# Patient Record
Sex: MALE | Race: WHITE | NOT HISPANIC OR LATINO | Employment: OTHER | URBAN - METROPOLITAN AREA
[De-identification: names, ages, dates, MRNs, and addresses within clinical notes are randomized per-mention and may not be internally consistent; named-entity substitution may affect disease eponyms.]

---

## 2020-10-18 ENCOUNTER — ANESTHESIA (INPATIENT)
Dept: PERIOP | Facility: HOSPITAL | Age: 70
DRG: 661 | End: 2020-10-18
Payer: MEDICARE

## 2020-10-18 ENCOUNTER — APPOINTMENT (INPATIENT)
Dept: RADIOLOGY | Facility: HOSPITAL | Age: 70
DRG: 661 | End: 2020-10-18
Payer: MEDICARE

## 2020-10-18 ENCOUNTER — APPOINTMENT (EMERGENCY)
Dept: RADIOLOGY | Facility: HOSPITAL | Age: 70
DRG: 661 | End: 2020-10-18
Payer: MEDICARE

## 2020-10-18 ENCOUNTER — HOSPITAL ENCOUNTER (INPATIENT)
Facility: HOSPITAL | Age: 70
LOS: 2 days | Discharge: HOME | DRG: 661 | End: 2020-10-20
Attending: EMERGENCY MEDICINE | Admitting: INTERNAL MEDICINE
Payer: MEDICARE

## 2020-10-18 ENCOUNTER — ANESTHESIA EVENT (INPATIENT)
Dept: PERIOP | Facility: HOSPITAL | Age: 70
DRG: 661 | End: 2020-10-18
Payer: MEDICARE

## 2020-10-18 VITALS — HEART RATE: 93 BPM

## 2020-10-18 DIAGNOSIS — N19 RENAL FAILURE: Primary | ICD-10-CM

## 2020-10-18 DIAGNOSIS — N20.0 KIDNEY STONE: ICD-10-CM

## 2020-10-18 DIAGNOSIS — N26.1 ATROPHY OF LEFT KIDNEY: ICD-10-CM

## 2020-10-18 DIAGNOSIS — N20.0 KIDNEY STONE ON RIGHT SIDE: ICD-10-CM

## 2020-10-18 DIAGNOSIS — N13.30 HYDRONEPHROSIS: ICD-10-CM

## 2020-10-18 PROBLEM — D72.829 LEUKOCYTOSIS: Status: ACTIVE | Noted: 2020-10-18

## 2020-10-18 PROBLEM — N17.9 ACUTE KIDNEY INJURY (HCC): Status: ACTIVE | Noted: 2020-10-18

## 2020-10-18 LAB
ALBUMIN SERPL BCP-MCNC: 3.5 G/DL (ref 3.5–5)
ALP SERPL-CCNC: 48 U/L (ref 46–116)
ALT SERPL W P-5'-P-CCNC: 40 U/L (ref 12–78)
ANION GAP SERPL CALCULATED.3IONS-SCNC: 11 MMOL/L (ref 4–13)
ANION GAP SERPL CALCULATED.3IONS-SCNC: 12 MMOL/L (ref 4–13)
AST SERPL W P-5'-P-CCNC: 61 U/L (ref 5–45)
BASOPHILS # BLD AUTO: 0.02 THOUSANDS/ΜL (ref 0–0.1)
BASOPHILS NFR BLD AUTO: 0 % (ref 0–1)
BILIRUB SERPL-MCNC: 0.7 MG/DL (ref 0.2–1)
BUN SERPL-MCNC: 34 MG/DL (ref 5–25)
BUN SERPL-MCNC: 36 MG/DL (ref 5–25)
CALCIUM SERPL-MCNC: 8.8 MG/DL (ref 8.3–10.1)
CALCIUM SERPL-MCNC: 9.1 MG/DL (ref 8.3–10.1)
CHLORIDE SERPL-SCNC: 101 MMOL/L (ref 100–108)
CHLORIDE SERPL-SCNC: 102 MMOL/L (ref 100–108)
CO2 SERPL-SCNC: 22 MMOL/L (ref 21–32)
CO2 SERPL-SCNC: 25 MMOL/L (ref 21–32)
CREAT SERPL-MCNC: 3.73 MG/DL (ref 0.6–1.3)
CREAT SERPL-MCNC: 4.25 MG/DL (ref 0.6–1.3)
EOSINOPHIL # BLD AUTO: 0 THOUSAND/ΜL (ref 0–0.61)
EOSINOPHIL NFR BLD AUTO: 0 % (ref 0–6)
ERYTHROCYTE [DISTWIDTH] IN BLOOD BY AUTOMATED COUNT: 13.2 % (ref 11.6–15.1)
GFR SERPL CREATININE-BSD FRML MDRD: 13 ML/MIN/1.73SQ M
GFR SERPL CREATININE-BSD FRML MDRD: 16 ML/MIN/1.73SQ M
GLUCOSE SERPL-MCNC: 116 MG/DL (ref 65–140)
GLUCOSE SERPL-MCNC: 122 MG/DL (ref 65–140)
HCT VFR BLD AUTO: 48.3 % (ref 36.5–49.3)
HGB BLD-MCNC: 16 G/DL (ref 12–17)
IMM GRANULOCYTES # BLD AUTO: 0.05 THOUSAND/UL (ref 0–0.2)
IMM GRANULOCYTES NFR BLD AUTO: 0 % (ref 0–2)
LYMPHOCYTES # BLD AUTO: 0.85 THOUSANDS/ΜL (ref 0.6–4.47)
LYMPHOCYTES NFR BLD AUTO: 6 % (ref 14–44)
MCH RBC QN AUTO: 31 PG (ref 26.8–34.3)
MCHC RBC AUTO-ENTMCNC: 33.1 G/DL (ref 31.4–37.4)
MCV RBC AUTO: 94 FL (ref 82–98)
MONOCYTES # BLD AUTO: 0.81 THOUSAND/ΜL (ref 0.17–1.22)
MONOCYTES NFR BLD AUTO: 6 % (ref 4–12)
NEUTROPHILS # BLD AUTO: 13.13 THOUSANDS/ΜL (ref 1.85–7.62)
NEUTS SEG NFR BLD AUTO: 88 % (ref 43–75)
NRBC BLD AUTO-RTO: 0 /100 WBCS
PLATELET # BLD AUTO: 234 THOUSANDS/UL (ref 149–390)
PMV BLD AUTO: 9.6 FL (ref 8.9–12.7)
POTASSIUM SERPL-SCNC: 4.8 MMOL/L (ref 3.5–5.3)
POTASSIUM SERPL-SCNC: 5.8 MMOL/L (ref 3.5–5.3)
PROT SERPL-MCNC: 8 G/DL (ref 6.4–8.2)
RBC # BLD AUTO: 5.16 MILLION/UL (ref 3.88–5.62)
SARS-COV-2 RNA RESP QL NAA+PROBE: NEGATIVE
SODIUM SERPL-SCNC: 136 MMOL/L (ref 136–145)
SODIUM SERPL-SCNC: 137 MMOL/L (ref 136–145)
WBC # BLD AUTO: 14.86 THOUSAND/UL (ref 4.31–10.16)

## 2020-10-18 PROCEDURE — 85025 COMPLETE CBC W/AUTO DIFF WBC: CPT | Performed by: PHYSICIAN ASSISTANT

## 2020-10-18 PROCEDURE — 74420 UROGRAPHY RTRGR +-KUB: CPT

## 2020-10-18 PROCEDURE — 96374 THER/PROPH/DIAG INJ IV PUSH: CPT

## 2020-10-18 PROCEDURE — 99285 EMERGENCY DEPT VISIT HI MDM: CPT | Performed by: PHYSICIAN ASSISTANT

## 2020-10-18 PROCEDURE — 88300 SURGICAL PATH GROSS: CPT | Performed by: PATHOLOGY

## 2020-10-18 PROCEDURE — 96375 TX/PRO/DX INJ NEW DRUG ADDON: CPT

## 2020-10-18 PROCEDURE — BT1D1ZZ FLUOROSCOPY OF RIGHT KIDNEY, URETER AND BLADDER USING LOW OSMOLAR CONTRAST: ICD-10-PCS | Performed by: UROLOGY

## 2020-10-18 PROCEDURE — 99223 1ST HOSP IP/OBS HIGH 75: CPT | Performed by: INTERNAL MEDICINE

## 2020-10-18 PROCEDURE — C1769 GUIDE WIRE: HCPCS | Performed by: UROLOGY

## 2020-10-18 PROCEDURE — 0T768DZ DILATION OF RIGHT URETER WITH INTRALUMINAL DEVICE, VIA NATURAL OR ARTIFICIAL OPENING ENDOSCOPIC: ICD-10-PCS | Performed by: UROLOGY

## 2020-10-18 PROCEDURE — 74176 CT ABD & PELVIS W/O CONTRAST: CPT

## 2020-10-18 PROCEDURE — 82360 CALCULUS ASSAY QUANT: CPT | Performed by: UROLOGY

## 2020-10-18 PROCEDURE — G1004 CDSM NDSC: HCPCS

## 2020-10-18 PROCEDURE — 0TC68ZZ EXTIRPATION OF MATTER FROM RIGHT URETER, VIA NATURAL OR ARTIFICIAL OPENING ENDOSCOPIC: ICD-10-PCS | Performed by: UROLOGY

## 2020-10-18 PROCEDURE — 80053 COMPREHEN METABOLIC PANEL: CPT | Performed by: PHYSICIAN ASSISTANT

## 2020-10-18 PROCEDURE — C2617 STENT, NON-COR, TEM W/O DEL: HCPCS | Performed by: UROLOGY

## 2020-10-18 PROCEDURE — 36415 COLL VENOUS BLD VENIPUNCTURE: CPT | Performed by: PHYSICIAN ASSISTANT

## 2020-10-18 PROCEDURE — 99285 EMERGENCY DEPT VISIT HI MDM: CPT

## 2020-10-18 PROCEDURE — 87635 SARS-COV-2 COVID-19 AMP PRB: CPT | Performed by: PHYSICIAN ASSISTANT

## 2020-10-18 PROCEDURE — 93005 ELECTROCARDIOGRAM TRACING: CPT

## 2020-10-18 PROCEDURE — 80048 BASIC METABOLIC PNL TOTAL CA: CPT | Performed by: PHYSICIAN ASSISTANT

## 2020-10-18 PROCEDURE — 96361 HYDRATE IV INFUSION ADD-ON: CPT

## 2020-10-18 DEVICE — URETERAL STENT 6 FR X 26 CM INLAY: Type: IMPLANTABLE DEVICE | Site: URETER | Status: FUNCTIONAL

## 2020-10-18 RX ORDER — POLYETHYLENE GLYCOL 3350 17 G/17G
17 POWDER, FOR SOLUTION ORAL DAILY PRN
Status: DISCONTINUED | OUTPATIENT
Start: 2020-10-18 | End: 2020-10-20 | Stop reason: HOSPADM

## 2020-10-18 RX ORDER — ONDANSETRON 2 MG/ML
4 INJECTION INTRAMUSCULAR; INTRAVENOUS ONCE AS NEEDED
Status: DISCONTINUED | OUTPATIENT
Start: 2020-10-18 | End: 2020-10-18 | Stop reason: HOSPADM

## 2020-10-18 RX ORDER — SODIUM CHLORIDE 9 MG/ML
125 INJECTION, SOLUTION INTRAVENOUS CONTINUOUS
Status: DISCONTINUED | OUTPATIENT
Start: 2020-10-18 | End: 2020-10-19

## 2020-10-18 RX ORDER — ACETAMINOPHEN 325 MG/1
650 TABLET ORAL EVERY 6 HOURS PRN
Status: DISCONTINUED | OUTPATIENT
Start: 2020-10-18 | End: 2020-10-20 | Stop reason: HOSPADM

## 2020-10-18 RX ORDER — FENTANYL CITRATE/PF 50 MCG/ML
50 SYRINGE (ML) INJECTION
Status: DISCONTINUED | OUTPATIENT
Start: 2020-10-18 | End: 2020-10-18 | Stop reason: HOSPADM

## 2020-10-18 RX ORDER — LIDOCAINE HYDROCHLORIDE 10 MG/ML
INJECTION, SOLUTION EPIDURAL; INFILTRATION; INTRACAUDAL; PERINEURAL AS NEEDED
Status: DISCONTINUED | OUTPATIENT
Start: 2020-10-18 | End: 2020-10-18

## 2020-10-18 RX ORDER — ONDANSETRON 2 MG/ML
4 INJECTION INTRAMUSCULAR; INTRAVENOUS ONCE
Status: COMPLETED | OUTPATIENT
Start: 2020-10-18 | End: 2020-10-18

## 2020-10-18 RX ORDER — PROPOFOL 10 MG/ML
INJECTION, EMULSION INTRAVENOUS AS NEEDED
Status: DISCONTINUED | OUTPATIENT
Start: 2020-10-18 | End: 2020-10-18

## 2020-10-18 RX ORDER — HEPARIN SODIUM 5000 [USP'U]/ML
5000 INJECTION, SOLUTION INTRAVENOUS; SUBCUTANEOUS EVERY 8 HOURS SCHEDULED
Status: DISCONTINUED | OUTPATIENT
Start: 2020-10-18 | End: 2020-10-20 | Stop reason: HOSPADM

## 2020-10-18 RX ORDER — SODIUM CHLORIDE 9 MG/ML
75 INJECTION, SOLUTION INTRAVENOUS CONTINUOUS
Status: DISCONTINUED | OUTPATIENT
Start: 2020-10-18 | End: 2020-10-18 | Stop reason: SDUPTHER

## 2020-10-18 RX ORDER — MORPHINE SULFATE 4 MG/ML
4 INJECTION, SOLUTION INTRAMUSCULAR; INTRAVENOUS ONCE
Status: COMPLETED | OUTPATIENT
Start: 2020-10-18 | End: 2020-10-18

## 2020-10-18 RX ORDER — CEFAZOLIN SODIUM 1 G/50ML
1000 SOLUTION INTRAVENOUS EVERY 8 HOURS
Status: COMPLETED | OUTPATIENT
Start: 2020-10-18 | End: 2020-10-19

## 2020-10-18 RX ORDER — PROPOFOL 10 MG/ML
INJECTION, EMULSION INTRAVENOUS CONTINUOUS PRN
Status: DISCONTINUED | OUTPATIENT
Start: 2020-10-18 | End: 2020-10-18

## 2020-10-18 RX ORDER — SODIUM CHLORIDE 9 MG/ML
INJECTION, SOLUTION INTRAVENOUS CONTINUOUS PRN
Status: DISCONTINUED | OUTPATIENT
Start: 2020-10-18 | End: 2020-10-18

## 2020-10-18 RX ORDER — MAGNESIUM HYDROXIDE 1200 MG/15ML
LIQUID ORAL AS NEEDED
Status: DISCONTINUED | OUTPATIENT
Start: 2020-10-18 | End: 2020-10-18 | Stop reason: HOSPADM

## 2020-10-18 RX ORDER — CEFAZOLIN SODIUM 1 G/50ML
1000 SOLUTION INTRAVENOUS
Status: DISCONTINUED | OUTPATIENT
Start: 2020-10-18 | End: 2020-10-18 | Stop reason: HOSPADM

## 2020-10-18 RX ORDER — MEPERIDINE HYDROCHLORIDE 25 MG/ML
12.5 INJECTION INTRAMUSCULAR; INTRAVENOUS; SUBCUTANEOUS
Status: DISCONTINUED | OUTPATIENT
Start: 2020-10-18 | End: 2020-10-18 | Stop reason: HOSPADM

## 2020-10-18 RX ORDER — FENTANYL CITRATE 50 UG/ML
INJECTION, SOLUTION INTRAMUSCULAR; INTRAVENOUS AS NEEDED
Status: DISCONTINUED | OUTPATIENT
Start: 2020-10-18 | End: 2020-10-18

## 2020-10-18 RX ORDER — PROMETHAZINE HYDROCHLORIDE 25 MG/ML
12.5 INJECTION, SOLUTION INTRAMUSCULAR; INTRAVENOUS ONCE AS NEEDED
Status: DISCONTINUED | OUTPATIENT
Start: 2020-10-18 | End: 2020-10-18 | Stop reason: HOSPADM

## 2020-10-18 RX ORDER — ONDANSETRON 2 MG/ML
4 INJECTION INTRAMUSCULAR; INTRAVENOUS EVERY 6 HOURS PRN
Status: DISCONTINUED | OUTPATIENT
Start: 2020-10-18 | End: 2020-10-20 | Stop reason: HOSPADM

## 2020-10-18 RX ADMIN — SODIUM CHLORIDE: 0.9 INJECTION, SOLUTION INTRAVENOUS at 15:17

## 2020-10-18 RX ADMIN — MORPHINE SULFATE 4 MG: 4 INJECTION INTRAVENOUS at 11:43

## 2020-10-18 RX ADMIN — HEPARIN SODIUM 5000 UNITS: 5000 INJECTION INTRAVENOUS; SUBCUTANEOUS at 21:52

## 2020-10-18 RX ADMIN — FENTANYL CITRATE 50 MCG: 50 INJECTION, SOLUTION INTRAMUSCULAR; INTRAVENOUS at 15:32

## 2020-10-18 RX ADMIN — PROPOFOL 120 MCG/KG/MIN: 10 INJECTION, EMULSION INTRAVENOUS at 15:22

## 2020-10-18 RX ADMIN — FENTANYL CITRATE 25 MCG: 50 INJECTION, SOLUTION INTRAMUSCULAR; INTRAVENOUS at 15:38

## 2020-10-18 RX ADMIN — ONDANSETRON 4 MG: 2 INJECTION INTRAMUSCULAR; INTRAVENOUS at 11:42

## 2020-10-18 RX ADMIN — SODIUM CHLORIDE 75 ML/HR: 0.9 INJECTION, SOLUTION INTRAVENOUS at 17:08

## 2020-10-18 RX ADMIN — CEFAZOLIN SODIUM 1000 MG: 1 SOLUTION INTRAVENOUS at 15:22

## 2020-10-18 RX ADMIN — SODIUM CHLORIDE 1000 ML: 0.9 INJECTION, SOLUTION INTRAVENOUS at 11:41

## 2020-10-18 RX ADMIN — CEFAZOLIN SODIUM 1000 MG: 1 SOLUTION INTRAVENOUS at 23:35

## 2020-10-18 RX ADMIN — FENTANYL CITRATE 25 MCG: 50 INJECTION, SOLUTION INTRAMUSCULAR; INTRAVENOUS at 15:40

## 2020-10-18 RX ADMIN — PROPOFOL 100 MG: 10 INJECTION, EMULSION INTRAVENOUS at 15:22

## 2020-10-18 RX ADMIN — HEPARIN SODIUM 5000 UNITS: 5000 INJECTION INTRAVENOUS; SUBCUTANEOUS at 17:08

## 2020-10-18 RX ADMIN — LIDOCAINE HYDROCHLORIDE 50 MG: 10 INJECTION, SOLUTION EPIDURAL; INFILTRATION; INTRACAUDAL; PERINEURAL at 15:22

## 2020-10-19 PROBLEM — N23 RENAL COLIC ON RIGHT SIDE: Status: ACTIVE | Noted: 2020-10-18

## 2020-10-19 PROBLEM — E87.5 HYPERKALEMIA: Status: ACTIVE | Noted: 2020-10-19

## 2020-10-19 LAB
ANION GAP SERPL CALCULATED.3IONS-SCNC: 9 MMOL/L (ref 4–13)
ATRIAL RATE: 75 BPM
BACTERIA UR QL AUTO: ABNORMAL /HPF
BILIRUB UR QL STRIP: NEGATIVE
BUN SERPL-MCNC: 36 MG/DL (ref 5–25)
CALCIUM SERPL-MCNC: 7.8 MG/DL (ref 8.3–10.1)
CHLORIDE SERPL-SCNC: 106 MMOL/L (ref 100–108)
CLARITY UR: ABNORMAL
CO2 SERPL-SCNC: 24 MMOL/L (ref 21–32)
COLOR UR: YELLOW
CREAT SERPL-MCNC: 2.56 MG/DL (ref 0.6–1.3)
ERYTHROCYTE [DISTWIDTH] IN BLOOD BY AUTOMATED COUNT: 13.5 % (ref 11.6–15.1)
GFR SERPL CREATININE-BSD FRML MDRD: 25 ML/MIN/1.73SQ M
GLUCOSE SERPL-MCNC: 89 MG/DL (ref 65–140)
GLUCOSE UR STRIP-MCNC: NEGATIVE MG/DL
HCT VFR BLD AUTO: 43.5 % (ref 36.5–49.3)
HGB BLD-MCNC: 13.7 G/DL (ref 12–17)
HGB UR QL STRIP.AUTO: ABNORMAL
KETONES UR STRIP-MCNC: NEGATIVE MG/DL
LEUKOCYTE ESTERASE UR QL STRIP: ABNORMAL
MAGNESIUM SERPL-MCNC: 2 MG/DL (ref 1.6–2.6)
MCH RBC QN AUTO: 30.8 PG (ref 26.8–34.3)
MCHC RBC AUTO-ENTMCNC: 31.5 G/DL (ref 31.4–37.4)
MCV RBC AUTO: 98 FL (ref 82–98)
NITRITE UR QL STRIP: NEGATIVE
NON-SQ EPI CELLS URNS QL MICRO: ABNORMAL /HPF
P AXIS: 14 DEGREES
PH UR STRIP.AUTO: 5.5 [PH]
PLATELET # BLD AUTO: 206 THOUSANDS/UL (ref 149–390)
PMV BLD AUTO: 9.7 FL (ref 8.9–12.7)
POTASSIUM SERPL-SCNC: 3.7 MMOL/L (ref 3.5–5.3)
PR INTERVAL: 130 MS
PROT UR STRIP-MCNC: ABNORMAL MG/DL
QRS AXIS: -4 DEGREES
QRSD INTERVAL: 136 MS
QT INTERVAL: 396 MS
QTC INTERVAL: 442 MS
RBC # BLD AUTO: 4.45 MILLION/UL (ref 3.88–5.62)
RBC #/AREA URNS AUTO: ABNORMAL /HPF
SODIUM SERPL-SCNC: 139 MMOL/L (ref 136–145)
SP GR UR STRIP.AUTO: 1.02 (ref 1–1.03)
T WAVE AXIS: 12 DEGREES
UROBILINOGEN UR QL STRIP.AUTO: 0.2 E.U./DL
VENTRICULAR RATE: 75 BPM
WBC # BLD AUTO: 8.97 THOUSAND/UL (ref 4.31–10.16)
WBC #/AREA URNS AUTO: ABNORMAL /HPF

## 2020-10-19 PROCEDURE — 81001 URINALYSIS AUTO W/SCOPE: CPT | Performed by: NURSE PRACTITIONER

## 2020-10-19 PROCEDURE — 83735 ASSAY OF MAGNESIUM: CPT | Performed by: NURSE PRACTITIONER

## 2020-10-19 PROCEDURE — 80048 BASIC METABOLIC PNL TOTAL CA: CPT | Performed by: NURSE PRACTITIONER

## 2020-10-19 PROCEDURE — 93010 ELECTROCARDIOGRAM REPORT: CPT | Performed by: INTERNAL MEDICINE

## 2020-10-19 PROCEDURE — 85027 COMPLETE CBC AUTOMATED: CPT | Performed by: NURSE PRACTITIONER

## 2020-10-19 PROCEDURE — 99232 SBSQ HOSP IP/OBS MODERATE 35: CPT | Performed by: INTERNAL MEDICINE

## 2020-10-19 PROCEDURE — 87086 URINE CULTURE/COLONY COUNT: CPT | Performed by: NURSE PRACTITIONER

## 2020-10-19 RX ORDER — SODIUM CHLORIDE 9 MG/ML
100 INJECTION, SOLUTION INTRAVENOUS CONTINUOUS
Status: DISCONTINUED | OUTPATIENT
Start: 2020-10-19 | End: 2020-10-20 | Stop reason: HOSPADM

## 2020-10-19 RX ORDER — CEFTRIAXONE 1 G/50ML
1000 INJECTION, SOLUTION INTRAVENOUS EVERY 24 HOURS
Status: DISCONTINUED | OUTPATIENT
Start: 2020-10-19 | End: 2020-10-20 | Stop reason: HOSPADM

## 2020-10-19 RX ADMIN — SODIUM CHLORIDE 100 ML/HR: 0.9 INJECTION, SOLUTION INTRAVENOUS at 22:19

## 2020-10-19 RX ADMIN — SODIUM CHLORIDE 125 ML/HR: 0.9 INJECTION, SOLUTION INTRAVENOUS at 02:37

## 2020-10-19 RX ADMIN — CEFAZOLIN SODIUM 1000 MG: 1 SOLUTION INTRAVENOUS at 07:34

## 2020-10-19 RX ADMIN — HEPARIN SODIUM 5000 UNITS: 5000 INJECTION INTRAVENOUS; SUBCUTANEOUS at 22:19

## 2020-10-19 RX ADMIN — SODIUM CHLORIDE 100 ML/HR: 0.9 INJECTION, SOLUTION INTRAVENOUS at 14:43

## 2020-10-19 RX ADMIN — CEFTRIAXONE 1000 MG: 1 INJECTION, SOLUTION INTRAVENOUS at 16:51

## 2020-10-19 RX ADMIN — HEPARIN SODIUM 5000 UNITS: 5000 INJECTION INTRAVENOUS; SUBCUTANEOUS at 14:10

## 2020-10-19 RX ADMIN — HEPARIN SODIUM 5000 UNITS: 5000 INJECTION INTRAVENOUS; SUBCUTANEOUS at 05:06

## 2020-10-20 VITALS
DIASTOLIC BLOOD PRESSURE: 91 MMHG | RESPIRATION RATE: 18 BRPM | TEMPERATURE: 98.2 F | OXYGEN SATURATION: 100 % | SYSTOLIC BLOOD PRESSURE: 152 MMHG | HEIGHT: 66 IN | WEIGHT: 205.03 LBS | BODY MASS INDEX: 32.95 KG/M2 | HEART RATE: 78 BPM

## 2020-10-20 LAB
ALBUMIN SERPL BCP-MCNC: 2.8 G/DL (ref 3.5–5)
ALP SERPL-CCNC: 38 U/L (ref 46–116)
ALT SERPL W P-5'-P-CCNC: 24 U/L (ref 12–78)
ANION GAP SERPL CALCULATED.3IONS-SCNC: 8 MMOL/L (ref 4–13)
AST SERPL W P-5'-P-CCNC: 17 U/L (ref 5–45)
BACTERIA UR CULT: NORMAL
BASOPHILS # BLD AUTO: 0.04 THOUSANDS/ΜL (ref 0–0.1)
BASOPHILS NFR BLD AUTO: 1 % (ref 0–1)
BILIRUB SERPL-MCNC: 0.5 MG/DL (ref 0.2–1)
BUN SERPL-MCNC: 26 MG/DL (ref 5–25)
CALCIUM ALBUM COR SERPL-MCNC: 8.8 MG/DL (ref 8.3–10.1)
CALCIUM SERPL-MCNC: 7.8 MG/DL (ref 8.3–10.1)
CHLORIDE SERPL-SCNC: 107 MMOL/L (ref 100–108)
CO2 SERPL-SCNC: 24 MMOL/L (ref 21–32)
CREAT SERPL-MCNC: 1.39 MG/DL (ref 0.6–1.3)
EOSINOPHIL # BLD AUTO: 0.27 THOUSAND/ΜL (ref 0–0.61)
EOSINOPHIL NFR BLD AUTO: 5 % (ref 0–6)
ERYTHROCYTE [DISTWIDTH] IN BLOOD BY AUTOMATED COUNT: 13.3 % (ref 11.6–15.1)
GFR SERPL CREATININE-BSD FRML MDRD: 52 ML/MIN/1.73SQ M
GLUCOSE SERPL-MCNC: 87 MG/DL (ref 65–140)
HCT VFR BLD AUTO: 43.6 % (ref 36.5–49.3)
HGB BLD-MCNC: 13.9 G/DL (ref 12–17)
IMM GRANULOCYTES # BLD AUTO: 0.02 THOUSAND/UL (ref 0–0.2)
IMM GRANULOCYTES NFR BLD AUTO: 0 % (ref 0–2)
LYMPHOCYTES # BLD AUTO: 1.41 THOUSANDS/ΜL (ref 0.6–4.47)
LYMPHOCYTES NFR BLD AUTO: 23 % (ref 14–44)
MAGNESIUM SERPL-MCNC: 1.8 MG/DL (ref 1.6–2.6)
MCH RBC QN AUTO: 30.8 PG (ref 26.8–34.3)
MCHC RBC AUTO-ENTMCNC: 31.9 G/DL (ref 31.4–37.4)
MCV RBC AUTO: 97 FL (ref 82–98)
MONOCYTES # BLD AUTO: 0.52 THOUSAND/ΜL (ref 0.17–1.22)
MONOCYTES NFR BLD AUTO: 9 % (ref 4–12)
NEUTROPHILS # BLD AUTO: 3.77 THOUSANDS/ΜL (ref 1.85–7.62)
NEUTS SEG NFR BLD AUTO: 62 % (ref 43–75)
NRBC BLD AUTO-RTO: 0 /100 WBCS
PHOSPHATE SERPL-MCNC: 2.5 MG/DL (ref 2.3–4.1)
PLATELET # BLD AUTO: 206 THOUSANDS/UL (ref 149–390)
PMV BLD AUTO: 9.5 FL (ref 8.9–12.7)
POTASSIUM SERPL-SCNC: 3.9 MMOL/L (ref 3.5–5.3)
PROT SERPL-MCNC: 6.7 G/DL (ref 6.4–8.2)
RBC # BLD AUTO: 4.51 MILLION/UL (ref 3.88–5.62)
SODIUM SERPL-SCNC: 139 MMOL/L (ref 136–145)
WBC # BLD AUTO: 6.03 THOUSAND/UL (ref 4.31–10.16)

## 2020-10-20 PROCEDURE — 84100 ASSAY OF PHOSPHORUS: CPT | Performed by: NURSE PRACTITIONER

## 2020-10-20 PROCEDURE — 99239 HOSP IP/OBS DSCHRG MGMT >30: CPT | Performed by: NURSE PRACTITIONER

## 2020-10-20 PROCEDURE — 85025 COMPLETE CBC W/AUTO DIFF WBC: CPT | Performed by: NURSE PRACTITIONER

## 2020-10-20 PROCEDURE — 80053 COMPREHEN METABOLIC PANEL: CPT | Performed by: NURSE PRACTITIONER

## 2020-10-20 PROCEDURE — 83735 ASSAY OF MAGNESIUM: CPT | Performed by: NURSE PRACTITIONER

## 2020-10-20 RX ADMIN — HEPARIN SODIUM 5000 UNITS: 5000 INJECTION INTRAVENOUS; SUBCUTANEOUS at 05:18

## 2020-10-26 LAB
COLOR STONE: NORMAL
COMMENT-STONE3: NORMAL
COMPOSITION: NORMAL
LABORATORY COMMENT REPORT: NORMAL
PHOTO: NORMAL
SIZE STONE: NORMAL MM
SPEC SOURCE SUBJ: NORMAL
STONE ANALYSIS-IMP: NORMAL
URATE MFR STONE: 100 %
WT STONE: 16 MG

## 2020-10-28 ENCOUNTER — TRANSCRIBE ORDERS (OUTPATIENT)
Dept: ADMINISTRATIVE | Facility: HOSPITAL | Age: 70
End: 2020-10-28

## 2020-10-28 ENCOUNTER — HOSPITAL ENCOUNTER (OUTPATIENT)
Dept: RADIOLOGY | Facility: HOSPITAL | Age: 70
Discharge: HOME | End: 2020-10-28
Attending: SPECIALIST
Payer: MEDICARE

## 2020-10-28 DIAGNOSIS — N20.0 URIC ACID NEPHROLITHIASIS: ICD-10-CM

## 2020-10-28 DIAGNOSIS — N20.0 URIC ACID NEPHROLITHIASIS: Primary | ICD-10-CM

## 2020-10-28 DIAGNOSIS — N20.0 RENAL CALCULUS: ICD-10-CM

## 2020-10-28 PROCEDURE — 74018 RADEX ABDOMEN 1 VIEW: CPT

## 2020-11-05 DIAGNOSIS — U07.1 COVID-19: ICD-10-CM

## 2020-11-05 PROCEDURE — U0003 INFECTIOUS AGENT DETECTION BY NUCLEIC ACID (DNA OR RNA); SEVERE ACUTE RESPIRATORY SYNDROME CORONAVIRUS 2 (SARS-COV-2) (CORONAVIRUS DISEASE [COVID-19]), AMPLIFIED PROBE TECHNIQUE, MAKING USE OF HIGH THROUGHPUT TECHNOLOGIES AS DESCRIBED BY CMS-2020-01-R: HCPCS | Performed by: SPECIALIST

## 2020-11-06 LAB — SARS-COV-2 RNA SPEC QL NAA+PROBE: NOT DETECTED

## 2020-11-10 ENCOUNTER — ANESTHESIA EVENT (OUTPATIENT)
Dept: PERIOP | Facility: HOSPITAL | Age: 70
End: 2020-11-10
Payer: MEDICARE

## 2020-11-11 ENCOUNTER — HOSPITAL ENCOUNTER (OUTPATIENT)
Facility: HOSPITAL | Age: 70
Setting detail: OUTPATIENT SURGERY
Discharge: HOME | End: 2020-11-11
Attending: SPECIALIST | Admitting: SPECIALIST
Payer: MEDICARE

## 2020-11-11 ENCOUNTER — ANESTHESIA (OUTPATIENT)
Dept: PERIOP | Facility: HOSPITAL | Age: 70
End: 2020-11-11
Payer: MEDICARE

## 2020-11-11 ENCOUNTER — APPOINTMENT (OUTPATIENT)
Dept: RADIOLOGY | Facility: HOSPITAL | Age: 70
End: 2020-11-11
Attending: SPECIALIST
Payer: MEDICARE

## 2020-11-11 VITALS
TEMPERATURE: 97.6 F | SYSTOLIC BLOOD PRESSURE: 124 MMHG | DIASTOLIC BLOOD PRESSURE: 81 MMHG | RESPIRATION RATE: 18 BRPM | BODY MASS INDEX: 32.18 KG/M2 | HEIGHT: 67 IN | WEIGHT: 205 LBS | OXYGEN SATURATION: 98 % | HEART RATE: 74 BPM

## 2020-11-11 VITALS — HEART RATE: 87 BPM

## 2020-11-11 DIAGNOSIS — N20.0 RENAL CALCULUS: ICD-10-CM

## 2020-11-11 PROCEDURE — 74018 RADEX ABDOMEN 1 VIEW: CPT

## 2020-11-11 RX ORDER — ONDANSETRON 2 MG/ML
4 INJECTION INTRAMUSCULAR; INTRAVENOUS ONCE AS NEEDED
Status: DISCONTINUED | OUTPATIENT
Start: 2020-11-11 | End: 2020-11-11 | Stop reason: HOSPADM

## 2020-11-11 RX ORDER — ONDANSETRON 2 MG/ML
INJECTION INTRAMUSCULAR; INTRAVENOUS AS NEEDED
Status: DISCONTINUED | OUTPATIENT
Start: 2020-11-11 | End: 2020-11-11

## 2020-11-11 RX ORDER — FENTANYL CITRATE 50 UG/ML
INJECTION, SOLUTION INTRAMUSCULAR; INTRAVENOUS AS NEEDED
Status: DISCONTINUED | OUTPATIENT
Start: 2020-11-11 | End: 2020-11-11

## 2020-11-11 RX ORDER — SODIUM CHLORIDE, SODIUM LACTATE, POTASSIUM CHLORIDE, CALCIUM CHLORIDE 600; 310; 30; 20 MG/100ML; MG/100ML; MG/100ML; MG/100ML
125 INJECTION, SOLUTION INTRAVENOUS CONTINUOUS
Status: DISCONTINUED | OUTPATIENT
Start: 2020-11-11 | End: 2020-11-11 | Stop reason: HOSPADM

## 2020-11-11 RX ORDER — DEXAMETHASONE SODIUM PHOSPHATE 4 MG/ML
INJECTION, SOLUTION INTRA-ARTICULAR; INTRALESIONAL; INTRAMUSCULAR; INTRAVENOUS; SOFT TISSUE AS NEEDED
Status: DISCONTINUED | OUTPATIENT
Start: 2020-11-11 | End: 2020-11-11

## 2020-11-11 RX ORDER — MEPERIDINE HYDROCHLORIDE 25 MG/ML
12.5 INJECTION INTRAMUSCULAR; INTRAVENOUS; SUBCUTANEOUS
Status: DISCONTINUED | OUTPATIENT
Start: 2020-11-11 | End: 2020-11-11 | Stop reason: HOSPADM

## 2020-11-11 RX ORDER — PROPOFOL 10 MG/ML
INJECTION, EMULSION INTRAVENOUS AS NEEDED
Status: DISCONTINUED | OUTPATIENT
Start: 2020-11-11 | End: 2020-11-11

## 2020-11-11 RX ORDER — MIDAZOLAM HYDROCHLORIDE 2 MG/2ML
INJECTION, SOLUTION INTRAMUSCULAR; INTRAVENOUS AS NEEDED
Status: DISCONTINUED | OUTPATIENT
Start: 2020-11-11 | End: 2020-11-11

## 2020-11-11 RX ORDER — PROPOFOL 10 MG/ML
INJECTION, EMULSION INTRAVENOUS CONTINUOUS PRN
Status: DISCONTINUED | OUTPATIENT
Start: 2020-11-11 | End: 2020-11-11

## 2020-11-11 RX ORDER — LEVOFLOXACIN 5 MG/ML
500 INJECTION, SOLUTION INTRAVENOUS ONCE
Status: COMPLETED | OUTPATIENT
Start: 2020-11-11 | End: 2020-11-11

## 2020-11-11 RX ADMIN — SODIUM CHLORIDE, SODIUM LACTATE, POTASSIUM CHLORIDE, AND CALCIUM CHLORIDE: .6; .31; .03; .02 INJECTION, SOLUTION INTRAVENOUS at 11:34

## 2020-11-11 RX ADMIN — FENTANYL CITRATE 50 MCG: 50 INJECTION, SOLUTION INTRAMUSCULAR; INTRAVENOUS at 10:44

## 2020-11-11 RX ADMIN — PHENYLEPHRINE HYDROCHLORIDE 2 DROP: 1 SPRAY NASAL at 11:04

## 2020-11-11 RX ADMIN — PROPOFOL 30 MG: 10 INJECTION, EMULSION INTRAVENOUS at 10:45

## 2020-11-11 RX ADMIN — PROPOFOL 110 MCG/KG/MIN: 10 INJECTION, EMULSION INTRAVENOUS at 10:44

## 2020-11-11 RX ADMIN — PHENYLEPHRINE HYDROCHLORIDE 100 MCG: 10 INJECTION INTRAVENOUS at 11:25

## 2020-11-11 RX ADMIN — PROPOFOL 150 MG: 10 INJECTION, EMULSION INTRAVENOUS at 11:07

## 2020-11-11 RX ADMIN — SODIUM CHLORIDE, SODIUM LACTATE, POTASSIUM CHLORIDE, AND CALCIUM CHLORIDE 125 ML/HR: .6; .31; .03; .02 INJECTION, SOLUTION INTRAVENOUS at 09:50

## 2020-11-11 RX ADMIN — FENTANYL CITRATE 50 MCG: 50 INJECTION, SOLUTION INTRAMUSCULAR; INTRAVENOUS at 10:49

## 2020-11-11 RX ADMIN — MIDAZOLAM 2 MG: 1 INJECTION INTRAMUSCULAR; INTRAVENOUS at 10:44

## 2020-11-11 RX ADMIN — DEXAMETHASONE SODIUM PHOSPHATE 4 MG: 4 INJECTION, SOLUTION INTRA-ARTICULAR; INTRALESIONAL; INTRAMUSCULAR; INTRAVENOUS; SOFT TISSUE at 11:02

## 2020-11-11 RX ADMIN — PHENYLEPHRINE HYDROCHLORIDE 100 MCG: 10 INJECTION INTRAVENOUS at 11:20

## 2020-11-11 RX ADMIN — PHENYLEPHRINE HYDROCHLORIDE 100 MCG: 10 INJECTION INTRAVENOUS at 11:13

## 2020-11-11 RX ADMIN — LEVOFLOXACIN 500 MG: 5 INJECTION, SOLUTION INTRAVENOUS at 09:49

## 2020-11-11 RX ADMIN — ONDANSETRON 4 MG: 2 INJECTION INTRAMUSCULAR; INTRAVENOUS at 11:03

## 2020-11-11 RX ADMIN — PROPOFOL 30 MG: 10 INJECTION, EMULSION INTRAVENOUS at 10:47

## 2020-11-23 ENCOUNTER — HOSPITAL ENCOUNTER (OUTPATIENT)
Dept: RADIOLOGY | Facility: HOSPITAL | Age: 70
Discharge: HOME | End: 2020-11-23
Attending: SPECIALIST
Payer: MEDICARE

## 2020-11-23 DIAGNOSIS — N20.0 RENAL CALCULUS: ICD-10-CM

## 2020-11-23 DIAGNOSIS — N20.0 URIC ACID NEPHROLITHIASIS: ICD-10-CM

## 2020-11-23 PROCEDURE — 74018 RADEX ABDOMEN 1 VIEW: CPT

## 2020-11-25 ENCOUNTER — ANESTHESIA EVENT (OUTPATIENT)
Dept: PERIOP | Facility: HOSPITAL | Age: 70
End: 2020-11-25
Payer: MEDICARE

## 2020-11-27 ENCOUNTER — APPOINTMENT (OUTPATIENT)
Dept: RADIOLOGY | Facility: HOSPITAL | Age: 70
End: 2020-11-27
Payer: MEDICARE

## 2020-11-27 ENCOUNTER — ANESTHESIA (OUTPATIENT)
Dept: PERIOP | Facility: HOSPITAL | Age: 70
End: 2020-11-27
Payer: MEDICARE

## 2020-11-27 ENCOUNTER — HOSPITAL ENCOUNTER (OUTPATIENT)
Facility: HOSPITAL | Age: 70
Setting detail: OUTPATIENT SURGERY
Discharge: HOME | End: 2020-11-27
Attending: SPECIALIST | Admitting: SPECIALIST
Payer: MEDICARE

## 2020-11-27 VITALS
SYSTOLIC BLOOD PRESSURE: 155 MMHG | HEART RATE: 72 BPM | OXYGEN SATURATION: 100 % | DIASTOLIC BLOOD PRESSURE: 78 MMHG | TEMPERATURE: 97.6 F | RESPIRATION RATE: 18 BRPM

## 2020-11-27 VITALS — HEART RATE: 88 BPM

## 2020-11-27 DIAGNOSIS — N20.0 CALCULUS OF KIDNEY: ICD-10-CM

## 2020-11-27 LAB
FLUAV RNA RESP QL NAA+PROBE: NEGATIVE
FLUBV RNA RESP QL NAA+PROBE: NEGATIVE
RSV RNA RESP QL NAA+PROBE: NEGATIVE
SARS-COV-2 RNA RESP QL NAA+PROBE: NEGATIVE

## 2020-11-27 PROCEDURE — C1769 GUIDE WIRE: HCPCS | Performed by: SPECIALIST

## 2020-11-27 PROCEDURE — 88300 SURGICAL PATH GROSS: CPT | Performed by: PATHOLOGY

## 2020-11-27 PROCEDURE — 74420 UROGRAPHY RTRGR +-KUB: CPT

## 2020-11-27 PROCEDURE — 0241U HB NFCT DS VIR RESP RNA 4 TRGT: CPT | Performed by: SPECIALIST

## 2020-11-27 PROCEDURE — C2617 STENT, NON-COR, TEM W/O DEL: HCPCS | Performed by: SPECIALIST

## 2020-11-27 PROCEDURE — 82360 CALCULUS ASSAY QUANT: CPT | Performed by: SPECIALIST

## 2020-11-27 PROCEDURE — C1894 INTRO/SHEATH, NON-LASER: HCPCS | Performed by: SPECIALIST

## 2020-11-27 DEVICE — INLAY URETERAL STENT W/O GUIDEWIRE
Type: IMPLANTABLE DEVICE | Site: URETER | Status: FUNCTIONAL
Brand: BARD® INLAY® URETERAL STENT

## 2020-11-27 RX ORDER — EPHEDRINE SULFATE 50 MG/ML
INJECTION INTRAVENOUS AS NEEDED
Status: DISCONTINUED | OUTPATIENT
Start: 2020-11-27 | End: 2020-11-27

## 2020-11-27 RX ORDER — FENTANYL CITRATE/PF 50 MCG/ML
25 SYRINGE (ML) INJECTION
Status: DISCONTINUED | OUTPATIENT
Start: 2020-11-27 | End: 2020-11-27 | Stop reason: HOSPADM

## 2020-11-27 RX ORDER — PROPOFOL 10 MG/ML
INJECTION, EMULSION INTRAVENOUS AS NEEDED
Status: DISCONTINUED | OUTPATIENT
Start: 2020-11-27 | End: 2020-11-27

## 2020-11-27 RX ORDER — MAGNESIUM HYDROXIDE 1200 MG/15ML
LIQUID ORAL AS NEEDED
Status: DISCONTINUED | OUTPATIENT
Start: 2020-11-27 | End: 2020-11-27 | Stop reason: HOSPADM

## 2020-11-27 RX ORDER — SODIUM CHLORIDE, SODIUM LACTATE, POTASSIUM CHLORIDE, CALCIUM CHLORIDE 600; 310; 30; 20 MG/100ML; MG/100ML; MG/100ML; MG/100ML
INJECTION, SOLUTION INTRAVENOUS CONTINUOUS PRN
Status: DISCONTINUED | OUTPATIENT
Start: 2020-11-27 | End: 2020-11-27

## 2020-11-27 RX ORDER — SODIUM CHLORIDE, SODIUM LACTATE, POTASSIUM CHLORIDE, CALCIUM CHLORIDE 600; 310; 30; 20 MG/100ML; MG/100ML; MG/100ML; MG/100ML
125 INJECTION, SOLUTION INTRAVENOUS CONTINUOUS
Status: DISCONTINUED | OUTPATIENT
Start: 2020-11-27 | End: 2020-11-27 | Stop reason: HOSPADM

## 2020-11-27 RX ORDER — ONDANSETRON 2 MG/ML
4 INJECTION INTRAMUSCULAR; INTRAVENOUS ONCE AS NEEDED
Status: COMPLETED | OUTPATIENT
Start: 2020-11-27 | End: 2020-11-27

## 2020-11-27 RX ORDER — FENTANYL CITRATE 50 UG/ML
INJECTION, SOLUTION INTRAMUSCULAR; INTRAVENOUS AS NEEDED
Status: DISCONTINUED | OUTPATIENT
Start: 2020-11-27 | End: 2020-11-27

## 2020-11-27 RX ORDER — DEXAMETHASONE SODIUM PHOSPHATE 4 MG/ML
INJECTION, SOLUTION INTRA-ARTICULAR; INTRALESIONAL; INTRAMUSCULAR; INTRAVENOUS; SOFT TISSUE AS NEEDED
Status: DISCONTINUED | OUTPATIENT
Start: 2020-11-27 | End: 2020-11-27

## 2020-11-27 RX ORDER — ONDANSETRON 2 MG/ML
INJECTION INTRAMUSCULAR; INTRAVENOUS AS NEEDED
Status: DISCONTINUED | OUTPATIENT
Start: 2020-11-27 | End: 2020-11-27

## 2020-11-27 RX ORDER — CEFAZOLIN SODIUM 2 G/50ML
2000 SOLUTION INTRAVENOUS ONCE
Status: COMPLETED | OUTPATIENT
Start: 2020-11-27 | End: 2020-11-27

## 2020-11-27 RX ADMIN — LIDOCAINE HYDROCHLORIDE 50 MG: 20 INJECTION, SOLUTION INTRAVENOUS at 11:51

## 2020-11-27 RX ADMIN — ONDANSETRON 4 MG: 2 INJECTION INTRAMUSCULAR; INTRAVENOUS at 13:00

## 2020-11-27 RX ADMIN — FENTANYL CITRATE 50 MCG: 50 INJECTION, SOLUTION INTRAMUSCULAR; INTRAVENOUS at 11:45

## 2020-11-27 RX ADMIN — CEFAZOLIN SODIUM 2000 MG: 2 SOLUTION INTRAVENOUS at 11:54

## 2020-11-27 RX ADMIN — EPHEDRINE SULFATE 10 MG: 50 INJECTION, SOLUTION INTRAVENOUS at 12:00

## 2020-11-27 RX ADMIN — SODIUM CHLORIDE, SODIUM LACTATE, POTASSIUM CHLORIDE, AND CALCIUM CHLORIDE: .6; .31; .03; .02 INJECTION, SOLUTION INTRAVENOUS at 11:45

## 2020-11-27 RX ADMIN — SODIUM CHLORIDE, SODIUM LACTATE, POTASSIUM CHLORIDE, AND CALCIUM CHLORIDE 125 ML/HR: .6; .31; .03; .02 INJECTION, SOLUTION INTRAVENOUS at 10:23

## 2020-11-27 RX ADMIN — FENTANYL CITRATE 50 MCG: 50 INJECTION, SOLUTION INTRAMUSCULAR; INTRAVENOUS at 11:51

## 2020-11-27 RX ADMIN — ONDANSETRON 4 MG: 2 INJECTION INTRAMUSCULAR; INTRAVENOUS at 11:55

## 2020-11-27 RX ADMIN — FENTANYL CITRATE 50 MCG: 50 INJECTION, SOLUTION INTRAMUSCULAR; INTRAVENOUS at 12:07

## 2020-11-27 RX ADMIN — PROPOFOL 30 MG: 10 INJECTION, EMULSION INTRAVENOUS at 12:10

## 2020-11-27 RX ADMIN — DEXAMETHASONE SODIUM PHOSPHATE 4 MG: 4 INJECTION, SOLUTION INTRA-ARTICULAR; INTRALESIONAL; INTRAMUSCULAR; INTRAVENOUS; SOFT TISSUE at 11:55

## 2020-11-27 RX ADMIN — PROPOFOL 200 MG: 10 INJECTION, EMULSION INTRAVENOUS at 11:51

## 2020-12-08 LAB
COLOR STONE: NORMAL
COM MFR STONE: 10 %
COMMENT-STONE3: NORMAL
COMPOSITION: NORMAL
LABORATORY COMMENT REPORT: NORMAL
PHOTO: NORMAL
SIZE STONE: NORMAL MM
SPEC SOURCE SUBJ: NORMAL
STONE ANALYSIS-IMP: NORMAL
URATE MFR STONE: 90 %
WT STONE: 73 MG

## 2021-03-01 ENCOUNTER — TRANSCRIBE ORDERS (OUTPATIENT)
Dept: ADMINISTRATIVE | Facility: HOSPITAL | Age: 71
End: 2021-03-01

## 2021-03-01 DIAGNOSIS — Z87.442 HISTORY OF KIDNEY STONES: Primary | ICD-10-CM

## 2021-03-05 ENCOUNTER — HOSPITAL ENCOUNTER (OUTPATIENT)
Dept: RADIOLOGY | Facility: HOSPITAL | Age: 71
Discharge: HOME | End: 2021-03-05
Attending: SPECIALIST
Payer: MEDICARE

## 2021-03-05 DIAGNOSIS — Z87.442 HISTORY OF KIDNEY STONES: ICD-10-CM

## 2021-03-05 PROCEDURE — 76770 US EXAM ABDO BACK WALL COMP: CPT

## 2021-10-18 ENCOUNTER — HOSPITAL ENCOUNTER (OUTPATIENT)
Dept: RADIOLOGY | Facility: HOSPITAL | Age: 71
Discharge: HOME | End: 2021-10-18
Attending: SPECIALIST
Payer: MEDICARE

## 2021-10-18 DIAGNOSIS — N20.0 URIC ACID NEPHROLITHIASIS: ICD-10-CM

## 2021-10-18 PROCEDURE — 74018 RADEX ABDOMEN 1 VIEW: CPT

## 2021-11-17 ENCOUNTER — HOSPITAL ENCOUNTER (OUTPATIENT)
Dept: RADIOLOGY | Facility: HOSPITAL | Age: 71
Discharge: HOME | End: 2021-11-17
Attending: SPECIALIST
Payer: MEDICARE

## 2021-11-17 DIAGNOSIS — N20.0 CALCULUS OF KIDNEY: ICD-10-CM

## 2021-11-17 PROCEDURE — 76770 US EXAM ABDO BACK WALL COMP: CPT

## 2022-11-30 ENCOUNTER — HOSPITAL ENCOUNTER (INPATIENT)
Facility: HOSPITAL | Age: 72
LOS: 4 days | Discharge: HOME | End: 2022-12-04
Attending: EMERGENCY MEDICINE | Admitting: FAMILY MEDICINE

## 2022-11-30 ENCOUNTER — APPOINTMENT (EMERGENCY)
Dept: RADIOLOGY | Facility: HOSPITAL | Age: 72
End: 2022-11-30

## 2022-11-30 DIAGNOSIS — G47.30 SLEEP APNEA: ICD-10-CM

## 2022-11-30 DIAGNOSIS — J96.01 ACUTE RESPIRATORY FAILURE WITH HYPOXIA (HCC): ICD-10-CM

## 2022-11-30 DIAGNOSIS — D72.829 LEUKOCYTOSIS: ICD-10-CM

## 2022-11-30 DIAGNOSIS — N20.1 CALCULUS OF URETER: ICD-10-CM

## 2022-11-30 DIAGNOSIS — N20.1 URETEROLITHIASIS: Primary | ICD-10-CM

## 2022-11-30 DIAGNOSIS — E79.0 HYPERURICEMIA: ICD-10-CM

## 2022-11-30 DIAGNOSIS — N19 RENAL FAILURE: ICD-10-CM

## 2022-11-30 DIAGNOSIS — N17.9 ACUTE KIDNEY INJURY (HCC): ICD-10-CM

## 2022-11-30 PROBLEM — N40.0 BPH (BENIGN PROSTATIC HYPERPLASIA): Status: ACTIVE | Noted: 2022-11-30

## 2022-11-30 PROBLEM — I10 HYPERTENSION: Status: ACTIVE | Noted: 2022-11-30

## 2022-11-30 PROBLEM — E87.29 HIGH ANION GAP METABOLIC ACIDOSIS: Status: ACTIVE | Noted: 2022-11-30

## 2022-11-30 PROBLEM — N20.0 RIGHT NEPHROLITHIASIS: Status: ACTIVE | Noted: 2022-11-30

## 2022-11-30 LAB
ALBUMIN SERPL BCP-MCNC: 3.2 G/DL (ref 3.5–5)
ALP SERPL-CCNC: 46 U/L (ref 46–116)
ALT SERPL W P-5'-P-CCNC: 32 U/L (ref 12–78)
ANION GAP SERPL CALCULATED.3IONS-SCNC: 23 MMOL/L (ref 4–13)
AST SERPL W P-5'-P-CCNC: 35 U/L (ref 5–45)
BASOPHILS # BLD AUTO: 0.04 THOUSANDS/ÂΜL (ref 0–0.1)
BASOPHILS NFR BLD AUTO: 0 % (ref 0–1)
BILIRUB SERPL-MCNC: 0.55 MG/DL (ref 0.2–1)
BUN SERPL-MCNC: 114 MG/DL (ref 5–25)
CALCIUM ALBUM COR SERPL-MCNC: 9.6 MG/DL (ref 8.3–10.1)
CALCIUM SERPL-MCNC: 9 MG/DL (ref 8.3–10.1)
CHLORIDE SERPL-SCNC: 95 MMOL/L (ref 96–108)
CO2 SERPL-SCNC: 19 MMOL/L (ref 21–32)
CREAT SERPL-MCNC: 12.6 MG/DL (ref 0.6–1.3)
EOSINOPHIL # BLD AUTO: 0.13 THOUSAND/ÂΜL (ref 0–0.61)
EOSINOPHIL NFR BLD AUTO: 1 % (ref 0–6)
ERYTHROCYTE [DISTWIDTH] IN BLOOD BY AUTOMATED COUNT: 12.8 % (ref 11.6–15.1)
FLUAV RNA RESP QL NAA+PROBE: NEGATIVE
FLUBV RNA RESP QL NAA+PROBE: NEGATIVE
GFR SERPL CREATININE-BSD FRML MDRD: 3 ML/MIN/1.73SQ M
GLUCOSE SERPL-MCNC: 77 MG/DL (ref 65–140)
HCT VFR BLD AUTO: 44.8 % (ref 36.5–49.3)
HGB BLD-MCNC: 15.1 G/DL (ref 12–17)
IMM GRANULOCYTES # BLD AUTO: 0.04 THOUSAND/UL (ref 0–0.2)
IMM GRANULOCYTES NFR BLD AUTO: 0 % (ref 0–2)
LYMPHOCYTES # BLD AUTO: 0.94 THOUSANDS/ÂΜL (ref 0.6–4.47)
LYMPHOCYTES NFR BLD AUTO: 8 % (ref 14–44)
MCH RBC QN AUTO: 30.8 PG (ref 26.8–34.3)
MCHC RBC AUTO-ENTMCNC: 33.7 G/DL (ref 31.4–37.4)
MCV RBC AUTO: 91 FL (ref 82–98)
MONOCYTES # BLD AUTO: 0.83 THOUSAND/ÂΜL (ref 0.17–1.22)
MONOCYTES NFR BLD AUTO: 7 % (ref 4–12)
NEUTROPHILS # BLD AUTO: 10.12 THOUSANDS/ÂΜL (ref 1.85–7.62)
NEUTS SEG NFR BLD AUTO: 84 % (ref 43–75)
NRBC BLD AUTO-RTO: 0 /100 WBCS
PLATELET # BLD AUTO: 254 THOUSANDS/UL (ref 149–390)
PMV BLD AUTO: 9.7 FL (ref 8.9–12.7)
POTASSIUM SERPL-SCNC: 5.8 MMOL/L (ref 3.5–5.3)
PROT SERPL-MCNC: 8 G/DL (ref 6.4–8.4)
RBC # BLD AUTO: 4.91 MILLION/UL (ref 3.88–5.62)
RSV RNA RESP QL NAA+PROBE: NEGATIVE
SARS-COV-2 RNA RESP QL NAA+PROBE: NEGATIVE
SODIUM SERPL-SCNC: 137 MMOL/L (ref 135–147)
WBC # BLD AUTO: 12.1 THOUSAND/UL (ref 4.31–10.16)

## 2022-11-30 RX ORDER — ACETAMINOPHEN 325 MG/1
650 TABLET ORAL EVERY 6 HOURS PRN
Status: DISCONTINUED | OUTPATIENT
Start: 2022-11-30 | End: 2022-12-04 | Stop reason: HOSPADM

## 2022-11-30 RX ORDER — SODIUM CHLORIDE, SODIUM GLUCONATE, SODIUM ACETATE, POTASSIUM CHLORIDE, MAGNESIUM CHLORIDE, SODIUM PHOSPHATE, DIBASIC, AND POTASSIUM PHOSPHATE .53; .5; .37; .037; .03; .012; .00082 G/100ML; G/100ML; G/100ML; G/100ML; G/100ML; G/100ML; G/100ML
125 INJECTION, SOLUTION INTRAVENOUS CONTINUOUS
Status: DISCONTINUED | OUTPATIENT
Start: 2022-11-30 | End: 2022-11-30

## 2022-11-30 RX ORDER — TAMSULOSIN HYDROCHLORIDE 0.4 MG/1
0.4 CAPSULE ORAL
Status: DISCONTINUED | OUTPATIENT
Start: 2022-11-30 | End: 2022-12-04 | Stop reason: HOSPADM

## 2022-11-30 RX ORDER — HYDROMORPHONE HCL/PF 1 MG/ML
0.5 SYRINGE (ML) INJECTION ONCE
Status: COMPLETED | OUTPATIENT
Start: 2022-11-30 | End: 2022-11-30

## 2022-11-30 RX ORDER — ONDANSETRON 2 MG/ML
4 INJECTION INTRAMUSCULAR; INTRAVENOUS EVERY 6 HOURS PRN
Status: DISCONTINUED | OUTPATIENT
Start: 2022-11-30 | End: 2022-12-04 | Stop reason: HOSPADM

## 2022-11-30 RX ORDER — HYDROMORPHONE HCL/PF 1 MG/ML
0.2 SYRINGE (ML) INJECTION EVERY 4 HOURS PRN
Status: DISCONTINUED | OUTPATIENT
Start: 2022-11-30 | End: 2022-12-01

## 2022-11-30 RX ORDER — ASCORBIC ACID 500 MG
1000 TABLET ORAL 2 TIMES DAILY
Status: DISCONTINUED | OUTPATIENT
Start: 2022-12-01 | End: 2022-12-01

## 2022-11-30 RX ADMIN — TAMSULOSIN HYDROCHLORIDE 0.4 MG: 0.4 CAPSULE ORAL at 21:56

## 2022-11-30 RX ADMIN — HYDROMORPHONE HYDROCHLORIDE 0.5 MG: 1 INJECTION, SOLUTION INTRAMUSCULAR; INTRAVENOUS; SUBCUTANEOUS at 18:26

## 2022-11-30 RX ADMIN — SODIUM CHLORIDE 1000 ML: 0.9 INJECTION, SOLUTION INTRAVENOUS at 18:25

## 2022-11-30 RX ADMIN — SODIUM BICARBONATE 125 ML/HR: 84 INJECTION, SOLUTION INTRAVENOUS at 21:35

## 2022-12-01 ENCOUNTER — APPOINTMENT (INPATIENT)
Dept: RADIOLOGY | Facility: HOSPITAL | Age: 72
End: 2022-12-01

## 2022-12-01 ENCOUNTER — ANESTHESIA (INPATIENT)
Dept: PERIOP | Facility: HOSPITAL | Age: 72
End: 2022-12-01

## 2022-12-01 ENCOUNTER — ANESTHESIA EVENT (INPATIENT)
Dept: PERIOP | Facility: HOSPITAL | Age: 72
End: 2022-12-01

## 2022-12-01 PROBLEM — Q62.11 HYDRONEPHROSIS WITH URETEROPELVIC JUNCTION (UPJ) OBSTRUCTION: Status: ACTIVE | Noted: 2022-11-30

## 2022-12-01 LAB
ANION GAP SERPL CALCULATED.3IONS-SCNC: 19 MMOL/L (ref 4–13)
BUN SERPL-MCNC: 117 MG/DL (ref 5–25)
CALCIUM SERPL-MCNC: 7.9 MG/DL (ref 8.3–10.1)
CHLORIDE SERPL-SCNC: 97 MMOL/L (ref 96–108)
CO2 SERPL-SCNC: 19 MMOL/L (ref 21–32)
CREAT SERPL-MCNC: 13.15 MG/DL (ref 0.6–1.3)
ERYTHROCYTE [DISTWIDTH] IN BLOOD BY AUTOMATED COUNT: 12.6 % (ref 11.6–15.1)
GFR SERPL CREATININE-BSD FRML MDRD: 3 ML/MIN/1.73SQ M
GLUCOSE SERPL-MCNC: 96 MG/DL (ref 65–140)
HCT VFR BLD AUTO: 38.2 % (ref 36.5–49.3)
HGB BLD-MCNC: 13.2 G/DL (ref 12–17)
MAGNESIUM SERPL-MCNC: 2.7 MG/DL (ref 1.6–2.6)
MCH RBC QN AUTO: 31 PG (ref 26.8–34.3)
MCHC RBC AUTO-ENTMCNC: 34.6 G/DL (ref 31.4–37.4)
MCV RBC AUTO: 90 FL (ref 82–98)
NT-PROBNP SERPL-MCNC: 767 PG/ML
PLATELET # BLD AUTO: 230 THOUSANDS/UL (ref 149–390)
PMV BLD AUTO: 9.3 FL (ref 8.9–12.7)
POTASSIUM SERPL-SCNC: 4.3 MMOL/L (ref 3.5–5.3)
RBC # BLD AUTO: 4.26 MILLION/UL (ref 3.88–5.62)
SODIUM SERPL-SCNC: 135 MMOL/L (ref 135–147)
WBC # BLD AUTO: 8.86 THOUSAND/UL (ref 4.31–10.16)

## 2022-12-01 PROCEDURE — BT1D1ZZ FLUOROSCOPY OF RIGHT KIDNEY, URETER AND BLADDER USING LOW OSMOLAR CONTRAST: ICD-10-PCS | Performed by: SPECIALIST

## 2022-12-01 PROCEDURE — 0T768DZ DILATION OF RIGHT URETER WITH INTRALUMINAL DEVICE, VIA NATURAL OR ARTIFICIAL OPENING ENDOSCOPIC: ICD-10-PCS | Performed by: SPECIALIST

## 2022-12-01 DEVICE — INLAY URETERAL STENT W/O GUIDEWIRE
Type: IMPLANTABLE DEVICE | Site: URETER | Status: FUNCTIONAL
Brand: BARD® INLAY® URETERAL STENT

## 2022-12-01 RX ORDER — ONDANSETRON 2 MG/ML
INJECTION INTRAMUSCULAR; INTRAVENOUS AS NEEDED
Status: DISCONTINUED | OUTPATIENT
Start: 2022-12-01 | End: 2022-12-01

## 2022-12-01 RX ORDER — FENTANYL CITRATE/PF 50 MCG/ML
25 SYRINGE (ML) INJECTION
Status: DISCONTINUED | OUTPATIENT
Start: 2022-12-01 | End: 2022-12-01 | Stop reason: HOSPADM

## 2022-12-01 RX ORDER — LIDOCAINE HYDROCHLORIDE 10 MG/ML
INJECTION, SOLUTION EPIDURAL; INFILTRATION; INTRACAUDAL; PERINEURAL AS NEEDED
Status: DISCONTINUED | OUTPATIENT
Start: 2022-12-01 | End: 2022-12-01

## 2022-12-01 RX ORDER — PROPOFOL 10 MG/ML
INJECTION, EMULSION INTRAVENOUS AS NEEDED
Status: DISCONTINUED | OUTPATIENT
Start: 2022-12-01 | End: 2022-12-01

## 2022-12-01 RX ORDER — CEFAZOLIN SODIUM 2 G/50ML
SOLUTION INTRAVENOUS AS NEEDED
Status: DISCONTINUED | OUTPATIENT
Start: 2022-12-01 | End: 2022-12-01

## 2022-12-01 RX ORDER — ONDANSETRON 2 MG/ML
4 INJECTION INTRAMUSCULAR; INTRAVENOUS ONCE AS NEEDED
Status: DISCONTINUED | OUTPATIENT
Start: 2022-12-01 | End: 2022-12-01 | Stop reason: HOSPADM

## 2022-12-01 RX ORDER — FUROSEMIDE 10 MG/ML
20 INJECTION INTRAMUSCULAR; INTRAVENOUS ONCE
Status: DISCONTINUED | OUTPATIENT
Start: 2022-12-01 | End: 2022-12-01

## 2022-12-01 RX ORDER — LEVOFLOXACIN 5 MG/ML
500 INJECTION, SOLUTION INTRAVENOUS EVERY 24 HOURS
Status: COMPLETED | OUTPATIENT
Start: 2022-12-01 | End: 2022-12-01

## 2022-12-01 RX ORDER — LABETALOL HYDROCHLORIDE 5 MG/ML
10 INJECTION, SOLUTION INTRAVENOUS EVERY 4 HOURS PRN
Status: DISCONTINUED | OUTPATIENT
Start: 2022-12-01 | End: 2022-12-04 | Stop reason: HOSPADM

## 2022-12-01 RX ORDER — ASCORBIC ACID 500 MG
500 TABLET ORAL DAILY
Status: DISCONTINUED | OUTPATIENT
Start: 2022-12-01 | End: 2022-12-04 | Stop reason: HOSPADM

## 2022-12-01 RX ORDER — FUROSEMIDE 10 MG/ML
40 INJECTION INTRAMUSCULAR; INTRAVENOUS ONCE
Status: COMPLETED | OUTPATIENT
Start: 2022-12-01 | End: 2022-12-01

## 2022-12-01 RX ORDER — CEFAZOLIN SODIUM 2 G/50ML
2000 SOLUTION INTRAVENOUS ONCE
Status: DISCONTINUED | OUTPATIENT
Start: 2022-12-01 | End: 2022-12-01 | Stop reason: HOSPADM

## 2022-12-01 RX ORDER — MAGNESIUM HYDROXIDE 1200 MG/15ML
LIQUID ORAL AS NEEDED
Status: DISCONTINUED | OUTPATIENT
Start: 2022-12-01 | End: 2022-12-01 | Stop reason: HOSPADM

## 2022-12-01 RX ADMIN — ONDANSETRON 4 MG: 2 INJECTION INTRAMUSCULAR; INTRAVENOUS at 17:34

## 2022-12-01 RX ADMIN — OXYCODONE HYDROCHLORIDE AND ACETAMINOPHEN 500 MG: 500 TABLET ORAL at 11:05

## 2022-12-01 RX ADMIN — LEVOFLOXACIN 500 MG: 500 INJECTION, SOLUTION INTRAVENOUS at 22:01

## 2022-12-01 RX ADMIN — FUROSEMIDE 40 MG: 10 INJECTION, SOLUTION INTRAMUSCULAR; INTRAVENOUS at 23:44

## 2022-12-01 RX ADMIN — CEFAZOLIN SODIUM 2000 MG: 2 SOLUTION INTRAVENOUS at 17:29

## 2022-12-01 RX ADMIN — LIDOCAINE HYDROCHLORIDE 50 MG: 10 INJECTION, SOLUTION EPIDURAL; INFILTRATION; INTRACAUDAL; PERINEURAL at 17:30

## 2022-12-01 RX ADMIN — PROPOFOL 125 MG: 10 INJECTION, EMULSION INTRAVENOUS at 17:30

## 2022-12-01 NOTE — ASSESSMENT & PLAN NOTE
Patient presents with intermittent right flank pain since Saturday 4 days ago, with associated nausea vomiting poor appetite  Patient denies dysuria hematuria urgency or frequency in urination  Denies fever chills  History of right nephrolithiasis 2 years ago  · CT renal stone study showed - 9 mm obstructing stone in the UPJ resulting in right hydroureteronephrosis  Nonobstructing stones in the right kidney are seen measuring up to 18 mm  · Patient seen by Urology in ED  Recommend starting Flomax, NPO after midnight, if patient continues to have pain, will plan for  cysto right ureteroscopy laser lithotripsy stent placement    · IV hydration  · Pain control  · Start Flomax  · Strain all urine  · NPO after midnight  · Consult urology

## 2022-12-01 NOTE — CONSULTS
Consultation - Nephrology   Leida Nathan 80 y o  male MRN: [id number]  Unit/Bed#: [address] [place] [address] Encounter: [id number]    ASSESSMENT and PLAN:  Acute kidney injury, POA  -Baseline creatinine:  Not known  Had episode of acute kidney injury in October 2020 and renal function improved from creatinine 3 7 to creatinine 1 39 on October 20, 2020  Acute kidney at that time was due to obstructive uropathy and underwent right ureteral stent placement  Patient did not have any blood work in between  Unclear if he has underlying chronic kidney disease-most likely has underlying chronic kidney disease with finding of left renal parenchyma atrophy   -Admission creatinine:  12 6 mg/dl  - Work up:   · UA with microscopy:  1+ protein, numerous RBCs, 4-10 WBC per high-power field  · Imaging:  CT abdomen suggestive of 9 mm stone in the UPJ resulting in right hydro ureteronephrosis  Nonobstructing stone in the right kidneys are seen measuring up to 18 mm in size  Simple cyst seen in the right kidney  Prostate gland is enlarged  finding of left renal parenchymal atrophy  -Etiology:  Acute kidney injury likely due to obstructive uropathy plus prerenal in the setting of right hydronephrosis from UPJ calculus in the setting of left renal parenchymal atrophy   -Hospital Course:  Receiving IV fluid as bicarb drip   -Plan:   · Follow-up BMP from today  In the setting of hyperkalemia and acidosis continue bicarb drip at 125 mL/hour  Recommend low-potassium diet  · Flank pain is improved but if there is no improvement renal function, recommend discussion with Urology for possible ureteral stent placement  Was seen by Urology and plan is to consider procedure if pain persist   · Discussed with patient about possibility of requiring dialysis if there is no improvement renal function and he verbalized understanding, he has multiple family member on dialysis  · Avoid nephrotoxins and dose all medications per EGFR      · Avoid hypotension  Hyperkalemia  -admission potassium 5 8  -due to acute kidney injury and metabolic acidosis and obstructive uropathy  -recommend low-potassium diet  Continue bicarb drip, monitor blood work from today  - if potassium level more than 6 would recommend medical treatment    Elevated anion gap metabolic acidosis  -admission bicarb level 19 with anion gap 23  -continue treatment with bicarb drip    Obstructive uropathy/right hydronephrosis in the setting of UPJ calculus  -follow up Urology recommendations  Currently on Flomax    Nephrolithiasis right   -recommend outpatient metabolic workup for nephrolithiasis  For now will check PTH and uric acid level  -stressed on oral hydration  -stone analysis from 2020 suggestive of uric acid stone  -decrease the dose of vitamin-C to 500 mg daily  Recommend low oxalate diet  Recommend stone analysis if able to strain urine and send kidney stone analysis  BPH continue Flomax    Discussed with primary team        HISTORY OF PRESENT ILLNESS:  Requesting Physician: Weplay,   Reason for Consult: DAYANNA Patel is a 80 y o  male with history of sleep apnea, history of acute kidney injury in October 2020 from obstructive uropathy and underwent cystoscopy and ureteral stent placement on 10/18/2020 status post lithotripsy on November 27, 2020 who was [place] to [facility] after presenting with complain of right flank pain associated with nausea and vomiting as well as poor appetite for last 4 days  Denies any other urinary symptoms  History of obstructive uropathy 2 years ago  Denies any intake of NSAIDs      PAST MEDICAL HISTORY:  Past Medical History:   Diagnosis Date   • Cancer ([facility] [address] ) 2015    basal cell of the skull    • Colon polyp    • History of subdural hematoma    • Kidney stone     right stone   • Renal disorder    • Sleep apnea     mild- no CPAP-had nasal septoplasty       PAST SURGICAL HISTORY:  Past Surgical History:   Procedure Laterality Date   • BASAL CELL CARCINOMA EXCISION  2015    skull   • BRAIN SURGERY      in the 1990's-subdural hematoma   • CATARACT EXTRACTION Left    • COLONOSCOPY      x2   • CYSTOSCOPY W/ LASER LITHOTRIPSY Right 11/27/2020    Procedure: CYSTOSCOPY, FLEXIBLE URETEROSCOPY, LASER, AND STENT [address] BASKET, RIGHT RETROGRADE;  Surgeon: Veda Wren MD;  Location: [address] [place] [address];  Service: Urology   • FL RETROGRADE PYELOGRAM  10/18/2020   • FL RETROGRADE PYELOGRAM  11/27/2020   • LITHOTRIPSY     • IL CYSTO/URETERO W/LITHOTRIPSY &INDWELL STENT INSRT Right 10/18/2020    Procedure: CYSTOSCOPY URETEROSCOPY WITH BASKET EXTRACTION, RETROGRADE PYELOGRAM AND INSERTION STENT URETERAL;  Surgeon: oTña Brewer MD;  Location: [address] [place] [address];  Service: Urology   • IL CYSTOURETHROSCOPY,URETER CATHETER Right 11/11/2020    Procedure: ESWL RIGHT;  Surgeon: Veda Wren MD;  Location: [address] [place] [address];  Service: Urology   • SEPTOPLASTY      in the 1990's   • TONSILLECTOMY  26    age 12       SOCIAL HISTORY:  Social History     Substance and Sexual Activity   Alcohol Use Yes    Comment: a beer on the weekends     Social History     Substance and Sexual Activity   Drug Use Not Currently     Social History     Tobacco Use   Smoking Status Never   Smokeless Tobacco Never       FAMILY HISTORY:  Family History   Problem Relation Age of Onset   • Hypertension Mother    • Kidney disease Mother         renal failure-dialysis   • Hypertension Father    • Stroke Father    • Diabetes Sister    • Kidney disease Sister         self dialysis at home       ALLERGIES:  Allergies   Allergen Reactions   • Bean Pod Extract - Food Allergy Nausea Only     Soft beans- eg chick peas, lima's, sweet peas       MEDICATIONS:    Current Facility-Administered Medications:   •  acetaminophen (TYLENOL) tablet 650 mg, 650 mg, Oral, Q6H PRN, CHELSIE Cook  •  ascorbic acid (VITAMIN C) tablet 1,000 mg, 1,000 mg, Oral, BID, CHELSIE Cook  •  ceFAZolin (ANCEF) IVPB (premix in dextrose) 2,000 mg 50 mL, 2,000 mg, Intravenous, Once, Kan Alcaraz MD  •  HYDROmorphone (DILAUDID) injection 0 2 mg, 0 2 mg, Intravenous, Q4H PRN, CHELSIE Cook  •  ondansetron (ZOFRAN) injection 4 mg, 4 mg, Intravenous, Q6H PRN, CHELSIE Cook  •  sodium bicarbonate 150 mEq in dextrose 5 % 1,000 mL infusion, 125 mL/hr, Intravenous, Continuous, CHELSIE Cook, Last Rate: 125 mL/hr at 11/30/22 2135, 125 mL/hr at 11/30/22 2135  •  tamsulosin (FLOMAX) capsule 0 4 mg, 0 4 mg, Oral, Daily With Dinner, CHELSIE Cook, 0 4 mg at 11/30/22 2156    REVIEW OF SYSTEMS:   Review of Systems   Constitutional: Negative for activity change, appetite change, chills, diaphoresis, fatigue and fever  HENT: Negative for congestion, facial swelling and nosebleeds  Eyes: Negative for pain and visual disturbance  Respiratory: Negative for cough, chest tightness and shortness of breath  Cardiovascular: Negative for chest pain and palpitations  Gastrointestinal: Positive for abdominal pain (right flank pain )  Negative for abdominal distention, diarrhea, nausea and vomiting  Genitourinary: Negative for difficulty urinating, dysuria, flank pain, frequency and hematuria  Musculoskeletal: Negative for arthralgias, back pain and joint swelling  Skin: Negative for rash  Neurological: Negative for dizziness, seizures, syncope, weakness and headaches  Psychiatric/Behavioral: Negative for agitation and confusion  The patient is not nervous/anxious  All the systems were reviewed and were negative except as documented on the HPI      PHYSICAL EXAM:  Current Weight:    First Weight:    Vitals:    11/30/22 2107 12/01/22 0758 12/01/22 0804 12/01/22 0808   BP: 162/91 158/89 158/89 158/89   BP Location:       Pulse: 72 68 72 69   Resp: 16 16     Temp: 97 9 °F (36 6 °C)   98 1 °F (36 7 °C)   TempSrc:       SpO2: 95% 96% 96% 97% Intake/Output Summary (Last 24 hours) at 12/1/2022 0809  Last data filed at 12/1/2022 0200  Gross per 24 hour   Intake --   Output 200 ml   Net -200 ml     Physical Exam  Constitutional:       General: He is not in acute distress  Appearance: He is well-developed  He is not diaphoretic  HENT:      Head: Normocephalic and atraumatic  Mouth/Throat:      Mouth: Mucous membranes are moist    Eyes:      General: No scleral icterus  Conjunctiva/sclera: Conjunctivae normal       Pupils: Pupils are equal, round, and reactive to light  Neck:      Thyroid: No thyromegaly  Cardiovascular:      Rate and Rhythm: Normal rate and regular rhythm  Heart sounds: Normal heart sounds  No murmur heard  No friction rub  Pulmonary:      Effort: Pulmonary effort is normal  No respiratory distress  Breath sounds: Normal breath sounds  No wheezing or rales  Abdominal:      General: Bowel sounds are normal  There is no distension  Palpations: Abdomen is soft  Tenderness: There is no abdominal tenderness  There is no right CVA tenderness or left CVA tenderness  Musculoskeletal:         General: No deformity  Cervical back: Neck supple  Right lower leg: No edema  Left lower leg: No edema  Lymphadenopathy:      Cervical: No cervical adenopathy  Skin:     Coloration: Skin is not pale  Nails: There is no clubbing  Neurological:      Mental Status: He is alert and oriented to person, place, and time  He is not disoriented  Psychiatric:         Mood and Affect: Mood normal  Mood is not anxious  Affect is not inappropriate  Behavior: Behavior normal          Thought Content:  Thought content normal            Invasive Devices:        Lab Results:   Results from last 7 days   Lab Units 11/30/22  1825   WBC Thousand/uL 12 10*   HEMOGLOBIN g/dL 15 1   HEMATOCRIT % 44 8   PLATELETS Thousands/uL 254   POTASSIUM mmol/L 5 8*   CHLORIDE mmol/L 95*   CO2 mmol/L 19*   BUN mg/dL 114*   CREATININE mg/dL 12 60*   CALCIUM mg/dL 9 0   ALK PHOS U/L 46   ALT U/L 32   AST U/L 35       Other Studies:   CT abd  IMPRESSION:     There is colonic diverticulosis  There is a small amount of fluid by the sigmoid colon  I suspect this is secondary to sternal ascites however changed low-grade diverticulitis cannot be excluded      9 mm obstructing stone in the UPJ resulting in right hydroureteronephrosis  Nonobstructing stones in the right kidney are seen measuring up to 18 mm  Simple cysts are seen in the right kidney  Left renal parenchymal atrophy      Prostate gland is enlarged  Consider urologic consultation the appropriate clinical setting  Portions of the record may have been created with voice recognition software  Occasional wrong word or "sound a like" substitutions may have occurred due to the inherent limitations of voice recognition software  Read the chart carefully and recognize, using context, where substitutions have occurred  If you have any questions, please contact the dictating provider

## 2022-12-01 NOTE — PLAN OF CARE
Problem: Knowledge Deficit  Goal: Patient/family/caregiver demonstrates understanding of disease process, treatment plan, medications, and discharge instructions  Description: Complete learning assessment and assess knowledge base    Interventions:  - Provide teaching at level of understanding  - Provide teaching via preferred learning methods  Outcome: Progressing     Problem: GENITOURINARY - ADULT  Goal: Maintains or returns to baseline urinary function  Description: INTERVENTIONS:  - Assess urinary function  - Encourage oral fluids to ensure adequate hydration if ordered  - Administer IV fluids as ordered to ensure adequate hydration  - Administer ordered medications as needed  - Offer frequent toileting  - Follow urinary retention protocol if ordered  Outcome: Progressing  Goal: Absence of urinary retention  Description: INTERVENTIONS:  - Assess patient’s ability to void and empty bladder  - Monitor I/O  - Bladder scan as needed  - Discuss with physician/AP medications to alleviate retention as needed  - Discuss catheterization for long term situations as appropriate  Outcome: Progressing     Problem: METABOLIC, FLUID AND ELECTROLYTES - ADULT  Goal: Electrolytes maintained within normal limits  Description: INTERVENTIONS:  - Monitor labs and assess patient for signs and symptoms of electrolyte imbalances  - Administer electrolyte replacement as ordered  - Monitor response to electrolyte replacements, including repeat lab results as appropriate  - Instruct patient on fluid and nutrition as appropriate  Outcome: Progressing  Goal: Fluid balance maintained  Description: INTERVENTIONS:  - Monitor labs   - Monitor I/O and WT  - Instruct patient on fluid and nutrition as appropriate  - Assess for signs & symptoms of volume excess or deficit  Outcome: Progressing

## 2022-12-01 NOTE — ASSESSMENT & PLAN NOTE
WBC 12 10, no bands, patient afebrile  Denies UTI symptoms  · CT showed  - There is colonic diverticulosis  There is a small amount of fluid by the sigmoid colon  I suspect this is secondary to sternal ascites however changed low-grade diverticulitis cannot be excluded  · Patient denies abdominal pain     · Could be reactive   · UA pending  · IV hydration  · Repeat lab in the morning

## 2022-12-01 NOTE — ASSESSMENT & PLAN NOTE
Patient presented with intermittent right flank pain since Saturday, with associated nausea, vomiting, poor appetite  No dysuria hematuria urgency or frequency in urination  Denies fever, chills  History of right nephrolithiasis 2 years ago  · CT renal stone study showed - 9 mm obstructing stone in the UPJ causing right hydroureteronephrosis  Nonobstructing stones noted in the right kidney  · Patient seen by Urology in ED  Continue Flomax, NPO for likely OR today - cysto right ureteroscopy laser lithotripsy stent placement    · IV hydration  · Pain control  · Strain all urine

## 2022-12-01 NOTE — ASSESSMENT & PLAN NOTE
Baseline creatinine hard to determine  Patient had DAYANNA in 10/2020 with highest creatinine 4 25 due to right obstructing kidney stone  Creatinine improved to 1 39 with stent placement and IV hydration  · Creatinine today 12 6  · Potassium 5 8(slightly hemolyzed specimen)  Anion gap 23, bicarb 19  · Likely multifactorial - post renal and prerenal  · Discussed with nephrology on-call, recommend sodium bicarb 150 mEq in D5 water at 125 per hour    · Check PVR  · Avoid nephrotoxins and hypotension  · Repeat BMP in the morning  · Low-potassium diet

## 2022-12-01 NOTE — CONSULTS
H&P Exam - Urology       Patient: Miroslava Mathis   : 1950 Sex: male   MRN: [id number]     CSN: [id number]      History of Present Illness   HPI:  Miroslava Mathis is a 80 y o  male who presents with worsening right flank pain over the last few days presenting to [facility] well known to me with history of kidney stones found to have  9 mm right upj/  proximal ureteral stone seen at the bedside  Patient states that the pain is unrelenting and has required multiple shots of IV analgesics  Patient undergoing attack of ureteral colic 2 years ago to this month and has not been seen in my office in the last 6 months        Review of Systems:   Constitutional:  Negative for activity change, fever, chills and diaphoresis  HENT: Negative for hearing loss and trouble swallowing  Eyes: Negative for itching and visual disturbance  Respiratory: Negative for chest tightness and shortness of breath  Cardiovascular: Negative for chest pain, edema  Gastrointestinal: Negative for abdominal distention, na abdominal pain, constipation, diarrhea, Nausea and vomiting  Genitourinary: Negative for decreased urine volume, difficulty urinating, dysuria, enuresis, frequency, hematuria and urgency  Musculoskeletal: Negative for gait problem and myalgias  Neurological: Negative for dizziness and headaches  Hematological: Does not bruise/bleed easily         Historical Information   Past Medical History:   Diagnosis Date   • Cancer ([facility] [address] )     basal cell of the skull    • Colon polyp    • History of subdural hematoma    • Kidney stone     right stone   • Renal disorder    • Sleep apnea     mild- no CPAP-had nasal septoplasty     Past Surgical History:   Procedure Laterality Date   • BASAL CELL CARCINOMA EXCISION      skull   • BRAIN SURGERY      in the -subdural hematoma   • CATARACT EXTRACTION Left    • COLONOSCOPY      x2   • CYSTOSCOPY W/ LASER LITHOTRIPSY Right 2020    Procedure: CYSTOSCOPY, FLEXIBLE URETEROSCOPY, LASER, AND STENT EXCHANGE,STONE BASKET, RIGHT RETROGRADE;  Surgeon: Veda Wren MD;  Location: [address] [place] [address];  Service: Urology   • FL RETROGRADE PYELOGRAM  10/18/2020   • FL RETROGRADE PYELOGRAM  11/27/2020   • LITHOTRIPSY     • SD CYSTO/URETERO W/LITHOTRIPSY &INDWELL STENT INSRT Right 10/18/2020    Procedure: CYSTOSCOPY URETEROSCOPY WITH BASKET EXTRACTION, RETROGRADE PYELOGRAM AND INSERTION STENT URETERAL;  Surgeon: Toña Brewer MD;  Location: [address] [place] [address];  Service: Urology   • SD CYSTOURETHROSCOPY,URETER CATHETER Right 11/11/2020    Procedure: ESWL RIGHT;  Surgeon: Veda Wren MD;  Location: [address] [place] [address];  Service: Urology   • SEPTOPLASTY      in the 1990's   • TONSILLECTOMY  26    age 12     Social History   Social History     Substance and Sexual Activity   Alcohol Use Yes    Comment: a beer on the weekends     Social History     Substance and Sexual Activity   Drug Use Not Currently     Social History     Tobacco Use   Smoking Status Never   Smokeless Tobacco Never     Family History:   Family History   Problem Relation Age of Onset   • Hypertension Mother    • Kidney disease Mother         renal failure-dialysis   • Hypertension Father    • Stroke Father    • Diabetes Sister    • Kidney disease Sister         self dialysis at home       Meds/Allergies   (Not in a hospital admission)    Allergies   Allergen Reactions   • Bean Pod Extract - Food Allergy Nausea Only     Soft beans- eg chick peas, lima's, sweet peas       Objective   Vitals: BP (!) 177/84 (BP Location: Right arm)   Pulse 76   Temp (!) 97 °F (36 1 °C)   Resp 17   SpO2 97%     Physical Exam:  General Alert awake   Normocephalic atraumatic PERRLA  Lungs clear bilaterally  Cardiac normal S1 normal S2  Abdomen soft, flank pain   Extremities no edema    No intake/output data recorded      Invasive Devices     Peripheral Intravenous Line  Duration           Peripheral IV 11/30/22 Left Antecubital <1 day Drain  Duration           Ureteral Drain/Stent Right ureter 6 Fr  733 days                    Lab Results: CBC:   Lab Results   Component Value Date    WBC 12 10 (H) 11/30/2022    HGB 15 1 11/30/2022    HCT 44 8 11/30/2022    MCV 91 11/30/2022     11/30/2022    MCH 30 8 11/30/2022    MCHC 33 7 11/30/2022    RDW 12 8 11/30/2022    MPV 9 7 11/30/2022    NRBC 0 11/30/2022     CMP:   Lab Results   Component Value Date    CL 95 (L) 11/30/2022    CO2 19 (L) 11/30/2022     (H) 11/30/2022    CREATININE 12 60 (H) 11/30/2022    CALCIUM 9 0 11/30/2022    AST 35 11/30/2022    ALT 32 11/30/2022    ALKPHOS 46 11/30/2022    EGFR 3 11/30/2022     Urinalysis:   Lab Results   Component Value Date    COLORU Yellow 10/19/2020    CLARITYU Cloudy 10/19/2020    SPECGRAV 1 020 10/19/2020    PHUR 5 5 10/19/2020    LEUKOCYTESUR Trace (A) 10/19/2020    NITRITE Negative 10/19/2020    GLUCOSEU Negative 10/19/2020    KETONESU Negative 10/19/2020    BILIRUBINUR Negative 10/19/2020    BLOODU Large (A) 10/19/2020     Urine Culture:   Lab Results   Component Value Date    URINECX No Growth <1000 cfu/mL 10/19/2020     PSA: No results found for: PSA        Assessment/ Plan:  9 mm right ureteral stone has a 30% chance of passing with medical expulsion therapy  Start tamsulosin 0 4 mg  NPO after midnight  If patient continues to have pain Will add to OR schedule tomorrow cysto right ureteroscopy laser trip see stent      Juliet Clay MD

## 2022-12-01 NOTE — ASSESSMENT & PLAN NOTE
WBC 12 10 initially, no bands, patient afebrile  Denies UTI symptoms  · CT showed  - colonic diverticulosis with small amount of fluid by the sigmoid colon which is likely due to sternal ascites however changed low-grade diverticulitis could not be excluded  · Patient with no abdominal pain     · Likely reactive, WBC count normalized  · UA pending  · Repeat lab in the morning    Results from last 7 days   Lab Units 12/01/22  0845 11/30/22  1825   WBC Thousand/uL 8 86 12 10*

## 2022-12-01 NOTE — PROGRESS NOTES
Progress Note - Urology      Patient: Jerlean Scheuermann   : 1950 Sex: male   MRN: [id number]     CSN: [id number]  Unit/Bed#: OR POOL     SUBJECTIVE:   Patient seen on afternoon rounds  Continuing to have mild to moderate right flank pain  Acute on chronic renal failure  Creatinine rising  Will keep NPO        Objective   Vitals: /89 (BP Location: Left arm)   Pulse 67   Temp 98 1 °F (36 7 °C) (Oral)   Resp 17   SpO2 97%     I/O last 24 hours:   In: 300 [P O :300]  Out: 400 [Urine:400]      Physical Exam:   General Alert awake   Normocephalic atraumatic PERRLA  Lungs clear bilaterally  Cardiac normal S1 normal S2  Abdomen soft, flank pain right  Extremities no edema      Lab Results: CBC:   Lab Results   Component Value Date    WBC 8 86 2022    HGB 13 2 2022    HCT 38 2 2022    MCV 90 2022     2022    MCH 31 0 2022    MCHC 34 6 2022    RDW 12 6 2022    MPV 9 3 2022    NRBC 0 2022     CMP:   Lab Results   Component Value Date    CL 97 2022    CO2 19 (L) 2022     (H) 2022    CREATININE 13 15 (H) 2022    CALCIUM 7 9 (L) 2022    AST 35 2022    ALT 32 2022    ALKPHOS 46 2022    EGFR 3 2022     Urinalysis:   Lab Results   Component Value Date    COLORU Yellow 10/19/2020    CLARITYU Cloudy 10/19/2020    SPECGRAV 1 020 10/19/2020    PHUR 5 5 10/19/2020    LEUKOCYTESUR Trace (A) 10/19/2020    NITRITE Negative 10/19/2020    GLUCOSEU Negative 10/19/2020    KETONESU Negative 10/19/2020    BILIRUBINUR Negative 10/19/2020    BLOODU Large (A) 10/19/2020     Urine Culture:   Lab Results   Component Value Date    URINECX No Growth <1000 cfu/mL 10/19/2020     PSA: No results found for: PSA      Assessment/ Plan:  Right 9 mm ureteropelvic proximal stone  Right hydronephrosis  Acute renal failure on chronic renal failure  Creatinine continues to drift up in light of severe ATN  Discussed with hospitalist  Will put on for emergency cysto right stent  Stone extraction to be put off pending renal failure issues          Veda Wren MD

## 2022-12-01 NOTE — ASSESSMENT & PLAN NOTE
Baseline creatinine unknown  Patient had DAYANNA in 10/2020 with highest creatinine 4 25 due to right obstructing kidney stone  Creatinine improved to 1 39 with stent placement and IV hydration  · Creatinine on admission 12 6  · Potassium 5 8 (slightly hemolyzed specimen)  Anion gap 23, bicarb 19 initially  · Likely multifactorial - post renal and prerenal  · Per Nephrology, continue sodium bicarb 150 mEq in D5 water at 125 per hour    · Check PVR, UA  · Avoid nephrotoxins and hypotension  · Repeat BMP in the morning    Results from last 7 days   Lab Units 12/01/22  0845 11/30/22  1825   BUN mg/dL 117* 114*   CREATININE mg/dL 13 15* 12 60*

## 2022-12-01 NOTE — H&P
Stephanie 46  H&P- Beni Sorensen 1950, 80 y o  male MRN: [id number]  Unit/Bed#: [address] [place] [address] Encounter: [id number]  Primary Care Provider: Corby Mercado PA-C   Date and time admitted to hospital: 11/30/2022  5:29 PM    * Right nephrolithiasis  Assessment & Plan  Patient presents with intermittent right flank pain since Saturday 4 days ago, with associated nausea vomiting poor appetite  Patient denies dysuria hematuria urgency or frequency in urination  Denies fever chills  History of right nephrolithiasis 2 years ago  · CT renal stone study showed - 9 mm obstructing stone in the UPJ resulting in right hydroureteronephrosis  Nonobstructing stones in the right kidney are seen measuring up to 18 mm  · Patient seen by Urology in ED  Recommend starting Flomax, NPO after midnight, if patient continues to have pain, will plan for  cysto right ureteroscopy laser lithotripsy stent placement  · IV hydration  · Pain control  · Start Flomax  · Strain all urine  · NPO after midnight  · Consult urology    Acute kidney injury [facility])  Assessment & Plan  Baseline creatinine hard to determine  Patient had DAYANNA in 10/2020 with highest creatinine 4 25 due to right obstructing kidney stone  Creatinine improved to 1 39 with stent placement and IV hydration  · Creatinine today 12 6  · Potassium 5 8(slightly hemolyzed specimen)  Anion gap 23, bicarb 19  · Likely multifactorial - post renal and prerenal  · Discussed with nephrology on-call, recommend sodium bicarb 150 mEq in D5 water at 125 per hour  · Check PVR  · Avoid nephrotoxins and hypotension  · Repeat BMP in the morning  · Low-potassium diet    High anion gap metabolic acidosis  Assessment & Plan  Management as above  · Repeat lab in the morning  · Consult nephrology    Leukocytosis  Assessment & Plan  WBC 12 10, no bands, patient afebrile  Denies UTI symptoms  · CT showed  - There is colonic diverticulosis   There is a small amount of fluid by the sigmoid colon  I suspect this is secondary to sternal ascites however changed low-grade diverticulitis cannot be excluded  · Patient denies abdominal pain  · Could be reactive   · UA pending  · IV hydration  · Repeat lab in the morning    Hypertension  Assessment & Plan  BP elevated on ED arrival,177/84  Likely due to pain  Improving currently  · Monitor    BPH (benign prostatic hyperplasia)  Assessment & Plan  Enlarged prostate on CT  · Start Flomax as above  · Check PVR    Sleep apnea  Assessment & Plan  Status post septoplasty  VTE Prophylaxis: Pharmacologic VTE Prophylaxis contraindicated due to Possible OR in the morning  / sequential compression device   Code Status:  Full code  POLST: POLST form is not discussed and not completed at this time  Anticipated Length of Stay:  Patient will be admitted on an Inpatient basis with an anticipated length of stay of  > 2 midnights  Justification for Hospital Stay:  Kidney stone, acute kidney injury    Total Time for Visit, including Counseling / Coordination of Care: 45 minutes  Greater than 50% of this total time spent on direct patient counseling and coordination of care  Chief Complaint:   Right flank pain since Saturday 4 days ago    History of Present Illness:    Horacio Cruz is a 80 y o  male with PMH of right kidney stone, sleep apnea, skin cancer who presents with intermittent right flank pain since Saturday 4 days ago, with associated nausea vomiting poor appetite  Patient denies dysuria hematuria urgency or frequency in urination  Denies fever chills  Reports history of right nephrolithiasis 2 years ago  Patient reports right flank pain pretty high currently  Denies nausea currently  Patient denies referred pain from right flank  Denies headache, dizziness, SOB, cough, chest pain  Patient reports did not take anything at home for pain since Saturday        Review of Systems:    Review of Systems Constitutional: Positive for appetite change  Eyes: Negative for visual disturbance  Respiratory: Negative for cough and shortness of breath  Cardiovascular: Negative for chest pain and palpitations  Gastrointestinal: Positive for nausea and vomiting  Negative for abdominal pain  Vomited once on Saturday  Genitourinary: Positive for flank pain  Negative for dysuria, frequency, hematuria and urgency  All other systems reviewed and are negative  Past Medical and Surgical History:     Past Medical History:   Diagnosis Date   • Cancer ([facility] [address] ) 2015    basal cell of the skull    • Colon polyp    • History of subdural hematoma    • Kidney stone     right stone   • Renal disorder    • Sleep apnea     mild- no CPAP-had nasal septoplasty       Past Surgical History:   Procedure Laterality Date   • BASAL CELL CARCINOMA EXCISION  2015    skull   • BRAIN SURGERY      in the 1990's-subdural hematoma   • CATARACT EXTRACTION Left    • COLONOSCOPY      x2   • CYSTOSCOPY W/ LASER LITHOTRIPSY Right 11/27/2020    Procedure: CYSTOSCOPY, FLEXIBLE URETEROSCOPY, LASER, AND STENT [address], RIGHT RETROGRADE;  Surgeon: Rohit Victoria MD;  Location: [address] [place] [address];  Service: Urology   • FL RETROGRADE PYELOGRAM  10/18/2020   • FL RETROGRADE PYELOGRAM  11/27/2020   • LITHOTRIPSY     • OK CYSTO/URETERO W/LITHOTRIPSY &INDWELL STENT INSRT Right 10/18/2020    Procedure: CYSTOSCOPY URETEROSCOPY WITH BASKET EXTRACTION, RETROGRADE PYELOGRAM AND INSERTION STENT URETERAL;  Surgeon: Adrianna Stein MD;  Location: [address] [place] [address];  Service: Urology   • OK CYSTOURETHROSCOPY,URETER CATHETER Right 11/11/2020    Procedure: ESWL RIGHT;  Surgeon: Rohit Victoria MD;  Location: [address] [place] [address];  Service: Urology   • SEPTOPLASTY      in the 1990's   • TONSILLECTOMY  1956    age 12       Meds/Allergies:    Prior to Admission medications    Medication Sig Start Date End Date Taking?  Authorizing Provider   Ascorbic Acid (VITAMIN C PO) Take 1,000 mg by mouth 2 (two) times a day   Yes Historical Provider, MD   b complex vitamins tablet Take 1 tablet by mouth 2 (two) times a day   Yes Historical Provider, MD   VITAMIN D PO Take by mouth 2 (two) times a day   Yes Historical Provider, MD   VITAMIN E PO Take 268 mg by mouth daily   Yes Historical Provider, MD   BETA CAROTENE PO Take by mouth 2 (two) times a day    Historical Provider, MD   GLUCOSAMINE-CHONDROITIN PO Take by mouth 2 (two) times a day    Historical Provider, MD   TURMERIC PO Take 50 mg by mouth 3 (three) times a day    Historical Provider, MD     I have reviewed home medications with patient personally  Allergies: Allergies   Allergen Reactions   • Bean Pod Extract - Food Allergy Nausea Only     Soft beans- eg chick peas, lima's, sweet peas       Social History:     Marital Status:    Occupation:  Retired  Patient Pre-hospital Living Situation:  Family  Patient Pre-hospital Level of Mobility:  INDependent  Patient Pre-hospital Diet Restrictions:  Regular diet  Substance Use History:   Social History     Substance and Sexual Activity   Alcohol Use Yes    Comment: a beer on the weekends     Social History     Tobacco Use   Smoking Status Never   Smokeless Tobacco Never     Social History     Substance and Sexual Activity   Drug Use Not Currently       Family History:    non-contributory    Physical Exam:     Vitals:   Blood Pressure: 162/91 (11/30/22 2107)  Pulse: 72 (11/30/22 2107)  Temperature: 97 9 °F (36 6 °C) (11/30/22 2107)  Temp Source: Oral (11/30/22 2018)  Respirations: 16 (11/30/22 2107)  SpO2: 95 % (11/30/22 2107)    Physical Exam  Vitals and nursing note reviewed  Constitutional:       Appearance: He is well-developed and well-nourished  He is obese  Comments: Patient appears unwell  HENT:      Head: Normocephalic and atraumatic  Neck:      Thyroid: No thyromegaly  Vascular: No JVD  Trachea: No tracheal deviation     Cardiovascular:      Rate and Rhythm: Normal rate and regular rhythm  Pulses: Intact distal pulses  Heart sounds: Normal heart sounds  Pulmonary:      Effort: Pulmonary effort is normal  No respiratory distress  Breath sounds: Normal breath sounds  No wheezing or rales  Abdominal:      General: Bowel sounds are normal  There is no distension  Palpations: Abdomen is soft  Tenderness: There is no abdominal tenderness  There is no guarding  Musculoskeletal:         General: No swelling or deformity  Cervical back: Neck supple  Right lower leg: No edema  Left lower leg: No edema  Skin:     General: Skin is warm and dry  Neurological:      General: No focal deficit present  Mental Status: He is alert and oriented to person, place, and time  Psychiatric:         Mood and Affect: Mood and affect and mood normal          Judgment: Judgment normal          Additional Data:     Lab Results: I have personally reviewed pertinent reports  Results from last 7 days   Lab Units 11/30/22  1825   WBC Thousand/uL 12 10*   HEMOGLOBIN g/dL 15 1   HEMATOCRIT % 44 8   PLATELETS Thousands/uL 254   NEUTROS PCT % 84*   LYMPHS PCT % 8*   MONOS PCT % 7   EOS PCT % 1     Results from last 7 days   Lab Units 11/30/22  1825   POTASSIUM mmol/L 5 8*   CHLORIDE mmol/L 95*   CO2 mmol/L 19*   BUN mg/dL 114*   CREATININE mg/dL 12 60*   CALCIUM mg/dL 9 0   ALK PHOS U/L 46   ALT U/L 32   AST U/L 35           Imaging: I have personally reviewed pertinent reports  CT renal stone study abdomen pelvis wo contrast    Result Date: 11/30/2022  Narrative: CT ABDOMEN AND PELVIS WITHOUT IV CONTRAST - LOW DOSE RENAL STONE INDICATION:   Flank pain, kidney stone suspected right flank pain history of nephrolithiasis   COMPARISON:  6/15/2010 TECHNIQUE:  Low radiation dose thin section CT examination of the abdomen and pelvis was performed without intravenous or oral contrast according to a protocol specifically designed to evaluate for urinary tract calculus  Axial, sagittal, and coronal 2D  reformatted images were created from the source data and submitted for interpretation  Evaluation for pathology in the abdomen and pelvis that is unrelated to urinary tract calculi is limited  Radiation dose length product (DLP) for this visit:  833 97 mGy-cm   This examination, like all CT scans performed in the [facility], was performed utilizing techniques to minimize radiation dose exposure, including the use of iterative  reconstruction and automated exposure control  URINARY TRACT FINDINGS: RIGHT KIDNEY AND URETER:  9 mm obstructing stone in the UPJ resulting in right hydroureteronephrosis  Nonobstructing stones in the right kidney are seen measuring up to 15 mm  Simple cysts are seen in the right kidney  LEFT KIDNEY AND URETER:  No urinary tract calculi  No hydronephrosis or hydroureter  Simple cysts are noted  Left renal parenchymal atrophy  URINARY BLADDER:  Unremarkable  ADDITIONAL FINDINGS: LOWER CHEST:  No clinically significant abnormality identified in the visualized lower chest  SOLID VISCERA: Visualized portion of the liver appears to be diffusely decreased in density suggestive of steatosis  7 mm hypodensity likely simple cyst left lobe liver  There Otherwise, limited low radiation dose CT demonstrates no clinically significant abnormality of visualized solid abdominal viscera  GALLBLADDER/BILIARY TREE:  No calcified gallstones  No pericholecystic inflammatory change  No biliary dilatation  STOMACH AND BOWEL:  There is colonic diverticulosis  There is a small amount of fluid by the sigmoid colon  I suspect this is secondary to sternal ascites however changed low-grade diverticulitis cannot be excluded  APPENDIX:  A normal appendix was visualized  ABDOMINOPELVIC CAVITY:  Trace ascites  No pneumoperitoneum  No lymphadenopathy  REPRODUCTIVE ORGANS:  The prostate is enlarged   ABDOMINAL WALL/INGUINAL REGIONS:  There is fat in the inguinal canals bilaterally    OSSEOUS STRUCTURES:  No acute fracture or destructive osseous lesion  Impression: There is colonic diverticulosis  There is a small amount of fluid by the sigmoid colon  I suspect this is secondary to sternal ascites however changed low-grade diverticulitis cannot be excluded  9 mm obstructing stone in the UPJ resulting in right hydroureteronephrosis  Nonobstructing stones in the right kidney are seen measuring up to 18 mm  Simple cysts are seen in the right kidney  Prostate gland is enlarged  Consider urologic consultation the appropriate clinical setting  Workstation performed: [id number]       EKG, Pathology, and Other Studies Reviewed on Admission:   · EKG:  Pending    Allscripts Records Reviewed: Yes     ** Please Note: Dragon 360 Dictation voice to text software may have been used in the creation of this document   **

## 2022-12-01 NOTE — OP NOTE
OPERATIVE REPORT  PATIENT NAME: Thea Jacques    :  1950  MRN: [id number]  Pt Location: [facility] OR ROOM 04    SURGERY DATE: 2022    Surgeon(s) and Role:     * Stanley Estrella MD - Primary    Preop Diagnosis:  Ureterolithiasis [N20 1]  9 mm UPJ proximal stone  Hydronephrosis  Acute renal failure on chronic renal failure    Post-Op Diagnosis Codes:     * Ureterolithiasis [N20 1]    Procedure(s) (LRB):  CYSTOSCOPY RETROGRADE PYELOGRAM WITH INSERTION STENT URETERAL (Right)    Specimen(s):  * No specimens in log *    Estimated Blood Loss:   Minimal    Drains:  Urethral Catheter Latex 18 Fr  (Active)   Number of days: 0       Ureteral Drain/Stent Right ureter 6 Fr   (Active)   Number of days: 734       Anesthesia Type:   General/LMA    Operative Indications:  Ureterolithiasis [N211]  26-year-old male known to me with previous history of kidney stones seen in the ER last night with acute on chronic renal failure with a creatinine of 12 and 9 mm right UPJ proximal stone with some hydronephrosis admitted and seen in the ER watched overnight now with creatinine rising patient still continues to have significant pain to undergo emergency cysto right stent aware risk of anesthesia infection bleeding additional urologic procedures as well as chance of required hemodialysis in light of renal failure    Operative Findings:  Right retrograde pyelogram confirmed cutoff at the proximal ureter consistent with obstruction from 9 mm stone 26 cm 6 Djiboutian stent past  Eighteen Djiboutian Kiser catheter left to bag drainage for urine output in light of renal failure    Complications:   None    Procedure and Technique:  Patient identified in the holding area right side marked consent signed placed the op suite after anesthesia induced placed in thigh position draped prep standard fashion time-out performed 22 Djiboutian cystoscope passed through urethra into the bladder anterior showed no abnormalities posterior the confirmed a 2 5 cm bilobar obstructing prostatic urethra moderate bladder trabeculation right orifice found 5 Jamaican catheter placed into it and retrograde pyelogram confirmed filling of the distal mid and proximal ureter with cutoff point of 3 8 wire passed up through the catheter into the right renal pelvis the catheter removed and a 26 cm 6 Jamaican stent past wire removed scope removed postop procedure well 18 Jamaican Kiser catheter inserted left to bag drainage for urine output   I was present for the entire procedure    Patient Disposition:  PACU         SIGNATURE: Connie Degroot MD  DATE: December 1, 2022  TIME: 6:00 PM

## 2022-12-01 NOTE — PROGRESS NOTES
[address]  Progress Note - Yousuf Perrin 1950, 80 y o  male MRN: [id number]  Unit/Bed#: [address] [place] [address] Encounter: [id number]  Primary Care Provider: Emilie Ortiz PA-C   Date and time admitted to hospital: 11/30/2022  5:29 PM    * Hydronephrosis with ureteropelvic junction (UPJ) obstruction  Assessment & Plan  Patient presented with intermittent right flank pain since Saturday, with associated nausea, vomiting, poor appetite  No dysuria hematuria urgency or frequency in urination  Denies fever, chills  History of right nephrolithiasis 2 years ago  · CT renal stone study showed - 9 mm obstructing stone in the UPJ causing right hydroureteronephrosis  Nonobstructing stones noted in the right kidney  · Patient seen by Urology in ED  Continue Flomax, NPO for likely OR today - cysto right ureteroscopy laser lithotripsy stent placement  · IV hydration  · Pain control  · Strain all urine    Acute kidney injury ([facility] [address] )  Assessment & Plan  Baseline creatinine unknown  Patient had DAYANNA in 10/2020 with highest creatinine 4 25 due to right obstructing kidney stone  Creatinine improved to 1 39 with stent placement and IV hydration  · Creatinine on admission 12 6  · Potassium 5 8 (slightly hemolyzed specimen)  Anion gap 23, bicarb 19 initially  · Likely multifactorial - post renal and prerenal  · Per Nephrology, continue sodium bicarb 150 mEq in D5 water at 125 per hour  · Check PVR, UA  · Avoid nephrotoxins and hypotension  · Repeat BMP in the morning    Results from last 7 days   Lab Units 12/01/22  0845 11/30/22  1825   BUN mg/dL 117* 114*   CREATININE mg/dL 13 15* 12 60*       High anion gap metabolic acidosis  Assessment & Plan  Management as above with bicarb drip  · Repeat lab in the morning  · Appreciate nephrology input    Hypertension  Assessment & Plan  BP elevated on ED arrival,177/84  Likely due to pain    Improving  · Monitor    BPH (benign prostatic hyperplasia)  Assessment & Plan  Enlarged prostate on CT  · Continue Flomax as above  · Check PVR    Sleep apnea  Assessment & Plan  Status post septoplasty    Leukocytosis  Assessment & Plan  WBC 12 10 initially, no bands, patient afebrile  Denies UTI symptoms  · CT showed  - colonic diverticulosis with small amount of fluid by the sigmoid colon which is likely due to sternal ascites however changed low-grade diverticulitis could not be excluded  · Patient with no abdominal pain  · Likely reactive, WBC count normalized  · UA pending  · Repeat lab in the morning    Results from last 7 days   Lab Units 22  0845 22  1825   WBC Thousand/uL 8 86 12 10*         VTE Pharmacologic Prophylaxis:   none for OR    Patient Centered Rounds: I performed bedside rounds with nursing staff today  Discussions with Specialists or Other Care Team Provider: yes - renal, urology    Education and Discussions with Family / Patient: Patient declined call to   Time Spent for Care: 35 min  More than 50% of total time spent on counseling and coordination of care as described above  Current Length of Stay: 1 day(s)  Current Patient Status: Inpatient   Certification Statement: The patient will continue to require additional inpatient hospital stay due to Acute kidney injury, anion gap metabolic acidosis, right obstructing UPJ stone  Discharge Plan: Anticipate discharge in >72 hrs to home  Code Status: Level 1 - Full Code    Subjective:     Patient denies any pain at this time  No shortness of breath    Objective:     Vitals:   Temp (24hrs), Av °F (36 7 °C), Min:97 °F (36 1 °C), Max:99 1 °F (37 3 °C)    Temp:  [97 °F (36 1 °C)-99 1 °F (37 3 °C)] 98 1 °F (36 7 °C)  HR:  [67-80] 67  Resp:  [16-20] 17  BP: (158-177)/(84-91) 158/89  SpO2:  [95 %-97 %] 97 %  There is no height or weight on file to calculate BMI  Input and Output Summary (last 24 hours):      Intake/Output Summary (Last 24 hours) at 12/1/2022 1021  Last data filed at 12/1/2022 0645  Gross per 24 hour   Intake 300 ml   Output 200 ml   Net 100 ml       Physical Exam:   Physical Exam  Vitals reviewed  Constitutional:       General: He is not in acute distress  Appearance: He is not ill-appearing  HENT:      Head: Normocephalic and atraumatic  Eyes:      General:         Right eye: No discharge  Left eye: No discharge  Extraocular Movements: Extraocular movements intact  Cardiovascular:      Rate and Rhythm: Normal rate and regular rhythm  Pulmonary:      Effort: Pulmonary effort is normal  No respiratory distress  Breath sounds: Normal breath sounds  No wheezing or rales  Abdominal:      General: Bowel sounds are normal  There is no distension  Palpations: Abdomen is soft  Tenderness: There is no abdominal tenderness  Musculoskeletal:      Comments: No CVA tenderness noted bilateral   Neurological:      Mental Status: He is alert and oriented to person, place, and time            Additional Data:     Labs:  Results from last 7 days   Lab Units 12/01/22  0845 11/30/22  1825   WBC Thousand/uL 8 86 12 10*   HEMOGLOBIN g/dL 13 2 15 1   HEMATOCRIT % 38 2 44 8   PLATELETS Thousands/uL 230 254   NEUTROS PCT %  --  84*   LYMPHS PCT %  --  8*   MONOS PCT %  --  7   EOS PCT %  --  1     Results from last 7 days   Lab Units 12/01/22  0845 11/30/22  1825   SODIUM mmol/L 135 137   POTASSIUM mmol/L 4 3 5 8*   CHLORIDE mmol/L 97 95*   CO2 mmol/L 19* 19*   BUN mg/dL 117* 114*   CREATININE mg/dL 13 15* 12 60*   ANION GAP mmol/L 19* 23*   CALCIUM mg/dL 7 9* 9 0   ALBUMIN g/dL  --  3 2*   TOTAL BILIRUBIN mg/dL  --  0 55   ALK PHOS U/L  --  46   ALT U/L  --  32   AST U/L  --  35   GLUCOSE RANDOM mg/dL 96 77                       Lines/Drains:  Invasive Devices     Peripheral Intravenous Line  Duration           Peripheral IV 11/30/22 Left Antecubital <1 day          Drain  Duration           Ureteral Drain/Stent Right ureter 6 Fr  733 days                      Imaging: Reviewed radiology reports from this admission including: abdominal/pelvic CT    Recent Cultures (last 7 days):         Last 24 Hours Medication List:   Current Facility-Administered Medications   Medication Dose Route Frequency Provider Last Rate   • acetaminophen  650 mg Oral Q6H PRN CHELSIE Cook     • ascorbic acid  500 mg Oral Daily Serena Gay MD     • cefazolin  2,000 mg Intravenous Once Sin Gonsales MD     • HYDROmorphone  0 2 mg Intravenous Q4H PRN CHELSIE Cook     • ondansetron  4 mg Intravenous Q6H PRN CHELSIE Cook     • sodium bicarbonate infusion  125 mL/hr Intravenous Continuous CHELSIE Cook 125 mL/hr (11/30/22 2135)   • tamsulosin  0 4 mg Oral Daily With Dinner CHELSIE West          Today, Patient Was Seen By: Wade Jackson DO    **Please Note: This note may have been constructed using a voice recognition system  **

## 2022-12-01 NOTE — ANESTHESIA PREPROCEDURE EVALUATION
Procedure:  CYSTOSCOPY RETROGRADE PYELOGRAM WITH INSERTION STENT URETERAL possible ureteroscopy laser (Right: Bladder)    Relevant Problems   CARDIO   (+) Hypertension      /RENAL   (+) Acute kidney injury (HCC)   (+) BPH (benign prostatic hyperplasia)   (+) Hydronephrosis with ureteropelvic junction (UPJ) obstruction      PULMONARY   (+) Sleep apnea        Physical Exam    Airway    Mallampati score: IV  TM Distance: >3 FB  Neck ROM: full     Dental       Cardiovascular  Rhythm: regular, Rate: normal, Cardiovascular exam normal    Pulmonary  Decreased breath sounds,     Other Findings        Anesthesia Plan  ASA Score- 3     Anesthesia Type- general with ASA Monitors  Additional Monitors:   Airway Plan: LMA  Plan Factors-Exercise tolerance (METS): <4 METS  Chart reviewed  EKG reviewed  Existing labs reviewed  Patient summary reviewed  Patient is not a current smoker  Induction- intravenous  Postoperative Plan-     Informed Consent- Anesthetic plan and risks discussed with patient  I personally reviewed this patient with the CRNA  Discussed and agreed on the Anesthesia Plan with the CRNA  Mark Bowser

## 2022-12-02 ENCOUNTER — APPOINTMENT (INPATIENT)
Dept: NON INVASIVE DIAGNOSTICS | Facility: HOSPITAL | Age: 72
End: 2022-12-02

## 2022-12-02 PROBLEM — J96.01 ACUTE RESPIRATORY FAILURE WITH HYPOXIA (HCC): Status: ACTIVE | Noted: 2022-12-02

## 2022-12-02 LAB
ANION GAP SERPL CALCULATED.3IONS-SCNC: 16 MMOL/L (ref 4–13)
AORTIC ROOT: 3.3 CM
AORTIC VALVE MEAN VELOCITY: 9.7 M/S
APICAL FOUR CHAMBER EJECTION FRACTION: 64 %
AV AREA BY CONTINUOUS VTI: 2.5 CM2
AV AREA PEAK VELOCITY: 2.1 CM2
AV LVOT MEAN GRADIENT: 2 MMHG
AV LVOT PEAK GRADIENT: 3 MMHG
AV MEAN GRADIENT: 5 MMHG
AV PEAK GRADIENT: 10 MMHG
AV VALVE AREA: 2.52 CM2
AV VELOCITY RATIO: 0.56
BACTERIA UR QL AUTO: ABNORMAL /HPF
BASOPHILS # BLD AUTO: 0.02 THOUSANDS/ÂΜL (ref 0–0.1)
BASOPHILS NFR BLD AUTO: 0 % (ref 0–1)
BILIRUB UR QL STRIP: NEGATIVE
BUN SERPL-MCNC: 97 MG/DL (ref 5–25)
CALCIUM SERPL-MCNC: 8.5 MG/DL (ref 8.3–10.1)
CHLORIDE SERPL-SCNC: 96 MMOL/L (ref 96–108)
CLARITY UR: ABNORMAL
CO2 SERPL-SCNC: 24 MMOL/L (ref 21–32)
COLOR UR: YELLOW
CREAT SERPL-MCNC: 7.57 MG/DL (ref 0.6–1.3)
DOP CALC AO PEAK VEL: 1.62 M/S
DOP CALC AO VTI: 25.88 CM
DOP CALC LVOT AREA: 3.8 CM2
DOP CALC LVOT DIAMETER: 2.2 CM
DOP CALC LVOT PEAK VEL VTI: 17.19 CM
DOP CALC LVOT PEAK VEL: 0.91 M/S
DOP CALC LVOT STROKE INDEX: 31.8 ML/M2
DOP CALC LVOT STROKE VOLUME: 65.31 CM3
E WAVE DECELERATION TIME: 221 MS
EOSINOPHIL # BLD AUTO: 0 THOUSAND/ÂΜL (ref 0–0.61)
EOSINOPHIL NFR BLD AUTO: 0 % (ref 0–6)
ERYTHROCYTE [DISTWIDTH] IN BLOOD BY AUTOMATED COUNT: 12.7 % (ref 11.6–15.1)
ERYTHROCYTE [DISTWIDTH] IN BLOOD BY AUTOMATED COUNT: 12.7 % (ref 11.6–15.1)
FRACTIONAL SHORTENING: 28 % (ref 28–44)
GFR SERPL CREATININE-BSD FRML MDRD: 6 ML/MIN/1.73SQ M
GLUCOSE SERPL-MCNC: 179 MG/DL (ref 65–140)
GLUCOSE UR STRIP-MCNC: NEGATIVE MG/DL
HCT VFR BLD AUTO: 45.1 % (ref 36.5–49.3)
HCT VFR BLD AUTO: 45.1 % (ref 36.5–49.3)
HGB BLD-MCNC: 15.7 G/DL (ref 12–17)
HGB BLD-MCNC: 15.7 G/DL (ref 12–17)
HGB UR QL STRIP.AUTO: ABNORMAL
IMM GRANULOCYTES # BLD AUTO: 0.08 THOUSAND/UL (ref 0–0.2)
IMM GRANULOCYTES NFR BLD AUTO: 0 % (ref 0–2)
INTERVENTRICULAR SEPTUM IN DIASTOLE (PARASTERNAL SHORT AXIS VIEW): 1.3 CM
INTERVENTRICULAR SEPTUM: 1.3 CM (ref 0.6–1.1)
KETONES UR STRIP-MCNC: NEGATIVE MG/DL
LAAS-AP2: 21.3 CM2
LAAS-AP4: 28.9 CM2
LEFT ATRIUM AREA SYSTOLE SINGLE PLANE A4C: 25.6 CM2
LEFT ATRIUM SIZE: 2.8 CM
LEFT INTERNAL DIMENSION IN SYSTOLE: 2.8 CM (ref 2.1–4)
LEFT VENTRICULAR INTERNAL DIMENSION IN DIASTOLE: 3.9 CM (ref 3.5–6)
LEFT VENTRICULAR POSTERIOR WALL IN END DIASTOLE: 1 CM
LEFT VENTRICULAR STROKE VOLUME: 38 ML
LEUKOCYTE ESTERASE UR QL STRIP: ABNORMAL
LVSV (TEICH): 38 ML
LYMPHOCYTES # BLD AUTO: 0.32 THOUSANDS/ÂΜL (ref 0.6–4.47)
LYMPHOCYTES NFR BLD AUTO: 2 % (ref 14–44)
MCH RBC QN AUTO: 30.7 PG (ref 26.8–34.3)
MCH RBC QN AUTO: 30.7 PG (ref 26.8–34.3)
MCHC RBC AUTO-ENTMCNC: 34.8 G/DL (ref 31.4–37.4)
MCHC RBC AUTO-ENTMCNC: 34.8 G/DL (ref 31.4–37.4)
MCV RBC AUTO: 88 FL (ref 82–98)
MCV RBC AUTO: 88 FL (ref 82–98)
MONOCYTES # BLD AUTO: 0.51 THOUSAND/ÂΜL (ref 0.17–1.22)
MONOCYTES NFR BLD AUTO: 3 % (ref 4–12)
MV E'TISSUE VEL-SEP: 8 CM/S
MV PEAK A VEL: 0.91 M/S
MV PEAK E VEL: 54 CM/S
MV STENOSIS PRESSURE HALF TIME: 64 MS
MV VALVE AREA P 1/2 METHOD: 3.44 CM2
NEUTROPHILS # BLD AUTO: 18.49 THOUSANDS/ÂΜL (ref 1.85–7.62)
NEUTS SEG NFR BLD AUTO: 95 % (ref 43–75)
NITRITE UR QL STRIP: NEGATIVE
NON-SQ EPI CELLS URNS QL MICRO: ABNORMAL /HPF
PH UR STRIP.AUTO: 5.5 [PH]
PLATELET # BLD AUTO: 285 THOUSANDS/UL (ref 149–390)
PLATELET # BLD AUTO: 285 THOUSANDS/UL (ref 149–390)
PMV BLD AUTO: 9.4 FL (ref 8.9–12.7)
PMV BLD AUTO: 9.4 FL (ref 8.9–12.7)
POTASSIUM SERPL-SCNC: 4.3 MMOL/L (ref 3.5–5.3)
PROCALCITONIN SERPL-MCNC: 1.36 NG/ML
PROT UR STRIP-MCNC: NEGATIVE MG/DL
PTH-INTACT SERPL-MCNC: 230 PG/ML (ref 18.4–80.1)
RBC # BLD AUTO: 5.11 MILLION/UL (ref 3.88–5.62)
RBC # BLD AUTO: 5.11 MILLION/UL (ref 3.88–5.62)
RBC #/AREA URNS AUTO: ABNORMAL /HPF
RIGHT ATRIUM AREA SYSTOLE A4C: 11.9 CM2
RIGHT VENTRICLE ID DIMENSION: 3 CM
SL CV LEFT ATRIUM LENGTH A2C: 4.7 CM
SL CV PED ECHO LEFT VENTRICLE DIASTOLIC VOLUME (MOD BIPLANE) 2D: 67 ML
SL CV PED ECHO LEFT VENTRICLE SYSTOLIC VOLUME (MOD BIPLANE) 2D: 29 ML
SODIUM SERPL-SCNC: 136 MMOL/L (ref 135–147)
SP GR UR STRIP.AUTO: 1.02 (ref 1–1.03)
TR MAX PG: 9 MMHG
TR PEAK VELOCITY: 1.5 M/S
TRICUSPID VALVE PEAK REGURGITATION VELOCITY: 1.48 M/S
URATE SERPL-MCNC: 13.5 MG/DL (ref 3.5–8.5)
UROBILINOGEN UR QL STRIP.AUTO: 0.2 E.U./DL
WBC # BLD AUTO: 20.29 THOUSAND/UL (ref 4.31–10.16)
WBC # BLD AUTO: 20.29 THOUSAND/UL (ref 4.31–10.16)
WBC #/AREA URNS AUTO: ABNORMAL /HPF

## 2022-12-02 RX ORDER — CEFTRIAXONE 1 G/50ML
1000 INJECTION, SOLUTION INTRAVENOUS EVERY 24 HOURS
Status: DISCONTINUED | OUTPATIENT
Start: 2022-12-02 | End: 2022-12-04

## 2022-12-02 RX ORDER — HEPARIN SODIUM 5000 [USP'U]/ML
5000 INJECTION, SOLUTION INTRAVENOUS; SUBCUTANEOUS EVERY 8 HOURS SCHEDULED
Status: DISCONTINUED | OUTPATIENT
Start: 2022-12-03 | End: 2022-12-04 | Stop reason: HOSPADM

## 2022-12-02 RX ADMIN — PERFLUTREN 0.8 ML/MIN: 6.52 INJECTION, SUSPENSION INTRAVENOUS at 11:17

## 2022-12-02 RX ADMIN — TAMSULOSIN HYDROCHLORIDE 0.4 MG: 0.4 CAPSULE ORAL at 15:57

## 2022-12-02 RX ADMIN — OXYCODONE HYDROCHLORIDE AND ACETAMINOPHEN 500 MG: 500 TABLET ORAL at 08:10

## 2022-12-02 RX ADMIN — CEFTRIAXONE 1000 MG: 1 INJECTION, SOLUTION INTRAVENOUS at 15:57

## 2022-12-02 NOTE — ASSESSMENT & PLAN NOTE
Patient presented with intermittent right flank pain since Saturday, with associated nausea, vomiting, poor appetite  No dysuria hematuria urgency or frequency in urination  Denies fever, chills  History of right nephrolithiasis 2 years ago  · CT renal stone study showed - 9 mm obstructing stone in the UPJ causing right hydroureteronephrosis  Nonobstructing stones noted in the right kidney  · Patient seen by Urology in ED    Continue Flomax  · Status post cysto with right ureteral stent placement on 12/01  · IV hydration discontinued  · Pain control  · Strain all urine

## 2022-12-02 NOTE — CASE MANAGEMENT
Case Management Assessment & Discharge Planning Note    Patient name Maria E Ricardo  Location [address] Tyrone Qureshi 55-* MRN [id number]  : 1950 Date 2022       Current Admission Date: 2022  Current Admission Diagnosis:Hydronephrosis with ureteropelvic junction (UPJ) obstruction   Patient Active Problem List    Diagnosis Date Noted   • Acute respiratory failure with hypoxia ([facility] [address] ) 2022   • Sleep apnea 2022   • High anion gap metabolic acidosis    • Hydronephrosis with ureteropelvic junction (UPJ) obstruction 2022   • BPH (benign prostatic hyperplasia) 2022   • Hypertension 2022   • Hyperkalemia 10/19/2020   • Acute kidney injury ([facility] [address] )    • Renal colic on right side    • Leukocytosis 10/18/2020      LOS (days): 2  Geometric Mean LOS (GMLOS) (days): 2 00  Days to GMLOS:0 2     OBJECTIVE:    Risk of Unplanned Readmission Score: 9 7      Current admission status: Inpatient     Preferred Pharmacy:   [facility] [address] [place] [address]  Phone: [phone] Fax: [phone]    Primary Care Provider: Kimberly Parker PA-C    Primary Insurance: MEDICARE  Secondary Insurance: [facility]    ASSESSMENT:  [address] Proxies    There are no active Health Care Proxies on file  Readmission Root Cause  30 Day Readmission: No    Patient Information  Admitted from[de-identified] Home  Mental Status: Alert  During Assessment patient was accompanied by: Not accompanied during assessment  Assessment information provided by[de-identified] Patient  Primary Caregiver: Self  Support Systems: [facility] of Residence: [address] [place] [address] do you live in?: [place], [place] [address] entry access options   Select all that apply : Stairs  Number of steps to enter home : 4  Type of Current Residence: [address] [place] [address] home  Upon entering residence, is there a bedroom on the main floor (no further steps)?: Yes  Upon entering residence, is there a bathroom on the main floor (no further steps)?: Yes  In the last 12 months, was there a time when you were not able to pay the mortgage or rent on time?: No  In the last 12 months, how many places have you lived?: 1  In the last 12 months, was there a time when you did not have a steady place to sleep or slept in a shelter (including now)?: No  Homeless/housing insecurity resource given?: N/A  Living Arrangements: Lives Alone    Activities of Daily Living Prior to Admission  Functional Status: Independent  Completes ADLs independently?: Yes  Ambulates independently?: Yes  Does patient use assisted devices?: No  Does patient currently own DME?: No  Does patient have a history of Outpatient Therapy (PT/OT)?: Yes  Does the patient have a history of Short-Term Rehab?: No  Does patient have a history of HHC?: No  Does patient currently have [facility]?: No     Patient Information Continued  Income Source: Pension/senior care  Does patient have prescription coverage?: Yes (Pt uses [employer]   Denies issues with OP costs )  Within the past 12 months, you worried that your food would run out before you got the money to buy more : Never true  Within the past 12 months, the food you bought just didn't last and you didn't have money to get more : Never true  Food insecurity resource given?: N/A  Does patient have a history of substance abuse?: No  Does patient have a history of Mental Health Diagnosis?: No     Means of Transportation  Means of Transport to Appts[de-identified] Drives Self  In the past 12 months, has lack of transportation kept you from medical appointments or from getting medications?: No  In the past 12 months, has lack of transportation kept you from meetings, work, or from getting things needed for daily living?: No  Was application for public transport provided?: N/A    DISCHARGE DETAILS:    Discharge planning discussed with[de-identified] Patient  Freedom of Choice: Yes  Comments - Freedom of Choice: KAREN met bedside with patient to introduce role, complete assessment, and discuss discharge planning needs  Patient states he is independent with mobility and ADLs prior to discharge  He verbalized motivation to get better and return back home as soon as possible  He denies having any questions, concerns, or needs SW can assist with at this time    CM contacted family/caregiver?: No- see comments (Patient specifically requested SW not contact friend Eduardo Martinez as he wants to update Eduardo Martinez himself with plan of care/discharge information )  Were Treatment Team discharge recommendations reviewed with patient/caregiver?: Yes  Did patient/caregiver verbalize understanding of patient care needs?: Yes  Were patient/caregiver advised of the risks associated with not following Treatment Team discharge recommendations?: Yes    Contacts  Patient Contacts: Shantelle Faulkner  Relationship to Patient[de-identified] Friend  Contact Method: Phone  Phone Number: [phone]  Reason/Outcome: Emergency [address]         Is the patient interested in [place] [address] at discharge?: No    DME Referral Provided  Referral made for DME?: No     Would you like to participate in our [address] service program?  : No - Declined    Treatment Team Recommendation: Home  Discharge Destination Plan[de-identified] Home  Transport at Discharge : Self, Automobile (Car is parked in hospital lot)

## 2022-12-02 NOTE — ASSESSMENT & PLAN NOTE
Management as above with bicarb drip initially  · Resolved on Repeat lab   · Appreciate nephrology input

## 2022-12-02 NOTE — ASSESSMENT & PLAN NOTE
Baseline creatinine unknown  Patient had DAYANNA in 10/2020 with highest creatinine 4 25 due to right obstructing kidney stone  Creatinine improved to 1 39 with stent placement and IV hydration  · Creatinine on admission 12 6  · Potassium 5 8 (slightly hemolyzed specimen)    Anion gap 23, bicarb 19 initially  · Likely multifactorial - post renal and prerenal  · Per Nephrology, was started on sodium bicarb 150 mEq in D5 water at 125 per hour which has since been discontinued  · Avoid nephrotoxins and hypotension  · Repeat BMP in the morning    Results from last 7 days   Lab Units 12/02/22  0605 12/01/22  0845 11/30/22  1825   BUN mg/dL 97* 117* 114*   CREATININE mg/dL 7 57* 13 15* 12 60*

## 2022-12-02 NOTE — ANESTHESIA POSTPROCEDURE EVALUATION
Post-Op Assessment Note    CV Status:  Stable  Pain Score: 1    Pain management: adequate     Mental Status:  Arousable and somnolent   Hydration Status:  Stable   Airway Patency:  Stridor, positional obstruction and fixed obstruction (ISAIAS)  Intubated: LMA  Post Op Vitals Reviewed: Yes      Staff: Anesthesiologist   Comments: pt was placed on trial CPAP in  PACU and improved will discuss with Critical care for possible step down if needed        No notable events documented      /82 (12/01/22 1905)    Temp      Pulse 102 (12/01/22 1905)   Resp (!) 26 (12/01/22 1905)    SpO2 97 % (12/01/22 1905)

## 2022-12-02 NOTE — ASSESSMENT & PLAN NOTE
WBC 12 10 initially, no bands, patient afebrile  Denies UTI symptoms  · CT showed  - colonic diverticulosis with small amount of fluid by the sigmoid colon which is likely due to sternal ascites however changed low-grade diverticulitis could not be excluded  · WBC count has trended up today, procalcitonin elevated today  Chest x-ray shows findings suggestive of CHF but infectious etiology could not be ruled out  · Check blood cultures  Repeat procalcitonin in a m    · UA with large blood, trace leukocytes, 1-2 WBC and occasional bacteria  · Repeat lab in the morning    Results from last 7 days   Lab Units 12/02/22  0605 12/01/22  0845 11/30/22  1825   WBC Thousand/uL 20 29*  20 29* 8 86 12 10*

## 2022-12-02 NOTE — ED PROVIDER NOTES
History  Chief Complaint   Patient presents with   • Back Pain     Patient c/o right lower back pain, starting on Saturday  States has a kidney stone  HPI  Patient is a 73-year-old male history of recurrent ureterolithiasis, CKD presenting for evaluation of 4 days of intermittent right-sided flank pain with radiation around into the right lower abdomen  Patient states the pain follow-up can reach as severe is a 10/10 severity  Patient denies associated nausea, vomiting, fevers, chills  Patient states that time of evaluation pain is 6/10  Patient states that this feels similar to prior episodes of ureterolithiasis  Patient denies hematuria, states some component of urinary urgency, denies dysuria, states somewhat decreased urination today  Patient denies chest pain, shortness of breath, cough, headache, sore throat, recent travel or sick contacts  Prior to Admission Medications   Prescriptions Last Dose Informant Patient Reported? Taking?    Ascorbic Acid (VITAMIN C PO) 11/30/2022  Yes Yes   Sig: Take 1,000 mg by mouth 2 (two) times a day   BETA CAROTENE PO   Yes No   Sig: Take by mouth 2 (two) times a day   GLUCOSAMINE-CHONDROITIN PO   Yes No   Sig: Take by mouth 2 (two) times a day   TURMERIC PO   Yes No   Sig: Take 50 mg by mouth 3 (three) times a day   VITAMIN D PO 11/30/2022  Yes Yes   Sig: Take by mouth 2 (two) times a day   VITAMIN E PO 11/30/2022  Yes Yes   Sig: Take 268 mg by mouth daily   b complex vitamins tablet 11/30/2022  Yes Yes   Sig: Take 1 tablet by mouth 2 (two) times a day      Facility-Administered Medications: None       Past Medical History:   Diagnosis Date   • Cancer ([facility] [address] ) 2015    basal cell of the skull    • Colon polyp    • History of subdural hematoma    • Kidney stone     right stone   • Renal disorder    • Sleep apnea     mild- no CPAP-had nasal septoplasty       Past Surgical History:   Procedure Laterality Date   • BASAL CELL CARCINOMA EXCISION  2015    skull   • BRAIN SURGERY      in the 1990's-subdural hematoma   • CATARACT EXTRACTION Left    • COLONOSCOPY      x2   • CYSTOSCOPY W/ LASER LITHOTRIPSY Right 11/27/2020    Procedure: CYSTOSCOPY, FLEXIBLE URETEROSCOPY, LASER, AND STENT [address] BASKET, RIGHT RETROGRADE;  Surgeon: Amanda Watson MD;  Location: [address] [place] [address];  Service: Urology   • FL RETROGRADE PYELOGRAM  10/18/2020   • FL RETROGRADE PYELOGRAM  11/27/2020   • LITHOTRIPSY     • AK CYSTO/URETERO W/LITHOTRIPSY &INDWELL STENT INSRT Right 10/18/2020    Procedure: CYSTOSCOPY URETEROSCOPY WITH BASKET EXTRACTION, RETROGRADE PYELOGRAM AND INSERTION STENT URETERAL;  Surgeon: Audrey Bolivar MD;  Location: [facility];  Service: Urology   • AK CYSTOURETHROSCOPY,URETER CATHETER Right 11/11/2020    Procedure: ESWL RIGHT;  Surgeon: Amanda Watson MD;  Location: [facility];  Service: Urology   • SEPTOPLASTY      in the 1990's   • TONSILLECTOMY  1956    age 12       Family History   Problem Relation Age of Onset   • Hypertension Mother    • Kidney disease Mother         renal failure-dialysis   • Hypertension Father    • Stroke Father    • Diabetes Sister    • Kidney disease Sister         self dialysis at home     I have reviewed and agree with the history as documented  E-Cigarette/Vaping   • E-Cigarette Use Never User      E-Cigarette/Vaping Substances     Social History     Tobacco Use   • Smoking status: Never   • Smokeless tobacco: Never   Vaping Use   • Vaping Use: Never used   Substance Use Topics   • Alcohol use: Yes     Comment: a beer on the weekends   • Drug use: Not Currently       Review of Systems   Constitutional: Negative for chills, fatigue and fever  HENT: Negative for congestion, rhinorrhea and sore throat  Eyes: Negative for photophobia and visual disturbance  Respiratory: Negative for chest tightness and shortness of breath  Cardiovascular: Negative for chest pain, palpitations and leg swelling     Gastrointestinal: Negative for abdominal distention, abdominal pain, diarrhea, nausea and vomiting  Endocrine: Negative for polydipsia and polyuria  Genitourinary: Positive for flank pain  Negative for dysuria and hematuria  Musculoskeletal: Negative for arthralgias and myalgias  Skin: Negative for color change, pallor, rash and wound  Neurological: Negative for weakness, numbness and headaches  Psychiatric/Behavioral: Negative for confusion  Physical Exam  Physical Exam  Vitals and nursing note reviewed  Constitutional:       General: He is not in acute distress  Appearance: He is well-developed and well-nourished  He is not diaphoretic  Comments: Uncomfortable appearing but nontoxic nondistressed   HENT:      Head: Normocephalic and atraumatic  Comments: Moist mucous membranes     Right Ear: External ear normal       Left Ear: External ear normal       Nose: Nose normal       Mouth/Throat:      Mouth: Oropharynx is clear and moist       Pharynx: No oropharyngeal exudate  Eyes:      Conjunctiva/sclera: Conjunctivae normal       Pupils: Pupils are equal, round, and reactive to light  Cardiovascular:      Rate and Rhythm: Normal rate and regular rhythm  Pulses: Intact distal pulses  Heart sounds: Normal heart sounds  No murmur heard  No friction rub  No gallop  Comments: Regular rate and rhythm, no murmurs rubs or gallops  Extremities warm and well perfused  Pulmonary:      Effort: Pulmonary effort is normal  No respiratory distress  Breath sounds: Normal breath sounds  No wheezing  Chest:      Chest wall: No tenderness  Abdominal:      General: Bowel sounds are normal  There is no distension  Palpations: Abdomen is soft  There is no mass  Tenderness: There is no abdominal tenderness  There is no guarding or rebound  Comments: Right lateral abdominal tenderness without rigidity, rebound, guarding     Genitourinary:     Comments: Right-sided CVA tenderness and right lateral abdominal tenderness  Musculoskeletal:         General: No deformity or edema  Skin:     General: Skin is warm and dry  Capillary Refill: Capillary refill takes less than 2 seconds  Neurological:      Mental Status: He is alert and oriented to person, place, and time        Comments: AAO x3   Psychiatric:         Mood and Affect: Mood and affect normal          Behavior: Behavior normal          Vital Signs  ED Triage Vitals   Temperature Pulse Respirations Blood Pressure SpO2   11/30/22 1725 11/30/22 1725 11/30/22 1725 11/30/22 1725 11/30/22 1725   (!) 97 °F (36 1 °C) 76 17 (!) 177/84 97 %      Temp Source Heart Rate Source Patient Position - Orthostatic VS BP Location FiO2 (%)   11/30/22 2018 11/30/22 1725 11/30/22 1725 11/30/22 1725 12/01/22 1845   Oral Monitor Sitting Right arm 50      Pain Score       11/30/22 1725       8           Vitals:    12/01/22 1930 12/01/22 1944 12/01/22 2022 12/01/22 2027   BP: 149/84 162/83 156/90 (!) 174/103   Pulse: 104 97 89 92   Patient Position - Orthostatic VS:   Lying          Visual Acuity      ED Medications  Medications   acetaminophen (TYLENOL) tablet 650 mg (has no administration in time range)   tamsulosin (FLOMAX) capsule 0 4 mg (0 4 mg Oral Given 11/30/22 2156)   ondansetron (ZOFRAN) injection 4 mg ( Intravenous MAR Unhold 12/1/22 2046)   ascorbic acid (VITAMIN C) tablet 500 mg ( Oral MAR Unhold 12/1/22 2046)   lactated ringers bolus 1,000 mL (has no administration in time range)     And   lactated ringers bolus 1,000 mL (has no administration in time range)   sodium chloride 0 9 % bolus 1,000 mL (has no administration in time range)     And   sodium chloride 0 9 % bolus 1,000 mL (has no administration in time range)   levofloxacin (LEVAQUIN) IVPB (premix in dextrose) 500 mg 100 mL (has no administration in time range)   sodium chloride 0 9 % bolus 1,000 mL (1,000 mL Intravenous New Bag 11/30/22 1825)   HYDROmorphone (DILAUDID) injection 0 5 mg (0 5 mg Intravenous Given 11/30/22 1826)       Diagnostic Studies  Results Reviewed     Procedure Component Value Units Date/Time    Basic metabolic panel [[id number]]  (Abnormal) Collected: 12/01/22 0845    Lab Status: Final result Specimen: Blood from Hand, Right Updated: 12/01/22 0918     Sodium 135 mmol/L      Potassium 4 3 mmol/L      Chloride 97 mmol/L      CO2 19 mmol/L      ANION GAP 19 mmol/L       mg/dL      Creatinine 13 15 mg/dL      Glucose 96 mg/dL      Calcium 7 9 mg/dL      eGFR 3 ml/min/1 73sq m     Narrative:      [place] guidelines for Chronic Kidney Disease (CKD):   •  Stage 1 with normal or high GFR (GFR > 90 mL/min/1 73 square meters)  •  Stage 2 Mild CKD (GFR = 60-89 mL/min/1 73 square meters)  •  Stage 3A Moderate CKD (GFR = 45-59 mL/min/1 73 square meters)  •  Stage 3B Moderate CKD (GFR = 30-44 mL/min/1 73 square meters)  •  Stage 4 Severe CKD (GFR = 15-29 mL/min/1 73 square meters)  •  Stage 5 End Stage CKD (GFR <15 mL/min/1 73 square meters)  Note: GFR calculation is accurate only with a steady state creatinine    Magnesium [[id number]]  (Abnormal) Collected: 12/01/22 0845    Lab Status: Final result Specimen: Blood from Hand, Right Updated: 12/01/22 0918     Magnesium 2 7 mg/dL     CBC [[id number]]  (Normal) Collected: 12/01/22 0845    Lab Status: Final result Specimen: Blood from Hand, Right Updated: 12/01/22 0900     WBC 8 86 Thousand/uL      RBC 4 26 Million/uL      Hemoglobin 13 2 g/dL      Hematocrit 38 2 %      MCV 90 fL      MCH 31 0 pg      MCHC 34 6 g/dL      RDW 12 6 %      Platelets 840 Thousands/uL      MPV 9 3 fL     COVID/FLU/RSV [[id number]]  (Normal) Collected: 11/30/22 1825    Lab Status: Final result Specimen: Nares from Nose Updated: 11/30/22 1927     SARS-CoV-2 Negative     INFLUENZA A PCR Negative     INFLUENZA B PCR Negative     RSV PCR Negative    Narrative:      FOR PEDIATRIC PATIENTS - copy/paste COVID Guidelines URL to browser: [email or web address]    SARS-CoV-2 assay is a Nucleic Acid Amplification assay intended for the  qualitative detection of nucleic acid from SARS-CoV-2 in nasopharyngeal  swabs  Results are for the presumptive identification of SARS-CoV-2 RNA  Positive results are indicative of infection with SARS-CoV-2, the virus  causing COVID-19, but do not rule out bacterial infection or co-infection  with other viruses  Laboratories within the [place] and its  territories are required to report all positive results to the appropriate  public health authorities  Negative results do not preclude SARS-CoV-2  infection and should not be used as the sole basis for treatment or other  patient management decisions  Negative results must be combined with  clinical observations, patient history, and epidemiological information  This test has not been FDA cleared or approved  This test has been authorized by FDA under an Emergency Use Authorization  (EUA)  This test is only authorized for the duration of time the  declaration that circumstances exist justifying the authorization of the  emergency use of an in vitro diagnostic tests for detection of SARS-CoV-2  virus and/or diagnosis of COVID-19 infection under section 564(b)(1) of  the Act, 21 U  S C  479VFT-7(S)(5), unless the authorization is terminated  or revoked sooner  The test has been validated but independent review by FDA  and CLIA is pending  Test performed using [employer] GeneXpert: This RT-PCR assay targets N2,  a region unique to SARS-CoV-2  A conserved region in the E-gene was chosen  for pan-Sarbecovirus detection which includes SARS-CoV-2  According to CMS-2020-01-R, this platform meets the definition of high-throughput technology      Comprehensive metabolic panel [[id number]]  (Abnormal) Collected: 11/30/22 1825    Lab Status: Final result Specimen: Blood from Arm, Left Updated: 11/30/22 2713     Sodium 137 mmol/L      Potassium 5 8 mmol/L      Chloride 95 mmol/L      CO2 19 mmol/L      ANION GAP 23 mmol/L       mg/dL      Creatinine 12 60 mg/dL      Glucose 77 mg/dL      Calcium 9 0 mg/dL      Corrected Calcium 9 6 mg/dL      AST 35 U/L      ALT 32 U/L      Alkaline Phosphatase 46 U/L      Total Protein 8 0 g/dL      Albumin 3 2 g/dL      Total Bilirubin 0 55 mg/dL      eGFR 3 ml/min/1 73sq m     Narrative:      National Kidney Disease Foundation guidelines for Chronic Kidney Disease (CKD):   •  Stage 1 with normal or high GFR (GFR > 90 mL/min/1 73 square meters)  •  Stage 2 Mild CKD (GFR = 60-89 mL/min/1 73 square meters)  •  Stage 3A Moderate CKD (GFR = 45-59 mL/min/1 73 square meters)  •  Stage 3B Moderate CKD (GFR = 30-44 mL/min/1 73 square meters)  •  Stage 4 Severe CKD (GFR = 15-29 mL/min/1 73 square meters)  •  Stage 5 End Stage CKD (GFR <15 mL/min/1 73 square meters)  Note: GFR calculation is accurate only with a steady state creatinine    CBC and differential [[id number]]  (Abnormal) Collected: 11/30/22 1825    Lab Status: Final result Specimen: Blood from Arm, Left Updated: 11/30/22 1833     WBC 12 10 Thousand/uL      RBC 4 91 Million/uL      Hemoglobin 15 1 g/dL      Hematocrit 44 8 %      MCV 91 fL      MCH 30 8 pg      MCHC 33 7 g/dL      RDW 12 8 %      MPV 9 7 fL      Platelets 281 Thousands/uL      nRBC 0 /100 WBCs      Neutrophils Relative 84 %      Immat GRANS % 0 %      Lymphocytes Relative 8 %      Monocytes Relative 7 %      Eosinophils Relative 1 %      Basophils Relative 0 %      Neutrophils Absolute 10 12 Thousands/µL      Immature Grans Absolute 0 04 Thousand/uL      Lymphocytes Absolute 0 94 Thousands/µL      Monocytes Absolute 0 83 Thousand/µL      Eosinophils Absolute 0 13 Thousand/µL      Basophils Absolute 0 04 Thousands/µL     UA (URINE) with reflex to Scope [[id number]]     Lab Status: No result Specimen: Urine                  CT renal stone study abdomen pelvis wo contrast Final Result by Mary Jo Cannon MD (11/30 1920)      There is colonic diverticulosis  There is a small amount of fluid by the sigmoid colon  I suspect this is secondary to sternal ascites however changed low-grade diverticulitis cannot be excluded  9 mm obstructing stone in the UPJ resulting in right hydroureteronephrosis  Nonobstructing stones in the right kidney are seen measuring up to 18 mm  Simple cysts are seen in the right kidney  Prostate gland is enlarged  Consider urologic consultation the appropriate clinical setting  Workstation performed: [id number]         FL retrograde pyelogram    (Results Pending)   XR chest portable    (Results Pending)              Procedures  Procedures         ED Course                                             MDM  Number of Diagnoses or Management Options  Renal failure  Ureterolithiasis  Diagnosis management comments: Patient with several days of flank pain consistent with prior episodes of ureterolithiasis  Comfortable but nondistressed  CVA tenderness and right-sided abdominal tenderness on exam but grossly benign abdominal exam   Lab evaluation including CBC, CMP, urinalysis significant for creatinine of 12 6 from previous of 1 39  Discussed patient with Dr Claudell Ruths with Urology with plan for admission for stent placement  Additionally discussed patient with Nephrology who agrees with current plan  Patient admitted to medicine service in stable condition        Disposition  Final diagnoses:   Ureterolithiasis   Renal failure     Time reflects when diagnosis was documented in both MDM as applicable and the Disposition within this note     Time User Action Codes Description Comment    11/30/2022  7:16 PM Cindy Garcia Add [N20 1] Ureterolithiasis     11/30/2022  7:16 PM Cindy Garcia Add [N19] Renal failure     12/1/2022  3:26 PM Charlotte Pak Add [N20 1] Calculus of ureter     12/1/2022  8:49 PM Shorty Chahal Add [G47 30] Sleep apnea ED Disposition     ED Disposition   Admit    Condition   Stable    Date/Time   Wed Nov 30, 2022  7:15 PM    Comment   Case was discussed with SHILPA and the patient's admission status was agreed to be Admission Status: inpatient status to the service of Dr Kyleigh Beltran   Follow-up Information    None         Current Discharge Medication List      CONTINUE these medications which have NOT CHANGED    Details   Ascorbic Acid (VITAMIN C PO) Take 1,000 mg by mouth 2 (two) times a day      b complex vitamins tablet Take 1 tablet by mouth 2 (two) times a day      VITAMIN D PO Take by mouth 2 (two) times a day      VITAMIN E PO Take 268 mg by mouth daily      BETA CAROTENE PO Take by mouth 2 (two) times a day      GLUCOSAMINE-CHONDROITIN PO Take by mouth 2 (two) times a day      TURMERIC PO Take 50 mg by mouth 3 (three) times a day             No discharge procedures on file      PDMP Review     None          ED Provider  Electronically Signed by           Steffanie Walsh MD  12/01/22 3140

## 2022-12-02 NOTE — CONSULTS
Consultation - Pulmonary Medicine   Candi Patel 80 y o  male MRN: [id number]  Unit/Bed#: [address] [place] [address] Encounter: [id number]      Assessment/Plan:    Acute hypoxic respiratory failure   Events after procedure yesterday reviewed  Titrate oxygen to maintain saturations greater than or equal to 89 percent  He was on room air while I was at bedside  Further management as listed below  Abnormal chest x-ray with bilateral pulmonary opacities concerning for pulmonary edema +/- aspiration pneumonitis   Diuresis per primary team    Monitor I/O and daily weights  Echocardiogram reviewed  Procalcitonin and WBCs are elevated  Discussed with primary team and ceftriaxone was initiated  Low threshold to discontinue should he continue to improve  Repeat imaging in 6 to 8 weeks  Obstructive uropathy with right hydronephrosis in the setting of UPJ calculus   Status post cystoscopy with stent placement yesterday  Management per urology  Acute kidney injury present on admission   Management per Nephrology  Morbid obesity with possible underlying ISAIAS   Prior suspected mild ISAIAS based on snoring which was treated with nasal septoplasty and uvulopalatopharyngoplasty 20 to 30 years ago  We discussed obstructive sleep apnea including risks of untreated sleep apnea  We discussed sleep study  At this time, he is not agreeable  I have advised him to follow-up with his PCP and he can have our office information should he change his mind  D/W primary team     History of Present Illness   Physician Requesting Consult: Dwight Langford DO  Reason for Consult / Principal Problem: Sleep apnea  Hx and PE limited by: None  Chief Complaint:  “I had a horrible time with my breathing yesterday ”  HPI: Candi Patel is a 80 y o   male who presented to [address] [place] [address] with complaints of right flank pain    He presented to the ER after four days of the symptoms, as well as associated nausea, vomiting, and poor appetite  He was found to have obstructive uropathy and underwent cystoscopy with stent placement yesterday  Postprocedure, he had oxygen saturations drop into the 50s and was placed on BiPAP  He had a chest x-ray and was subsequently diuresed  Today, he reports he feels remarkably better  He is back to 97% on room air and denies any dyspnea  He does not have any fevers or chills  He denies chest pain or leg pain/swelling  Aside from the above, no other complaints  He denies any prior diagnosis of underlying pulmonary disease and has never been on inhalers or oxygen in the past     Inpatient consult to Pulmonology  Consult performed by: CHELSIE Hdez  Consult ordered by: CHELSIE Israel        Review of Systems   All other systems reviewed and are negative  A full 12-point review of systems was completed and is negative except for those outlined in the HPI      Historical Information   Past Medical History:   Diagnosis Date   • Cancer ([facility] [address] ) 2015    basal cell of the skull    • Colon polyp    • History of subdural hematoma    • Kidney stone     right stone   • Renal disorder    • Sleep apnea     mild- no CPAP-had nasal septoplasty     Past Surgical History:   Procedure Laterality Date   • BASAL CELL CARCINOMA EXCISION  2015    skull   • BRAIN SURGERY      in the 1990's-subdural hematoma   • CATARACT EXTRACTION Left    • COLONOSCOPY      x2   • CYSTOSCOPY W/ LASER LITHOTRIPSY Right 11/27/2020    Procedure: CYSTOSCOPY, FLEXIBLE URETEROSCOPY, LASER, AND STENT [address], RIGHT RETROGRADE;  Surgeon: Rafal Felipe MD;  Location: [address] [place] [address];  Service: Urology   • FL RETROGRADE PYELOGRAM  10/18/2020   • FL RETROGRADE PYELOGRAM  11/27/2020   • FL RETROGRADE PYELOGRAM  12/1/2022   • LITHOTRIPSY     • SC CYSTO/URETERO W/LITHOTRIPSY &INDWELL STENT INSRT Right 10/18/2020    Procedure: CYSTOSCOPY URETEROSCOPY WITH BASKET EXTRACTION, RETROGRADE PYELOGRAM AND INSERTION STENT URETERAL;  Surgeon: Samia Lowery MD;  Location: [address] [place] [address];  Service: Urology   • NH CYSTOURETHROSCOPY,URETER CATHETER Right 11/11/2020    Procedure: ESWL RIGHT;  Surgeon: Angela Chavarria MD;  Location: [address] [place] [address];  Service: Urology   • NH CYSTOURETHROSCOPY,URETER CATHETER Right 12/1/2022    Procedure: CYSTOSCOPY RETROGRADE PYELOGRAM WITH INSERTION STENT URETERAL;  Surgeon: Angela Chavarria MD;  Location: [facility];  Service: Urology   • SEPTOPLASTY      in the 1990's   • TONSILLECTOMY  26    age 12     Social History   Social History     Substance and Sexual Activity   Alcohol Use Yes    Comment: a beer on the weekends     Social History     Substance and Sexual Activity   Drug Use Not Currently     Social History     Tobacco Use   Smoking Status Never   Smokeless Tobacco Never     E-Cigarette/Vaping   • E-Cigarette Use Never User      E-Cigarette/Vaping Substances     Occupational History:  Retired from work at [employer]      Family History:   Family History   Problem Relation Age of Onset   • Hypertension Mother    • Kidney disease Mother         renal failure-dialysis   • Hypertension Father    • Stroke Father    • Diabetes Sister    • Kidney disease Sister         self dialysis at home       Meds/Allergies   all current active meds have been reviewed, pertinent pulmonary meds have been reviewed, current meds:   Current Facility-Administered Medications   Medication Dose Route Frequency   • acetaminophen (TYLENOL) tablet 650 mg  650 mg Oral Q6H PRN   • ascorbic acid (VITAMIN C) tablet 500 mg  500 mg Oral Daily   • labetalol (NORMODYNE) injection 10 mg  10 mg Intravenous Q4H PRN   • lactated ringers bolus 1,000 mL  1,000 mL Intravenous Once PRN    And   • lactated ringers bolus 1,000 mL  1,000 mL Intravenous Once PRN   • ondansetron (ZOFRAN) injection 4 mg  4 mg Intravenous Q6H PRN   • sodium chloride 0 9 % bolus 1,000 mL  1,000 mL Intravenous Once PRN    And   • sodium chloride 0 9 % bolus 1,000 mL  1,000 mL Intravenous Once PRN   • tamsulosin (FLOMAX) capsule 0 4 mg  0 4 mg Oral Daily With Dinner    and PTA meds:   Prior to Admission Medications   Prescriptions Last Dose Informant Patient Reported? Taking? Ascorbic Acid (VITAMIN C PO) 11/30/2022  Yes Yes   Sig: Take 1,000 mg by mouth 2 (two) times a day   BETA CAROTENE PO   Yes No   Sig: Take by mouth 2 (two) times a day   GLUCOSAMINE-CHONDROITIN PO   Yes No   Sig: Take by mouth 2 (two) times a day   TURMERIC PO   Yes No   Sig: Take 50 mg by mouth 3 (three) times a day   VITAMIN D PO 11/30/2022  Yes Yes   Sig: Take by mouth 2 (two) times a day   VITAMIN E PO 11/30/2022  Yes Yes   Sig: Take 268 mg by mouth daily   b complex vitamins tablet 11/30/2022  Yes Yes   Sig: Take 1 tablet by mouth 2 (two) times a day      Facility-Administered Medications: None       Allergies   Allergen Reactions   • Bean Pod Extract - Food Allergy Nausea Only     Soft beans- eg chick peas, lima's, sweet peas       Objective   Vitals: Blood pressure 150/95, pulse 90, temperature 98 1 °F (36 7 °C), resp  rate 18, height 5' 7" (1 702 m), weight 99 3 kg (219 lb), SpO2 94 %  RA,Body mass index is 34 3 kg/m²  Intake/Output Summary (Last 24 hours) at 12/2/2022 1212  Last data filed at 12/2/2022 0530  Gross per 24 hour   Intake 410 ml   Output 5300 ml   Net -4890 ml     Invasive Devices     Peripheral Intravenous Line  Duration           Peripheral IV 11/30/22 Left Antecubital 1 day          Drain  Duration           Ureteral Drain/Stent Right ureter 6 Fr  734 days    Urethral Catheter Latex 18 Fr  <1 day                Physical Exam  Vitals reviewed  Constitutional:       General: He is not in acute distress  Appearance: He is well-developed  He is obese  He is not toxic-appearing or diaphoretic  HENT:      Head: Normocephalic and atraumatic  Eyes:      General: No scleral icterus       Extraocular Movements: EOM normal    Neck:      Trachea: No tracheal deviation  Cardiovascular:      Rate and Rhythm: Normal rate and regular rhythm  Heart sounds: S1 normal and S2 normal  No murmur heard  No friction rub  No gallop  Pulmonary:      Effort: Pulmonary effort is normal  No tachypnea, accessory muscle usage or respiratory distress  Breath sounds: Normal breath sounds  No stridor  No decreased breath sounds, wheezing, rhonchi or rales  Chest:      Chest wall: No tenderness  Abdominal:      General: Bowel sounds are normal  There is no distension  Palpations: Abdomen is soft  Tenderness: There is no abdominal tenderness  Musculoskeletal:         General: No tenderness or edema  Cervical back: Neck supple  Skin:     General: Skin is warm and dry  Findings: No rash  Nails: There is no cyanosis  Neurological:      Mental Status: He is alert and oriented to person, place, and time  GCS: GCS eye subscore is 4  GCS verbal subscore is 5  GCS motor subscore is 6  Motor: Motor strength is normal    Psychiatric:         Mood and Affect: Mood and affect normal          Speech: Speech normal          Behavior: Behavior normal  Behavior is cooperative           Lab Results:   CBC:   Lab Results   Component Value Date    WBC 20 29 (H) 12/02/2022    WBC 20 29 (H) 12/02/2022    HGB 15 7 12/02/2022    HGB 15 7 12/02/2022    HCT 45 1 12/02/2022    HCT 45 1 12/02/2022    MCV 88 12/02/2022    MCV 88 12/02/2022     12/02/2022     12/02/2022    MCH 30 7 12/02/2022    MCH 30 7 12/02/2022    MCHC 34 8 12/02/2022    MCHC 34 8 12/02/2022    RDW 12 7 12/02/2022    RDW 12 7 12/02/2022    MPV 9 4 12/02/2022    MPV 9 4 12/02/2022   , CMP:   Lab Results   Component Value Date    SODIUM 136 12/02/2022    K 4 3 12/02/2022    CL 96 12/02/2022    CO2 24 12/02/2022    BUN 97 (H) 12/02/2022    CREATININE 7 57 (H) 12/02/2022    CALCIUM 8 5 12/02/2022    EGFR 6 12/02/2022     Procalcitonin:  1 36    Flu/COVID/RSV PCR:  Negative    BNP: 767    Imaging Studies: I have personally reviewed pertinent reports  and I have personally reviewed pertinent films in PACS   Chest x-ray shows ground-glass opacities bilaterally  EKG, Pathology, and Other Studies: I have personally reviewed pertinent reports  Echocardiogram shows EF 60 to 65 percent with normal diastolic dysfunction and normal RV size and function  Pulmonary Results (PFTs, PSG): None    VTE Prophylaxis: Reason for no pharmacologic prophylaxis Per primary team    Code Status: Level 1 - Full Code    None    Portions of the record may have been created with voice recognition software  Occasional wrong word or "sound a like" substitutions may have occurred due to the inherent limitations of voice recognition software  Read the chart carefully and recognize, using context, where substitutions have occurred

## 2022-12-02 NOTE — PROGRESS NOTES
Placed on 3LNC as per Kimo Ruiz NP  Pt  O2sat 94% and in NARD  BiPAP remains on standby   Will continue to monitor

## 2022-12-02 NOTE — ASSESSMENT & PLAN NOTE
Patient was noted to be hypoxic yesterday after being moved from the OR table to the stretcher  · Per notes, LMA was placed and patient was bagged with improvement in O2 sat and then transitioned to CPAP  · Chest x-ray showed findings suggestive of CHF and patient received 40 mg IV Lasix x1 yesterday  · Currently on 3 L, titrate down as tolerated  · Chest x-ray also suggested that infectious etiology cannot be ruled out    In the setting of worsening leukocytosis, elevated procalcitonin, possibility of aspiration pneumonia versus pneumonitis  · Will start patient on IV Rocephin and monitor response

## 2022-12-02 NOTE — PROGRESS NOTES
NEPHROLOGY PROGRESS NOTE   Winsome Band 80 y o  male MRN: [id number]  Unit/Bed#: [address] [place] [address] Encounter: [id number]    ASSESSMENT & PLAN:  80 y o  male with history of sleep apnea, history of acute kidney injury in October 2020 from obstructive uropathy and underwent cystoscopy and ureteral stent placement on 10/18/2020 status post lithotripsy on November 27, 2020 who was admitted to [facility] after presenting with complain of right flank pain associated with nausea and vomiting as well as poor appetite for  4 days    Acute kidney injury, POA  -Baseline creatinine:  Not known  Had episode of acute kidney injury in October 2020 and renal function improved from creatinine 3 7 to creatinine 1 39 on October 20, 2020  Acute kidney at that time was due to obstructive uropathy and underwent right ureteral stent placement  Patient did not have any blood work in between  Unclear if he has underlying chronic kidney disease-most likely has underlying chronic kidney disease with finding of left renal parenchyma atrophy   -Admission creatinine:  12 6 mg/dl  - Work up:   • UA with microscopy:  1+ protein, numerous RBCs, 4-10 WBC per high-power field  • Imaging:  CT abdomen suggestive of 9 mm stone in the UPJ resulting in right hydro ureteronephrosis  Nonobstructing stone in the right kidneys are seen measuring up to 18 mm in size  Simple cyst seen in the right kidney  Prostate gland is enlarged  finding of left renal parenchymal atrophy  -Etiology:  Acute kidney injury likely due to obstructive uropathy plus prerenal in the setting of right hydronephrosis from UPJ calculus in the setting of left renal parenchymal atrophy   [facility] Course:   underwent cystoscopy and ureteral stent placement on 12/01  -Plan:   • Renal function improving with creatinine trending down to 7 57 electrolytes stable    Has good urine output on 5 5 L  • No need for IV fluids, continue to monitor renal function, avoid nephrotoxins  • Avoid nephrotoxins and dose all medications per EGFR  • Avoid hypotension                                              Hyperkalemia  -admission potassium 5 8  -due to acute kidney injury and metabolic acidosis and obstructive uropathy  -resolved     Elevated anion gap metabolic acidosis  -admission bicarb level 19 with anion gap 23  -resolved status post IV bicarb drip and improvement in renal function    Obstructive uropathy/right hydronephrosis in the setting of UPJ calculus  -status post cystoscopy, retrograde pyelogram and insertion of ureteral stent     Nephrolithiasis right   -recommend outpatient metabolic workup for nephrolithiasis  -PTH was elevated to 230 but also currently having renal dysfunction which could be contributing may need to repeat as an outpatient  Does not have any hypercalcemia  Uric acid elevated at 13 5   -stone analysis from 2020 suggestive of uric acid stone  -decrease the dose of vitamin-C to 500 mg daily  Recommend low oxalate diet  Recommend stone analysis if able to strain urine and send kidney stone analysis  -would consider starting allopurinol 100 mg daily if patient agreeable    Fluid overload:  Chest x-ray from early today was concerning for CHF  Status post IV Lasix and shortness of breath improving, has good urine output, continue to monitor, no need for further IV fluids  If any respiratory distress can give another dose of IV Lasix    Hyperuricemia with uric acid 13  5 would consider starting allopurinol 100 mg daily if patient agreeable     BPH continue Flomax     Discussed with primary team    Recommend to continue inpatient care in the setting of acute kidney injury  Patient not stable for discharge from Nephrology side    SUBJECTIVE:  No new complaints  No chest pain or SOB  Patient had shortness of breath early today and required IV Lasix      OBJECTIVE:  Current Weight: Weight - Scale: 99 7 kg (219 lb 12 8 oz)  Vitals:    12/02/22 0729 BP: 144/93   Pulse: 102   Resp:    Temp: 98 1 °F (36 7 °C)   SpO2: 94%       Intake/Output Summary (Last 24 hours) at 12/2/2022 1014  Last data filed at 12/2/2022 0530  Gross per 24 hour   Intake 410 ml   Output 5500 ml   Net -5090 ml       Physical Exam  General:  Ill looking, awake  Eyes: Conjunctivae pink,  Sclera anicteric  ENT: lips and mucous membranes moist  Neck: supple   Chest:  Bilateral basal crackles  CVS: S1 & S2 present, normal rate, regular rhythm, no murmur  Abdomen: soft, non-tender, non-distended, Bowel sounds normoactive  Extremities: no edema of  legs  Skin: no rash  Neuro: awake, alert, oriented x 3   Psych: Mood and affect appropriate     Medications:    Current Facility-Administered Medications:   •  acetaminophen (TYLENOL) tablet 650 mg, 650 mg, Oral, Q6H PRN, Stanley Estrella MD  •  ascorbic acid (VITAMIN C) tablet 500 mg, 500 mg, Oral, Daily, Stanley Estrella MD, 500 mg at 12/02/22 8704  •  labetalol (NORMODYNE) injection 10 mg, 10 mg, Intravenous, Q4H PRN, CHELSIE Cook  •  lactated ringers bolus 1,000 mL, 1,000 mL, Intravenous, Once PRN **AND** lactated ringers bolus 1,000 mL, 1,000 mL, Intravenous, Once PRN, Stanley Estrella MD  •  ondansetron [facility]) injection 4 mg, 4 mg, Intravenous, Q6H PRN, Stanley Estrella MD  •  sodium chloride 0 9 % bolus 1,000 mL, 1,000 mL, Intravenous, Once PRN **AND** sodium chloride 0 9 % bolus 1,000 mL, 1,000 mL, Intravenous, Once PRN, Stanley Estrella MD  •  tamsulosin [facility]) capsule 0 4 mg, 0 4 mg, Oral, Daily With Carlitos Sanders MD, 0 4 mg at 11/30/22 5751    Invasive Devices:   Urethral Catheter Latex 18 Fr   (Active)   Reasons to continue Urinary Catheter  Post-operative urological requirements 12/02/22 0530   Goal for Removal Will consult urology 12/02/22 0530   Site Assessment Clean;Skin intact 12/02/22 0530   Kiser Care Done 12/02/22 0900   Securement Method Securing device (Describe) 12/02/22 0530   Output (mL) 3000 mL 12/02/22 0530 Lab Results:   Results from last 7 days   Lab Units 12/02/22  0605 12/01/22  0845 11/30/22  1825   WBC Thousand/uL 20 29*  20 29* 8 86 12 10*   HEMOGLOBIN g/dL 15 7  15 7 13 2 15 1   HEMATOCRIT % 45 1  45 1 38 2 44 8   PLATELETS Thousands/uL 285  285 230 254   POTASSIUM mmol/L 4 3 4 3 5 8*   CHLORIDE mmol/L 96 97 95*   CO2 mmol/L 24 19* 19*   BUN mg/dL 97* 117* 114*   CREATININE mg/dL 7 57* 13 15* 12 60*   CALCIUM mg/dL 8 5 7 9* 9 0   MAGNESIUM mg/dL  --  2 7*  --    ALK PHOS U/L  --   --  46   ALT U/L  --   --  32   AST U/L  --   --  35       Previous work up:         Portions of the record may have been created with voice recognition software  Occasional wrong word or "sound a like" substitutions may have occurred due to the inherent limitations of voice recognition software  Read the chart carefully and recognize, using context, where substitutions have occurred  If you have any questions, please contact the dictating provider

## 2022-12-02 NOTE — QUICK NOTE
Re-evaluated patient who was resting comfortably on 3 L NC with an O2 sat of 94%  He opened his eyes when I walked in and stated he felt better  He denied any SOB or chest pain  Was updated by primary team that after receiving lasix 40 mg IV x 1 for a CXR concerning for CHF, patient diuresed 2 liters and they felt he was okay to be taken off of CPAP at that time  Reiterated the concern for ISAIAS based on recent anesthesia events and the possible need for new CPAP HS  Primary team stated they would monitor him closely

## 2022-12-02 NOTE — PLAN OF CARE
Problem: GENITOURINARY - ADULT  Goal: Maintains or returns to baseline urinary function  Description: INTERVENTIONS:  - Assess urinary function  - Encourage oral fluids to ensure adequate hydration if ordered  - Administer IV fluids as ordered to ensure adequate hydration  - Administer ordered medications as needed  - Offer frequent toileting  - Follow urinary retention protocol if ordered  Outcome: Progressing  Goal: Absence of urinary retention  Description: INTERVENTIONS:  - Assess patient’s ability to void and empty bladder  - Monitor I/O  - Bladder scan as needed  - Discuss with physician/AP medications to alleviate retention as needed  - Discuss catheterization for long term situations as appropriate  Outcome: Progressing     Problem: METABOLIC, FLUID AND ELECTROLYTES - ADULT  Goal: Electrolytes maintained within normal limits  Description: INTERVENTIONS:  - Monitor labs and assess patient for signs and symptoms of electrolyte imbalances  - Administer electrolyte replacement as ordered  - Monitor response to electrolyte replacements, including repeat lab results as appropriate  - Instruct patient on fluid and nutrition as appropriate  Outcome: Progressing  Goal: Fluid balance maintained  Description: INTERVENTIONS:  - Monitor labs   - Monitor I/O and WT  - Instruct patient on fluid and nutrition as appropriate  - Assess for signs & symptoms of volume excess or deficit  Outcome: Progressing     Problem: METABOLIC, FLUID AND ELECTROLYTES - ADULT  Goal: Electrolytes maintained within normal limits  Description: INTERVENTIONS:  - Monitor labs and assess patient for signs and symptoms of electrolyte imbalances  - Administer electrolyte replacement as ordered  - Monitor response to electrolyte replacements, including repeat lab results as appropriate  - Instruct patient on fluid and nutrition as appropriate  Outcome: Progressing  Goal: Fluid balance maintained  Description: INTERVENTIONS:  - Monitor labs   - Monitor I/O and WT  - Instruct patient on fluid and nutrition as appropriate  - Assess for signs & symptoms of volume excess or deficit  Outcome: Progressing

## 2022-12-02 NOTE — PLAN OF CARE
Problem: Knowledge Deficit  Goal: Patient/family/caregiver demonstrates understanding of disease process, treatment plan, medications, and discharge instructions  Description: Complete learning assessment and assess knowledge base    Interventions:  - Provide teaching at level of understanding  - Provide teaching via preferred learning methods  Outcome: Progressing     Problem: GENITOURINARY - ADULT  Goal: Maintains or returns to baseline urinary function  Description: INTERVENTIONS:  - Assess urinary function  - Encourage oral fluids to ensure adequate hydration if ordered  - Administer IV fluids as ordered to ensure adequate hydration  - Administer ordered medications as needed  - Offer frequent toileting  - Follow urinary retention protocol if ordered  Outcome: Progressing  Goal: Absence of urinary retention  Description: INTERVENTIONS:  - Assess patient’s ability to void and empty bladder  - Monitor I/O  - Bladder scan as needed  - Discuss with physician/AP medications to alleviate retention as needed  - Discuss catheterization for long term situations as appropriate  Outcome: Progressing     Problem: METABOLIC, FLUID AND ELECTROLYTES - ADULT  Goal: Electrolytes maintained within normal limits  Description: INTERVENTIONS:  - Monitor labs and assess patient for signs and symptoms of electrolyte imbalances  - Administer electrolyte replacement as ordered  - Monitor response to electrolyte replacements, including repeat lab results as appropriate  - Instruct patient on fluid and nutrition as appropriate  Outcome: Progressing  Goal: Fluid balance maintained  Description: INTERVENTIONS:  - Monitor labs   - Monitor I/O and WT  - Instruct patient on fluid and nutrition as appropriate  - Assess for signs & symptoms of volume excess or deficit  Outcome: Progressing     Problem: RESPIRATORY - ADULT  Goal: Achieves optimal ventilation and oxygenation  Description: INTERVENTIONS:  - Assess for changes in respiratory status  - Assess for changes in mentation and behavior  - Position to facilitate oxygenation and minimize respiratory effort  - Oxygen administered by appropriate delivery if ordered  - Initiate smoking cessation education as indicated  - Encourage broncho-pulmonary hygiene including cough, deep breathe, Incentive Spirometry  - Assess the need for suctioning and aspirate as needed  - Assess and instruct to report SOB or any respiratory difficulty  - Respiratory Therapy support as indicated  Outcome: Progressing

## 2022-12-02 NOTE — QUICK NOTE
Updated on patient's status by anesthesiologist   Patient was in the PACU status post cystoscopy with stent placement and became acutely hypoxic after being moved from the OR table to the stretcher with oxygen saturations in the 40s and 50s per Anesthesia  An LMA was placed back in the patient and the patient was bagged up until saturations were back in the 90s  The patient was then transition to CPAP with settings of 10  He became more oriented, was able to follow commands, and had at oxygen saturation of 94%  The rest of his hemodynamics were stable  Case was discussed with on-call ICU attending and anesthesia and it was decided that the patient could be transferred to the telemetry floor and rest on CPAP HS     2130 - patient was evaluated and found resting comfortably on CPAP with an oxygen saturation 94% and normal hemodynamics  Although he was sleeping, he was arousable to his name and followed commands  He denies any shortness of breath or chest pain  Discussed patient's status with respiratory, bedside nurse, and on-call AP for Hospital Medicine  Patient is to remain NPO tonight, on CPAP while sleeping  He will need follow-up with pulmonology

## 2022-12-02 NOTE — QUICK NOTE
Stat CXR concerning for CHF  ProBNP this morning 767(added tonight)   in PACU,currently has approximately 300 ml in the bag  IV fluid was discontinued  Communicated with Nephrology on-call, recommend Lasix 40 IV x1, if no response in 2 hours can give another 80mg IV  Patient ordered Lasix 40 IV x1,will try to wean off CPAP in 2 hours  Check 2D echo  Telemetry  Daily weight, I&Os    Update:  UO 2000 ml 2 hours post Lasix 40mg IV  Patient was taken off CPAP, placed on oxygen 3L, satting 94%

## 2022-12-02 NOTE — PROGRESS NOTES
[facility]  Progress Note - Jerlean Scheuermann 1950, 80 y o  male MRN: [id number]  Unit/Bed#: [address] [place] [address] Encounter: [id number]  Primary Care Provider: Jared Knott PA-C   Date and time admitted to hospital: 11/30/2022  5:29 PM    * Hydronephrosis with ureteropelvic junction (UPJ) obstruction  Assessment & Plan  Patient presented with intermittent right flank pain since Saturday, with associated nausea, vomiting, poor appetite  No dysuria hematuria urgency or frequency in urination  Denies fever, chills  History of right nephrolithiasis 2 years ago  · CT renal stone study showed - 9 mm obstructing stone in the UPJ causing right hydroureteronephrosis  Nonobstructing stones noted in the right kidney  · Patient seen by Urology in ED  Continue Flomax  · Status post cysto with right ureteral stent placement on 12/01  · IV hydration discontinued  · Pain control  · Strain all urine    Acute kidney injury ([facility] [address] )  Assessment & Plan  Baseline creatinine unknown  Patient had DAYANNA in 10/2020 with highest creatinine 4 25 due to right obstructing kidney stone  Creatinine improved to 1 39 with stent placement and IV hydration  · Creatinine on admission 12 6  · Potassium 5 8 (slightly hemolyzed specimen)    Anion gap 23, bicarb 19 initially  · Likely multifactorial - post renal and prerenal  · Per Nephrology, was started on sodium bicarb 150 mEq in D5 water at 125 per hour which has since been discontinued  · Avoid nephrotoxins and hypotension  · Repeat BMP in the morning    Results from last 7 days   Lab Units 12/02/22  0605 12/01/22  0845 11/30/22  1825   BUN mg/dL 97* 117* 114*   CREATININE mg/dL 7 57* 13 15* 12 60*       Acute respiratory failure with hypoxia (HCC)  Assessment & Plan  Patient was noted to be hypoxic yesterday after being moved from the OR table to the stretcher  · Per notes, LMA was placed and patient was bagged with improvement in O2 sat and then transitioned to CPAP  · Chest x-ray showed findings suggestive of CHF and patient received 40 mg IV Lasix x1 yesterday  · Currently on 3 L, titrate down as tolerated  · Chest x-ray also suggested that infectious etiology cannot be ruled out  In the setting of worsening leukocytosis, elevated procalcitonin, possibility of aspiration pneumonia versus pneumonitis  · Will start patient on IV Rocephin and monitor response    Leukocytosis  Assessment & Plan  WBC 12 10 initially, no bands, patient afebrile  Denies UTI symptoms  · CT showed  - colonic diverticulosis with small amount of fluid by the sigmoid colon which is likely due to sternal ascites however changed low-grade diverticulitis could not be excluded  · WBC count has trended up today, procalcitonin elevated today  Chest x-ray shows findings suggestive of CHF but infectious etiology could not be ruled out  · Check blood cultures  Repeat procalcitonin in a m  · UA with large blood, trace leukocytes, 1-2 WBC and occasional bacteria  · Repeat lab in the morning    Results from last 7 days   Lab Units 12/02/22  0605 12/01/22  0845 11/30/22  1825   WBC Thousand/uL 20 29*  20 29* 8 86 12 10*       High anion gap metabolic acidosis  Assessment & Plan  Management as above with bicarb drip initially  · Resolved on Repeat lab   · Appreciate nephrology input    Hypertension  Assessment & Plan  BP elevated on ED arrival,177/84  Improving  · Monitor    BPH (benign prostatic hyperplasia)  Assessment & Plan  Enlarged prostate on CT  · Continue Flomax as above  · Currently with Kiser in place    Sleep apnea  Assessment & Plan  Status post septoplasty  VTE Pharmacologic Prophylaxis:   none    Patient Centered Rounds: I performed bedside rounds with nursing staff today  Discussions with Specialists or Other Care Team Provider: yes     Education and Discussions with Family / Patient: Patient declined call to   Time Spent for Care: 30 minutes   More than 50% of total time spent on counseling and coordination of care as described above  Current Length of Stay: 2 day(s)  Current Patient Status: Inpatient   Certification Statement: The patient will continue to require additional inpatient hospital stay due to Acute kidney injury still with elevated creatinine, possible pneumonia  Discharge Plan: Anticipate discharge in 48-72 hrs to home  Code Status: Level 1 - Full Code    Subjective:     Patient states his breathing feels much better today  Denies any pain    Objective:     Vitals:   Temp (24hrs), Av 3 °F (36 8 °C), Min:97 4 °F (36 3 °C), Max:99 4 °F (37 4 °C)    Temp:  [97 4 °F (36 3 °C)-99 4 °F (37 4 °C)] 98 1 °F (36 7 °C)  HR:  [] 102  Resp:  [18-38] 18  BP: (144-189)/() 144/93  SpO2:  [90 %-99 %] 94 %  Body mass index is 34 43 kg/m²  Input and Output Summary (last 24 hours): Intake/Output Summary (Last 24 hours) at 2022 0947  Last data filed at 2022 0530  Gross per 24 hour   Intake 410 ml   Output 5500 ml   Net -5090 ml       Physical Exam:   Physical Exam  Vitals reviewed  Constitutional:       General: He is not in acute distress  Appearance: He is not ill-appearing  HENT:      Head: Normocephalic and atraumatic  Eyes:      General:         Right eye: No discharge  Left eye: No discharge  Cardiovascular:      Rate and Rhythm: Normal rate and regular rhythm  Pulmonary:      Effort: Pulmonary effort is normal  No respiratory distress  Breath sounds: No wheezing or rales  Comments: Decreased breath sounds bilaterally  Abdominal:      General: Bowel sounds are normal  There is no distension  Palpations: Abdomen is soft  Tenderness: There is no abdominal tenderness  Genitourinary:     Comments: Kiser catheter in place  Neurological:      Mental Status: He is alert and oriented to person, place, and time           Additional Data:     Labs:  Results from last 7 days   Lab Units 22  0605 WBC Thousand/uL 20 29*  20 29*   HEMOGLOBIN g/dL 15 7  15 7   HEMATOCRIT % 45 1  45 1   PLATELETS Thousands/uL 285  285   NEUTROS PCT % 95*   LYMPHS PCT % 2*   MONOS PCT % 3*   EOS PCT % 0     Results from last 7 days   Lab Units 12/02/22  0605 12/01/22  0845 11/30/22  1825   SODIUM mmol/L 136   < > 137   POTASSIUM mmol/L 4 3   < > 5 8*   CHLORIDE mmol/L 96   < > 95*   CO2 mmol/L 24   < > 19*   BUN mg/dL 97*   < > 114*   CREATININE mg/dL 7 57*   < > 12 60*   ANION GAP mmol/L 16*   < > 23*   CALCIUM mg/dL 8 5   < > 9 0   ALBUMIN g/dL  --   --  3 2*   TOTAL BILIRUBIN mg/dL  --   --  0 55   ALK PHOS U/L  --   --  46   ALT U/L  --   --  32   AST U/L  --   --  35   GLUCOSE RANDOM mg/dL 179*   < > 77    < > = values in this interval not displayed                   Results from last 7 days   Lab Units 12/02/22  0605   PROCALCITONIN ng/ml 1 36*       Lines/Drains:  Invasive Devices     Peripheral Intravenous Line  Duration           Peripheral IV 11/30/22 Left Antecubital 1 day          Drain  Duration           Ureteral Drain/Stent Right ureter 6 Fr  734 days    Urethral Catheter Latex 18 Fr  <1 day              Urinary Catheter:  Goal for removal: Remove after 48 hrs of I/O monitoring           Imaging: Reviewed radiology reports from this admission including: chest xray    Recent Cultures (last 7 days):         Last 24 Hours Medication List:   Current Facility-Administered Medications   Medication Dose Route Frequency Provider Last Rate   • acetaminophen  650 mg Oral Q6H PRN Sin Gonsales MD     • ascorbic acid  500 mg Oral Daily Sin Gonsales MD     • labetalol  10 mg Intravenous Q4H PRN CHELSIE Cook     • lactated ringers  1,000 mL Intravenous Once PRN Sin Gonsales MD      And   • lactated ringers  1,000 mL Intravenous Once PRN Sin Gonsales MD     • ondansetron  4 mg Intravenous Q6H PRN Sin Gonsales MD     • sodium chloride  1,000 mL Intravenous Once PRN Sin Gonsales MD      And   • sodium chloride  1,000 mL Intravenous Once PRN Batool Daugherty MD     • tamsulosin  0 4 mg Oral Daily With Alba Henderson MD          Today, Patient Was Seen By: Nikki Peraza DO    **Please Note: This note may have been constructed using a voice recognition system  **

## 2022-12-03 PROBLEM — E87.29 HIGH ANION GAP METABOLIC ACIDOSIS: Status: RESOLVED | Noted: 2022-11-30 | Resolved: 2022-12-03

## 2022-12-03 LAB
ANION GAP SERPL CALCULATED.3IONS-SCNC: 10 MMOL/L (ref 4–13)
BUN SERPL-MCNC: 63 MG/DL (ref 5–25)
CALCIUM SERPL-MCNC: 8.3 MG/DL (ref 8.3–10.1)
CHLORIDE SERPL-SCNC: 103 MMOL/L (ref 96–108)
CO2 SERPL-SCNC: 28 MMOL/L (ref 21–32)
CREAT SERPL-MCNC: 3.02 MG/DL (ref 0.6–1.3)
ERYTHROCYTE [DISTWIDTH] IN BLOOD BY AUTOMATED COUNT: 13.1 % (ref 11.6–15.1)
GFR SERPL CREATININE-BSD FRML MDRD: 19 ML/MIN/1.73SQ M
GLUCOSE SERPL-MCNC: 124 MG/DL (ref 65–140)
HCT VFR BLD AUTO: 43.3 % (ref 36.5–49.3)
HGB BLD-MCNC: 14.9 G/DL (ref 12–17)
MCH RBC QN AUTO: 31.4 PG (ref 26.8–34.3)
MCHC RBC AUTO-ENTMCNC: 34.4 G/DL (ref 31.4–37.4)
MCV RBC AUTO: 91 FL (ref 82–98)
PLATELET # BLD AUTO: 254 THOUSANDS/UL (ref 149–390)
PMV BLD AUTO: 9.1 FL (ref 8.9–12.7)
POTASSIUM SERPL-SCNC: 4.1 MMOL/L (ref 3.5–5.3)
PROCALCITONIN SERPL-MCNC: 1.41 NG/ML
RBC # BLD AUTO: 4.74 MILLION/UL (ref 3.88–5.62)
SODIUM SERPL-SCNC: 141 MMOL/L (ref 135–147)
WBC # BLD AUTO: 13.33 THOUSAND/UL (ref 4.31–10.16)

## 2022-12-03 RX ADMIN — HEPARIN SODIUM 5000 UNITS: 5000 INJECTION INTRAVENOUS; SUBCUTANEOUS at 06:37

## 2022-12-03 RX ADMIN — CEFTRIAXONE 1000 MG: 1 INJECTION, SOLUTION INTRAVENOUS at 16:25

## 2022-12-03 RX ADMIN — OXYCODONE HYDROCHLORIDE AND ACETAMINOPHEN 500 MG: 500 TABLET ORAL at 08:10

## 2022-12-03 RX ADMIN — HEPARIN SODIUM 5000 UNITS: 5000 INJECTION INTRAVENOUS; SUBCUTANEOUS at 13:42

## 2022-12-03 RX ADMIN — HEPARIN SODIUM 5000 UNITS: 5000 INJECTION INTRAVENOUS; SUBCUTANEOUS at 21:36

## 2022-12-03 RX ADMIN — TAMSULOSIN HYDROCHLORIDE 0.4 MG: 0.4 CAPSULE ORAL at 16:24

## 2022-12-03 NOTE — PROGRESS NOTES
[facility]  Progress Note - Yas Mins 1950, 80 y o  male MRN: [id number]  Unit/Bed#: [address] [place] [address] Encounter: [id number]  Primary Care Provider: Rudy Monsalve PA-C   Date and time admitted to hospital: 11/30/2022  5:29 PM    * Hydronephrosis with ureteropelvic junction (UPJ) obstruction  Assessment & Plan  Patient presented with intermittent right flank pain since Saturday, with associated nausea, vomiting, poor appetite  No dysuria hematuria urgency or frequency in urination  Denies fever, chills  History of right nephrolithiasis 2 years ago  · CT renal stone study showed - 9 mm obstructing stone in the UPJ causing right hydroureteronephrosis  Nonobstructing stones noted in the right kidney  · Patient seen by Urology in ED  Continue Flomax  · Status post cysto with right ureteral stent placement on 12/01  · Per Urology, discontinue Kiser in the morning  · IV hydration discontinued  · Pain control    Acute kidney injury [facility])  Assessment & Plan  Baseline creatinine unknown  Patient had DAYANNA in 10/2020 with highest creatinine 4 25 due to right obstructing kidney stone  Creatinine improved to 1 39 with stent placement and IV hydration  · Creatinine on admission 12 6 with Potassium 5 8 (slightly hemolyzed specimen)    Anion gap 23, bicarb 19 initially  · Likely multifactorial - post renal and prerenal  · Creatinine continues to improve  · Per Nephrology, was started on sodium bicarb 150 mEq in D5 water at 125 per hour which has been discontinued  · Avoid nephrotoxins and hypotension  · Repeat BMP in the morning    Results from last 7 days   Lab Units 12/03/22  0532 12/02/22  0605 12/01/22  0845 11/30/22  1825   BUN mg/dL 63* 97* 117* 114*   CREATININE mg/dL 3 02* 7 57* 13 15* 12 60*       Acute respiratory failure with hypoxia [facility])  Assessment & Plan  Patient was noted to be hypoxic 12/1 after being moved from the OR table to the stretcher  · Per notes, LMA was placed and patient was bagged with improvement in O2 sat and then transitioned to CPAP  · Chest x-ray showed findings suggestive of CHF and patient received 40 mg IV Lasix x1 12/1  · Currently titrated off oxygen  · Chest x-ray also suggested that infectious etiology cannot be ruled out  In the setting of worsening leukocytosis, elevated procalcitonin, possibility of aspiration pneumonia versus pneumonitis  · Continue patient on IV Rocephin and monitor response    Leukocytosis  Assessment & Plan  WBC 12 10 initially, no bands, patient afebrile  Denies UTI symptoms  · CT showed  - colonic diverticulosis with small amount of fluid by the sigmoid colon which is likely due to sternal ascites however changed low-grade diverticulitis could not be excluded  · Initially leukocytosis was reactive and resolved  · WBC count has trended up on 12/02 along with elevated procalcitonin  Chest x-ray shows findings suggestive of CHF but infectious etiology could not be ruled out  · blood cultures pending  Leukocytosis improving  · UA with large blood, trace leukocytes, 1-2 WBC and occasional bacteria  · Repeat lab in the morning    Results from last 7 days   Lab Units 12/03/22  0532 12/02/22  0605 12/01/22  0845 11/30/22  1825   WBC Thousand/uL 13 33* 20 29*  20 29* 8 86 12 10*       High anion gap metabolic acidosis-resolved as of 12/3/2022  Assessment & Plan  Management as above with bicarb drip initially  · Resolved on Repeat lab   · Appreciate nephrology input    Hypertension  Assessment & Plan  BP elevated on ED arrival,177/84  Continues to improve  · Monitor    BPH (benign prostatic hyperplasia)  Assessment & Plan  Enlarged prostate on CT  · Continue Flomax as above  · As noted above, plan for Kiser removal in a m  Sleep apnea  Assessment & Plan  Status post septoplasty    Patient not interested in outpatient sleep study      VTE Pharmacologic Prophylaxis:   Heparin    Patient Centered Rounds: I performed bedside rounds with nursing staff today  Discussions with Specialists or Other Care Team Provider: yes - renal    Education and Discussions with Family / Patient: Patient declined call to   Time Spent for Care: 35 min  More than 50% of total time spent on counseling and coordination of care as described above  Current Length of Stay: 3 day(s)  Current Patient Status: Inpatient   Certification Statement: The patient will continue to require additional inpatient hospital stay due to Acute kidney injury, possible aspiration pneumonia versus pneumonitis  Discharge Plan: Anticipate discharge in 24-48 hrs to home  Code Status: Level 1 - Full Code    Subjective:     Patient denies any pain, shortness of breath    Objective:     Vitals:   Temp (24hrs), Av °F (36 7 °C), Min:97 8 °F (36 6 °C), Max:98 3 °F (36 8 °C)    Temp:  [97 8 °F (36 6 °C)-98 3 °F (36 8 °C)] 98 °F (36 7 °C)  HR:  [] 99  Resp:  [18-20] 20  BP: (116-161)/(78-91) 116/78  SpO2:  [94 %-98 %] 96 %  Body mass index is 34 61 kg/m²  Input and Output Summary (last 24 hours): Intake/Output Summary (Last 24 hours) at 12/3/2022 1143  Last data filed at 12/3/2022 0540  Gross per 24 hour   Intake 210 ml   Output 4525 ml   Net -4315 ml       Physical Exam:   Physical Exam  Vitals reviewed  Constitutional:       General: He is not in acute distress  Appearance: He is not toxic-appearing  HENT:      Head: Normocephalic and atraumatic  Eyes:      General:         Right eye: No discharge  Left eye: No discharge  Cardiovascular:      Rate and Rhythm: Normal rate and regular rhythm  Pulmonary:      Effort: Pulmonary effort is normal  No respiratory distress  Breath sounds: Rales present  No wheezing  Abdominal:      General: Bowel sounds are normal  There is no distension  Palpations: Abdomen is soft  Tenderness: There is no abdominal tenderness     Genitourinary:     Comments: Margi in place  Neurological:      Mental Status: He is alert and oriented to person, place, and time  Additional Data:     Labs:  Results from last 7 days   Lab Units 12/03/22  0532 12/02/22  0605   WBC Thousand/uL 13 33* 20 29*  20 29*   HEMOGLOBIN g/dL 14 9 15 7  15 7   HEMATOCRIT % 43 3 45 1  45 1   PLATELETS Thousands/uL 254 285  285   NEUTROS PCT %  --  95*   LYMPHS PCT %  --  2*   MONOS PCT %  --  3*   EOS PCT %  --  0     Results from last 7 days   Lab Units 12/03/22  0532 12/01/22  0845 11/30/22  1825   SODIUM mmol/L 141   < > 137   POTASSIUM mmol/L 4 1   < > 5 8*   CHLORIDE mmol/L 103   < > 95*   CO2 mmol/L 28   < > 19*   BUN mg/dL 63*   < > 114*   CREATININE mg/dL 3 02*   < > 12 60*   ANION GAP mmol/L 10   < > 23*   CALCIUM mg/dL 8 3   < > 9 0   ALBUMIN g/dL  --   --  3 2*   TOTAL BILIRUBIN mg/dL  --   --  0 55   ALK PHOS U/L  --   --  46   ALT U/L  --   --  32   AST U/L  --   --  35   GLUCOSE RANDOM mg/dL 124   < > 77    < > = values in this interval not displayed  Results from last 7 days   Lab Units 12/03/22  0532 12/02/22  0605   PROCALCITONIN ng/ml 1 41* 1 36*       Lines/Drains:  Invasive Devices     Peripheral Intravenous Line  Duration           Peripheral IV 11/30/22 Left Antecubital 2 days          Drain  Duration           Ureteral Drain/Stent Right ureter 6 Fr  735 days    Urethral Catheter Latex 18 Fr  1 day              Urinary Catheter:  Goal for removal: Plan for Kiser removal tomorrow         Imaging: No pertinent imaging reviewed  Recent Cultures (last 7 days):   Results from last 7 days   Lab Units 12/02/22  1447   BLOOD CULTURE  Received in Microbiology Lab  Culture in Progress  Received in Microbiology Lab  Culture in Progress         Last 24 Hours Medication List:   Current Facility-Administered Medications   Medication Dose Route Frequency Provider Last Rate   • acetaminophen  650 mg Oral Q6H PRN Neda Ha MD     • ascorbic acid  500 mg Oral Daily Cole Vasquez MD     • cefTRIAXone  1,000 mg Intravenous Q24H Chantelle Snachez MD 1,000 mg (12/02/22 8027)   • heparin (porcine)  5,000 Units Subcutaneous Q8H [facility] Heide Weber DO     • labetalol  10 mg Intravenous Q4H PRN CHELSIE Cook     • ondansetron  4 mg Intravenous Q6H PRN Cole Vasquez MD     • tamsulosin  0 4 mg Oral Daily With Francie Blackman MD          Today, Patient Was Seen By: Drew Wiseman DO    **Please Note: This note may have been constructed using a voice recognition system  **

## 2022-12-03 NOTE — PROGRESS NOTES
NEPHROLOGY PROGRESS NOTE   Jessy Driver 80 y o  male MRN: [id number]  Unit/Bed#: [address] [place] [address] Encounter: [id number]    ASSESSMENT & PLAN:  80 y o  male with history of sleep apnea, history of acute kidney injury in October 2020 from obstructive uropathy and underwent cystoscopy and ureteral stent placement on 10/18/2020 status post lithotripsy on November 27, 2020 who was admitted to [facility] after presenting with complain of right flank pain associated with nausea and vomiting as well as poor appetite for  4 days    Acute kidney injury, POA  -Baseline creatinine:  Not known  Had episode of acute kidney injury in October 2020 and renal function improved from creatinine 3 7 to creatinine 1 39 on October 20, 2020  Acute kidney at that time was due to obstructive uropathy and underwent right ureteral stent placement  Patient did not have any blood work in between  Unclear if he has underlying chronic kidney disease-most likely has underlying chronic kidney disease with finding of left renal parenchyma atrophy   -Admission creatinine:  12 6 mg/dl  - Work up:   • UA with microscopy:  1+ protein, numerous RBCs, 4-10 WBC per high-power field  • Imaging:  CT abdomen suggestive of 9 mm stone in the UPJ resulting in right hydro ureteronephrosis  Nonobstructing stone in the right kidneys are seen measuring up to 18 mm in size  Simple cyst seen in the right kidney  Prostate gland is enlarged  finding of left renal parenchymal atrophy  -Etiology:  Acute kidney injury likely due to obstructive uropathy plus prerenal in the setting of right hydronephrosis from UPJ calculus in the setting of left renal parenchymal atrophy   [facility] Course:   underwent cystoscopy and ureteral stent placement on 12/01  -Plan:   • Renal function improving with creatinine trending down to 3 02 mg/dL today  electrolytes stable  Has good urine output around 4 5 L   Would not give any diuretics as urine output is good and likely postobstructive diuresis  • No need for IV fluids, continue to monitor renal function, avoid nephrotoxins  • Avoid nephrotoxins and dose all medications per EGFR  • Avoid hypotension  • If renal function continue to improve, possible discharge tomorrow from Nephrology side                               Hyperkalemia  -admission potassium 5 8  -due to acute kidney injury and metabolic acidosis and obstructive uropathy  -resolved     Elevated anion gap metabolic acidosis  -admission bicarb level 19 with anion gap 23  -resolved status post IV bicarb drip and improvement in renal function, bicarb level 28    Obstructive uropathy/right hydronephrosis in the setting of UPJ calculus  -status post cystoscopy, retrograde pyelogram and insertion of ureteral stent     Nephrolithiasis right   -recommend outpatient metabolic workup for nephrolithiasis  -PTH was elevated to 230 but also currently having renal dysfunction which could be contributing may need to repeat as an outpatient  Does not have any hypercalcemia  Uric acid elevated at 13 5   -stone analysis from 2020 suggestive of uric acid stone  -decrease the dose of vitamin-C to 500 mg daily  -stone analysis from 11/27 suggestive of 90% uric acid and 10% calcium oxalate stone   -would consider starting allopurinol 100 mg daily if patient agreeable    Fluid overload:   -status post IV Lasix on 12/02 and volume status improving  Already has good urine output, continue to monitor  Okay to give Lasix if any respiratory distress    Hyperuricemia with uric acid 13  5 would consider starting allopurinol 100 mg daily if patient agreeable  Will discuss with patient tomorrow about the risk and benefit of allopurinol  Recheck uric acid level tomorrow     BPH continue Flomax     Discussed with primary team    SUBJECTIVE:  No new complaints  No chest pain or shortness of breath    Renal function improved still on oxygen by nasal cannula    OBJECTIVE:  Current Weight: Weight - Scale: 100 kg (221 lb)  Vitals:    12/03/22 0753   BP: 116/78   Pulse: 99   Resp:    Temp: 98 °F (36 7 °C)   SpO2: 96%       Intake/Output Summary (Last 24 hours) at 12/3/2022 1428  Last data filed at 12/3/2022 1200  Gross per 24 hour   Intake 410 ml   Output 3525 ml   Net -3115 ml       Physical Exam  General:  Ill looking, awake  Eyes: Conjunctivae pink,  Sclera anicteric  ENT: lips and mucous membranes moist  Neck: supple   Chest:  Coarse crackles left lung  CVS: S1 & S2 present, normal rate, regular rhythm, no murmur  Abdomen: soft, non-tender, non-distended, Bowel sounds normoactive  Extremities: no edema of  legs  Skin: no rash  Neuro: awake, alert, oriented  Psych: Mood and affect appropriate     Medications:    Current Facility-Administered Medications:   •  acetaminophen (TYLENOL) tablet 650 mg, 650 mg, Oral, Q6H PRN, eNda Ha MD  •  ascorbic acid (VITAMIN C) tablet 500 mg, 500 mg, Oral, Daily, Neda Ha MD, 500 mg at 12/03/22 0810  •  cefTRIAXone (ROCEPHIN) IVPB (premix in dextrose) 1,000 mg 50 mL, 1,000 mg, Intravenous, Q24H, Chantelle Sanchez MD, Last Rate: 100 mL/hr at 12/02/22 1557, 1,000 mg at 12/02/22 1557  •  heparin (porcine) subcutaneous injection 5,000 Units, 5,000 Units, Subcutaneous, Q8H [address], Heide Revankar, DO, 5,000 Units at 12/03/22 1342  •  labetalol (NORMODYNE) injection 10 mg, 10 mg, Intravenous, Q4H PRN, CHELSIE Cook  •  ondansetron (ZOFRAN) injection 4 mg, 4 mg, Intravenous, Q6H PRN, Neda Ha MD  •  tamsulosin (FLOMAX) capsule 0 4 mg, 0 4 mg, Oral, Daily With Diana Asher MD, 0 4 mg at 12/02/22 1557    Invasive Devices:   Urethral Catheter Latex 18 Fr   (Active)   Reasons to continue Urinary Catheter  Post-operative urological requirements 12/02/22 0530   Goal for Removal Will consult urology 12/02/22 0530   Site Assessment Clean;Skin intact 12/02/22 0530   Kiser Care Done 12/02/22 0900   Securement Method Securing device (Describe) 12/02/22 0530   Output (mL) 3000 mL 12/02/22 0530       Lab Results:   Results from last 7 days   Lab Units 12/03/22  0532 12/02/22  0605 12/01/22  0845 11/30/22  1825   WBC Thousand/uL 13 33* 20 29*  20 29* 8 86 12 10*   HEMOGLOBIN g/dL 14 9 15 7  15 7 13 2 15 1   HEMATOCRIT % 43 3 45 1  45 1 38 2 44 8   PLATELETS Thousands/uL 254 285  285 230 254   POTASSIUM mmol/L 4 1 4 3 4 3 5 8*   CHLORIDE mmol/L 103 96 97 95*   CO2 mmol/L 28 24 19* 19*   BUN mg/dL 63* 97* 117* 114*   CREATININE mg/dL 3 02* 7 57* 13 15* 12 60*   CALCIUM mg/dL 8 3 8 5 7 9* 9 0   MAGNESIUM mg/dL  --   --  2 7*  --    ALK PHOS U/L  --   --   --  46   ALT U/L  --   --   --  32   AST U/L  --   --   --  35       Previous work up:         Portions of the record may have been created with voice recognition software  Occasional wrong word or "sound a like" substitutions may have occurred due to the inherent limitations of voice recognition software  Read the chart carefully and recognize, using context, where substitutions have occurred  If you have any questions, please contact the dictating provider

## 2022-12-03 NOTE — PROGRESS NOTES
Progress Note - Pulmonary   Jessy Driver 80 y o  male MRN: [id number]  Unit/Bed#: [address] [place] [address] Encounter: [id number]    Assessment:  Acute hypoxemic respiratory failure  Abnormal chest x-ray bilateral pulmonary opacities, ? Aspiration pneumonitis  History of ISAIAS not on CPAP  Obesity    Plan:  Currently on 2 L of oxygen by nasal cannula saturating around 93% titrate down as able to maintain oxygen saturation greater than 89%  Also still with elevated procalcitonin, and increased WBC would continue with the ceftriaxone for a total duration of 5 days  History of ISAIAS not on CPAP states he had septoplasty after which symptoms of snoring or gone he understands the risks of untreated sleep apnea but currently does not feel the need for going in for a repeat study and the CPAP machine he states  Outpatient Pulmonary follow-up with repeat imaging in 6-8 weeks for resolution of the opacities  Subjective:   I feel okay, no significant cough    Objective:     Vitals: Blood pressure 116/78, pulse 99, temperature 98 °F (36 7 °C), resp  rate 20, height 5' 7" (1 702 m), weight 100 kg (221 lb), SpO2 96 %  ,Body mass index is 34 61 kg/m²  Intake/Output Summary (Last 24 hours) at 12/3/2022 1508  Last data filed at 12/3/2022 1200  Gross per 24 hour   Intake 410 ml   Output 2625 ml   Net -2215 ml       Invasive Devices     Peripheral Intravenous Line  Duration           Peripheral IV 11/30/22 Left Antecubital 2 days          Drain  Duration           Ureteral Drain/Stent Right ureter 6 Fr  736 days    Urethral Catheter Latex 18 Fr  1 day                Physical Exam:  Currently in bed in no acute respiratory distress  Eyes anicteric sclera, conjunctivae is clear  ENT nares is patent, no evidence of any discharge  Neck no evidence of any JVD no cervical lymphadenopathy  Lungs diminished breath sounds bilaterally few basal rales right base posteriorly    Heart first and second heart sounds are heard no murmur or gallop is heard  Abdomen soft nontender bowel sounds are present  Extremities bilateral trace pedal edema  CNS awake alert and oriented x3      Labs:   CBC:   Lab Results   Component Value Date    WBC 13 33 (H) 12/03/2022    HGB 14 9 12/03/2022    HCT 43 3 12/03/2022    MCV 91 12/03/2022     12/03/2022    MCH 31 4 12/03/2022    MCHC 34 4 12/03/2022    RDW 13 1 12/03/2022    MPV 9 1 12/03/2022     Imaging and other studies: I have personally reviewed pertinent films in PACS

## 2022-12-03 NOTE — PLAN OF CARE
Problem: GENITOURINARY - ADULT  Goal: Maintains or returns to baseline urinary function  Description: INTERVENTIONS:  - Assess urinary function  - Encourage oral fluids to ensure adequate hydration if ordered  - Administer IV fluids as ordered to ensure adequate hydration  - Administer ordered medications as needed  - Offer frequent toileting  - Follow urinary retention protocol if ordered  Outcome: Progressing  Goal: Absence of urinary retention  Description: INTERVENTIONS:  - Assess patient’s ability to void and empty bladder  - Monitor I/O  - Bladder scan as needed  - Discuss with physician/AP medications to alleviate retention as needed  - Discuss catheterization for long term situations as appropriate  Outcome: Progressing     Problem: METABOLIC, FLUID AND ELECTROLYTES - ADULT  Goal: Electrolytes maintained within normal limits  Description: INTERVENTIONS:  - Monitor labs and assess patient for signs and symptoms of electrolyte imbalances  - Administer electrolyte replacement as ordered  - Monitor response to electrolyte replacements, including repeat lab results as appropriate  - Instruct patient on fluid and nutrition as appropriate  Outcome: Progressing     Problem: RESPIRATORY - ADULT  Goal: Achieves optimal ventilation and oxygenation  Description: INTERVENTIONS:  - Assess for changes in respiratory status  - Assess for changes in mentation and behavior  - Position to facilitate oxygenation and minimize respiratory effort  - Oxygen administered by appropriate delivery if ordered  - Initiate smoking cessation education as indicated  - Encourage broncho-pulmonary hygiene including cough, deep breathe, Incentive Spirometry  - Assess the need for suctioning and aspirate as needed  - Assess and instruct to report SOB or any respiratory difficulty  - Respiratory Therapy support as indicated  Outcome: Progressing

## 2022-12-03 NOTE — PROGRESS NOTES
Progress Note - Urology      Patient: Vincent Mays   : 1950 Sex: male   MRN: [id number]     CSN: [id number]  Unit/Bed#: [address] [place] [address]     SUBJECTIVE:   Seen on evening rounds  Feeling better  No further right flank pain      Objective   Vitals: /85   Pulse 93   Temp 98 2 °F (36 8 °C)   Resp 18   Ht 5' 7" (1 702 m)   Wt 99 3 kg (219 lb)   SpO2 98%   BMI 34 30 kg/m²     I/O last 24 hours: In: 410 [P O :400;  I V :10]  Out: 6700 [Urine:6700]      Physical Exam:   General Alert awake   Normocephalic atraumatic PERRLA  Lungs clear bilaterally  Cardiac normal S1 normal S2  Abdomen soft, flank pain  Extremities no edema      Lab Results: CBC:   Lab Results   Component Value Date    WBC 20 29 (H) 2022    WBC 20 29 (H) 2022    HGB 15 7 2022    HGB 15 7 2022    HCT 45 1 2022    HCT 45 1 2022    MCV 88 2022    MCV 88 2022     2022     2022    MCH 30 7 2022    MCH 30 7 2022    MCHC 34 8 2022    MCHC 34 8 2022    RDW 12 7 2022    RDW 12 7 2022    MPV 9 4 2022    MPV 9 4 2022    NRBC 0 2022     CMP:   Lab Results   Component Value Date    CL 96 2022    CO2 24 2022    BUN 97 (H) 2022    CREATININE 7 57 (H) 2022    CALCIUM 8 5 2022    AST 35 2022    ALT 32 2022    ALKPHOS 46 2022    EGFR 6 2022     Urinalysis:   Lab Results   Component Value Date    COLORU Yellow 2022    CLARITYU Slightly Cloudy 2022    SPECGRAV 1 020 2022    PHUR 5 5 2022    LEUKOCYTESUR Trace (A) 2022    NITRITE Negative 2022    GLUCOSEU Negative 2022    KETONESU Negative 2022    BILIRUBINUR Negative 2022    BLOODU Large (A) 2022     Urine Culture:   Lab Results   Component Value Date    URINECX No Growth <1000 cfu/mL 10/19/2020     PSA: No results found for: PSA      Assessment/ Plan:  Acute renal failure on chronic renal failure  Right hydronephrosis  Right 9 mm obstructing stone  Status post cysto right stent insertion emergency last night  Postop diuresis putting out over 5 L  Creatinine falling  Patient now fully aware right solitary kidney in light of poorly functioning left kidney  Will need appropriate stone therapy in a few weeks debulk all the stones          Shannon Brooks MD

## 2022-12-03 NOTE — ASSESSMENT & PLAN NOTE
Baseline creatinine unknown  Patient had DAYANNA in 10/2020 with highest creatinine 4 25 due to right obstructing kidney stone  Creatinine improved to 1 39 with stent placement and IV hydration  · Creatinine on admission 12 6 with Potassium 5 8 (slightly hemolyzed specimen)    Anion gap 23, bicarb 19 initially  · Likely multifactorial - post renal and prerenal  · Creatinine continues to improve  · Per Nephrology, was started on sodium bicarb 150 mEq in D5 water at 125 per hour which has been discontinued  · Avoid nephrotoxins and hypotension  · Repeat BMP in the morning    Results from last 7 days   Lab Units 12/03/22  0532 12/02/22  0605 12/01/22  0845 11/30/22  1825   BUN mg/dL 63* 97* 117* 114*   CREATININE mg/dL 3 02* 7 57* 13 15* 12 60*

## 2022-12-03 NOTE — ASSESSMENT & PLAN NOTE
WBC 12 10 initially, no bands, patient afebrile  Denies UTI symptoms  · CT showed  - colonic diverticulosis with small amount of fluid by the sigmoid colon which is likely due to sternal ascites however changed low-grade diverticulitis could not be excluded  · Initially leukocytosis was reactive and resolved  · WBC count has trended up on 12/02 along with elevated procalcitonin  Chest x-ray shows findings suggestive of CHF but infectious etiology could not be ruled out  · blood cultures pending  Leukocytosis improving      · UA with large blood, trace leukocytes, 1-2 WBC and occasional bacteria  · Repeat lab in the morning    Results from last 7 days   Lab Units 12/03/22  0532 12/02/22  0605 12/01/22  0845 11/30/22  1825   WBC Thousand/uL 13 33* 20 29*  20 29* 8 86 12 10*

## 2022-12-03 NOTE — ASSESSMENT & PLAN NOTE
Patient presented with intermittent right flank pain since Saturday, with associated nausea, vomiting, poor appetite  No dysuria hematuria urgency or frequency in urination  Denies fever, chills  History of right nephrolithiasis 2 years ago  · CT renal stone study showed - 9 mm obstructing stone in the UPJ causing right hydroureteronephrosis  Nonobstructing stones noted in the right kidney  · Patient seen by Urology in ED  Continue Flomax  · Status post cysto with right ureteral stent placement on 12/01    · Per Urology, discontinue Kiser in the morning  · IV hydration discontinued  · Pain control

## 2022-12-03 NOTE — ASSESSMENT & PLAN NOTE
Enlarged prostate on CT  · Continue Flomax as above  · As noted above, plan for Kiser removal in a m

## 2022-12-03 NOTE — PROGRESS NOTES
Progress Note - Urology      Patient: Quynh Costello   : 1950 Sex: male   MRN: [id number]     CSN: [id number]  Unit/Bed#: [address] [place] [address]     SUBJECTIVE:   Feeling better  Status post cysto right stent placement day 3  Creatinine trending down to 3  Long discussion about appropriate right stone therapy in light of solitary kidney  Will DC Kiser tomorrow       Objective   Vitals: /87   Pulse (!) 109   Temp 97 9 °F (36 6 °C)   Resp 18   Ht 5' 7" (1 702 m)   Wt 100 kg (221 lb)   SpO2 95%   BMI 34 61 kg/m²     I/O last 24 hours: In: 410 [P O :400; I V :10]  Out: 4525 [Urine:4525]      Physical Exam:   General Alert awake   Normocephalic atraumatic PERRLA  Lungs clear bilaterally  Cardiac normal S1 normal S2  Abdomen soft, flank pain  Extremities no edema      Lab Results: CBC:   Lab Results   Component Value Date    WBC 13 33 (H) 2022    HGB 14 9 2022    HCT 43 3 2022    MCV 91 2022     2022    MCH 31 4 2022    MCHC 34 4 2022    RDW 13 1 2022    MPV 9 1 2022    NRBC 0 2022     CMP:   Lab Results   Component Value Date     2022    CO2 28 2022    BUN 63 (H) 2022    CREATININE 3 02 (H) 2022    CALCIUM 8 3 2022    AST 35 2022    ALT 32 2022    ALKPHOS 46 2022    EGFR 19 2022     Urinalysis:   Lab Results   Component Value Date    COLORU Yellow 2022    CLARITYU Slightly Cloudy 2022    SPECGRAV 1 020 2022    PHUR 5 5 2022    LEUKOCYTESUR Trace (A) 2022    NITRITE Negative 2022    GLUCOSEU Negative 2022    KETONESU Negative 2022    BILIRUBINUR Negative 2022    BLOODU Large (A) 2022     Urine Culture:   Lab Results   Component Value Date    URINECX No Growth <1000 cfu/mL 10/19/2020     PSA: No results found for: PSA      Assessment/ Plan:  Resolving post renal failure  Cysto right stent day 2    Will DC Kiser in the radha Ames MD

## 2022-12-03 NOTE — ASSESSMENT & PLAN NOTE
Patient was noted to be hypoxic 12/1 after being moved from the OR table to the stretcher  · Per notes, LMA was placed and patient was bagged with improvement in O2 sat and then transitioned to CPAP  · Chest x-ray showed findings suggestive of CHF and patient received 40 mg IV Lasix x1 12/1  · Currently titrated off oxygen  · Chest x-ray also suggested that infectious etiology cannot be ruled out    In the setting of worsening leukocytosis, elevated procalcitonin, possibility of aspiration pneumonia versus pneumonitis  · Continue patient on IV Rocephin and monitor response

## 2022-12-04 VITALS
RESPIRATION RATE: 20 BRPM | HEART RATE: 95 BPM | WEIGHT: 218.92 LBS | OXYGEN SATURATION: 95 % | DIASTOLIC BLOOD PRESSURE: 78 MMHG | HEIGHT: 67 IN | TEMPERATURE: 98.1 F | BODY MASS INDEX: 34.36 KG/M2 | SYSTOLIC BLOOD PRESSURE: 113 MMHG

## 2022-12-04 LAB
ANION GAP SERPL CALCULATED.3IONS-SCNC: 12 MMOL/L (ref 4–13)
BUN SERPL-MCNC: 45 MG/DL (ref 5–25)
CALCIUM SERPL-MCNC: 8.6 MG/DL (ref 8.3–10.1)
CHLORIDE SERPL-SCNC: 101 MMOL/L (ref 96–108)
CO2 SERPL-SCNC: 24 MMOL/L (ref 21–32)
CREAT SERPL-MCNC: 2.01 MG/DL (ref 0.6–1.3)
ERYTHROCYTE [DISTWIDTH] IN BLOOD BY AUTOMATED COUNT: 13.3 % (ref 11.6–15.1)
GFR SERPL CREATININE-BSD FRML MDRD: 32 ML/MIN/1.73SQ M
GLUCOSE SERPL-MCNC: 126 MG/DL (ref 65–140)
HCT VFR BLD AUTO: 45.1 % (ref 36.5–49.3)
HGB BLD-MCNC: 14.5 G/DL (ref 12–17)
MCH RBC QN AUTO: 30.7 PG (ref 26.8–34.3)
MCHC RBC AUTO-ENTMCNC: 32.2 G/DL (ref 31.4–37.4)
MCV RBC AUTO: 96 FL (ref 82–98)
PLATELET # BLD AUTO: 298 THOUSANDS/UL (ref 149–390)
PMV BLD AUTO: 9.7 FL (ref 8.9–12.7)
POTASSIUM SERPL-SCNC: 4.1 MMOL/L (ref 3.5–5.3)
RBC # BLD AUTO: 4.72 MILLION/UL (ref 3.88–5.62)
SODIUM SERPL-SCNC: 137 MMOL/L (ref 135–147)
URATE SERPL-MCNC: 8.7 MG/DL (ref 3.5–8.5)
WBC # BLD AUTO: 10.12 THOUSAND/UL (ref 4.31–10.16)

## 2022-12-04 RX ORDER — CEFDINIR 300 MG/1
300 CAPSULE ORAL EVERY 12 HOURS SCHEDULED
Qty: 8 CAPSULE | Refills: 0 | Status: SHIPPED | OUTPATIENT
Start: 2022-12-05 | End: 2022-12-09

## 2022-12-04 RX ORDER — ACETAMINOPHEN 325 MG/1
650 TABLET ORAL EVERY 6 HOURS PRN
Refills: 0
Start: 2022-12-04 | End: 2022-12-14

## 2022-12-04 RX ORDER — TAMSULOSIN HYDROCHLORIDE 0.4 MG/1
0.4 CAPSULE ORAL
Qty: 30 CAPSULE | Refills: 0 | Status: SHIPPED | OUTPATIENT
Start: 2022-12-04

## 2022-12-04 RX ORDER — CEFTRIAXONE 1 G/50ML
1000 INJECTION, SOLUTION INTRAVENOUS ONCE
Status: COMPLETED | OUTPATIENT
Start: 2022-12-04 | End: 2022-12-04

## 2022-12-04 RX ADMIN — HEPARIN SODIUM 5000 UNITS: 5000 INJECTION INTRAVENOUS; SUBCUTANEOUS at 06:19

## 2022-12-04 RX ADMIN — CEFTRIAXONE 1000 MG: 1 INJECTION, SOLUTION INTRAVENOUS at 13:34

## 2022-12-04 RX ADMIN — OXYCODONE HYDROCHLORIDE AND ACETAMINOPHEN 500 MG: 500 TABLET ORAL at 09:10

## 2022-12-04 RX ADMIN — TAMSULOSIN HYDROCHLORIDE 0.4 MG: 0.4 CAPSULE ORAL at 15:50

## 2022-12-04 NOTE — PROGRESS NOTES
NEPHROLOGY PROGRESS NOTE   Jerlean Scheuermann 80 y o  male MRN: [id number]  Unit/Bed#: [address] [place] [address] Encounter: [id number]    ASSESSMENT & PLAN:  80 y o  male with history of sleep apnea, history of acute kidney injury in October 2020 from obstructive uropathy and underwent cystoscopy and ureteral stent placement on 10/18/2020 status post lithotripsy on November 27, 2020 who was admitted to [facility] after presenting with complain of right flank pain associated with nausea and vomiting as well as poor appetite for  4 days    Acute kidney injury, POA  -Baseline creatinine:  Not known  Had episode of acute kidney injury in October 2020 and renal function improved from creatinine 3 7 to creatinine 1 39 on October 20, 2020  Acute kidney at that time was due to obstructive uropathy and underwent right ureteral stent placement  Patient did not have any blood work in between  Unclear if he has underlying chronic kidney disease-most likely has underlying chronic kidney disease with finding of left renal parenchyma atrophy   -Admission creatinine:  12 6 mg/dl  - Work up:   • UA with microscopy:  1+ protein, numerous RBCs, 4-10 WBC per high-power field  • Imaging:  CT abdomen suggestive of 9 mm stone in the UPJ resulting in right hydro ureteronephrosis  Nonobstructing stone in the right kidneys are seen measuring up to 18 mm in size  Simple cyst seen in the right kidney  Prostate gland is enlarged  finding of left renal parenchymal atrophy  -Etiology:  Acute kidney injury likely due to obstructive uropathy plus prerenal in the setting of right hydronephrosis from UPJ calculus in the setting of left renal parenchymal atrophy   [facility] Course:   underwent cystoscopy and ureteral stent placement on 12/01  -Plan:   • Renal function improving with creatinine trending down to 2 01 mg/dL today  electrolytes stable  Has good urine output around 1 6 L   Would not give any diuretics as urine output is good and likely postobstructive diuresis which is improving   • Avoid nephrotoxins and dose all medications per EGFR  • Avoid hypotension  • Okay for discharge from Nephrology side  Office informed to arrange for outpatient follow-up and repeat BMP in 1 week                              Hyperkalemia  -admission potassium 5 8  -due to acute kidney injury and metabolic acidosis and obstructive uropathy  -resolved     Elevated anion gap metabolic acidosis  -admission bicarb level 19 with anion gap 23  -resolved status post IV bicarb drip and improvement in renal function, bicarb level 24    Obstructive uropathy/right hydronephrosis in the setting of UPJ calculus  -status post cystoscopy, retrograde pyelogram and insertion of ureteral stent  -follow-up with Urology  Most likely has nonfunctioning left kidney with finding of left renal parenchymal atrophy     Nephrolithiasis right   -recommend outpatient metabolic workup for nephrolithiasis  -PTH was elevated to 230 but also currently having renal dysfunction which could be contributing may need to repeat as an outpatient  Does not have any hypercalcemia  Uric acid elevated at 13 5 but improving to 8 7 with improvement in renal function  -stone analysis from 2020 suggestive of uric acid stone  -decrease the dose of vitamin-C to 500 mg daily  -stone analysis from 11/27 suggestive of 90% uric acid and 10% calcium oxalate stone   -discussed with patient about starting allopurinol discussed about side effects  Patient is not interested in allopurinol at this time, would recommend low purine diet and outpatient monitoring of uric acid  Fluid overload:   -status post IV Lasix on 12/02 and volume status improving  Already has good urine output, continue to monitor  No need for outpatient diuretics    Hyperuricemia with uric acid 13 5 improving to 8 7  Patient not interested in allopurinol    Recommend low purine diet       BPH continue Flomax     Discussed with primary team   Office informed for outpatient follow-up    SUBJECTIVE:  Good urine output, denies any difficulty breathing  No lower extremity edema    OBJECTIVE:  Current Weight: Weight - Scale: 99 3 kg (218 lb 14 7 oz)  Vitals:    12/04/22 0902   BP: 113/78   Pulse: 95   Resp: 20   Temp: 98 1 °F (36 7 °C)   SpO2: 95%       Intake/Output Summary (Last 24 hours) at 12/4/2022 1207  Last data filed at 12/4/2022 0901  Gross per 24 hour   Intake 450 ml   Output 2000 ml   Net -1550 ml       Physical Exam  General:  Ill looking, awake  Eyes: Conjunctivae pink,  Sclera anicteric  ENT: lips and mucous membranes moist  Neck: supple   Chest: b/l basal coarse crackles   CVS: S1 & S2 present, normal rate, regular rhythm, no murmur  Abdomen: soft, non-tender, non-distended, Bowel sounds normoactive  Extremities: no edema of  legs  Skin: no rash  Neuro: awake, alert, oriented x 3   Psych: Mood and affect appropriate     Medications:    Current Facility-Administered Medications:   •  acetaminophen (TYLENOL) tablet 650 mg, 650 mg, Oral, Q6H PRN, Vaibhav Hagan MD  •  ascorbic acid (VITAMIN C) tablet 500 mg, 500 mg, Oral, Daily, Vaibhav Hagan MD, 500 mg at 12/04/22 0910  •  cefTRIAXone (ROCEPHIN) IVPB (premix in dextrose) 1,000 mg 50 mL, 1,000 mg, Intravenous, Once, Heide Weber DO  •  heparin (porcine) subcutaneous injection 5,000 Units, 5,000 Units, Subcutaneous, Q8H [facility], Heide Weber DO, 5,000 Units at 12/04/22 3216  •  labetalol (NORMODYNE) injection 10 mg, 10 mg, Intravenous, Q4H PRN, CHELSIE Cook  •  ondansetron (ZOFRAN) injection 4 mg, 4 mg, Intravenous, Q6H PRN, Vaibhav Hagan MD  •  tamsulosin (FLOMAX) capsule 0 4 mg, 0 4 mg, Oral, Daily With Anabell Medina MD, 0 4 mg at 12/03/22 1624    Invasive Devices:   Urethral Catheter Latex 18 Fr   (Active)   Reasons to continue Urinary Catheter  Post-operative urological requirements 12/02/22 4263   Goal for Removal Will consult urology 12/02/22 0530   Site Assessment Clean;Skin intact 12/02/22 0530   Kiser Care Done 12/02/22 0900   Securement Method Securing device (Describe) 12/02/22 0530   Output (mL) 3000 mL 12/02/22 0530       Lab Results:   Results from last 7 days   Lab Units 12/04/22  0619 12/03/22  0532 12/02/22  0605 12/01/22  0845 11/30/22  1825   WBC Thousand/uL 10 12 13 33* 20 29*  20 29* 8 86 12 10*   HEMOGLOBIN g/dL 14 5 14 9 15 7  15 7 13 2 15 1   HEMATOCRIT % 45 1 43 3 45 1  45 1 38 2 44 8   PLATELETS Thousands/uL 298 254 285  285 230 254   POTASSIUM mmol/L 4 1 4 1 4 3 4 3 5 8*   CHLORIDE mmol/L 101 103 96 97 95*   CO2 mmol/L 24 28 24 19* 19*   BUN mg/dL 45* 63* 97* 117* 114*   CREATININE mg/dL 2 01* 3 02* 7 57* 13 15* 12 60*   CALCIUM mg/dL 8 6 8 3 8 5 7 9* 9 0   MAGNESIUM mg/dL  --   --   --  2 7*  --    ALK PHOS U/L  --   --   --   --  46   ALT U/L  --   --   --   --  32   AST U/L  --   --   --   --  35       Previous work up:         Portions of the record may have been created with voice recognition software  Occasional wrong word or "sound a like" substitutions may have occurred due to the inherent limitations of voice recognition software  Read the chart carefully and recognize, using context, where substitutions have occurred  If you have any questions, please contact the dictating provider

## 2022-12-04 NOTE — PROGRESS NOTES
Progress Note - Urology      Patient: Enrrique Moran   : 1950 Sex: male   MRN: [id number]     CSN: [id number]  Unit/Bed#: [address] [place] [address]     SUBJECTIVE:   Vs stable  Feeling great  Kiser out  To be discharged today      Objective   Vitals: /78 (BP Location: Right arm)   Pulse 95   Temp 98 1 °F (36 7 °C) (Oral)   Resp 20   Ht 5' 7" (1 702 m)   Wt 99 3 kg (218 lb 14 7 oz)   SpO2 95%   BMI 34 29 kg/m²     I/O last 24 hours:   In: 650 [P O :640; I V :10]  Out:  [Urine:2000]      Physical Exam:   General Alert awake   Normocephalic atraumatic PERRLA  Lungs clear bilaterally  Cardiac normal S1 normal S2  Abdomen soft, flank pain none  Extremities no edema      Lab Results: CBC:   Lab Results   Component Value Date    WBC 10 12 2022    HGB 14 5 2022    HCT 45 1 2022    MCV 96 2022     2022    MCH 30 7 2022    MCHC 32 2 2022    RDW 13 3 2022    MPV 9 7 2022    NRBC 0 2022     CMP:   Lab Results   Component Value Date     2022    CO2 24 2022    BUN 45 (H) 2022    CREATININE 2 01 (H) 2022    CALCIUM 8 6 2022    AST 35 2022    ALT 32 2022    ALKPHOS 46 2022    EGFR 32 2022     Urinalysis:   Lab Results   Component Value Date    COLORU Yellow 2022    CLARITYU Slightly Cloudy 2022    SPECGRAV 1 020 2022    PHUR 5 5 2022    LEUKOCYTESUR Trace (A) 2022    NITRITE Negative 2022    GLUCOSEU Negative 2022    KETONESU Negative 2022    BILIRUBINUR Negative 2022    BLOODU Large (A) 2022     Urine Culture:   Lab Results   Component Value Date    URINECX No Growth <1000 cfu/mL 10/19/2020     PSA: No results found for: PSA      Assessment/ Plan:  S/p cysto/rt stent  Renal failure trending down  D/c home office f/u  Next week          David Guo MD

## 2022-12-04 NOTE — PLAN OF CARE
Problem: Knowledge Deficit  Goal: Patient/family/caregiver demonstrates understanding of disease process, treatment plan, medications, and discharge instructions  Description: Complete learning assessment and assess knowledge base    Interventions:  - Provide teaching at level of understanding  - Provide teaching via preferred learning methods  Outcome: Adequate for Discharge     Problem: GENITOURINARY - ADULT  Goal: Maintains or returns to baseline urinary function  Description: INTERVENTIONS:  - Assess urinary function  - Encourage oral fluids to ensure adequate hydration if ordered  - Administer IV fluids as ordered to ensure adequate hydration  - Administer ordered medications as needed  - Offer frequent toileting  - Follow urinary retention protocol if ordered  Outcome: Adequate for Discharge  Goal: Absence of urinary retention  Description: INTERVENTIONS:  - Assess patient’s ability to void and empty bladder  - Monitor I/O  - Bladder scan as needed  - Discuss with physician/AP medications to alleviate retention as needed  - Discuss catheterization for long term situations as appropriate  Outcome: Adequate for Discharge     Problem: METABOLIC, FLUID AND ELECTROLYTES - ADULT  Goal: Electrolytes maintained within normal limits  Description: INTERVENTIONS:  - Monitor labs and assess patient for signs and symptoms of electrolyte imbalances  - Administer electrolyte replacement as ordered  - Monitor response to electrolyte replacements, including repeat lab results as appropriate  - Instruct patient on fluid and nutrition as appropriate  Outcome: Adequate for Discharge  Goal: Fluid balance maintained  Description: INTERVENTIONS:  - Monitor labs   - Monitor I/O and WT  - Instruct patient on fluid and nutrition as appropriate  - Assess for signs & symptoms of volume excess or deficit  Outcome: Adequate for Discharge     Problem: RESPIRATORY - ADULT  Goal: Achieves optimal ventilation and oxygenation  Description: INTERVENTIONS:  - Assess for changes in respiratory status  - Assess for changes in mentation and behavior  - Position to facilitate oxygenation and minimize respiratory effort  - Oxygen administered by appropriate delivery if ordered  - Initiate smoking cessation education as indicated  - Encourage broncho-pulmonary hygiene including cough, deep breathe, Incentive Spirometry  - Assess the need for suctioning and aspirate as needed  - Assess and instruct to report SOB or any respiratory difficulty  - Respiratory Therapy support as indicated  Outcome: Adequate for Discharge

## 2022-12-04 NOTE — DISCHARGE SUMMARY
[address]  Discharge- [place] Devoid 1950, 80 y o  male MRN: [id number]  Unit/Bed#: [address] [place] [address] Encounter: [id number]  Primary Care Provider: Juan Muse PA-C   Date and time admitted to hospital: 11/30/2022  5:29 PM    * Hydronephrosis with ureteropelvic junction (UPJ) obstruction  Assessment & Plan  Patient presented with intermittent right flank pain since Saturday, with associated nausea, vomiting, poor appetite  No dysuria hematuria urgency or frequency in urination  Denies fever, chills  History of right nephrolithiasis 2 years ago  · CT renal stone study showed - 9 mm obstructing stone in the UPJ causing right hydroureteronephrosis  Nonobstructing stones noted in the right kidney  · Patient seen by Urology in ED  Continue Flomax on discharge per Urology  · Status post cysto with right ureteral stent placement on 12/01  · Discontinued Kiser today and patient is urinating on his own  · IV hydration discontinued  · Follow-up with Urology after discharge    Acute kidney injury [facility])  Assessment & Plan  Baseline creatinine unknown  Patient had DAYANNA in 10/2020 with highest creatinine 4 25 due to right obstructing kidney stone  Creatinine improved to 1 39 with stent placement and IV hydration  · Creatinine on admission 12 6 with Potassium 5 8 (slightly hemolyzed specimen)    Anion gap 23, bicarb 19 initially  · Likely multifactorial - post renal and prerenal  · Creatinine has trended down to 2 01   · Per Nephrology, was started on sodium bicarb 150 mEq in D5 water at 125 per hour which was later discontinued  · Repeat BMP after discharge and follow up with Nephrology    Results from last 7 days   Lab Units 12/04/22  0619 12/03/22  0532 12/02/22  0605 12/01/22  0845 11/30/22  1825   BUN mg/dL 45* 63* 97* 117* 114*   CREATININE mg/dL 2 01* 3 02* 7 57* 13 15* 12 60*       Acute respiratory failure with hypoxia [facility])  Assessment & Plan  Patient was noted to be hypoxic 12/1 after being moved from the OR table to the stretcher  · Per notes, LMA was placed and patient was bagged with improvement in O2 sat and then transitioned to CPAP  · Chest x-ray showed findings suggestive of CHF and patient received 40 mg IV Lasix x1 12/1  · By day of discharge on room air even with ambulation  · Chest x-ray also suggested that infectious etiology cannot be ruled out  In the setting of worsening leukocytosis, elevated procalcitonin, possibility of aspiration pneumonia versus pneumonitis  · Also treated with IV Rocephin and transitioned to PARDO SIA per Pulmonary recommendation to complete a Total of 7 day course of treatment  · Echo showed normal EF and normal diastolic function    Leukocytosis-resolved as of 12/5/2022  Assessment & Plan  WBC 12 10 initially, no bands, patient afebrile  Denies UTI symptoms  · CT showed  - colonic diverticulosis with small amount of fluid by the sigmoid colon which is likely due to sternal ascites however changed low-grade diverticulitis could not be excluded  · Initially leukocytosis was reactive and resolved  · WBC count trended up on 12/02 along with elevated procalcitonin  Chest x-ray showed findings suggestive of CHF but infectious etiology could not be ruled out  · blood cultures negative  Leukocytosis resolved  · UA with large blood, trace leukocytes, 1-2 WBC and occasional bacteria    Results from last 7 days   Lab Units 12/04/22  0619 12/03/22  0532 12/02/22  0605 12/01/22  0845 11/30/22  1825   WBC Thousand/uL 10 12 13 33* 20 29*  20 29* 8 86 12 10*       Hyperuricemia  Assessment & Plan  Uric acid noted to be elevated  Nephrology spoke with patient regarding allopurinol but patient wants to hold off on starting this medication  Repeat uric acid after discharge    Hypertension  Assessment & Plan  · BP elevated on ED arrival,177/84    Blood pressures have improved    BPH (benign prostatic hyperplasia)  Assessment & Plan  Enlarged prostate on CT   · Continue Flomax as above on discharge    Sleep apnea  Assessment & Plan  Status post septoplasty  Patient not interested in outpatient sleep study  Medical Problems     Resolved Problems  Date Reviewed: 12/4/2022          Resolved    High anion gap metabolic acidosis 57/3/5567     Resolved by  Geovanna Burgess DO        Discharging Physician / Practitioner: Geovanna Burgess DO  PCP: Shashi Cruz PA-C  Admission Date:   Admission Orders (From admission, onward)     Ordered        11/30/22 1940  INPATIENT ADMISSION  Once                      Discharge Date: 12/04/22    Consultations During Hospital Stay:  · Urology  · Pulmonary  · nephrology    Procedures Performed:   · Cystoscopy, pyelogram with right ureteral stent placement    Incidental Findings:   · none    Test Results Pending at Discharge (will require follow up): · None     Outpatient Tests Requested:  · BMP    Reason for Admission:  Right flank pain    Hospital Course:   Albino Crenshaw is a 80 y o  male patient who originally presented to the hospital on 11/30/2022 due to Intermittent right flank pain for about 4 days associated with nausea, vomiting and poor appetite  Patient denied any urinary symptoms  On imaging patient noted to have right UPJ stone causing hydroureteronephrosis and a lab work was noted to have significant acute kidney injury along with hyperkalemia and elevated anion gap  Patient was admitted and seen by both Urology and Nephrology  Received fluids per Nephrology and underwent cystoscopy with right ureteral stent placement by Urology  Did require IV Lasix for volume overload and was also treated with IV antibiotic for possible aspiration pneumonia/pneumonitis and seen by Pulmonary  Creatinine has significantly improved compared to before and patient cleared by all consultants involved in his care prior to discharge    Discharge plan discussed with patient    Please see above list of diagnoses and related plan for additional information  Condition at Discharge: stable    Discharge Day Visit / Exam:   Subjective:  Denies any pain  Voiding on his own  Feels ready to go home    Vitals: Blood Pressure: 113/78 (12/04/22 0902)  Pulse: 95 (12/04/22 0902)  Temperature: 98 1 °F (36 7 °C) (12/04/22 0902)  Temp Source: Oral (12/04/22 0902)  Respirations: 20 (12/04/22 0902)  Height: 5' 7" (170 2 cm) (12/02/22 1045)  Weight - Scale: 99 3 kg (218 lb 14 7 oz) (12/04/22 0600)  SpO2: 95 % (12/04/22 0902)  Exam:   Physical Exam  Vitals reviewed  Constitutional:       General: He is not in acute distress  Appearance: He is not ill-appearing  HENT:      Head: Normocephalic and atraumatic  Eyes:      General:         Right eye: No discharge  Left eye: No discharge  Cardiovascular:      Rate and Rhythm: Normal rate and regular rhythm  Pulmonary:      Effort: Pulmonary effort is normal  No respiratory distress  Breath sounds: Rales (Occasional) present  No wheezing  Abdominal:      General: Bowel sounds are normal  There is no distension  Palpations: Abdomen is soft  Tenderness: There is no abdominal tenderness  Neurological:      Mental Status: He is alert and oriented to person, place, and time  Discussion with Family: Patient declined call to   Discharge instructions/Information to patient and family:   See after visit summary for information provided to patient and family  Provisions for Follow-Up Care:  See after visit summary for information related to follow-up care and any pertinent home health orders  Disposition:   Home    Planned Readmission: no     Discharge Statement:  I spent > 30 minutes discharging the patient  This time was spent on the day of discharge  I had direct contact with the patient on the day of discharge   Greater than 50% of the total time was spent examining patient, answering all patient questions, arranging and discussing plan of care with patient as well as directly providing post-discharge instructions  Additional time then spent on discharge activities  Discharge Medications:  See after visit summary for reconciled discharge medications provided to patient and/or family        **Please Note: This note may have been constructed using a voice recognition system**

## 2022-12-04 NOTE — PLAN OF CARE
Problem: GENITOURINARY - ADULT  Goal: Maintains or returns to baseline urinary function  Description: INTERVENTIONS:  - Assess urinary function  - Encourage oral fluids to ensure adequate hydration if ordered  - Administer IV fluids as ordered to ensure adequate hydration  - Administer ordered medications as needed  - Offer frequent toileting  - Follow urinary retention protocol if ordered  Outcome: Progressing  Goal: Absence of urinary retention  Description: INTERVENTIONS:  - Assess patient’s ability to void and empty bladder  - Monitor I/O  - Bladder scan as needed  - Discuss with physician/AP medications to alleviate retention as needed  - Discuss catheterization for long term situations as appropriate  Outcome: Progressing     Problem: METABOLIC, FLUID AND ELECTROLYTES - ADULT  Goal: Electrolytes maintained within normal limits  Description: INTERVENTIONS:  - Monitor labs and assess patient for signs and symptoms of electrolyte imbalances  - Administer electrolyte replacement as ordered  - Monitor response to electrolyte replacements, including repeat lab results as appropriate  - Instruct patient on fluid and nutrition as appropriate  Outcome: Progressing  Goal: Fluid balance maintained  Description: INTERVENTIONS:  - Monitor labs   - Monitor I/O and WT  - Instruct patient on fluid and nutrition as appropriate  - Assess for signs & symptoms of volume excess or deficit  Outcome: Progressing

## 2022-12-04 NOTE — NURSING NOTE
AVS reviewed with the patient at  bedside, opportunity for questions was provided  A copy of the AVS was given at discharge, accompanied the patient to his car via wheelchair, patient in stable condition at discharge

## 2022-12-04 NOTE — DISCHARGE INSTRUCTIONS
#1 no heavy straining or lifting above 10 pounds for 2 weeks    #2 call office fevers, chills, or worsening blood in the urine  #3 f/u x 1 week call office  May have blood urine straining      Desire Nicolagrady ANGEL  [address] [facility] office  [address] [place] 6  [phone]  8:30 AM to 4:30 PM  Monday through Friday    [place] office  [phone]  [place], [address] [place] [address]  [phone]  1:00 to 5:00 PM  Wednesday      Follow up with Pulmonary with repeat chest xray in 6-8 weeks for resolution of the opacities  Acute Kidney Injury   AMBULATORY CARE:   Acute kidney injury (DAYANNA) is also called acute kidney failure, or acute renal failure  DAYANNA happens when your kidneys suddenly stop working correctly  Normally, the kidneys remove fluid, chemicals, and waste from your blood  These wastes are turned into urine by your kidneys  DAYANNA usually happens over hours or days  When you have DAYANNA, your kidneys do not remove the waste, chemicals, or extra fluid from your body  A normal amount of urine is not produced  DAYANNA is usually temporary, but it may become a chronic kidney condition  Causes of DAYANNA:   Decreased blood flow to the kidney, such as from hypercalcemia (high blood calcium level) or severe heart disease     A disease or condition that affects the kidneys, such as hypertension (high blood pressure) or diabetes     A blockage in the kidney or ureter, such as a kidney or bladder stone, enlarged prostate, or tumor    Common symptoms include the following: You may not have any symptoms with early or mild DAYANNA   As DAYANNA progresses, you may have any of the following:  Decrease in the amount of urine or no urination    Swelling in your arms, legs, or feet     Weakness, drowsiness, or no appetite    Nausea, flank pain, muscle twitching or muscle cramps    Itchy skin, or your, breath or body smells like urine    Behavior changes, confusion, disorientation, or seizures    Call 911 if:   You have sudden chest pain or trouble breathing  Seek care immediately if:   Your symptoms get worse  Contact your healthcare provider if:   Your symptoms return  Your blood sugar or blood pressure level is not within the range your healthcare provider recommends  You have questions or concerns about your condition or care  Treatment for DAYANNA  depends upon the cause of your acute kidney injury and how severe it is  Usually, DAYANNA will be monitored in the hospital  If you have mild DAYANNA, you may be able to go home to recover  Your healthcare providers will treat the cause of your DAYANNA  You may need IV fluids if your DAYANNA was caused by little or no fluid in your body  You may need dialysis to remove waste and extra fluid from your body  Nutrition:  Your healthcare provider may tell you to eat food low in sodium (salt), potassium, phosphorus, or protein  A dietitian can help you plan your meals  Drink liquids as directed: Your healthcare provider may recommend that you drink a certain amount of liquids  This will help your kidneys work better and decrease your risk for dehydration  Ask how much liquid to drink each day and which liquids are best for you  What you can do to manage and prevent DAYANAN:   Monitor and manage other health conditions  such as diabetes, high blood pressure, or heart disease  These conditions increase your risk for acute kidney injury  Take your medicines for these conditions as directed  Also, monitor your blood sugar and blood pressure levels as directed  Contact your healthcare provider if your levels are not in the range he or she says it should be  Talk to your healthcare provider before you take over-the-counter-medicine  NSAIDs, stomach medicine, or laxatives may harm your kidneys and increase your risk for acute kidney injury  If it is okay to take the medicine, follow the directions on the package  Do not take more than directed       Tell healthcare providers you have had acute kidney injury  before you get contrast liquid for an x-ray or CT scan  Your healthcare provider may give you medicine to prevent kidney problems caused by the liquid  Follow up with your doctor as directed:  Write down your questions so you remember to ask them during your visits  © Copyright [address] 2022 Information is for End User's use only and may not be sold, redistributed or otherwise used for commercial purposes  All illustrations and images included in CareNotes® are the copyrighted property of A D A M , Inc  or Diane Vasquez   The above information is an  only  It is not intended as medical advice for individual conditions or treatments  Talk to your doctor, nurse or pharmacist before following any medical regimen to see if it is safe and effective for you  Low Purine Diet   WHAT YOU NEED TO KNOW:   A low-purine diet is a meal plan based on foods that are low in purine content  Purine is a substance that is found in foods and is produced naturally by the body  Purines are broken down by the body and changed to uric acid  The kidneys normally filter the uric acid, and it leaves the body through the urine  However, people with gout sometimes have a buildup of uric acid in the blood  This buildup of uric acid can cause swelling and pain (a gout attack)  A low-purine diet may help to treat and prevent gout attacks  DISCHARGE INSTRUCTIONS:   Foods to include: The following foods are low in purine  Eggs, nuts, and peanut butter    Low-fat and fat-free cheese and ice cream    Skim or 1% milk    Soup made without meat extract or broth    Vegetables that are not on the medium-purine list below    All fruit and fruit juice    Bread, pasta, rice, cakes, cornbread, and popcorn    Water, soda, tea, coffee, and cocoa    Sugar, sweets, and gelatin    Fat and oil    Foods to limit:   Medium-purine foods:     Meats:  Limit the following to 4 to 6 ounces each day      Meat and poultry Crab, lobster, oysters, and shrimp    Vegetables:  Limit the following vegetables to ½ cup each day  Asparagus    Cauliflower    Spinach    Mushrooms    Green peas    Beans, peas, and lentils (limit to 1 cup each day)    Oats and oatmeal (limit to ? cup uncooked each day)    Wheat germ and bran (limit to ¼ cup each day)    High-purine foods:  Limit or avoid foods high in purine  Anchovies, sardines, scallops, and mussels    [place], codfish, herring, and [employer], like goose and duck    Organ meats, such as brains, heart, kidney, liver, and sweetbreads    Gravies and sauces made with meat    Yeast extracts taken in the form of a supplement    Other guidelines to follow:   Increase liquid intake  Drink 8 to 16 (eight-ounce) cups of liquid each day  At least half of the liquid you drink should be water  Liquid can help your body get rid of extra uric acid  Limit or avoid alcohol  Alcohol (especially beer) increases your risk of a gout attack  Beer contains a high amount of purine  Maintain a healthy weight  If you are overweight, you should lose weight slowly  Weight loss can help decrease the amount of stress on your joints  Regular exercise can help you lose weight if you are overweight, or maintain your weight if you are at a normal weight  Talk to your healthcare provider before you begin an exercise program     © Copyright [employer] 2022 Information is for End User's use only and may not be sold, redistributed or otherwise used for commercial purposes  All illustrations and images included in CareNotes® are the copyrighted property of [employer]  or Diane Jang  The above information is an  only  It is not intended as medical advice for individual conditions or treatments  Talk to your doctor, nurse or pharmacist before following any medical regimen to see if it is safe and effective for you

## 2022-12-05 ENCOUNTER — TELEPHONE (OUTPATIENT)
Dept: NEPHROLOGY | Facility: CLINIC | Age: 72
End: 2022-12-05

## 2022-12-05 DIAGNOSIS — E87.5 HYPERKALEMIA: Primary | ICD-10-CM

## 2022-12-05 PROBLEM — D72.829 LEUKOCYTOSIS: Status: RESOLVED | Noted: 2020-10-18 | Resolved: 2022-12-05

## 2022-12-05 PROBLEM — E79.0 HYPERURICEMIA: Status: ACTIVE | Noted: 2022-12-05

## 2022-12-05 NOTE — ASSESSMENT & PLAN NOTE
Baseline creatinine unknown  Patient had DAYANNA in 10/2020 with highest creatinine 4 25 due to right obstructing kidney stone  Creatinine improved to 1 39 with stent placement and IV hydration  · Creatinine on admission 12 6 with Potassium 5 8 (slightly hemolyzed specimen)    Anion gap 23, bicarb 19 initially  · Likely multifactorial - post renal and prerenal  · Creatinine has trended down to 2 01   · Per Nephrology, was started on sodium bicarb 150 mEq in D5 water at 125 per hour which was later discontinued  · Repeat BMP after discharge and follow up with Nephrology    Results from last 7 days   Lab Units 12/04/22  0619 12/03/22  0532 12/02/22  0605 12/01/22  0845 11/30/22  1825   BUN mg/dL 45* 63* 97* 117* 114*   CREATININE mg/dL 2 01* 3 02* 7 57* 13 15* 12 60*

## 2022-12-05 NOTE — ASSESSMENT & PLAN NOTE
Uric acid noted to be elevated    Nephrology spoke with patient regarding allopurinol but patient wants to hold off on starting this medication  Repeat uric acid after discharge

## 2022-12-05 NOTE — TELEPHONE ENCOUNTER
----- Message from Heath Xavier MD sent at 12/4/2022 12:11 PM EST -----  Patient was seen by Nephrology for acute kidney injury due to obstructive uropathy please arrange for outpatient follow-up with an advanced practitioner in next 2-4 weeks  Please order for a BMP to be done in 1 week for acute kidney injury    Patient discharged from [facility]

## 2022-12-05 NOTE — ASSESSMENT & PLAN NOTE
Patient was noted to be hypoxic 12/1 after being moved from the OR table to the stretcher  · Per notes, LMA was placed and patient was bagged with improvement in O2 sat and then transitioned to CPAP  · Chest x-ray showed findings suggestive of CHF and patient received 40 mg IV Lasix x1 12/1  · By day of discharge on room air even with ambulation  · Chest x-ray also suggested that infectious etiology cannot be ruled out    In the setting of worsening leukocytosis, elevated procalcitonin, possibility of aspiration pneumonia versus pneumonitis  · Also treated with IV Rocephin and transitioned to PARDO SIA per Pulmonary recommendation to complete a Total of 7 day course of treatment  · Echo showed normal EF and normal diastolic function

## 2022-12-05 NOTE — ASSESSMENT & PLAN NOTE
WBC 12 10 initially, no bands, patient afebrile  Denies UTI symptoms  · CT showed  - colonic diverticulosis with small amount of fluid by the sigmoid colon which is likely due to sternal ascites however changed low-grade diverticulitis could not be excluded  · Initially leukocytosis was reactive and resolved  · WBC count trended up on 12/02 along with elevated procalcitonin  Chest x-ray showed findings suggestive of CHF but infectious etiology could not be ruled out  · blood cultures negative   Leukocytosis resolved  · UA with large blood, trace leukocytes, 1-2 WBC and occasional bacteria    Results from last 7 days   Lab Units 12/04/22  0619 12/03/22  0532 12/02/22  0605 12/01/22  0845 11/30/22  1825   WBC Thousand/uL 10 12 13 33* 20 29*  20 29* 8 86 12 10*

## 2022-12-05 NOTE — ASSESSMENT & PLAN NOTE
Patient presented with intermittent right flank pain since Saturday, with associated nausea, vomiting, poor appetite  No dysuria hematuria urgency or frequency in urination  Denies fever, chills  History of right nephrolithiasis 2 years ago  · CT renal stone study showed - 9 mm obstructing stone in the UPJ causing right hydroureteronephrosis  Nonobstructing stones noted in the right kidney  · Patient seen by Urology in ED  Continue Flomax on discharge per Urology  · Status post cysto with right ureteral stent placement on 12/01    · Discontinued Kiser today and patient is urinating on his own  · IV hydration discontinued  · Follow-up with Urology after discharge

## 2022-12-07 LAB
BACTERIA BLD CULT: NORMAL
BACTERIA BLD CULT: NORMAL

## 2022-12-13 ENCOUNTER — TELEPHONE (OUTPATIENT)
Dept: NEPHROLOGY | Facility: CLINIC | Age: 72
End: 2022-12-13

## 2022-12-14 ENCOUNTER — APPOINTMENT (OUTPATIENT)
Dept: LAB | Facility: HOSPITAL | Age: 72
End: 2022-12-14

## 2022-12-14 DIAGNOSIS — Z01.818 PRE-OP EXAM: ICD-10-CM

## 2022-12-14 NOTE — PRE-PROCEDURE INSTRUCTIONS
Pre-Surgery Instructions:   Medication Instructions   • Ascorbic Acid (VITAMIN C PO) Hold day of surgery  • b complex vitamins tablet Hold day of surgery  • BETA CAROTENE PO Hold day of surgery  • GLUCOSAMINE-CHONDROITIN PO Hold day of surgery  • tamsulosin (FLOMAX) 0 4 mg Take night before surgery   • TURMERIC PO Stop taking 7 days prior to surgery  • VITAMIN D PO Hold day of surgery  • VITAMIN E PO Stop taking 7 days prior to surgery  Pre op instructions given  Pt to use hibiclens as directed  Pt to arrive to the [facility] AND [facility] [place] at the given time, wear a mask  , and proceed to the SDS unit 2nd floor   Jacqueline Lawrence [phone]  My Surgical Experience    The following information was developed to assist you to prepare for your operation  What do I need to do before coming to the hospital?  • Arrange for a responsible person to drive you to and from the hospital   • Arrange care for your children at home  Children are not allowed in the recovery areas of the hospital  • Plan to wear clothing that is easy to put on and take off  If you are having shoulder surgery, wear a shirt that buttons or zippers in the front  Bathing  o Shower the evening before and the morning of your surgery with an antibacterial soap  Please refer to the Pre Op Showering Instructions for Surgery Patients Sheet   o Remove nail polish and all body piercing jewelry  o Do not shave any body part for at least 24 hours before surgery-this includes face, arms, legs and upper body  Food  o Nothing to eat or drink after midnight the night before your surgery   This includes candy and chewing gum  o Exception: If your surgery is after 12:00pm (noon), you may have clear liquids such as 7-Up®, ginger ale, apple or cranberry juice, Jell-O®, water, or clear broth until 8:00 am  o Do not drink milk or juice with pulp on the morning before surgery  o Do not drink alcohol 24 hours before surgery  Medicine  o Follow instructions you received from your surgeon about which medicines you may take on the day of surgery  o If instructed to take medicine on the morning of surgery, take pills with just a small sip of water  Call your prescribing doctor for specific infroamtion on what to do if you take insulin    What should I bring to the hospital?    Bring:  • Crutches or a walker, if you have them, for foot or knee surgery  • A list of the daily medicines, vitamins, minerals, herbals and nutritional supplements you take  Include the dosages of medicines and the time you take them each day  • Glasses, dentures or hearing aids  • Minimal clothing; you will be wearing hospital sleepwear  • Photo ID; required to verify your identity  • If you have a Living Will or Power of , bring a copy of the documents  • If you have an ostomy, bring an extra pouch and any supplies you use    Do not bring  • Medicines or inhalers  • Money, valuables or jewelry    What other information should I know about the day of surgery? • Notify your surgeons if you develop a cold, sore throat, cough, fever, rash or any other illness  • Report to the Ambulatory Surgical/Same Day Surgery Unit  • You will be instructed to stop at Registration only if you have not been pre-registered  • Inform your  fi they do not stay that they will be asked by the staff to leave a phone number where they can be reached  • Be available to be reached before surgery  In the event the operating room schedule changes, you may be asked to come in earlier or later than expected    *It is important to tell your doctor and others involved in your health care if you are taking or have been taking any non-prescription drugs, vitamins, minerals, herbals or other nutritional supplements   Any of these may interact with some food or medicines and cause a reaction

## 2022-12-16 LAB
ATRIAL RATE: 99 BPM
P AXIS: 38 DEGREES
PR INTERVAL: 140 MS
QRS AXIS: -15 DEGREES
QRSD INTERVAL: 130 MS
QT INTERVAL: 358 MS
QTC INTERVAL: 459 MS
T WAVE AXIS: 16 DEGREES
VENTRICULAR RATE: 99 BPM

## 2022-12-19 DIAGNOSIS — N20.1 URETERAL CALCULUS: ICD-10-CM

## 2022-12-20 ENCOUNTER — ANESTHESIA EVENT (OUTPATIENT)
Dept: PERIOP | Facility: HOSPITAL | Age: 72
End: 2022-12-20

## 2022-12-20 LAB — SARS-COV-2 RNA RESP QL NAA+PROBE: NEGATIVE

## 2022-12-22 ENCOUNTER — APPOINTMENT (OUTPATIENT)
Dept: RADIOLOGY | Facility: HOSPITAL | Age: 72
End: 2022-12-22

## 2022-12-22 ENCOUNTER — HOSPITAL ENCOUNTER (OUTPATIENT)
Facility: HOSPITAL | Age: 72
Setting detail: OUTPATIENT SURGERY
Discharge: HOME | End: 2022-12-22
Attending: SPECIALIST | Admitting: SPECIALIST

## 2022-12-22 ENCOUNTER — ANESTHESIA (OUTPATIENT)
Dept: PERIOP | Facility: HOSPITAL | Age: 72
End: 2022-12-22

## 2022-12-22 VITALS
SYSTOLIC BLOOD PRESSURE: 176 MMHG | HEIGHT: 67 IN | HEART RATE: 114 BPM | WEIGHT: 213.8 LBS | TEMPERATURE: 97.2 F | RESPIRATION RATE: 18 BRPM | DIASTOLIC BLOOD PRESSURE: 96 MMHG | OXYGEN SATURATION: 94 % | BODY MASS INDEX: 33.56 KG/M2

## 2022-12-22 DIAGNOSIS — N20.0 CALCULUS OF KIDNEY: ICD-10-CM

## 2022-12-22 DIAGNOSIS — N23 RENAL COLIC ON RIGHT SIDE: Primary | ICD-10-CM

## 2022-12-22 DEVICE — INLAY URETERAL STENT W/O GUIDEWIRE
Type: IMPLANTABLE DEVICE | Status: FUNCTIONAL
Brand: BARD® INLAY® URETERAL STENT

## 2022-12-22 RX ORDER — MAGNESIUM HYDROXIDE 1200 MG/15ML
LIQUID ORAL AS NEEDED
Status: DISCONTINUED | OUTPATIENT
Start: 2022-12-22 | End: 2022-12-22 | Stop reason: HOSPADM

## 2022-12-22 RX ORDER — ONDANSETRON 2 MG/ML
4 INJECTION INTRAMUSCULAR; INTRAVENOUS ONCE AS NEEDED
Status: DISCONTINUED | OUTPATIENT
Start: 2022-12-22 | End: 2022-12-22 | Stop reason: HOSPADM

## 2022-12-22 RX ORDER — FENTANYL CITRATE 50 UG/ML
INJECTION, SOLUTION INTRAMUSCULAR; INTRAVENOUS AS NEEDED
Status: DISCONTINUED | OUTPATIENT
Start: 2022-12-22 | End: 2022-12-22

## 2022-12-22 RX ORDER — PROPOFOL 10 MG/ML
INJECTION, EMULSION INTRAVENOUS AS NEEDED
Status: DISCONTINUED | OUTPATIENT
Start: 2022-12-22 | End: 2022-12-22

## 2022-12-22 RX ORDER — SODIUM CHLORIDE, SODIUM LACTATE, POTASSIUM CHLORIDE, CALCIUM CHLORIDE 600; 310; 30; 20 MG/100ML; MG/100ML; MG/100ML; MG/100ML
100 INJECTION, SOLUTION INTRAVENOUS CONTINUOUS
Status: DISCONTINUED | OUTPATIENT
Start: 2022-12-22 | End: 2022-12-22 | Stop reason: HOSPADM

## 2022-12-22 RX ORDER — LIDOCAINE HYDROCHLORIDE 10 MG/ML
INJECTION, SOLUTION EPIDURAL; INFILTRATION; INTRACAUDAL; PERINEURAL AS NEEDED
Status: DISCONTINUED | OUTPATIENT
Start: 2022-12-22 | End: 2022-12-22

## 2022-12-22 RX ORDER — SODIUM CHLORIDE, SODIUM LACTATE, POTASSIUM CHLORIDE, CALCIUM CHLORIDE 600; 310; 30; 20 MG/100ML; MG/100ML; MG/100ML; MG/100ML
100 INJECTION, SOLUTION INTRAVENOUS CONTINUOUS
Status: CANCELLED | OUTPATIENT
Start: 2022-12-22

## 2022-12-22 RX ORDER — ONDANSETRON 2 MG/ML
INJECTION INTRAMUSCULAR; INTRAVENOUS AS NEEDED
Status: DISCONTINUED | OUTPATIENT
Start: 2022-12-22 | End: 2022-12-22

## 2022-12-22 RX ORDER — ALBUMIN, HUMAN INJ 5% 5 %
SOLUTION INTRAVENOUS CONTINUOUS PRN
Status: DISCONTINUED | OUTPATIENT
Start: 2022-12-22 | End: 2022-12-22

## 2022-12-22 RX ORDER — LEVOFLOXACIN 5 MG/ML
500 INJECTION, SOLUTION INTRAVENOUS ONCE
Status: COMPLETED | OUTPATIENT
Start: 2022-12-22 | End: 2022-12-22

## 2022-12-22 RX ORDER — FENTANYL CITRATE/PF 50 MCG/ML
50 SYRINGE (ML) INJECTION
Status: DISCONTINUED | OUTPATIENT
Start: 2022-12-22 | End: 2022-12-22 | Stop reason: HOSPADM

## 2022-12-22 RX ADMIN — PHENYLEPHRINE HYDROCHLORIDE 30 MCG/MIN: 10 INJECTION INTRAVENOUS at 08:36

## 2022-12-22 RX ADMIN — PROPOFOL 150 MG: 10 INJECTION, EMULSION INTRAVENOUS at 08:24

## 2022-12-22 RX ADMIN — ONDANSETRON 4 MG: 2 INJECTION INTRAMUSCULAR; INTRAVENOUS at 08:31

## 2022-12-22 RX ADMIN — FENTANYL CITRATE 50 MCG: 50 INJECTION, SOLUTION INTRAMUSCULAR; INTRAVENOUS at 08:28

## 2022-12-22 RX ADMIN — ALBUMIN (HUMAN): 12.5 INJECTION, SOLUTION INTRAVENOUS at 08:37

## 2022-12-22 RX ADMIN — FENTANYL CITRATE 50 MCG: 50 INJECTION INTRAMUSCULAR; INTRAVENOUS at 10:42

## 2022-12-22 RX ADMIN — FENTANYL CITRATE 50 MCG: 50 INJECTION, SOLUTION INTRAMUSCULAR; INTRAVENOUS at 08:49

## 2022-12-22 RX ADMIN — LIDOCAINE HYDROCHLORIDE 50 MG: 10 INJECTION, SOLUTION EPIDURAL; INFILTRATION; INTRACAUDAL; PERINEURAL at 08:24

## 2022-12-22 RX ADMIN — LEVOFLOXACIN: 5 INJECTION, SOLUTION INTRAVENOUS at 08:23

## 2022-12-22 RX ADMIN — FENTANYL CITRATE 50 MCG: 50 INJECTION INTRAMUSCULAR; INTRAVENOUS at 11:05

## 2022-12-22 RX ADMIN — SODIUM CHLORIDE, POTASSIUM CHLORIDE, SODIUM LACTATE AND CALCIUM CHLORIDE 100 ML/HR: 600; 310; 30; 20 INJECTION, SOLUTION INTRAVENOUS at 07:25

## 2022-12-22 RX ADMIN — PROPOFOL 50 MG: 10 INJECTION, EMULSION INTRAVENOUS at 08:31

## 2022-12-22 NOTE — DISCHARGE INSTR - AVS FIRST PAGE
#1 no heavy straining or lifting above 10 pounds for 2 weeks    #2 call office fevers, chills, or worsening blood in the urine  #3 patient has follow-up tomorrow December 23 at 9:45 AM to remove Kiser  Follow-up with Dr Vish Salas please do KUB by January 9 follow-up January 13 at 1:30 PM to check stone burden possible cystoscopy stent removal      Eduardo ANGEL  [address] [place] [address] office  [address] [place] [address]  [phone]  8:30 AM to 4:30 PM  Monday through Friday    [place] office  [phone]  [place], [address] [place] [address]  [phone]  1:00 to 5:00 PM  Wednesday

## 2022-12-22 NOTE — PROGRESS NOTES
Progress Note - Urology      Patient: Christ Tracey   : 1950 Sex: male   MRN: [id number]     CSN: [id number]  Unit/Bed#: OR POOL     SUBJECTIVE:   Patient in surgical preop unit  No change to history physical  By risk of anesthesia infection stent discomfort additional urologic procedures in light of multiple stones      Objective   Vitals: /83   Pulse 105   Temp 97 9 °F (36 6 °C) (Temporal)   Resp 18   Ht 5' 7" (1 702 m)   Wt 97 kg (213 lb 12 8 oz)   SpO2 98%   BMI 33 49 kg/m²     No intake/output data recorded        Physical Exam:   General Alert awake   Normocephalic atraumatic PERRLA  Lungs clear bilaterally  Cardiac normal S1 normal S2  Abdomen soft, flank pain  Extremities no edema      Lab Results: CBC:   Lab Results   Component Value Date    WBC 10 12 2022    HGB 14 5 2022    HCT 45 1 2022    MCV 96 2022     2022    MCH 30 7 2022    MCHC 32 2 2022    RDW 13 3 2022    MPV 9 7 2022    NRBC 0 2022     CMP:   Lab Results   Component Value Date     2022    CO2 24 2022    BUN 45 (H) 2022    CREATININE 2 01 (H) 2022    CALCIUM 8 6 2022    AST 35 2022    ALT 32 2022    ALKPHOS 46 2022    EGFR 32 2022     Urinalysis:   Lab Results   Component Value Date    COLORU Yellow 2022    CLARITYU Slightly Cloudy 2022    SPECGRAV 1 020 2022    PHUR 5 5 2022    LEUKOCYTESUR Trace (A) 2022    NITRITE Negative 2022    GLUCOSEU Negative 2022    KETONESU Negative 2022    BILIRUBINUR Negative 2022    BLOODU Large (A) 2022     Urine Culture:   Lab Results   Component Value Date    URINECX No Growth <1000 cfu/mL 10/19/2020     PSA: No results found for: PSA      Assessment/ Plan:  Cystoscopy right ureteroscopy lithotripsy stent exchange          Nik Ford MD

## 2022-12-22 NOTE — OP NOTE
OPERATIVE REPORT  PATIENT NAME: Davon Boudreaux    :  1950  MRN: [id number]  Pt Location: [facility] OR ROOM 04    SURGERY DATE: 2022    Surgeon(s) and Role:     * Sindhu Osborn MD - Primary    Preop Diagnosis:  Calculus of kidney [N20 0]  Right 12 mm / 9 mm stone    Post-Op Diagnosis Codes:     * Calculus of kidney [N20 0]   Multiple stones    Procedure(s) (LRB):  CYSTOSCOPY URETEROSCOPY WITH LITHOTRIPSY HOLMIUM LASER, RETROGRADE PYELOGRAM AND INSERTION STENT URETERAL (Right)    Specimen(s):  ID Type Source Tests Collected by Time Destination   1 :  Calculus Ureter, Right STONE ANALYSIS, TISSUE EXAM Sindhu Osborn MD 2022 [id number]        Estimated Blood Loss:   Minimal    Drains:  Urethral Catheter 18 Fr  (Active)   Number of days: 0       Ureteral Internal Stent Right ureter 6 Fr  (Active)   Number of days: 0       Anesthesia Type:   IV Sedation with Anesthesia    Operative Indications:  Calculus of kidney [N20 0]  This 52-year-old male known to me with history of kidney stones atrophic nonfunctioning left kidney admitted for acute renal failure from 9 mm obstructing right UPJ proximal stone undergoing emergency cystoscopy stent insertion now to return to the OR for definitive cystoscopy flexible ureteroscopy lithotripsy basket extraction stent placement on view of preop CAT scan patient appears to have more than the 12 mm and 9 mm stone as per upper and lower pole images may need staged procedure    Operative Findings:  9 mm / 12 mm stones fragmented popcorn into small pieces basket extraction patient to attempt to pass fragments in the next few weeks undergoing KUB if significant fragment still present will need second staged procedure to debulk remaining stone fragments light of solitary kidney function    Complications:   None    Procedure and Technique:  Patient identified in the holding area right side marked consent signed placed in op suite    After anesthesia induced placed in thigh position draped prepped standard fashion timeout performed  A 22 [place] Klaie scope passed through to the bladder  Anterior urethra confirmed mild is post urethra confirmed a 3 0 cm bilobar obstructing prostate  2 038 wire was passed up the right orifice into the right renal pelvis with the stent removed  First wire left as a safety second wire cannulated with the 35 cm sheath which was passed upward to its maximum position the wire removed retrograde pyelogram confirmed the sheath to be 2 cm below the UPJ the obturator removed flexible scope was placed on view of the upper middle and lower pole there was a 9 and 12 mm stone nestled in the upper pole with multiple stone fragments up to 5 mm noted in the upper middle and lower pole there appeared to be a 8 mm stone in the lower posterior pole hard to evaluate with the flexible scope completely the holmium YAG laser was then placed and aggressive lithotripsy of the 2 stones in the upper pole was performed with some hematuria developing popcorn of the larger fragments into smaller fragments the basket was placed and fragments removed in the lower pole there appeared to be large amounts of 2 mm stones nestled in the middle 7 mm the laser was placed with difficulty and fragmentation was attempted with bleeding occurring  It appeared to fragment nicely    Attempts at removing these fragments with the basket were difficult the scope was removed the cystoscope was replaced and over the safety wire 26 cm 6 B[place]ud[place] stent was placed the scope and wire removed 18 B[place]udian Kiser cath inserted (drainage due to hematuria patient to procedure awakened taken recovery stable condition will undergo KUB in 3 weeks if any remaining fragments are still present in light of solitary kidney we will strongly consider repeat staged second look ureteroscopy basket extraction possible lithotripsy of remaining fragments   I was present for the entire procedure    Patient Disposition:  PACU         SIGNATURE: Patrice Gutierrez MD  DATE: December 22, 2022  TIME: 9:51 AM

## 2022-12-22 NOTE — ANESTHESIA PREPROCEDURE EVALUATION
Procedure:  CYSTOSCOPY URETEROSCOPY WITH LITHOTRIPSY HOLMIUM LASER, RETROGRADE PYELOGRAM AND INSERTION STENT URETERAL (Right: Bladder)    Relevant Problems   ANESTHESIA (within normal limits)      CARDIO   (+) Hypertension      /RENAL   (+) Acute kidney injury (HCC)   (+) BPH (benign prostatic hyperplasia)   (+) Hydronephrosis with ureteropelvic junction (UPJ) obstruction      PULMONARY   (+) Acute respiratory failure with hypoxia (HCC)   (+) Sleep apnea        Physical Exam    Airway    Mallampati score: IV  TM Distance: >3 FB  Neck ROM: full     Dental       Cardiovascular  Rhythm: regular, Rate: normal, Cardiovascular exam normal    Pulmonary  Decreased breath sounds,     Other Findings        Anesthesia Plan  ASA Score- 3     Anesthesia Type- general with ASA Monitors  Additional Monitors:   Airway Plan: LMA  Plan Factors-Exercise tolerance (METS): <4 METS  Chart reviewed  EKG reviewed  Existing labs reviewed  Patient summary reviewed  Patient is not a current smoker  Induction- intravenous  Postoperative Plan- Plan for postoperative opioid use  Planned trial extubation    Informed Consent- Anesthetic plan and risks discussed with patient  I personally reviewed this patient with the CRNA  Discussed and agreed on the Anesthesia Plan with the CRNA  Rosalia Love

## 2022-12-22 NOTE — H&P
H&P Exam - Urology       Patient: Valeria Chau   : 1950 Sex: male   MRN: [id number]     CSN: [id number]      History of Present Illness   HPI:  Valeria Chau is a 71 y o  male who presents with essentially solitary right kidney with atrophic poorly functioning left kidney status post emergency cystoscopy right stent placement for obstructing right 12 mm renal pelvic UPJ stone now to undergo right ureteroscopy lithotripsy stent placement patient also has a 9 mm stone which will be addressed at the same time aware risk of anesthesia infection bleeding additional urologic procedures        Review of Systems:   Constitutional:  Negative for activity change, fever, chills and diaphoresis  HENT: Negative for hearing loss and trouble swallowing  Eyes: Negative for itching and visual disturbance  Respiratory: Negative for chest tightness and shortness of breath  Cardiovascular: Negative for chest pain, edema  Gastrointestinal: Negative for abdominal distention, na abdominal pain, constipation, diarrhea, Nausea and vomiting  Genitourinary: Negative for decreased urine volume, difficulty urinating, dysuria, enuresis, frequency, hematuria and urgency  Musculoskeletal: Negative for gait problem and myalgias  Neurological: Negative for dizziness and headaches  Hematological: Does not bruise/bleed easily         Historical Information   Past Medical History:   Diagnosis Date   • Arthritis    • BPH (benign prostatic hyperplasia)    • Breathing difficulty 2022    CPAP given per pt-s/p surgery   • Cancer ([facility] [address] )     basal cell of the skull    • Colon polyp    • Heart failure ([facility] [address] )     22-pt had ECHO   • History of subdural hematoma    • Kidney stone     right stone   • Renal disorder    • Sleep apnea     mild- no CPAP-had nasal septoplasty     Past Surgical History:   Procedure Laterality Date   • BASAL CELL CARCINOMA EXCISION      skull   • BRAIN SURGERY      in the 1990's-subdural hematoma   • CATARACT EXTRACTION Left    • COLONOSCOPY      x2   • CYSTOSCOPY W/ LASER LITHOTRIPSY Right 11/27/2020    Procedure: CYSTOSCOPY, FLEXIBLE URETEROSCOPY, LASER, AND STENT [address] BASKET, RIGHT RETROGRADE;  Surgeon: Casey Johnson MD;  Location: [address] [place] [address];  Service: Urology   • FL RETROGRADE PYELOGRAM  10/18/2020   • FL RETROGRADE PYELOGRAM  11/27/2020   • FL RETROGRADE PYELOGRAM  12/01/2022   • LITHOTRIPSY     • PA CYSTO/URETERO W/LITHOTRIPSY &INDWELL STENT INSRT Right 10/18/2020    Procedure: CYSTOSCOPY URETEROSCOPY WITH BASKET EXTRACTION, RETROGRADE PYELOGRAM AND INSERTION STENT URETERAL;  Surgeon: Uri Conner MD;  Location: [address] [place] [address];  Service: Urology   • PA CYSTOURETHROSCOPY,URETER CATHETER Right 11/11/2020    Procedure: ESWL RIGHT;  Surgeon: Casey Johnson MD;  Location: [address] [place] [address];  Service: Urology   • PA CYSTOURETHROSCOPY,URETER CATHETER Right 12/01/2022    Procedure: CYSTOSCOPY RETROGRADE PYELOGRAM WITH INSERTION STENT URETERAL;  Surgeon: Casey Johnson MD;  Location: [address] [place] [address];  Service: Urology   • SEPTOPLASTY      in the 1990's-trimmed uvula   • TONSILLECTOMY  26    age 12     Social History   Social History     Substance and Sexual Activity   Alcohol Use Yes    Comment: a beer on the weekends     Social History     Substance and Sexual Activity   Drug Use Never     Social History     Tobacco Use   Smoking Status Never   Smokeless Tobacco Never     Family History:   Family History   Problem Relation Age of Onset   • Hypertension Mother    • Kidney disease Mother         renal failure-dialysis   • Hypertension Father    • Stroke Father    • Diabetes Sister    • Kidney disease Sister         self dialysis at home       Meds/Allergies   No medications prior to admission       Allergies   Allergen Reactions   • Bean Pod Extract - Food Allergy Nausea Only     Soft beans- eg chick peas, lima's, sweet peas       Objective   Vitals: Ht 5' 7" (1 702 m)   Wt 95 3 kg (210 lb)   BMI 32 89 kg/m²     Physical Exam:  General Alert awake   Normocephalic atraumatic PERRLA  Lungs clear bilaterally  Cardiac normal S1 normal S2  Abdomen soft, flank pain  Extremities no edema    No intake/output data recorded      Invasive Devices     Drain  Duration           Ureteral Drain/Stent Right ureter 6 Fr  754 days                    Lab Results: CBC:   Lab Results   Component Value Date    WBC 10 12 12/04/2022    HGB 14 5 12/04/2022    HCT 45 1 12/04/2022    MCV 96 12/04/2022     12/04/2022    MCH 30 7 12/04/2022    MCHC 32 2 12/04/2022    RDW 13 3 12/04/2022    MPV 9 7 12/04/2022    NRBC 0 11/30/2022     CMP:   Lab Results   Component Value Date     12/04/2022    CO2 24 12/04/2022    BUN 45 (H) 12/04/2022    CREATININE 2 01 (H) 12/04/2022    CALCIUM 8 6 12/04/2022    AST 35 11/30/2022    ALT 32 11/30/2022    ALKPHOS 46 11/30/2022    EGFR 32 12/04/2022     Urinalysis:   Lab Results   Component Value Date    COLORU Yellow 12/02/2022    CLARITYU Slightly Cloudy 12/02/2022    SPECGRAV 1 020 12/02/2022    PHUR 5 5 12/02/2022    LEUKOCYTESUR Trace (A) 12/02/2022    NITRITE Negative 12/02/2022    GLUCOSEU Negative 12/02/2022    KETONESU Negative 12/02/2022    BILIRUBINUR Negative 12/02/2022    BLOODU Large (A) 12/02/2022     Urine Culture:   Lab Results   Component Value Date    URINECX No Growth <1000 cfu/mL 10/19/2020     PSA: No results found for: PSA        Assessment/ Plan:  Cystoscopy right ureteroscopy laser lithotripsy stent exchange      Kendra Jade MD

## 2022-12-22 NOTE — ANESTHESIA POSTPROCEDURE EVALUATION
Post-Op Assessment Note    CV Status:  Stable  Pain Score: 0    Pain management: adequate     Mental Status:  Sleepy and arousable   Hydration Status:  Stable   PONV Controlled:  Controlled   Airway Patency:  Patent and adequate      Post Op Vitals Reviewed: Yes      Staff: CRNA         No notable events documented      BP      Temp      Pulse     Resp      SpO2

## 2022-12-30 ENCOUNTER — TELEPHONE (OUTPATIENT)
Dept: NEPHROLOGY | Facility: CLINIC | Age: 72
End: 2022-12-30

## 2022-12-30 LAB
COLOR STONE: NORMAL
COMMENT-STONE3: NORMAL
COMPOSITION: NORMAL
LABORATORY COMMENT REPORT: NORMAL
PHOTO: NORMAL
SIZE STONE: NORMAL MM
SPEC SOURCE SUBJ: NORMAL
STONE ANALYSIS-IMP: NORMAL
STONE ANALYSIS-IMP: NORMAL
URATE MFR STONE: 100 %
WT STONE: 69 MG

## 2022-12-30 NOTE — TELEPHONE ENCOUNTER
Spoke with patient reminding him to go for lab work before his appt on 1/5  He expressed understanding and thanked us for the reminder call

## 2023-01-03 ENCOUNTER — APPOINTMENT (OUTPATIENT)
Dept: LAB | Facility: HOSPITAL | Age: 73
End: 2023-01-03

## 2023-01-03 DIAGNOSIS — E87.5 HYPERKALEMIA: ICD-10-CM

## 2023-01-03 DIAGNOSIS — N17.9 ACUTE KIDNEY INJURY (HCC): ICD-10-CM

## 2023-01-03 DIAGNOSIS — E79.0 HYPERURICEMIA: ICD-10-CM

## 2023-01-03 LAB
ANION GAP SERPL CALCULATED.3IONS-SCNC: 8 MMOL/L (ref 4–13)
BUN SERPL-MCNC: 22 MG/DL (ref 5–25)
CALCIUM SERPL-MCNC: 9.8 MG/DL (ref 8.3–10.1)
CHLORIDE SERPL-SCNC: 104 MMOL/L (ref 96–108)
CO2 SERPL-SCNC: 29 MMOL/L (ref 21–32)
CREAT SERPL-MCNC: 1.28 MG/DL (ref 0.6–1.3)
GFR SERPL CREATININE-BSD FRML MDRD: 55 ML/MIN/1.73SQ M
GLUCOSE SERPL-MCNC: 98 MG/DL (ref 65–140)
POTASSIUM SERPL-SCNC: 4.3 MMOL/L (ref 3.5–5.3)
SODIUM SERPL-SCNC: 141 MMOL/L (ref 135–147)
URATE SERPL-MCNC: 6 MG/DL (ref 3.5–8.5)

## 2023-01-04 NOTE — PROGRESS NOTES
NEPHROLOGY OFFICE VISIT   Jessy Driver 71 y o  male MRN: [id number]  2023    Reason for Visit: Hospital follow-up    INTERVAL HISTORY and SUBJECTIVE:    I had the pleasure of seeing Asael Lira today in the renal clinic  He is a 78-year-old who presents for hospital follow-up  He was recently seen during hospitalization in early 2022  He was seen by nephrology for management of acute kidney injury  Asael Lira has a past medical history of sleep apnea, acute kidney injury 2020 from obstructive uropathy status post ureteral stent placement 2020 status post lithotripsy 2020  He was admitted to [address] [place] [address] with right flank pain/nausea/vomiting and poor appetite for 4 days  Doing fairly well at this time following hospital discharge  He has a slightly funny feeling when he urinates which he attributes to stent placement  No difficulty urinating  Feels that he is emptying his bladder  Last labs obtained on 1/3/2023 reviewed with Asael Lira  Family history: Mother on HD, Sister - was on PD, sister- DM severe CKD    ASSESSMENT AND PLAN:    Recent acute kidney injury:  · Baseline creatinine unclear, paucity of data at steady state  · Self-reported renal study which showed that left kidney only had 10% function  Left kidney atrophy noted on imaging  · Prior episode of acute kidney injury 2020 in the setting of obstructive uropathy with peak creatinine 3 7 and follow-up down to   · Admitted on  to [address] [place] [address]: Creatinine 12 6, peak creatinine 13 15  · Imagin mm stone in the UPJ resulting in right hydroureteronephrosis  Nonobstructing stone right kidney, simple cyst right kidney, prostate gland enlarged    Finding of left renal parenchymal atrophy  · Etiology of DAYANNA: Obstructive uropathy in the setting of prerenal due to decreased intake and GI loss while in the setting of left renal parenchymal atrophy  · At discharge creatinine down to 2 01 with follow-up creatinine down to 1 28   · Plan/recommendations:  · Follow-up in approximately 3 months  Blood work and urine studies prior to the appointment  · Maintain adequate fluid intake to maintain urine output greater than 2 L a day  · Follow-up with urology    Electrolytes:  · Hyperkalemic on hospital admission  · Etiology: DAYANNA/obstructive uropathy/acidosis  · Follow-up potassium 4 3    Blood pressure/volume status  · Blood pressure acceptable  · Volume status: Euvolemic  · On Flomax due to obstructive uropathy    Acid-base imbalance:  · Metabolic acidosis latest DAYANNA, obstruction: Follow-up bicarbonate 29    Nephrolithiasis:   · Since 1998  · Last episode prior to current was 2 yrs ago  · Uric acid stones  · No history of gout  · Stent, prior urine studies which the only information he can recall was that urine volume was not adequate  · Follow-up Litholink study before next appointment in 3 months  · Directed to maintain fluid intake for urine output greater than 2 L a day  · Avoid processed foods, red meat, sugar  Primarily diet of veggies and fruit  · Plan of care can be fine-tuned once Litholink exam has been obtained    Obstructive uropathy:  · UPJ calculus causing right hydroureteronephrosis  · On Flomax  · Stone composition: 100% uric acid  · Serum uric acid level 6 0 on repeat level  Initial uric acid level 8 7 on 12/4/2022  · Following with urology  · Stent in place    CT renal stone study  RIGHT KIDNEY AND URETER:  9 mm obstructing stone in the UPJ resulting in right hydroureteronephrosis  Nonobstructing stones in the right kidney are seen measuring up to 15 mm  Simple cysts are seen in the right kidney      LEFT KIDNEY AND URETER:  No urinary tract calculi  No hydronephrosis or hydroureter  Simple cysts are noted  Left renal parenchymal atrophy      PATIENT INSTRUCTIONS:    Patient Instructions   You were seen today due to recent acute kidney injury which occurred because of obstruction related stone  At this time kidney function has improved greatly and is possibly back to baseline  We will need to continue to monitor kidney function closely  At this time recommend follow-up in 3 months      Plan/recommendations:  • Obtain adequate fluid intake to keep urine output greater than 2 L/day  • Follow healthy diet primarily of fruits and veggies  • Avoid red meat, sugar, processed foods  • Follow-up stone risk 24-hour urine before next appointment  • Blood work and urine studies before the next appointment in 3 months  • Continue Flomax  • Call with any questions or concerns        Orders Placed This Encounter   Procedures   • CBC     Standing Status:   Future     Standing Expiration Date:   7/1/2023   • Renal function panel     Standing Status:   Future     Standing Expiration Date:   7/1/2023   • Urinalysis with microscopic     Standing Status:   Future     Standing Expiration Date:   7/1/2023   • Vitamin D 25 hydroxy     Standing Status:   Future     Standing Expiration Date:   7/1/2023   • Protein / creatinine ratio, urine     Standing Status:   Future     Standing Expiration Date:   7/1/2023   • Magnesium     Standing Status:   Future     Standing Expiration Date:   7/1/2023   • PTH, intact     Standing Status:   Future     Standing Expiration Date:   7/1/2023   • Stone risk profile       OBJECTIVE:  Current Weight: Weight - Scale: 98 kg (216 lb)  Vitals:    01/05/23 1325   BP: 126/82   BP Location: Left arm   Patient Position: Sitting   Cuff Size: Large   Pulse: 92   Weight: 98 kg (216 lb)   Height: 5' 7" (1 702 m)    Body mass index is 33 83 kg/m²  REVIEW OF SYSTEMS:    Review of Systems   Constitutional: Negative for activity change, appetite change, chills and fever  HENT: Negative for congestion, ear pain and sore throat  Eyes: Negative for pain and visual disturbance  Respiratory: Negative for cough and shortness of breath      Cardiovascular: Negative for chest pain and palpitations  Gastrointestinal: Negative for abdominal distention, abdominal pain, constipation, diarrhea, nausea and vomiting  Endocrine: Negative  Genitourinary: Negative for difficulty urinating, dysuria and hematuria  Has a funny feeling with urination which he attributes to the stent being in place   Musculoskeletal: Negative for arthralgias and back pain  Skin: Negative for color change and rash  Allergic/Immunologic: Negative for immunocompromised state  Neurological: Negative for seizures and syncope  Hematological: Does not bruise/bleed easily  Psychiatric/Behavioral: The patient is not nervous/anxious  All other systems reviewed and are negative  PHYSICAL EXAM:      Physical Exam  Constitutional:       Appearance: Normal appearance  He is well-developed  HENT:      Head: Normocephalic and atraumatic  Nose: Nose normal  No congestion  Eyes:      General:         Right eye: No discharge  Left eye: No discharge  Extraocular Movements: Extraocular movements intact  Conjunctiva/sclera: Conjunctivae normal    Neck:      Vascular: No JVD  Trachea: No tracheal deviation  Cardiovascular:      Rate and Rhythm: Normal rate and regular rhythm  Heart sounds: No murmur heard  No friction rub  No gallop  Pulmonary:      Effort: Pulmonary effort is normal       Breath sounds: Normal breath sounds  Abdominal:      General: Abdomen is protuberant  Bowel sounds are normal  There is no distension  Palpations: Abdomen is soft  There is no mass  Tenderness: There is no abdominal tenderness  There is no guarding or rebound  Musculoskeletal:         General: No tenderness or signs of injury  Normal range of motion  Cervical back: Normal range of motion and neck supple  No rigidity or tenderness  Right lower leg: No edema  Left lower leg: No edema  Skin:     General: Skin is warm and dry        Coloration: Skin is not jaundiced or pale  Findings: No erythema or rash  Neurological:      General: No focal deficit present  Mental Status: He is alert and oriented to person, place, and time  Psychiatric:         Mood and Affect: Mood normal          Behavior: Behavior normal          Thought Content:  Thought content normal          Judgment: Judgment normal          Medications:    Current Outpatient Medications:   •  Ascorbic Acid (VITAMIN C PO), Take 1,000 mg by mouth 2 (two) times a day, Disp: , Rfl:   •  b complex vitamins tablet, Take 1 tablet by mouth 2 (two) times a day, Disp: , Rfl:   •  BETA CAROTENE PO, Take by mouth 2 (two) times a day, Disp: , Rfl:   •  GLUCOSAMINE-CHONDROITIN PO, Take by mouth 2 (two) times a day, Disp: , Rfl:   •  tamsulosin (FLOMAX) 0 4 mg, Take 1 capsule (0 4 mg total) by mouth daily with dinner, Disp: 30 capsule, Rfl: 5  •  TURMERIC PO, Take 50 mg by mouth in the morning, Disp: , Rfl:   •  VITAMIN D PO, Take by mouth every morning, Disp: , Rfl:   •  VITAMIN E PO, Take 268 mg by mouth daily, Disp: , Rfl:     Laboratory Results:  Results from last 7 days   Lab Units 01/03/23  1341   POTASSIUM mmol/L 4 3   CHLORIDE mmol/L 104   CO2 mmol/L 29   BUN mg/dL 22   CREATININE mg/dL 1 28   CALCIUM mg/dL 9 8       Results for orders placed or performed in visit on 23/19/00   Basic metabolic panel   Result Value Ref Range    Sodium 141 135 - 147 mmol/L    Potassium 4 3 3 5 - 5 3 mmol/L    Chloride 104 96 - 108 mmol/L    CO2 29 21 - 32 mmol/L    ANION GAP 8 4 - 13 mmol/L    BUN 22 5 - 25 mg/dL    Creatinine 1 28 0 60 - 1 30 mg/dL    Glucose 98 65 - 140 mg/dL    Calcium 9 8 8 3 - 10 1 mg/dL    eGFR 55 ml/min/1 73sq m   Uric acid   Result Value Ref Range    Uric Acid 6 0 3 5 - 8 5 mg/dL

## 2023-01-05 ENCOUNTER — OFFICE VISIT (OUTPATIENT)
Dept: NEPHROLOGY | Facility: CLINIC | Age: 73
End: 2023-01-05

## 2023-01-05 VITALS
SYSTOLIC BLOOD PRESSURE: 126 MMHG | HEART RATE: 92 BPM | BODY MASS INDEX: 33.9 KG/M2 | DIASTOLIC BLOOD PRESSURE: 82 MMHG | WEIGHT: 216 LBS | HEIGHT: 67 IN

## 2023-01-05 DIAGNOSIS — N40.0 BENIGN PROSTATIC HYPERPLASIA WITHOUT LOWER URINARY TRACT SYMPTOMS: ICD-10-CM

## 2023-01-05 DIAGNOSIS — Q62.11 HYDRONEPHROSIS WITH URETEROPELVIC JUNCTION (UPJ) OBSTRUCTION: ICD-10-CM

## 2023-01-05 DIAGNOSIS — N20.1 URETEROLITHIASIS: ICD-10-CM

## 2023-01-05 DIAGNOSIS — N17.9 ACUTE KIDNEY INJURY (HCC): ICD-10-CM

## 2023-01-05 DIAGNOSIS — E79.0 HYPERURICEMIA: ICD-10-CM

## 2023-01-05 DIAGNOSIS — I10 HYPERTENSION, UNSPECIFIED TYPE: ICD-10-CM

## 2023-01-05 DIAGNOSIS — N18.31 STAGE 3A CHRONIC KIDNEY DISEASE (HCC): Primary | ICD-10-CM

## 2023-01-05 RX ORDER — TAMSULOSIN HYDROCHLORIDE 0.4 MG/1
0.4 CAPSULE ORAL
Qty: 30 CAPSULE | Refills: 5 | Status: SHIPPED | OUTPATIENT
Start: 2023-01-05

## 2023-01-05 NOTE — PATIENT INSTRUCTIONS
You were seen today due to recent acute kidney injury which occurred because of obstruction related stone  At this time kidney function has improved greatly and is possibly back to baseline  We will need to continue to monitor kidney function closely    At this time recommend follow-up in 3 months      Plan/recommendations:  Obtain adequate fluid intake to keep urine output greater than 2 L/day  Follow healthy diet primarily of fruits and veggies  Avoid red meat, sugar, processed foods  Follow-up stone risk 24-hour urine before next appointment  Blood work and urine studies before the next appointment in 3 months  Continue Flomax  Call with any questions or concerns

## 2023-01-09 ENCOUNTER — HOSPITAL ENCOUNTER (OUTPATIENT)
Dept: RADIOLOGY | Facility: HOSPITAL | Age: 73
Discharge: HOME | End: 2023-01-09

## 2023-01-09 DIAGNOSIS — N20.0 RENAL CALCULUS: ICD-10-CM

## 2023-03-09 ENCOUNTER — HOSPITAL ENCOUNTER (OUTPATIENT)
Dept: RADIOLOGY | Facility: HOSPITAL | Age: 73
Discharge: HOME | End: 2023-03-09
Attending: SPECIALIST

## 2023-03-09 DIAGNOSIS — N20.0 KIDNEY STONES: ICD-10-CM

## 2023-04-25 ENCOUNTER — TELEPHONE (OUTPATIENT)
Dept: NEPHROLOGY | Facility: CLINIC | Age: 73
End: 2023-04-25

## 2023-04-25 ENCOUNTER — APPOINTMENT (OUTPATIENT)
Dept: LAB | Facility: HOSPITAL | Age: 73
End: 2023-04-25

## 2023-04-25 DIAGNOSIS — Q62.11 HYDRONEPHROSIS WITH URETEROPELVIC JUNCTION (UPJ) OBSTRUCTION: ICD-10-CM

## 2023-04-25 DIAGNOSIS — N18.31 STAGE 3A CHRONIC KIDNEY DISEASE (HCC): ICD-10-CM

## 2023-04-25 DIAGNOSIS — N40.0 BENIGN PROSTATIC HYPERPLASIA WITHOUT LOWER URINARY TRACT SYMPTOMS: ICD-10-CM

## 2023-04-25 DIAGNOSIS — I10 HYPERTENSION, UNSPECIFIED TYPE: ICD-10-CM

## 2023-04-25 DIAGNOSIS — E79.0 HYPERURICEMIA: ICD-10-CM

## 2023-04-25 DIAGNOSIS — N17.9 ACUTE KIDNEY INJURY (HCC): ICD-10-CM

## 2023-04-25 DIAGNOSIS — N20.1 URETEROLITHIASIS: ICD-10-CM

## 2023-04-25 PROBLEM — N20.0 NEPHROLITHIASIS: Status: ACTIVE | Noted: 2023-04-25

## 2023-04-25 PROBLEM — R82.998 HYPERURICURIA: Status: ACTIVE | Noted: 2023-04-25

## 2023-04-25 LAB
25(OH)D3 SERPL-MCNC: 37.7 NG/ML (ref 30–100)
ALBUMIN SERPL BCP-MCNC: 4.2 G/DL (ref 3.5–5)
ANION GAP SERPL CALCULATED.3IONS-SCNC: 9 MMOL/L (ref 4–13)
BACTERIA UR QL AUTO: ABNORMAL /HPF
BILIRUB UR QL STRIP: NEGATIVE
BUN SERPL-MCNC: 19 MG/DL (ref 5–25)
CALCIUM SERPL-MCNC: 9.6 MG/DL (ref 8.4–10.2)
CHLORIDE SERPL-SCNC: 105 MMOL/L (ref 96–108)
CLARITY UR: CLEAR
CO2 SERPL-SCNC: 25 MMOL/L (ref 21–32)
COLOR UR: YELLOW
CREAT SERPL-MCNC: 1.26 MG/DL (ref 0.6–1.3)
CREAT UR-MCNC: 119.9 MG/DL
ERYTHROCYTE [DISTWIDTH] IN BLOOD BY AUTOMATED COUNT: 13.2 % (ref 11.6–15.1)
GFR SERPL CREATININE-BSD FRML MDRD: 57 ML/MIN/1.73SQ M
GLUCOSE SERPL-MCNC: 135 MG/DL (ref 65–140)
GLUCOSE UR STRIP-MCNC: NEGATIVE MG/DL
HCT VFR BLD AUTO: 49.5 % (ref 36.5–49.3)
HGB BLD-MCNC: 16.6 G/DL (ref 12–17)
HGB UR QL STRIP.AUTO: NEGATIVE
KETONES UR STRIP-MCNC: NEGATIVE MG/DL
LEUKOCYTE ESTERASE UR QL STRIP: ABNORMAL
MAGNESIUM SERPL-MCNC: 2 MG/DL (ref 1.9–2.7)
MCH RBC QN AUTO: 31.6 PG (ref 26.8–34.3)
MCHC RBC AUTO-ENTMCNC: 33.5 G/DL (ref 31.4–37.4)
MCV RBC AUTO: 94 FL (ref 82–98)
NITRITE UR QL STRIP: NEGATIVE
NON-SQ EPI CELLS URNS QL MICRO: ABNORMAL /HPF
PH UR STRIP.AUTO: 6 [PH]
PHOSPHATE SERPL-MCNC: 3.3 MG/DL (ref 2.3–4.1)
PLATELET # BLD AUTO: 238 THOUSANDS/UL (ref 149–390)
PMV BLD AUTO: 9.3 FL (ref 8.9–12.7)
POTASSIUM SERPL-SCNC: 3.9 MMOL/L (ref 3.5–5.3)
PROT UR STRIP-MCNC: ABNORMAL MG/DL
PROT UR-MCNC: 50 MG/DL
PROT/CREAT UR: 0.42 MG/G{CREAT} (ref 0–0.1)
PTH-INTACT SERPL-MCNC: 27.3 PG/ML (ref 18.4–80.1)
RBC # BLD AUTO: 5.26 MILLION/UL (ref 3.88–5.62)
RBC #/AREA URNS AUTO: ABNORMAL /HPF
SODIUM SERPL-SCNC: 139 MMOL/L (ref 135–147)
SP GR UR STRIP.AUTO: >=1.03 (ref 1–1.03)
UROBILINOGEN UR QL STRIP.AUTO: 0.2 E.U./DL
WBC # BLD AUTO: 6.17 THOUSAND/UL (ref 4.31–10.16)
WBC #/AREA URNS AUTO: ABNORMAL /HPF
WBC CLUMPS # UR AUTO: ABNORMAL /UL

## 2023-04-25 NOTE — TELEPHONE ENCOUNTER
Left voicemail for patient to remind them to complete ordered lab work prior to 4/26 appointment with Tyrell Machado  Advised patient to call back with any questions or if they need the lab orders sent to an outside lab

## 2023-04-26 ENCOUNTER — OFFICE VISIT (OUTPATIENT)
Dept: NEPHROLOGY | Facility: CLINIC | Age: 73
End: 2023-04-26

## 2023-04-26 VITALS
SYSTOLIC BLOOD PRESSURE: 132 MMHG | BODY MASS INDEX: 35.97 KG/M2 | HEART RATE: 104 BPM | WEIGHT: 229.2 LBS | DIASTOLIC BLOOD PRESSURE: 90 MMHG | HEIGHT: 67 IN

## 2023-04-26 DIAGNOSIS — I10 PRIMARY HYPERTENSION: ICD-10-CM

## 2023-04-26 DIAGNOSIS — R80.1 PERSISTENT PROTEINURIA: ICD-10-CM

## 2023-04-26 DIAGNOSIS — Q62.11 HYDRONEPHROSIS WITH URETEROPELVIC JUNCTION (UPJ) OBSTRUCTION: ICD-10-CM

## 2023-04-26 DIAGNOSIS — N20.0 NEPHROLITHIASIS: ICD-10-CM

## 2023-04-26 DIAGNOSIS — N18.31 STAGE 3A CHRONIC KIDNEY DISEASE (HCC): Primary | ICD-10-CM

## 2023-04-26 DIAGNOSIS — E66.01 OBESITY, MORBID (HCC): ICD-10-CM

## 2023-04-26 RX ORDER — POTASSIUM CITRATE 10 MEQ/1
10 TABLET, EXTENDED RELEASE ORAL
Qty: 90 TABLET | Refills: 3 | Status: SHIPPED | OUTPATIENT
Start: 2023-04-26

## 2023-04-26 NOTE — PATIENT INSTRUCTIONS
Recent 24-hour urine demonstrates low urine volumes, significantly low urine pH, significant low citrate borderline high oxalate  Recommend starting potassium citrate 10 meq TID  Repeat 24-hour urine stone risk profile in 4 months prior to next appointment  Push hydration to maintain urine output 2-2 5 L a day  Eat a low animal protein diet  Push diet primarily of fruits and vegetables  Your kidney function remains stable  Most recent creatinine 1 2, baseline  We will continue routine surveillance as outlined below  Avoid all NSAIDs to include ibuprofen, Motrin, Aleve, Advil, Naproxen, Celebrex, Indomethacin, Toradol  Stay well hydrated  Continue all prescribed medications  Call if blood pressure consistently more than 140/90 or less than 110/50s  High and low blood pressures may affect your kidney function  Recommend low salt diet (2 gm sodium diet)  Please let us know if you are scheduled for any studies with IV contrast (ex: CT scan, arteriogram or cardiac catheterization)   Follow up in 4 months with repeat labs prior to appointment  Please contact the office with new symptoms or concerns

## 2023-04-26 NOTE — PROGRESS NOTES
Assessment and Plan:  Chronic kidney disease IIIA:    -Etiology:  Suspect due to left renal atrophy and prior episodes of DAYANNA due to obstructive uropathy  -Baseline creatinine appears to be 1 2-1 3  -hx DAYANNA Oct  '20 and recently Dec '22 with peak creatinine 13  -recent creatinine 1 26, GFR 57, at baseline  -UPCr 0 42 g  -UA with 1+ protein and no hematuria  -electrolytes and acid-base stable  Hgb 16 6  -Check SPEP/UPEP/free light chain ratio  -Treat modifiable risk factors  -avoid nephrotoxins, NSAIDs, hypotension and IV contrast if possible  -stay well hydrated  -follow-up with physician in 4 months with repeat blood and urine studies  Blood pressure/Volume status:  -BP initially elevated but improved with repeat measurements  Remains slightly above goal   Patient insists his blood pressures are normal at home  Instructed patient to send in 1 weeks worth of blood pressure readings to the office for review  If remains elevated consider starting ACEi/ARB, particularly in the setting of proteinuria  -Patient somewhat resistant to starting any prescription medications  -volume status euvolemic  -continue current medications: none  -Avoid NSAIDs  -Avoid hypotension or fluctuations in blood pressure    -low sodium (2 gm) diet  Encourage regular exercise  -continue to monitor BP at home    Proteinuria:  -UA with 1+ protein with no significant hematuria  Prior UA in December with no proteinuria  -UPCR 0 42 g  -Follow BP readings  -check UPEP, SPEP and FLCr  -continue to monitor    Nephrolithiasis:  -Since 1998, last stone episode December 2022  -Composition: 100% uric acid  -No history of gout  -serum uric acid 6 0  -Litholink study: Low urine volume 1 6L, low pH 5 3, significantly low citrate levels, borderline elevated calcium and oxalate   -Maintain adequate fluid intake to maintain urine output greater than 2 L/day  -Start potassium citrate 10 meq 3 times daily  -Avoid processed foods, red meat and sugar  Diet primarily vegetables and fruits  -Review Litholink/24-hour urine stone risk profile prior to next appointment  Already ordered by urology    Obstructive uropathy:  -hx UPJ calculus with right hydroureteronephrosis in December 2022, s/p R ureteral stent  -s/p L ureteral stent Oct  '20  -Repeat renal ultrasound March 2023 with no hydronephrosis and multiple bilateral intrarenal calculi  -Follows with urology    Bone Mineral Disease of CKD:  -Calcium and magnesium stable  -Phosphorus 3 3, at goal  -PTH 27, at goal  -Vitamin D 25 37 7, at goal  -continue to monitor    Obesity:  -continue to encourage weight management, lifestyle modifications and diet modifications to slow progession of CKD and proteinuria  -continue follow-up and management with PCP    Age related screening: Your primary caregiver may do yearly screening for colorectal cancer  It is recommended in all men and women over 48years old  You may have screening earlier if you have colon disease or a family history of colorectal cancer    Solis Buchanan was seen today for follow-up  Diagnoses and all orders for this visit:    Stage 3a chronic kidney disease ([address] )  -     CBC; Future  -     Magnesium; Future  -     PTH, intact; Future  -     Protein / creatinine ratio, urine; Future  -     Phosphorus; Future  -     Urinalysis with microscopic; Future  -     Immunoglobulin free LT chains blood; Future  -     Protein electrophoresis, serum; Future  -     Protein electrophoresis, urine; Future    Hydronephrosis with ureteropelvic junction (UPJ) obstruction    Primary hypertension    Nephrolithiasis  -     potassium citrate (UROCIT-K) 10 mEq; Take 1 tablet (10 mEq total) by mouth 3 (three) times a day with meals  -     Cancel: Litholink Kidney Stone Panel; Future  -     Urinalysis with microscopic; Future    Obesity, morbid ([address] )    Persistent proteinuria  -     Immunoglobulin free LT chains blood; Future  -     Protein electrophoresis, serum;  Future  - Protein electrophoresis, urine; Future        Repeat 24-hour urine prior to June as per urology order  Follow up with physician in 4 months with repeat blood and urine studies  Please call the office with any questions or concerns  Reason for Visit: Follow-up (Stage 3a chronic kidney disease )    HPI: Gianni Avitia is a 71 y o  male CKD 3A, nephrolithiasis, history DAYANNA 2020 due to obstructive uropathy, s/p ureteral stent October 2020 and lithotripsy 11/27/2020 more recently DAYANNA December 2022 from obstructive uropathy with right hydroureteronephrosis and left renal parenchymal atrophy, ISAIAS who presents for follow up of CKD and nephrolithiasis  Patient last seen by Vi Millan 1/5/2023  Most recent creatinine 1 26  Repeat renal ultrasound in March with no hydronephrosis and multiple bilateral intrarenal calculi  Litholink as outlined above  Recent 1+ protein, not previously seen, and resolution of hematuria  Patient denies recent hospitalizations or ER visits  Patient denies NSAID use  Patient denies nausea, vomiting, diarrhea, dyspnea, orthopnea, edema, hematuria or foamy urine  ROS: A complete review of systems was performed and was negative unless otherwise noted in the history of present illness      Allergies:   Bean pod extract - food allergy    Medications:     Current Outpatient Medications:   •  Ascorbic Acid (VITAMIN C PO), Take 1,000 mg by mouth 2 (two) times a day, Disp: , Rfl:   •  b complex vitamins tablet, Take 1 tablet by mouth 2 (two) times a day, Disp: , Rfl:   •  BETA CAROTENE PO, Take by mouth 2 (two) times a day, Disp: , Rfl:   •  GLUCOSAMINE-CHONDROITIN PO, Take by mouth 2 (two) times a day, Disp: , Rfl:   •  potassium citrate (UROCIT-K) 10 mEq, Take 1 tablet (10 mEq total) by mouth 3 (three) times a day with meals, Disp: 90 tablet, Rfl: 3  •  TURMERIC PO, Take 50 mg by mouth in the morning, Disp: , Rfl:   •  VITAMIN D PO, Take by mouth every morning, Disp: , Rfl:   •  VITAMIN E PO, Take 268 mg by mouth daily, Disp: , Rfl:   •  tamsulosin (FLOMAX) 0 4 mg, Take 1 capsule (0 4 mg total) by mouth daily with dinner (Patient not taking: Reported on 4/26/2023), Disp: 30 capsule, Rfl: 5    Past Medical History:   Diagnosis Date   • Arthritis    • BPH (benign prostatic hyperplasia)    • Breathing difficulty 12/01/2022    CPAP given per pt-s/p surgery   • Cancer ([address] ) 2015    basal cell of the skull    • Colon polyp    • Heart failure ([facility] [address] )     12/2/22-pt had ECHO   • History of subdural hematoma    • Kidney stone     right stone   • Renal disorder    • Sleep apnea     mild- no CPAP-had nasal septoplasty     Past Surgical History:   Procedure Laterality Date   • BASAL CELL CARCINOMA EXCISION  2015    skull   • BRAIN SURGERY      in the 1990's-subdural hematoma   • CATARACT EXTRACTION Left    • COLONOSCOPY      x2   • CYSTOSCOPY W/ LASER LITHOTRIPSY Right 11/27/2020    Procedure: CYSTOSCOPY, FLEXIBLE URETEROSCOPY, LASER, AND STENT [address], RIGHT RETROGRADE;  Surgeon: Madina Poe MD;  Location: [address] [place] [address];  Service: Urology   • FL RETROGRADE PYELOGRAM  10/18/2020   • FL RETROGRADE PYELOGRAM  11/27/2020   • FL RETROGRADE PYELOGRAM  12/01/2022   • FL RETROGRADE PYELOGRAM  12/22/2022   • LITHOTRIPSY     • RI CYSTO BLADDER W/URETERAL CATHETERIZATION Right 11/11/2020    Procedure: ESWL RIGHT;  Surgeon: Madina Poe MD;  Location: [address] [place] [address];  Service: Urology   • RI CYSTO BLADDER W/URETERAL CATHETERIZATION Right 12/01/2022    Procedure: CYSTOSCOPY RETROGRADE PYELOGRAM WITH INSERTION STENT URETERAL;  Surgeon: Madina Poe MD;  Location: [address] [place] [address];  Service: Urology   • RI CYSTO/URETERO W/LITHOTRIPSY &INDWELL STENT INSRT Right 10/18/2020    Procedure: CYSTOSCOPY URETEROSCOPY WITH BASKET EXTRACTION, RETROGRADE PYELOGRAM AND INSERTION STENT URETERAL;  Surgeon: Destin Park MD;  Location: [facility];  Service: Urology   • RI CYSTO/URETERO W/LITHOTRIPSY &INDWELL STENT INSRT Right "12/22/2022    Procedure: CYSTOSCOPY URETEROSCOPY WITH LITHOTRIPSY HOLMIUM LASER, RETROGRADE PYELOGRAM AND INSERTION STENT URETERAL;  Surgeon: Madina Poe MD;  Location: [facility];  Service: Urology   • SEPTOPLASTY      in the 1990's-trimmed uvula   • TONSILLECTOMY  1956    age 12     Family History   Problem Relation Age of Onset   • Hypertension Mother    • Kidney disease Mother         renal failure-dialysis   • Hypertension Father    • Stroke Father    • Diabetes Sister    • Kidney disease Sister         self dialysis at home      reports that he has never smoked  He has never used smokeless tobacco  He reports current alcohol use  He reports that he does not use drugs  Physical Exam:   Vitals:    04/26/23 1303 04/26/23 1341   BP: 152/100 132/90   BP Location: Left arm Right arm   Patient Position: Sitting Sitting   Cuff Size: Large Large   Pulse: 104    Weight: 104 kg (229 lb 3 2 oz)    Height: 5' 7\" (1 702 m)      Body mass index is 35 9 kg/m²  General:  Awake, alert, appears comfortable and in no acute distress  Nontoxic  Skin:  No rash, warm, good skin turgor   Eyes:  PERRL, EOMI, sclerae nonicteric   no periorbital edema   ENT:  Moist mucous membranes  Neck:  Trachea midline, symmetric  No JVD  No carotid bruits  Chest:  Clear to auscultation bilaterally without wheezes, crackles or rhonchi  CVS:  Regular rate and rhythm without murmur, gallop or rub  S1 and S2 identified and normal   No S3, S4    Abdomen:  Soft, nontender, nondistended without masses  Normal bowel sounds x 4 quadrants  No bruit  Extremities:  Warm, pink, motor and sensory intact and well perfused  No cyanosis, pallor  No BLE edema  Neuro:  Awake, alert, oriented x3  Grossly intact  Psych:  Appropriate affect  Mentating appropriately  Normal mental status exam      Procedure:  No results found for this or any previous visit      Lab Results   Component Value Date    CALCIUM 9 6 04/25/2023    K 3 9 04/25/2023    CO2 25 " 04/25/2023     04/25/2023    BUN 19 04/25/2023    CREATININE 1 26 04/25/2023       Results from last 7 days   Lab Units 04/25/23  1209   WBC Thousand/uL 6 17   HEMOGLOBIN g/dL 16 6   HEMATOCRIT % 49 5*   PLATELETS Thousands/uL 238   POTASSIUM mmol/L 3 9   CHLORIDE mmol/L 105   CO2 mmol/L 25   BUN mg/dL 19   CREATININE mg/dL 1 26   CALCIUM mg/dL 9 6   MAGNESIUM mg/dL 2 0   PHOSPHORUS mg/dL 3 3   Imaging study:  Renal ultrasound 3/9/2023:  Imaging study reviewed  Normal echogenicity and contour, numerous bilateral cystic lesions largest in the upper poles  Right kidney 15 8 x 5 9 x 6 5 cm, left kidney 12 1 x 7 5 x 6 0 cm  No hydronephrosis  Echogenic focus in the upper pole of the right kidney measuring 3 cm representing intrarenal calculi  Echogenic focus in the lower pole measuring 1 7 x 2 4 cm represent intrarenal calculi  Echogenic foci measuring 5 mm in left kidney representing intrarenal calculi    EMR, including Epic, Care Everywhere and outside scanned documents reviewed  I have personally reviewed the blood work as stated above and in my note  I have personally reviewed ultrasound imaging studies  I have personally reviewed PCP, consultants and prior nephrology notes

## 2023-05-09 ENCOUNTER — OFFICE VISIT (OUTPATIENT)
Dept: AUDIOLOGY | Facility: CLINIC | Age: 73
End: 2023-05-09

## 2023-05-09 DIAGNOSIS — H90.3 SENSORINEURAL HEARING LOSS (SNHL), BILATERAL: Primary | ICD-10-CM

## 2023-05-09 DIAGNOSIS — R94.128 ABNORMAL TYMPANOGRAM: ICD-10-CM

## 2023-05-09 NOTE — PROGRESS NOTES
ADULT HEARING EVALUATION - [place] [address] AUDIOLOGY      Patient Name: Katarina Altman   MRN:  [id number]   :  1950   Age: 71 y o  Gender: male   DOS: 2023     HISTORY:     Katarina Altman, a 71 y o  male, was seen on 2023 at the referral of Dr Luci Patton for an audiometric evaluation  Mr Vesna Jin was unaccompanied to today's visit  Mr Vesna Jin is a new patient to our practice  Today, Mr Betty Lynch primary complaint(s) was bilateral hearing loss  Onset of symptoms reportedly began several weeks ago  Mr Vesna Jin denied otalgia, otorrhea, aural fullness, tinnitus and dizziness  History of otitis was negative  Mr Vesna Jin has no history of ear surgeries  Communication difficulties include needing to ask for speech repetitions  History of noise exposure is positive  Other documented medical history states that Mr Vesna Jin  has a past medical history of Arthritis, BPH (benign prostatic hyperplasia), Breathing difficulty (2022), Cancer ([address] ) (), Colon polyp, Heart failure ([address] ), History of subdural hematoma, Kidney stone, Renal disorder, and Sleep apnea  Mr Vesna Jin has not had his hearing tested previously  RESULTS:    Otoscopic Evaluation:   Right Ear: Unremarkable, canal clear   Left Ear: Unremarkable, canal clear    Tympanometry:   Right Ear: Type B; no measurable middle ear pressure or static compliance, consistent with middle ear pathology  Left Ear: Type B; no measurable middle ear pressure or static compliance, consistent with middle ear pathology       Audiometry:  Conventional pure tone audiometry from 250 - 8000 Hz  obtained with good reliability and revealed the following:     Right Ear: moderate to moderately-severe mixed hearing loss (MHL)   Left Ear: mild to moderately-severe mixed hearing loss (MHL)     Speech Audiometry:    Speech Reception (SRT)   Right Ear: 45 dB HL   Left Ear: 45 dB HL    Word Recognition Scores (WRS):  Right Ear: excellent (100 % correct)     Left Ear: excellent (90 % correct)   Stimuli: [id number]    IMPRESSIONS:  Bilateral CHL, mild to mod sev AS, mod to mod-sev AD  The results of today's findings were reviewed with Mr Morenita Aguirre and his hearing thresholds were explained at length  Treatment options, including amplification and communication strategies, were discussed as appropriate  Mr Morenita Aguirre voiced understanding of his test results and had no further questions  RECOMMENDATIONS:    1 ) Follow-up with referring provider  2 ) Referral to ENT recommended for consultation for bilateral CHL  *see attached audiogram*    It was a pleasure working with Mr Morenita Aguirre today  Thank you for referring this patient  Nolan Blue    Clinical Audiologist    [address] [address] [id number]

## 2023-05-10 ENCOUNTER — OFFICE VISIT (OUTPATIENT)
Dept: PODIATRY | Facility: CLINIC | Age: 73
End: 2023-05-10

## 2023-05-10 VITALS
WEIGHT: 229 LBS | HEIGHT: 67 IN | RESPIRATION RATE: 17 BRPM | DIASTOLIC BLOOD PRESSURE: 90 MMHG | SYSTOLIC BLOOD PRESSURE: 132 MMHG | BODY MASS INDEX: 35.94 KG/M2

## 2023-05-10 DIAGNOSIS — M77.9 TENDONITIS: ICD-10-CM

## 2023-05-10 DIAGNOSIS — B35.1 ONYCHOMYCOSIS: ICD-10-CM

## 2023-05-10 DIAGNOSIS — M21.961 ACQUIRED DEFORMITY OF RIGHT FOOT: Primary | ICD-10-CM

## 2023-05-10 DIAGNOSIS — M21.619 BUNION: ICD-10-CM

## 2023-05-10 DIAGNOSIS — M20.11 HALLUX VALGUS OF RIGHT FOOT: ICD-10-CM

## 2023-05-10 NOTE — PROGRESS NOTES
Assessment/Plan:  Patient has arthralgia of the right foot  He has dorsal spurring of the Lisfranc joint  Secondary extensor tendinitis  Patient has track bound hallux valgus deformity with bunion  Mycosis of nail  Plan  Chart reviewed  Patient examined  Patient advised on condition  We recommend injection therapy however patient would like to try Voltaren gel first   He will continue use bunion splint as directed  He may need surgical intervention in the future  Today all mycotic nails debrided without pain or complication  Patient advised on aftercare  Diagnoses and all orders for this visit:    Acquired deformity of right foot    Bunion    Hallux valgus of right foot    Tendonitis    Onychomycosis          Subjective: Patient has pain  He has pain in his right foot  He has a painful bunion  He also has pain in the instep    No history of trauma    Allergies   Allergen Reactions   • Bean Pod Extract - Food Allergy Nausea Only     Soft beans- eg chick peas, lima's, sweet peas         Current Outpatient Medications:   •  Ascorbic Acid (VITAMIN C PO), Take 1,000 mg by mouth 2 (two) times a day, Disp: , Rfl:   •  b complex vitamins tablet, Take 1 tablet by mouth 2 (two) times a day, Disp: , Rfl:   •  BETA CAROTENE PO, Take by mouth 2 (two) times a day, Disp: , Rfl:   •  GLUCOSAMINE-CHONDROITIN PO, Take by mouth 2 (two) times a day, Disp: , Rfl:   •  potassium citrate (UROCIT-K) 10 mEq, Take 1 tablet (10 mEq total) by mouth 3 (three) times a day with meals, Disp: 90 tablet, Rfl: 3  •  tamsulosin (FLOMAX) 0 4 mg, Take 1 capsule (0 4 mg total) by mouth daily with dinner (Patient not taking: Reported on 4/26/2023), Disp: 30 capsule, Rfl: 5  •  TURMERIC PO, Take 50 mg by mouth in the morning, Disp: , Rfl:   •  VITAMIN D PO, Take by mouth every morning, Disp: , Rfl:   •  VITAMIN E PO, Take 268 mg by mouth daily, Disp: , Rfl:     Patient Active Problem List   Diagnosis   • Acute kidney injury (HCC)   • Renal colic on right side   • Hyperkalemia   • Sleep apnea   • Hydronephrosis with ureteropelvic junction (UPJ) obstruction   • BPH (benign prostatic hyperplasia)   • Hypertension   • Acute respiratory failure with hypoxia (HCC)   • Hyperuricemia   • Stage 3a chronic kidney disease ([facility] [address] )   • Nephrolithiasis   • Hyperuricuria   • Obesity, morbid ([facility] [address] )          Patient ID: Ollie Jasmine is a 71 y o  male  HPI    The following portions of the patient's history were reviewed and updated as appropriate:     family history includes Diabetes in his sister; Hypertension in his father and mother; Kidney disease in his mother and sister; Stroke in his father  reports that he has never smoked  He has never used smokeless tobacco  He reports current alcohol use  He reports that he does not use drugs  Vitals:    05/10/23 1024   BP: 132/90   Resp: 17       Review of Systems      Objective:  Patient's shoes and socks removed  Foot ExamPhysical Exam  Vitals and nursing note reviewed  Constitutional:       Appearance: Normal appearance  Cardiovascular:      Rate and Rhythm: Normal rate and regular rhythm  Feet:      Comments: Patient has dorsal spurring of the medial aspect of the Lisfranc joint  Pain with palpation extensor tendons  Patient has a track bound hallux valgus deformity with inflamed bunion  Skin:     Capillary Refill: Capillary refill takes less than 2 seconds  Comments: All nails are mycotic  They demonstrate distal mycosis  Neurological:      Mental Status: He is alert  Psychiatric:         Mood and Affect: Mood normal          Behavior: Behavior normal          Thought Content:  Thought content normal          Judgment: Judgment normal

## 2023-06-05 ENCOUNTER — OFFICE VISIT (OUTPATIENT)
Dept: AUDIOLOGY | Facility: CLINIC | Age: 73
End: 2023-06-05
Payer: MEDICARE

## 2023-06-05 ENCOUNTER — OFFICE VISIT (OUTPATIENT)
Dept: OTOLARYNGOLOGY | Facility: CLINIC | Age: 73
End: 2023-06-05
Payer: MEDICARE

## 2023-06-05 VITALS — WEIGHT: 220 LBS | TEMPERATURE: 97.5 F | HEIGHT: 67 IN | BODY MASS INDEX: 34.53 KG/M2

## 2023-06-05 DIAGNOSIS — H69.83 ETD (EUSTACHIAN TUBE DYSFUNCTION), BILATERAL: ICD-10-CM

## 2023-06-05 DIAGNOSIS — H90.6 MIXED CONDUCTIVE AND SENSORINEURAL HEARING LOSS OF BOTH EARS: Primary | ICD-10-CM

## 2023-06-05 DIAGNOSIS — H90.6 MIXED HEARING LOSS, BILATERAL: Primary | ICD-10-CM

## 2023-06-05 DIAGNOSIS — R94.128 ABNORMAL TYMPANOGRAM: ICD-10-CM

## 2023-06-05 PROCEDURE — 99214 OFFICE O/P EST MOD 30 MIN: CPT | Performed by: NURSE PRACTITIONER

## 2023-06-05 PROCEDURE — 92567 TYMPANOMETRY: CPT | Performed by: AUDIOLOGIST

## 2023-06-05 RX ORDER — AZELASTINE 1 MG/ML
1 SPRAY, METERED NASAL 2 TIMES DAILY
Qty: 1 ML | Refills: 2 | Status: SHIPPED | OUTPATIENT
Start: 2023-06-05

## 2023-06-05 NOTE — PROGRESS NOTES
Assessment/Plan:    Mixed conductive and sensorineural hearing loss, bilateral  Symptoms include hearing loss that comes and goes for past year or more  If bends forward hearing clears then decreases again  Additionally has year round allergy concerns with nasal congestion  Bilateral eac clear, no cerumen, TM intact, no serous fluid  Audiogram today bilateral moderate near severe mixed conductive and SNHL  Tymps type B bilaterally  Word discrimination right 100% and left 90%  Repeat tymps today indicating type B    Reviewed causes of mixed conductive and SNHL hearing loss including inflammatory process, vascular change, semi circular canal dehiscence, acoustic neuroma, vs otosclerosis  No evidence serous fluid on exam   Type B tymp may be associated with tympanosclerosis  Reviewed treatment options including acceptance, hearing amplification, imaging (CT temporal bones vs MRI brain with IAC), and consultation with otologist for surgical options  May also trial Astelin and Flonase for ETD impact  After discussion agreed to nasal sprays and obtain Ct scan  Follow up after testing and then may consider additional follow up 3 months with repeat audiogram to document stability of hearing prior to hearing amplification consideration         Diagnoses and all orders for this visit:    Mixed conductive and sensorineural hearing loss of both ears  -     Ambulatory referral to Audiology  -     azelastine (ASTELIN) 0 1 % nasal spray; 1 spray into each nostril 2 (two) times a day Use in each nostril as directed  -     CT orbits/temporal bones/skull base wo contrast; Future    ETD (Eustachian tube dysfunction), bilateral  -     azelastine (ASTELIN) 0 1 % nasal spray; 1 spray into each nostril 2 (two) times a day Use in each nostril as directed          Subjective:      Patient ID: John Farnsworth is a 71 y o  male  Presents today as a new patient due to ear concerns    Hearing gradually worsening since "winter last year  [place] hearing would come and go  Worse this past winter  Hearing goes up and down  Bilateral ears feel blocked  If blows nose, hearing improves  White noise tinnitus, sometimes both ears  No otalgia or otorrhea  No history of ear surgery  No current hearing aids  Additionally had allergy related concerns  Recent audiogram        The following portions of the patient's history were reviewed and updated as appropriate: allergies, current medications, past family history, past medical history, past social history, past surgical history and problem list     Review of Systems   Constitutional: Negative  HENT: Positive for hearing loss  Negative for congestion, ear discharge, ear pain, nosebleeds, postnasal drip, rhinorrhea, sinus pressure, sinus pain, sore throat, tinnitus and voice change  Respiratory: Negative for chest tightness and shortness of breath  Skin: Negative for color change  Neurological: Negative for dizziness, numbness and headaches  Psychiatric/Behavioral: Negative  Objective:      Temp 97 5 °F (36 4 °C) (Temporal)   Ht 5' 7\" (1 702 m)   Wt 99 8 kg (220 lb)   BMI 34 46 kg/m²          Physical Exam  Constitutional:       Appearance: He is well-developed  HENT:      Head: Normocephalic  Right Ear: Hearing, ear canal and external ear normal  No decreased hearing noted  No drainage or tenderness  Tympanic membrane is scarred  Tympanic membrane is not perforated or erythematous  Left Ear: Hearing, ear canal and external ear normal  No decreased hearing noted  No drainage or tenderness  Tympanic membrane is scarred  Tympanic membrane is not perforated or erythematous  Nose: Nose normal  No nasal deformity or septal deviation  Mouth/Throat:      Mouth: Mucous membranes are not pale and not dry  No oral lesions  Dentition: Normal dentition  Pharynx: Uvula midline  No oropharyngeal exudate     Neck:      Trachea: No tracheal " deviation  Pulmonary:      Effort: No accessory muscle usage or respiratory distress  Musculoskeletal:      Cervical back: Neck supple  Lymphadenopathy:      Cervical: No cervical adenopathy  Skin:     General: Skin is warm and dry  Neurological:      Mental Status: He is alert and oriented to person, place, and time  Cranial Nerves: No cranial nerve deficit  Sensory: No sensory deficit  Psychiatric:         Behavior: Behavior is cooperative

## 2023-06-05 NOTE — ASSESSMENT & PLAN NOTE
Symptoms include hearing loss that comes and goes for past year or more  If bends forward hearing clears then decreases again  Additionally has year round allergy concerns with nasal congestion  Bilateral eac clear, no cerumen, TM intact, no serous fluid  Audiogram today bilateral moderate near severe mixed conductive and SNHL  Tymps type B bilaterally  Word discrimination right 100% and left 90%  Repeat tymps today indicating type B    Reviewed causes of mixed conductive and SNHL hearing loss including inflammatory process, vascular change, semi circular canal dehiscence, acoustic neuroma, vs otosclerosis  No evidence serous fluid on exam   Type B tymp may be associated with tympanosclerosis  Reviewed treatment options including acceptance, hearing amplification, imaging (CT temporal bones vs MRI brain with IAC), and consultation with otologist for surgical options  May also trial Astelin and Flonase for ETD impact     After discussion agreed to nasal sprays and obtain Ct scan  Follow up after testing and then may consider additional follow up 3 months with repeat audiogram to document stability of hearing prior to hearing amplification consideration

## 2023-06-05 NOTE — PROGRESS NOTES
ADULT HEARING EVALUATION - [place] [address] AUDIOLOGY      Patient Name: Gopi Fields   MRN:  [id number]   :  1950   Age: 71 y o  Gender: male   DOS: 2023     HISTORY:     Gopi Fields, a 71 y o  male, was seen on 2023 at the referral of Dr Porsha Philip for an audiometric evaluation  Mr Linda Elizabeth was unaccompanied to today's visit  Mr Linda Elizabeth was last seen in our clinic on 23 for an audiometric evaluation, which revealed bilateral mixed hearing loss and type B tympanograms   RESULTS:      Tympanometry:   Right Ear: Type B; no measurable middle ear pressure or static compliance, consistent with middle ear pathology  Left Ear: Type B; no measurable middle ear pressure or static compliance, consistent with middle ear pathology  DNT audiometry per ENT    IMPRESSIONS:  Bilateral mixed hearing loss  Abnormal tympanograms persistent today  The results of today's findings were reviewed with Mr Linda Elizabeth and his hearing thresholds were explained at length  Treatment options, including amplification and communication strategies, were discussed as appropriate  Mr Linda Elizabeth voiced understanding of his test results and had no further questions  RECOMMENDATIONS:    1 ) Follow-up with referring provider  2 ) Referral to ENT recommended for consultation for bilateral CHL  It was a pleasure working with Mr Linda Elizabeth today  Thank you for referring this patient  Nolan Galo    Clinical Audiologist    [id number] [place] [phone]

## 2023-06-12 ENCOUNTER — HOSPITAL ENCOUNTER (OUTPATIENT)
Dept: RADIOLOGY | Facility: HOSPITAL | Age: 73
Discharge: HOME | End: 2023-06-12
Payer: MEDICARE

## 2023-06-12 DIAGNOSIS — H90.6 MIXED CONDUCTIVE AND SENSORINEURAL HEARING LOSS OF BOTH EARS: ICD-10-CM

## 2023-06-12 PROCEDURE — 70480 CT ORBIT/EAR/FOSSA W/O DYE: CPT

## 2023-06-12 PROCEDURE — G1004 CDSM NDSC: HCPCS

## 2023-08-17 ENCOUNTER — APPOINTMENT (OUTPATIENT)
Dept: RADIOLOGY | Facility: CLINIC | Age: 73
End: 2023-08-17
Payer: MEDICARE

## 2023-08-17 ENCOUNTER — OFFICE VISIT (OUTPATIENT)
Dept: OBGYN CLINIC | Facility: CLINIC | Age: 73
End: 2023-08-17
Payer: MEDICARE

## 2023-08-17 VITALS
WEIGHT: 229 LBS | HEART RATE: 78 BPM | TEMPERATURE: 98.2 F | HEIGHT: 67 IN | SYSTOLIC BLOOD PRESSURE: 150 MMHG | DIASTOLIC BLOOD PRESSURE: 78 MMHG | BODY MASS INDEX: 35.94 KG/M2

## 2023-08-17 DIAGNOSIS — M25.561 PAIN IN BOTH KNEES, UNSPECIFIED CHRONICITY: ICD-10-CM

## 2023-08-17 DIAGNOSIS — G89.29 CHRONIC PAIN OF BOTH KNEES: ICD-10-CM

## 2023-08-17 DIAGNOSIS — M25.562 CHRONIC PAIN OF BOTH KNEES: ICD-10-CM

## 2023-08-17 DIAGNOSIS — M25.562 PAIN IN BOTH KNEES, UNSPECIFIED CHRONICITY: ICD-10-CM

## 2023-08-17 DIAGNOSIS — M25.561 CHRONIC PAIN OF BOTH KNEES: ICD-10-CM

## 2023-08-17 DIAGNOSIS — M17.0 PRIMARY OSTEOARTHRITIS OF BOTH KNEES: Primary | ICD-10-CM

## 2023-08-17 PROCEDURE — 73562 X-RAY EXAM OF KNEE 3: CPT

## 2023-08-17 PROCEDURE — 99204 OFFICE O/P NEW MOD 45 MIN: CPT | Performed by: ORTHOPAEDIC SURGERY

## 2023-08-17 NOTE — PROGRESS NOTES
Assessment/Plan:  1. Primary osteoarthritis of both knees        2. Chronic pain of both knees  XR knee 3 vw left non injury    XR knee 3 vw right non injury        Scribe Attestation    I,:  Marlen Floyd am acting as a scribe while in the presence of the attending physician.:       I,:  Fani Wang, DO personally performed the services described in this documentation    as scribed in my presence.:         Zeynep Hampton is a pleasant 69-year-old gentleman who presents today for initial evaluation of his bilateral knee pain. After reviewing his imaging and performing a thorough history and physical exam I explained that he is symptomatic of his end-stage underlying osteoarthritis. His disease is quite advanced. We discussed treatment options today and I recommended against continued chiropractic care as this is likely to exacerbate his symptoms. I explained that he would benefit from total knee arthroplasty based on the progression of his disease. He does believe he would like to pursue this in the new year. I did offer to perform a corticosteroid injection to help mitigate his pain until he is ready for surgery. He declined this. He will return to the office in 2 to 3 months for preoperative examination and surgical consent for robotic assisted left total knee arthroplasty. All of his questions and concerns were addressed today. Subjective: Initial evaluation for bilateral knee pain    Patient ID: Beevrley Osborn is a 71 y.o. male who presents today for initial evaluation of his bilateral knee pain. At today's visit, he complains of worsening pain about his knees over the last few years. He describes diffuse aching pain about his bilateral knees that is worse on the left over the right. His pain can reach 9/10 on the left and 4/10 on the right depending on his level of activity. He does find navigating stairs particularly painful.   He uses topical Voltaren and over-the-counter oral medications with minimal benefit. He also sees his chiropractor for his knees. He has not had injection therapy in his knees. Of note, he does have a history of right ACL and MCL injury in 1991 which was treated conservatively. He denies any recent injury or trauma. Review of Systems   Constitutional: Positive for activity change. Negative for chills, fever and unexpected weight change. HENT: Negative for hearing loss, nosebleeds and sore throat. Eyes: Negative for pain, redness and visual disturbance. Respiratory: Negative for cough, shortness of breath and wheezing. Cardiovascular: Negative for chest pain, palpitations and leg swelling. Gastrointestinal: Negative for abdominal pain, nausea and vomiting. Endocrine: Negative for polyphagia and polyuria. Genitourinary: Negative for dysuria and hematuria. Musculoskeletal: Positive for arthralgias and myalgias. Negative for joint swelling. See HPI   Skin: Negative for rash and wound. Neurological: Negative for dizziness, numbness and headaches. Psychiatric/Behavioral: Negative for decreased concentration and suicidal ideas. The patient is not nervous/anxious.           Past Medical History:   Diagnosis Date   • Arthritis    • BPH (benign prostatic hyperplasia)    • Breathing difficulty 12/01/2022    CPAP given per pt-s/p surgery   • Cancer ([address]) 2015    basal cell of the skull    • Colon polyp    • Heart failure ([address])     12/2/22-pt had ECHO   • History of subdural hematoma    • Kidney stone     right stone   • Renal disorder    • Sleep apnea     mild- no CPAP-had nasal septoplasty       Past Surgical History:   Procedure Laterality Date   • BASAL CELL CARCINOMA EXCISION  2015    skull   • BRAIN SURGERY      in the 1990's-subdural hematoma   • CATARACT EXTRACTION Left    • COLONOSCOPY      x2   • CYSTOSCOPY W/ LASER LITHOTRIPSY Right 11/27/2020    Procedure: CYSTOSCOPY, FLEXIBLE URETEROSCOPY, LASER, AND STENT EXCHANGE,STONE BASKET, RIGHT RETROGRADE;  Surgeon: Stone Longo MD;  Location: [facility];  Service: Urology   • FL RETROGRADE PYELOGRAM  10/18/2020   • FL RETROGRADE PYELOGRAM  11/27/2020   • FL RETROGRADE PYELOGRAM  12/01/2022   • FL RETROGRADE PYELOGRAM  12/22/2022   • LITHOTRIPSY     • GA CYSTO BLADDER W/URETERAL CATHETERIZATION Right 11/11/2020    Procedure: ESWL RIGHT;  Surgeon: Stone Longo MD;  Location: [facility];  Service: Urology   • GA CYSTO BLADDER W/URETERAL CATHETERIZATION Right 12/01/2022    Procedure: CYSTOSCOPY RETROGRADE PYELOGRAM WITH INSERTION STENT URETERAL;  Surgeon: Stone Longo MD;  Location: [facility];  Service: Urology   • GA CYSTO/URETERO W/LITHOTRIPSY &INDWELL STENT INSRT Right 10/18/2020    Procedure: CYSTOSCOPY URETEROSCOPY WITH BASKET EXTRACTION, RETROGRADE PYELOGRAM AND INSERTION STENT URETERAL;  Surgeon: Amor Franks MD;  Location: [facility];  Service: Urology   • GA CYSTO/URETERO W/LITHOTRIPSY &INDWELL STENT INSRT Right 12/22/2022    Procedure: CYSTOSCOPY URETEROSCOPY WITH LITHOTRIPSY HOLMIUM LASER, RETROGRADE PYELOGRAM AND INSERTION STENT URETERAL;  Surgeon: Stone Longo MD;  Location: [facility];  Service: Urology   • SEPTOPLASTY      in the 1990's-trimmed uvula   • TONSILLECTOMY  1956    age 12       Family History   Problem Relation Age of Onset   • Hypertension Mother    • Kidney disease Mother         renal failure-dialysis   • Hypertension Father    • Stroke Father    • Diabetes Sister    • Kidney disease Sister         self dialysis at home       Social History     Occupational History   • Not on file   Tobacco Use   • Smoking status: Never   • Smokeless tobacco: Never   Vaping Use   • Vaping Use: Never used   Substance and Sexual Activity   • Alcohol use: Yes     Comment: a beer on the weekends   • Drug use: Never   • Sexual activity: Not on file         Current Outpatient Medications:   •  Ascorbic Acid (VITAMIN C PO), Take 1,000 mg by mouth 2 (two) times a day, Disp: , Rfl:   • b complex vitamins tablet, Take 1 tablet by mouth 2 (two) times a day, Disp: , Rfl:   •  BETA CAROTENE PO, Take by mouth 2 (two) times a day, Disp: , Rfl:   •  GLUCOSAMINE-CHONDROITIN PO, Take by mouth 2 (two) times a day, Disp: , Rfl:   •  potassium citrate (UROCIT-K) 10 mEq, Take 1 tablet (10 mEq total) by mouth 3 (three) times a day with meals, Disp: 90 tablet, Rfl: 3  •  TURMERIC PO, Take 50 mg by mouth in the morning, Disp: , Rfl:   •  VITAMIN D PO, Take by mouth every morning, Disp: , Rfl:   •  VITAMIN E PO, Take 268 mg by mouth daily, Disp: , Rfl:   •  azelastine (ASTELIN) 0.1 % nasal spray, 1 spray into each nostril 2 (two) times a day Use in each nostril as directed (Patient not taking: Reported on 8/17/2023), Disp: 1 mL, Rfl: 2  •  tamsulosin (FLOMAX) 0.4 mg, Take 1 capsule (0.4 mg total) by mouth daily with dinner (Patient not taking: Reported on 4/26/2023), Disp: 30 capsule, Rfl: 5    Allergies   Allergen Reactions   • Bean Pod Extract - Food Allergy Nausea Only     Soft beans- eg chick peas, lima's, sweet peas       Objective:  Vitals:    08/17/23 1013   BP: 150/78   Pulse: 78   Temp: 98.2 °F (36.8 °C)       Body mass index is 36.41 kg/m². Right Knee Exam     Tenderness   The patient is experiencing tenderness in the medial joint line and patella. Range of Motion   Extension: 0   Flexion: 120     Tests   Varus: negative Valgus: negative  Drawer:  Anterior - negative    Posterior - negative    Other   Erythema: absent  Scars: absent  Sensation: normal  Pulse: present  Swelling: none  Effusion: no effusion present    Comments:  5-7 degree varus deformity passively correctable  Stable at 0, 30 and 90 degrees  Neurovascularly in tact distally  No warmth or erythema  Parapatellar crepitance noted  Patellofemoral grind: positive      Left Knee Exam     Tenderness   The patient is experiencing tenderness in the medial joint line and patella.     Range of Motion   Extension: 0   Flexion: 120     Tests Varus: negative Valgus: negative  Drawer:  Anterior - negative     Posterior - negative    Other   Erythema: absent  Scars: absent  Sensation: normal  Pulse: present  Swelling: none  Effusion: no effusion present    Comments:  5-7 degree varus deformity passively correctable  Stable at 0, 30 and 90 degrees  Neurovascularly in tact distally  No warmth or erythema  Parapatellar crepitance noted  Patellofemoral grind: positive          Observations   Left Knee   Negative for effusion. Right Knee   Negative for effusion. Physical Exam  Vitals and nursing note reviewed. Constitutional:       Appearance: Normal appearance. He is well-developed. HENT:      Head: Normocephalic and atraumatic. Right Ear: External ear normal.      Left Ear: External ear normal.      Nose: Nose normal.   Eyes:      General: No scleral icterus. Extraocular Movements: Extraocular movements intact. Conjunctiva/sclera: Conjunctivae normal.   Cardiovascular:      Rate and Rhythm: Normal rate. Pulmonary:      Effort: Pulmonary effort is normal. No respiratory distress. Musculoskeletal:      Cervical back: Normal range of motion and neck supple. Right knee: No effusion. Left knee: No effusion. Comments: See Ortho exam   Skin:     General: Skin is warm and dry. Neurological:      General: No focal deficit present. Mental Status: He is alert and oriented to person, place, and time. Psychiatric:         Mood and Affect: Mood normal.         Behavior: Behavior normal.         I have personally reviewed pertinent films in PACS. Bilateral knee x-rays obtained on 8/17/2023 reviewed demonstrating end stage degenerative change in a varus pattern with medial bone on bone contact. There is tricompartmental sclerosis and advanced osteophytosis. There is no acute fracture, dislocation, lytic or blastic lesions in either knee. There is evidence of old MCL injury in the right knee.      This document was created using speech voice recognition software. Grammatical errors, random word insertions, pronoun errors, and incomplete sentences are an occasional consequence of this system due to software limitations, ambient noise, and hardware issues. Any formal questions or concerns about content, text, or information contained within the body of this dictation should be directly addressed to the provider for clarification.

## 2023-09-06 ENCOUNTER — HOSPITAL ENCOUNTER (OUTPATIENT)
Dept: RADIOLOGY | Facility: HOSPITAL | Age: 73
Discharge: HOME | End: 2023-09-06
Payer: MEDICARE

## 2023-09-06 DIAGNOSIS — N20.0 URIC ACID NEPHROLITHIASIS: ICD-10-CM

## 2023-09-06 PROCEDURE — 74018 RADEX ABDOMEN 1 VIEW: CPT

## 2023-10-27 ENCOUNTER — OFFICE VISIT (OUTPATIENT)
Dept: OBGYN CLINIC | Facility: CLINIC | Age: 73
End: 2023-10-27
Payer: MEDICARE

## 2023-10-27 VITALS
WEIGHT: 230 LBS | HEART RATE: 109 BPM | HEIGHT: 67 IN | DIASTOLIC BLOOD PRESSURE: 88 MMHG | SYSTOLIC BLOOD PRESSURE: 131 MMHG | BODY MASS INDEX: 36.1 KG/M2

## 2023-10-27 DIAGNOSIS — Z01.818 PRE-OPERATIVE EXAMINATION: ICD-10-CM

## 2023-10-27 DIAGNOSIS — N18.31 STAGE 3A CHRONIC KIDNEY DISEASE (HCC): ICD-10-CM

## 2023-10-27 DIAGNOSIS — J96.01 ACUTE RESPIRATORY FAILURE WITH HYPOXIA (HCC): ICD-10-CM

## 2023-10-27 DIAGNOSIS — Q62.11 HYDRONEPHROSIS WITH URETEROPELVIC JUNCTION (UPJ) OBSTRUCTION: ICD-10-CM

## 2023-10-27 DIAGNOSIS — M25.561 CHRONIC PAIN OF BOTH KNEES: ICD-10-CM

## 2023-10-27 DIAGNOSIS — I10 PRIMARY HYPERTENSION: ICD-10-CM

## 2023-10-27 DIAGNOSIS — G89.29 CHRONIC PAIN OF BOTH KNEES: ICD-10-CM

## 2023-10-27 DIAGNOSIS — M17.0 PRIMARY OSTEOARTHRITIS OF BOTH KNEES: Primary | ICD-10-CM

## 2023-10-27 DIAGNOSIS — M25.562 CHRONIC PAIN OF BOTH KNEES: ICD-10-CM

## 2023-10-27 PROCEDURE — 99215 OFFICE O/P EST HI 40 MIN: CPT | Performed by: ORTHOPAEDIC SURGERY

## 2023-10-27 RX ORDER — ACETAMINOPHEN 325 MG/1
975 TABLET ORAL ONCE
OUTPATIENT
Start: 2023-10-27 | End: 2023-10-27

## 2023-10-27 RX ORDER — ZINC SULFATE 50(220)MG
220 CAPSULE ORAL DAILY
Qty: 30 CAPSULE | Refills: 0 | Status: SHIPPED | OUTPATIENT
Start: 2023-10-27

## 2023-10-27 RX ORDER — CHLORHEXIDINE GLUCONATE ORAL RINSE 1.2 MG/ML
15 SOLUTION DENTAL ONCE
OUTPATIENT
Start: 2023-10-27 | End: 2023-10-27

## 2023-10-27 RX ORDER — FOLIC ACID 1 MG/1
1 TABLET ORAL DAILY
Qty: 30 TABLET | Refills: 0 | Status: SHIPPED | OUTPATIENT
Start: 2023-10-27 | End: 2023-11-02

## 2023-10-27 RX ORDER — GABAPENTIN 300 MG/1
300 CAPSULE ORAL ONCE
OUTPATIENT
Start: 2023-10-27 | End: 2023-10-27

## 2023-10-27 RX ORDER — FERROUS SULFATE 324(65)MG
324 TABLET, DELAYED RELEASE (ENTERIC COATED) ORAL
Qty: 30 TABLET | Refills: 0 | Status: SHIPPED | OUTPATIENT
Start: 2023-10-27

## 2023-10-27 NOTE — PROGRESS NOTES
Assessment/Plan:  1. Primary osteoarthritis of both knees  zinc sulfate (ZINCATE) 220 mg capsule    folic acid (FOLVITE) 1 mg tablet    ferrous sulfate 324 (65 Fe) mg    Case request operating room: ARTHROPLASTY KNEE TOTAL W ROBOT - overnight, cemented    Comprehensive metabolic panel    Hemoglobin A1C W/EAG Estimation    CBC and differential    Protime-INR    APTT    MRSA culture    Ambulatory referral to [facility]    Ambulatory referral to Physical Therapy    EKG 12 lead    Ambulatory referral to Cardiology    Ambulatory referral to Nephrology    Case request operating room: ARTHROPLASTY KNEE TOTAL W ROBOT - overnight, cemented      2. Chronic pain of both knees  zinc sulfate (ZINCATE) 220 mg capsule    folic acid (FOLVITE) 1 mg tablet    ferrous sulfate 324 (65 Fe) mg    Comprehensive metabolic panel    Hemoglobin A1C W/EAG Estimation    CBC and differential    Protime-INR    APTT    MRSA culture    Ambulatory referral to [facility]    Ambulatory referral to Physical Therapy    EKG 12 lead    Ambulatory referral to Cardiology    Ambulatory referral to Nephrology      3. Stage 3a chronic kidney disease [facility])  Ambulatory referral to [facility]    Ambulatory referral to Cardiology    Ambulatory referral to Nephrology      4. Hydronephrosis with ureteropelvic junction (UPJ) obstruction  Ambulatory referral to [facility]    Ambulatory referral to Cardiology    Ambulatory referral to Nephrology      5. Primary hypertension  Ambulatory referral to [facility]    Ambulatory referral to Cardiology    Ambulatory referral to Nephrology      6. Acute respiratory failure with hypoxia [facility])  Ambulatory referral to [facility]    Ambulatory referral to Cardiology    Ambulatory referral to Nephrology      7.  Pre-operative examination  Comprehensive metabolic panel    Hemoglobin A1C W/EAG Estimation    CBC and differential    Protime-INR    APTT    MRSA culture    Ambulatory referral to [facility]    Ambulatory referral to Physical Therapy    EKG 12 lead    Ambulatory referral to Cardiology    Ambulatory referral to Nephrology        Scribe Attestation      I,:  Trudi Rico PA-C am acting as a scribe while in the presence of the attending physician.:       I,:  Ileana Barbour, DO personally performed the services described in this documentation    as scribed in my presence.:           Shawn Galarza is a pleasant 45-year-old gentleman presenting today for follow-up of his activity related bilateral knee pain with known end-stage underlying osteoarthritis and varus deformities. His left knee continues to bother him more than his right and is severely limiting his ability to perform activities of daily living and enjoyment. He has failed all forms of conservative treatment and is a candidate for surgery. Risks of surgery including but not limited to bleeding, infection, stiffness, persistent limp, persistent pain, damage to vessels or nerves, need for further surgery, blood clots, heart attack, stroke, and death were discussed. Consents were signed and placed in the chart for a robotic assisted left total knee arthroplasty. Due to to his medical comorbidities and complexity of his surgery, we will utilize cemented implants and keep him overnight for observation. He does live alone, but states that he will be able to get rides to outpatient physical therapy postoperatively. He denies any history of MRSA infection, malignancy, DVT or PE, gastric ulcer or GI bleed, smoking or use of nicotine products. He does use tumeric, but understands that he needs to discontinue this 30 days before surgery. He will need clearance from his primary care physician, cardiologist and nephrologist prior to the intended procedure. He was introduced today to our surgical scheduler for further planning, and we look forward to taking care of him in the near future.   All questions addressed    Subjective: Bilateral knee follow-up    Patient ID: Vianey Davis is a 71 y.o. male known to our practice for his bilateral knee pain with end-stage underlying osteoarthritis. He is now ready to consider surgery. He would like to pursue his left knee first since this is more symptomatic for him. He denies any new injuries from his previous visit, but continues to be limited in his ability to ambulate or perform activities of daily living or enjoyment    Review of Systems   Constitutional:  Positive for activity change. HENT: Negative. Eyes: Negative. Respiratory: Negative. Cardiovascular: Negative. Gastrointestinal: Negative. Endocrine: Negative. Genitourinary: Negative. Musculoskeletal:  Positive for arthralgias, gait problem, joint swelling and myalgias. Skin: Negative. Allergic/Immunologic: Negative. Hematological: Negative. Psychiatric/Behavioral: Negative.            Past Medical History:   Diagnosis Date    Arthritis     BPH (benign prostatic hyperplasia)     Breathing difficulty 12/01/2022    CPAP given per pt-s/p surgery    Cancer ([address]) 2015    basal cell of the skull     Colon polyp     Heart failure ([address])     12/2/22-pt had ECHO    History of subdural hematoma     Kidney stone     right stone    Renal disorder     Sleep apnea     mild- no CPAP-had nasal septoplasty       Past Surgical History:   Procedure Laterality Date    BASAL CELL CARCINOMA EXCISION  2015    skull    BRAIN SURGERY      in the 1990's-subdural hematoma    CATARACT EXTRACTION Left     COLONOSCOPY      x2    CYSTOSCOPY W/ LASER LITHOTRIPSY Right 11/27/2020    Procedure: CYSTOSCOPY, FLEXIBLE URETEROSCOPY, LASER, AND STENT [address], RIGHT RETROGRADE;  Surgeon: Kb Ferrer MD;  Location: [facility];  Service: Urology    FL RETROGRADE PYELOGRAM  10/18/2020    FL RETROGRADE PYELOGRAM  11/27/2020    FL RETROGRADE PYELOGRAM  12/01/2022    FL RETROGRADE PYELOGRAM  12/22/2022    LITHOTRIPSY      TN CYSTO BLADDER W/URETERAL CATHETERIZATION Right 11/11/2020    Procedure: ESWL RIGHT;  Surgeon: Jhonathan Wood MD;  Location: [facility];  Service: Urology    ME CYSTO BLADDER Zeny Sicard CATHETERIZATION Right 12/01/2022    Procedure: CYSTOSCOPY RETROGRADE PYELOGRAM WITH INSERTION STENT URETERAL;  Surgeon: Johnathan Wood MD;  Location: [facility];  Service: Urology    ME CYSTO/URETERO W/LITHOTRIPSY &INDWELL STENT INSRT Right 10/18/2020    Procedure: CYSTOSCOPY URETEROSCOPY WITH BASKET EXTRACTION, RETROGRADE PYELOGRAM AND INSERTION STENT URETERAL;  Surgeon: Bianca Cabrera MD;  Location: [facility];  Service: Urology    ME CYSTO/URETERO W/LITHOTRIPSY &INDWELL STENT INSRT Right 12/22/2022    Procedure: CYSTOSCOPY URETEROSCOPY WITH LITHOTRIPSY HOLMIUM LASER, RETROGRADE PYELOGRAM AND INSERTION STENT URETERAL;  Surgeon: Johnathan Wood MD;  Location: [facility];  Service: Urology    SEPTOPLASTY      in the 1990's-trimmed uvula    TONSILLECTOMY  1956    age 12       Family History   Problem Relation Age of Onset    Hypertension Mother     Kidney disease Mother         renal failure-dialysis    Hypertension Father     Stroke Father     Diabetes Sister     Kidney disease Sister         self dialysis at home       Social History     Occupational History    Not on file   Tobacco Use    Smoking status: Never    Smokeless tobacco: Never   Vaping Use    Vaping Use: Never used   Substance and Sexual Activity    Alcohol use: Yes     Comment: a beer on the weekends    Drug use: Never    Sexual activity: Not on file         Current Outpatient Medications:     Ascorbic Acid (VITAMIN C PO), Take 1,000 mg by mouth 2 (two) times a day, Disp: , Rfl:     b complex vitamins tablet, Take 1 tablet by mouth 2 (two) times a day, Disp: , Rfl:     BETA CAROTENE PO, Take by mouth 2 (two) times a day, Disp: , Rfl:     ferrous sulfate 324 (65 Fe) mg, Take 1 tablet (324 mg total) by mouth daily before breakfast, Disp: 30 tablet, Rfl: 0    folic acid (FOLVITE) 1 mg tablet, Take 1 tablet (1 mg total) by mouth daily, Disp: 30 tablet, Rfl: 0    GLUCOSAMINE-CHONDROITIN PO, Take by mouth 2 (two) times a day, Disp: , Rfl:     potassium citrate (UROCIT-K) 10 mEq, Take 1 tablet (10 mEq total) by mouth 3 (three) times a day with meals, Disp: 90 tablet, Rfl: 3    TURMERIC PO, Take 50 mg by mouth in the morning, Disp: , Rfl:     VITAMIN D PO, Take by mouth every morning, Disp: , Rfl:     VITAMIN E PO, Take 268 mg by mouth daily, Disp: , Rfl:     zinc sulfate (ZINCATE) 220 mg capsule, Take 1 capsule (220 mg total) by mouth daily, Disp: 30 capsule, Rfl: 0    azelastine (ASTELIN) 0.1 % nasal spray, 1 spray into each nostril 2 (two) times a day Use in each nostril as directed (Patient not taking: Reported on 8/17/2023), Disp: 1 mL, Rfl: 2    tamsulosin (FLOMAX) 0.4 mg, Take 1 capsule (0.4 mg total) by mouth daily with dinner (Patient not taking: Reported on 4/26/2023), Disp: 30 capsule, Rfl: 5    Allergies   Allergen Reactions    Bean Pod Extract - Food Allergy Nausea Only     Soft beans- eg chick peas, lima's, sweet peas       Objective:  Vitals:    10/27/23 0929   BP: 131/88   Pulse: (!) 109       Body mass index is 36.57 kg/m². Right Knee Exam     Muscle Strength   The patient has normal right knee strength. Tenderness   The patient is experiencing tenderness in the medial joint line and patella.     Range of Motion   Extension:  0 normal   Flexion:  120 normal     Tests   Varus: negative Valgus: negative  Drawer:  Anterior - negative    Posterior - negative  Patellar apprehension: negative    Other   Erythema: absent  Scars: absent  Sensation: normal  Pulse: present  Swelling: none  Effusion: no effusion present    Comments:  5-7 degree varus deformity passively correctable  Stable at 0, 30 and 90 degrees  Neurovascularly in tact distally  No warmth or erythema  Parapatellar crepitance noted  Patellofemoral grind: positive      Left Knee Exam     Muscle Strength The patient has normal left knee strength. Tenderness   The patient is experiencing tenderness in the medial joint line and patella. Range of Motion   Extension:  0 normal   Flexion:  120 normal     Tests   Varus: negative Valgus: negative  Drawer:  Anterior - negative     Posterior - negative  Patellar apprehension: negative    Other   Erythema: absent  Scars: absent  Sensation: normal  Pulse: present  Swelling: none  Effusion: no effusion present    Comments:  5-7 degree varus deformity passively correctable  Stable at 0, 30 and 90 degrees  Neurovascularly in tact distally  No warmth or erythema  Parapatellar crepitance noted  Patellofemoral grind: positive          Observations   Left Knee   Negative for effusion. Right Knee   Negative for effusion. Physical Exam  Vitals and nursing note reviewed. Constitutional:       Appearance: Normal appearance. He is well-developed. Comments: Body mass index is 36.57 kg/m². HENT:      Head: Normocephalic and atraumatic. Right Ear: External ear normal.      Left Ear: External ear normal.      Ears:      Comments: Hard of hearing     Nose: Nose normal.      Mouth/Throat:      Mouth: Mucous membranes are moist.   Eyes:      Extraocular Movements: Extraocular movements intact. Conjunctiva/sclera: Conjunctivae normal.   Cardiovascular:      Rate and Rhythm: Normal rate. Pulses: Normal pulses. Pulmonary:      Effort: Pulmonary effort is normal.   Abdominal:      Palpations: Abdomen is soft. Musculoskeletal:      Cervical back: Normal range of motion. Right knee: No effusion. Left knee: No effusion. Comments: See ortho exam   Skin:     General: Skin is warm and dry. Neurological:      General: No focal deficit present. Mental Status: He is alert and oriented to person, place, and time. Mental status is at baseline.    Psychiatric:         Mood and Affect: Mood normal.         Behavior: Behavior normal. Thought Content: Thought content normal.         Judgment: Judgment normal.       I have personally reviewed pertinent films in PACS of the previously taken weightbearing mag marker x-rays of both knees which demonstrate severe end-stage degenerative changes with bone-on-bone appearance of the medial compartments with varus deformities. He has massive tricompartmental osteophytosis and significant sclerosis of all 3 compartments. It does appear that there is subchondral cyst formation, but no fracture, secretion, or lytic blastic lesion    This document was created using speech voice recognition software. Grammatical errors, random word insertions, pronoun errors, and incomplete sentences are an occasional consequence of this system due to software limitations, ambient noise, and hardware issues. Any formal questions or concerns about content, text, or information contained within the body of this dictation should be directly addressed to the provider for clarification.

## 2023-11-02 ENCOUNTER — OFFICE VISIT (OUTPATIENT)
Dept: FAMILY MEDICINE CLINIC | Facility: CLINIC | Age: 73
End: 2023-11-02
Payer: MEDICARE

## 2023-11-02 VITALS
HEART RATE: 100 BPM | OXYGEN SATURATION: 96 % | HEIGHT: 67 IN | DIASTOLIC BLOOD PRESSURE: 70 MMHG | RESPIRATION RATE: 18 BRPM | SYSTOLIC BLOOD PRESSURE: 130 MMHG | WEIGHT: 229.4 LBS | TEMPERATURE: 97.8 F | BODY MASS INDEX: 36 KG/M2

## 2023-11-02 DIAGNOSIS — E66.01 SEVERE OBESITY (BMI 35.0-39.9) WITH COMORBIDITY (HCC): ICD-10-CM

## 2023-11-02 DIAGNOSIS — Z01.818 PRE-OPERATIVE EXAMINATION: ICD-10-CM

## 2023-11-02 DIAGNOSIS — I10 PRIMARY HYPERTENSION: ICD-10-CM

## 2023-11-02 DIAGNOSIS — Z13.6 SCREENING FOR CARDIOVASCULAR CONDITION: ICD-10-CM

## 2023-11-02 DIAGNOSIS — E79.0 HYPERURICEMIA: ICD-10-CM

## 2023-11-02 DIAGNOSIS — Z12.11 SCREEN FOR COLON CANCER: ICD-10-CM

## 2023-11-02 DIAGNOSIS — Z00.00 MEDICARE ANNUAL WELLNESS VISIT, INITIAL: Primary | ICD-10-CM

## 2023-11-02 DIAGNOSIS — N18.31 STAGE 3A CHRONIC KIDNEY DISEASE (HCC): ICD-10-CM

## 2023-11-02 DIAGNOSIS — Z11.59 NEED FOR HEPATITIS C SCREENING TEST: ICD-10-CM

## 2023-11-02 DIAGNOSIS — E87.5 HYPERKALEMIA: ICD-10-CM

## 2023-11-02 PROBLEM — M17.0 PRIMARY OSTEOARTHRITIS OF BOTH KNEES: Status: ACTIVE | Noted: 2023-11-02

## 2023-11-02 PROBLEM — K64.8 OTHER HEMORRHOIDS: Status: ACTIVE | Noted: 2023-11-02

## 2023-11-02 PROBLEM — R82.998 HYPERURICURIA: Status: RESOLVED | Noted: 2023-04-25 | Resolved: 2023-11-02

## 2023-11-02 PROBLEM — J96.01 ACUTE RESPIRATORY FAILURE WITH HYPOXIA (HCC): Status: RESOLVED | Noted: 2022-12-02 | Resolved: 2023-11-02

## 2023-11-02 PROBLEM — N17.9 ACUTE KIDNEY INJURY (HCC): Status: RESOLVED | Noted: 2020-10-18 | Resolved: 2023-11-02

## 2023-11-02 PROCEDURE — G0438 PPPS, INITIAL VISIT: HCPCS | Performed by: FAMILY MEDICINE

## 2023-11-02 NOTE — PROGRESS NOTES
Assessment and Plan:     Problem List Items Addressed This Visit        Cardiovascular and Mediastinum    Hypertension     stable            Genitourinary    Stage 3a chronic kidney disease ([address] )       Other    Hyperkalemia    Hyperuricemia   Other Visit Diagnoses     Medicare annual wellness visit, initial    -  Primary    Pre-operative examination        Need for hepatitis C screening test        Relevant Orders    Hepatitis C antibody    Screening for cardiovascular condition        Relevant Orders    Lipid Panel with Direct LDL reflex    Screen for colon cancer        Relevant Orders    Ambulatory Referral to Gastroenterology    Severe obesity (BMI 35.0-39. 9) with comorbidity ([address] [facility])        BMI 35.0-35.9,adult              Depression Screening and Follow-up Plan: Patient was screened for depression during today's encounter. They screened negative with a PHQ-2 score of 0. Preventive health issues were discussed with patient, and age appropriate screening tests were ordered as noted in patient's After Visit Summary. Personalized health advice and appropriate referrals for health education or preventive services given if needed, as noted in patient's After Visit Summary. History of Present Illness:     Patient presents for a Medicare Wellness Visit    Pt is here for the first time to establish  Is due for an initial AWV  [facility]    Pt states he is on step one of a six month process. States in [facility] he will need a pre op he will be getting both knees replaced. Pt states he has one good kidney - states he was told his left kidney is only runtioning at 5% and the rt seems to be working fine  Pt has a nephrologist    Last PSA just done 2.3 - pt states is nolrmal for him    Pt states his cholesterol will normall run 210 and he states that is normal for that.   Pt states he is not going to take naything for lipids       Patient Care Team:  Ronald Fritz, DO as PCP - General (Family Medicine)  Yeimy Cardoso PA-C as Physician Assistant (Nephrology)  Narcisa Martinez DO as Consulting Physician (Orthopedic Surgery)  Diego Goodwin as Nurse Practitioner (Otolaryngology)  Delphine Mccain MD as Consulting Physician (Urology)     Review of Systems:     Review of Systems   Constitutional:  Negative for activity change, appetite change, chills, diaphoresis, fatigue, fever and unexpected weight change. HENT:  Negative for congestion, dental problem, ear pain, mouth sores, sinus pressure, sinus pain, sore throat and trouble swallowing. Eyes:  Negative for photophobia, discharge and itching. Respiratory:  Negative for apnea, chest tightness and shortness of breath. Cardiovascular:  Negative for chest pain, palpitations and leg swelling. Gastrointestinal:  Negative for abdominal distention, abdominal pain, blood in stool, nausea and vomiting. Endocrine: Negative for cold intolerance, heat intolerance, polydipsia, polyphagia and polyuria. Genitourinary:  Negative for difficulty urinating. Musculoskeletal:  Positive for arthralgias. Skin:  Negative for color change and wound. Neurological:  Negative for dizziness, syncope, speech difficulty and headaches. Hematological:  Negative for adenopathy. Psychiatric/Behavioral:  Negative for agitation and behavioral problems.          Problem List:     Patient Active Problem List   Diagnosis   • Renal colic on right side   • Hyperkalemia   • Sleep apnea   • Hydronephrosis with ureteropelvic junction (UPJ) obstruction   • BPH (benign prostatic hyperplasia)   • Hypertension   • Hyperuricemia   • Stage 3a chronic kidney disease (HCC)   • Nephrolithiasis   • Obesity, morbid (HCC)   • Mixed conductive and sensorineural hearing loss, bilateral   • Primary osteoarthritis of both knees   • Other hemorrhoids      Past Medical and Surgical History:     Past Medical History:   Diagnosis Date   • Arthritis    • BPH (benign prostatic hyperplasia)    • Breathing difficulty 12/01/2022    CPAP given per pt-s/p surgery   • Cancer ([address]) 2015    basal cell of the skull    • Colon polyp    • Heart failure ([address])     12/2/22-pt had ECHO   • History of subdural hematoma    • Kidney stone     right stone   • Renal disorder    • Sleep apnea     mild- no CPAP-had nasal septoplasty     Past Surgical History:   Procedure Laterality Date   • BASAL CELL CARCINOMA EXCISION  2015    skull   • BRAIN SURGERY      in the 1990's-subdural hematoma   • CATARACT EXTRACTION Left    • COLONOSCOPY      x2   • CYSTOSCOPY W/ LASER LITHOTRIPSY Right 11/27/2020    Procedure: CYSTOSCOPY, FLEXIBLE URETEROSCOPY, LASER, AND STENT [address], RIGHT RETROGRADE;  Surgeon: Ly Patton MD;  Location: [facility];  Service: Urology   • FL RETROGRADE PYELOGRAM  10/18/2020   • FL RETROGRADE PYELOGRAM  11/27/2020   • FL RETROGRADE PYELOGRAM  12/01/2022   • FL RETROGRADE PYELOGRAM  12/22/2022   • LITHOTRIPSY     • AK CYSTO BLADDER W/URETERAL CATHETERIZATION Right 11/11/2020    Procedure: ESWL RIGHT;  Surgeon: Ly Patton MD;  Location: [facility];  Service: Urology   • AK CYSTO BLADDER W/URETERAL CATHETERIZATION Right 12/01/2022    Procedure: CYSTOSCOPY RETROGRADE PYELOGRAM WITH INSERTION STENT URETERAL;  Surgeon: Ly Patton MD;  Location: [facility];  Service: Urology   • AK CYSTO/URETERO W/LITHOTRIPSY &INDWELL STENT INSRT Right 10/18/2020    Procedure: CYSTOSCOPY URETEROSCOPY WITH BASKET EXTRACTION, RETROGRADE PYELOGRAM AND INSERTION STENT URETERAL;  Surgeon: Nathan Lopez MD;  Location: [facility];  Service: Urology   • AK CYSTO/URETERO W/LITHOTRIPSY &INDWELL STENT INSRT Right 12/22/2022    Procedure: CYSTOSCOPY URETEROSCOPY WITH LITHOTRIPSY HOLMIUM LASER, RETROGRADE PYELOGRAM AND INSERTION STENT URETERAL;  Surgeon: Ly Patton MD;  Location: [facility];  Service: Urology   • SEPTOPLASTY      in the 1990's-trimmed uvula   • TONSILLECTOMY  1956    age 12 Family History:     Family History   Problem Relation Age of Onset   • Hypertension Mother    • Kidney disease Mother         renal failure-dialysis   • Hypertension Father    • Stroke Father    • Diabetes Sister    • Kidney disease Sister         self dialysis at home      Social History:     Social History     Socioeconomic History   • Marital status:      Spouse name: None   • Number of children: None   • Years of education: None   • Highest education level: None   Occupational History   • None   Tobacco Use   • Smoking status: Never   • Smokeless tobacco: Never   Vaping Use   • Vaping Use: Never used   Substance and Sexual Activity   • Alcohol use: Yes     Comment: a beer on the weekends   • Drug use: Never   • Sexual activity: None   Other Topics Concern   • None   Social History Narrative   • None     Social Determinants of Health     Financial Resource Strain: Low Risk  (11/2/2023)    Overall Financial Resource Strain (CARDIA)    • Difficulty of Paying Living Expenses: Not hard at all   Food Insecurity: No Food Insecurity (12/2/2022)    Hunger Vital Sign    • Worried About Running Out of Food in the Last Year: Never true    • Ran Out of Food in the Last Year: Never true   Transportation Needs: No Transportation Needs (11/2/2023)    PRAPARE - Transportation    • Lack of Transportation (Medical): No    • Lack of Transportation (Non-Medical):  No   Physical Activity: Not on file   Stress: Not on file   Social Connections: Not on file   Intimate Partner Violence: Not on file   Housing Stability: Low Risk  (12/2/2022)    Housing Stability Vital Sign    • Unable to Pay for Housing in the Last Year: No    • Number of Places Lived in the Last Year: 1    • Unstable Housing in the Last Year: No      Medications and Allergies:     Current Outpatient Medications   Medication Sig Dispense Refill   • Ascorbic Acid (VITAMIN C PO) Take 1,000 mg by mouth 2 (two) times a day     • b complex vitamins tablet Take 1 tablet by mouth 2 (two) times a day     • BETA CAROTENE PO Take by mouth 2 (two) times a day     • ferrous sulfate 324 (65 Fe) mg Take 1 tablet (324 mg total) by mouth daily before breakfast 30 tablet 0   • GLUCOSAMINE-CHONDROITIN PO Take by mouth 2 (two) times a day     • potassium citrate (UROCIT-K) 10 mEq Take 1 tablet (10 mEq total) by mouth 3 (three) times a day with meals 90 tablet 3   • TURMERIC PO Take 50 mg by mouth in the morning     • VITAMIN D PO Take by mouth every morning     • VITAMIN E PO Take 268 mg by mouth daily     • zinc sulfate (ZINCATE) 220 mg capsule Take 1 capsule (220 mg total) by mouth daily 30 capsule 0     No current facility-administered medications for this visit. Allergies   Allergen Reactions   • Bean Pod Extract - Food Allergy Nausea Only     Soft beans- eg chick peas, lima's, sweet peas      Immunizations: There is no immunization history on file for this patient. Health Maintenance:         Topic Date Due   • Hepatitis C Screening  Never done   • Colorectal Cancer Screening  Never done     There are no preventive care reminders to display for this patient. Medicare Screening Tests and Risk Assessments:     Pedro Pablo Champagne is here for his Initial Wellness visit. Last Medicare Wellness visit information reviewed, patient interviewed, no change since last AWV. Health Risk Assessment:   Patient rates overall health as very good. Patient feels that their physical health rating is much better. Patient is very satisfied with their life. Eyesight was rated as slightly worse. Hearing was rated as much worse. Patient feels that their emotional and mental health rating is same. Patients states they are never, rarely angry. Patient states they are never, rarely unusually tired/fatigued. Pain experienced in the last 7 days has been some. Patient's pain rating has been 3/10. Patient states that he has experienced no weight loss or gain in last 6 months.      Depression Screening:   PHQ-2 Score: 0      Fall Risk Screening: In the past year, patient has experienced: no history of falling in past year      Home Safety:  Patient has trouble with stairs inside or outside of their home. Patient has working smoke alarms and has no working carbon monoxide detector. Home safety hazards include: none. Nutrition:   Current diet is Regular. Medications:   Patient is currently taking over-the-counter supplements. OTC medications include: see medication list. Patient is able to manage medications. Activities of Daily Living (ADLs)/Instrumental Activities of Daily Living (IADLs):   Walk and transfer into and out of bed and chair?: Yes  Dress and groom yourself?: Yes    Bathe or shower yourself?: Yes    Feed yourself?  Yes  Do your laundry/housekeeping?: Yes  Manage your money, pay your bills and track your expenses?: Yes  Make your own meals?: Yes    Do your own shopping?: Yes    Previous Hospitalizations:   Any hospitalizations or ED visits within the last 12 months?: Yes    How many hospitalizations have you had in the last year?: 1-2    Advance Care Planning:   Living will: No    Durable POA for healthcare: No    Advanced directive: No      Cognitive Screening:   Provider or family/friend/caregiver concerned regarding cognition?: No    PREVENTIVE SCREENINGS      Cardiovascular Screening:    General: Risks and Benefits Discussed    Due for: Lipid Panel      Diabetes Screening:     General: Screening Current and Risks and Benefits Discussed    Due for: Blood Glucose      Colorectal Cancer Screening:     General: Risks and Benefits Discussed    Due for: Colonoscopy - High Risk      Prostate Cancer Screening:    General: Risks and Benefits Discussed and Screening Current      Osteoporosis Screening:    General: Risks and Benefits Discussed and Patient Declines      Abdominal Aortic Aneurysm (AAA) Screening:    Risk factors include: age between 70-77 yo        General: Risks and Benefits Discussed and Screening Not Indicated      Lung Cancer Screening:     General: Screening Not Indicated      Hepatitis C Screening:    General: Risks and Benefits Discussed    Hep C Screening Accepted: Yes      Screening, Brief Intervention, and Referral to Treatment (SBIRT)    Screening  Typical number of drinks in a day: 0  Typical number of drinks in a week: 0  Interpretation: Low risk drinking behavior. Single Item Drug Screening:  How often have you used an illegal drug (including marijuana) or a prescription medication for non-medical reasons in the past year? never    Single Item Drug Screen Score: 0  Interpretation: Negative screen for possible drug use disorder    Brief Intervention  Alcohol & drug use screenings were reviewed. No concerns regarding substance use disorder identified. No results found. Physical Exam:     /70   Pulse 100   Temp 97.8 °F (36.6 °C) (Temporal)   Resp 18   Ht 5' 7" (1.702 m)   Wt 104 kg (229 lb 6.4 oz)   SpO2 96%   BMI 35.93 kg/m²     Physical Exam  Constitutional:       Appearance: He is well-developed. HENT:      Head: Normocephalic and atraumatic. Right Ear: External ear normal.      Left Ear: External ear normal.      Nose: Nose normal.   Eyes:      Conjunctiva/sclera: Conjunctivae normal.      Pupils: Pupils are equal, round, and reactive to light. Neck:      Thyroid: No thyromegaly. Cardiovascular:      Rate and Rhythm: Normal rate and regular rhythm. Heart sounds: Normal heart sounds. Pulmonary:      Effort: Pulmonary effort is normal.      Breath sounds: Normal breath sounds. No wheezing. Abdominal:      General: Bowel sounds are normal. There is no distension. Palpations: Abdomen is soft. There is no mass. Tenderness: There is no abdominal tenderness. There is no guarding. Musculoskeletal:         General: No tenderness. Normal range of motion. Cervical back: Normal range of motion and neck supple.    Skin:     General: Skin is warm and dry. Capillary Refill: Capillary refill takes less than 2 seconds. Findings: No erythema. Neurological:      Mental Status: He is alert and oriented to person, place, and time. Psychiatric:         Behavior: Behavior normal.         Thought Content: Thought content normal.         Judgment: Judgment normal.          Donaldo Krause DOBMI Counseling: Body mass index is 35.93 kg/m². The BMI is above normal. Nutrition recommendations include reducing portion sizes.

## 2023-11-02 NOTE — PATIENT INSTRUCTIONS
Medicare Preventive Visit Patient Instructions  Thank you for completing your Welcome to Medicare Visit or Medicare Annual Wellness Visit today. Your next wellness visit will be due in one year (11/2/2024). The screening/preventive services that you may require over the next 5-10 years are detailed below. Some tests may not apply to you based off risk factors and/or age. Screening tests ordered at today's visit but not completed yet may show as past due. Also, please note that scanned in results may not display below. Preventive Screenings:  Service Recommendations Previous Testing/Comments   Colorectal Cancer Screening  Colonoscopy    Fecal Occult Blood Test (FOBT)/Fecal Immunochemical Test (FIT)  Fecal DNA/Cologuard Test  Flexible Sigmoidoscopy Age: 43-73 years old   Colonoscopy: every 10 years (May be performed more frequently if at higher risk)  OR  FOBT/FIT: every 1 year  OR  Cologuard: every 3 years  OR  Sigmoidoscopy: every 5 years  Screening may be recommended earlier than age 39 if at higher risk for colorectal cancer. Also, an individualized decision between you and your healthcare provider will decide whether screening between the ages of 77-80 would be appropriate.  Colonoscopy: Not on file  FOBT/FIT: Not on file  Cologuard: Not on file  Sigmoidoscopy: Not on file          Prostate Cancer Screening Individualized decision between patient and health care provider in men between ages of 53-66   Medicare will cover every 12 months beginning on the day after your 50th birthday PSA: No results in last 5 years           Hepatitis C Screening Once for adults born between [address] [place] [address]  More frequently in patients at high risk for Hepatitis C Hep C Antibody: Not on file        Diabetes Screening 1-2 times per year if you're at risk for diabetes or have pre-diabetes Fasting glucose: No results in last 5 years (No results in last 5 years)  A1C: No results in last 5 years (No results in last 5 years)  Screening Current   Cholesterol Screening Once every 5 years if you don't have a lipid disorder. May order more often based on risk factors. Lipid panel: Not on file         Other Preventive Screenings Covered by Medicare:  Abdominal Aortic Aneurysm (AAA) Screening: covered once if your at risk. You're considered to be at risk if you have a family history of AAA or a male between the age of 70-76 who smoking at least 100 cigarettes in your lifetime. Lung Cancer Screening: covers low dose CT scan once per year if you meet all of the following conditions: (1) Age 48-67; (2) No signs or symptoms of lung cancer; (3) Current smoker or have quit smoking within the last 15 years; (4) You have a tobacco smoking history of at least 20 pack years (packs per day x number of years you smoked); (5) You get a written order from a healthcare provider. Glaucoma Screening: covered annually if you're considered high risk: (1) You have diabetes OR (2) Family history of glaucoma OR (3)  aged 48 and older OR (3)  American aged 72 and older  Osteoporosis Screening: covered every 2 years if you meet one of the following conditions: (1) Have a vertebral abnormality; (2) On glucocorticoid therapy for more than 3 months; (3) Have primary hyperparathyroidism; (4) On osteoporosis medications and need to assess response to drug therapy. HIV Screening: covered annually if you're between the age of 14-79. Also covered annually if you are younger than 13 and older than 72 with risk factors for HIV infection. For pregnant patients, it is covered up to 3 times per pregnancy.     Immunizations:  Immunization Recommendations   Influenza Vaccine Annual influenza vaccination during flu season is recommended for all persons aged >= 6 months who do not have contraindications   Pneumococcal Vaccine   * Pneumococcal conjugate vaccine = PCV13 (Prevnar 13), PCV15 (Vaxneuvance), PCV20 (Prevnar 20)  * Pneumococcal polysaccharide vaccine = PPSV23 (Pneumovax) Adults 53-19 yo with certain risk factors or if 69+ yo  If never received any pneumonia vaccine: recommend Prevnar 20 (PCV20)  Give PCV20 if previously received 1 dose of PCV13 or PPSV23   Hepatitis B Vaccine 3 dose series if at intermediate or high risk (ex: diabetes, end stage renal disease, liver disease)   Respiratory syncytial virus (RSV) Vaccine - COVERED BY MEDICARE PART D  * RSVPreF3 (Arexvy) CDC recommends that adults 61years of age and older may receive a single dose of RSV vaccine using shared clinical decision-making (SCDM)   Tetanus (Td) Vaccine - COST NOT COVERED BY MEDICARE PART B Following completion of primary series, a booster dose should be given every 10 years to maintain immunity against tetanus. Td may also be given as tetanus wound prophylaxis. Tdap Vaccine - COST NOT COVERED BY MEDICARE PART B Recommended at least once for all adults. For pregnant patients, recommended with each pregnancy. Shingles Vaccine (Shingrix) - COST NOT COVERED BY MEDICARE PART B  2 shot series recommended in those 19 years and older who have or will have weakened immune systems or those 50 years and older     Health Maintenance Due:      Topic Date Due   • Hepatitis C Screening  Never done   • Colorectal Cancer Screening  Never done     Immunizations Due:      Topic Date Due   • COVID-19 Vaccine (1) Never done   • Pneumococcal Vaccine: 65+ Years (1 - PCV) Never done   • Influenza Vaccine (1) Never done     Advance Directives   What are advance directives? Advance directives are legal documents that state your wishes and plans for medical care. These plans are made ahead of time in case you lose your ability to make decisions for yourself. Advance directives can apply to any medical decision, such as the treatments you want, and if you want to donate organs. What are the types of advance directives? There are many types of advance directives, and each state has rules about how to use them.  You may choose a combination of any of the following:  Living will: This is a written record of the treatment you want. You can also choose which treatments you do not want, which to limit, and which to stop at a certain time. This includes surgery, medicine, IV fluid, and tube feedings. Durable power of  for healthcare [facility]): This is a written record that states who you want to make healthcare choices for you when you are unable to make them for yourself. This person, called a proxy, is usually a family member or a friend. You may choose more than 1 proxy. Do not resuscitate (DNR) order:  A DNR order is used in case your heart stops beating or you stop breathing. It is a request not to have certain forms of treatment, such as CPR. A DNR order may be included in other types of advance directives. Medical directive: This covers the care that you want if you are in a coma, near death, or unable to make decisions for yourself. You can list the treatments you want for each condition. Treatment may include pain medicine, surgery, blood transfusions, dialysis, IV or tube feedings, and a ventilator (breathing machine). Values history: This document has questions about your views, beliefs, and how you feel and think about life. This information can help others choose the care that you would choose. Why are advance directives important? An advance directive helps you control your care. Although spoken wishes may be used, it is better to have your wishes written down. Spoken wishes can be misunderstood, or not followed. Treatments may be given even if you do not want them. An advance directive may make it easier for your family to make difficult choices about your care. Weight Management   Why it is important to manage your weight:  Being overweight increases your risk of health conditions such as heart disease, high blood pressure, type 2 diabetes, and certain types of cancer.  It can also increase your risk for osteoarthritis, sleep apnea, and other respiratory problems. Aim for a slow, steady weight loss. Even a small amount of weight loss can lower your risk of health problems. How to lose weight safely:  A safe and healthy way to lose weight is to eat fewer calories and get regular exercise. You can lose up about 1 pound a week by decreasing the number of calories you eat by 500 calories each day. Healthy meal plan for weight management:  A healthy meal plan includes a variety of foods, contains fewer calories, and helps you stay healthy. A healthy meal plan includes the following:  Eat whole-grain foods more often. A healthy meal plan should contain fiber. Fiber is the part of grains, fruits, and vegetables that is not broken down by your body. Whole-grain foods are healthy and provide extra fiber in your diet. Some examples of whole-grain foods are whole-wheat breads and pastas, oatmeal, brown rice, and bulgur. Eat a variety of vegetables every day. Include dark, leafy greens such as spinach, kale, camila greens, and mustard greens. Eat yellow and orange vegetables such as carrots, sweet potatoes, and winter squash. Eat a variety of fruits every day. Choose fresh or canned fruit (canned in its own juice or light syrup) instead of juice. Fruit juice has very little or no fiber. Eat low-fat dairy foods. Drink fat-free (skim) milk or 1% milk. Eat fat-free yogurt and low-fat cottage cheese. Try low-fat cheeses such as mozzarella and other reduced-fat cheeses. Choose meat and other protein foods that are low in fat. Choose beans or other legumes such as split peas or lentils. Choose fish, skinless poultry (chicken or turkey), or lean cuts of red meat (beef or pork). Before you cook meat or poultry, cut off any visible fat. Use less fat and oil. Try baking foods instead of frying them. Add less fat, such as margarine, sour cream, regular salad dressing and mayonnaise to foods. Eat fewer high-fat foods. Some examples of high-fat foods include french fries, doughnuts, ice cream, and cakes. Eat fewer sweets. Limit foods and drinks that are high in sugar. This includes candy, cookies, regular soda, and sweetened drinks. Exercise:  Exercise at least 30 minutes per day on most days of the week. Some examples of exercise include walking, biking, dancing, and swimming. You can also fit in more physical activity by taking the stairs instead of the elevator or parking farther away from stores. Ask your healthcare provider about the best exercise plan for you. © Copyright [employer] 2018 Information is for End User's use only and may not be sold, redistributed or otherwise used for commercial purposes. All illustrations and images included in CareNotes® are the copyrighted property of [employer].D.A[employer]M., Inc. or [address] [place] [address]    Obesity   AMBULATORY CARE:   Obesity  means your body mass index (BMI) is greater than 30. Your healthcare provider will use your age, height, and weight to measure your BMI. The risks of obesity include  many health problems, including injuries or physical disability. Diabetes (high blood sugar level)    High blood pressure or high cholesterol    Heart disease or heart failure    Stroke    Gallbladder or liver disease    Cancer of the colon, breast, prostate, liver, or kidney    Sleep apnea    Arthritis or gout    Screening  is done to check for health conditions before you have signs or symptoms. If you are 28to 79years old, your blood sugar level may be checked every 3 years for signs of prediabetes or diabetes. Your healthcare provider will check your blood pressure at each visit. High blood pressure can lead to a stroke or other problems. Your provider may check for signs of heart disease, cancer, or other health problems. Seek care immediately if:   You have a severe headache, confusion, or difficulty speaking. You have weakness on one side of your body.     You have chest pain, sweating, or shortness of breath. Call your doctor if:   You have symptoms of gallbladder or liver disease, such as pain in your upper abdomen. You have knee or hip pain and discomfort while walking. You have symptoms of diabetes, such as intense hunger and thirst, and frequent urination. You have symptoms of sleep apnea, such as snoring or daytime sleepiness. You have questions or concerns about your condition or care. Treatment for obesity  focuses on helping you lose weight to improve your health. Even a small decrease in BMI can reduce the risk for many health problems. Your healthcare provider will help you set a weight-loss goal.  Lifestyle changes  are the first step in treating obesity. These include making healthy food choices and getting regular physical activity. Your healthcare provider may suggest a weight-loss program that involves coaching, education, and therapy. Medicine  may help you lose weight when it is used with a healthy foods and physical activity. Surgery  can help you lose weight if you have obesity along with other health problems. Several types of weight-loss surgery are available. Ask your healthcare provider for more information. Tips for safe weight loss:   Set small, realistic goals. An example of a small goal is to walk for 20 minutes 5 days a week. Anther goal is to lose 5% of your body weight. Ask for support. Tell friends, family members, and coworkers about your goals. Ask someone to lose weight with you. You may also want to join a weight-loss support group. Identify foods or triggers that may cause you to overeat. Remove tempting high-calorie foods from your home and workplace. Place a bowl of fresh fruit on your kitchen counter. If stress causes you to eat, find other ways to cope with stress. A counselor or therapist may be able to help you. Track your daily calories and activity. Write down what you eat and drink.  Also write down how many minutes of physical activity you do each day. Track your weekly weight. Weigh yourself in the morning, before you eat or drink anything but after you use the bathroom. Use the same scale, in the same place, and in similar clothing each time. Only weigh yourself 1 to 2 times each week, or as directed. You may become discouraged if you weigh yourself every day. Eating changes: You will need to eat 500 to 1,000 fewer calories each day than you currently eat to lose 1 to 2 pounds a week. The following changes will help you cut calories:  Eat smaller portions. Use small plates, no larger than 9 inches in diameter. Fill your plate half full of fruits and vegetables. Measure your food using measuring cups until you know what a serving size looks like. Eat 3 meals and 1 or 2 snacks each day. Plan your meals in advance. Ortega Skains and eat at home most of the time. Eat slowly. Do not skip meals. Skipping meals can lead to overeating later in the day. This can make it harder for you to lose weight. Talk with a dietitian to help you make a meal plan and schedule that is right for you. Eat fruits and vegetables at every meal.  They are low in calories and high in fiber, which makes you feel full. Do not add butter, margarine, or cream sauce to vegetables. Use herbs to season steamed vegetables. Eat less fat and fewer fried foods. Eat more baked or grilled chicken and fish. These protein sources are lower in calories and fat than red meat. Limit fast food. Dress your salads with olive oil and vinegar instead of bottled dressing. Limit the amount of sugar you eat. Do not drink sugary beverages. Limit alcohol. Activity changes:  Physical activity is good for your body in many ways. It helps you burn calories and build strong muscles. It decreases stress and depression, and improves your mood. It can also help you sleep better.  Talk to your healthcare provider before you begin an exercise program.  Exercise for at least 30 minutes 5 days a week. Start slowly. Set aside time each day for physical activity that you enjoy and that is convenient for you. It is best to do both weight training and an activity that increases your heart rate, such as walking, bicycling, or swimming. Find ways to be more active. Do yard work and housecleaning. Walk up the stairs instead of using elevators. Spend your leisure time going to events that require walking, such as outdoor festivals or fairs. This extra physical activity can help you lose weight and keep it off. Follow up with your doctor as directed: You may need to meet with a dietitian. Write down your questions so you remember to ask them during your visits. © Copyright [employer] 2023 Information is for End User's use only and may not be sold, redistributed or otherwise used for commercial purposes. The above information is an  only. It is not intended as medical advice for individual conditions or treatments. Talk to your doctor, nurse or pharmacist before following any medical regimen to see if it is safe and effective for you.

## 2023-12-15 ENCOUNTER — TELEPHONE (OUTPATIENT)
Dept: OBGYN CLINIC | Facility: HOSPITAL | Age: 73
End: 2023-12-15

## 2023-12-15 DIAGNOSIS — M17.0 PRIMARY OSTEOARTHRITIS OF BOTH KNEES: Primary | ICD-10-CM

## 2023-12-18 NOTE — TELEPHONE ENCOUNTER
Preoperative Elective Admission Assessment-spoke to patient     Living Situation:    Who does pt live with: lives alone  What kind of home: multi-level  How do they enter the home: front  How many levels in home: 2 level home, [facility]   # of steps to enter home: 4 to enter  # of steps to second floor: n/a   Are there handrails: Yes  Are there landings: Yes  Sleeping arrangement: first/entry floor  Where is Bathroom: first floor bathroom with step in tub     First Floor Setup:   Is there a bathroom: Yes  Where would pt sleep: bed     DME:  [facility]- ORDERING RW.  PROVIDED [facility] NUMBER TO CONTACT: [phone]     We discussed clearing pathways in the home and making sure there is accessibly to use the walker, for example, removing throw rugs.      Patient's Current Level of Function: Ambulates: Independently and ADLs: Independent    Post-op Caregiver: EZE, REFERRAL TO SOCIAL WORK     Post-op Transport: friend  To/from hospital: friend  To/from PT 2-3x/week:  UBER  Uses community transport now: No     Outpatient Physical Therapy Site:  Site: [facility]  pre and post-op appts scheduled? Yes     Medication Management: self  Preferred Pharmacy for Post-op Medications: [id number] - [place], [place] - [phone] [[id number]]   Blood Management Vitamin Regimen:  HAS AT HOME TO START 30 DAYS BEFORE SURGERY  Post-op anticoagulant: to be determined by surgical team postoperatively     DC Plan: Pt plans to be discharged home    Barriers to DC identified preoperatively: caregiver support and transportation- referral to . Potentially interested in OOP aides.     BMI: 35.93    Patient Education:  Pt educated on post-op pain, early mobilization (POD0), LOS goals, OP PT goals, and preoperative bathing. Patient educated that our goal is to appropriately discharge patient based off their post-op function while striving to maintain maximal independence. The goal is to discharge patient to home and  for them to attend outpatient physical therapy.    Assigned to care team? Yes

## 2023-12-19 ENCOUNTER — PATIENT OUTREACH (OUTPATIENT)
Dept: OBGYN CLINIC | Facility: HOSPITAL | Age: 73
End: 2023-12-19

## 2023-12-19 DIAGNOSIS — Z74.8 ASSISTANCE NEEDED WITH TRANSPORTATION: Primary | ICD-10-CM

## 2023-12-19 LAB
DME PARACHUTE DELIVERY DATE ACTUAL: NORMAL
DME PARACHUTE DELIVERY DATE REQUESTED: NORMAL
DME PARACHUTE ITEM DESCRIPTION: NORMAL
DME PARACHUTE ORDER STATUS: NORMAL
DME PARACHUTE SUPPLIER NAME: NORMAL
DME PARACHUTE SUPPLIER PHONE: NORMAL

## 2023-12-19 NOTE — PROGRESS NOTES
KAREN received a new referral in regard to pt scheduled for arthroplasty of left knee on 01/23/2024. KAREN reviewed [facility] notes prior to contacting pt. Pt lives alone in a multi level home. Pt ambulates independently and is independent with ADLs. Pt does not have nay caregiver support planned. Pt has a friend that will be taking him to and from surgery. Pt also plans to take an uber to and from OP PT.   KAREN contacted pt today and introduced self and role.   Pt did confirm he has no caregiver support planned at this time. KAREN spoke to pt about self pay [facility] support and pt is interested. Pt requested [facility] send [facility] agencies via email :  [email or web address]  [facility] will send these options:  [employer] @ [phone]  [facility] @ [phone]  [facility] @ [phone]  [facility] @ [phone]    Pt will contact self pay [facility] agencies to determine which he would prefer to use. [facility] inquired about transportation. Pt plans to uber to procedure and is aware the hospital will not release him to home in an uber. Pt is still working on a ride home from procedure. Pt also plans ot use uber if needed to OP PT, KAREN spoke to pt about shared rude programs and is interested. [facility] will refer to [facility] and pt is in agreement to work with [facility]. [facility] would like to determine what transportation is available for pt in [place].   KAREN inquired if pt has any interest in Meals on Wheels and pt declined.   At this time, [facility] will refer pt to [facility] to help with transportation and [facility] will email pt self pay [facility] agencies. [facility] will remain available.

## 2023-12-21 ENCOUNTER — APPOINTMENT (OUTPATIENT)
Dept: LAB | Facility: HOSPITAL | Age: 73
End: 2023-12-21
Payer: MEDICARE

## 2023-12-21 ENCOUNTER — LAB (OUTPATIENT)
Dept: LAB | Facility: HOSPITAL | Age: 73
End: 2023-12-21
Payer: MEDICARE

## 2023-12-21 DIAGNOSIS — M25.561 CHRONIC PAIN OF BOTH KNEES: ICD-10-CM

## 2023-12-21 DIAGNOSIS — G89.29 CHRONIC PAIN OF BOTH KNEES: ICD-10-CM

## 2023-12-21 DIAGNOSIS — M17.0 PRIMARY OSTEOARTHRITIS OF BOTH KNEES: ICD-10-CM

## 2023-12-21 DIAGNOSIS — Z01.818 PRE-OPERATIVE EXAMINATION: ICD-10-CM

## 2023-12-21 DIAGNOSIS — M25.562 CHRONIC PAIN OF BOTH KNEES: ICD-10-CM

## 2023-12-21 DIAGNOSIS — Z11.59 NEED FOR HEPATITIS C SCREENING TEST: ICD-10-CM

## 2023-12-21 DIAGNOSIS — Z13.6 SCREENING FOR CARDIOVASCULAR CONDITION: ICD-10-CM

## 2023-12-21 LAB
ALBUMIN SERPL BCP-MCNC: 4.3 G/DL (ref 3.5–5)
ALP SERPL-CCNC: 45 U/L (ref 34–104)
ALT SERPL W P-5'-P-CCNC: 45 U/L (ref 7–52)
ANION GAP SERPL CALCULATED.3IONS-SCNC: 11 MMOL/L
APTT PPP: 31 SECONDS (ref 23–37)
AST SERPL W P-5'-P-CCNC: 36 U/L (ref 13–39)
BASOPHILS # BLD AUTO: 0.02 THOUSANDS/ÂΜL (ref 0–0.1)
BASOPHILS NFR BLD AUTO: 0 % (ref 0–1)
BILIRUB SERPL-MCNC: 0.59 MG/DL (ref 0.2–1)
BUN SERPL-MCNC: 16 MG/DL (ref 5–25)
CALCIUM SERPL-MCNC: 9.7 MG/DL (ref 8.4–10.2)
CHLORIDE SERPL-SCNC: 101 MMOL/L (ref 96–108)
CHOLEST SERPL-MCNC: 214 MG/DL
CO2 SERPL-SCNC: 26 MMOL/L (ref 21–32)
CREAT SERPL-MCNC: 1.38 MG/DL (ref 0.6–1.3)
EOSINOPHIL # BLD AUTO: 0.21 THOUSAND/ÂΜL (ref 0–0.61)
EOSINOPHIL NFR BLD AUTO: 3 % (ref 0–6)
ERYTHROCYTE [DISTWIDTH] IN BLOOD BY AUTOMATED COUNT: 13.4 % (ref 11.6–15.1)
EST. AVERAGE GLUCOSE BLD GHB EST-MCNC: 131 MG/DL
GFR SERPL CREATININE-BSD FRML MDRD: 50 ML/MIN/1.73SQ M
GLUCOSE P FAST SERPL-MCNC: 95 MG/DL (ref 65–99)
HBA1C MFR BLD: 6.2 %
HCT VFR BLD AUTO: 50.9 % (ref 36.5–49.3)
HCV AB SER QL: NORMAL
HDLC SERPL-MCNC: 56 MG/DL
HGB BLD-MCNC: 16.6 G/DL (ref 12–17)
IMM GRANULOCYTES # BLD AUTO: 0.01 THOUSAND/UL (ref 0–0.2)
IMM GRANULOCYTES NFR BLD AUTO: 0 % (ref 0–2)
INR PPP: 0.92 (ref 0.84–1.19)
LDLC SERPL CALC-MCNC: 139 MG/DL (ref 0–100)
LYMPHOCYTES # BLD AUTO: 1.24 THOUSANDS/ÂΜL (ref 0.6–4.47)
LYMPHOCYTES NFR BLD AUTO: 20 % (ref 14–44)
MCH RBC QN AUTO: 30.6 PG (ref 26.8–34.3)
MCHC RBC AUTO-ENTMCNC: 32.6 G/DL (ref 31.4–37.4)
MCV RBC AUTO: 94 FL (ref 82–98)
MONOCYTES # BLD AUTO: 0.56 THOUSAND/ÂΜL (ref 0.17–1.22)
MONOCYTES NFR BLD AUTO: 9 % (ref 4–12)
NEUTROPHILS # BLD AUTO: 4.23 THOUSANDS/ÂΜL (ref 1.85–7.62)
NEUTS SEG NFR BLD AUTO: 68 % (ref 43–75)
NRBC BLD AUTO-RTO: 0 /100 WBCS
PLATELET # BLD AUTO: 256 THOUSANDS/UL (ref 149–390)
PMV BLD AUTO: 9.3 FL (ref 8.9–12.7)
POTASSIUM SERPL-SCNC: 4.3 MMOL/L (ref 3.5–5.3)
PROT SERPL-MCNC: 7.7 G/DL (ref 6.4–8.4)
PROTHROMBIN TIME: 12.5 SECONDS (ref 11.6–14.5)
RBC # BLD AUTO: 5.42 MILLION/UL (ref 3.88–5.62)
SODIUM SERPL-SCNC: 138 MMOL/L (ref 135–147)
TRIGL SERPL-MCNC: 94 MG/DL
WBC # BLD AUTO: 6.27 THOUSAND/UL (ref 4.31–10.16)

## 2023-12-21 PROCEDURE — 36415 COLL VENOUS BLD VENIPUNCTURE: CPT

## 2023-12-21 PROCEDURE — 86803 HEPATITIS C AB TEST: CPT

## 2023-12-21 PROCEDURE — 80053 COMPREHEN METABOLIC PANEL: CPT

## 2023-12-21 PROCEDURE — 83036 HEMOGLOBIN GLYCOSYLATED A1C: CPT

## 2023-12-21 PROCEDURE — 87081 CULTURE SCREEN ONLY: CPT

## 2023-12-21 PROCEDURE — 80061 LIPID PANEL: CPT

## 2023-12-21 PROCEDURE — 93005 ELECTROCARDIOGRAM TRACING: CPT

## 2023-12-21 PROCEDURE — 85610 PROTHROMBIN TIME: CPT

## 2023-12-21 PROCEDURE — 85730 THROMBOPLASTIN TIME PARTIAL: CPT

## 2023-12-21 PROCEDURE — 85025 COMPLETE CBC W/AUTO DIFF WBC: CPT

## 2023-12-22 LAB
ATRIAL RATE: 85 BPM
MRSA NOSE QL CULT: NORMAL
P AXIS: 19 DEGREES
PR INTERVAL: 138 MS
QRS AXIS: 27 DEGREES
QRSD INTERVAL: 138 MS
QT INTERVAL: 380 MS
QTC INTERVAL: 452 MS
T WAVE AXIS: 28 DEGREES
VENTRICULAR RATE: 85 BPM

## 2023-12-27 ENCOUNTER — PATIENT OUTREACH (OUTPATIENT)
Dept: FAMILY MEDICINE CLINIC | Facility: CLINIC | Age: 73
End: 2023-12-27

## 2023-12-27 NOTE — PROGRESS NOTES
Chart Review and Outgoing Call:  12/27/2023    [facility] received patient referral from [facility] KAREN Cabello.    [facility] completed a chart review. Per chart review, patient is in need of transportation and would like assistance with [facility].    [facility] called patient, left voice message explaining the purpose of my call. [facility] requested a return call.    If no return call is received, [facility] will outreach patient in less than two weeks.    Next scheduled outreach is 01/08/2024.

## 2023-12-28 ENCOUNTER — PATIENT OUTREACH (OUTPATIENT)
Dept: FAMILY MEDICINE CLINIC | Facility: CLINIC | Age: 73
End: 2023-12-28

## 2023-12-28 NOTE — PROGRESS NOTES
Incoming/Outgoing Call:  12/28/2023    Patient returned [facility]'s call. [facility] answered and introduced myself. [facility] explained the referral that was in place. Patient agreed to needing transportation due to having knee replacement surgery. Patient was told he will not be able to drive.    [facility] added [facility] to the call. A [facility] representative completed application over the phone with patient. Patient was asked when he would need to use the service and patient explained his knee replacement surgery on 01/23/2024. Patient told the [facility] representative that he will be staying over night and is unaware of the time to be at the hospital and when he is going home. Patient was told to call back to schedule a ride when he knows the time. The [facility] representative explained how to schedule a ride.    [facility] will check with patient closer to the date of his surgery to make sure he has rides set up.    Next scheduled outreach is 01/16/2023.

## 2023-12-29 ENCOUNTER — CONSULT (OUTPATIENT)
Dept: FAMILY MEDICINE CLINIC | Facility: CLINIC | Age: 73
End: 2023-12-29
Payer: MEDICARE

## 2023-12-29 ENCOUNTER — APPOINTMENT (OUTPATIENT)
Dept: LAB | Facility: CLINIC | Age: 73
End: 2023-12-29
Payer: COMMERCIAL

## 2023-12-29 VITALS
TEMPERATURE: 98 F | HEART RATE: 95 BPM | SYSTOLIC BLOOD PRESSURE: 138 MMHG | DIASTOLIC BLOOD PRESSURE: 82 MMHG | WEIGHT: 229.2 LBS | BODY MASS INDEX: 35.9 KG/M2 | RESPIRATION RATE: 18 BRPM

## 2023-12-29 DIAGNOSIS — Z01.818 PRE-OP EXAMINATION: ICD-10-CM

## 2023-12-29 DIAGNOSIS — M17.0 PRIMARY OSTEOARTHRITIS OF BOTH KNEES: Primary | ICD-10-CM

## 2023-12-29 DIAGNOSIS — I10 PRIMARY HYPERTENSION: ICD-10-CM

## 2023-12-29 DIAGNOSIS — E78.2 MIXED HYPERLIPIDEMIA: ICD-10-CM

## 2023-12-29 DIAGNOSIS — N18.31 STAGE 3A CHRONIC KIDNEY DISEASE (HCC): ICD-10-CM

## 2023-12-29 PROCEDURE — 99214 OFFICE O/P EST MOD 30 MIN: CPT | Performed by: FAMILY MEDICINE

## 2023-12-29 NOTE — PROGRESS NOTES
[facility]  [facility]    NAME: Swapnil Grover  AGE: 73 y.o. SEX: male  : 1950     DATE: 2023    [facility] Pre-Operative Evaluation      Chief Complaint: Pre-operative Evaluation     Surgery: l Knee replacement  Anticipated Date of Surgery: 24  Surgeon: Dr Moscoso      History of Present Illness:     Pt is here for a pre op  L knee replacement        Anesthesia:  general  Bleeding Risk: no recent abnormal bleeding, no remote history of abnormal bleeding, and no use of Ca-channel blockers  Current Anti-platelet/anticoagulation medication:  none    Assessment of Cardiac Risk:  Denies unstable or severe angina or MI in the last 6 weeks or history of stent placement in the last year   Denies decompensated heart failure (e.g. New onset heart failure, NYHA functional class IV heart failure, or worsening existing heart failure)  Denies significant arrhythmias such as high grade AV block, symptomatic ventricular arrhythmia, newly recognized ventricular tachycardia, supraventricular tachycardia with resting heart rate >100, or symptomatic bradycardia  Denies severe heart valve disease including aortic stenosis or symptomatic mitral stenosis     Exercise Capacity:  Able to walk 4 blocks without symptoms?: No  Able to walk 2 flights without symptoms?: No    Prior Anesthesia Reactions: Yes, Problem coming out of anesthesia - woke up with a CPAP on     Personal history of venous thromboembolic disease? No    History of steroid use for >2 weeks within last year? No         Review of Systems:     Review of Systems   Musculoskeletal:  Positive for arthralgias.       Current Problem List:     Patient Active Problem List   Diagnosis   • Renal colic on right side   • Hyperkalemia   • Sleep apnea   • Hydronephrosis with ureteropelvic junction (UPJ) obstruction   • BPH (benign prostatic hyperplasia)   • Hypertension   • Hyperuricemia   •  Stage 3a chronic kidney disease (HCC)   • Nephrolithiasis   • Obesity, morbid (HCC)   • Mixed conductive and sensorineural hearing loss, bilateral   • Primary osteoarthritis of both knees   • Other hemorrhoids   • Mixed hyperlipidemia       Allergies:     Allergies   Allergen Reactions   • Bean Pod Extract - Food Allergy Nausea Only     Soft beans- eg chick peas, lima's, sweet peas       Current Medications:       Current Outpatient Medications:   •  Ascorbic Acid (VITAMIN C PO), Take 1,000 mg by mouth 2 (two) times a day, Disp: , Rfl:   •  b complex vitamins tablet, Take 1 tablet by mouth 2 (two) times a day, Disp: , Rfl:   •  BETA CAROTENE PO, Take by mouth 2 (two) times a day, Disp: , Rfl:   •  ferrous sulfate 324 (65 Fe) mg, Take 1 tablet (324 mg total) by mouth daily before breakfast, Disp: 30 tablet, Rfl: 0  •  GLUCOSAMINE-CHONDROITIN PO, Take by mouth 2 (two) times a day, Disp: , Rfl:   •  potassium citrate (UROCIT-K) 10 mEq, Take 1 tablet (10 mEq total) by mouth 3 (three) times a day with meals, Disp: 90 tablet, Rfl: 3  •  TURMERIC PO, Take 50 mg by mouth in the morning, Disp: , Rfl:   •  VITAMIN D PO, Take by mouth every morning, Disp: , Rfl:   •  VITAMIN E PO, Take 268 mg by mouth daily, Disp: , Rfl:   •  zinc sulfate (ZINCATE) 220 mg capsule, Take 1 capsule (220 mg total) by mouth daily, Disp: 30 capsule, Rfl: 0    Past Medical History:       Past Medical History:   Diagnosis Date   • Arthritis    • BPH (benign prostatic hyperplasia)    • Breathing difficulty 12/01/2022    CPAP given per pt-s/p surgery   • Cancer (HCC) 2015    basal cell of the skull    • Colon polyp    • Heart failure (HCC)     12/2/22-pt had ECHO   • History of subdural hematoma    • Kidney stone     right stone   • Renal disorder    • Sleep apnea     mild- no CPAP-had nasal septoplasty        Past Surgical History:   Procedure Laterality Date   • BASAL CELL CARCINOMA EXCISION  2015    skull   • BRAIN SURGERY      in the 1990's-subdural  hematoma   • CATARACT EXTRACTION Left    • COLONOSCOPY      x2   • CYSTOSCOPY W/ LASER LITHOTRIPSY Right 11/27/2020    Procedure: CYSTOSCOPY, FLEXIBLE URETEROSCOPY, LASER, AND STENT EXCHANGE,STONE BASKET, RIGHT RETROGRADE;  Surgeon: Sabino Garcia MD;  Location: [facility];  Service: Urology   • FL RETROGRADE PYELOGRAM  10/18/2020   • FL RETROGRADE PYELOGRAM  11/27/2020   • FL RETROGRADE PYELOGRAM  12/01/2022   • FL RETROGRADE PYELOGRAM  12/22/2022   • LITHOTRIPSY     • MI CYSTO BLADDER W/URETERAL CATHETERIZATION Right 11/11/2020    Procedure: ESWL RIGHT;  Surgeon: Sabino Garcia MD;  Location: [facility];  Service: Urology   • MI CYSTO BLADDER W/URETERAL CATHETERIZATION Right 12/01/2022    Procedure: CYSTOSCOPY RETROGRADE PYELOGRAM WITH INSERTION STENT URETERAL;  Surgeon: Sabino Garcia MD;  Location: [facility];  Service: Urology   • MI CYSTO/URETERO W/LITHOTRIPSY &INDWELL STENT INSRT Right 10/18/2020    Procedure: CYSTOSCOPY URETEROSCOPY WITH BASKET EXTRACTION, RETROGRADE PYELOGRAM AND INSERTION STENT URETERAL;  Surgeon: Kris Ac MD;  Location: [facility];  Service: Urology   • MI CYSTO/URETERO W/LITHOTRIPSY &INDWELL STENT INSRT Right 12/22/2022    Procedure: CYSTOSCOPY URETEROSCOPY WITH LITHOTRIPSY HOLMIUM LASER, RETROGRADE PYELOGRAM AND INSERTION STENT URETERAL;  Surgeon: Sabino Garcia MD;  Location: [facility];  Service: Urology   • SEPTOPLASTY      in the 1990's-trimmed uvula   • TONSILLECTOMY  1956    age 6        Family History   Problem Relation Age of Onset   • Hypertension Mother    • Kidney disease Mother         renal failure-dialysis   • Hypertension Father    • Stroke Father    • Diabetes Sister    • Kidney disease Sister         self dialysis at home        Social History     Socioeconomic History   • Marital status:      Spouse name: Not on file   • Number of children: Not on file   • Years of education: Not on file   • Highest education level: Not on file   Occupational  History   • Not on file   Tobacco Use   • Smoking status: Never   • Smokeless tobacco: Never   Vaping Use   • Vaping status: Never Used   Substance and Sexual Activity   • Alcohol use: Yes     Comment: a beer on the weekends   • Drug use: Never   • Sexual activity: Not on file   Other Topics Concern   • Not on file   Social History Narrative   • Not on file     Social Determinants of Health     Financial Resource Strain: Low Risk  (11/2/2023)    Overall Financial Resource Strain (CARDIA)    • Difficulty of Paying Living Expenses: Not hard at all   Food Insecurity: No Food Insecurity (12/2/2022)    Hunger Vital Sign    • Worried About Running Out of Food in the Last Year: Never true    • Ran Out of Food in the Last Year: Never true   Transportation Needs: No Transportation Needs (11/2/2023)    PRAPARE - Transportation    • Lack of Transportation (Medical): No    • Lack of Transportation (Non-Medical): No   Physical Activity: Not on file   Stress: Not on file   Social Connections: Not on file   Intimate Partner Violence: Not on file   Housing Stability: Low Risk  (12/2/2022)    Housing Stability Vital Sign    • Unable to Pay for Housing in the Last Year: No    • Number of Places Lived in the Last Year: 1    • Unstable Housing in the Last Year: No        Physical Exam:     /82   Pulse 95   Temp 98 °F (36.7 °C)   Resp 18   Wt 104 kg (229 lb 3.2 oz)   BMI 35.90 kg/m²     Physical Exam  Vitals and nursing note reviewed.   Constitutional:       General: He is not in acute distress.     Appearance: He is well-developed. He is not diaphoretic.   HENT:      Head: Normocephalic and atraumatic.      Right Ear: External ear normal.      Left Ear: External ear normal.      Nose: Nose normal.      Mouth/Throat:      Pharynx: No oropharyngeal exudate.   Eyes:      General: No scleral icterus.        Right eye: No discharge.         Left eye: No discharge.      Pupils: Pupils are equal, round, and reactive to light.    Neck:      Thyroid: No thyromegaly.   Cardiovascular:      Rate and Rhythm: Normal rate.      Heart sounds: Normal heart sounds. No murmur heard.  Pulmonary:      Effort: Pulmonary effort is normal. No respiratory distress.      Breath sounds: Normal breath sounds. No wheezing.   Abdominal:      General: Bowel sounds are normal. There is no distension.      Palpations: Abdomen is soft. There is no mass.      Tenderness: There is no abdominal tenderness. There is no guarding or rebound.   Musculoskeletal:         General: Normal range of motion.   Skin:     General: Skin is warm and dry.      Findings: No erythema or rash.   Neurological:      Mental Status: He is alert.      Coordination: Coordination normal.      Deep Tendon Reflexes: Reflexes normal.   Psychiatric:         Behavior: Behavior normal.          Data:     Pre-operative work-up    Laboratory Results: I have personally reviewed the pertinent laboratory results/reports  and are acceptable for surgery     EKG: I only see report can't see image - pt has appt with cardio next week    Recent Results (from the past 672 hour(s))   Wheeled Walker    Collection Time: 12/18/23  9:29 AM   Result Value Ref Range    Supplier Name [employer]     Supplier Phone Number [phone]     Order Status Delivery Successful     Delivery Note      Delivery Request Date 12/18/2023     Date Delivered  12/19/2023     Item Description Wheeled Walker, Adult    Comprehensive metabolic panel    Collection Time: 12/21/23  9:18 AM   Result Value Ref Range    Sodium 138 135 - 147 mmol/L    Potassium 4.3 3.5 - 5.3 mmol/L    Chloride 101 96 - 108 mmol/L    CO2 26 21 - 32 mmol/L    ANION GAP 11 mmol/L    BUN 16 5 - 25 mg/dL    Creatinine 1.38 (H) 0.60 - 1.30 mg/dL    Glucose, Fasting 95 65 - 99 mg/dL    Calcium 9.7 8.4 - 10.2 mg/dL    AST 36 13 - 39 U/L    ALT 45 7 - 52 U/L    Alkaline Phosphatase 45 34 - 104 U/L    Total Protein 7.7 6.4 - 8.4 g/dL    Albumin  4.3 3.5 - 5.0 g/dL    Total Bilirubin 0.59 0.20 - 1.00 mg/dL    eGFR 50 ml/min/1.73sq m   CBC and differential    Collection Time: 12/21/23  9:18 AM   Result Value Ref Range    WBC 6.27 4.31 - 10.16 Thousand/uL    RBC 5.42 3.88 - 5.62 Million/uL    Hemoglobin 16.6 12.0 - 17.0 g/dL    Hematocrit 50.9 (H) 36.5 - 49.3 %    MCV 94 82 - 98 fL    MCH 30.6 26.8 - 34.3 pg    MCHC 32.6 31.4 - 37.4 g/dL    RDW 13.4 11.6 - 15.1 %    MPV 9.3 8.9 - 12.7 fL    Platelets 256 149 - 390 Thousands/uL    nRBC 0 /100 WBCs    Neutrophils Relative 68 43 - 75 %    Immat GRANS % 0 0 - 2 %    Lymphocytes Relative 20 14 - 44 %    Monocytes Relative 9 4 - 12 %    Eosinophils Relative 3 0 - 6 %    Basophils Relative 0 0 - 1 %    Neutrophils Absolute 4.23 1.85 - 7.62 Thousands/µL    Immature Grans Absolute 0.01 0.00 - 0.20 Thousand/uL    Lymphocytes Absolute 1.24 0.60 - 4.47 Thousands/µL    Monocytes Absolute 0.56 0.17 - 1.22 Thousand/µL    Eosinophils Absolute 0.21 0.00 - 0.61 Thousand/µL    Basophils Absolute 0.02 0.00 - 0.10 Thousands/µL   Protime-INR    Collection Time: 12/21/23  9:18 AM   Result Value Ref Range    Protime 12.5 11.6 - 14.5 seconds    INR 0.92 0.84 - 1.19   APTT    Collection Time: 12/21/23  9:18 AM   Result Value Ref Range    PTT 31 23 - 37 seconds   MRSA culture    Collection Time: 12/21/23  9:18 AM    Specimen: Nose; Nares   Result Value Ref Range    MRSA Culture Only       No Methicillin Resistant Staphlyococcus aureus (MRSA) isolated   Lipid Panel with Direct LDL reflex    Collection Time: 12/21/23  9:18 AM   Result Value Ref Range    Cholesterol 214 (H) See Comment mg/dL    Triglycerides 94 See Comment mg/dL    HDL, Direct 56 >=40 mg/dL    LDL Calculated 139 (H) 0 - 100 mg/dL   Hepatitis C antibody    Collection Time: 12/21/23  9:18 AM   Result Value Ref Range    Hepatitis C Ab Non-reactive Non-Reactive   Hemoglobin A1C    Collection Time: 12/21/23  9:18 AM   Result Value Ref Range    Hemoglobin A1C 6.2 (H) Normal  4.0-5.6%; PreDiabetic 5.7-6.4%; Diabetic >=6.5%; Glycemic control for adults with diabetes <7.0% %     mg/dl   EKG 12 lead    Collection Time: 12/21/23  9:30 AM   Result Value Ref Range    Ventricular Rate 85 BPM    Atrial Rate 85 BPM    NJ Interval 138 ms    QRSD Interval 138 ms    QT Interval 380 ms    QTC Interval 452 ms    P [place] 19 degrees    QRS Axis 27 degrees    T Wave Axis 28 degrees           Assessment & Recommendations:     1. Primary osteoarthritis of both knees    2. Pre-op examination    3. Stage 3a chronic kidney disease (HCC)    4. Primary hypertension    5. Mixed hyperlipidemia  Assessment & Plan:  Pt has been on an OTC med for this and states that helps his lipids - pt likes natural products            Pre-Op Evaluation Assessment  73 y.o. male with planned surgery: l knee replacement.    Known risk factors for perioperative complications: None        Current medications which may produce withdrawal symptoms if withheld perioperatively: none.    Pre-Op Evaluation Plan  1. Further preoperative workup as follows:   - pt has appt with cardio    2. Medication Management/Recommendations:   - Patient has been instructed to avoid herbs or non-directed vitamins the week prior to surgery to ensure no drug interactions with perioperative surgical and anesthetic medications.  - Patient has been instructed to avoid aspirin containing medications or non-steroidal anti-inflammatory drugs for the week preceding surgery.  Stop Tumeric  All other supplements ar ok and can stop night before surgery  3. Prophylaxis for cardiac events with perioperative beta-blockers: not indicated.    4. Patient requires further consultation with: Cardiology    Clearance  Medical clearance is good - pt has appt with Cardio next week for cardiac clearance.      Frank Lombardi, [facility]  [address] [address]  Phone#  [phone]  Fax#  [phone]

## 2024-01-02 ENCOUNTER — CONSULT (OUTPATIENT)
Dept: CARDIOLOGY CLINIC | Facility: CLINIC | Age: 74
End: 2024-01-02
Payer: MEDICARE

## 2024-01-02 VITALS
OXYGEN SATURATION: 97 % | HEART RATE: 99 BPM | WEIGHT: 227 LBS | SYSTOLIC BLOOD PRESSURE: 140 MMHG | DIASTOLIC BLOOD PRESSURE: 92 MMHG | HEIGHT: 67 IN | BODY MASS INDEX: 35.63 KG/M2

## 2024-01-02 DIAGNOSIS — M17.0 PRIMARY OSTEOARTHRITIS OF BOTH KNEES: ICD-10-CM

## 2024-01-02 DIAGNOSIS — G89.29 CHRONIC PAIN OF BOTH KNEES: ICD-10-CM

## 2024-01-02 DIAGNOSIS — J96.01 ACUTE RESPIRATORY FAILURE WITH HYPOXIA (HCC): ICD-10-CM

## 2024-01-02 DIAGNOSIS — Z01.818 PRE-OPERATIVE EXAMINATION: ICD-10-CM

## 2024-01-02 DIAGNOSIS — M25.561 CHRONIC PAIN OF BOTH KNEES: ICD-10-CM

## 2024-01-02 DIAGNOSIS — E66.01 SEVERE OBESITY (BMI 35.0-39.9) WITH COMORBIDITY (HCC): ICD-10-CM

## 2024-01-02 DIAGNOSIS — Q62.11 HYDRONEPHROSIS WITH URETEROPELVIC JUNCTION (UPJ) OBSTRUCTION: ICD-10-CM

## 2024-01-02 DIAGNOSIS — Z01.810 ENCOUNTER FOR PREPROCEDURAL CARDIOVASCULAR EXAMINATION: ICD-10-CM

## 2024-01-02 DIAGNOSIS — M25.562 CHRONIC PAIN OF BOTH KNEES: ICD-10-CM

## 2024-01-02 DIAGNOSIS — N18.31 STAGE 3A CHRONIC KIDNEY DISEASE (HCC): ICD-10-CM

## 2024-01-02 DIAGNOSIS — I10 PRIMARY HYPERTENSION: ICD-10-CM

## 2024-01-02 DIAGNOSIS — Z01.818 PRE-OPERATIVE CLEARANCE: Primary | ICD-10-CM

## 2024-01-02 PROCEDURE — 93000 ELECTROCARDIOGRAM COMPLETE: CPT | Performed by: INTERNAL MEDICINE

## 2024-01-02 PROCEDURE — 99214 OFFICE O/P EST MOD 30 MIN: CPT | Performed by: INTERNAL MEDICINE

## 2024-01-02 NOTE — H&P (VIEW-ONLY)
" Subjective:     Swapnil Grover is a 73 y.o. male  who presents to the office today for a preoperative consultation at the request of surgeon Dr. Moscoso who plans on performing left knee arthroplasty on January 23.   Planned anesthesia is spinal and IV sedation. The patient has no known anesthesia issues. Most recent surgery was lithotripsy.  He had some periods of apnea during procedure and required CPAP temporarily afterwards.  Activity is limited due to knee pain.  He is unable to ambulate upstairs.  He is also unable to walk 4 blocks uninterrupted.  He does not have any chest pain or shortness of breath with ambulation.  He denies any previous cardiac history.  He had an echocardiogram in December 2022 after lithotripsy procedure which was grossly normal.    The following portions of the patient's history were reviewed and updated as appropriate: allergies, current medications, past family history, past medical history, past social history, past surgical history, and problem list.    Review of Systems  Review of Systems   Respiratory:  Negative for shortness of breath.    Cardiovascular:  Negative for chest pain, palpitations and leg swelling.   Musculoskeletal:  Positive for arthralgias, gait problem and myalgias.   All other systems reviewed and are negative.         Objective:      Physical Exam  /92 (BP Location: Right arm, Patient Position: Sitting, Cuff Size: Large)   Pulse 99   Ht 5' 7\" (1.702 m)   Wt 103 kg (227 lb)   SpO2 97%   BMI 35.55 kg/m²    Physical Exam   Constitutional: He appears healthy. No distress.   Eyes: Pupils are equal, round, and reactive to light. Conjunctivae are normal.   Neck: No JVD present.   Cardiovascular: Normal rate, regular rhythm and normal heart sounds. Exam reveals no gallop and no friction rub.   No murmur heard.  Pulmonary/Chest: Effort normal and breath sounds normal. He has no wheezes. He has no rales.   Musculoskeletal:         General: No tenderness, " deformity or edema.      Cervical back: Normal range of motion and neck supple.   Neurological: He is alert and oriented to person, place, and time.   Skin: Skin is warm and dry.        Cardiographics  ECG: NSR with RBBB  Echocardiogram: normal and reviewed by myself - December 2022      Lab Review   Lab Results   Component Value Date    K 4.3 12/21/2023     12/21/2023    CO2 26 12/21/2023    BUN 16 12/21/2023    CREATININE 1.38 (H) 12/21/2023    CALCIUM 9.7 12/21/2023     Lab Results   Component Value Date    WBC 6.27 12/21/2023    HGB 16.6 12/21/2023    HCT 50.9 (H) 12/21/2023    MCV 94 12/21/2023     12/21/2023     Lab Results   Component Value Date    TRIG 94 12/21/2023    HDL 56 12/21/2023          Assessment:     1. Pre-operative clearance    2. Primary hypertension    3. Primary osteoarthritis of both knees    4. Chronic pain of both knees    5. Stage 3a chronic kidney disease (HCC)    6. Hydronephrosis with ureteropelvic junction (UPJ) obstruction    7. Acute respiratory failure with hypoxia (HCC)    8. Pre-operative examination           63 y.o. male  with planned surgery as above.    Known risk factors for perioperative complications: Renal dysfunction        Cardiac Risk Estimation: per the Revised Cardiac Risk Index, the patient has a score of 1 placing him at low-intermediate risk of major cardiac event (6%)       Patient's functional capacity cannot be determined due to knee pain.  He is at advanced age and has renal dysfunction.  Will obtain stress test prior to surgery.          Plan:      1. Preoperative workup as follows cardiac stress testing to exclude undiagnosed coronary disease.   2. Change in medication regimen before surgery: not applicable, not on any medications.  3. Prophylaxis for cardiac events with perioperative beta-blockers: not indicated. .  4. Invasive hemodynamic monitoring perioperatively: at the discretion of anesthesiologist.  5. Deep vein thrombosis prophylaxis  postoperatively:regimen to be chosen by surgical team.  6.  Other measures: Blood pressure is elevated today.  He has follow-up appointment scheduled with nephrology later this week.  If blood pressure elevated during that visit, begin metoprolol 25 mg daily.

## 2024-01-04 ENCOUNTER — PATIENT OUTREACH (OUTPATIENT)
Dept: OBGYN CLINIC | Facility: HOSPITAL | Age: 74
End: 2024-01-04

## 2024-01-04 NOTE — PROGRESS NOTES
[facility] attempted to reach pt today to follow up on caregiver support. On 12/19/2023 [facility] provided four self pay [facility] agencies. Pt had planned to contact them for services and prices. [facility] had also referred pt to [facility] to assist with shared ride programs. Per review of chart, [facility] and pt completed [facility] application for transportation over the phone on 12/28/2023. The [facility] explained to pt how to schedule a ride when needed. [facility] to check with pt as surgery date approaches to ensure he has rides set up. I was unable to reach pt today and a message was left to please return [facility] call.

## 2024-01-05 ENCOUNTER — OFFICE VISIT (OUTPATIENT)
Dept: NEPHROLOGY | Facility: CLINIC | Age: 74
End: 2024-01-05
Payer: MEDICARE

## 2024-01-05 VITALS
OXYGEN SATURATION: 96 % | WEIGHT: 227 LBS | HEART RATE: 100 BPM | DIASTOLIC BLOOD PRESSURE: 90 MMHG | SYSTOLIC BLOOD PRESSURE: 138 MMHG | HEIGHT: 67 IN | BODY MASS INDEX: 35.63 KG/M2

## 2024-01-05 DIAGNOSIS — Q62.11 HYDRONEPHROSIS WITH URETEROPELVIC JUNCTION (UPJ) OBSTRUCTION: ICD-10-CM

## 2024-01-05 DIAGNOSIS — N20.0 NEPHROLITHIASIS: ICD-10-CM

## 2024-01-05 DIAGNOSIS — Z01.818 PRE-OPERATIVE EXAMINATION: ICD-10-CM

## 2024-01-05 DIAGNOSIS — M17.0 PRIMARY OSTEOARTHRITIS OF BOTH KNEES: ICD-10-CM

## 2024-01-05 DIAGNOSIS — M25.562 CHRONIC PAIN OF BOTH KNEES: ICD-10-CM

## 2024-01-05 DIAGNOSIS — G89.29 CHRONIC PAIN OF BOTH KNEES: ICD-10-CM

## 2024-01-05 DIAGNOSIS — I10 PRIMARY HYPERTENSION: ICD-10-CM

## 2024-01-05 DIAGNOSIS — M25.561 CHRONIC PAIN OF BOTH KNEES: ICD-10-CM

## 2024-01-05 DIAGNOSIS — N18.31 STAGE 3A CHRONIC KIDNEY DISEASE (HCC): Primary | ICD-10-CM

## 2024-01-05 PROCEDURE — 99214 OFFICE O/P EST MOD 30 MIN: CPT | Performed by: INTERNAL MEDICINE

## 2024-01-05 RX ORDER — POTASSIUM CITRATE 10 MEQ/1
20 TABLET, EXTENDED RELEASE ORAL
Start: 2024-01-05

## 2024-01-05 NOTE — PATIENT INSTRUCTIONS
Renal function is stable.   From a kidney standpoint, there are no contraindications to the scheduled knee replacement surgery.   Continue with potassium citrate.   Continue with high fluid intake.   Follow up in 6 months.
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
## 2024-01-08 ENCOUNTER — ANESTHESIA EVENT (OUTPATIENT)
Dept: PERIOP | Facility: HOSPITAL | Age: 74
End: 2024-01-08
Payer: MEDICARE

## 2024-01-08 DIAGNOSIS — Z91.89 AT RISK FOR RENAL FAILURE: Primary | ICD-10-CM

## 2024-01-08 NOTE — PRE-PROCEDURE INSTRUCTIONS
"Pre-Surgery Instructions:   Medication Instructions    Ascorbic Acid (VITAMIN C PO) Instructions provided by MD    b complex vitamins tablet Stop taking 7 days prior to surgery.    BETA CAROTENE PO Stop taking 7 days prior to surgery.    ferrous sulfate 324 (65 Fe) mg Instructions provided by MD    GLUCOSAMINE-CHONDROITIN PO Stop taking 7 days prior to surgery.    potassium citrate (UROCIT-K) 10 mEq Hold day of surgery.    TURMERIC PO Stop taking 7 days prior to surgery.    VITAMIN D PO Stop taking 7 days prior to surgery.    VITAMIN E PO Stop taking 7 days prior to surgery.    zinc sulfate (ZINCATE) 220 mg capsule Stop taking 7 days prior to surgery.      Reviewed with patient, in detail, instructions from \"My Surgical Experience\". Verified with patient that he received TMLJ patient education from surgeon's office. Advised to call ortho office/surgery coordinator with any questions and/or concerns.   Confirmed that patient has hibiclens soap and CHG wipes. Incentive spirometer received.   Patient verbalized understanding of current visitor restrictions due to Covid and will clarify with nurse DOS. Instructed to avoid all ASA and OTC Vit/Supp 1 week prior to surgery and to avoid NSAIDs 7 days prior to surgery per anesthesia guidelines.Tylenol ok to take prn.   No alcohol 24 hours prior to surgery. Patient aware Lovenox or other Blood Thinner prescribed is for POST OP ONLY. Instructed to call surgeon's office in meantime with any new illness.  Patient verbalized an understanding of all instructions reviewed and offers no concerns at this time.    "

## 2024-01-11 ENCOUNTER — HOSPITAL ENCOUNTER (OUTPATIENT)
Dept: NON INVASIVE DIAGNOSTICS | Facility: HOSPITAL | Age: 74
Discharge: HOME | End: 2024-01-11
Attending: INTERNAL MEDICINE
Payer: MEDICARE

## 2024-01-11 ENCOUNTER — HOSPITAL ENCOUNTER (OUTPATIENT)
Dept: RADIOLOGY | Facility: HOSPITAL | Age: 74
Discharge: HOME | End: 2024-01-11
Attending: INTERNAL MEDICINE
Payer: MEDICARE

## 2024-01-11 ENCOUNTER — PATIENT OUTREACH (OUTPATIENT)
Dept: OBGYN CLINIC | Facility: HOSPITAL | Age: 74
End: 2024-01-11

## 2024-01-11 VITALS
HEART RATE: 91 BPM | OXYGEN SATURATION: 98 % | SYSTOLIC BLOOD PRESSURE: 150 MMHG | DIASTOLIC BLOOD PRESSURE: 90 MMHG | RESPIRATION RATE: 20 BRPM

## 2024-01-11 DIAGNOSIS — Z01.818 PRE-OPERATIVE CLEARANCE: ICD-10-CM

## 2024-01-11 DIAGNOSIS — Z01.810 ENCOUNTER FOR PREPROCEDURAL CARDIOVASCULAR EXAMINATION: ICD-10-CM

## 2024-01-11 LAB
CHEST PAIN STATEMENT: NORMAL
MAX DIASTOLIC BP: 100 MMHG
MAX HR PERCENT: 97 %
MAX HR: 144 BPM
MAX PREDICTED HEART RATE: 148 BPM
PROTOCOL NAME: NORMAL
RATE PRESSURE PRODUCT: NORMAL
SL CV REST NUCLEAR ISOTOPE DOSE: 10.6 MCI
SL CV STRESS NUCLEAR ISOTOPE DOSE: 32.82 MCI
SL CV STRESS RECOVERY BP: NORMAL MMHG
SL CV STRESS RECOVERY HR: 103 BPM
SL CV STRESS RECOVERY O2 SAT: 100 %
SL CV STRESS STAGE REACHED: 1
STRESS ANGINA INDEX: 0
STRESS BASELINE BP: NORMAL MMHG
STRESS BASELINE HR: 91 BPM
STRESS O2 SAT REST: 98 %
STRESS PEAK HR: 123 BPM
STRESS POST ESTIMATED WORKLOAD: 4.6 METS
STRESS POST EXERCISE DUR MIN: 3 MIN
STRESS POST EXERCISE DUR MIN: 3 MIN
STRESS POST EXERCISE DUR SEC: 14 SEC
STRESS POST EXERCISE DUR SEC: 14 SEC
STRESS POST O2 SAT PEAK: 98 %
STRESS POST PEAK BP: 210 MMHG
STRESS POST PEAK HR: 144 BPM
STRESS POST PEAK SYSTOLIC BP: 210 MMHG
TARGET HR FORMULA: NORMAL
TEST INDICATION: NORMAL

## 2024-01-11 PROCEDURE — 93016 CV STRESS TEST SUPVJ ONLY: CPT | Performed by: INTERNAL MEDICINE

## 2024-01-11 PROCEDURE — 93018 CV STRESS TEST I&R ONLY: CPT | Performed by: INTERNAL MEDICINE

## 2024-01-11 PROCEDURE — G1004 CDSM NDSC: HCPCS

## 2024-01-11 PROCEDURE — 78452 HT MUSCLE IMAGE SPECT MULT: CPT | Performed by: INTERNAL MEDICINE

## 2024-01-11 PROCEDURE — 93017 CV STRESS TEST TRACING ONLY: CPT

## 2024-01-11 PROCEDURE — A9502 TC99M TETROFOSMIN: HCPCS

## 2024-01-11 PROCEDURE — 78452 HT MUSCLE IMAGE SPECT MULT: CPT

## 2024-01-11 NOTE — PROGRESS NOTES
SWCM attempted to reach pt today with upcoming arthroplasty of left knee of 01/23/2024. Pt did confirm today that he is going to uber to procedure and his friend will be taking home. Pt was approved for T transportation and advised on how to use same. Today I inquired if pt has followed with any of the [facility] agencies I provided and he has not. Today pt said he is not convinced he will need any help after surgery because he is independent. I did advise pt that it is preferred he has caregiver support postoperatively. Pt states he will call. SWCM will follow up next week and inform NN that pt has not followed through with caregiver support. SWCM will remain available.

## 2024-01-12 ENCOUNTER — TELEPHONE (OUTPATIENT)
Dept: CARDIOLOGY CLINIC | Facility: CLINIC | Age: 74
End: 2024-01-12

## 2024-01-12 NOTE — TELEPHONE ENCOUNTER
----- Message from Denver Perez DO sent at 1/12/2024  3:54 PM EST -----  Stress test review.  There was a hypertensive blood pressure response during exercise.  Nuclear imaging was overall normal.  Study was low risk for events.  He may proceed with surgery.

## 2024-01-15 ENCOUNTER — TELEPHONE (OUTPATIENT)
Dept: LAB | Facility: HOSPITAL | Age: 74
End: 2024-01-15

## 2024-01-15 ENCOUNTER — EVALUATION (OUTPATIENT)
Dept: PHYSICAL THERAPY | Facility: CLINIC | Age: 74
End: 2024-01-15
Payer: MEDICARE

## 2024-01-15 DIAGNOSIS — Z01.818 PRE-OPERATIVE EXAMINATION: ICD-10-CM

## 2024-01-15 DIAGNOSIS — G89.29 CHRONIC PAIN OF BOTH KNEES: ICD-10-CM

## 2024-01-15 DIAGNOSIS — M17.0 PRIMARY OSTEOARTHRITIS OF BOTH KNEES: Primary | ICD-10-CM

## 2024-01-15 DIAGNOSIS — M25.562 CHRONIC PAIN OF BOTH KNEES: ICD-10-CM

## 2024-01-15 DIAGNOSIS — M25.561 CHRONIC PAIN OF BOTH KNEES: ICD-10-CM

## 2024-01-15 PROCEDURE — 97161 PT EVAL LOW COMPLEX 20 MIN: CPT | Performed by: PHYSICAL THERAPIST
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
## 2024-01-16 ENCOUNTER — PATIENT OUTREACH (OUTPATIENT)
Dept: FAMILY MEDICINE CLINIC | Facility: CLINIC | Age: 74
End: 2024-01-16

## 2024-01-16 NOTE — PROGRESS NOTES
Outgoing Call:  01/16/2024    CMOC called patient for scheduled outreach.    CMOC confirmed with patient that he has a schedule for his surgery. Patient explained that he does. However, when patient tried calling [facility] he could not get in touch with a representative. CMOC asked patient if he wanted me to add [facility] to the call. Before doing so, patient asked if he could confirm [facility]'s phone number with [facility]. Patient had the wrong number. Patient said that he would give them a call to schedule his rides.    Patient's needs have been met. Patient does not have more needs at this time. CMOC explained to patient that his case will be closed out. Patient is aware that [facility] can be called if needed.    CMOC will close [facility] referral and take name off care team.    No further outreach is needed.

## 2024-01-17 ENCOUNTER — PATIENT OUTREACH (OUTPATIENT)
Dept: OBGYN CLINIC | Facility: HOSPITAL | Age: 74
End: 2024-01-17

## 2024-01-17 NOTE — PROGRESS NOTES
[facility] contacted pt today for follow up on caregiver support. Per pt he contacted the self pay home health care support options, but they did not work out. Pt did not provide any additional detail. Pt stated he is working on another plan and I asked when that might in place and he said it is impossible to know. [facility] will provide this information to surgeon and NN. Pt requested SWCM follow up with him in two weeks. Per review of chart, pt was approved [facility] for transportation needs. [facility] will remain available and continue to support pt.

## 2024-01-18 ENCOUNTER — PATIENT OUTREACH (OUTPATIENT)
Dept: OBGYN CLINIC | Facility: HOSPITAL | Age: 74
End: 2024-01-18

## 2024-01-18 NOTE — PROGRESS NOTES
MALLORIE consulted with NN yesterday 01/17/2024 in regard to pts caregiver support. Pt shared today he has arranged for a family friend, Chele, to stay for 3 days postoperatively. Per pt his emergency contact is Chele, but not the same Chele. Pt plans to uber to surgery and his emergency contact Chele will be taking him home form surgery. Pt also worked with [facility] and was approved for T ( [facility] ). Pt shared today he has no additional needs. [facility] will remain available and continue to support pt.

## 2024-01-20 ENCOUNTER — HOSPITAL ENCOUNTER (OUTPATIENT)
Dept: MRI IMAGING | Facility: HOSPITAL | Age: 74
Discharge: HOME | End: 2024-01-20
Payer: MEDICARE

## 2024-01-20 DIAGNOSIS — Q16.5 TEGMEN DEFECT OF BASE OF SKULL: ICD-10-CM

## 2024-01-20 DIAGNOSIS — H90.6 MIXED CONDUCTIVE AND SENSORINEURAL HEARING LOSS OF BOTH EARS: ICD-10-CM

## 2024-01-20 PROCEDURE — G1004 CDSM NDSC: HCPCS

## 2024-01-20 PROCEDURE — 70553 MRI BRAIN STEM W/O & W/DYE: CPT

## 2024-01-20 PROCEDURE — A9585 GADOBUTROL INJECTION: HCPCS

## 2024-01-20 RX ORDER — GADOBUTROL 604.72 MG/ML
10 INJECTION INTRAVENOUS
Status: COMPLETED | OUTPATIENT
Start: 2024-01-20 | End: 2024-01-20

## 2024-01-20 RX ADMIN — GADOBUTROL 10 ML: 604.72 INJECTION INTRAVENOUS at 11:30

## 2024-01-21 NOTE — DISCHARGE INSTR - AVS FIRST PAGE
Total Knee Replacement     WHAT YOU SHOULD KNOW:   Total knee replacement is surgery to replace a knee joint damaged by wear, injury, or disease. It is also called a total knee arthroplasty. The knee joint is where your femur (thigh bone) and tibia (large lower leg bone or shin bone) meet. A small bone called the patella (kneecap) protects your knee joint.            AFTER YOU LEAVE:     Medicines:   Pain medicine:  You may be given a prescription medicine to decrease pain. Do not wait until the pain is severe before you take this medicine. We recommend that you take Tylenol 975mg every 8 hours for baseline pain control. Oxycodone can be used as needed, but no more than 1-2 tablets every 6 hours.    Stool softeners  make it easier for you to have a bowel movement. You may need this medicine to treat or prevent constipation.  You will be given Colace 100mg to take twice a day    Anticoagulants  are a type of blood thinner medicine that helps prevent clots. Clots can cause strokes, heart attacks, and death. These medicines may cause you to bleed or bruise more easily.  You will be given aspirin 325 mg to take twice a day for 6 weeks after surgery.    Watch for bleeding from your gums or nose. Watch for blood in your urine and bowel movements. Use a soft washcloth and a soft toothbrush. If you shave, use an electric razor. Avoid activities that can cause bruising or bleeding.    Take your medicine as directed.  Call your healthcare provider if you think your medicine is not helping or if you have side effects. Tell him if you are allergic to any medicine. Keep a list of the medicines, vitamins, and herbs you take. Include the amounts, and when and why you take them. Bring the list or the pill bottles to follow-up visits. Carry your medicine list with you in case of an emergency.    Follow up with your orthopedist as directed:  You may need to return to have your wound checked and stitches, staples, or drain removed.  Write down your questions so you remember to ask them during your visits.  Please make an appointment to see Dr. Moscoso 2 weeks postoperatively.    Physical therapy:  A physical therapist teaches you exercises to help improve movement and strength, and to decrease pain. You will begin outpatient physical therapy later this week as planned.     Self-care:   Wound Care:  Please leave the Mepilex dressing in place for 7 days after surgery. After 7 days, you may remove the dressing and leave the incision open to air.  If the incision is uncomfortable under clothing after the Mepilex is removed, you may cover it with a a dry sterile dressing. Once the Mepilex dressing has been removed, please keep the incision dry for another week until your follow up appointment.    Use ice:  Ice helps decrease swelling and pain. Ice may also help prevent tissue damage. Use an ice pack or put crushed ice in a plastic bag. Cover it with a towel, and place it on your knee for 15 to 20 minutes every hour as directed.    Use a cane, walker, or crutches as directed:  These devices will help decrease your risk of falling. Your therapist will guide you about transitioning from a walker to cane to no assistive device.    Wear pressure stockings:  These are long, tight stockings that put pressure on your legs to promote blood flow and prevent clots. You may remove the stocking on your non-operative leg when you get home. The stocking on your operative leg should remain on for 2-3 days. Afterwards, you may put the stocking on or off as needed for swelling.             Contact your orthopedist if:  You have a fever over 101.0°F.    You have trouble moving or bending your joint.    You have increased pain and swelling in your joint, even after you take pain medicine.    You have back pain or lower leg pain when you bend your foot upwards.    You have questions or concerns about your condition or care.    Blood soaks through/saturates your  bandage.    Your incision comes apart.    Your incision is red, swollen, or draining pus.    You cannot walk or move your joint, or the limb is numb.    Your leg feels warm, tender, and painful. It may look swollen and red.     Seek immediate care or call 911 if:   You feel like you are going to faint.    You have a seizure or feel confused.     You feel lightheaded, short of breath, and have chest pain.    You have chest pain when you take a deep breath or cough. You may cough up blood.    © 2014 [employer]. Information is for End User's use only and may not be sold, redistributed or otherwise used for commercial purposes. All illustrations and images included in CareNotes® are the copyrighted property of [employer] Inc. or [employer]  The above information is an  only. It is not intended as medical advice for individual conditions or treatments. Talk to your doctor, nurse or pharmacist before following any medical regimen to see if it is safe and effective for you.

## 2024-01-23 ENCOUNTER — APPOINTMENT (OUTPATIENT)
Dept: RADIOLOGY | Facility: HOSPITAL | Age: 74
End: 2024-01-23
Payer: MEDICARE

## 2024-01-23 ENCOUNTER — HOSPITAL ENCOUNTER (OUTPATIENT)
Facility: HOSPITAL | Age: 74
Setting detail: OUTPATIENT SURGERY
Discharge: HOME | End: 2024-01-24
Attending: ORTHOPAEDIC SURGERY | Admitting: ORTHOPAEDIC SURGERY
Payer: MEDICARE

## 2024-01-23 ENCOUNTER — ANESTHESIA (OUTPATIENT)
Dept: PERIOP | Facility: HOSPITAL | Age: 74
End: 2024-01-23
Payer: MEDICARE

## 2024-01-23 DIAGNOSIS — Z96.652 S/P TOTAL KNEE REPLACEMENT USING CEMENT, LEFT: Primary | ICD-10-CM

## 2024-01-23 PROBLEM — M19.90 ARTHRITIS: Status: ACTIVE | Noted: 2024-01-23

## 2024-01-23 LAB — GLUCOSE SERPL-MCNC: 111 MG/DL (ref 65–140)

## 2024-01-23 PROCEDURE — 82948 REAGENT STRIP/BLOOD GLUCOSE: CPT

## 2024-01-23 PROCEDURE — 97110 THERAPEUTIC EXERCISES: CPT

## 2024-01-23 PROCEDURE — S2900 ROBOTIC SURGICAL SYSTEM: HCPCS | Performed by: ORTHOPAEDIC SURGERY

## 2024-01-23 PROCEDURE — 97163 PT EVAL HIGH COMPLEX 45 MIN: CPT

## 2024-01-23 PROCEDURE — C1776 JOINT DEVICE (IMPLANTABLE): HCPCS | Performed by: ORTHOPAEDIC SURGERY

## 2024-01-23 PROCEDURE — 73560 X-RAY EXAM OF KNEE 1 OR 2: CPT

## 2024-01-23 PROCEDURE — C9290 INJ, BUPIVACAINE LIPOSOME: HCPCS | Performed by: ANESTHESIOLOGY

## 2024-01-23 PROCEDURE — 27447 TOTAL KNEE ARTHROPLASTY: CPT | Performed by: PHYSICIAN ASSISTANT

## 2024-01-23 PROCEDURE — 99024 POSTOP FOLLOW-UP VISIT: CPT | Performed by: ORTHOPAEDIC SURGERY

## 2024-01-23 PROCEDURE — C1713 ANCHOR/SCREW BN/BN,TIS/BN: HCPCS | Performed by: ORTHOPAEDIC SURGERY

## 2024-01-23 PROCEDURE — 27447 TOTAL KNEE ARTHROPLASTY: CPT | Performed by: ORTHOPAEDIC SURGERY

## 2024-01-23 DEVICE — ATTUNE PATELLA MEDIALIZED DOME 38MM CEMENTED AOX
Type: IMPLANTABLE DEVICE | Site: KNEE | Status: FUNCTIONAL
Brand: ATTUNE

## 2024-01-23 DEVICE — PALACOS® R IS A FAST-CURING, RADIOPAQUE, POLY(METHYL METHACRYLATE)-BASED BONE CEMENT.PALACOS ® R CONTAINS THE X-RAY CONTRAST MEDIUM ZIRCONIUM DIOXIDE. TO IMPROVE VISIBILITY IN THE SURGICAL FIELD PALACOS ® R HAS BEEN COLOURED WITH CHLOROPHYLL (E141). THE BONE CEMENT IS PREPARED DIRECTLY BEFORE USE BY MIXING A POLYMER POWDER COMPONENT WITH A LIQUID MONOMER COMPONENT. A DUCTILE DOUGH FORMS WHICH CURES WITHIN A FEW MINUTES.
Type: IMPLANTABLE DEVICE | Site: KNEE | Status: FUNCTIONAL
Brand: PALACOS®

## 2024-01-23 DEVICE — ATTUNE KNEE SYSTEM TIBIAL INSERT FIXED BEARING MEDIAL STABILIZED LEFT AOX 5, 8MM
Type: IMPLANTABLE DEVICE | Site: KNEE | Status: FUNCTIONAL
Brand: ATTUNE

## 2024-01-23 DEVICE — ATTUNE KNEE SYSTEM TIBIAL BASE FIXED BEARING SIZE 6 CEMENTED
Type: IMPLANTABLE DEVICE | Site: KNEE | Status: FUNCTIONAL
Brand: ATTUNE

## 2024-01-23 DEVICE — ATTUNE KNEE SYSTEM FEMORAL CRUCIATE RETAINING SIZE 5 LEFT CEMENTED
Type: IMPLANTABLE DEVICE | Site: KNEE | Status: FUNCTIONAL
Brand: ATTUNE

## 2024-01-23 RX ORDER — MIDAZOLAM HYDROCHLORIDE 2 MG/2ML
INJECTION, SOLUTION INTRAMUSCULAR; INTRAVENOUS AS NEEDED
Status: DISCONTINUED | OUTPATIENT
Start: 2024-01-23 | End: 2024-01-23

## 2024-01-23 RX ORDER — ACETAMINOPHEN 325 MG/1
975 TABLET ORAL ONCE
Status: COMPLETED | OUTPATIENT
Start: 2024-01-23 | End: 2024-01-23

## 2024-01-23 RX ORDER — CHLORHEXIDINE GLUCONATE ORAL RINSE 1.2 MG/ML
15 SOLUTION DENTAL ONCE
Status: COMPLETED | OUTPATIENT
Start: 2024-01-23 | End: 2024-01-23

## 2024-01-23 RX ORDER — BUPIVACAINE HYDROCHLORIDE 7.5 MG/ML
INJECTION, SOLUTION INTRASPINAL AS NEEDED
Status: DISCONTINUED | OUTPATIENT
Start: 2024-01-23 | End: 2024-01-23

## 2024-01-23 RX ORDER — FENTANYL CITRATE 50 UG/ML
INJECTION, SOLUTION INTRAMUSCULAR; INTRAVENOUS AS NEEDED
Status: DISCONTINUED | OUTPATIENT
Start: 2024-01-23 | End: 2024-01-23

## 2024-01-23 RX ORDER — ONDANSETRON 2 MG/ML
INJECTION INTRAMUSCULAR; INTRAVENOUS AS NEEDED
Status: DISCONTINUED | OUTPATIENT
Start: 2024-01-23 | End: 2024-01-23

## 2024-01-23 RX ORDER — OXYCODONE HYDROCHLORIDE 5 MG/1
5 TABLET ORAL EVERY 4 HOURS PRN
Status: DISCONTINUED | OUTPATIENT
Start: 2024-01-23 | End: 2024-01-24 | Stop reason: HOSPADM

## 2024-01-23 RX ORDER — BUPIVACAINE HYDROCHLORIDE 5 MG/ML
INJECTION, SOLUTION EPIDURAL; INTRACAUDAL
Status: DISCONTINUED | OUTPATIENT
Start: 2024-01-23 | End: 2024-01-23

## 2024-01-23 RX ORDER — POTASSIUM CITRATE 10 MEQ/1
20 TABLET, EXTENDED RELEASE ORAL
Status: DISCONTINUED | OUTPATIENT
Start: 2024-01-23 | End: 2024-01-24

## 2024-01-23 RX ORDER — SODIUM CHLORIDE 9 MG/ML
75 INJECTION, SOLUTION INTRAVENOUS CONTINUOUS
Status: DISCONTINUED | OUTPATIENT
Start: 2024-01-23 | End: 2024-01-24 | Stop reason: HOSPADM

## 2024-01-23 RX ORDER — CEFAZOLIN SODIUM 2 G/50ML
2000 SOLUTION INTRAVENOUS ONCE
Status: COMPLETED | OUTPATIENT
Start: 2024-01-23 | End: 2024-01-23

## 2024-01-23 RX ORDER — PANTOPRAZOLE SODIUM 40 MG/1
40 TABLET, DELAYED RELEASE ORAL DAILY
Status: DISCONTINUED | OUTPATIENT
Start: 2024-01-23 | End: 2024-01-24 | Stop reason: HOSPADM

## 2024-01-23 RX ORDER — DEXAMETHASONE SODIUM PHOSPHATE 10 MG/ML
INJECTION, SOLUTION INTRAMUSCULAR; INTRAVENOUS AS NEEDED
Status: DISCONTINUED | OUTPATIENT
Start: 2024-01-23 | End: 2024-01-23

## 2024-01-23 RX ORDER — MAGNESIUM HYDROXIDE 1200 MG/15ML
LIQUID ORAL AS NEEDED
Status: DISCONTINUED | OUTPATIENT
Start: 2024-01-23 | End: 2024-01-23 | Stop reason: HOSPADM

## 2024-01-23 RX ORDER — ASPIRIN 325 MG
325 TABLET ORAL 2 TIMES DAILY
Status: DISCONTINUED | OUTPATIENT
Start: 2024-01-23 | End: 2024-01-24 | Stop reason: HOSPADM

## 2024-01-23 RX ORDER — DEXAMETHASONE SODIUM PHOSPHATE 10 MG/ML
10 INJECTION, SOLUTION INTRAMUSCULAR; INTRAVENOUS ONCE
Status: COMPLETED | OUTPATIENT
Start: 2024-01-24 | End: 2024-01-24

## 2024-01-23 RX ORDER — ONDANSETRON 2 MG/ML
4 INJECTION INTRAMUSCULAR; INTRAVENOUS ONCE AS NEEDED
Status: DISCONTINUED | OUTPATIENT
Start: 2024-01-23 | End: 2024-01-23 | Stop reason: HOSPADM

## 2024-01-23 RX ORDER — MAGNESIUM HYDROXIDE/ALUMINUM HYDROXICE/SIMETHICONE 120; 1200; 1200 MG/30ML; MG/30ML; MG/30ML
30 SUSPENSION ORAL EVERY 6 HOURS PRN
Status: DISCONTINUED | OUTPATIENT
Start: 2024-01-23 | End: 2024-01-24 | Stop reason: HOSPADM

## 2024-01-23 RX ORDER — HYDROMORPHONE HCL/PF 1 MG/ML
0.5 SYRINGE (ML) INJECTION EVERY 2 HOUR PRN
Status: DISCONTINUED | OUTPATIENT
Start: 2024-01-23 | End: 2024-01-24 | Stop reason: HOSPADM

## 2024-01-23 RX ORDER — DOCUSATE SODIUM 100 MG/1
100 CAPSULE, LIQUID FILLED ORAL 2 TIMES DAILY
Status: DISCONTINUED | OUTPATIENT
Start: 2024-01-23 | End: 2024-01-24 | Stop reason: HOSPADM

## 2024-01-23 RX ORDER — OXYCODONE HYDROCHLORIDE 10 MG/1
10 TABLET ORAL EVERY 4 HOURS PRN
Status: DISCONTINUED | OUTPATIENT
Start: 2024-01-23 | End: 2024-01-24 | Stop reason: HOSPADM

## 2024-01-23 RX ORDER — GABAPENTIN 300 MG/1
300 CAPSULE ORAL ONCE
Status: COMPLETED | OUTPATIENT
Start: 2024-01-23 | End: 2024-01-23

## 2024-01-23 RX ORDER — PHENYLEPHRINE HCL IN 0.9% NACL 1 MG/10 ML
SYRINGE (ML) INTRAVENOUS AS NEEDED
Status: DISCONTINUED | OUTPATIENT
Start: 2024-01-23 | End: 2024-01-23

## 2024-01-23 RX ORDER — ONDANSETRON 2 MG/ML
4 INJECTION INTRAMUSCULAR; INTRAVENOUS EVERY 6 HOURS PRN
Status: DISCONTINUED | OUTPATIENT
Start: 2024-01-23 | End: 2024-01-24 | Stop reason: HOSPADM

## 2024-01-23 RX ORDER — FENTANYL CITRATE/PF 50 MCG/ML
25 SYRINGE (ML) INJECTION
Status: DISCONTINUED | OUTPATIENT
Start: 2024-01-23 | End: 2024-01-23 | Stop reason: HOSPADM

## 2024-01-23 RX ORDER — GABAPENTIN 100 MG/1
200 CAPSULE ORAL 2 TIMES DAILY
Status: DISCONTINUED | OUTPATIENT
Start: 2024-01-23 | End: 2024-01-24 | Stop reason: HOSPADM

## 2024-01-23 RX ORDER — OXYCODONE HCL 10 MG/1
10 TABLET, FILM COATED, EXTENDED RELEASE ORAL ONCE
Status: COMPLETED | OUTPATIENT
Start: 2024-01-23 | End: 2024-01-23

## 2024-01-23 RX ORDER — LIDOCAINE HYDROCHLORIDE 10 MG/ML
INJECTION, SOLUTION EPIDURAL; INFILTRATION; INTRACAUDAL; PERINEURAL AS NEEDED
Status: DISCONTINUED | OUTPATIENT
Start: 2024-01-23 | End: 2024-01-23

## 2024-01-23 RX ORDER — TRANEXAMIC ACID 10 MG/ML
1000 INJECTION, SOLUTION INTRAVENOUS ONCE
Status: COMPLETED | OUTPATIENT
Start: 2024-01-23 | End: 2024-01-23

## 2024-01-23 RX ORDER — ACETAMINOPHEN 325 MG/1
975 TABLET ORAL EVERY 8 HOURS
Status: DISCONTINUED | OUTPATIENT
Start: 2024-01-23 | End: 2024-01-24 | Stop reason: HOSPADM

## 2024-01-23 RX ORDER — CEFAZOLIN SODIUM 2 G/50ML
2000 SOLUTION INTRAVENOUS EVERY 8 HOURS
Status: COMPLETED | OUTPATIENT
Start: 2024-01-23 | End: 2024-01-24

## 2024-01-23 RX ORDER — PROPOFOL 10 MG/ML
INJECTION, EMULSION INTRAVENOUS CONTINUOUS PRN
Status: DISCONTINUED | OUTPATIENT
Start: 2024-01-23 | End: 2024-01-23

## 2024-01-23 RX ORDER — SODIUM CHLORIDE, SODIUM LACTATE, POTASSIUM CHLORIDE, CALCIUM CHLORIDE 600; 310; 30; 20 MG/100ML; MG/100ML; MG/100ML; MG/100ML
125 INJECTION, SOLUTION INTRAVENOUS CONTINUOUS
Status: DISCONTINUED | OUTPATIENT
Start: 2024-01-23 | End: 2024-01-23

## 2024-01-23 RX ADMIN — CEFAZOLIN SODIUM 2000 MG: 2 SOLUTION INTRAVENOUS at 09:01

## 2024-01-23 RX ADMIN — FENTANYL CITRATE 25 MCG: 50 INJECTION INTRAMUSCULAR; INTRAVENOUS at 10:56

## 2024-01-23 RX ADMIN — POTASSIUM CITRATE 20 MEQ: 10 TABLET, EXTENDED RELEASE ORAL at 17:26

## 2024-01-23 RX ADMIN — DEXAMETHASONE SODIUM PHOSPHATE 10 MG: 10 INJECTION, SOLUTION INTRAMUSCULAR; INTRAVENOUS at 09:59

## 2024-01-23 RX ADMIN — FENTANYL CITRATE 25 MCG: 50 INJECTION INTRAMUSCULAR; INTRAVENOUS at 11:11

## 2024-01-23 RX ADMIN — BUPIVACAINE 20 ML: 13.3 INJECTION, SUSPENSION, LIPOSOMAL INFILTRATION at 11:58

## 2024-01-23 RX ADMIN — CHLORHEXIDINE GLUCONATE 15 ML: 1.2 RINSE ORAL at 07:19

## 2024-01-23 RX ADMIN — ASPIRIN 325 MG: 325 TABLET ORAL at 21:16

## 2024-01-23 RX ADMIN — ACETAMINOPHEN 975 MG: 325 TABLET ORAL at 21:16

## 2024-01-23 RX ADMIN — PANTOPRAZOLE SODIUM 40 MG: 40 TABLET, DELAYED RELEASE ORAL at 14:07

## 2024-01-23 RX ADMIN — BUPIVACAINE HYDROCHLORIDE 5 ML: 5 INJECTION, SOLUTION EPIDURAL; INTRACAUDAL; PERINEURAL at 11:58

## 2024-01-23 RX ADMIN — PHENYLEPHRINE HYDROCHLORIDE 100 MCG: 10 INJECTION INTRAVENOUS at 09:40

## 2024-01-23 RX ADMIN — ACETAMINOPHEN 975 MG: 325 TABLET ORAL at 14:07

## 2024-01-23 RX ADMIN — ACETAMINOPHEN 975 MG: 325 TABLET ORAL at 07:20

## 2024-01-23 RX ADMIN — LIDOCAINE HYDROCHLORIDE 20 MG: 10 INJECTION, SOLUTION EPIDURAL; INFILTRATION; INTRACAUDAL at 09:24

## 2024-01-23 RX ADMIN — MIDAZOLAM 2 MG: 1 INJECTION INTRAMUSCULAR; INTRAVENOUS at 09:05

## 2024-01-23 RX ADMIN — DEXAMETHASONE SODIUM PHOSPHATE 10 MG: 10 INJECTION, SOLUTION INTRAMUSCULAR; INTRAVENOUS at 09:39

## 2024-01-23 RX ADMIN — FENTANYL CITRATE 25 MCG: 50 INJECTION INTRAMUSCULAR; INTRAVENOUS at 09:33

## 2024-01-23 RX ADMIN — OXYCODONE HYDROCHLORIDE 10 MG: 10 TABLET, FILM COATED, EXTENDED RELEASE ORAL at 07:20

## 2024-01-23 RX ADMIN — PROPOFOL 100 MCG/KG/MIN: 10 INJECTION, EMULSION INTRAVENOUS at 09:24

## 2024-01-23 RX ADMIN — SODIUM CHLORIDE, SODIUM LACTATE, POTASSIUM CHLORIDE, AND CALCIUM CHLORIDE 125 ML/HR: .6; .31; .03; .02 INJECTION, SOLUTION INTRAVENOUS at 07:14

## 2024-01-23 RX ADMIN — PHENYLEPHRINE HYDROCHLORIDE 40 MCG/MIN: 10 INJECTION INTRAVENOUS at 09:53

## 2024-01-23 RX ADMIN — SODIUM CHLORIDE, SODIUM LACTATE, POTASSIUM CHLORIDE, AND CALCIUM CHLORIDE: .6; .31; .03; .02 INJECTION, SOLUTION INTRAVENOUS at 11:12

## 2024-01-23 RX ADMIN — Medication 100 MCG: at 09:52

## 2024-01-23 RX ADMIN — DOCUSATE SODIUM 100 MG: 100 CAPSULE, LIQUID FILLED ORAL at 17:26

## 2024-01-23 RX ADMIN — SODIUM CHLORIDE 75 ML/HR: 0.9 INJECTION, SOLUTION INTRAVENOUS at 14:08

## 2024-01-23 RX ADMIN — GABAPENTIN 200 MG: 100 CAPSULE ORAL at 17:26

## 2024-01-23 RX ADMIN — BUPIVACAINE HYDROCHLORIDE IN DEXTROSE 1.8 ML: 7.5 INJECTION, SOLUTION SUBARACHNOID at 09:15

## 2024-01-23 RX ADMIN — ONDANSETRON 4 MG: 2 INJECTION INTRAMUSCULAR; INTRAVENOUS at 09:29

## 2024-01-23 RX ADMIN — GABAPENTIN 300 MG: 300 CAPSULE ORAL at 07:20

## 2024-01-23 RX ADMIN — FENTANYL CITRATE 25 MCG: 50 INJECTION INTRAMUSCULAR; INTRAVENOUS at 10:33

## 2024-01-23 RX ADMIN — CEFAZOLIN SODIUM 2000 MG: 2 SOLUTION INTRAVENOUS at 17:26

## 2024-01-23 RX ADMIN — TRANEXAMIC ACID 1000 MG: 10 INJECTION, SOLUTION INTRAVENOUS at 09:30

## 2024-01-23 NOTE — OP NOTE
OPERATIVE REPORT  PATIENT NAME: Swapnil Grover  : 1950  MRN: [id number]  Pt Location:  [facility] OR ROOM 03    Surgery Date: 2024    Surgeons and Role:     * Bassem Moscoso, DO - Primary     * Hill Reynoso PA-C - Assisting, no qualified resident was available an assistant was needed for patient positioning, prepping and draping, soft tissue retraction, protection of vital structures throughout the case, exposure of the femur and tibia for robotic assisted resection and implantation of final prosthesis, superficial closure, and to complete the case safely.      * Bo Harp,  ATC/OTC - 2nd Assist\     Preop Diagnosis:  Primary osteoarthritis of left knee  Chronic pain left knee    Postop diagnosis:  Primary osteoarthritis of left knee  Chronic pain left knee    Procedure(s):  Left - ARTHROPLASTY KNEE TOTAL W ROBOT - cemented    Specimens:  * No specimens in log *    Estimated Blood Loss:   10 mL    Drains:  Urethral Catheter Latex 16 Fr. (Active)   Site Assessment Clean;Skin intact 24 1125   Collection Container Standard drainage bag 24 1125   Securement Method Securing device (Describe) 24 1125   Number of days: 0       Ureteral Internal Stent Right ureter 6 Fr. (Active)   Number of days: 397     Anesthesia Type:   Spinal with sedation, postoperative adductor canal block with Exparel    Intravenous fluids: 900 cc    Antibiotics: Ancef 2 g    Urine output: Kiser: 100 cc    Tourniquet time: 54 minutes at 250 mmHg    Implant Name Type Inv. Item Serial No.  Lot No. LRB No. Used Action   CEMENT BN 40 GM PALACOS RADIOPAQUE W/O ANTIBIOTIC - [id number]  CEMENT BN 40 GM PALACOS RADIOPAQUE W/O ANTIBIOTIC  [employer] [id number] Left 1 Implanted   CEMENT BN 40 GM PALACOS RADIOPAQUE W/O ANTIBIOTIC - [id number]  CEMENT BN 40 GM PALACOS RADIOPAQUE W/O ANTIBIOTIC  [employer] [id number] Left 1 Implanted   COMPONENT PATELLA 38MM MEDIAL DOME ATTUNE - [id number]   COMPONENT PATELLA 38MM MEDIAL DOME ATTUNE  DEPUY [id number] Left 1 Implanted   INSERT TIB SZ 5 8MM LT FX BRNG MED STAB ATTUNE - [id number]  INSERT TIB SZ 5 8MM LT FX BRNG MED STAB ATTUNE  DEPUY [id number] Left 1 Implanted   BASEPLATE TIBIAL SZ 6 CMNT FX BRNG ATTUNE S PLUS - [id number]  BASEPLATE TIBIAL SZ 6 CMNT FX BRNG ATTUNE S PLUS  DEPUY [id number] Left 1 Implanted   COMPONENT FEM SZ 5 LT CMNT CR ATTUNE - [id number]  COMPONENT FEM SZ 5 LT CMNT CR ATTUNE  DEPUY [id number] Left 1 Implanted     Operative Indications:  Primary osteoarthritis of left knee  Chronic pain left knee  Patient is a very pleasant 73-year-old male known to me for treatment of his activity related left knee pain.  The patient has attempted multiple forms of conservative measure treatment and all have failed to provide long-lasting relief of his discomfort.  With failed conservative treatment, persistent activity related pain, and severe osteoarthritis on x-ray recommended he undergo robotic assisted left total knee arthroplasty.  He was agreeable to this.  Consents were signed and placed in the chart.  Patient was optimized by his medical subspecialist in preparation for the procedure.  Patient was deemed optimized and cleared therefore underwent robotic assisted left total knee arthroplasty 1/23/2024.    Operative Findings:  Intraoperatively the patient was found to have a range of motion from 3 degrees of flexion to 115 degrees of flexion with a 15 degree varus deformity of the left lower extremity.  There is grade 4 degenerative change in all 3 compartments of the knee.  Robotic assisted left total knee arthroplasty was successfully performed without complication.  The final construct had excellent range of motion from 0 to 135 degrees with excellent stability at 0 degrees 30 degrees and 90 degrees.  The limb was restored to 5 degrees of varus alignment.  The patella tracked midline and flat.  After closure of the arthrotomy range of motion,  stability, patellar tracking were unchanged.  Patient tolerated procedure well.  There were no complications.    Complications:   None    Knee Approach: Medial Parapatellar    Procedure and Technique:  The patient was identified and marked in the preoperative holding area. Final questions were addressed. Consents were visualized for correct laterality and the intended procedure. Patient was taken back to the operating room where they were transferred to the operating room table and administered spinal anesthesia by the anesthesia staff. Tourniquet was then applied to the left thigh. The right lower extremity was draped over the foot break in the bed after the split leg attachment was removed, and the popliteal fossa was well padded and the leg was secured in the flexed position off of the operating table.  The left lower extremity was prepped and draped in the standard sterile fashion with the addition of the knee positioner.  The patient was administered 2 g of IV Ancef. Tranexamic acid was administered by the anesthesia staff.  A final timeout was performed and all members of the operating room staff were in agreement of the intended procedure and the correct laterality was confirmed.  An anterior knee incision was marked over the anterior left knee and carried over the medial portion of the patella down medial to the tibial tubercle.  The leg was exsanguinated and the tourniquet was inflated to 250 mmHg.  An incision was made over the anterior knee using a scalpel, and sharp dissection was carried deep through Irena's fascia creating full thickness flaps.  The extensor mechanism was identified.  A standard medial parapatellar arthrotomy was performed using a scalpel, and upon doing so benign-appearing yellow intra-articular fluid was expressed.  The anterior horn of the medial and lateral meniscus was removed. 50% of the patellar fat pad was removed for proper exposure. The distal arthrotomy was used to initiate  a medial release around the proximal tibia at the joint line to the mid coronal plane.  On inspection there was diffuse grade 4 degenerative change in the medial compartment, lateral compartment, and patellofemoral compartment.  Preparations were made for robotic assisted total knee arthroplasty.  The periarticular osteophytes were removed from around the distal femur, proximal tibia, and intercondylar notch.  The remnants of the ACL and the PCL were removed electrocautery.  The visualized portions of the medial and lateral meniscus were removed.  The distal femur and proximal tibial arrays were placed without complication.  The checkpoints were created the distal femur proximal tibia.  The hip center was captured.  Anatomic registration was carried out in sequence.  Range of motion was evaluated and the knee was in 15 degrees of varus, and the range of motion was from 3-115 degrees.  The Proadjust graph was created.  Through manipulating the position of the femoral and tibial implants a well-balanced Proadjust graph was created and this was excepted as the intraoperative plan.  The robot was brought into the surgical field.  The retractors were placed around the distal femur proximal tibia.  Robotic assisted resection was performed of the distal femur in sequence without damage to the periarticular tissues.  The tibia was subluxed anteriorly and the proximal tibia was resected without complication.  All bony fragments were removed and resection appeared to be complete.  The knee was brought out into the full extension in the posterior compartment was evaluated.  The remnants of the medial and lateral meniscus were removed with electrocautery.  Based on the intraoperative plan a size 5 femoral notch guide was placed and the trochlea was prepared.  The size 5 left CR trial was placed upon the distal femur and a size 8 polyethylene medial stabilized insert was placed into the articulation.  The knee was cycled through  a range of motion and is range of motion was evaluated.  The overall alignment of the limb was restored to 5 degrees of varus, and range of motion was from 0-135 degrees.  This construct had excellent stability at 0 degrees, 30 degrees, 90 degrees.  This was deemed to be the final construct.  The arrays were removed from the distal femur proximal tibia without complication.  The lug holes in the femur were drilled.  The trials were removed and the tibia was subluxed anteriorly.  The tibia was sized and was found that a size 6 base plate would be appropriate.  This was aligned with the medial 1/3 of the proximal tibial tubercle and pinned into place.  The tibia was then reamed, broached, and finished in sequence.  The base plate was removed.    Attention was then turned to the patella. The osteo synovial reflection was revealed using a Bovie. The patella height measured 26 millimeters prior to resection. The patella was sized and found to be a 38 mm patellar button. The cutting guide was set for the appropriate depth and the patella was evenly resected using oscillating saw. The 38 mm patellar button was then medialized over the patella and the lug holes were drilled. A trial patella button was placed upon the patella and the pre osteotomy patellar height was restored.  A lateral facetectomy of the patella was performed. The soft tissues of the posterior capsule were cauterized using Bovie to ensure hemostasis. The posterior capsule and medial and lateral periarticular soft tissues were injected with a periarticular analgesic cocktail.     The knee was again irrigated in preparation for cementation.  The tibia was subluxed and all retractors were in place for cementation when final implants were confirmed and placed upon the back table.  2 bags of Palacos cement without gentamicin were mixed and the final size 6  S+ tibial implant, 8 mm AOX CR medial stabilized polyethylene insert, size 5 left CR femur, and 38 mm  patella button were cemented in sequence.  Excess cement was removed after each implant was in place.  As the cement cured the remainder of the periarticular pain cocktail was injected into the soft tissues around the knee.  Irrigation then followed with IrriSept followed by normal saline solution.  After the cement had cured the joint was inspected for any residual loose bodies of cement and these were removed. The tourniquet was released after 54 minutes at 250 mmHg.     The tracking and stability of the final components were assessed. The patella tracked midline without tilt, and the knee was stable in both flexion and extension, coronal stability was unchanged, and the knee demonstrated range of motion from 0-135°.  The knee was again irrigated and a very conservative partial synovectomy was performed.  Hemostasis was achieved using electrocautery.       Closure began using a #1 barbed bidirectional suture for the arthrotomy.  After closure of the arthrotomy range of motion to gravity was tested and was found to be 0-135° of flexion. Quarter percent Marcaine plain was injected in the subcutaneous soft tissues.  The deep subcutaneous tissues were reapproximated with undyed 0 Vicryl, the superficial subcutaneous tissues were reapproximated with undyed 2-0 Vicryl, the skin edges reapproximated with a running 3-0 bidirectional barbed Monocryl suture, and the skin edges were sealed with Exofin.  After the Exofin had dried a silver impregnated Mepilex dressing was placed over the incision.  The drapes were removed and MARTY stockings were placed on the bilateral lower extremities. Patient was then awakened from anesthesia without difficulty, and transferred to PACU in stable condition.      I was present for all critical portions of the procedure.    Patient Disposition:  PACU       SIGNATURE: Bassem Moscoso DO  DATE: January 23, 2024  TIME: 1:17 PM

## 2024-01-23 NOTE — PROGRESS NOTES
"Progress Note - Orthopedics   Swapnil Grover 73 y.o. male MRN: [id number]  Unit/Bed#: OR POOL Encounter: [id number]    Assessment:  1)POD#0 s/p RA L TKA    Plan:  Ancef 2g IV x 2 for 24 hours postop  DVT prophylaxis: ASA 325mg PO BID/SCD's/Ambulation  WBAT LLE  PT/OT- WBAT LLE  Analgesia PRN  Follow up AM labs  D/c dover in AM POD #1  Dressing- monitor for drainage  Discharge planning -this position pending progress with PT postoperatively.  Plan for discharge to home to start outpatient physical therapy later this week.    Weight bearing: WBAT LLE    VTE Pharmacologic Prophylaxis: ASA 325mg PO BID  VTE Mechanical Prophylaxis: sequential compression device    Subjective: Patient seen and examined in PACU.  Pain controlled.  Events of surgery discussed with the patient and the patient's friend via telephone.    Vitals: Blood pressure 131/76, pulse 73, temperature (!) 97.2 °F (36.2 °C), resp. rate 18, height 5' 7\" (1.702 m), weight 101 kg (223 lb 8.7 oz), SpO2 96%.,Body mass index is 35.01 kg/m².      Intake/Output Summary (Last 24 hours) at 1/23/2024 1323  Last data filed at 1/23/2024 1112  Gross per 24 hour   Intake 1000 ml   Output 110 ml   Net 890 ml       Invasive Devices       Peripheral Intravenous Line  Duration             Peripheral IV 01/23/24 Right Wrist <1 day              Drain  Duration             Ureteral Internal Stent Right ureter 6 Fr. 397 days    Urethral Catheter Latex 16 Fr. <1 day                    Physical Exam: NAD  Ortho Exam: LLE: Dsg c/d/i, compartments soft, calf non-tender, +PF/DF/EHL, +DP/SP/Saph/Sural SILT, DP 2+, foot warm    Lab, Imaging and other studies: PO XR L knee: Reviewed and acceptable    Bassem Moscoso D.O.  Division of Adult Reconstruction  Department of Orthopaedic Surgery  [facility]        "

## 2024-01-23 NOTE — PLAN OF CARE
Problem: PHYSICAL THERAPY ADULT  Goal: Performs mobility at highest level of function for planned discharge setting.  See evaluation for individualized goals.  Description: Treatment/Interventions: Functional transfer training, LE strengthening/ROM, Therapeutic exercise, Endurance training, Bed mobility, Gait training, Spoke to nursing, ADL retraining          See flowsheet documentation for full assessment, interventions and recommendations.  Note: Prognosis: Good  Problem List: Decreased strength, Decreased range of motion, Decreased endurance, Decreased mobility, Impaired balance, Pain, Decreased skin integrity, Decreased safety awareness  Assessment: Patient seen for Physical Therapy evaluation. Patient admitted with s/p L TKA. Comorbidities affecting patient's physical performance include: arthritis, CKD, hypertension, hyperlipidemia, and osteoarthritis.  Personal factors affecting patient at time of initial evaluation include: lives in 2 story house, ambulating with assistive device, stairs to enter home, inability to navigate community distances, inability to navigate level surfaces without external assistance, inability to perform IADLS , and inability to live alone. Prior to admission, patient was independent with functional mobility without assistive device, independent with ADLS, independent with IADLS, living alone in 2 story home with 4 steps to enter, ambulating household distance, and ambulating community distances.  Please find objective findings from Physical Therapy assessment regarding body systems outlined above with impairments and limitations including weakness, decreased ROM, impaired balance, decreased endurance, gait deviations, pain, decreased activity tolerance, decreased functional mobility tolerance, fall risk, and decreased skin integrity.  The Barthel Index was used as a functional outcome tool presenting with a score of Barthel Index Score: 40 today indicating marked limitations of  functional mobility and ADLS.  Patient's clinical presentation is currently unstable/unpredictable as seen in patient's presentation of changing level of pain, increased fall risk, new onset of impairment of functional mobility, decreased endurance, and new onset of weakness. Pt would benefit from continued Physical Therapy treatment to address deficits as defined above and maximize level of functional mobility. As demonstrated by objective findings, the assigned level of complexity for this evaluation is high.The patient's AM-[facility] Basic Mobility Inpatient Short Form Raw Score is 17. A Raw score of greater than 16 suggests the patient may benefit from discharge to home. Please also refer to the recommendation of the Physical Therapist for safe discharge planning.        Rehab Resource Intensity Level, PT: III (Minimum Resource Intensity)    See flowsheet documentation for full assessment.

## 2024-01-23 NOTE — ANESTHESIA POSTPROCEDURE EVALUATION
Post-Op Assessment Note    CV Status:  Stable  Pain Score: 0    Pain management: adequate       Mental Status:  Alert and awake   Hydration Status:  Euvolemic   PONV Controlled:  Controlled   Airway Patency:  Patent     Post Op Vitals Reviewed: Yes    No anethesia notable event occurred.    Staff: Anesthesiologist, CRNA   Comments: Report given to recovering RN, VSS, Pt states he is comfortable.            BP   125/79   Temp   97.2   Pulse  87   Resp   16   SpO2   96

## 2024-01-23 NOTE — ANESTHESIA PROCEDURE NOTES
Spinal Block    Patient location during procedure: OR  Start time: 1/23/2024 9:15 AM  Reason for block: procedure for pain and at surgeon's request  Staffing  Performed by: Shaggy Garza CRNA  Authorized by: Shaggy Garza CRNA    Preanesthetic Checklist  Completed: patient identified, IV checked, site marked, risks and benefits discussed, surgical consent, monitors and equipment checked, pre-op evaluation and timeout performed  Spinal Block  Patient position: sitting  Prep: Betadine  Patient monitoring: cardiac monitor and frequent blood pressure checks  Approach: midline  Location: L3-4  Injection technique: single-shot  Needle  Needle type: pencil-tip   Needle gauge: 25 G  Needle length: 10 cm  Assessment  Sensory level: T10  Events: cerebrospinal fluid  Injection Assessment:  negative aspiration for heme, no paresthesia on injection and positive aspiration for clear CSF.  Post-procedure:  adhesive bandage applied, pressure dressing applied, secured with tape, site cleaned and sterile dressing applied  Additional Notes  Initial attempt by CRNA. 2nd attempt by Anesthesiologist

## 2024-01-23 NOTE — ANESTHESIA PREPROCEDURE EVALUATION
Procedure:  ARTHROPLASTY KNEE TOTAL W ROBOT - overnight, cemented (Left: Knee)    Relevant Problems   ANESTHESIA  Prior issue with obstruction with last procedure requiring CPAP in PACU      CARDIO   (+) Hypertension   (+) Mixed hyperlipidemia      /RENAL   (+) BPH (benign prostatic hyperplasia)   (+) Hydronephrosis with ureteropelvic junction (UPJ) obstruction   (+) Nephrolithiasis   (+) Stage 3a chronic kidney disease (HCC)      MUSCULOSKELETAL   (+) Arthritis   (+) Primary osteoarthritis of both knees      PULMONARY   (+) Sleep apnea      Other   (+) Obesity, morbid (HCC)        Physical Exam    Airway    Mallampati score: II  TM Distance: >3 FB  Neck ROM: full     Dental       Cardiovascular  Rhythm: regular, Rate: normal    Pulmonary   Breath sounds clear to auscultation    Other Findings        Anesthesia Plan  ASA Score- 3     Anesthesia Type- spinal with ASA Monitors.         Additional Monitors:     Airway Plan:     Comment: Spinal with MAC/Sedation; post-procedure adductor canal block.       Plan Factors-    Chart reviewed.        Patient is not a current smoker.              Induction- intravenous.    Postoperative Plan- Plan for postoperative opioid use.     Informed Consent- Anesthetic plan and risks discussed with patient.  I personally reviewed this patient with the CRNA. Discussed and agreed on the Anesthesia Plan with the CRNA..

## 2024-01-23 NOTE — PHYSICAL THERAPY NOTE
"       PHYSICAL THERAPY EVALUATION/TREATMENT       01/23/24 1549   PT Last Visit   PT Visit Date 01/23/24   Note Type   Note type Evaluation   Pain Assessment   Pain Assessment Tool 0-10   Pain Score 2   Pain Location/Orientation Orientation: Left;Location: Knee   Pain Onset/Description Onset: Ongoing;Descriptor: Sore   Patient's Stated Pain Goal No pain   Hospital Pain Intervention(s) Repositioned;Ambulation/increased activity;Cold applied   Multiple Pain Sites No   Restrictions/Precautions   Weight Bearing Precautions Per Order Yes   LLE Weight Bearing Per Order WBAT   Other Precautions Fall Risk;Pain   Home Living   Type of Home House   Home Layout Two level;Performs ADLs on one level;Able to live on main level with bedroom/bathroom  (Planning to live 1st floor upon return home)   Bathroom Toilet Standard   Home Equipment Walker   Prior Function   Level of [place] Independent with ADLs;Independent with functional mobility;Independent with IADLS   Lives With Alone  (reports friend is staying with him for a few days following surgery)   Receives Help From Friend(s)   IADLs Independent with driving;Independent with meal prep;Independent with medication management   Falls in the last 6 months 0   Vocational Retired   General   Additional Pertinent History Pt is a 73 year-old male who was admitted to the hospital today now seen POD #0 s/p L TKA   Family/Caregiver Present No   Cognition   Overall Cognitive Status WFL   Arousal/Participation Cooperative   Orientation Level Oriented X4   Following Commands Follows multistep commands with increased time or repetition   Subjective   Subjective \"I feel alright\"   RLE Assessment   RLE Assessment WFL   LLE Assessment   LLE Assessment   (Hip/ankle WFL ; Knee 3- to 3/5 hunt by pain ; L knee ROM 0-92 degrees with mild ERP flexion)   Bed Mobility   Supine to Sit 4  Minimal assistance   Additional items Assist x 1;Verbal cues;LE management   Sit to Supine Unable to assess "   Additional Comments transferred to bedside chair   Transfers   Sit to Stand 4  Minimal assistance   Additional items Assist x 1;Verbal cues;Bedrails   Stand to Sit 4  Minimal assistance   Additional items Assist x 1;Verbal cues;Armrests   Ambulation/Elevation   Gait pattern Foward flexed;Short stride;Step to;Antalgic;Decreased L stance   Gait Assistance 4  Minimal assist   Additional items Assist x 1;Verbal cues   Assistive Device Rolling walker   Distance 8 feet with change of direction within room   Stair Management Assistance Not tested   Balance   Static Sitting Fair +   Dynamic Sitting Fair   Static Standing Fair   Dynamic Standing Fair -   Ambulatory Fair -  (RW)   Activity Tolerance   Activity Tolerance Patient tolerated treatment well;Patient limited by pain   Nurse Made Aware yes RN [place]   Assessment   Prognosis Good   Problem List Decreased strength;Decreased range of motion;Decreased endurance;Decreased mobility;Impaired balance;Pain;Decreased skin integrity;Decreased safety awareness   Assessment Patient seen for Physical Therapy evaluation. Patient admitted with s/p L TKA. Comorbidities affecting patient's physical performance include: arthritis, CKD, hypertension, hyperlipidemia, and osteoarthritis.  Personal factors affecting patient at time of initial evaluation include: lives in 2 story house, ambulating with assistive device, stairs to enter home, inability to navigate community distances, inability to navigate level surfaces without external assistance, inability to perform IADLS , and inability to live alone. Prior to admission, patient was independent with functional mobility without assistive device, independent with ADLS, independent with IADLS, living alone in 2 story home with 4 steps to enter, ambulating household distance, and ambulating community distances.  Please find objective findings from Physical Therapy assessment regarding body systems outlined above with impairments and  limitations including weakness, decreased ROM, impaired balance, decreased endurance, gait deviations, pain, decreased activity tolerance, decreased functional mobility tolerance, fall risk, and decreased skin integrity.  The Barthel Index was used as a functional outcome tool presenting with a score of Barthel Index Score: 40 today indicating marked limitations of functional mobility and ADLS.  Patient's clinical presentation is currently unstable/unpredictable as seen in patient's presentation of changing level of pain, increased fall risk, new onset of impairment of functional mobility, decreased endurance, and new onset of weakness. Pt would benefit from continued Physical Therapy treatment to address deficits as defined above and maximize level of functional mobility. As demonstrated by objective findings, the assigned level of complexity for this evaluation is high.The patient's -[facility] Basic Mobility Inpatient Short Form Raw Score is 17. A Raw score of greater than 16 suggests the patient may benefit from discharge to home. Please also refer to the recommendation of the Physical Therapist for safe discharge planning.   Goals   Patient Goals to decrease L knee pain, improve ability to walk   STG Expiration Date 01/30/24   Short Term Goal #1 Pt will be I with HEP ; LLE strength will improve by 1/2 grade, L knee ROM 0-95 degrees to improve tolerance to functional mobility ; Pt will ambulate x 150 feet Mod I with RW ; Pt will negotiate x 4 steps with supervision + rail/cane with step to pattern to safely enter/exit home ; AMPAC score will improve >20/24 to demonstrate improved functional independence   LT Expiration Date 02/06/24   Long Term Goal #1 LLE strength will improve by 1 grade, L knee ROM 0-100 degrees to improve tolerance to functional mobility ; Pt will ambulate x 500 feet Mod I with RW ; Pt will negotiate x 4 steps Mod I + rail/cane with step to pattern to safely enter/exit home ; AMPAC score will  improve >22/24 to demonstrate improved functional independence   Plan   Treatment/Interventions Functional transfer training;LE strengthening/ROM;Therapeutic exercise;Endurance training;Bed mobility;Gait training;Spoke to nursing;ADL retraining   PT Frequency Twice a day   Discharge Recommendation   Rehab Resource Intensity Level, PT III (Minimum Resource Intensity)   Additional Comments Will provide cane during next session   AM-PAC Basic Mobility Inpatient   Turning in Flat Bed Without Bedrails 3   Lying on Back to Sitting on Edge of Flat Bed Without Bedrails 3   Moving Bed to Chair 3   Standing Up From Chair Using Arms 3   Walk in Room 3   Climb 3-5 Stairs With Railing 2   Basic Mobility Inpatient Raw Score 17   Basic Mobility Standardized Score 39.67   Highest Level Of Mobility   -HL Goal 5: Stand one or more mins   -HL Achieved 6: Walk 10 steps or more   Barthel Index   Feeding 10   Bathing 0   Grooming Score 0   Dressing Score 5   Bladder Score 0   Bowels Score 10   Toilet Use Score 5   Transfers (Bed/Chair) Score 10   Mobility (Level Surface) Score 0   Stairs Score 0   Barthel Index Score 40   Additional Treatment Session   Start Time 1539   End Time 1549   Treatment Assessment S: Pt agreeable to PT session this afternoon O/A: Able to perform exercise as mentioned above with intermittent assist to improve form/ROM. Exercise handout provided to pt - to review during next session. Repositioned in bedside chair with all needs within reach. P: pt will benefit from skilled PT to address deficits to maximize IND for safe d/c   Equipment Use walker   Exercises   TKR Supine;Sitting;Left  (Ankle pumps, quad sets, heel slides, LAQ x 5-10 reps)   End of Consult   Patient Position at End of Consult Bedside chair;All needs within reach   Licensure   [place] License Number  Jennifer Alfaro [id number]

## 2024-01-23 NOTE — PLAN OF CARE
Problem: PAIN - ADULT  Goal: Verbalizes/displays adequate comfort level or baseline comfort level  Description: Interventions:  - Encourage patient to monitor pain and request assistance  - Assess pain using appropriate pain scale  - Administer analgesics based on type and severity of pain and evaluate response  - Implement non-pharmacological measures as appropriate and evaluate response  - Consider cultural and social influences on pain and pain management  - Notify physician/advanced practitioner if interventions unsuccessful or patient reports new pain  Outcome: Progressing     Problem: INFECTION - ADULT  Goal: Absence or prevention of progression during hospitalization  Description: INTERVENTIONS:  - Assess and monitor for signs and symptoms of infection  - Monitor lab/diagnostic results  - Monitor all insertion sites, i.e. indwelling lines, tubes, and drains  - Monitor endotracheal if appropriate and nasal secretions for changes in amount and color  - [place] appropriate cooling/warming therapies per order  - Administer medications as ordered  - Instruct and encourage patient and family to use good hand hygiene technique  - Identify and instruct in appropriate isolation precautions for identified infection/condition  Outcome: Progressing     Problem: SAFETY ADULT  Goal: Patient will remain free of falls  Description: INTERVENTIONS:  - Educate patient/family on patient safety including physical limitations  - Instruct patient to call for assistance with activity   - Consult OT/PT to assist with strengthening/mobility   - Keep Call bell within reach  - Keep bed low and locked with side rails adjusted as appropriate  - Keep care items and personal belongings within reach  - Initiate and maintain comfort rounds  - Make Fall Risk Sign visible to staff  - Offer Toileting every  Hours, in advance of need  - Initiate/Maintain alarm  - Obtain necessary fall risk management equipment:   - Apply yellow socks and  bracelet for high fall risk patients  - Consider moving patient to room near nurses station  Outcome: Progressing     Problem: DISCHARGE PLANNING  Goal: Discharge to home or other facility with appropriate resources  Description: INTERVENTIONS:  - Identify barriers to discharge w/patient and caregiver  - Arrange for needed discharge resources and transportation as appropriate  - Identify discharge learning needs (meds, wound care, etc.)  - Arrange for interpretive services to assist at discharge as needed  - Refer to Case Management Department for coordinating discharge planning if the patient needs post-hospital services based on physician/advanced practitioner order or complex needs related to functional status, cognitive ability, or social support system  Outcome: Progressing     Problem: SKIN/TISSUE INTEGRITY - ADULT  Goal: Incision(s), wounds(s) or drain site(s) healing without S/S of infection  Description: INTERVENTIONS  - Assess and document dressing, incision, wound bed, drain sites and surrounding tissue  - Provide patient and family education  - Perform skin care/dressing changes every  Outcome: Progressing     Problem: MUSCULOSKELETAL - ADULT  Goal: Maintain or return mobility to safest level of function  Description: INTERVENTIONS:  - Assess patient's ability to carry out ADLs; assess patient's baseline for ADL function and identify physical deficits which impact ability to perform ADLs (bathing, care of mouth/teeth, toileting, grooming, dressing, etc.)  - Assess/evaluate cause of self-care deficits   - Assess range of motion  - Assess patient's mobility  - Assess patient's need for assistive devices and provide as appropriate  - Encourage maximum independence but intervene and supervise when necessary  - Involve family in performance of ADLs  - Assess for home care needs following discharge   - Consider OT consult to assist with ADL evaluation and planning for discharge  - Provide patient education as  appropriate  Outcome: Progressing  Goal: Maintain proper alignment of affected body part  Description: INTERVENTIONS:  - Support, maintain and protect limb and body alignment  - Provide patient/ family with appropriate education  Outcome: Progressing

## 2024-01-23 NOTE — INTERVAL H&P NOTE
H&P reviewed. After examining the patient I find no changes in the patients condition since the H&P had been written.  Patient was seen and examined this morning in preoperative holding.  He states his activity related left knee pain continues to be persistent and severe.  Pain radiates throughout the left knee.  Please see exam below.  Patient denies any recent fever, chills, nausea, vomiting, headache, chest pain, trouble breathing.  The patient has been cleared by his medical subspecialist in preparation for the procedure.  The patient would be a good candidate for aspirin postoperatively for DVT prophylaxis.  The patient be a good candidate for outpatient physical therapy following his discharge from the hospital.  All questions addressed today.  Proceed the OR for robotic assisted left total knee arthroplasty.    Vitals:    01/23/24 0712   BP: 146/88   Pulse: (!) 108   Resp: 18   Temp: 97.9 °F (36.6 °C)   SpO2: 96%     Left Knee Exam      Muscle Strength   The patient has normal left knee strength.     Tenderness   The patient is experiencing tenderness in the medial joint line and patella.     Range of Motion   Extension:  0 normal   Flexion:  120 normal      Tests   Varus: negative Valgus: negative  Drawer:  Anterior - negative     Posterior - negative  Patellar apprehension: negative     Other   Erythema: absent  Scars: absent  Sensation: normal  Pulse: present  Swelling: none  Effusion: no effusion present     Comments:  5-7 degree varus deformity passively correctable  Stable at 0, 30 and 90 degrees  Neurovascularly in tact distally  No warmth or erythema  Parapatellar crepitance noted  Patellofemoral grind: positive    Bassem Moscoso D.O.  Division of Adult Reconstruction  Department of Orthopaedic Surgery  [facility]

## 2024-01-23 NOTE — ANESTHESIA PROCEDURE NOTES
Peripheral Block    Patient location during procedure: PACU  Start time: 1/23/2024 11:58 AM  Reason for block: procedure for pain, at surgeon's request and post-op pain management  Staffing  Performed by: Sabino Valdez MD  Authorized by: Sabino Valdez MD    Preanesthetic Checklist  Completed: patient identified, IV checked, site marked, risks and benefits discussed, surgical consent, monitors and equipment checked, pre-op evaluation and timeout performed  Peripheral Block  Patient position: supine  Prep: ChloraPrep  Patient monitoring: continuous pulse oximetry, frequent blood pressure checks and heart rate  Block type: Adductor Canal  Laterality: left  Injection technique: single-shot  Procedures: ultrasound guided, Ultrasound guidance required for the procedure to increase accuracy and safety of medication placement and decrease risk of complications.  Ultrasound permanent image savedbupivacaine (PF) (MARCAINE) 0.5 % injection 20 mL - Perineural   5 mL - 1/23/2024 11:58:00 AM  bupivacaine liposomal (EXPAREL) 1.3 % injection 20 mL - Perineural   20 mL - 1/23/2024 11:58:00 AM  Needle  Needle type: Stimuplex   Needle gauge: 20 G  Needle length: 4 in  Needle localization: ultrasound guidance  Assessment  Injection assessment: frequent aspiration, injected with ease, negative aspiration, no paresthesia on injection, incremental injection, needle tip visualized at all times, negative for heart rate change and no symptoms of intraneural/intravenous injection  Paresthesia pain: none  Post-procedure:  site cleaned  patient tolerated the procedure well with no immediate complications

## 2024-01-24 VITALS
SYSTOLIC BLOOD PRESSURE: 133 MMHG | BODY MASS INDEX: 35.09 KG/M2 | HEART RATE: 84 BPM | HEIGHT: 67 IN | OXYGEN SATURATION: 96 % | TEMPERATURE: 98.8 F | DIASTOLIC BLOOD PRESSURE: 81 MMHG | RESPIRATION RATE: 18 BRPM | WEIGHT: 223.55 LBS

## 2024-01-24 PROBLEM — Z96.652 S/P TOTAL KNEE REPLACEMENT USING CEMENT, LEFT: Status: ACTIVE | Noted: 2024-01-24

## 2024-01-24 LAB
ANION GAP SERPL CALCULATED.3IONS-SCNC: 10 MMOL/L
BASOPHILS # BLD AUTO: 0.02 THOUSANDS/ÂΜL (ref 0–0.1)
BASOPHILS NFR BLD AUTO: 0 % (ref 0–1)
BUN SERPL-MCNC: 22 MG/DL (ref 5–25)
CALCIUM SERPL-MCNC: 8.6 MG/DL (ref 8.4–10.2)
CHLORIDE SERPL-SCNC: 101 MMOL/L (ref 96–108)
CO2 SERPL-SCNC: 24 MMOL/L (ref 21–32)
CREAT SERPL-MCNC: 1.51 MG/DL (ref 0.6–1.3)
EOSINOPHIL # BLD AUTO: 0 THOUSAND/ÂΜL (ref 0–0.61)
EOSINOPHIL NFR BLD AUTO: 0 % (ref 0–6)
ERYTHROCYTE [DISTWIDTH] IN BLOOD BY AUTOMATED COUNT: 13.6 % (ref 11.6–15.1)
GFR SERPL CREATININE-BSD FRML MDRD: 45 ML/MIN/1.73SQ M
GLUCOSE P FAST SERPL-MCNC: 126 MG/DL (ref 65–99)
GLUCOSE SERPL-MCNC: 126 MG/DL (ref 65–140)
HCT VFR BLD AUTO: 41.6 % (ref 36.5–49.3)
HGB BLD-MCNC: 13.4 G/DL (ref 12–17)
IMM GRANULOCYTES # BLD AUTO: 0.06 THOUSAND/UL (ref 0–0.2)
IMM GRANULOCYTES NFR BLD AUTO: 0 % (ref 0–2)
LYMPHOCYTES # BLD AUTO: 1.03 THOUSANDS/ÂΜL (ref 0.6–4.47)
LYMPHOCYTES NFR BLD AUTO: 6 % (ref 14–44)
MCH RBC QN AUTO: 30.8 PG (ref 26.8–34.3)
MCHC RBC AUTO-ENTMCNC: 32.2 G/DL (ref 31.4–37.4)
MCV RBC AUTO: 96 FL (ref 82–98)
MONOCYTES # BLD AUTO: 1.07 THOUSAND/ÂΜL (ref 0.17–1.22)
MONOCYTES NFR BLD AUTO: 7 % (ref 4–12)
NEUTROPHILS # BLD AUTO: 13.98 THOUSANDS/ÂΜL (ref 1.85–7.62)
NEUTS SEG NFR BLD AUTO: 87 % (ref 43–75)
NRBC BLD AUTO-RTO: 0 /100 WBCS
PLATELET # BLD AUTO: 231 THOUSANDS/UL (ref 149–390)
PMV BLD AUTO: 9.1 FL (ref 8.9–12.7)
POTASSIUM SERPL-SCNC: 5.6 MMOL/L (ref 3.5–5.3)
RBC # BLD AUTO: 4.35 MILLION/UL (ref 3.88–5.62)
SODIUM SERPL-SCNC: 135 MMOL/L (ref 135–147)
WBC # BLD AUTO: 16.16 THOUSAND/UL (ref 4.31–10.16)

## 2024-01-24 PROCEDURE — 97167 OT EVAL HIGH COMPLEX 60 MIN: CPT

## 2024-01-24 PROCEDURE — 97530 THERAPEUTIC ACTIVITIES: CPT

## 2024-01-24 PROCEDURE — 97116 GAIT TRAINING THERAPY: CPT

## 2024-01-24 PROCEDURE — 97535 SELF CARE MNGMENT TRAINING: CPT

## 2024-01-24 PROCEDURE — 80048 BASIC METABOLIC PNL TOTAL CA: CPT | Performed by: PHYSICIAN ASSISTANT

## 2024-01-24 PROCEDURE — 85025 COMPLETE CBC W/AUTO DIFF WBC: CPT | Performed by: PHYSICIAN ASSISTANT

## 2024-01-24 PROCEDURE — 99024 POSTOP FOLLOW-UP VISIT: CPT | Performed by: PHYSICIAN ASSISTANT

## 2024-01-24 PROCEDURE — 97110 THERAPEUTIC EXERCISES: CPT

## 2024-01-24 RX ORDER — ACETAMINOPHEN 325 MG/1
975 TABLET ORAL EVERY 8 HOURS
Start: 2024-01-24

## 2024-01-24 RX ORDER — ASPIRIN 325 MG
325 TABLET ORAL 2 TIMES DAILY
Qty: 82 TABLET | Refills: 0 | Status: SHIPPED | OUTPATIENT
Start: 2024-01-24

## 2024-01-24 RX ORDER — DOCUSATE SODIUM 100 MG/1
100 CAPSULE, LIQUID FILLED ORAL 2 TIMES DAILY
Qty: 60 CAPSULE | Refills: 0 | Status: SHIPPED | OUTPATIENT
Start: 2024-01-24

## 2024-01-24 RX ORDER — OXYCODONE HYDROCHLORIDE 5 MG/1
TABLET ORAL
Qty: 40 TABLET | Refills: 0 | Status: SHIPPED | OUTPATIENT
Start: 2024-01-24

## 2024-01-24 RX ADMIN — POTASSIUM CITRATE 20 MEQ: 10 TABLET, EXTENDED RELEASE ORAL at 08:45

## 2024-01-24 RX ADMIN — ACETAMINOPHEN 975 MG: 325 TABLET ORAL at 13:01

## 2024-01-24 RX ADMIN — DEXAMETHASONE SODIUM PHOSPHATE 10 MG: 10 INJECTION, SOLUTION INTRAMUSCULAR; INTRAVENOUS at 12:00

## 2024-01-24 RX ADMIN — GABAPENTIN 200 MG: 100 CAPSULE ORAL at 08:45

## 2024-01-24 RX ADMIN — SODIUM CHLORIDE 75 ML/HR: 0.9 INJECTION, SOLUTION INTRAVENOUS at 01:56

## 2024-01-24 RX ADMIN — ACETAMINOPHEN 975 MG: 325 TABLET ORAL at 06:03

## 2024-01-24 RX ADMIN — PANTOPRAZOLE SODIUM 40 MG: 40 TABLET, DELAYED RELEASE ORAL at 08:45

## 2024-01-24 RX ADMIN — ASPIRIN 325 MG: 325 TABLET ORAL at 08:45

## 2024-01-24 RX ADMIN — CEFAZOLIN SODIUM 2000 MG: 2 SOLUTION INTRAVENOUS at 01:51

## 2024-01-24 NOTE — OCCUPATIONAL THERAPY NOTE
Occupational Therapy Evaluation/Treatment       01/24/24 1140   OT Last Visit   OT Visit Date 01/24/24   Note Type   Note type Evaluation   Pain Assessment   Pain Assessment Tool 0-10   Pain Score 3   Pain Location/Orientation Orientation: Left;Location: Knee   Restrictions/Precautions   LLE Weight Bearing Per Order WBAT   Other Precautions Fall Risk;Pain  (L knee buckling with activity)   Home Living   Type of Home House   Home Layout Two level;Able to live on main level with bedroom/bathroom  (4.5 TAYLA)   Bathroom Shower/Tub Tub/shower unit  (pt plans to sponge bath upon discharge)   Bathroom Toilet Standard   Home Equipment Walker   Prior Function   Level of [place] Independent with ADLs;Independent with functional mobility;Independent with IADLS   Lives With Alone  (friend will be assisting patient in the afternoon but not staying with patient)   Receives Help From Friend(s)   IADLs Independent with driving;Independent with meal prep;Independent with medication management   Vocational Retired   Comments Patient is POD#1 s/p L TKA   Lifestyle   Intrinsic Gratification trains   ADL   Eating Assistance 7  Independent   Grooming Assistance 7  Independent   UB Bathing Assistance 5  Supervision/Setup   LB Bathing Assistance 4  Minimal Assistance   UB Dressing Assistance 5  Supervision/Setup   UB Dressing Deficit Thread RUE;Thread LUE;Pull over head;Pull down in back   LB Dressing Assistance 3  Moderate Assistance   LB Dressing Deficit Setup;Thread RLE into underwear;Thread LLE into underwear;Thread RLE into pants;Thread LLE into pants;Pull up over hips  (+L knee buckling, needing blocking while pulling garments up in stance)   Toileting Assistance  4  Minimal Assistance   Toileting Deficit Grab bar use   Transfers   Sit to Stand 4  Minimal assistance   Additional items Assist x 1;Verbal cues   Stand to Sit 4  Minimal assistance   Additional items Assist x 1;Verbal cues   Functional Mobility   Functional Mobility 4   Minimal assistance   Additional Comments short household distance with RW   Balance   Static Sitting Fair +   Dynamic Sitting Fair   Static Standing Fair -   Dynamic Standing Poor +   Activity Tolerance   Activity Tolerance Patient limited by pain;Patient limited by fatigue  (L knee buckling with mobility/ADLS)   Medical Staff Made Aware PT Jennifer   RUE Assessment   RUE Assessment WFL   LUE Assessment   LUE Assessment WFL   Proprioception   Proprioception Severe deficits in the LLE   Cognition   Overall Cognitive Status WFL   Arousal/Participation Cooperative   Attention Attends with cues to redirect   Orientation Level Oriented X4   Following Commands Follows multistep commands with increased time or repetition   Comments pt is [place], easily distracted, impulsive   Assessment   Limitation Decreased ADL status;Decreased UE strength;Decreased Safe judgement during ADL;Decreased endurance;Decreased self-care trans;Decreased high-level ADLs  (decreased balance and mobility)   Prognosis Good   Assessment Patient evaluated by Occupational Therapy.  Patient admitted with S/P total knee replacement using cement, left.  The patients occupational profile, medical and therapy history includes a extensive additional review of physical, cognitive, or psychosocial history related to current functional performance.  Comorbidities affecting functional mobility and ADLS include: arthritis, cancer, and CKD.  Prior to admission, patient was independent with functional mobility without assistive device, independent with ADLS, and independent with IADLS.  The evaluation identifies the following performance deficits: weakness, impaired balance, decreased endurance, increased fall risk, new onset of impairment of functional mobility, decreased ADLS, decreased IADLS, pain, decreased activity tolerance, decreased safety awareness, impaired judgement, and decreased strength, that result in activity limitations and/or participation  restrictions. This evaluation requires clinical decision making of high complexity, because the patient presents with comorbidites that affect occupational performance and required significant modification of tasks or assistance with consideration of multiple treatment options.  The Barthel Index was used as a functional outcome tool presenting with a score of Barthel Index Score: 45, indicating marked limitations of functional mobility and ADLS.  The patient's raw score on the AM-PAC Daily Activity Inpatient Short Form is 20. A raw score of greater than or equal to 19 suggests the patient may benefit from discharge to home. Please refer to the recommendation of the Occupational Therapist for safe discharge planning.  Patient will benefit from skilled Occupational Therapy services to address above deficits and facilitate a safe return to prior level of function.   Goals   Patient Goals to improve ability to walk   STG Time Frame   (1-7 days)   Short Term Goal  Goals established to promote Patient Goals: to improve ability to walk:  Grooming: supervision standing at sink; Bathing: supervision;  Lower Body Dressing: min assist; Toileting: supervision; Patient will increase ambulatory standard toilet transfer to supervision with rolling walker to increase performance and safety with ADLS and functional mobility; Patient will increase standing tolerance to 5 minutes during ADL task to decrease assistance level and decrease fall risk; Patient will increase bed mobility to supervision in preparation for ADLS and transfers; Patient will increase functional mobility to and from bathroom with rolling walker with supervision to increase performance with ADLS and to use a toilet;  Patient will improve functional activity tolerance to 10 minutes of sustained functional tasks to increase participation in basic self-care and decrease assistance level;  Patient will increase dynamic standing balance to fair- to improve postural  stability and decrease fall risk during standing ADLS and transfers.   LTG Time Frame   (8-14 days)   Long Term Goal Grooming: independent standing at sink; Bathing: independent;  Lower Body Dressing: supervision; Toileting: independent; Patient will increase ambulatory standard toilet transfer to independent with rolling walker to increase performance and safety with ADLS and functional mobility; Patient will increase standing tolerance to 10 minutes during ADL task to decrease assistance level and decrease fall risk; Patient will increase bed mobility to independent in preparation for ADLS and transfers; Patient will increase functional mobility to and from bathroom with rolling walker independently to increase performance with ADLS and to use a toilet;  Patient will improve functional activity tolerance to 20 minutes of sustained functional tasks to increase participation in basic self-care and decrease assistance level;  Patient will increase dynamic standing balance to fair to improve postural stability and decrease fall risk during standing ADLS and transfers.   Pt will score >/= 24/24 on AM-PAC Daily Activity Inpatient scale to promote safe independence with ADLs and functional mobility; Pt will score >/= 75/100 on Barthel Index in order to decrease caregiver assistance needed and increase ability to perform ADLs and functional mobility.   Plan   Treatment Interventions ADL retraining;Functional transfer training;Endurance training;Patient/family training;Equipment evaluation/education;Activityengagement;Compensatory technique education   Goal Expiration Date 02/07/24   OT Frequency 3-5x/wk   Discharge Recommendation   Rehab Resource Intensity Level, OT No post-acute rehabilitation needs   AM-PAC Daily Activity Inpatient   Lower Body Dressing 2   Bathing 3   Toileting 3   Upper Body Dressing 4   Grooming 4   Eating 4   Daily Activity Raw Score 20   Daily Activity Standardized Score (Calc for Raw Score >=11)  42.03   AM-PAC Applied Cognition Inpatient   Following a Speech/Presentation 3   Understanding Ordinary Conversation 4   Taking Medications 4   Remembering Where Things Are Placed or Put Away 4   Remembering List of 4-5 Errands 4   Taking Care of Complicated Tasks 4   Applied Cognition Raw Score 23   Applied Cognition Standardized Score 53.08   Barthel Index   Feeding 10   Bathing 0   Grooming Score 0   Dressing Score 5   Bladder Score 5   Bowels Score 10   Toilet Use Score 5   Transfers (Bed/Chair) Score 10   Mobility (Level Surface) Score 0   Stairs Score 0   Barthel Index Score 45   Additional Treatment Session   Start Time 1120   End Time 1143   Treatment Assessment S: 3/10 pain  L knee O: Patient completed don shirt independently.  Patient donned underwear with Min A without assistive device and pants with Mod A  without assistive device with L knee buckling and needing max verbal cues and knee blocking.  Patient declined instruction on socks and equipment and donned slip-on shoes with Min A without assistive device seated. Patient ambulated short household distance to/from bathroom with RW and Min A and max verbal cues for L knee buckling and safety with mobility with use of RW and sequencing steps; Ambulatory transfer to/from standard toilet with RW and Min A and use of commode where pt has a sink at home; Educated patient on safe car transfer technique, safe tub/shower transfer technique via demonstration/explanation by therapist; patient verbalized good understanding A: Patient is easily distracted and a high risk for falls especially during standing ADL tasks.  Patient may benefit from use of knee immobilizer to prevent falls and L knee buckling as pt with poor awareness.  PT aware who is in contact with ortho.  P: Home with friend assist; concern for safety as pt is a high risk for falls, plans to attend outpatient PT upon discharge and have assist from friends in afternoon at home   Licensure   [place]  License Number  Jennifer Martinez MS OTR/L [id number]

## 2024-01-24 NOTE — PLAN OF CARE
Problem: PAIN - ADULT  Goal: Verbalizes/displays adequate comfort level or baseline comfort level  Description: Interventions:  - Encourage patient to monitor pain and request assistance  - Assess pain using appropriate pain scale  - Administer analgesics based on type and severity of pain and evaluate response  - Implement non-pharmacological measures as appropriate and evaluate response  - Consider cultural and social influences on pain and pain management  - Notify physician/advanced practitioner if interventions unsuccessful or patient reports new pain  Outcome: Progressing     Problem: INFECTION - ADULT  Goal: Absence or prevention of progression during hospitalization  Description: INTERVENTIONS:  - Assess and monitor for signs and symptoms of infection  - Monitor lab/diagnostic results  - Monitor all insertion sites, i.e. indwelling lines, tubes, and drains  - Monitor endotracheal if appropriate and nasal secretions for changes in amount and color  - [place] appropriate cooling/warming therapies per order  - Administer medications as ordered  - Instruct and encourage patient and family to use good hand hygiene technique  - Identify and instruct in appropriate isolation precautions for identified infection/condition  Outcome: Progressing     Problem: DISCHARGE PLANNING  Goal: Discharge to home or other facility with appropriate resources  Description: INTERVENTIONS:  - Identify barriers to discharge w/patient and caregiver  - Arrange for needed discharge resources and transportation as appropriate  - Identify discharge learning needs (meds, wound care, etc.)  - Arrange for interpretive services to assist at discharge as needed  - Refer to Case Management Department for coordinating discharge planning if the patient needs post-hospital services based on physician/advanced practitioner order or complex needs related to functional status, cognitive ability, or social support system  Outcome: Progressing      Problem: SKIN/TISSUE INTEGRITY - ADULT  Goal: Incision(s), wounds(s) or drain site(s) healing without S/S of infection  Description: INTERVENTIONS  - Assess and document dressing, incision, wound bed, drain sites and surrounding tissue  - Provide patient and family education  - Perform skin care/dressing changes every shift   Outcome: Progressing     Problem: MUSCULOSKELETAL - ADULT  Goal: Maintain or return mobility to safest level of function  Description: INTERVENTIONS:  - Assess patient's ability to carry out ADLs; assess patient's baseline for ADL function and identify physical deficits which impact ability to perform ADLs (bathing, care of mouth/teeth, toileting, grooming, dressing, etc.)  - Assess/evaluate cause of self-care deficits   - Assess range of motion  - Assess patient's mobility  - Assess patient's need for assistive devices and provide as appropriate  - Encourage maximum independence but intervene and supervise when necessary  - Involve family in performance of ADLs  - Assess for home care needs following discharge   - Consider OT consult to assist with ADL evaluation and planning for discharge  - Provide patient education as appropriate  Outcome: Progressing     Problem: MUSCULOSKELETAL - ADULT  Goal: Maintain proper alignment of affected body part  Description: INTERVENTIONS:  - Support, maintain and protect limb and body alignment  - Provide patient/ family with appropriate education  Outcome: Progressing

## 2024-01-24 NOTE — PHYSICAL THERAPY NOTE
"   PT TREATMENT       01/24/24 1507   PT Last Visit   PT Visit Date 01/24/24   Note Type   Note Type BID visit/treatment   Pain Assessment   Pain Assessment Tool 0-10   Pain Score 1   Pain Location/Orientation Orientation: Left;Location: Knee   Pain Onset/Description Onset: Ongoing;Descriptor: Discomfort   Effect of Pain on Daily Activities limits mobility   Patient's Stated Pain Goal No pain   Hospital Pain Intervention(s) Repositioned;Ambulation/increased activity   Multiple Pain Sites No   Restrictions/Precautions   Weight Bearing Precautions Per Order Yes   LLE Weight Bearing Per Order WBAT   Other Precautions Fall Risk;Pain;WBS   General   Chart Reviewed Yes   Family/Caregiver Present No   Cognition   Overall Cognitive Status WFL   Arousal/Participation Cooperative   Orientation Level Oriented X4   Following Commands Follows multistep commands with increased time or repetition   Comments Continues to be easily distracted throughout session but redirectable to task at hand   Subjective   Subjective \"I still don't have much pain at all. Maybe some discomfort and stiffness but that's it\"   Bed Mobility   Sit to Supine 5  Supervision   Additional items Assist x 1;Verbal cues;Increased time required   Additional Comments Received sitting in bedside chair ; transferred back to bed at EOS for pt to demonstrate ability to doff/sarah long knee immobilizer ; requires increased time + intermittent verbal cuing but able to perform with intermittent rest breaks   Transfers   Sit to Stand 4  Minimal assistance  (Min A without brace/short knee immobilizer ; progressing to supervision with long knee immobilizer)   Additional items Assist x 1;Verbal cues   Stand to Sit 4  Minimal assistance  (Min A without brace/short knee immobilizer ; progressing to supervision with long knee immobilizer)   Additional items Assist x 1;Verbal cues   Ambulation/Elevation   Gait pattern Antalgic;L Knee Ryan;Decreased L stance;Foward " flexed;Short stride;Step to;Step through pattern  (step to progressing to step through pattern with short step length, intermittent L knee buckling at end range stance phase LLE ; improved stability noted with knee immobilizer)   Gait Assistance 4  Minimal assist  (Min A without brace/short knee immobilizer ; progressing to supervision with long knee immobilizer)   Additional items Assist x 1;Verbal cues   Assistive Device Rolling walker   Distance 100 feet x 3 ; 15 feet x 2 within room   Stair Management Assistance 4  Minimal assist  (Min A without brace/short knee immobilizer + constant tactile cues/assist at L knee due to buckling ; progressing to supervision with long knee immobilizer with improved control, mild antalgic gait pattern + tactile cues but overall improved)   Additional items Assist x 1;Verbal cues   Stair Management Technique Step to pattern;One rail L;With cane   Number of Stairs 16  (4 steps with short knee immobilizer ; 12 steps with long knee immobilizer with improving control, decreased tactile cues requires as session progressed ; continues to be easily distracted with increased buckling when engaged in conversation)   Balance   Static Sitting Fair +   Dynamic Sitting Fair   Static Standing Fair   Dynamic Standing Fair -   Ambulatory Fair -  (RW, knee immobilizer)   Activity Tolerance   Activity Tolerance Patient tolerated treatment well   Nurse Made Aware yes CONOR Pena   Exercises   TKR   (Reviewed exercise handout + progressiona t home - pt verbalized understanding)   Assessment   Prognosis Good   Problem List Decreased strength;Decreased range of motion;Decreased endurance;Decreased mobility;Impaired balance;Pain;Decreased skin integrity;Decreased safety awareness   Assessment Pt seen for PT session this afternoon. Exercise handout reviewed with pt who verbalized understanding. Pt continues to presents with intermittent L knee buckling when ambulating + poor control on steps, constant  cuing to decrease L knee buckling. Progressed from short to long knee immobilzer with good response. Improved stability when ambulating within minimal to no buckling - continues to require intermittent cues for sequencing + safety, especially when distracted. Able to negotiate x 12 steps with supervision using long immobilizer with decreased cues requires to maintain L knee extension - continues to have intermittent mild L knee buckling but able to self correct. Pt demonstrated ability to don/doff knee immobilizer in supine with supervision + cues to proper fit + requires breaks due to fatigue. Educated on MD recommendation for brace x 1-2 days only - pt verbalized understanding. Pt reports he will have his friend assist into house today, motivated to return home.  The patient's AM-PAC Basic Mobility Inpatient Short Form Raw Score is 18. A Raw score of greater than 16 suggests the patient may benefit from discharge to home. Please also refer to the recommendation of the Physical Therapist for safe discharge planning.     Goals   Patient Goals to get better and return home   Plan   Treatment/Interventions ADL retraining;Functional transfer training;LE strengthening/ROM;Therapeutic exercise;Endurance training;Bed mobility;Gait training;Spoke to nursing;Spoke to advanced practitioner   Progress Progressing toward goals   PT Frequency Twice a day   Discharge Recommendation   Rehab Resource Intensity Level, PT III (Minimum Resource Intensity)   Additional Comments cane + knee immobilizer (short + long) provided during session   AM-PAC Basic Mobility Inpatient   Turning in Flat Bed Without Bedrails 3   Lying on Back to Sitting on Edge of Flat Bed Without Bedrails 3   Moving Bed to Chair 3   Standing Up From Chair Using Arms 3   Walk in Room 3   Climb 3-5 Stairs With Railing 3   Basic Mobility Inpatient Raw Score 18   Basic Mobility Standardized Score 41.05   Highest Level Of Mobility   -[facility] Goal 6: Walk 10 steps or more    STAS Achieved 8: Walk 250 feet ot more

## 2024-01-24 NOTE — PROGRESS NOTES
"Progress Note - Orthopedics   Swapnil Grover 73 y.o. male MRN: [id number]  Unit/Bed#: 2 [place] [address] Encounter: [id number]    Assessment:  1)POD#1 s/p Robotic Assisted Left TKA    Plan:  Ancef 2g IV x 2 for 24 hours postop - completed  DVT prophylaxis: ASA 325mg PO BID/SCD's/Ambulation  PT/OT- WBAT LLE  Analgesia PRN  Follow up AM labs - slightly elevated Cr from baseline, will continue gentle IVF hydration  Kiser Dc - monitor for void  Dressing- monitor for drainage, Mepilex may remain in place 7 days  Discharge planning -patient did well with his second session of physical therapy this afternoon utilizing a knee immobilizer.  After discussion with the therapist, will be discharged home to utilize a knee immobilizer for the next 1 to 2 days.  He has met all other discharge criteria.  Plan for discharge to home to start outpatient physical therapy later this week.  All discharge instructions patient questions were discussed in detail at bedside prior to discharge.  He will return to see Dr. Moscoso in 2 weeks for continued postoperative care.    Weight bearing: WBAT LLE    VTE Pharmacologic Prophylaxis: ASA 325mg PO BID  VTE Mechanical Prophylaxis: sequential compression device    Subjective: Patient seen and examined this afternoon.  Pain controlled.  No overnight events. He struggled with physical and Occupational Therapy this morning due to poor proprioception and some knee buckling.  He was able to work with therapy again this afternoon and did much better with a knee immobilizer    Vitals: Blood pressure 133/81, pulse 84, temperature 98.8 °F (37.1 °C), temperature source Oral, resp. rate 18, height 5' 7\" (1.702 m), weight 101 kg (223 lb 8.7 oz), SpO2 96%.,Body mass index is 35.01 kg/m².      Intake/Output Summary (Last 24 hours) at 1/24/2024 1324  Last data filed at 1/24/2024 0601  Gross per 24 hour   Intake 885 ml   Output 850 ml   Net 35 ml       Invasive Devices       Peripheral Intravenous Line  Duration "             Peripheral IV 01/23/24 Right Wrist 1 day              Drain  Duration             Ureteral Internal Stent Right ureter 6 Fr. 398 days                    Physical Exam: NAD, A&Ox3  Ortho Exam: LLE: left anterior knee dsg c/d/i, compartments soft, calf non-tender, +PF/DF/EHL, +DP/SP/Saph/Sural SILT, DP 2+, foot warm, TEDs in place    Lab, Imaging and other studies: PO XR L knee: Reviewed and acceptable    Lab Results   Component Value Date    WBC 16.16 (H) 01/24/2024    HGB 13.4 01/24/2024    HCT 41.6 01/24/2024    MCV 96 01/24/2024     01/24/2024     Lab Results   Component Value Date    SODIUM 135 01/24/2024    K 5.6 (H) 01/24/2024     01/24/2024    CO2 24 01/24/2024    AGAP 10 01/24/2024    BUN 22 01/24/2024    CREATININE 1.51 (H) 01/24/2024    GLUC 126 01/24/2024    GLUF 126 (H) 01/24/2024    CALCIUM 8.6 01/24/2024    AST 36 12/21/2023    ALT 45 12/21/2023    ALKPHOS 45 12/21/2023    TP 7.7 12/21/2023    TBILI 0.59 12/21/2023    EGFR 45 01/24/2024     Hill Reynoso PA-C

## 2024-01-24 NOTE — NURSING NOTE
Patient discharged home. Written and verbal discharge instruction provided to patient. All questions answered. IV removed per hospital protocol, occlusive dressing applied. Patient left unit with all personal belongings, accompanied by PCA.

## 2024-01-24 NOTE — PLAN OF CARE
Problem: PAIN - ADULT  Goal: Verbalizes/displays adequate comfort level or baseline comfort level  Description: Interventions:  - Encourage patient to monitor pain and request assistance  - Assess pain using appropriate pain scale  - Administer analgesics based on type and severity of pain and evaluate response  - Implement non-pharmacological measures as appropriate and evaluate response  - Consider cultural and social influences on pain and pain management  - Notify physician/advanced practitioner if interventions unsuccessful or patient reports new pain  Outcome: Progressing     Problem: INFECTION - ADULT  Goal: Absence or prevention of progression during hospitalization  Description: INTERVENTIONS:  - Assess and monitor for signs and symptoms of infection  - Monitor lab/diagnostic results  - Monitor all insertion sites, i.e. indwelling lines, tubes, and drains  - Monitor endotracheal if appropriate and nasal secretions for changes in amount and color  - [place] appropriate cooling/warming therapies per order  - Administer medications as ordered  - Instruct and encourage patient and family to use good hand hygiene technique  - Identify and instruct in appropriate isolation precautions for identified infection/condition  Outcome: Progressing

## 2024-01-24 NOTE — PHYSICAL THERAPY NOTE
"   PT TREATMENT       01/24/24 1027   PT Last Visit   PT Visit Date 01/24/24   Note Type   Note Type BID visit/treatment   Pain Assessment   Pain Assessment Tool 0-10   Pain Score 1   Pain Location/Orientation Orientation: Left;Location: Knee   Pain Onset/Description Onset: Ongoing;Descriptor: Discomfort   Effect of Pain on Daily Activities limits mobility   Patient's Stated Pain Goal No pain   Hospital Pain Intervention(s) Repositioned;Ambulation/increased activity;Cold applied   Multiple Pain Sites No   Restrictions/Precautions   Weight Bearing Precautions Per Order Yes   LLE Weight Bearing Per Order WBAT   Other Precautions Fall Risk;Pain   General   Chart Reviewed Yes   Family/Caregiver Present No   Cognition   Overall Cognitive Status WFL   Arousal/Participation Cooperative   Orientation Level Oriented X4   Following Commands Follows multistep commands with increased time or repetition   Subjective   Subjective \"I really don't have any pain this morning\"   Bed Mobility   Supine to Sit Unable to assess   Sit to Supine Unable to assess   Additional Comments received sitting in bedside chair ; pt reports he slept in the recliner   Transfers   Sit to Stand 4  Minimal assistance   Additional items Assist x 1;Verbal cues;Armrests;Increased time required;Impulsive   Stand to Sit 4  Minimal assistance   Additional items Assist x 1;Verbal cues;Armrests;Increased time required;Impulsive   Toilet transfer 4  Minimal assistance   Additional items Assist x 1;Verbal cues;Impulsive;Increased time required;Commode   Ambulation/Elevation   Gait pattern Foward flexed;Short stride;Step to;Step through pattern;Decreased L stance;Antalgic;L Knee Ryan  (Intermittent L knee buckling throughout session, requries frequent verbal/tactile cues to improve L knee extension in stance phase)   Gait Assistance 4  Minimal assist   Additional items Assist x 1;Verbal cues   Assistive Device Rolling walker   Distance 100 feet + 40 feet + 10 " feet   Stair Management Assistance 3  Moderate assist   Additional items Assist x 1;Verbal cues;Tactile cues  (constant blocking at L knee due to L knee buckling)   Stair Management Technique Step to pattern;One rail L;With cane   Number of Stairs 8   Balance   Static Sitting Fair +   Dynamic Sitting Fair   Static Standing Fair   Dynamic Standing Fair -   Ambulatory Poor +  (RW)   Activity Tolerance   Activity Tolerance Patient tolerated treatment well;Patient limited by fatigue   Nurse Made Aware yes RN Becky   Exercises   TKR Sitting;Left  (Ankle pumps, quad sets, SAQ/LAQ, heel slides x 10 reps L)   Assessment   Prognosis Good   Problem List Decreased strength;Decreased range of motion;Decreased endurance;Decreased mobility;Impaired balance;Pain;Decreased skin integrity;Decreased safety awareness   Assessment Pt seen for PT session this AM. Able to perform exercise as mentioned above with intermittent verbal/tactile cues to improve form. Able to ambulate approx 100 feet with Min A using RW - pt requires intermittent verbal/tactile cues to maintain L knee extension in standing + reminders for proper sequencing due to L knee buckling at end range stance phase. Requires cuing throughout session with performing sit <> stand from chair due to poor weight shifting + increased L knee buckling when attempting to stand. When performing toilet transfer pt with L knee buckling, requires Mod A to return to commode. When negotiating x 8 steps requires constant cuing + min/Mod A to maintain L knee extension due to buckling. Repositioned in bedside chair with all needs within reach.  The patient's AM-PAC Basic Mobility Inpatient Short Form Raw Score is 17. A Raw score of greater than 16 suggests the patient may benefit from discharge to home. Please also refer to the recommendation of the Physical Therapist for safe discharge planning.     Goals   Patient Goals to improve ability to walk   Plan   Treatment/Interventions ADL  retraining;Functional transfer training;LE strengthening/ROM;Therapeutic exercise;Endurance training;Bed mobility;Gait training;Spoke to nursing   Progress Progressing toward goals   PT Frequency Twice a day   Discharge Recommendation   Rehab Resource Intensity Level, PT III (Minimum Resource Intensity)   AM-PAC Basic Mobility Inpatient   Turning in Flat Bed Without Bedrails 3   Lying on Back to Sitting on Edge of Flat Bed Without Bedrails 3   Moving Bed to Chair 3   Standing Up From Chair Using Arms 3   Walk in Room 3   Climb 3-5 Stairs With Railing 2   Basic Mobility Inpatient Raw Score 17   Basic Mobility Standardized Score 39.67   Highest Level Of Mobility   -HLM Goal 5: Stand one or more mins   JH-HLM Achieved 7: Walk 25 feet or more   Education   Education Provided Mobility training;Home exercise program;Assistive device   Patient Explanation/teachback used;Demonstrates verbal understanding;Reinforcement needed   End of Consult   Patient Position at End of Consult Bedside chair;All needs within reach   Licensure   [place] License Number  Jennifer Alfaro [id number]

## 2024-01-25 ENCOUNTER — PATIENT OUTREACH (OUTPATIENT)
Dept: OBGYN CLINIC | Facility: HOSPITAL | Age: 74
End: 2024-01-25

## 2024-01-25 ENCOUNTER — TELEPHONE (OUTPATIENT)
Dept: OBGYN CLINIC | Facility: HOSPITAL | Age: 74
End: 2024-01-25

## 2024-01-25 NOTE — PROGRESS NOTES
SWCM made call to pt to follow up postoperatively. Pt reports doing great and feeling the same he felt prior to surgery. Pt has his first OP PT appt tomorrow and plans to take WCT to same. Today pt reports no additional needs. SWCM will close referral. [facility] will remain available for any future needs or concerns. SWCM will remain available.

## 2024-01-25 NOTE — TELEPHONE ENCOUNTER
Patient contacted for a postoperative follow up assessment. Patient states current pain level of a  0/10  when sitting and 1-2/10 when walking with RW. Patient states they have minimal pain and it is relieved with medication regimen. Patient denies increase in swelling and dressing is clean, dry and intact. Patient is icing the site regularly. PT 1/26 at 11AM.     We reviewed patients AVS medication list. Patient is taking, ASA 325mg BID. Patient denies use of Tylenol, Oxycodone and Colace at this time. Patient has not yet had a BM but is passing gas.      Patient denies nausea, vomiting, abdominal pain, chest pain, shortness of breath, fever, dizziness and calf pain. Patient confirmed post-op appointment with surgeon on 2/7 at 2:45.Patient does not have any other questions or concerns at this time. Pt was encouraged to call with any questions, concerns or issues.

## 2024-01-26 ENCOUNTER — OFFICE VISIT (OUTPATIENT)
Dept: PHYSICAL THERAPY | Facility: CLINIC | Age: 74
End: 2024-01-26
Payer: MEDICARE

## 2024-01-26 DIAGNOSIS — G89.29 CHRONIC PAIN OF BOTH KNEES: ICD-10-CM

## 2024-01-26 DIAGNOSIS — M25.562 CHRONIC PAIN OF BOTH KNEES: ICD-10-CM

## 2024-01-26 DIAGNOSIS — M17.0 PRIMARY OSTEOARTHRITIS OF BOTH KNEES: Primary | ICD-10-CM

## 2024-01-26 DIAGNOSIS — M25.561 CHRONIC PAIN OF BOTH KNEES: ICD-10-CM

## 2024-01-26 PROCEDURE — 97110 THERAPEUTIC EXERCISES: CPT | Performed by: PHYSICAL THERAPIST
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
## 2024-01-29 ENCOUNTER — OFFICE VISIT (OUTPATIENT)
Dept: PHYSICAL THERAPY | Facility: CLINIC | Age: 74
End: 2024-01-29
Payer: MEDICARE

## 2024-01-29 DIAGNOSIS — M25.562 CHRONIC PAIN OF BOTH KNEES: ICD-10-CM

## 2024-01-29 DIAGNOSIS — M17.0 PRIMARY OSTEOARTHRITIS OF BOTH KNEES: Primary | ICD-10-CM

## 2024-01-29 DIAGNOSIS — G89.29 CHRONIC PAIN OF BOTH KNEES: ICD-10-CM

## 2024-01-29 DIAGNOSIS — M25.561 CHRONIC PAIN OF BOTH KNEES: ICD-10-CM

## 2024-01-29 PROCEDURE — 97110 THERAPEUTIC EXERCISES: CPT | Performed by: PHYSICAL THERAPIST

## 2024-01-29 PROCEDURE — 97112 NEUROMUSCULAR REEDUCATION: CPT | Performed by: PHYSICAL THERAPIST

## 2024-01-29 NOTE — PROGRESS NOTES
Daily Note     Today's date: 2024  Patient name: Swapnil Grover  : 1950  MRN: [id number]  Referring provider: Hill Reynoso*  Dx:   Encounter Diagnosis     ICD-10-CM    1. Primary osteoarthritis of both knees  M17.0       2. Chronic pain of both knees  M25.561     M25.562     G89.29                      Subjective: Pain is 3/10      Objective: See treatment diary below      Assessment: Tolerated treatment well. Patient would benefit from continued PT.  Removed compression stocking this session.  He agreed to keep the compression stocking off his left leg unless he sees significant increase in left leg edema.He notes compliance with standing HEP but not lying down HEP.  He was educated on importance of heel slides and quad sets.  He agreed to perform these regularly..      Plan: Continue per plan of care.      Eval/ Re-eval Auth #/ Referral # Total visits Start date  Expiration date Total active units  Total manual units  PT only or  PT+OT?   1/15/24                                     1/15 1/26 1/29            Total units authorized                Total units remaining                    Precautions: Kidney Disease      Specialty Daily Treatment Diary     Manuals 1/15/24 1/26/24 1/29/24     Visit # 1 2 3     L PF mobs   2'     Stretch L HS Quad   3'     L Knee PROM  5 min 3'             Flex AROM 118 74 deg 78 deg     Ext AROM -11 -8 -6 deg             Warm-up        NuStep   5 min     Neuro Re-Ed        Qset 20 20 20     Ankle pumps 20 20  HR 20 20 HR     Hip abd stand  20 20                     Ther Ex        Mini squat        Side step        Knee flex   20     Knee ext  20 20     SLR   2x5 with Assist     Heel slides 20  20 20             Ther Activity                        Gait Training        W/ rw reviewed       Stairs  reviewed       Modalities        CP   deferred

## 2024-01-31 ENCOUNTER — OFFICE VISIT (OUTPATIENT)
Dept: PHYSICAL THERAPY | Facility: CLINIC | Age: 74
End: 2024-01-31
Payer: MEDICARE

## 2024-01-31 DIAGNOSIS — M17.0 PRIMARY OSTEOARTHRITIS OF BOTH KNEES: Primary | ICD-10-CM

## 2024-01-31 DIAGNOSIS — M25.562 CHRONIC PAIN OF BOTH KNEES: ICD-10-CM

## 2024-01-31 DIAGNOSIS — M25.561 CHRONIC PAIN OF BOTH KNEES: ICD-10-CM

## 2024-01-31 DIAGNOSIS — G89.29 CHRONIC PAIN OF BOTH KNEES: ICD-10-CM

## 2024-01-31 PROCEDURE — 97110 THERAPEUTIC EXERCISES: CPT

## 2024-01-31 PROCEDURE — 97112 NEUROMUSCULAR REEDUCATION: CPT

## 2024-01-31 NOTE — PROGRESS NOTES
"Daily Note     Today's date: 2024  Patient name: Swapnil Grover  : 1950  MRN: [id number]  Referring provider: Hill Reynoso*  Dx:   Encounter Diagnosis     ICD-10-CM    1. Primary osteoarthritis of both knees  M17.0       2. Chronic pain of both knees  M25.561     M25.562     G89.29                      Subjective: Pt reports no new complaints, has continued soreness at an acceptable level. Slept pretty well last night.       Objective: mini squats through 25% w/ R side WS and poor drive through L LE, mod improvement w/ verbal and tactile cueing but will require further instruction      Assessment: Tolerated treatment well. Patient demonstrated fatigue post treatment and would benefit from continued PT. Does well w/ exercise and WB progressions today. Will benefit from aggressive strength work as ROM improves, focus on functional motions to improve mobility with home. Fatigue but no significant increase in pain by end of session.       Plan: Continue per plan of care. 1.5 weeks at [place]     Eval/ Re-eval Auth #/ Referral # Total visits Start date  Expiration date Total active units  Total manual units  PT only or  PT+OT?   1/15/24                                     1/15 1/26 1/29 1/31           Total units authorized                Total units remaining                    Precautions: Kidney Disease      Specialty Daily Treatment Diary     Manuals 1/15/24 1/26/24 1/29/24 1/31/24    Visit # 1 2 3 4    L PF mobs   2'     Stretch L HS Quad   3'     L Knee PROM  5 min 3'             Flex AROM 118 74 deg 78 deg 84 deg    Ext AROM -11 -8 -6 deg -5 deg            Warm-up        NuStep   5 min 5 min pre    Neuro Re-Ed        Qset 20 20 20 x20    Ankle pumps 20 20  HR 20 20 HR x20    Hip abd stand  20 20 Abd and ext 2x10    gait    In walker x 4 mins throughout            Ther Ex        Mini squat    x20    Side step    4\" 3x5    Knee flex   20 x20    Knee ext  20 20 x20    SLR   2x5 with Assist AA " x5     Heel slides 20  20 20 CP PROM x 10 mins                    Ther Activity                        Gait Training        W/ rw reviewed       Stairs  reviewed       Modalities        CP   deferred Post x 6 mins

## 2024-02-02 ENCOUNTER — OFFICE VISIT (OUTPATIENT)
Dept: PHYSICAL THERAPY | Facility: CLINIC | Age: 74
End: 2024-02-02
Payer: MEDICARE

## 2024-02-02 DIAGNOSIS — M17.0 PRIMARY OSTEOARTHRITIS OF BOTH KNEES: Primary | ICD-10-CM

## 2024-02-02 DIAGNOSIS — G89.29 CHRONIC PAIN OF BOTH KNEES: ICD-10-CM

## 2024-02-02 DIAGNOSIS — M25.561 CHRONIC PAIN OF BOTH KNEES: ICD-10-CM

## 2024-02-02 DIAGNOSIS — M25.562 CHRONIC PAIN OF BOTH KNEES: ICD-10-CM

## 2024-02-02 PROCEDURE — 97110 THERAPEUTIC EXERCISES: CPT | Performed by: PHYSICAL THERAPIST

## 2024-02-02 PROCEDURE — 97112 NEUROMUSCULAR REEDUCATION: CPT | Performed by: PHYSICAL THERAPIST

## 2024-02-02 NOTE — PROGRESS NOTES
"Daily Note     Today's date: 2024  Patient name: Swapnil Grover  : 1950  MRN: [id number]  Referring provider: Hill Reynoso*  Dx:   Encounter Diagnosis     ICD-10-CM    1. Primary osteoarthritis of both knees  M17.0       2. Chronic pain of both knees  M25.561     M25.562     G89.29                      Subjective: Pain is 5/10 today      Objective: treatment per flow sheet      Assessment: Tolerated treatment well. Phe ambulated into session today using a cane.  He was using cane on the wrong side.  He was educated on correct use of the cane.  His gait pattern normalized after this.  Mepilex was removed this session.  The distal inch of the incision had a small amount of bloody drainage.  Three Steri-strips were used at the distal incision.  He agreed to leave these in place and to wrap the incision site with Saran wrap for showers.      Plan: Continue per plan of care.  Step ups     Eval/ Re-eval Auth #/ Referral # Total visits Start date  Expiration date Total active units  Total manual units  PT only or  PT+OT?   1/15/24                                     1/15 1/26 1/29 1/31 2/2          Total units authorized                Total units remaining                    Precautions: Kidney Disease      Specialty Daily Treatment Diary     Manuals 1/15/24 1/26/24 1/29/24 1/31/24 2/2/24   Visit # 1 2 3 4 5   L PF mobs   2'  2'   Stretch L HS Quad   3'  3'   L Knee PROM  5 min 3'  3'           Flex AROM 118 74 deg 78 deg 84 deg 85 deg   Ext AROM -11 -8 -6 deg -5 deg -5 deg           Warm-up        NuStep   5 min 5 min pre 5 min   Neuro Re-Ed        Qset 20 20 20 x20 20   Ankle pumps 20 20  HR 20 20 HR x20 20  HR   Hip abd stand  20 20 Abd and ext 2x10 --   gait    In walker x 4 mins throughout            Ther Ex        Mini squat    x20 20   Side step    4\" 3x5 4 x 10 ft   Knee flex   20 x20 20   Knee ext  20 20 x20 20   SLR   2x5 with Assist AA x5  2x 5 AA   Heel slides 20  20 20 CP PROM x 10 " mins 20 on pnut   LP     2x10  55#           Ther Activity                        Gait Training        W/ rw reviewed       Stairs  reviewed       Modalities        CP   deferred Post x 6 mins

## 2024-02-05 ENCOUNTER — OFFICE VISIT (OUTPATIENT)
Dept: PHYSICAL THERAPY | Facility: CLINIC | Age: 74
End: 2024-02-05
Payer: MEDICARE

## 2024-02-05 DIAGNOSIS — M25.562 CHRONIC PAIN OF BOTH KNEES: ICD-10-CM

## 2024-02-05 DIAGNOSIS — M17.0 PRIMARY OSTEOARTHRITIS OF BOTH KNEES: Primary | ICD-10-CM

## 2024-02-05 DIAGNOSIS — G89.29 CHRONIC PAIN OF BOTH KNEES: ICD-10-CM

## 2024-02-05 DIAGNOSIS — M25.561 CHRONIC PAIN OF BOTH KNEES: ICD-10-CM

## 2024-02-05 PROCEDURE — 97110 THERAPEUTIC EXERCISES: CPT

## 2024-02-05 PROCEDURE — 97112 NEUROMUSCULAR REEDUCATION: CPT

## 2024-02-05 NOTE — PROGRESS NOTES
"Daily Note     Today's date: 2024  Patient name: Swapnil Grover  : 1950  MRN: [id number]  Referring provider: Hill Reynoso*  Dx:   Encounter Diagnosis     ICD-10-CM    1. Primary osteoarthritis of both knees  M17.0       2. Chronic pain of both knees  M25.561     M25.562     G89.29                      Subjective: Pain level at 1-2/10, had tylenol before he left. Feels that left leg is longer now. Leg was bothersome on Sat.       Objective: See treatment diary below      Assessment: Tolerated treatment well. Patient would benefit from continued PT in order to build quad, hip and glute strength to support left knee.  Right knee hurt during mini squats today. Counseled by primary PT on the importance of moving knee despite soreness. Did ok with chair scootches to increase knee flexion.       Plan: Continue per plan of care.      Eval/ Re-eval Auth #/ Referral # Total visits Start date  Expiration date Total active units  Total manual units  PT only or  PT+OT?   1/15/24                                     1/15 1/26 1/29 1/31 2/2          Total units authorized                Total units remaining                    Precautions: Kidney Disease      Specialty Daily Treatment Diary     Manuals 1/15/24 1/26/24 1/29/24 1/31/24 2/2/24 2/5/24   Visit # 1 2 3 4 5 6   L PF mobs   2'  2'    Stretch L HS Quad   3'  3'    L Knee PROM  5 min 3'  3' 3'            Flex AROM 118 74 deg 78 deg 84 deg 85 deg 87 deg   Ext AROM -11 -8 -6 deg -5 deg -5 deg -5 deg            Warm-up         NuStep   5 min 5 min pre 5 min 5 min   Neuro Re-Ed         Qset 20 20 20 x20 20 20   Ankle pumps 20 20  HR 20 20 HR x20 20  HR 20 HR   Hip abd stand  20 20 Abd and ext 2x10 -- Abd/ ext 2*10   gait    In walker x 4 mins throughout              Ther Ex         Mini squat    x20 20 20   Side step    4\" 3x5 4 x 10 ft 4*10 ft   Knee flex   20 x20 20 20   Knee ext  20 20 x20 20 20   SLR   2x5 with Assist AA x5  2x 5 AA 2*5 AA   Heel " slides 20  20 20 CP PROM x 10 mins 20 on pnut 20x on peanut + SOS   LP     2x10  55# 2*10 55#   LAQ      2*10   Chair scootches      4x            Ther Activity                           Gait Training         W/ rw reviewed        Stairs  reviewed        Modalities         CP   deferred Post x 6 mins

## 2024-02-07 ENCOUNTER — OFFICE VISIT (OUTPATIENT)
Dept: OBGYN CLINIC | Facility: CLINIC | Age: 74
End: 2024-02-07

## 2024-02-07 ENCOUNTER — APPOINTMENT (OUTPATIENT)
Dept: RADIOLOGY | Facility: CLINIC | Age: 74
End: 2024-02-07
Payer: MEDICARE

## 2024-02-07 ENCOUNTER — OFFICE VISIT (OUTPATIENT)
Dept: PHYSICAL THERAPY | Facility: CLINIC | Age: 74
End: 2024-02-07
Payer: MEDICARE

## 2024-02-07 VITALS
WEIGHT: 223 LBS | SYSTOLIC BLOOD PRESSURE: 145 MMHG | HEART RATE: 97 BPM | HEIGHT: 67 IN | BODY MASS INDEX: 35 KG/M2 | DIASTOLIC BLOOD PRESSURE: 90 MMHG

## 2024-02-07 DIAGNOSIS — M25.561 CHRONIC PAIN OF BOTH KNEES: ICD-10-CM

## 2024-02-07 DIAGNOSIS — Z47.1 AFTERCARE FOLLOWING LEFT KNEE JOINT REPLACEMENT SURGERY: ICD-10-CM

## 2024-02-07 DIAGNOSIS — M17.0 PRIMARY OSTEOARTHRITIS OF BOTH KNEES: Primary | ICD-10-CM

## 2024-02-07 DIAGNOSIS — Z96.652 AFTERCARE FOLLOWING LEFT KNEE JOINT REPLACEMENT SURGERY: ICD-10-CM

## 2024-02-07 DIAGNOSIS — M25.562 CHRONIC PAIN OF BOTH KNEES: ICD-10-CM

## 2024-02-07 DIAGNOSIS — Z96.652 STATUS POST LEFT KNEE REPLACEMENT: ICD-10-CM

## 2024-02-07 DIAGNOSIS — G89.29 CHRONIC PAIN OF BOTH KNEES: ICD-10-CM

## 2024-02-07 DIAGNOSIS — Z96.652 STATUS POST LEFT KNEE REPLACEMENT: Primary | ICD-10-CM

## 2024-02-07 PROCEDURE — 97112 NEUROMUSCULAR REEDUCATION: CPT | Performed by: PHYSICAL THERAPIST

## 2024-02-07 PROCEDURE — 73562 X-RAY EXAM OF KNEE 3: CPT

## 2024-02-07 PROCEDURE — 99024 POSTOP FOLLOW-UP VISIT: CPT | Performed by: ORTHOPAEDIC SURGERY

## 2024-02-07 PROCEDURE — 97110 THERAPEUTIC EXERCISES: CPT | Performed by: PHYSICAL THERAPIST

## 2024-02-07 NOTE — PROGRESS NOTES
Assessment/Plan:  1. Status post left knee replacement  XR knee 3 vw left non injury      2. Aftercare following left knee joint replacement surgery  XR knee 3 vw left non injury        Scribe Attestation      I,:  Woody Harp am acting as a scribe while in the presence of the attending physician.:       I,:  Bassem Moscoso, DO personally performed the services described in this documentation    as scribed in my presence.:           Swapnil is a pleasant 73-year-old gentleman who returns today for follow-up evaluation 2-week status post left total knee arthroplasty.  I am pleased with his imaging today.  His range of motion is acceptable today, although not as progressed as I would expect at this point in his recovery.  I encouraged him to use his narcotic pain medication prior to physical therapy in order to maximize the benefit of his therapy sessions.  He should continue with physical therapy 3 times per week with focus on restoring his range of motion.  He will continue with aspirin for DVT prophylaxis.  I would like to see him back in 2 weeks for range of motion check.  We did briefly discuss the indications for manipulation under anesthesia today, but I am hopeful we can avoid this.  All of his questions and concerns were addressed.    Subjective: Follow-up evaluation 2-week status post left total knee arthroplasty    Patient ID: Swapnil Grover is a 73 y.o. male who returns today for follow-up evaluation 2-week status post left total knee arthroplasty.  He has been participating in physical therapy 3 times per week and has been using a cane for ambulatory assistance.  He has been avoiding using narcotics but has been using Tylenol to help control his pain.  He does report some bruising about his thigh.  He denies any new injury or trauma.    Review of Systems   Constitutional:  Positive for activity change. Negative for chills, fever and unexpected weight change.   HENT:  Negative for hearing loss,  nosebleeds and sore throat.    Eyes:  Negative for pain, redness and visual disturbance.   Respiratory:  Negative for cough, shortness of breath and wheezing.    Cardiovascular:  Negative for chest pain, palpitations and leg swelling.   Gastrointestinal:  Negative for abdominal pain, nausea and vomiting.   Endocrine: Negative for polyphagia and polyuria.   Genitourinary:  Negative for dysuria and hematuria.   Musculoskeletal:  Positive for myalgias. Negative for arthralgias and joint swelling.        See HPI   Skin:  Negative for rash and wound.   Neurological:  Negative for dizziness, numbness and headaches.   Psychiatric/Behavioral:  Negative for decreased concentration and suicidal ideas. The patient is not nervous/anxious.          Past Medical History:   Diagnosis Date    Arthritis     BPH (benign prostatic hyperplasia)     Breathing difficulty 12/01/2022    CPAP given per pt-s/p surgery    Cancer (HCC) 2015    basal cell of the skull     Chronic kidney disease     Colon polyp     Heart failure (HCC)     12/2/22-pt had ECHO-per pt R. sided block    History of subdural hematoma     Kidney stone     right stone    Renal disorder     Sleep apnea     mild- no CPAP-had nasal septoplasty       Past Surgical History:   Procedure Laterality Date    BASAL CELL CARCINOMA EXCISION  2015    skull    BRAIN SURGERY      in the 1990's-subdural hematoma    CATARACT EXTRACTION Left     COLONOSCOPY      x2    CYSTOSCOPY W/ LASER LITHOTRIPSY Right 11/27/2020    Procedure: CYSTOSCOPY, FLEXIBLE URETEROSCOPY, LASER, AND STENT EXCHANGE,STONE BASKET, RIGHT RETROGRADE;  Surgeon: Sabino Garcia MD;  Location: [facility];  Service: Urology    FL RETROGRADE PYELOGRAM  10/18/2020    FL RETROGRADE PYELOGRAM  11/27/2020    FL RETROGRADE PYELOGRAM  12/01/2022    FL RETROGRADE PYELOGRAM  12/22/2022    LITHOTRIPSY      [place] ARTHRP KNE CONDYLE&PLATU MEDIAL&LAT COMPARTMENTS Left 1/23/2024    Procedure: ARTHROPLASTY KNEE TOTAL W ROBOT - overnight,  cemented;  Surgeon: Bassem Moscoso DO;  Location: [facility];  Service: Orthopedics    KY CYSTO BLADDER W/URETERAL CATHETERIZATION Right 11/11/2020    Procedure: ESWL RIGHT;  Surgeon: Sabino Garcia MD;  Location: [facility];  Service: Urology    KY CYSTO BLADDER W/URETERAL CATHETERIZATION Right 12/01/2022    Procedure: CYSTOSCOPY RETROGRADE PYELOGRAM WITH INSERTION STENT URETERAL;  Surgeon: Sabino Garcia MD;  Location: [facility];  Service: Urology    KY CYSTO/URETERO W/LITHOTRIPSY &INDWELL STENT INSRT Right 10/18/2020    Procedure: CYSTOSCOPY URETEROSCOPY WITH BASKET EXTRACTION, RETROGRADE PYELOGRAM AND INSERTION STENT URETERAL;  Surgeon: Kris Ac MD;  Location: [facility];  Service: Urology    KY CYSTO/URETERO W/LITHOTRIPSY &INDWELL STENT INSRT Right 12/22/2022    Procedure: CYSTOSCOPY URETEROSCOPY WITH LITHOTRIPSY HOLMIUM LASER, RETROGRADE PYELOGRAM AND INSERTION STENT URETERAL;  Surgeon: Sabino Garcia MD;  Location: [facility];  Service: Urology    SEPTOPLASTY      in the 1990's-trimmed uvula    TONSILLECTOMY  1956    age 6       Family History   Problem Relation Age of Onset    Hypertension Mother     Kidney disease Mother         renal failure-dialysis    Hypertension Father     Stroke Father     Diabetes Sister     Kidney disease Sister         self dialysis at home       Social History     Occupational History    Not on file   Tobacco Use    Smoking status: Never    Smokeless tobacco: Never   Vaping Use    Vaping status: Never Used   Substance and Sexual Activity    Alcohol use: Yes     Comment: a beer on the weekends    Drug use: Never    Sexual activity: Not on file         Current Outpatient Medications:     acetaminophen (TYLENOL) 325 mg tablet, Take 3 tablets (975 mg total) by mouth every 8 (eight) hours, Disp: , Rfl:     aspirin 325 mg tablet, Take 1 tablet (325 mg total) by mouth 2 (two) times a day, Disp: 82 tablet, Rfl: 0    b complex vitamins tablet, Take 1 tablet by mouth 2 (two)  times a day, Disp: , Rfl:     docusate sodium (COLACE) 100 mg capsule, Take 1 capsule (100 mg total) by mouth 2 (two) times a day, Disp: 60 capsule, Rfl: 0    oxyCODONE (Roxicodone) 5 immediate release tablet, Take 1-2 tablets by mouth every 6 hours as needed for pain, Disp: 40 tablet, Rfl: 0    potassium citrate (UROCIT-K) 10 mEq, Take 2 tablets (20 mEq total) by mouth 3 (three) times a day with meals, Disp: , Rfl:     VITAMIN D PO, Take by mouth every morning With calcium, Disp: , Rfl:     VITAMIN E PO, Take 268 mg by mouth daily, Disp: , Rfl:     Allergies   Allergen Reactions    Other Nasal Congestion     Seasonal allergies    Bean Pod Extract - Food Allergy Nausea Only     Soft beans- eg chick peas, lima's, sweet peas       Objective:  Vitals:    02/07/24 1510   BP: 145/90   Pulse: 97       Body mass index is 34.93 kg/m².    Left Knee Exam     Tenderness   The patient is experiencing no tenderness.     Range of Motion   Extension:  0   Flexion:  90     Tests   Varus: negative Valgus: negative  Drawer:  Anterior - negative     Posterior - negative    Other   Erythema: absent  Scars: present  Sensation: normal  Pulse: present  Swelling: mild  Effusion: no effusion present    Comments:  Swelling appropriate for stage in postoperative course  Ecchymosis about the distal medial thigh  Nearly healed anterior scar  Stable at 0, 30 and 90 degrees  Neurovascular intact distal  No warmth or erythema            Observations   Left Knee   Negative for effusion.       Physical Exam  Vitals and nursing note reviewed.   Constitutional:       Appearance: Normal appearance. He is well-developed.   HENT:      Head: Normocephalic and atraumatic.      Right Ear: External ear normal.      Left Ear: External ear normal.      Nose: Nose normal.   Eyes:      General: No scleral icterus.     Extraocular Movements: Extraocular movements intact.      Conjunctiva/sclera: Conjunctivae normal.   Cardiovascular:      Rate and Rhythm: Normal  rate.   Pulmonary:      Effort: Pulmonary effort is normal. No respiratory distress.   Musculoskeletal:      Cervical back: Normal range of motion and neck supple.      Left knee: No effusion.      Comments: See Ortho exam   Skin:     General: Skin is warm and dry.   Neurological:      General: No focal deficit present.      Mental Status: He is alert and oriented to person, place, and time.   Psychiatric:         Behavior: Behavior normal.         I have personally reviewed pertinent films in PACS.    X-ray of the left knee obtained on 2/7/2024 reviewed demonstrating a well-positioned and aligned cemented total knee arthroplasty without evidence of failure.  There is no new fracture, dislocation, lytic or blastic lesion.    This document was created using speech voice recognition software.   Grammatical errors, random word insertions, pronoun errors, and incomplete sentences are an occasional consequence of this system due to software limitations, ambient noise, and hardware issues.   Any formal questions or concerns about content, text, or information contained within the body of this dictation should be directly addressed to the provider for clarification.

## 2024-02-07 NOTE — PROGRESS NOTES
"Daily Note     Today's date: 2024  Patient name: Swapnil Grover  : 1950  MRN: [id number]  Referring provider: Hill Reynoso*  Dx:   Encounter Diagnosis     ICD-10-CM    1. Primary osteoarthritis of both knees  M17.0       2. Chronic pain of both knees  M25.561     M25.562     G89.29                      Subjective: Pain level is 3/10 today.       Objective: See treatment diary below      Assessment: Tolerated treatment well. He has been more compliant with flexion PROM for HEP.  He has improved ROM this session.  He would benefir from continued focus on ROM restoration.      Plan: Continue per plan of care.      Eval/ Re-eval Auth #/ Referral # Total visits Start date  Expiration date Total active units  Total manual units  PT only or  PT+OT?   1/15/24                                     1/15 1/26 1/29 1/31 2/2 2/7         Total units authorized                Total units remaining                    Precautions: Kidney Disease      Specialty Daily Treatment Diary     Manuals 24   Visit # 3 4 5 6 7   L PF mobs 2'  2'  2'   Stretch L HS Quad 3'  3'  3'   L Knee PROM 3'  3' 3' 3'           Flex AROM 78 deg 84 deg 85 deg 87 deg 95 deg    Ext AROM -6 deg -5 deg -5 deg -5 deg -4 deg           Warm-up        NuStep 5 min 5 min pre 5 min 5 min 8 min    Neuro Re-Ed        Qset 20 x20 20 20 30   Ankle pumps 20 HR x20 20  HR 20 HR 20  HR   Hip abd stand 20 Abd and ext 2x10 -- Abd/ ext 2*10    gait  In walker x 4 mins throughout              Ther Ex        Mini squat  x20 20 20 20   Side step  4\" 3x5 4 x 10 ft 4*10 ft 4 x 10 ft   Knee flex 20 x20 20 20 20   Knee ext 20 x20 20 20 20   SLR 2x5 with Assist AA x5  2x 5 AA 2*5 AA 2x5  AA   Heel slides 20 CP PROM x 10 mins 20 on pnut 20x on peanut + SOS 30 on pnut   LP   2x10  55# 2*10 55# 2x10  55#   LAQ    2*10 --   Chair scootches    4x --           Ther Activity                        Gait Training        W/ cane      "           Modalities        CP deferred Post x 6 mins

## 2024-02-09 ENCOUNTER — OFFICE VISIT (OUTPATIENT)
Dept: PHYSICAL THERAPY | Facility: CLINIC | Age: 74
End: 2024-02-09
Payer: MEDICARE

## 2024-02-09 DIAGNOSIS — G89.29 CHRONIC PAIN OF BOTH KNEES: ICD-10-CM

## 2024-02-09 DIAGNOSIS — M17.0 PRIMARY OSTEOARTHRITIS OF BOTH KNEES: Primary | ICD-10-CM

## 2024-02-09 DIAGNOSIS — M25.561 CHRONIC PAIN OF BOTH KNEES: ICD-10-CM

## 2024-02-09 DIAGNOSIS — M25.562 CHRONIC PAIN OF BOTH KNEES: ICD-10-CM

## 2024-02-09 PROCEDURE — 97112 NEUROMUSCULAR REEDUCATION: CPT | Performed by: PHYSICAL THERAPIST

## 2024-02-09 PROCEDURE — 97110 THERAPEUTIC EXERCISES: CPT | Performed by: PHYSICAL THERAPIST

## 2024-02-09 NOTE — PROGRESS NOTES
Daily Note     Today's date: 2024  Patient name: Swapnil Grover  : 1950  MRN: [id number]  Referring provider: Hill Reynoso*  Dx:   Encounter Diagnosis     ICD-10-CM    1. Primary osteoarthritis of both knees  M17.0       2. Chronic pain of both knees  M25.561     M25.562     G89.29                      Subjective: Pain 5/10 today.  He also notes increased stiffness in his knee and GI disruption today.      Objective: See treatment diary below      Assessment: Tolerated treatment well.  Educated patient on importance of continued knee ROM despite having pain.  Modified program today so he had less weight bearing activities.        Plan: Continue per plan of care.   Resume normal program next session.     Eval/ Re-eval Auth #/ Referral # Total visits Start date  Expiration date Total active units  Total manual units  PT only or  PT+OT?   1/15/24                                     1/15 1/26 1/29 1/31 2/2 2/7 2/9        Total units authorized                Total units remaining                    Precautions: Kidney Disease      Specialty Daily Treatment Diary     Manuals 24    Visit # 5 6 7 8    L PF mobs 2'  2' 2'    Stretch L HS Quad 3'  3' 3'    L Knee PROM 3' 3' 3' 3'            Flex AROM 85 deg 87 deg 95 deg  94 deg    Ext AROM -5 deg -5 deg -4 deg -4 deg            Warm-up        NuStep 5 min 5 min 8 min  8min    Neuro Re-Ed        Qset 20 20 30 30    Ankle pumps 20  HR 20 HR 20  HR 20    Hip abd stand -- Abd/ ext 2*10  --    SAQ    20            Ther Ex        Mini squat 20 20 20 --    Side step 4 x 10 ft 4*10 ft 4 x 10 ft --    Knee flex 20 20 20 20    Knee ext 20 20 20 20    SLR 2x 5 AA 2*5 AA 2x5  AA 1x5 AA    Heel slides 20 on pnut 20x on peanut + SOS 30 on pnut 30    LP 2x10  55# 2*10 55# 2x10  55# 55#   3x10    LAQ  2*10 --     Chair scootches  4x --             Ther Activity                        Gait Training        W/ cane                Modalities         CP

## 2024-02-12 ENCOUNTER — APPOINTMENT (OUTPATIENT)
Dept: PHYSICAL THERAPY | Facility: CLINIC | Age: 74
End: 2024-02-12
Payer: MEDICARE

## 2024-02-13 ENCOUNTER — APPOINTMENT (OUTPATIENT)
Dept: PHYSICAL THERAPY | Facility: CLINIC | Age: 74
End: 2024-02-13
Payer: MEDICARE

## 2024-02-14 ENCOUNTER — OFFICE VISIT (OUTPATIENT)
Facility: CLINIC | Age: 74
End: 2024-02-14
Payer: MEDICARE

## 2024-02-14 DIAGNOSIS — M25.562 CHRONIC PAIN OF BOTH KNEES: ICD-10-CM

## 2024-02-14 DIAGNOSIS — M25.561 CHRONIC PAIN OF BOTH KNEES: ICD-10-CM

## 2024-02-14 DIAGNOSIS — M17.0 PRIMARY OSTEOARTHRITIS OF BOTH KNEES: Primary | ICD-10-CM

## 2024-02-14 DIAGNOSIS — G89.29 CHRONIC PAIN OF BOTH KNEES: ICD-10-CM

## 2024-02-14 PROCEDURE — 97110 THERAPEUTIC EXERCISES: CPT

## 2024-02-14 PROCEDURE — 97112 NEUROMUSCULAR REEDUCATION: CPT

## 2024-02-14 NOTE — PROGRESS NOTES
Daily Note     Today's date: 2024  Patient name: Swapnil Grover  : 1950  MRN: [id number]  Referring provider: Hill Reynoso*  Dx:   Encounter Diagnosis     ICD-10-CM    1. Primary osteoarthritis of both knees  M17.0       2. Chronic pain of both knees  M25.561     M25.562     G89.29                      Subjective: Patient reports decreased pain compared to last visit.       Objective: See treatment diary below      Assessment: Tolerated treatment well.  Resumed normal program with reintegration of standing activities with good tolerance. Cueing to improve technique of mini squats with fair carryover. He will continue to benefit from skilled PT to maximize functional mobility and activity tolerance .      Plan: Continue per plan of care.       Eval/ Re-eval Auth #/ Referral # Total visits Start date  Expiration date Total active units  Total manual units  PT only or  PT+OT?   1/15/24                                     1/15 1/26 1/29 1/31 2/2 2/7 2/9        Total units authorized                Total units remaining                    Precautions: Kidney Disease      Specialty Daily Treatment Diary     Manuals 24   Visit # 5 6 7 8 9   L PF mobs 2'  2' 2'    Stretch L HS Quad 3'  3' 3'    L Knee PROM 3' 3' 3' 3'             Flex AROM 85 deg 87 deg 95 deg  94 deg 92 deg   Ext AROM -5 deg -5 deg -4 deg -4 deg -3 deg           Warm-up        NuStep 5 min 5 min 8 min  8min 8 min    Neuro Re-Ed        Qset 20 20 30 30 30   Ankle pumps 20  HR 20 HR 20  HR 20 20   Hip abd stand -- Abd/ ext 2*10  --    SAQ    20 20           Ther Ex     L knee PROM 3'   Mini squat 20 20 20 -- 20   Side step 4 x 10 ft 4*10 ft 4 x 10 ft -- 10 x 6 feet    Knee flex 20 20 20 20 20   Knee ext 20 20 20 20 20   SLR 2x 5 AA 2*5 AA 2x5  AA 1x5 AA 10x AA   Heel slides 20 on pnut 20x on peanut + SOS 30 on pnut 30 30 on pnut   LP 2x10  55# 2*10 55# 2x10  55# 55#   3x10 55#   3x10   LAQ  2*10  --     Chair scootches  4x --             Ther Activity                        Gait Training        W/ cane                Modalities        CP

## 2024-02-16 ENCOUNTER — OFFICE VISIT (OUTPATIENT)
Dept: PHYSICAL THERAPY | Facility: CLINIC | Age: 74
End: 2024-02-16
Payer: MEDICARE

## 2024-02-16 DIAGNOSIS — M25.562 CHRONIC PAIN OF BOTH KNEES: ICD-10-CM

## 2024-02-16 DIAGNOSIS — M25.561 CHRONIC PAIN OF BOTH KNEES: ICD-10-CM

## 2024-02-16 DIAGNOSIS — G89.29 CHRONIC PAIN OF BOTH KNEES: ICD-10-CM

## 2024-02-16 DIAGNOSIS — M17.0 PRIMARY OSTEOARTHRITIS OF BOTH KNEES: Primary | ICD-10-CM

## 2024-02-16 PROCEDURE — 97110 THERAPEUTIC EXERCISES: CPT | Performed by: PHYSICAL THERAPIST

## 2024-02-16 NOTE — PROGRESS NOTES
Daily Note     Today's date: 2024  Patient name: Swapnil Grover  : 1950  MRN: [id number]  Referring provider: Hill Reynoso*  Dx:   Encounter Diagnosis     ICD-10-CM    1. Primary osteoarthritis of both knees  M17.0       2. Chronic pain of both knees  M25.561     M25.562     G89.29                      Subjective: Patient reports noticing less overall pain recently and his chief complaint is stiffness.      Objective: See treatment diary below      Assessment: Tolerated treatment well.   His swing phase of gait remains stiff.  He has poor knee flexion during this phase.  He would benefit from gait training net session.      Plan: Continue per plan of care.   Hurdles     Eval/ Re-eval Auth #/ Referral # Total visits Start date  Expiration date Total active units  Total manual units  PT only or  PT+OT?   1/15/24                                     1/15 1/26 1/29 1/31 2/2 2/5 2/7 2/9 2/14 2/16     Total units authorized                Total units remaining                    Precautions: Kidney Disease      Specialty Daily Treatment Diary     Manuals 24   Visit # 6 7 8 9 10   L PF mobs  2' 2'     Stretch L HS Quad  3' 3'  3'   L Knee PROM 3' 3' 3'   3'           Flex AROM 87 deg 95 deg  94 deg 92 deg 97 deg   Ext AROM -5 deg -4 deg -4 deg -3 deg -3 deg           Warm-up        NuStep 5 min 8 min  8min 8 min     Neuro Re-Ed        Qset 20 30 30 30 30   Ankle pumps 20 HR 20  HR 20 20 --   Hip abd stand Abd/ ext 2*10  --  ----   SAQ   20 20 20           Ther Ex    L knee PROM 3'    Mini squat 20 20 -- 20 20   Side step 4*10 ft 4 x 10 ft -- 10 x 6 feet  5 x 20 ft   Knee flex 20 20 20 20 20   Knee ext 20 20 20 20 20   SLR 2*5 AA 2x5  AA 1x5 AA 10x AA 10x   Heel slides 20x on peanut + SOS 30 on pnut 30 30 on pnut 30   LP 2*10 55# 2x10  55# 55#   3x10 55#   3x10 65#  3x10   LAQ 2*10 --      Chair scootches 4x --              Ther Activity                         Gait Training        Hurdles                Modalities        CP

## 2024-02-19 ENCOUNTER — APPOINTMENT (OUTPATIENT)
Dept: PHYSICAL THERAPY | Facility: CLINIC | Age: 74
End: 2024-02-19
Payer: MEDICARE

## 2024-02-20 ENCOUNTER — OFFICE VISIT (OUTPATIENT)
Dept: PHYSICAL THERAPY | Facility: CLINIC | Age: 74
End: 2024-02-20
Payer: MEDICARE

## 2024-02-20 DIAGNOSIS — M25.562 CHRONIC PAIN OF BOTH KNEES: ICD-10-CM

## 2024-02-20 DIAGNOSIS — M25.561 CHRONIC PAIN OF BOTH KNEES: ICD-10-CM

## 2024-02-20 DIAGNOSIS — G89.29 CHRONIC PAIN OF BOTH KNEES: ICD-10-CM

## 2024-02-20 DIAGNOSIS — M17.0 PRIMARY OSTEOARTHRITIS OF BOTH KNEES: Primary | ICD-10-CM

## 2024-02-20 PROCEDURE — 97112 NEUROMUSCULAR REEDUCATION: CPT | Performed by: PHYSICAL THERAPIST

## 2024-02-20 PROCEDURE — 97110 THERAPEUTIC EXERCISES: CPT | Performed by: PHYSICAL THERAPIST

## 2024-02-20 NOTE — PROGRESS NOTES
Daily Note     Today's date: 2024  Patient name: Swapnil Grover  : 1950  MRN: [id number]  Referring provider: Hill Reynoso*  Dx:   Encounter Diagnosis     ICD-10-CM    1. Primary osteoarthritis of both knees  M17.0       2. Chronic pain of both knees  M25.561     M25.562     G89.29                      Subjective: Patient reports noticing less overall pain recently and his chief complaint is stiffness.      Objective: See treatment diary below      Assessment: Tolerated treatment well.  Added hurdles this session to improve knee flexion during swing.  He needed verbal cues to prevent hip rotation compensation to avoid knee flexion.  This corrected the pattern.      Plan: Continue per plan of care.    Papa test knee flexion next visit to improve quad mobility         Eval/ Re-eval Auth #/ Referral # Total visits Start date  Expiration date Total active units  Total manual units  PT only or  PT+OT?   1/15/24                                     1/15 1/26 1/29 1/31 2/2 2/5 2/7 2/9 2/14 2/16     Total units authorized                Total units remaining                    Precautions: Kidney Disease      Specialty Daily Treatment Diary     Manuals 24   Visit # 7 8 9 10 11   L PF mobs 2' 2'      Stretch L HS Quad 3' 3'  3'    L Knee PROM 3' 3'   3'            Flex AROM 95 deg  94 deg 92 deg 97 deg    Ext AROM -4 deg -4 deg -3 deg -3 deg            Warm-up        NuStep 8 min  8min 8 min   8 min   Neuro Re-Ed        Qset 30 30 30 30 30   Ankle pumps 20  HR 20 20 --    Hip abd stand  --  ----    SAQ  20 20 20 20   Stair stretch     20                   Ther Ex   L knee PROM 3'     Mini squat 20 -- 20 20 20   Side step 4 x 10 ft -- 10 x 6 feet  5 x 20 ft 5x20ft   Knee flex 20 20 20 20 20   Knee ext 20 20 20 20 20   SLR 2x5  AA 1x5 AA 10x AA 10x 4x5   Heel slides 30 on pnut 30 30 on pnut 30 30   LP 2x10  55# 55#   3x10 55#   3x10 65#  3x10 65#  3x10   Step ups  "    20  4\"                   Ther Activity                        Gait Training        Hurdles     6 x 3 hurdles           Modalities        CP                              "

## 2024-02-21 ENCOUNTER — OFFICE VISIT (OUTPATIENT)
Dept: OBGYN CLINIC | Facility: CLINIC | Age: 74
End: 2024-02-21

## 2024-02-21 VITALS
DIASTOLIC BLOOD PRESSURE: 92 MMHG | SYSTOLIC BLOOD PRESSURE: 149 MMHG | BODY MASS INDEX: 35 KG/M2 | WEIGHT: 223 LBS | HEIGHT: 67 IN | HEART RATE: 105 BPM

## 2024-02-21 DIAGNOSIS — Z47.1 AFTERCARE FOLLOWING LEFT KNEE JOINT REPLACEMENT SURGERY: ICD-10-CM

## 2024-02-21 DIAGNOSIS — Z96.652 STATUS POST LEFT KNEE REPLACEMENT: Primary | ICD-10-CM

## 2024-02-21 DIAGNOSIS — Z96.652 AFTERCARE FOLLOWING LEFT KNEE JOINT REPLACEMENT SURGERY: ICD-10-CM

## 2024-02-21 PROCEDURE — 99024 POSTOP FOLLOW-UP VISIT: CPT | Performed by: ORTHOPAEDIC SURGERY

## 2024-02-21 NOTE — PROGRESS NOTES
Assessment/Plan:  1. Status post left knee replacement        2. Aftercare following left knee joint replacement surgery          Scribe Attestation      I,:  Woody Harp am acting as a scribe while in the presence of the attending physician.:       I,:  Bassem Moscoso, DO personally performed the services described in this documentation    as scribed in my presence.:           Swapnil is a pleasant 73-year-old gentleman who returns today for follow-up evaluation and range of motion check 4 weeks status post left total knee arthroplasty. He has done well to improve his range of motion. I recommended he continue with his efforts at physical therapy. He understands he requires aspirin for DVT prophylaxis for 2 more weeks.  He may return to driving provide he is not using narcotics.  All of his questions and concerns were addressed today.  I would like to see him back in 3 weeks for repeat clinical evaluation.  No x-rays are needed at that visit.    Subjective: Follow-up evaluation and range of motion check 4 weeks status post left total knee arthroplasty    Patient ID: Swapnil Grover is a 73 y.o. male who returns today for follow-up evaluation and range of motion check 4 weeks status post left total knee arthroplasty. He reports he has been participating in physical therapy and has been making improvements with his range of motion. He does report some stiffness in the knee but it has been improving. He is curious about returning to driving. He denies any new injury or trauma.     Review of Systems   Constitutional:  Positive for activity change. Negative for chills, fever and unexpected weight change.   HENT:  Negative for hearing loss, nosebleeds and sore throat.    Eyes:  Negative for pain, redness and visual disturbance.   Respiratory:  Negative for cough, shortness of breath and wheezing.    Cardiovascular:  Negative for chest pain, palpitations and leg swelling.   Gastrointestinal:  Negative for  abdominal pain, nausea and vomiting.   Endocrine: Negative for polyphagia and polyuria.   Genitourinary:  Negative for dysuria and hematuria.   Musculoskeletal:  Positive for myalgias. Negative for arthralgias and joint swelling.        See HPI   Skin:  Negative for rash and wound.   Neurological:  Negative for dizziness, numbness and headaches.   Psychiatric/Behavioral:  Negative for decreased concentration and suicidal ideas. The patient is not nervous/anxious.          Past Medical History:   Diagnosis Date    Arthritis     BPH (benign prostatic hyperplasia)     Breathing difficulty 12/01/2022    CPAP given per pt-s/p surgery    Cancer (HCC) 2015    basal cell of the skull     Chronic kidney disease     Colon polyp     Heart failure (HCC)     12/2/22-pt had ECHO-per pt R. sided block    History of subdural hematoma     Kidney stone     right stone    Renal disorder     Sleep apnea     mild- no CPAP-had nasal septoplasty       Past Surgical History:   Procedure Laterality Date    BASAL CELL CARCINOMA EXCISION  2015    skull    BRAIN SURGERY      in the 1990's-subdural hematoma    CATARACT EXTRACTION Left     COLONOSCOPY      x2    CYSTOSCOPY W/ LASER LITHOTRIPSY Right 11/27/2020    Procedure: CYSTOSCOPY, FLEXIBLE URETEROSCOPY, LASER, AND STENT EXCHANGE,STONE BASKET, RIGHT RETROGRADE;  Surgeon: Sabino Garcia MD;  Location: [facility];  Service: Urology    FL RETROGRADE PYELOGRAM  10/18/2020    FL RETROGRADE PYELOGRAM  11/27/2020    FL RETROGRADE PYELOGRAM  12/01/2022    FL RETROGRADE PYELOGRAM  12/22/2022    JOINT REPLACEMENT  1/23/2024    LITHOTRIPSY      AL ARTHRP KNE CONDYLE&PLATU MEDIAL&LAT COMPARTMENTS Left 01/23/2024    Procedure: ARTHROPLASTY KNEE TOTAL W ROBOT - overnight, cemented;  Surgeon: Bassem Moscoso DO;  Location: [facility];  Service: Orthopedics    AL CYSTO BLADDER W/URETERAL CATHETERIZATION Right 11/11/2020    Procedure: ESWL RIGHT;  Surgeon: Sabino Garcia MD;  Location: [facility];  Service:  Urology    OH CYSTO BLADDER W/URETERAL CATHETERIZATION Right 12/01/2022    Procedure: CYSTOSCOPY RETROGRADE PYELOGRAM WITH INSERTION STENT URETERAL;  Surgeon: Sabino Garcia MD;  Location: [facility];  Service: Urology    OH CYSTO/URETERO W/LITHOTRIPSY &INDWELL STENT INSRT Right 10/18/2020    Procedure: CYSTOSCOPY URETEROSCOPY WITH BASKET EXTRACTION, RETROGRADE PYELOGRAM AND INSERTION STENT URETERAL;  Surgeon: Kris Ac MD;  Location: [facility];  Service: Urology    OH CYSTO/URETERO W/LITHOTRIPSY &INDWELL STENT INSRT Right 12/22/2022    Procedure: CYSTOSCOPY URETEROSCOPY WITH LITHOTRIPSY HOLMIUM LASER, RETROGRADE PYELOGRAM AND INSERTION STENT URETERAL;  Surgeon: Sabino Garcia MD;  Location: [facility];  Service: Urology    SEPTOPLASTY      in the 1990's-trimmed uvula    TONSILLECTOMY  1956    age 6       Family History   Problem Relation Age of Onset    Hypertension Mother     Kidney disease Mother         renal failure-dialysis    Hypertension Father     Stroke Father     Diabetes Sister     Kidney disease Sister         self dialysis at home       Social History     Occupational History    Not on file   Tobacco Use    Smoking status: Never    Smokeless tobacco: Never   Vaping Use    Vaping status: Never Used   Substance and Sexual Activity    Alcohol use: Yes     Comment: Rare occasion    Drug use: Never    Sexual activity: Not Currently         Current Outpatient Medications:     acetaminophen (TYLENOL) 325 mg tablet, Take 3 tablets (975 mg total) by mouth every 8 (eight) hours, Disp: , Rfl:     aspirin 325 mg tablet, Take 1 tablet (325 mg total) by mouth 2 (two) times a day, Disp: 82 tablet, Rfl: 0    b complex vitamins tablet, Take 1 tablet by mouth 2 (two) times a day, Disp: , Rfl:     docusate sodium (COLACE) 100 mg capsule, Take 1 capsule (100 mg total) by mouth 2 (two) times a day, Disp: 60 capsule, Rfl: 0    oxyCODONE (Roxicodone) 5 immediate release tablet, Take 1-2 tablets by mouth every 6  hours as needed for pain, Disp: 40 tablet, Rfl: 0    potassium citrate (UROCIT-K) 10 mEq, Take 2 tablets (20 mEq total) by mouth 3 (three) times a day with meals, Disp: , Rfl:     VITAMIN D PO, Take by mouth every morning With calcium, Disp: , Rfl:     VITAMIN E PO, Take 268 mg by mouth daily, Disp: , Rfl:     Allergies   Allergen Reactions    Other Nasal Congestion     Seasonal allergies    Bean Pod Extract - Food Allergy Nausea Only     Soft beans- eg chick peas, lima's, sweet peas       Objective:  Vitals:    02/21/24 1425   BP: 149/92   Pulse: 105       Body mass index is 34.93 kg/m².    Left Knee Exam     Tenderness   The patient is experiencing no tenderness.     Range of Motion   Extension:  0   Flexion:  110     Tests   Varus: negative Valgus: negative  Drawer:  Anterior - negative     Posterior - negative    Other   Erythema: absent  Scars: present  Sensation: normal  Pulse: present  Swelling: mild  Effusion: no effusion present    Comments:  Swelling appropriate for stage in postoperative course  Ecchymosis about the distal medial thigh  Nearly healed anterior scar  Stable at 0, 30 and 90 degrees  Neurovascular intact distal  No warmth or erythema            Observations   Left Knee   Negative for effusion.       Physical Exam  Vitals and nursing note reviewed.   Constitutional:       Appearance: Normal appearance. He is well-developed.   HENT:      Head: Normocephalic and atraumatic.      Right Ear: External ear normal.      Left Ear: External ear normal.      Nose: Nose normal.   Eyes:      General: No scleral icterus.     Extraocular Movements: Extraocular movements intact.      Conjunctiva/sclera: Conjunctivae normal.   Cardiovascular:      Rate and Rhythm: Normal rate.   Pulmonary:      Effort: Pulmonary effort is normal. No respiratory distress.   Musculoskeletal:      Cervical back: Normal range of motion and neck supple.      Left knee: No effusion.      Comments: See Ortho exam   Skin:      General: Skin is warm and dry.   Neurological:      General: No focal deficit present.      Mental Status: He is alert and oriented to person, place, and time.   Psychiatric:         Behavior: Behavior normal.         This document was created using speech voice recognition software.   Grammatical errors, random word insertions, pronoun errors, and incomplete sentences are an occasional consequence of this system due to software limitations, ambient noise, and hardware issues.   Any formal questions or concerns about content, text, or information contained within the body of this dictation should be directly addressed to the provider for clarification.

## 2024-02-23 ENCOUNTER — OFFICE VISIT (OUTPATIENT)
Dept: PHYSICAL THERAPY | Facility: CLINIC | Age: 74
End: 2024-02-23
Payer: MEDICARE

## 2024-02-23 DIAGNOSIS — G89.29 CHRONIC PAIN OF BOTH KNEES: ICD-10-CM

## 2024-02-23 DIAGNOSIS — M17.0 PRIMARY OSTEOARTHRITIS OF BOTH KNEES: Primary | ICD-10-CM

## 2024-02-23 DIAGNOSIS — M25.561 CHRONIC PAIN OF BOTH KNEES: ICD-10-CM

## 2024-02-23 DIAGNOSIS — M25.562 CHRONIC PAIN OF BOTH KNEES: ICD-10-CM

## 2024-02-23 PROCEDURE — 97110 THERAPEUTIC EXERCISES: CPT

## 2024-02-23 PROCEDURE — 97112 NEUROMUSCULAR REEDUCATION: CPT

## 2024-02-23 NOTE — PROGRESS NOTES
"Daily Note     Today's date: 2024  Patient name: Swapnil Grover  : 1950  MRN: [id number]  Referring provider: Hill Reynoso*  Dx:   Encounter Diagnosis     ICD-10-CM    1. Primary osteoarthritis of both knees  M17.0       2. Chronic pain of both knees  M25.561     M25.562     G89.29             Subjective: Patient reports no pain prior to session, just stiffness. Patient notes that the orthopedic surgeon was \"pleased\" with his progress thus far.       Objective: See treatment diary below      Assessment: Tolerated treatment well. Patient did very well with increase in resistance/difficulty with weight bearing exercises and no increase in pain (but did have some pain in the non operative knee). Patient is progressing very well with physical therapy at this time, with improvements in ROM to 100 deg flexion. Patient fatigued post visit. Patient exhibited good technique with therapeutic exercises and would benefit from continued PT to continue with functional mobility.       Plan: Continue per plan of care.  Progress per primary PT.      Eval/ Re-eval Auth #/ Referral # Total visits Start date  Expiration date Total active units  Total manual units  PT only or  PT+OT?   1/15/24                                     1/15 1/26 1/29 1/31 2/2 2/5 2/7 2/9 2/14 2/16 2/20 2/23   Total units authorized                Total units remaining                    Precautions: Kidney Disease      Specialty Daily Treatment Diary     Manuals 24   Visit # 7 8 9 10 11 12   L PF mobs 2' 2'       Stretch L HS Quad 3' 3'  3'     L Knee PROM 3' 3'   3'              Flex AROM 95 deg  94 deg 92 deg 97 deg  100   Ext AROM -4 deg -4 deg -3 deg -3 deg  -2            Warm-up         NuStep 8 min  8min 8 min   8 min 10' lvl 2 no arms   Neuro Re-Ed         Qset 30 30 30 30 30    Heel raises 20  HR 20 20 --  2x10   Hip abd stand  --  ----  1x10 abd  1x10 ext    SAQ  20 20 20 20 30x " "  Stair stretch     20 20x                     Ther Ex   L knee PROM 3'   PROM 8'   Mini squat 20 -- 20 20 20 2x10    Side step 4 x 10 ft -- 10 x 6 feet  5 x 20 ft 5x20ft 4 laps at bar     Knee flex 20 20 20 20 20 20x   Knee ext 20 20 20 20 20 20x   SLR 2x5  AA 1x5 AA 10x AA 10x 4x5 4x5   Heel slides 30 on pnut 30 30 on pnut 30 30 30x with peanut   LP 2x10  55# 55#   3x10 55#   3x10 65#  3x10 65#  3x10 75# 3x10   Step ups     20  4\" 6\" 2x10                      Ther Activity                           Gait Training         Hurdles     6 x 3 hurdles 6x3 hurdles             Modalities         CP                                  "

## 2024-02-28 ENCOUNTER — OFFICE VISIT (OUTPATIENT)
Dept: PHYSICAL THERAPY | Facility: CLINIC | Age: 74
End: 2024-02-28
Payer: MEDICARE

## 2024-02-28 DIAGNOSIS — M17.0 PRIMARY OSTEOARTHRITIS OF BOTH KNEES: Primary | ICD-10-CM

## 2024-02-28 DIAGNOSIS — M25.561 CHRONIC PAIN OF BOTH KNEES: ICD-10-CM

## 2024-02-28 DIAGNOSIS — G89.29 CHRONIC PAIN OF BOTH KNEES: ICD-10-CM

## 2024-02-28 DIAGNOSIS — M25.562 CHRONIC PAIN OF BOTH KNEES: ICD-10-CM

## 2024-02-28 PROCEDURE — 97112 NEUROMUSCULAR REEDUCATION: CPT | Performed by: PHYSICAL THERAPIST

## 2024-02-28 PROCEDURE — 97110 THERAPEUTIC EXERCISES: CPT | Performed by: PHYSICAL THERAPIST

## 2024-02-28 NOTE — PROGRESS NOTES
Daily Note     Today's date: 2024  Patient name: Swapnil Grover  : 1950  MRN: [id number]  Referring provider: Hill Reynoso*  Dx:   Encounter Diagnosis     ICD-10-CM    1. Primary osteoarthritis of both knees  M17.0       2. Chronic pain of both knees  M25.561     M25.562     G89.29             Subjective: Patient reports mild soreness today with the bad weather outside.      Objective: See treatment diary below      Assessment: Tolerated treatment well. Increased step height to 8 inches today.  He could perform 8 reps before fatiguing and switching back to 6 inches.      Plan: Continue per plan of care.         Eval/ Re-eval Auth #/ Referral # Total visits Start date  Expiration date Total active units  Total manual units  PT only or  PT+OT?   1/15/24                                     1/15 1/26 1/29 1/31 2/2 2/5 2/7 2/9 2/14 2/16 2/20 2/23   Total units authorized                Total units remaining                                   Total units authorized                Total units remaining                    Precautions: Kidney Disease      Specialty Daily Treatment Diary     Manuals 24   Visit # 9 10 11 12 13   L PF mobs        Stretch L HS Quad  3'   3'   L Knee PROM   3'   3'           Flex AROM 92 deg 97 deg  100 104 deg   Ext AROM -3 deg -3 deg  -2 -2 deg           Warm-up        NuStep 8 min   8 min 10' lvl 2 no arms 10 min L2   Neuro Re-Ed        Qset 30 30 30     Heel raises 20 --  2x10 20   Hip abd stand  ----  1x10 abd  1x10 ext  ---   SAQ 20 20 20 30x 30  3#   Stair stretch   20 20x                    Ther Ex L knee PROM 3'   PROM 8'    Mini squat 20 20 20 2x10  20   Side step 10 x 6 feet  5 x 20 ft 5x20ft 4 laps at bar   4 laps   Knee flex 20 20 20 20x 20   Knee ext 20 20 20 20x 30  3#   SLR 10x AA 10x 4x5 4x5 20   Heel slides 30 on pnut 30 30 30x with peanut 30   LP 55#   3x10 65#  3x10 65#  3x10 75# 3x10 85#   30   Step ups   20  " 4\" 6\" 2x10  8\"  10x  6\"  20x                   Ther Activity                        Gait Training        Hurdles   6 x 3 hurdles 6x3 hurdles             Modalities        CP                                "

## 2024-03-01 ENCOUNTER — OFFICE VISIT (OUTPATIENT)
Dept: PHYSICAL THERAPY | Facility: CLINIC | Age: 74
End: 2024-03-01
Payer: MEDICARE

## 2024-03-01 DIAGNOSIS — M25.561 CHRONIC PAIN OF BOTH KNEES: ICD-10-CM

## 2024-03-01 DIAGNOSIS — G89.29 CHRONIC PAIN OF BOTH KNEES: ICD-10-CM

## 2024-03-01 DIAGNOSIS — M17.0 PRIMARY OSTEOARTHRITIS OF BOTH KNEES: Primary | ICD-10-CM

## 2024-03-01 DIAGNOSIS — M25.562 CHRONIC PAIN OF BOTH KNEES: ICD-10-CM

## 2024-03-01 PROCEDURE — 97110 THERAPEUTIC EXERCISES: CPT | Performed by: PHYSICAL THERAPIST

## 2024-03-01 NOTE — PROGRESS NOTES
"Daily Note     Today's date: 3/1/2024  Patient name: Swapnil Grover  : 1950  MRN: [id number]  Referring provider: Hill Reynoso*  Dx:   Encounter Diagnosis     ICD-10-CM    1. Primary osteoarthritis of both knees  M17.0       2. Chronic pain of both knees  M25.561     M25.562     G89.29             Subjective: Stiffness today      Objective: See treatment diary below      Assessment: Tolerated treatment well. He performed 8\" steps 20 times.  He has some difficulty with eccentric control lowering off of the step.      Plan: Continue per plan of care.   Re-Eval       Eval/ Re-eval Auth #/ Referral # Total visits Start date  Expiration date Total active units  Total manual units  PT only or  PT+OT?   1/15/24                                     1/15 1/26 1/29 1/31 2/2 2/5 2/7 2/9 2/14 2/16 2/20 2/23   Total units authorized                Total units remaining                     2/28 3/1             Total units authorized                Total units remaining                    Precautions: Kidney Disease      Specialty Daily Treatment Diary     Manuals 2/16/24 2/20/24 2/23/22 2/28/24 3/1/24   Visit # 10 11 12 13 14   L PF mobs        Stretch L HS Quad 3'   3' 3'   L Knee PROM 3'   3' 3'           Flex AROM 97 deg  100 104 deg 104 deg   Ext AROM -3 deg  -2 -2 deg -2 deg           Warm-up        NuStep  8 min 10' lvl 2 no arms 10 min L2 10 L2   Neuro Re-Ed        Qset 30 30      Heel raises --  2x10 20 --   Hip abd stand ----  1x10 abd  1x10 ext  --- --   SAQ 20 20 30x 30  3# 30   Stair stretch  20 20x  20                   Ther Ex   PROM 8'     Mini squat 20 20 2x10  20 20   Side step 5 x 20 ft 5x20ft 4 laps at bar   4 laps 5 laps at bar   Knee flex 20 20 20x 20 20   Knee ext 20 20 20x 30  3# 30   SLR 10x 4x5 4x5 20 20   Heel slides 30 30 30x with peanut 30 30   LP 65#  3x10 65#  3x10 75# 3x10 85#   30 30   95#   Step ups  20  4\" 6\" 2x10  8\"  10x  6\"  20x 8\"   20                   Ther Activity    "                     Gait Training        Hurdles  6 x 3 hurdles 6x3 hurdles              Modalities        CP

## 2024-03-05 ENCOUNTER — OFFICE VISIT (OUTPATIENT)
Dept: PHYSICAL THERAPY | Facility: CLINIC | Age: 74
End: 2024-03-05
Payer: MEDICARE

## 2024-03-05 DIAGNOSIS — M25.561 CHRONIC PAIN OF BOTH KNEES: ICD-10-CM

## 2024-03-05 DIAGNOSIS — M25.562 CHRONIC PAIN OF BOTH KNEES: ICD-10-CM

## 2024-03-05 DIAGNOSIS — M17.0 PRIMARY OSTEOARTHRITIS OF BOTH KNEES: Primary | ICD-10-CM

## 2024-03-05 DIAGNOSIS — G89.29 CHRONIC PAIN OF BOTH KNEES: ICD-10-CM

## 2024-03-05 PROCEDURE — 97110 THERAPEUTIC EXERCISES: CPT | Performed by: PHYSICAL THERAPIST

## 2024-03-05 PROCEDURE — 97112 NEUROMUSCULAR REEDUCATION: CPT | Performed by: PHYSICAL THERAPIST

## 2024-03-05 NOTE — PROGRESS NOTES
"Daily Note     Today's date: 3/5/2024  Patient name: Swapnil Grover  : 1950  MRN: [id number]  Referring provider: Hill Reynoso*  Dx:   Encounter Diagnosis     ICD-10-CM    1. Primary osteoarthritis of both knees  M17.0       2. Chronic pain of both knees  M25.561     M25.562     G89.29             Subjective: Left knewe stiffness      Objective: See treatment diary below      Assessment: Tolerated treatment well. Flexion to 104 degrees.  He was unable to complete a full revolution on the bike.  He performd rocking for 5 min as a warm-up.      Plan: Continue per plan of care.   Re-Eval       Eval/ Re-eval Auth #/ Referral # Total visits Start date  Expiration date Total active units  Total manual units  PT only or  PT+OT?   1/15/24                                     1/15 1/26 1/29 1/31 2/2 2/5 2/7 2/9 2/14 2/16 2/20 2/23   Total units authorized                Total units remaining                     2/28 3/1 3/5            Total units authorized                Total units remaining                    Precautions: Kidney Disease      Specialty Daily Treatment Diary     Manuals 2/20/24 2/23/22 2/28/24 3/1/24 3/5/24   Visit # 11 12 13 14 15   L PF mobs        Stretch L HS Quad   3' 3' 3'   L Knee PROM   3' 3' 3'           Flex AROM  100 104 deg 104 deg 104 deg   Ext AROM  -2 -2 deg -2 deg -1 deg           Warm-up        NuStep 8 min 10' lvl 2 no arms 10 min L2 10 L2 5 min bike rocking   Neuro Re-Ed        Qset 30       Heel raises  2x10 20 --    Hip abd stand  1x10 abd  1x10 ext  --- --    SAQ 20 30x 30  3# 30 30  3#   Stair stretch 20 20x  20 20   SLS     20  3s           Ther Ex  PROM 8'      Mini squat 20 2x10  20 20 30   Side step 5x20ft 4 laps at bar   4 laps 5 laps at bar 5 laps   Knee flex 20 20x 20 20 20   Knee ext 20 20x 30  3# 30 30  3#   SLR 4x5 4x5 20 20 20   Heel slides 30 30x with peanut 30 30 30   LP 65#  3x10 75# 3x10 85#   30 30   95# 30  95#   Step ups 20  4\" 6\" 2x10  8\"  " "10x  6\"  20x 8\"   20 20  8\"                   Ther Activity                        Gait Training        Hurdles 6 x 3 hurdles 6x3 hurdles               Modalities        CP                                "

## 2024-03-07 ENCOUNTER — OFFICE VISIT (OUTPATIENT)
Dept: PHYSICAL THERAPY | Facility: CLINIC | Age: 74
End: 2024-03-07
Payer: MEDICARE

## 2024-03-07 DIAGNOSIS — G89.29 CHRONIC PAIN OF BOTH KNEES: ICD-10-CM

## 2024-03-07 DIAGNOSIS — M25.562 CHRONIC PAIN OF BOTH KNEES: ICD-10-CM

## 2024-03-07 DIAGNOSIS — M17.0 PRIMARY OSTEOARTHRITIS OF BOTH KNEES: Primary | ICD-10-CM

## 2024-03-07 DIAGNOSIS — M25.561 CHRONIC PAIN OF BOTH KNEES: ICD-10-CM

## 2024-03-07 PROCEDURE — 97110 THERAPEUTIC EXERCISES: CPT

## 2024-03-07 PROCEDURE — 97112 NEUROMUSCULAR REEDUCATION: CPT

## 2024-03-07 NOTE — PROGRESS NOTES
Daily Note     Today's date: 3/7/2024  Patient name: Swapnil Grover  : 1950  MRN: [id number]  Referring provider: Hill Reynoso*  Dx:   Encounter Diagnosis     ICD-10-CM    1. Primary osteoarthritis of both knees  M17.0       2. Chronic pain of both knees  M25.561     M25.562     G89.29           Start Time: 1100  Stop Time: 1155  Total time in clinic (min): 55 minutes    Subjective: Patient with no new complaints in regards to left knee at the start of today's session. Reports continued restriction in bending.      Objective: See treatment diary below      Assessment: Tolerated treatment well. Patient demo mild weight shift away from L LE during squats that improved w/ vc. Patient demo improving technique and motor control w/ SLR. Patient with modest reduction in L knee soreness following gentle oscillation. Patient will continue to benefit from skilled PT to improve functional mobility and activity tolerance.      Plan: Continue per plan of care.      Eval/ Re-eval Auth #/ Referral # Total visits Start date  Expiration date Total active units  Total manual units  PT only or  PT+OT?   1/15/24                                     1/15 1/26 1/29 1/31 2   Total units authorized                Total units remaining                     2/28 3/1 3/5 3-           Total units authorized                Total units remaining                    Precautions: Kidney Disease      Specialty Daily Treatment Diary     Manuals 2/23/22 2/28/24 3/1/24 3/5/24 3/7/24   Visit # 12 13 14 15 16   L PF mobs        Stretch L HS Quad  3' 3' 3' Patellar mobs all planes    Joint oscillation 5x30   L Knee PROM  3' 3' 3' 5 min           Flex AROM 100 104 deg 104 deg 104 deg 105   Ext AROM -2 -2 deg -2 deg -1 deg -1           Warm-up        NuStep 10' lvl 2 no arms 10 min L2 10 L2 5 min bike rocking 5 min bike rocking   Neuro Re-Ed        Qset        Heel raises 2x10 20 --     Hip abd stand  "1x10 abd  1x10 ext  --- --     SAQ 30x 30  3# 30 30  3#    Stair stretch 20x  20 20 5\" x20   SLS    20  3s            Ther Ex PROM 8'       Mini squat 2x10  20 20 30 3x10   Side step 4 laps at bar   4 laps 5 laps at bar 5 laps 5 laps at rail   Knee flex 20x 20 20 20    Knee ext 20x 30  3# 30 30  3# SAQ: 3# 3x10  LAQ: 3# 3x10   SLR 4x5 20 20 20 3x10   Heel slides 30x with peanut 30 30 30 With peanut x30   LP 75# 3x10 85#   30 30   95# 30  95# 95# 4x10   Step ups 6\" 2x10  8\"  10x  6\"  20x 8\"   20 20  8\" 8\" 3x10                   Ther Activity                        Gait Training        Hurdles 6x3 hurdles                Modalities        CP     10 min                             "

## 2024-03-11 PROBLEM — I12.9 HYPERTENSIVE CKD (CHRONIC KIDNEY DISEASE): Status: ACTIVE | Noted: 2024-03-11

## 2024-03-12 ENCOUNTER — EVALUATION (OUTPATIENT)
Dept: PHYSICAL THERAPY | Facility: CLINIC | Age: 74
End: 2024-03-12
Payer: MEDICARE

## 2024-03-12 DIAGNOSIS — M17.0 PRIMARY OSTEOARTHRITIS OF BOTH KNEES: Primary | ICD-10-CM

## 2024-03-12 PROCEDURE — 97110 THERAPEUTIC EXERCISES: CPT | Performed by: PHYSICAL THERAPIST

## 2024-03-12 PROCEDURE — 97112 NEUROMUSCULAR REEDUCATION: CPT | Performed by: PHYSICAL THERAPIST

## 2024-03-12 NOTE — PROGRESS NOTES
Daily Note     Today's date: 3/12/2024  Patient name: Swapnil Grover  : 1950  MRN: [id number]  Referring provider: Hill Reynoso*  Dx:   Encounter Diagnosis     ICD-10-CM    1. Primary osteoarthritis of both knees  M17.0             Start Time: 1045  Stop Time: 1130  Total time in clinic (min): 45 minutes    Subjective: Patient reports noticing improvement with knee flexion during at home stair stretch. Complains of not having full ROM.      Objective: See treatment diary below      Assessment: Tolerated treatment well. Patient shows functional improvements with ROM as evident by ability to almost do a full backward rotation on the bike. Patient continues to show improvement with quad strength with the addition of a 3# weight during SLR, however showed to be most difficult to complete the second set. R quad tightness improved following trigger point manual. Patient will continue to benefit from skilled PT to improve functional mobility and activity tolerance.      Plan: Continue per plan of care.      Eval/ Re-eval Auth #/ Referral # Total visits Start date  Expiration date Total active units  Total manual units  PT only or  PT+OT?   1/15/24                                     1/15 1/26 1/29 1/31 2/   Total units authorized                Total units remaining                     2/28 3/1 3/5 3-           Total units authorized                Total units remaining                    Precautions: Kidney Disease      Specialty Daily Treatment Diary     Manuals 2/23/22 2/28/24 3/1/24 3/5/24 3/7/24 3/12/24   Visit # 12 13 14 15 16 17   L PF mobs         Stretch L HS Quad  3' 3' 3' Patellar mobs all planes    Joint oscillation 5x30 Patella mobs all planes     Quad trigger point    L Knee PROM  3' 3' 3' 5 min             Flex AROM 100 104 deg 104 deg 104 deg 105 100   Ext AROM -2 -2 deg -2 deg -1 deg -1 -5            Warm-up         NuStep 10' lvl 2 no arms 10 min  "L2 10 L2 5 min bike rocking 5 min bike rocking 5 min bike rocking   Neuro Re-Ed         Qset         Heel raises 2x10 20 --      Hip abd stand 1x10 abd  1x10 ext  --- --      SAQ 30x 30  3# 30 30  3#     Stair stretch 20x  20 20 5\" x20 20   SLS    20  3s              Ther Ex PROM 8'        Mini squat 2x10  20 20 30 3x10 3x10   Side step 4 laps at bar   4 laps 5 laps at bar 5 laps 5 laps at rail    Knee flex 20x 20 20 20     Knee ext 20x 30  3# 30 30  3# SAQ: 3# 3x10  LAQ: 3# 3x10 LAQ: 3# 3x10   SLR 4x5 20 20 20 3x10 3# 3x10    Heel slides 30x with peanut 30 30 30 With peanut x30 With green band over pressure 2x10   LP 75# 3x10 85#   30 30   95# 30  95# 95# 4x10    Step ups 6\" 2x10  8\"  10x  6\"  20x 8\"   20 20  8\" 8\" 3x10 8\" 3x10                     Ther Activity                           Gait Training         Hurdles 6x3 hurdles                  Modalities         CP     10 min 10 min                              "

## 2024-03-13 ENCOUNTER — OFFICE VISIT (OUTPATIENT)
Dept: OBGYN CLINIC | Facility: CLINIC | Age: 74
End: 2024-03-13

## 2024-03-13 VITALS
HEIGHT: 67 IN | DIASTOLIC BLOOD PRESSURE: 87 MMHG | WEIGHT: 225.2 LBS | SYSTOLIC BLOOD PRESSURE: 158 MMHG | BODY MASS INDEX: 35.35 KG/M2 | HEART RATE: 101 BPM

## 2024-03-13 DIAGNOSIS — G89.29 CHRONIC PAIN OF RIGHT KNEE: ICD-10-CM

## 2024-03-13 DIAGNOSIS — Z96.652 STATUS POST LEFT KNEE REPLACEMENT: Primary | ICD-10-CM

## 2024-03-13 DIAGNOSIS — M25.561 CHRONIC PAIN OF RIGHT KNEE: ICD-10-CM

## 2024-03-13 DIAGNOSIS — Z96.652 AFTERCARE FOLLOWING LEFT KNEE JOINT REPLACEMENT SURGERY: ICD-10-CM

## 2024-03-13 DIAGNOSIS — M17.11 PRIMARY OSTEOARTHRITIS OF RIGHT KNEE: ICD-10-CM

## 2024-03-13 DIAGNOSIS — Z47.1 AFTERCARE FOLLOWING LEFT KNEE JOINT REPLACEMENT SURGERY: ICD-10-CM

## 2024-03-13 PROCEDURE — 99024 POSTOP FOLLOW-UP VISIT: CPT | Performed by: ORTHOPAEDIC SURGERY

## 2024-03-13 NOTE — PROGRESS NOTES
Assessment/Plan:  1. Status post left knee replacement        2. Aftercare following left knee joint replacement surgery        3. Primary osteoarthritis of right knee        4. Chronic pain of right knee          Scribe Attestation      I,:  Woody Harp am acting as a scribe while in the presence of the attending physician.:       I,:  Bassem Moscoso, DO personally performed the services described in this documentation    as scribed in my presence.:           Swapnil is a pleasant 73-year-old gentleman who returns today for follow-up evaluation 7 weeks status post left total knee arthroplasty.  I am very pleased with his clinical presentation today in the office.  He should continue with his efforts at physical therapy with discharge to home exercise program as appropriate.  He no longer requires DVT prophylaxis.  He may continue returning to activity to his tolerance.  We did spend time today discussing his activity related right knee pain and I explained that we can consider proceeding with surgery pending his continued recovery and consult with neurosurgery next week.  All of his questions and concerns were addressed today.  I would like to see him back in 5 weeks for his 3-month postoperative appoint with new x-ray on arrival.    Subjective: Follow-up evaluation 7 weeks status post left total knee arthroplasty    Patient ID: Swapnil Grover is a 73 y.o. male who returns today for follow-up evaluation 7 weeks status post left total knee arthroplasty.  He reports he has been doing well.  He does report some stiffness about the knee but offers minimal pain complaints.  He has been participating in physical therapy.  He does report persistent and increasing pain about his right knee and would like to discuss surgery.  He does report he has a potential neurological surgery in the near future and has an upcoming consult appointment for this next week.  He denies any new injury or trauma.    Review of Systems    Constitutional:  Positive for activity change. Negative for chills, fever and unexpected weight change.   HENT:  Negative for hearing loss, nosebleeds and sore throat.    Eyes:  Negative for pain, redness and visual disturbance.   Respiratory:  Negative for cough, shortness of breath and wheezing.    Cardiovascular:  Negative for chest pain, palpitations and leg swelling.   Gastrointestinal:  Negative for abdominal pain, nausea and vomiting.   Endocrine: Negative for polyphagia and polyuria.   Genitourinary:  Negative for dysuria and hematuria.   Musculoskeletal:  Positive for myalgias. Negative for arthralgias and joint swelling.        See HPI   Skin:  Negative for rash and wound.   Neurological:  Negative for dizziness, numbness and headaches.   Psychiatric/Behavioral:  Negative for decreased concentration and suicidal ideas. The patient is not nervous/anxious.          Past Medical History:   Diagnosis Date    Arthritis     BPH (benign prostatic hyperplasia)     Breathing difficulty 12/01/2022    CPAP given per pt-s/p surgery    Cancer (HCC) 2015    basal cell of the skull     Chronic kidney disease     Colon polyp     Heart failure (HCC)     12/2/22-pt had ECHO-per pt R. sided block    History of subdural hematoma     Kidney stone     right stone    Renal disorder     Sleep apnea     mild- no CPAP-had nasal septoplasty       Past Surgical History:   Procedure Laterality Date    BASAL CELL CARCINOMA EXCISION  2015    skull    BRAIN SURGERY      in the 1990's-subdural hematoma    CATARACT EXTRACTION Left     COLONOSCOPY      x2    CYSTOSCOPY W/ LASER LITHOTRIPSY Right 11/27/2020    Procedure: CYSTOSCOPY, FLEXIBLE URETEROSCOPY, LASER, AND STENT EXCHANGE,STONE BASKET, RIGHT RETROGRADE;  Surgeon: Sabino Garcia MD;  Location: [facility];  Service: Urology    FL RETROGRADE PYELOGRAM  10/18/2020    FL RETROGRADE PYELOGRAM  11/27/2020    FL RETROGRADE PYELOGRAM  12/01/2022    FL RETROGRADE PYELOGRAM  12/22/2022     JOINT REPLACEMENT  1/23/2024    LITHOTRIPSY      AL ARTHRP KNE CONDYLE&PLATU MEDIAL&LAT COMPARTMENTS Left 01/23/2024    Procedure: ARTHROPLASTY KNEE TOTAL W ROBOT - overnight, cemented;  Surgeon: Bassem Moscoso DO;  Location: [facility];  Service: Orthopedics    AL CYSTO BLADDER W/URETERAL CATHETERIZATION Right 11/11/2020    Procedure: ESWL RIGHT;  Surgeon: Sabino Garcia MD;  Location: [facility];  Service: Urology    AL CYSTO BLADDER W/URETERAL CATHETERIZATION Right 12/01/2022    Procedure: CYSTOSCOPY RETROGRADE PYELOGRAM WITH INSERTION STENT URETERAL;  Surgeon: Sabino Garcia MD;  Location: [facility];  Service: Urology    AL CYSTO/URETERO W/LITHOTRIPSY &INDWELL STENT INSRT Right 10/18/2020    Procedure: CYSTOSCOPY URETEROSCOPY WITH BASKET EXTRACTION, RETROGRADE PYELOGRAM AND INSERTION STENT URETERAL;  Surgeon: Kris Ac MD;  Location: [facility];  Service: Urology    AL CYSTO/URETERO W/LITHOTRIPSY &INDWELL STENT INSRT Right 12/22/2022    Procedure: CYSTOSCOPY URETEROSCOPY WITH LITHOTRIPSY HOLMIUM LASER, RETROGRADE PYELOGRAM AND INSERTION STENT URETERAL;  Surgeon: Sabino Garcia MD;  Location: [facility];  Service: Urology    SEPTOPLASTY      in the 1990's-trimmed uvula    TONSILLECTOMY  1956    age 6       Family History   Problem Relation Age of Onset    Hypertension Mother     Kidney disease Mother         renal failure-dialysis    Hypertension Father     Stroke Father     Diabetes Sister     Kidney disease Sister         self dialysis at home       Social History     Occupational History    Not on file   Tobacco Use    Smoking status: Never    Smokeless tobacco: Never   Vaping Use    Vaping status: Never Used   Substance and Sexual Activity    Alcohol use: Yes     Comment: Rare occasion    Drug use: Never    Sexual activity: Not Currently         Current Outpatient Medications:     b complex vitamins tablet, Take 1 tablet by mouth 2 (two) times a day, Disp: , Rfl:     docusate sodium (COLACE) 100  mg capsule, Take 1 capsule (100 mg total) by mouth 2 (two) times a day, Disp: 60 capsule, Rfl: 0    potassium citrate (UROCIT-K) 10 mEq, Take 2 tablets (20 mEq total) by mouth 3 (three) times a day with meals, Disp: , Rfl:     VITAMIN D PO, Take by mouth every morning With calcium, Disp: , Rfl:     VITAMIN E PO, Take 268 mg by mouth daily, Disp: , Rfl:     acetaminophen (TYLENOL) 325 mg tablet, Take 3 tablets (975 mg total) by mouth every 8 (eight) hours (Patient not taking: Reported on 3/13/2024), Disp: , Rfl:     aspirin 325 mg tablet, Take 1 tablet (325 mg total) by mouth 2 (two) times a day (Patient not taking: Reported on 3/13/2024), Disp: 82 tablet, Rfl: 0    oxyCODONE (Roxicodone) 5 immediate release tablet, Take 1-2 tablets by mouth every 6 hours as needed for pain (Patient not taking: Reported on 3/13/2024), Disp: 40 tablet, Rfl: 0    Allergies   Allergen Reactions    Other Nasal Congestion     Seasonal allergies    Bean Pod Extract - Food Allergy Nausea Only     Soft beans- eg chick peas, lima's, sweet peas       Objective:  Vitals:    03/13/24 1204   BP: 158/87   Pulse: 101       Body mass index is 35.27 kg/m².    Left Knee Exam     Tenderness   The patient is experiencing no tenderness.     Range of Motion   Extension:  0   Flexion:  110     Tests   Varus: negative Valgus: negative  Drawer:  Anterior - negative     Posterior - negative    Other   Erythema: absent  Scars: present  Sensation: normal  Pulse: present  Swelling: mild  Effusion: no effusion present    Comments:  Swelling appropriate for stage in postoperative course  Well healed anterior scar  Stable at 0, 30 and 90 degrees  Neurovascular intact distal  No warmth or erythema            Observations   Left Knee   Negative for effusion.       Physical Exam  Vitals and nursing note reviewed.   Constitutional:       Appearance: Normal appearance. He is well-developed.   HENT:      Head: Normocephalic and atraumatic.      Right Ear: External ear  normal.      Left Ear: External ear normal.      Nose: Nose normal.   Eyes:      General: No scleral icterus.     Extraocular Movements: Extraocular movements intact.      Conjunctiva/sclera: Conjunctivae normal.   Cardiovascular:      Rate and Rhythm: Normal rate.   Pulmonary:      Effort: Pulmonary effort is normal. No respiratory distress.   Musculoskeletal:      Cervical back: Normal range of motion and neck supple.      Left knee: No effusion.      Comments: See Ortho exam   Skin:     General: Skin is warm and dry.   Neurological:      General: No focal deficit present.      Mental Status: He is alert and oriented to person, place, and time.   Psychiatric:         Behavior: Behavior normal.         This document was created using speech voice recognition software.   Grammatical errors, random word insertions, pronoun errors, and incomplete sentences are an occasional consequence of this system due to software limitations, ambient noise, and hardware issues.   Any formal questions or concerns about content, text, or information contained within the body of this dictation should be directly addressed to the provider for clarification.

## 2024-03-14 ENCOUNTER — APPOINTMENT (OUTPATIENT)
Dept: PHYSICAL THERAPY | Facility: CLINIC | Age: 74
End: 2024-03-14
Payer: MEDICARE

## 2024-03-15 ENCOUNTER — OFFICE VISIT (OUTPATIENT)
Dept: PHYSICAL THERAPY | Facility: CLINIC | Age: 74
End: 2024-03-15
Payer: MEDICARE

## 2024-03-15 DIAGNOSIS — M25.562 CHRONIC PAIN OF BOTH KNEES: ICD-10-CM

## 2024-03-15 DIAGNOSIS — G89.29 CHRONIC PAIN OF BOTH KNEES: ICD-10-CM

## 2024-03-15 DIAGNOSIS — M17.0 PRIMARY OSTEOARTHRITIS OF BOTH KNEES: Primary | ICD-10-CM

## 2024-03-15 DIAGNOSIS — M25.561 CHRONIC PAIN OF BOTH KNEES: ICD-10-CM

## 2024-03-15 PROCEDURE — 97110 THERAPEUTIC EXERCISES: CPT | Performed by: PHYSICAL THERAPIST

## 2024-03-15 PROCEDURE — 97112 NEUROMUSCULAR REEDUCATION: CPT | Performed by: PHYSICAL THERAPIST

## 2024-03-15 NOTE — PROGRESS NOTES
"Daily Note     Today's date: 3/15/2024  Patient name: Swapnil Grover  : 1950  MRN: [id number]  Referring provider: Hill Reynoso*  Dx:   Encounter Diagnosis     ICD-10-CM    1. Primary osteoarthritis of both knees  M17.0       2. Chronic pain of both knees  M25.561     M25.562     G89.29                      Subjective: He had a follow up with Dr. Moscoso and states that he was pleased with his progress      Objective: See treatment diary below      Assessment: Tolerated treatment well. Patient would benefit from continued PT.  AROM flex to 110 degrees.  He is making gains into flexion.        Plan: Continue per plan of care.   Hurdles, SLS         Eval/ Re-eval Auth #/ Referral # Total visits Start date  Expiration date Total active units  Total manual units  PT only or  PT+OT?   1/15/24                                     1/15 1/26 1/29 1/31 2/   Total units authorized                Total units remaining                     2/28 3/1 3/5 3-7 3/12 3/15         Total units authorized                Total units remaining                    Precautions: Kidney Disease      Specialty Daily Treatment Diary     Manuals 3/1/24 3/5/24 3/7/24 3/12/24 3/15/24   Visit # 14 15 16 17 18   L PF mobs        Stretch L HS Quad 3' 3' Patellar mobs all planes    Joint oscillation 5x30 Patella mobs all planes     Quad trigger point  $ min HS gastroc stretch   L Knee PROM 3' 3' 5 min  4 min PROM           Flex AROM 104 deg 104 deg 105 100 110 deg   Ext AROM -2 deg -1 deg -1 -5 -2 deg           Warm-up        NuStep 10 L2 5 min bike rocking 5 min bike rocking 5 min bike rocking 8 min NS   Neuro Re-Ed        SAQ 30 30  3#      Stair stretch 20 20 5\" x20 20 20   SLS  20  3s                      Ther Ex        Mini squat 20 30 3x10 3x10 3x10   Side step 5 laps at bar 5 laps 5 laps at rail  5 laps 20 ft   Knee flex 20 20      Knee ext 30 30  3# SAQ: 3# 3x10  LAQ: 3# 3x10 LAQ: 3# 3x10 3# " " 30   SLR 20 20 3x10 3# 3x10  30   Heel slides 30 30 With peanut x30 With green band over pressure 2x10 W strap  20x   LP 30   95# 30  95# 95# 4x10  95#  30   Step ups 8\"   20 20  8\" 8\" 3x10 8\" 3x10 8\"  30                   Ther Activity                        Gait Training        Hurdles                Modalities        CP   10 min 10 min                                "

## 2024-03-19 ENCOUNTER — OFFICE VISIT (OUTPATIENT)
Dept: PHYSICAL THERAPY | Facility: CLINIC | Age: 74
End: 2024-03-19
Payer: MEDICARE

## 2024-03-19 DIAGNOSIS — M25.562 CHRONIC PAIN OF BOTH KNEES: ICD-10-CM

## 2024-03-19 DIAGNOSIS — M25.561 CHRONIC PAIN OF BOTH KNEES: ICD-10-CM

## 2024-03-19 DIAGNOSIS — G89.29 CHRONIC PAIN OF BOTH KNEES: ICD-10-CM

## 2024-03-19 DIAGNOSIS — M17.0 PRIMARY OSTEOARTHRITIS OF BOTH KNEES: Primary | ICD-10-CM

## 2024-03-19 PROCEDURE — 97112 NEUROMUSCULAR REEDUCATION: CPT | Performed by: PHYSICAL THERAPIST

## 2024-03-19 PROCEDURE — 97110 THERAPEUTIC EXERCISES: CPT | Performed by: PHYSICAL THERAPIST

## 2024-03-19 NOTE — PROGRESS NOTES
"Daily Note     Today's date: 3/19/2024  Patient name: Swapnil Grover  : 1950  MRN: [id number]  Referring provider: Hill Reynoso*  Dx:   Encounter Diagnosis     ICD-10-CM    1. Primary osteoarthritis of both knees  M17.0       2. Chronic pain of both knees  M25.561     M25.562     G89.29                      Subjective: Swapnil has      Objective: See treatment diary below      Assessment: Tolerated treatment well. Patient would benefit from continued PT.  He would benefit from continue flex / ext ROM improvement.  He is having more trouble hearing lately and is awaiting a possible procedure to help with his hearing.        Plan: Continue per plan of care.   Hurdles, bike         Eval/ Re-eval Auth #/ Referral # Total visits Start date  Expiration date Total active units  Total manual units  PT only or  PT+OT?   1/15/24                                     1/15 1/26 1/29 1/31 2/2 2/5 2/7 2/9 2/14 2/16 2/20 2/23   Total units authorized                Total units remaining                     2/28 3/1 3/5 3-7 3/12 3/15 3/19        Total units authorized                Total units remaining                    Precautions: Kidney Disease      Specialty Daily Treatment Diary     Manuals 3/5/24 3/7/24 3/12/24 3/15/24 3/19/24   Visit # 15 16 17 18 19   L PF mobs        Stretch L HS Quad 3' Patellar mobs all planes    Joint oscillation 5x30 Patella mobs all planes     Quad trigger point  $ min HS gastroc stretch 4 min   L Knee PROM 3' 5 min  4 min PROM 4 min           Flex AROM 104 deg 105 100 110 deg 108 deg   Ext AROM -1 deg -1 -5 -2 deg -2 deg           Warm-up        NuStep 5 min bike rocking 5 min bike rocking 5 min bike rocking 8 min NS 8 min   Neuro Re-Ed        SAQ 30  3#       Stair stretch 20 5\" x20 20 20 20   SLS 20  3s                       Ther Ex        Mini squat 30 3x10 3x10 3x10 30   Side step 5 laps 5 laps at rail  5 laps 20 ft    Knee flex 20       Knee ext 30  3# SAQ: 3# 3x10  LAQ: " "3# 3x10 LAQ: 3# 3x10 3#  30 30  3#   SLR 20 3x10 3# 3x10  30 30   Heel slides 30 With peanut x30 With green band over pressure 2x10 W strap  20x 30   LP 30  95# 95# 4x10  95#  30 95#  30   Step ups 20  8\" 8\" 3x10 8\" 3x10 8\"  30 8\"  20                   Ther Activity                        Gait Training        Hurdles                Modalities        CP  10 min 10 min                                 "

## 2024-03-21 ENCOUNTER — OFFICE VISIT (OUTPATIENT)
Dept: PHYSICAL THERAPY | Facility: CLINIC | Age: 74
End: 2024-03-21
Payer: MEDICARE

## 2024-03-21 DIAGNOSIS — M17.0 PRIMARY OSTEOARTHRITIS OF BOTH KNEES: Primary | ICD-10-CM

## 2024-03-21 DIAGNOSIS — G89.29 CHRONIC PAIN OF BOTH KNEES: ICD-10-CM

## 2024-03-21 DIAGNOSIS — M25.562 CHRONIC PAIN OF BOTH KNEES: ICD-10-CM

## 2024-03-21 DIAGNOSIS — M25.561 CHRONIC PAIN OF BOTH KNEES: ICD-10-CM

## 2024-03-21 PROCEDURE — 97112 NEUROMUSCULAR REEDUCATION: CPT | Performed by: PHYSICAL THERAPIST

## 2024-03-21 PROCEDURE — 97110 THERAPEUTIC EXERCISES: CPT | Performed by: PHYSICAL THERAPIST

## 2024-03-21 NOTE — PROGRESS NOTES
"Daily Note     Today's date: 3/21/2024  Patient name: Swapnil Grover  : 1950  MRN: [id number]  Referring provider: Hill Reynoso*  Dx:   Encounter Diagnosis     ICD-10-CM    1. Primary osteoarthritis of both knees  M17.0       2. Chronic pain of both knees  M25.561     M25.562     G89.29                      Subjective: Swapnil has no pain      Objective: See treatment diary below      Assessment: Tolerated treatment well. Patient would benefit from continued PT.  ROM making steady gains.  He would benefit from further functional strengthening.        Plan: Continue per plan of care.   Proprioception advancement walking on uneven surfaces.         Eval/ Re-eval Auth #/ Referral # Total visits Start date  Expiration date Total active units  Total manual units  PT only or  PT+OT?   1/15/24                                     1/15 1/26 1/29 1/31 2   Total units authorized                Total units remaining                     2/28 3/1 3/5 3-7 3/12 3/15 3/19 3/21       Total units authorized                Total units remaining                    Precautions: Kidney Disease      Specialty Daily Treatment Diary     Manuals 3/7/24 3/12/24 3/15/24 3/19/24 3/21/24   Visit # 16 17 18 19 20   L PF mobs        Stretch L HS Quad Patellar mobs all planes    Joint oscillation 5x30 Patella mobs all planes     Quad trigger point  $ min HS gastroc stretch 4 min 4 min   L Knee PROM 5 min  4 min PROM 4 min 4 min           Flex AROM 105 100 110 deg 108 deg 110   Ext AROM -1 -5 -2 deg -2 deg -1 deg           Warm-up        NuStep 5 min bike rocking 5 min bike rocking 8 min NS 8 min 8 min   Neuro Re-Ed        SAQ        Stair stretch 5\" x20 20 20 20 20   SLS        SLDL                Ther Ex        Mini squat 3x10 3x10 3x10 30 30   Side step 5 laps at rail  5 laps 20 ft     Knee flex        Knee ext SAQ: 3# 3x10  LAQ: 3# 3x10 LAQ: 3# 3x10 3#  30 30  3# 30  3#   SLR 3x10 3# 3x10  " "30 30 30   Heel slides With peanut x30 With green band over pressure 2x10 W strap  20x 30 30   LP 95# 4x10  95#  30 95#  30 30   95#   Step ups 8\" 3x10 8\" 3x10 8\"  30 8\"  20 30  8\"                   Ther Activity                        Gait Training        Hurdles        Uneven mat walking        Modalities        CP 10 min 10 min                                  "

## 2024-03-25 ENCOUNTER — OFFICE VISIT (OUTPATIENT)
Dept: PHYSICAL THERAPY | Facility: CLINIC | Age: 74
End: 2024-03-25
Payer: MEDICARE

## 2024-03-25 DIAGNOSIS — M17.0 PRIMARY OSTEOARTHRITIS OF BOTH KNEES: Primary | ICD-10-CM

## 2024-03-25 DIAGNOSIS — M25.561 CHRONIC PAIN OF BOTH KNEES: ICD-10-CM

## 2024-03-25 DIAGNOSIS — G89.29 CHRONIC PAIN OF BOTH KNEES: ICD-10-CM

## 2024-03-25 DIAGNOSIS — N20.0 NEPHROLITHIASIS: ICD-10-CM

## 2024-03-25 DIAGNOSIS — M25.562 CHRONIC PAIN OF BOTH KNEES: ICD-10-CM

## 2024-03-25 PROCEDURE — 97112 NEUROMUSCULAR REEDUCATION: CPT | Performed by: PHYSICAL THERAPIST

## 2024-03-25 PROCEDURE — 97110 THERAPEUTIC EXERCISES: CPT | Performed by: PHYSICAL THERAPIST

## 2024-03-25 NOTE — PROGRESS NOTES
Daily Note     Today's date: 3/25/2024  Patient name: Swapnil Grover  : 1950  MRN: [id number]  Referring provider: Hill Reynoso*  Dx:   Encounter Diagnosis     ICD-10-CM    1. Primary osteoarthritis of both knees  M17.0       2. Chronic pain of both knees  M25.561     M25.562     G89.29                      Subjective: Swapnil reports being on his feet a bit this weekend with return to work.  He felt some fatigue following.  He noted the largest difficulty was walking up and down the hill to work.      Objective: See treatment diary below      Assessment: Tolerated treatment well. Patient would benefit from continued PT.  He performed various height step overs without difficulty for left knee but right knee was aggravated with higher step over.  Added light weight resisted walking with no adverse effects.      Plan: Continue per plan of care.   Proprioception advancement walking on uneven surfaces.         Eval/ Re-eval Auth #/ Referral # Total visits Start date  Expiration date Total active units  Total manual units  PT only or  PT+OT?   1/15/24                                     1/15 1/26 1/29 1/31 2/ 2 2   Total units authorized                Total units remaining                     2/28 3/1 3/5 3-7 3/12 3/15 3/19 3/21 3/25      Total units authorized                Total units remaining                    Precautions: Kidney Disease      Specialty Daily Treatment Diary     Manuals 3/12/24 3/15/24 3/19/24 3/21/24 3/25/24   Visit # 17 18 19 20 21   L PF mobs        Stretch L HS Quad Patella mobs all planes     Quad trigger point  $ min HS gastroc stretch 4 min 4 min 4 min   L Knee PROM  4 min PROM 4 min 4 min 4 min           Flex AROM 100 110 deg 108 deg 110    Ext AROM -5 -2 deg -2 deg -1 deg            Warm-up        NuStep 5 min bike rocking 8 min NS 8 min 8 min 5 min bike   Neuro Re-Ed        SAQ        Stair stretch 20 20 20 20    SLS     20   SLDL       "          Ther Ex        Mini squat 3x10 3x10 30 30 20   Side step  5 laps 20 ft   5 laps at bar   Knee flex        Knee ext LAQ: 3# 3x10 3#  30 30  3# 30  3# 30  4#   SLR 3# 3x10  30 30 30 20  4#   Heel slides With green band over pressure 2x10 W strap  20x 30 30 30   LP  95#  30 95#  30 30   95# 10  95#  20  105#  20  115#   Step ups 8\" 3x10 8\"  30 8\"  20 30  8\" 30  8#                   Ther Activity        Various height step over     4 x 5 steps           Gait Training        CC resisted walk     7.5#  5x ea fw/bw           Modalities        CP 10 min                                   "

## 2024-03-26 ENCOUNTER — TELEPHONE (OUTPATIENT)
Age: 74
End: 2024-03-26

## 2024-03-26 ENCOUNTER — APPOINTMENT (OUTPATIENT)
Dept: LAB | Facility: CLINIC | Age: 74
End: 2024-03-26
Payer: MEDICARE

## 2024-03-26 ENCOUNTER — LAB REQUISITION (OUTPATIENT)
Dept: LAB | Facility: HOSPITAL | Age: 74
End: 2024-03-26
Payer: MEDICARE

## 2024-03-26 ENCOUNTER — CONSULT (OUTPATIENT)
Dept: NEUROSURGERY | Facility: CLINIC | Age: 74
End: 2024-03-26
Payer: MEDICARE

## 2024-03-26 VITALS
OXYGEN SATURATION: 98 % | RESPIRATION RATE: 16 BRPM | HEART RATE: 102 BPM | HEIGHT: 67 IN | SYSTOLIC BLOOD PRESSURE: 142 MMHG | WEIGHT: 225 LBS | DIASTOLIC BLOOD PRESSURE: 88 MMHG | BODY MASS INDEX: 35.31 KG/M2 | TEMPERATURE: 98.6 F

## 2024-03-26 DIAGNOSIS — Q16.5 TEGMEN DEFECT OF BASE OF SKULL: ICD-10-CM

## 2024-03-26 DIAGNOSIS — Q16.5 CONGENITAL MALFORMATION OF INNER EAR: ICD-10-CM

## 2024-03-26 DIAGNOSIS — G96.01 CSF OTORRHEA: ICD-10-CM

## 2024-03-26 DIAGNOSIS — Z01.818 ENCOUNTER FOR OTHER PREPROCEDURAL EXAMINATION: ICD-10-CM

## 2024-03-26 DIAGNOSIS — Z01.818 PRE-PROCEDURAL EXAMINATION: ICD-10-CM

## 2024-03-26 LAB
ABO GROUP BLD: NORMAL
ALBUMIN SERPL BCP-MCNC: 4.3 G/DL (ref 3.5–5)
ALP SERPL-CCNC: 46 U/L (ref 34–104)
ALT SERPL W P-5'-P-CCNC: 29 U/L (ref 7–52)
ANION GAP SERPL CALCULATED.3IONS-SCNC: 10 MMOL/L (ref 4–13)
APTT PPP: 28 SECONDS (ref 23–37)
AST SERPL W P-5'-P-CCNC: 22 U/L (ref 13–39)
BACTERIA UR QL AUTO: ABNORMAL /HPF
BASOPHILS # BLD AUTO: 0.03 THOUSANDS/ÂΜL (ref 0–0.1)
BASOPHILS NFR BLD AUTO: 1 % (ref 0–1)
BILIRUB SERPL-MCNC: 0.39 MG/DL (ref 0.2–1)
BILIRUB UR QL STRIP: NEGATIVE
BLD GP AB SCN SERPL QL: NEGATIVE
BUN SERPL-MCNC: 23 MG/DL (ref 5–25)
CALCIUM SERPL-MCNC: 10.4 MG/DL (ref 8.4–10.2)
CHLORIDE SERPL-SCNC: 104 MMOL/L (ref 96–108)
CLARITY UR: CLEAR
CO2 SERPL-SCNC: 27 MMOL/L (ref 21–32)
COLOR UR: YELLOW
CREAT SERPL-MCNC: 1.26 MG/DL (ref 0.6–1.3)
EOSINOPHIL # BLD AUTO: 0.3 THOUSAND/ÂΜL (ref 0–0.61)
EOSINOPHIL NFR BLD AUTO: 5 % (ref 0–6)
ERYTHROCYTE [DISTWIDTH] IN BLOOD BY AUTOMATED COUNT: 13.7 % (ref 11.6–15.1)
EST. AVERAGE GLUCOSE BLD GHB EST-MCNC: 117 MG/DL
GFR SERPL CREATININE-BSD FRML MDRD: 56 ML/MIN/1.73SQ M
GLUCOSE SERPL-MCNC: 107 MG/DL (ref 65–140)
GLUCOSE UR STRIP-MCNC: NEGATIVE MG/DL
HBA1C MFR BLD: 5.7 %
HCT VFR BLD AUTO: 46.9 % (ref 36.5–49.3)
HGB BLD-MCNC: 15.1 G/DL (ref 12–17)
HGB UR QL STRIP.AUTO: NEGATIVE
IMM GRANULOCYTES # BLD AUTO: 0.02 THOUSAND/UL (ref 0–0.2)
IMM GRANULOCYTES NFR BLD AUTO: 0 % (ref 0–2)
INR PPP: 0.93 (ref 0.84–1.19)
KETONES UR STRIP-MCNC: NEGATIVE MG/DL
LEUKOCYTE ESTERASE UR QL STRIP: ABNORMAL
LYMPHOCYTES # BLD AUTO: 1.4 THOUSANDS/ÂΜL (ref 0.6–4.47)
LYMPHOCYTES NFR BLD AUTO: 23 % (ref 14–44)
MCH RBC QN AUTO: 31.5 PG (ref 26.8–34.3)
MCHC RBC AUTO-ENTMCNC: 32.2 G/DL (ref 31.4–37.4)
MCV RBC AUTO: 98 FL (ref 82–98)
MONOCYTES # BLD AUTO: 0.73 THOUSAND/ÂΜL (ref 0.17–1.22)
MONOCYTES NFR BLD AUTO: 12 % (ref 4–12)
NEUTROPHILS # BLD AUTO: 3.56 THOUSANDS/ÂΜL (ref 1.85–7.62)
NEUTS SEG NFR BLD AUTO: 59 % (ref 43–75)
NITRITE UR QL STRIP: NEGATIVE
NON-SQ EPI CELLS URNS QL MICRO: ABNORMAL /HPF
NRBC BLD AUTO-RTO: 0 /100 WBCS
PH UR STRIP.AUTO: 7 [PH]
PLATELET # BLD AUTO: 269 THOUSANDS/UL (ref 149–390)
PMV BLD AUTO: 9.4 FL (ref 8.9–12.7)
POTASSIUM SERPL-SCNC: 4.2 MMOL/L (ref 3.5–5.3)
PROT SERPL-MCNC: 7.8 G/DL (ref 6.4–8.4)
PROT UR STRIP-MCNC: ABNORMAL MG/DL
PROTHROMBIN TIME: 13.1 SECONDS (ref 11.6–14.5)
RBC # BLD AUTO: 4.8 MILLION/UL (ref 3.88–5.62)
RBC #/AREA URNS AUTO: ABNORMAL /HPF
RH BLD: POSITIVE
SODIUM SERPL-SCNC: 141 MMOL/L (ref 135–147)
SP GR UR STRIP.AUTO: 1.02 (ref 1–1.03)
SPECIMEN EXPIRATION DATE: NORMAL
UROBILINOGEN UR STRIP-ACNC: <2 MG/DL
WBC # BLD AUTO: 6.04 THOUSAND/UL (ref 4.31–10.16)
WBC #/AREA URNS AUTO: ABNORMAL /HPF

## 2024-03-26 PROCEDURE — 87077 CULTURE AEROBIC IDENTIFY: CPT

## 2024-03-26 PROCEDURE — 83036 HEMOGLOBIN GLYCOSYLATED A1C: CPT

## 2024-03-26 PROCEDURE — 80053 COMPREHEN METABOLIC PANEL: CPT

## 2024-03-26 PROCEDURE — 85025 COMPLETE CBC W/AUTO DIFF WBC: CPT

## 2024-03-26 PROCEDURE — 86901 BLOOD TYPING SEROLOGIC RH(D): CPT | Performed by: NEUROLOGICAL SURGERY

## 2024-03-26 PROCEDURE — 99205 OFFICE O/P NEW HI 60 MIN: CPT | Performed by: NEUROLOGICAL SURGERY

## 2024-03-26 PROCEDURE — 81001 URINALYSIS AUTO W/SCOPE: CPT

## 2024-03-26 PROCEDURE — 87186 SC STD MICRODIL/AGAR DIL: CPT

## 2024-03-26 PROCEDURE — 85610 PROTHROMBIN TIME: CPT

## 2024-03-26 PROCEDURE — 85730 THROMBOPLASTIN TIME PARTIAL: CPT

## 2024-03-26 PROCEDURE — 86900 BLOOD TYPING SEROLOGIC ABO: CPT | Performed by: NEUROLOGICAL SURGERY

## 2024-03-26 PROCEDURE — 87086 URINE CULTURE/COLONY COUNT: CPT

## 2024-03-26 PROCEDURE — 36415 COLL VENOUS BLD VENIPUNCTURE: CPT

## 2024-03-26 PROCEDURE — 86850 RBC ANTIBODY SCREEN: CPT | Performed by: NEUROLOGICAL SURGERY

## 2024-03-26 RX ORDER — POTASSIUM CITRATE 10 MEQ/1
20 TABLET, EXTENDED RELEASE ORAL
Qty: 180 TABLET | Refills: 0 | Status: SHIPPED | OUTPATIENT
Start: 2024-03-26

## 2024-03-26 RX ORDER — CEFAZOLIN SODIUM 2 G/50ML
2000 SOLUTION INTRAVENOUS ONCE
OUTPATIENT
Start: 2024-03-26 | End: 2024-03-26

## 2024-03-26 NOTE — TELEPHONE ENCOUNTER
Tabitha from Neuro called in to schedule a surgery clearance appt for pt. He is scheduled for 04/02/2024 with Dr. Cottrell.    Pt is aware and confirmed

## 2024-03-26 NOTE — PROGRESS NOTES
"Neurosurgery Office Note  Swapnil Grover 73 y.o. male MRN: [id number]      Assessment/Plan        Problem List Items Addressed This Visit       Visit Diagnoses       Tegmen defect of base of skull        Relevant Orders    Case request operating room: Bilateral endoscopic middle fossa craniotomies for repair of tegmen defect and CSF leak with temporalis fascia or muscle flap, with neuromonitoring (CN 7/8) and intraoperative lumbar drain placement (Completed)    CSF otorrhea                  Discussion:  Patient is a 73-year-old male with h/o left craniotomy for SDH evacuation in 1992 in [place] (no reported complications) who p/w several months of progressive worsening bilateral hearing loss, fullness, and CSF leak (\"postnasal drip\"). Recently underwent left knee arthroplasty on 1/23/24, recovering with PT and still pending right knee arthroplasty.    No new neurologic symptoms including fever/chills, HA, nausea/vomiting, change in vision, vertigo, tinnitus, neck stiffness, facial numbness/weakness, gait imbalance, seizure history. No prior lumbar surgery.    Not on AC/AP (took  mg for 1 month after knee surgery then stopped). Non-smoker.    CT and MRI showed mastoiditis a/w dehiscence of bilateral tegmen tympani and bilateral superior semicircular canals, no intracranial abnormal enhancement or abscess, no clearly visible encephalocele.    At this time, Dr. Wasserman and I will proceed with scheduling joint case for bilateral endoscopic middle fossa craniotomies for repair of tegmen defect and CSF leak with temporalis fascia or muscle flap, with neuromonitoring (CN 7/8) and intraoperative lumbar drain placement. I have discussed the potential benefits, risks, and alternatives to surgery. He agrees to proceed, signing consent today.     He will need preoperative medical clearance and updated labs. Hold  mg perioperatively.    All questions and concerns were addressed during this visit.          CHIEF " COMPLAINT    Chief Complaint   Patient presents with    Consult     CSF otorrhea with Tegmen dehiscence MRI BRAIN IAC 1/20/24 [facility]       HISTORY    History of Present Illness     73 y.o. year old male     HPI    See Discussion    REVIEW OF SYSTEMS    Review of Systems   HENT:  Positive for hearing loss (b/l ears- hearing loss( muffled) comes and goes).         H/o allergies and post nasal drip especially in the morning    Musculoskeletal:  Positive for gait problem (Pt also c/o difficulty walking due to Left knee pain and h/o left Knee sx).   Neurological:         Swapnil Grover RM     CONSULT CSF otorrhea with Tegmen dehiscence MRI BRAIN IAC 1/20/24 [facility]    H/o Brain sx x 39 yrs ago SDH Left side    Pt c/o some changes in hearing  Ov with ENT 1/4/24     All other systems reviewed and are negative.        Meds/Allergies     Current Outpatient Medications   Medication Sig Dispense Refill    acetaminophen (TYLENOL) 325 mg tablet Take 3 tablets (975 mg total) by mouth every 8 (eight) hours (Patient not taking: Reported on 3/13/2024)      aspirin 325 mg tablet Take 1 tablet (325 mg total) by mouth 2 (two) times a day (Patient not taking: Reported on 3/13/2024) 82 tablet 0    b complex vitamins tablet Take 1 tablet by mouth 2 (two) times a day      docusate sodium (COLACE) 100 mg capsule Take 1 capsule (100 mg total) by mouth 2 (two) times a day 60 capsule 0    oxyCODONE (Roxicodone) 5 immediate release tablet Take 1-2 tablets by mouth every 6 hours as needed for pain (Patient not taking: Reported on 3/13/2024) 40 tablet 0    potassium citrate (UROCIT-K) 10 mEq Take 2 tablets (20 mEq total) by mouth 3 (three) times a day with meals      VITAMIN D PO Take by mouth every morning With calcium      VITAMIN E PO Take 268 mg by mouth daily       No current facility-administered medications for this visit.       Allergies   Allergen Reactions    Other Nasal Congestion     Seasonal allergies    Bean Pod Extract - Food Allergy Nausea  Only     Soft beans- eg chick peas, lima's, sweet peas       PAST HISTORY    Past Medical History:   Diagnosis Date    Arthritis     BPH (benign prostatic hyperplasia)     Breathing difficulty 12/01/2022    CPAP given per pt-s/p surgery    Cancer (HCC) 2015    basal cell of the skull     Chronic kidney disease     Colon polyp     Heart failure (HCC)     12/2/22-pt had ECHO-per pt R. sided block    History of subdural hematoma     Kidney stone     right stone    Renal disorder     Sleep apnea     mild- no CPAP-had nasal septoplasty       Past Surgical History:   Procedure Laterality Date    BASAL CELL CARCINOMA EXCISION  2015    skull    BRAIN SURGERY      in the 1990's-subdural hematoma    CATARACT EXTRACTION Left     COLONOSCOPY      x2    CYSTOSCOPY W/ LASER LITHOTRIPSY Right 11/27/2020    Procedure: CYSTOSCOPY, FLEXIBLE URETEROSCOPY, LASER, AND STENT EXCHANGE,STONE BASKET, RIGHT RETROGRADE;  Surgeon: Sabino Garcia MD;  Location: [facility];  Service: Urology    FL RETROGRADE PYELOGRAM  10/18/2020    FL RETROGRADE PYELOGRAM  11/27/2020    FL RETROGRADE PYELOGRAM  12/01/2022    FL RETROGRADE PYELOGRAM  12/22/2022    JOINT REPLACEMENT  1/23/2024    LITHOTRIPSY      IL ARTHRP KNE CONDYLE&PLATU MEDIAL&LAT COMPARTMENTS Left 01/23/2024    Procedure: ARTHROPLASTY KNEE TOTAL W ROBOT - overnight, cemented;  Surgeon: Bassem Moscoso DO;  Location: [facility];  Service: Orthopedics    IL CYSTO BLADDER W/URETERAL CATHETERIZATION Right 11/11/2020    Procedure: ESWL RIGHT;  Surgeon: Sabino Garcia MD;  Location: [facility];  Service: Urology    IL CYSTO BLADDER W/URETERAL CATHETERIZATION Right 12/01/2022    Procedure: CYSTOSCOPY RETROGRADE PYELOGRAM WITH INSERTION STENT URETERAL;  Surgeon: Sabino Garcia MD;  Location: [facility];  Service: Urology    IL CYSTO/URETERO W/LITHOTRIPSY &INDWELL STENT INSRT Right 10/18/2020    Procedure: CYSTOSCOPY URETEROSCOPY WITH BASKET EXTRACTION, RETROGRADE PYELOGRAM AND INSERTION STENT  "URETERAL;  Surgeon: Kris Ac MD;  Location: [facility];  Service: Urology    CO CYSTO/URETERO W/LITHOTRIPSY &INDWELL STENT INSRT Right 12/22/2022    Procedure: CYSTOSCOPY URETEROSCOPY WITH LITHOTRIPSY HOLMIUM LASER, RETROGRADE PYELOGRAM AND INSERTION STENT URETERAL;  Surgeon: Sabino Garcia MD;  Location: [facility];  Service: Urology    SEPTOPLASTY      in the 1990's-trimmed uvula    TONSILLECTOMY  1956    age 6       Social History     Tobacco Use    Smoking status: Never    Smokeless tobacco: Never   Vaping Use    Vaping status: Never Used   Substance Use Topics    Alcohol use: Yes     Comment: Rare occasion    Drug use: Never       Family History   Problem Relation Age of Onset    Hypertension Mother     Kidney disease Mother         renal failure-dialysis    Hypertension Father     Stroke Father     Diabetes Sister     Kidney disease Sister         self dialysis at home         The following portions of the patient's history were reviewed in this encounter and updated as appropriate: Past medical, surgical, family, and social history, as well as medications, allergies, and review of systems.      EXAM    Vitals:Blood pressure 142/88, pulse 102, temperature 98.6 °F (37 °C), temperature source Temporal, resp. rate 16, height 5' 7\" (1.702 m), weight 102 kg (225 lb), SpO2 98%.,Body mass index is 35.24 kg/m².     Physical Exam  Vitals and nursing note reviewed.   Constitutional:       Appearance: Normal appearance. He is normal weight.   HENT:      Head: Normocephalic and atraumatic.   Eyes:      Extraocular Movements: Extraocular movements intact and EOM normal.      Pupils: Pupils are equal, round, and reactive to light.   Cardiovascular:      Rate and Rhythm: Normal rate and regular rhythm.      Pulses: Normal pulses.      Heart sounds: Normal heart sounds.   Pulmonary:      Effort: Pulmonary effort is normal.      Breath sounds: Normal breath sounds.   Abdominal:      General: Abdomen is flat.      " Palpations: Abdomen is soft.   Musculoskeletal:         General: Normal range of motion.      Cervical back: Normal range of motion.   Neurological:      General: No focal deficit present.      Mental Status: He is alert and oriented to person, place, and time. Mental status is at baseline.      GCS: GCS eye subscore is 4. GCS verbal subscore is 5. GCS motor subscore is 6.      Sensory: Sensation is intact.      Motor: Motor strength is normal.Motor function is intact.      Coordination: Coordination is intact.      Gait: Gait is intact.      Deep Tendon Reflexes: Reflexes are normal and symmetric.   Psychiatric:         Mood and Affect: Mood normal.         Speech: Speech normal.         Behavior: Behavior normal.         Neurologic Exam     Mental Status   Oriented to person, place, and time.   Attention: normal.   Speech: speech is normal   Level of consciousness: alert    Cranial Nerves     CN II   Visual fields full to confrontation.   Visual acuity: normal  Right visual field deficit: none  Left visual field deficit: none     CN III, IV, VI   Pupils are equal, round, and reactive to light.  Extraocular motions are normal.   CN III: no CN III palsy  CN VI: no CN VI palsy  Nystagmus: none   Diplopia: none  Ophthalmoparesis: none  Upgaze: normal  Downgaze: normal  Conjugate gaze: present    CN V   Facial sensation intact.   Right facial sensation deficit: none  Left facial sensation deficit: none    CN VII   Facial expression full, symmetric.   Right facial weakness: none  Left facial weakness: none    CN IX, X   CN IX normal.   CN X normal.   Palate: symmetric    CN XI   CN XI normal.   Right sternocleidomastoid strength: normal  Left sternocleidomastoid strength: normal  Right trapezius strength: normal  Left trapezius strength: normal    CN XII   CN XII normal.   Tongue deviation: none  Bilateral hearing loss and fullness     Motor Exam   Muscle bulk: normal  Overall muscle tone: normal  Right arm pronator  drift: absent  Left arm pronator drift: absent    Strength   Strength 5/5 throughout.     Sensory Exam   Light touch normal.     Gait, Coordination, and Reflexes     Gait  Gait: normal    Reflexes   Reflexes 2+ except as noted.         MEDICAL DECISION MAKING    Imaging Studies:     No results found.    I have personally reviewed pertinent reports.   and I have personally reviewed pertinent films in PACS      Billy August M.D.  Neurosurgeon

## 2024-03-27 ENCOUNTER — OFFICE VISIT (OUTPATIENT)
Dept: CARDIOLOGY CLINIC | Facility: CLINIC | Age: 74
End: 2024-03-27
Payer: MEDICARE

## 2024-03-27 VITALS
OXYGEN SATURATION: 99 % | SYSTOLIC BLOOD PRESSURE: 130 MMHG | HEIGHT: 67 IN | HEART RATE: 97 BPM | WEIGHT: 227 LBS | DIASTOLIC BLOOD PRESSURE: 80 MMHG | BODY MASS INDEX: 35.63 KG/M2

## 2024-03-27 DIAGNOSIS — Z01.818 PRE-OPERATIVE CLEARANCE: Primary | ICD-10-CM

## 2024-03-27 PROCEDURE — 93000 ELECTROCARDIOGRAM COMPLETE: CPT | Performed by: PHYSICIAN ASSISTANT

## 2024-03-27 PROCEDURE — 99214 OFFICE O/P EST MOD 30 MIN: CPT | Performed by: PHYSICIAN ASSISTANT

## 2024-03-27 NOTE — PROGRESS NOTES
Consultation - Cardiology Office  [facility].    Swapnil Grover 73 y.o. male MRN: [id number]  : 1950  Unit/Bed#:  Encounter: [id number]      Assessment:     Preoperative cardiac risk stratification.  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  Dyslipidemia.  Chronic RBBB.   CKD stage IIIa.  Left knee osteoarthritis s/p left total knee arthroplasty.  SDH s/p left craniotomy.  Mild obstructive sleep apnea.   Pre-diabetes   Class I obesity.    Discussion summary and Plan:    Preoperative cardiac risk stratification.  - Patient is currently scheduled for bilateral endoscopic middle fossa craniotomies for repair of tegmen defect and CSF leak with temporalis fascia or muscle flap scheduled for  with [facility].    - Patient is considered at least intermediate risk for planned neurosurgical procedure.  No further cardiology workup indicated at this time.  No contraindication from cardiology standpoint to proceed with planned neurosurgical procedure.  - 3/27/24 EKG: Sinus rhythm, 97 bpm.  RBBB.  - 24 nuclear stress test: LVEF 76%. There is a left ventricular perfusion defect that is small in size with mild reduction in uptake present in the mid to basal septal location that is reversible. Summed differential score is 1.   - 24 nuclear stress test were reviewed by Dr. Perez, deemed overall normal, patient proceeded with planned surgical procedure for left total knee arthroplasty on 24 without issue.   - Of note, review of prior cardiology notes indicate that patient had issues with anesthesia during 22 lithotripsy.  It is documented patient became hypoxic after being moved from OR table to the stretcher.  Patient required LMA and was bagged with improvement in SpO2 and then transition to CPAP.  2022 Anesthesia note reviewed.  - 22 TTE: LVEF 60-65%.  Left ventricle diastolic function normal for age.  Mild mitral valve annular  calcification, trace regurgitation.  Trace tricuspid valve regurgitation.  Trace pulmonic valve regurgitation.    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  - Follows outpatient with [facility].  - Scheduled for bilateral endoscopic middle fossa craniotomies for repair of tegmen defect and CSF leak with temporalis fascia or muscle flap scheduled for 4/22/24.     Dyslipidemia.  - Patient denies known history of dyslipidemia.  Not currently on any medications to treat dyslipidemia.  - 12/21/23 lipid panel: Cholesterol 214, triglycerides 94, HDL 56, .  - ASCVD 10-year risk score: 23.8%, high risk.  - Discussed with patient recommendations for starting Lipitor 40 mg daily given dyslipidemia and elevated ASCVD risk score. Patient vehemently refused.     Chronic RBBB.   - Noted on EKG since October 2020.     CKD stage IIIa.  - Baseline creatinine appears between 1.2 and 1.3.  - Follows outpatient with [facility].    Left knee osteoarthritis s/p left total knee arthroplasty.  - s/p left total knee arthroplasty on 1/23/24.  Tolerated procedure well without issue.    SDH s/p left craniotomy.  - s/p left craniotomy in 1992.   - Follows outpatient with [facility].    Mild obstructive sleep apnea.   - Patient states that he does not use CPAP.  He has a known history of mild obstructive sleep apnea that he states was treated with nasal septoplasty and uvulopalatopharyngoplasty 20 to 30 years ago.   - Patient was offered sleep study by [facility] in December 2022, patient declined.    Pre-diabetes.   - 3/26/24 HgbA1c: 5.7.   - 12/21/23 HgbA1c: 6.2.  - Care per PCP.       Class I obesity.  - BMI 35.24 kg/m2.       Patient / Caretaker was advised and educated to call our office  immediately if  patient has any new symptoms of chest pain/shortness of breath, near-syncope, syncope, light headedness sustained palpitations or any other cardiovascular symptoms before their  scheduled follow-up appointment.  Office number was provided #[phone].      Thank you for your consultation.  If you have any question please call me at [phone]    Counseling :  A description of the counseling.  Goals and Barriers.  Patient's ability to self care: Yes  Medication side effect reviewed with patient in detail and all their questions answered to their satisfaction.      Primary Care Physician Requesting Consult: Frank Lombardi, DO    Reason for Consult / Principal Problem: preoperative cardiac risk stratification.         HPI :     Swapnil Grover is a 73 y.o. dyslipidemia, chronic RBBB, CKDIIIA, left knee osteoarthritis s/p left total knee arthroplasty (1/23/24), tegmen defect of base of skull, CSF otorrhea, SDH s/p left craniotomy (1992), pre-diabetes, mild ISAIAS (not on CPAP), who presents for preoperative cardiac risk stratification for bilateral endoscopic middle fossa craniotomies for repair of tegmen defect and CSF leak with temporalis fascia or muscle flap scheduled for 4/22/24 with [facility].      Patient was last seen in outpatient cardiology office on 1/2/24 for consultation for preoperative cardiac risk stratification for left total knee arthroplasty.  Patient underwent left total knee arthroplasty on 1/23/2024 without issue.        Since last outpatient cardiology office visit patient reports he has been doing well.  He offers no complaints.  He denies experiencing chest pain, palpitations, shortness of breath at rest or with exertion, lower extremity edema, orthopnea, lightheadedness, dizziness, headache, nausea, or vomiting.        There are prior notes that indicate patient has a history of hypertension, patient denies known history of hypertension and states he has never been on medication. Review of prior BP readings over the past 3 months notes SBP ranging from 121-158.  When discussing concern for elevated BP readings during prior office visit patient  "states that \"normal\" for him and he has no interest in discussing medications for BP management.         Review of prior cardiology notes indicate that patient had issues with anesthesia during 12/01/22 lithotripsy. It is documented patient became hypoxic after being moved from OR table to the stretcher.  Patient required LMA and was bagged with improvement in SpO2 and then transition to CPAP.  12/1/2022 anesthesia note reviewed.      Historical Information   Past Medical History:   Diagnosis Date    Arthritis     BPH (benign prostatic hyperplasia)     Breathing difficulty 12/01/2022    CPAP given per pt-s/p surgery    Cancer (HCC) 2015    basal cell of the skull     Chronic kidney disease     Colon polyp     Heart failure (HCC)     12/2/22-pt had ECHO-per pt R. sided block    History of subdural hematoma     Kidney stone     right stone    Renal disorder     Sleep apnea     mild- no CPAP-had nasal septoplasty     Past Surgical History:   Procedure Laterality Date    BASAL CELL CARCINOMA EXCISION  2015    skull    BRAIN SURGERY      in the 1990's-subdural hematoma    CATARACT EXTRACTION Left     COLONOSCOPY      x2    CYSTOSCOPY W/ LASER LITHOTRIPSY Right 11/27/2020    Procedure: CYSTOSCOPY, FLEXIBLE URETEROSCOPY, LASER, AND STENT EXCHANGE,STONE BASKET, RIGHT RETROGRADE;  Surgeon: Sabino Garcia MD;  Location: [facility];  Service: Urology    FL RETROGRADE PYELOGRAM  10/18/2020    FL RETROGRADE PYELOGRAM  11/27/2020    FL RETROGRADE PYELOGRAM  12/01/2022    FL RETROGRADE PYELOGRAM  12/22/2022    JOINT REPLACEMENT  1/23/2024    LITHOTRIPSY      AZ ARTHRP KNE CONDYLE&PLATU MEDIAL&LAT COMPARTMENTS Left 01/23/2024    Procedure: ARTHROPLASTY KNEE TOTAL W ROBOT - overnight, cemented;  Surgeon: Bassem Moscoso DO;  Location: [facility];  Service: Orthopedics    AZ CYSTO BLADDER W/URETERAL CATHETERIZATION Right 11/11/2020    Procedure: ESWL RIGHT;  Surgeon: Sabino Garcia MD;  Location: [facility];  Service: Urology    AZ " CYSTO BLADDER W/URETERAL CATHETERIZATION Right 12/01/2022    Procedure: CYSTOSCOPY RETROGRADE PYELOGRAM WITH INSERTION STENT URETERAL;  Surgeon: Sabino Garcia MD;  Location: [facility];  Service: Urology    ND CYSTO/URETERO W/LITHOTRIPSY &INDWELL STENT INSRT Right 10/18/2020    Procedure: CYSTOSCOPY URETEROSCOPY WITH BASKET EXTRACTION, RETROGRADE PYELOGRAM AND INSERTION STENT URETERAL;  Surgeon: Kris Ac MD;  Location: [facility];  Service: Urology    ND CYSTO/URETERO W/LITHOTRIPSY &INDWELL STENT INSRT Right 12/22/2022    Procedure: CYSTOSCOPY URETEROSCOPY WITH LITHOTRIPSY HOLMIUM LASER, RETROGRADE PYELOGRAM AND INSERTION STENT URETERAL;  Surgeon: Sabino Garcia MD;  Location: [facility];  Service: Urology    SEPTOPLASTY      in the 1990's-trimmed uvula    TONSILLECTOMY  1956    age 6     Social History     Substance and Sexual Activity   Alcohol Use Yes    Comment: Rare occasion     Social History     Substance and Sexual Activity   Drug Use Never     Social History     Tobacco Use   Smoking Status Never   Smokeless Tobacco Never     Family History:   Family History   Problem Relation Age of Onset    Hypertension Mother     Kidney disease Mother         renal failure-dialysis    Hypertension Father     Stroke Father     Diabetes Sister     Kidney disease Sister         self dialysis at home       Meds/Allergies     Allergies   Allergen Reactions    Other Nasal Congestion     Seasonal allergies    Bean Pod Extract - Food Allergy Nausea Only     Soft beans- eg chick peas, lima's, sweet peas       Current Outpatient Medications:     acetaminophen (TYLENOL) 325 mg tablet, Take 3 tablets (975 mg total) by mouth every 8 (eight) hours, Disp: , Rfl:     b complex vitamins tablet, Take 1 tablet by mouth 2 (two) times a day, Disp: , Rfl:     oxyCODONE (Roxicodone) 5 immediate release tablet, Take 1-2 tablets by mouth every 6 hours as needed for pain, Disp: 40 tablet, Rfl: 0    VITAMIN D PO, Take by mouth every  "morning With calcium, Disp: , Rfl:     VITAMIN E PO, Take 268 mg by mouth daily, Disp: , Rfl:     aspirin 325 mg tablet, Take 1 tablet (325 mg total) by mouth 2 (two) times a day (Patient not taking: Reported on 3/13/2024), Disp: 82 tablet, Rfl: 0    docusate sodium (COLACE) 100 mg capsule, Take 1 capsule (100 mg total) by mouth 2 (two) times a day (Patient not taking: Reported on 3/26/2024), Disp: 60 capsule, Rfl: 0    potassium citrate (UROCIT-K) 10 mEq, Take 2 tablets (20 mEq total) by mouth 3 (three) times a day with meals (Patient not taking: Reported on 3/27/2024), Disp: 180 tablet, Rfl: 0    Vitals: Blood pressure 130/80, pulse 97, height 5' 7\" (1.702 m), weight 103 kg (227 lb), SpO2 99%.    Body mass index is 35.55 kg/m².  Wt Readings from Last 3 Encounters:   03/27/24 103 kg (227 lb)   03/26/24 102 kg (225 lb)   03/13/24 102 kg (225 lb 3.2 oz)     Vitals:    03/27/24 1528   Weight: 103 kg (227 lb)     BP Readings from Last 3 Encounters:   03/27/24 130/80   03/26/24 142/88   03/13/24 158/87         Physical Exam  Vitals reviewed.   Constitutional:       General: He is not in acute distress.     Appearance: He is obese.   Cardiovascular:      Rate and Rhythm: Normal rate and regular rhythm.      Pulses: Normal pulses.      Heart sounds: Murmur heard.   Pulmonary:      Effort: Pulmonary effort is normal. No respiratory distress.      Breath sounds: Normal breath sounds.   Abdominal:      General: Abdomen is flat. There is no distension.      Palpations: Abdomen is soft.      Tenderness: There is no abdominal tenderness.   Musculoskeletal:      Right lower leg: No edema.      Left lower leg: No edema.   Skin:     General: Skin is warm and dry.   Neurological:      Mental Status: He is alert and oriented to person, place, and time.         Diagnostic Studies Review Cardio:      EKG:    3/27/24 EKG: Sinus rhythm, 97 bpm.  RBBB.    Cardiac testing:   Results for orders placed during the hospital encounter of " 01/11/24    NM myocardial perfusion spect (stress and/or rest)    Interpretation Summary    Perfusion: There is a left ventricular perfusion defect that is small in size with mild reduction in uptake present in the mid to basal septal location that is reversible.  Summed differential score is 1    Stress ECG: The ECG was not diagnostic due to resting ST-T abnormalities.  Exercise capacity is decreased.  Hypertensive response to exercise.    Stress Function: Left ventricular function post-stress is normal.  Calculated ejection fraction is 76%    Perfusion Defect Conclusion: There is no evidence of transient ischemic dilation (TID).    No major reversible ischemia on perfusion imaging.  There is a very small, mild intensity, reversible defect noted in the IV septum with a summed differential score of 1  Nondiagnostic EKG due to baseline abnormalities  Decreased exercise capacity  Hypertensive response to exercise  Clinical correlation recommended    Echo complete   Result date: 12/02/22    Left Ventricle Left ventricular cavity size is normal. Wall thickness is mildly increased. There is mild concentric hypertrophy. Systolic function is normal.  Definity was used to outline the ventricles.  Estimated ejection fraction is 60-65%. Although no diagnostic regional wall motion abnormality was identified, this possibility cannot be completely excluded on the basis of this study. Diastolic function is normal for age.   Right Ventricle Right ventricular cavity size is normal. Systolic function is normal. Wall thickness is normal.   Left Atrium The atrium is normal in size.   Right Atrium The atrium is normal in size.   Aortic Valve The aortic valve is trileaflet. The leaflets are not thickened. The leaflets are not calcified. The leaflets exhibit normal mobility. There is no evidence of regurgitation. The aortic valve has no significant stenosis.   Mitral Valve There is mild thickening. There is mild annular calcification.  "There is trace regurgitation. There is no evidence of stenosis.   Tricuspid Valve Tricuspid valve structure is normal. There is trace regurgitation. There is no evidence of stenosis. Right ventricular systolic pressure could not be assessed.   Pulmonic Valve Pulmonic valve structure is normal. There is trace regurgitation. There is no evidence of stenosis.   Ascending Aorta The aortic root is normal in size.   IVC/SVC The inferior vena cava is not well visualized.   Pericardium There is no pericardial effusion. The pericardium is normal in appearance.     Lab Review   Lab Results   Component Value Date    WBC 6.04 03/26/2024    HGB 15.1 03/26/2024    HCT 46.9 03/26/2024    MCV 98 03/26/2024    RDW 13.7 03/26/2024     03/26/2024     BMP:  Lab Results   Component Value Date    SODIUM 141 03/26/2024    K 4.2 03/26/2024     03/26/2024    CO2 27 03/26/2024    BUN 23 03/26/2024    CREATININE 1.26 03/26/2024    GLUC 107 03/26/2024    GLUF 126 (H) 01/24/2024    CALCIUM 10.4 (H) 03/26/2024    CORRECTEDCA 9.6 11/30/2022    EGFR 56 03/26/2024    MG 2.0 04/25/2023     Troponins:    LFT:  Lab Results   Component Value Date    AST 22 03/26/2024    ALT 29 03/26/2024    ALKPHOS 46 03/26/2024    TP 7.8 03/26/2024    ALB 4.3 03/26/2024      No results found for: \"[id number]\"  No components found for: \"TSH3\"  Lab Results   Component Value Date    HGBA1C 5.7 (H) 03/26/2024     Lipid Profile:   Lab Results   Component Value Date    CHOLESTEROL 214 (H) 12/21/2023    HDL 56 12/21/2023    LDLCALC 139 (H) 12/21/2023    TRIG 94 12/21/2023       Nighat Monroy PA-C  "

## 2024-03-28 ENCOUNTER — OFFICE VISIT (OUTPATIENT)
Dept: PHYSICAL THERAPY | Facility: CLINIC | Age: 74
End: 2024-03-28
Payer: MEDICARE

## 2024-03-28 DIAGNOSIS — M17.0 PRIMARY OSTEOARTHRITIS OF BOTH KNEES: Primary | ICD-10-CM

## 2024-03-28 LAB
BACTERIA UR CULT: ABNORMAL
BACTERIA UR CULT: ABNORMAL

## 2024-03-28 PROCEDURE — 97110 THERAPEUTIC EXERCISES: CPT | Performed by: PHYSICAL THERAPIST

## 2024-03-28 PROCEDURE — 97530 THERAPEUTIC ACTIVITIES: CPT | Performed by: PHYSICAL THERAPIST

## 2024-03-28 NOTE — PROGRESS NOTES
"Daily Note     Today's date: 3/28/2024  Patient name: Swapnil Grover  : 1950  MRN: [id number]  Referring provider: Hill Reynoso*  Dx:   Encounter Diagnosis     ICD-10-CM    1. Primary osteoarthritis of both knees  M17.0                        Subjective: Swapnil reports he has been moving around a lot at work. Noted that he also has been going up and down his steps more often and he is more cautious descending the stairs.    Objective: See treatment diary below      Assessment: Tolerated treatment well. Patient would benefit from continued PT. Prone knee flexion with green resistance band provided the patient with a quad stretch and he found to be \"looser\" following exercise. SLS posed most difficulty and patient required 2 fingers on the table for support, however continued having LoB with HHA.     Plan: Continue per plan of care.   Proprioception advancement walking on uneven surfaces.         Eval/ Re-eval Auth #/ Referral # Total visits Start date  Expiration date Total active units  Total manual units  PT only or  PT+OT?   1/15/24                                     1/15 1/26 1/29 1/31 2/ 2   Total units authorized                Total units remaining                     2/28 3/1 3/5 3-7 3/12 3/15 3/19 3/21 3/25      Total units authorized                Total units remaining                    Precautions: Kidney Disease      Specialty Daily Treatment Diary     Manuals 3/12/24 3/15/24 3/19/24 3/21/24 3/25/24 3/28   Visit # 17 18 19 20 21 22   L PF mobs      Performed   Stretch L HS Quad Patella mobs all planes     Quad trigger point  $ min HS gastroc stretch 4 min 4 min 4 min    L Knee PROM  4 min PROM 4 min 4 min 4 min 3 min            Flex AROM 100 110 deg 108 deg 110     Ext AROM -5 -2 deg -2 deg -1 deg              Warm-up         NuStep 5 min bike rocking 8 min NS 8 min 8 min 5 min bike 5 min bike   Neuro Re-Ed         SAQ         Stair stretch 20 20 " "20 20  3x20   SLS     20 5x 10 sec holds    SLDL                  Ther Ex         Mini squat 3x10 3x10 30 30 20 20   Side step  5 laps 20 ft   5 laps at bar    Knee flex      20 4#   Knee ext LAQ: 3# 3x10 3#  30 30  3# 30  3# 30  4# 30 4#   SLR 3# 3x10  30 30 30 20  4#    Heel slides With green band over pressure 2x10 W strap  20x 30 30 30    LP  95#  30 95#  30 30   95# 10  95#  20  105#  20  115#    Step ups 8\" 3x10 8\"  30 8\"  20 30  8\" 30  8# 20 6\"    Eccentric step down      3x10 6\" step            Ther Activity         Various height step over     4 x 5 steps             Gait Training         CC resisted walk     7.5#  5x ea fw/bw             Modalities         CP 10 min                                     "

## 2024-03-31 LAB
ATRIAL RATE: 108 BPM
P AXIS: -10 DEGREES
PR INTERVAL: 154 MS
QRS AXIS: -30 DEGREES
QRSD INTERVAL: 134 MS
QT INTERVAL: 354 MS
QTC INTERVAL: 474 MS
T WAVE AXIS: -1 DEGREES
VENTRICULAR RATE: 108 BPM

## 2024-04-01 ENCOUNTER — OFFICE VISIT (OUTPATIENT)
Dept: PHYSICAL THERAPY | Facility: CLINIC | Age: 74
End: 2024-04-01
Payer: MEDICARE

## 2024-04-01 DIAGNOSIS — M25.561 CHRONIC PAIN OF BOTH KNEES: ICD-10-CM

## 2024-04-01 DIAGNOSIS — G89.29 CHRONIC PAIN OF BOTH KNEES: ICD-10-CM

## 2024-04-01 DIAGNOSIS — M25.562 CHRONIC PAIN OF BOTH KNEES: ICD-10-CM

## 2024-04-01 DIAGNOSIS — M17.0 PRIMARY OSTEOARTHRITIS OF BOTH KNEES: Primary | ICD-10-CM

## 2024-04-01 PROCEDURE — 97112 NEUROMUSCULAR REEDUCATION: CPT

## 2024-04-01 PROCEDURE — 97110 THERAPEUTIC EXERCISES: CPT

## 2024-04-01 NOTE — PROGRESS NOTES
"Daily Note     Today's date: 2024  Patient name: Swapnil Grover  : 1950  MRN: [id number]  Referring provider: Hill Reynoso*  Dx:   Encounter Diagnosis     ICD-10-CM    1. Primary osteoarthritis of both knees  M17.0       2. Chronic pain of both knees  M25.561     M25.562     G89.29                      Subjective: Pt reports being pretty sore today from standing a lot yesterday and getting in and out of a golf cart.       Objective: See treatment diary below      Assessment: Pt did well with all TE as listed, reports no increased  pain during or post tx.       Plan: Continue per plan of care.      Eval/ Re-eval Auth #/ Referral # Total visits Start date  Expiration date Total active units  Total manual units  PT only or  PT+OT?   1/15/24                                     1/15 1/26 1/29 1/31 2/   Total units authorized                Total units remaining                     2/28 3/1 3/5 3-7 3/12 3/15 3/19 3/21 3/25 3/28 4/1    Total units authorized                Total units remaining                    Precautions: Kidney Disease      Specialty Daily Treatment Diary     Manuals 3/15/24 3/19/24 3/21/24 3/25/24 3/28 4/1   Visit # 18 19 20 21 22 23   L PF mobs     Performed HA   Stretch L HS Quad $ min HS gastroc stretch 4 min 4 min 4 min     L Knee PROM 4 min PROM 4 min 4 min 4 min 3 min HA            Flex AROM 110 deg 108 deg 110      Ext AROM -2 deg -2 deg -1 deg               Warm-up         NuStep 8 min NS 8 min 8 min 5 min bike 5 min bike 5 min bike   Neuro Re-Ed         SAQ         Stair stretch 20 20 20  3x20 3x20   SLS    20 5x 10 sec holds  5x10\"   SLDL                  Ther Ex         Mini squat 3x10 30 30 20 20 20   Side step 5 laps 20 ft   5 laps at bar     Knee flex     20 4# 20 4#   Knee ext 3#  30 30  3# 30  3# 30  4# 30 4# 30 4#   SLR 30 30 30 20  4#     Heel slides W strap  20x 30 30 30     LP 95#  30 95#  30 30   95# 10  95#  20  " "105#  20  115#  10  95#  30  105#     Step ups 8\"  30 8\"  20 30  8\" 30  8# 20 6\"  20 8\"   Eccentric step down     3x10 6\" step 3x10 6\" step            Ther Activity         Various height step over    4 x 5 steps              Gait Training         CC resisted walk    7.5#  5x ea fw/bw              Modalities         CP                                        "

## 2024-04-02 ENCOUNTER — OFFICE VISIT (OUTPATIENT)
Dept: NEPHROLOGY | Facility: CLINIC | Age: 74
End: 2024-04-02
Payer: MEDICARE

## 2024-04-02 VITALS
HEART RATE: 100 BPM | SYSTOLIC BLOOD PRESSURE: 148 MMHG | BODY MASS INDEX: 35.47 KG/M2 | OXYGEN SATURATION: 95 % | HEIGHT: 67 IN | WEIGHT: 226 LBS | DIASTOLIC BLOOD PRESSURE: 92 MMHG

## 2024-04-02 DIAGNOSIS — N20.0 NEPHROLITHIASIS: ICD-10-CM

## 2024-04-02 DIAGNOSIS — N18.31 STAGE 3A CHRONIC KIDNEY DISEASE (HCC): Primary | ICD-10-CM

## 2024-04-02 DIAGNOSIS — I12.9 BENIGN HYPERTENSION WITH CKD (CHRONIC KIDNEY DISEASE) STAGE III (HCC): ICD-10-CM

## 2024-04-02 DIAGNOSIS — N18.30 BENIGN HYPERTENSION WITH CKD (CHRONIC KIDNEY DISEASE) STAGE III (HCC): ICD-10-CM

## 2024-04-02 PROCEDURE — 99214 OFFICE O/P EST MOD 30 MIN: CPT | Performed by: INTERNAL MEDICINE

## 2024-04-02 RX ORDER — AMLODIPINE BESYLATE 5 MG/1
5 TABLET ORAL DAILY
Qty: 90 TABLET | Refills: 1 | Status: SHIPPED | OUTPATIENT
Start: 2024-04-02

## 2024-04-02 NOTE — PROGRESS NOTES
NEPHROLOGY OFFICE PROGRESS NOTE   Swapnil Grover 73 y.o. male MRN: [id number]  DATE: 04/02/24  Reason for visit: Continued evaluation and management of CKD    ASSESSMENT & PLAN:  Chronic kidney disease, stage IIIA:  Baseline creatinine is around 1.2-1.3.  Etiology is probably sequelae from previous DAYANNA and left renal atrophy.  Stable renal function. SCr 1.26 on 3/26/24.     Hypertension  Not checking home BP but BP has been high multiple times in the office setting.   Not on meds now.   Start Amlodipine 5 mg daily.     Nephrolithiasis  Uric acid stones based on stone analysis.  Rx: K-citrate 20 meq TID.   No recent stone passage.   Continue high fluid intake - at least 2.0 liters per day.   Check uric acid.     R sided hydronephrosis, obstructive uropathy  Noted on CT in Nov 2022.   S/p R ureteral stent and removal.   Renal US in March 2023 showed no R sided hydronephrosis but still with large intrarenal calculi.   Follow up with Dr. Garcia.     Mineral bone disease  Check PTH, Vitamin D and phos.   PTH at goal in April 2023.  Vitamin D level at goal in April 2023.    Tegmen defect, CSF otorrhea  Going for bilateral endoscopic middle fossa craniotomy to repair a defect and CSF leak on April 22, 2024.  No objection to surgery from renal standpoint.     Osteoarthritis  S/p L knee TKR in Jan 2024.   Possibly need R TKR later this year.     I have personally discussed the risks and benefits of the surgery from a renal standpoint with the patient in depth, and advised the patient about the risk of DAYANNA and the course of DAYANNA if it occurs. Patient voiced understanding.    From a renal standpoint the patient is renally optimized for surgery with the following recommendations:  Fluids to administer: Other - Per anesthesia protocol    Medication Recommendations:  Minimize any IV contrast use.  Avoid NSAIDs.   Hemodynamic Recommendations:  Ideally, target MAPs greater than 65 mmHg in the perioperative period.   Minimize  operative time with MAPs < 65 mmHg.   Avoid intraop hemodynamic instability to decrease risk for DAYANNA.  Other Recommendations:  Please consult the Nephrology team when the patient is admitted      Patient Instructions   You have chronic kidney disease (CKD) and your kidney function is stable.   I have no objection to the proposed surgery.   Please start Amlodipine 5 mg daily.   Follow up in 6 months - please obtain blood work 1-2 weeks prior to the appointment.     Please check your BP twice daily for 1 week and bring a log with your next visit.   Please avoid taking NSAIDs (Ibuprofen, Motrin, Aleve, Advil, Naproxen, Celebrex, etc.)  Make sure you are eating healthy and have adequate exercise.     SUBJECTIVE / INTERVAL HISTORY:  Swapnil was last seen in the office in Jan 2024.   Since his last office visit, he had successful left knee replacement on January 23, 2024.  He stayed overnight in the hospital and did physical therapy upon discharge.   No other unplanned hospital stays or ER visits.   No gravel like urine, maynor urine. No stone passage.   He last saw Dr. Garcia in January 2024.   He is scheduled for bilateral endoscopic middle fossa craniotomy to repair a defect and CSF leak on April 22, 2024.  He has not been checking his BP at home recently.  His biggest complaint now is R knee pain which he reports that he will also need surgery on (possibly in May 2024).     PMH/PSH: PreDM, HLP, nephrolithiasis since 1990 s/p multiple lithotripsies, DJD, SDH s/p craniotomy, SCC of scalp, possible ISAIAS.     Previous work up:   3/9/23 Renal US: R 15.8 cm, L 12.1 cm, no hydronephrosis.  Large intrarenal calculi on the right kidney.  Bilateral renal cysts.    8/25/23 SPEP & UPEP no M spike, KL ratio 1.32,     ALLERGIES:   Allergies   Allergen Reactions    Other Nasal Congestion     Seasonal allergies    Bean Pod Extract - Food Allergy Nausea Only     Soft beans- eg chick peas, lima's, sweet peas     REVIEW OF  "SYSTEMS:  Review of Systems   Constitutional:  Negative for appetite change, chills, fatigue and fever.   Respiratory:  Negative for cough and shortness of breath.    Cardiovascular:  Negative for chest pain and leg swelling.   Gastrointestinal:  Negative for abdominal pain, diarrhea, nausea and vomiting.   Genitourinary:  Negative for dysuria and hematuria.   Musculoskeletal:  Positive for arthralgias (R knee). Negative for back pain.   Neurological:  Negative for dizziness and light-headedness.     OBJECTIVE:  /92 (BP Location: Left arm, Patient Position: Sitting, Cuff Size: Large)   Pulse 100   Ht 5' 7\" (1.702 m)   Wt 103 kg (226 lb)   SpO2 95%   BMI 35.40 kg/m²   Current Weight: Weight - Scale: 103 kg (226 lb) Body mass index is 35.4 kg/m².  Physical Exam  Constitutional:       General: He is not in acute distress.     Appearance: Normal appearance. He is well-developed. He is obese. He is not ill-appearing, toxic-appearing or diaphoretic.   HENT:      Head: Normocephalic and atraumatic.   Eyes:      General: No scleral icterus.     Conjunctiva/sclera: Conjunctivae normal.   Neck:      Vascular: No JVD.   Cardiovascular:      Rate and Rhythm: Normal rate and regular rhythm.      Heart sounds: Normal heart sounds.   Pulmonary:      Effort: Pulmonary effort is normal. No respiratory distress.      Breath sounds: Normal breath sounds.   Abdominal:      General: Bowel sounds are normal.      Palpations: Abdomen is soft.   Musculoskeletal:      Cervical back: Neck supple.      Right lower leg: No edema.      Left lower leg: No edema.   Skin:     General: Skin is warm and dry.   Neurological:      Mental Status: He is alert and oriented to person, place, and time.   Psychiatric:         Mood and Affect: Mood normal.         Behavior: Behavior normal.         Judgment: Judgment normal.       Medications:  Current Outpatient Medications:     acetaminophen (TYLENOL) 325 mg tablet, Take 3 tablets (975 mg total) " by mouth every 8 (eight) hours, Disp: , Rfl:     aspirin 325 mg tablet, Take 1 tablet (325 mg total) by mouth 2 (two) times a day (Patient not taking: Reported on 3/13/2024), Disp: 82 tablet, Rfl: 0    b complex vitamins tablet, Take 1 tablet by mouth 2 (two) times a day, Disp: , Rfl:     docusate sodium (COLACE) 100 mg capsule, Take 1 capsule (100 mg total) by mouth 2 (two) times a day (Patient not taking: Reported on 3/26/2024), Disp: 60 capsule, Rfl: 0    oxyCODONE (Roxicodone) 5 immediate release tablet, Take 1-2 tablets by mouth every 6 hours as needed for pain, Disp: 40 tablet, Rfl: 0    potassium citrate (UROCIT-K) 10 mEq, Take 2 tablets (20 mEq total) by mouth 3 (three) times a day with meals (Patient not taking: Reported on 3/27/2024), Disp: 180 tablet, Rfl: 0    VITAMIN D PO, Take by mouth every morning With calcium, Disp: , Rfl:     VITAMIN E PO, Take 268 mg by mouth daily, Disp: , Rfl:     Laboratory Results:  Lab Results   Component Value Date    WBC 6.04 03/26/2024    HGB 15.1 03/26/2024    HCT 46.9 03/26/2024    MCV 98 03/26/2024     03/26/2024     Lab Results   Component Value Date    SODIUM 141 03/26/2024    K 4.2 03/26/2024     03/26/2024    CO2 27 03/26/2024    BUN 23 03/26/2024    CREATININE 1.26 03/26/2024    GLUC 107 03/26/2024    CALCIUM 10.4 (H) 03/26/2024

## 2024-04-02 NOTE — PATIENT INSTRUCTIONS
You have chronic kidney disease (CKD) and your kidney function is stable.   I have no objection to the proposed surgery.   Please start Amlodipine 5 mg daily.   Follow up in 6 months - please obtain blood work 1-2 weeks prior to the appointment.     Please check your BP twice daily for 1 week and bring a log with your next visit.   Please avoid taking NSAIDs (Ibuprofen, Motrin, Aleve, Advil, Naproxen, Celebrex, etc.)  Make sure you are eating healthy and have adequate exercise.

## 2024-04-04 ENCOUNTER — OFFICE VISIT (OUTPATIENT)
Dept: PHYSICAL THERAPY | Facility: CLINIC | Age: 74
End: 2024-04-04
Payer: MEDICARE

## 2024-04-04 DIAGNOSIS — M17.0 PRIMARY OSTEOARTHRITIS OF BOTH KNEES: Primary | ICD-10-CM

## 2024-04-04 PROCEDURE — 97112 NEUROMUSCULAR REEDUCATION: CPT | Performed by: PHYSICAL THERAPIST

## 2024-04-04 PROCEDURE — 97110 THERAPEUTIC EXERCISES: CPT | Performed by: PHYSICAL THERAPIST

## 2024-04-04 NOTE — PROGRESS NOTES
"Daily Note     Today's date: 2024  Patient name: Swapnil Grover  : 1950  MRN: [id number]  Referring provider: Hill Reynoso*  Dx:   Encounter Diagnosis     ICD-10-CM    1. Primary osteoarthritis of both knees  M17.0                        Subjective: Pt reports feeling the same and notices that he is no making as big \"gains\" as he previously has been, although he still feels that he is getting better.      Objective: See treatment diary below      Assessment: Patient tolerated treatment well with continued focus on LE stretching and strengthening.  Patient continues to demonstrate difficulty with knee flexion with increased stiffness through ROM due tightness noted in quads and hamstrings. Difficulty with eccentric step down requiring two UE assitance as a result of quad weakness. Patient would benefit from continued PT services to improve functional mobility of his LE's following L TKA.       Plan: Continue per plan of care.      Eval/ Re-eval Auth #/ Referral # Total visits Start date  Expiration date Total active units  Total manual units  PT only or  PT+OT?   1/15/24                                     1/15 1/26 1/29 1/31 2/2 2/5 2/7 2/9 2/14 2/16 2/20 2/23   Total units authorized                Total units remaining                     2/28 3/1 3/5 3-7 3/12 3/15 3/19 3/21 3/25 3/28 4/1    Total units authorized                Total units remaining                    Precautions: Kidney Disease      Specialty Daily Treatment Diary     Manuals 3/15/24 3/19/24 3/21/24 3/25/24 3/28 4/1 4/4   Visit # 18 19 20 21  24   L PF mobs     Performed HA Performed   Stretch L HS Quad $ min HS gastroc stretch 4 min 4 min 4 min      L Knee PROM 4 min PROM 4 min 4 min 4 min 3 min HA Performed              Flex AROM 110 deg 108 deg 110       Ext AROM -2 deg -2 deg -1 deg                 Warm-up          NuStep 8 min NS 8 min 8 min 5 min bike 5 min bike 5 min bike    Neuro Re-Ed          Quad set   " "    40 bolster under feet    Stair stretch 20 20 20  3x20 3x20 3x20   SLS    20 5x 10 sec holds  5x10\"    SLDL                    Ther Ex          Mini squat 3x10 30 30 20 20 20    Side step 5 laps 20 ft   5 laps at bar      Knee flex     20 4# 20 4# 2x10 4# prone   Knee ext 3#  30 30  3# 30  3# 30  4# 30 4# 30 4# 30 4#   SLR 30 30 30 20  4#   10 4#   Heel slides W strap  20x 30 30 30      LP 95#  30 95#  30 30   95# 10  95#  20  105#  20  115#  10  95#  30  105#   95# 10  105# 10  115# 10   Step ups 8\"  30 8\"  20 30  8\" 30  8# 20 6\"  20 8\"    Eccentric step down     3x10 6\" step 3x10 6\" step 2x20 anterior 8\" step  2UE support   TRX lunges       2x5 B/L   Ther Activity          Various height step over    4 x 5 steps                Gait Training          CC resisted walk    7.5#  5x ea fw/bw   12.5#  FWD/BWD 10x             Modalities          CP                                          "

## 2024-04-08 ENCOUNTER — OFFICE VISIT (OUTPATIENT)
Dept: PHYSICAL THERAPY | Facility: CLINIC | Age: 74
End: 2024-04-08
Payer: MEDICARE

## 2024-04-08 ENCOUNTER — OFFICE VISIT (OUTPATIENT)
Dept: FAMILY MEDICINE CLINIC | Facility: CLINIC | Age: 74
End: 2024-04-08
Payer: MEDICARE

## 2024-04-08 VITALS
RESPIRATION RATE: 20 BRPM | TEMPERATURE: 97.7 F | SYSTOLIC BLOOD PRESSURE: 138 MMHG | WEIGHT: 228.4 LBS | DIASTOLIC BLOOD PRESSURE: 88 MMHG | HEIGHT: 67 IN | BODY MASS INDEX: 35.85 KG/M2 | HEART RATE: 76 BPM

## 2024-04-08 DIAGNOSIS — I12.9 BENIGN HYPERTENSION WITH CKD (CHRONIC KIDNEY DISEASE) STAGE III (HCC): ICD-10-CM

## 2024-04-08 DIAGNOSIS — Q16.5 TEGMEN DEFECT OF BASE OF SKULL: ICD-10-CM

## 2024-04-08 DIAGNOSIS — N20.0 NEPHROLITHIASIS: ICD-10-CM

## 2024-04-08 DIAGNOSIS — G96.01 CSF OTORRHEA: ICD-10-CM

## 2024-04-08 DIAGNOSIS — N18.31 STAGE 3A CHRONIC KIDNEY DISEASE (HCC): ICD-10-CM

## 2024-04-08 DIAGNOSIS — H90.6 MIXED CONDUCTIVE AND SENSORINEURAL HEARING LOSS, BILATERAL: ICD-10-CM

## 2024-04-08 DIAGNOSIS — M25.562 CHRONIC PAIN OF BOTH KNEES: ICD-10-CM

## 2024-04-08 DIAGNOSIS — M17.0 PRIMARY OSTEOARTHRITIS OF BOTH KNEES: Primary | ICD-10-CM

## 2024-04-08 DIAGNOSIS — Z01.818 PRE-OP EXAMINATION: Primary | ICD-10-CM

## 2024-04-08 DIAGNOSIS — N18.30 BENIGN HYPERTENSION WITH CKD (CHRONIC KIDNEY DISEASE) STAGE III (HCC): ICD-10-CM

## 2024-04-08 DIAGNOSIS — G89.29 CHRONIC PAIN OF BOTH KNEES: ICD-10-CM

## 2024-04-08 DIAGNOSIS — M25.561 CHRONIC PAIN OF BOTH KNEES: ICD-10-CM

## 2024-04-08 PROCEDURE — 97110 THERAPEUTIC EXERCISES: CPT | Performed by: PHYSICAL THERAPIST

## 2024-04-08 PROCEDURE — 99214 OFFICE O/P EST MOD 30 MIN: CPT | Performed by: NURSE PRACTITIONER

## 2024-04-08 PROCEDURE — G2211 COMPLEX E/M VISIT ADD ON: HCPCS | Performed by: NURSE PRACTITIONER

## 2024-04-08 NOTE — ASSESSMENT & PLAN NOTE
Cleared by nephrologist  Lab Results   Component Value Date    EGFR 56 03/26/2024    EGFR 45 01/24/2024    EGFR 50 12/21/2023    CREATININE 1.26 03/26/2024    CREATININE 1.51 (H) 01/24/2024    CREATININE 1.38 (H) 12/21/2023

## 2024-04-08 NOTE — ASSESSMENT & PLAN NOTE
Complaint with current regimen  Lab Results   Component Value Date    EGFR 56 03/26/2024    EGFR 45 01/24/2024    EGFR 50 12/21/2023    CREATININE 1.26 03/26/2024    CREATININE 1.51 (H) 01/24/2024    CREATININE 1.38 (H) 12/21/2023

## 2024-04-08 NOTE — PROGRESS NOTES
FAMILY PRACTICE PRE-OPERATIVE EVALUATION  [facility]    NAME: Swapnil Grover  AGE: 73 y.o. SEX: male  : 1950     DATE: 2024    [facility] Pre-Operative Evaluation      Chief Complaint: Pre-operative Evaluation     Surgery: CRANIOTOMY MIDDLE FOSSA,RETROMASTOID APPROACH FOR ENT (Bilateral: Head)       Bilateral endoscopic middle fossa craniotomies for repair of tegmen defect and CSF leak with temporalis fascia or muscle flap, with neuromonitoring (CN 7/8) and intraoperative lumbar drain placement (Bilateral: Head)   Anticipated Date of Surgery: 24  Surgeon: Dr. Wasserman      History of Present Illness:     HPI  Patient is here to follow up on chronic conditions before upcoming surgery. Patient already had cardiac and nephrology clearance for upcoming surgery. Denies chest pain, sob, headache and dizziness.  Complaint with medications and tolerating it well  Anesthesia:  general  Bleeding Risk: no recent abnormal bleeding and no remote history of abnormal bleeding  Current Anti-platelet/anticoagulation medication:  stopped taking aspirin on 2/10/24    Assessment of Cardiac Risk:  Denies unstable or severe angina or MI in the last 6 weeks or history of stent placement in the last year   Denies decompensated heart failure (e.g. New onset heart failure, NYHA functional class IV heart failure, or worsening existing heart failure)  Denies significant arrhythmias such as high grade AV block, symptomatic ventricular arrhythmia, newly recognized ventricular tachycardia, supraventricular tachycardia with resting heart rate >100, or symptomatic bradycardia  Denies severe heart valve disease including aortic stenosis or symptomatic mitral stenosis      Prior Anesthesia Reactions:stated that once had hallucinations like reaction but had anaesthesia twice after that and tolerated it well without any issues     Personal history of venous thromboembolic disease?  No    History of steroid use for >2 weeks within last year? No         Review of Systems:     Review of Systems   Constitutional:  Negative for chills, diaphoresis, fatigue, fever and unexpected weight change.   HENT:  Positive for hearing loss. Negative for congestion, dental problem, drooling, ear discharge, ear pain, facial swelling, mouth sores, nosebleeds, postnasal drip, rhinorrhea, sinus pressure, sinus pain, sneezing, sore throat, tinnitus, trouble swallowing and voice change.    Respiratory:  Negative for cough, chest tightness, shortness of breath and wheezing.    Cardiovascular: Negative.    Gastrointestinal:  Negative for abdominal pain, constipation, diarrhea, nausea and vomiting.   Skin: Negative.    Neurological:  Negative for dizziness, light-headedness and headaches.       Current Problem List:     Patient Active Problem List   Diagnosis   • Renal colic on right side   • Hyperkalemia   • Sleep apnea   • Hydronephrosis with ureteropelvic junction (UPJ) obstruction   • BPH (benign prostatic hyperplasia)   • Hypertension   • Hyperuricemia   • Stage 3a chronic kidney disease (HCC)   • Nephrolithiasis   • Obesity, morbid ([facility])   • Mixed conductive and sensorineural hearing loss, bilateral   • Primary osteoarthritis of both knees   • Other hemorrhoids   • Mixed hyperlipidemia   • Pre-operative examination   • Arthritis   • S/P total knee replacement using cement, left   • Benign hypertension with CKD (chronic kidney disease) stage III ([facility])   • Tegmen defect of base of skull       Allergies:     Allergies   Allergen Reactions   • Other Nasal Congestion     Seasonal allergies   • Bean Pod Extract - Food Allergy Nausea Only     Soft beans- eg chick peas, lima's, sweet peas       Current Medications:       Current Outpatient Medications:   •  amLODIPine (NORVASC) 5 mg tablet, Take 1 tablet (5 mg total) by mouth daily, Disp: 90 tablet, Rfl: 1  •  b complex vitamins tablet, Take 1 tablet by mouth 2 (two) times  a day, Disp: , Rfl:   •  BETA CAROTENE PO, Take by mouth in the morning, Disp: , Rfl:   •  potassium citrate (UROCIT-K) 10 mEq, Take 2 tablets (20 mEq total) by mouth 3 (three) times a day with meals, Disp: 180 tablet, Rfl: 0  •  acetaminophen (TYLENOL) 325 mg tablet, Take 3 tablets (975 mg total) by mouth every 8 (eight) hours (Patient not taking: Reported on 4/8/2024), Disp: , Rfl:   •  aspirin 325 mg tablet, Take 1 tablet (325 mg total) by mouth 2 (two) times a day (Patient not taking: Reported on 3/13/2024), Disp: 82 tablet, Rfl: 0  •  docusate sodium (COLACE) 100 mg capsule, Take 1 capsule (100 mg total) by mouth 2 (two) times a day (Patient not taking: Reported on 3/26/2024), Disp: 60 capsule, Rfl: 0  •  oxyCODONE (Roxicodone) 5 immediate release tablet, Take 1-2 tablets by mouth every 6 hours as needed for pain (Patient not taking: Reported on 4/2/2024), Disp: 40 tablet, Rfl: 0  •  VITAMIN D PO, Take by mouth every morning With calcium (Patient not taking: Reported on 4/8/2024), Disp: , Rfl:   •  VITAMIN E PO, Take 268 mg by mouth daily (Patient not taking: Reported on 4/8/2024), Disp: , Rfl:     Past Medical History:       Past Medical History:   Diagnosis Date   • Arthritis    • BPH (benign prostatic hyperplasia)    • Breathing difficulty 12/01/2022    CPAP given per pt-s/p surgery   • Cancer (HCC) 2015    basal cell of the skull    • Chronic kidney disease    • Colon polyp    • Heart failure (HCC)     12/2/22-pt had ECHO-per pt R. sided block   • History of subdural hematoma    • Kidney stone     right stone   • Renal disorder    • Sleep apnea     mild- no CPAP-had nasal septoplasty        Past Surgical History:   Procedure Laterality Date   • BASAL CELL CARCINOMA EXCISION  2015    skull   • BRAIN SURGERY      in the 1990's-subdural hematoma   • CATARACT EXTRACTION Left    • COLONOSCOPY      x2   • CYSTOSCOPY W/ LASER LITHOTRIPSY Right 11/27/2020    Procedure: CYSTOSCOPY, FLEXIBLE URETEROSCOPY, LASER, AND  STENT EXCHANGE,STONE BASKET, RIGHT RETROGRADE;  Surgeon: Sabino Garcia MD;  Location: [facility];  Service: Urology   • FL RETROGRADE PYELOGRAM  10/18/2020   • FL RETROGRADE PYELOGRAM  11/27/2020   • FL RETROGRADE PYELOGRAM  12/01/2022   • FL RETROGRADE PYELOGRAM  12/22/2022   • JOINT REPLACEMENT  1/23/2024   • LITHOTRIPSY     • TX ARTHRP KNE CONDYLE&PLATU MEDIAL&LAT COMPARTMENTS Left 01/23/2024    Procedure: ARTHROPLASTY KNEE TOTAL W ROBOT - overnight, cemented;  Surgeon: Bassem Moscoso DO;  Location: [facility];  Service: Orthopedics   • TX CYSTO BLADDER W/URETERAL CATHETERIZATION Right 11/11/2020    Procedure: ESWL RIGHT;  Surgeon: Sabino Garcia MD;  Location: [facility];  Service: Urology   • TX CYSTO BLADDER W/URETERAL CATHETERIZATION Right 12/01/2022    Procedure: CYSTOSCOPY RETROGRADE PYELOGRAM WITH INSERTION STENT URETERAL;  Surgeon: Sabino Garcia MD;  Location: [facility];  Service: Urology   • TX CYSTO/URETERO W/LITHOTRIPSY &INDWELL STENT INSRT Right 10/18/2020    Procedure: CYSTOSCOPY URETEROSCOPY WITH BASKET EXTRACTION, RETROGRADE PYELOGRAM AND INSERTION STENT URETERAL;  Surgeon: Kris Ac MD;  Location: [facility];  Service: Urology   • TX CYSTO/URETERO W/LITHOTRIPSY &INDWELL STENT INSRT Right 12/22/2022    Procedure: CYSTOSCOPY URETEROSCOPY WITH LITHOTRIPSY HOLMIUM LASER, RETROGRADE PYELOGRAM AND INSERTION STENT URETERAL;  Surgeon: Sabino Garcia MD;  Location: [facility];  Service: Urology   • SEPTOPLASTY      in the 1990's-trimmed uvula   • TONSILLECTOMY  1956    age 6        Family History   Problem Relation Age of Onset   • Hypertension Mother    • Kidney disease Mother         renal failure-dialysis   • Hypertension Father    • Stroke Father    • Diabetes Sister    • Kidney disease Sister         self dialysis at home        Social History     Socioeconomic History   • Marital status:      Spouse name: Not on file   • Number of children: Not on file   • Years of education:  "Not on file   • Highest education level: Not on file   Occupational History   • Not on file   Tobacco Use   • Smoking status: Never     Passive exposure: Never   • Smokeless tobacco: Never   Vaping Use   • Vaping status: Never Used   Substance and Sexual Activity   • Alcohol use: Yes     Comment: Rare occasion   • Drug use: Never   • Sexual activity: Not Currently   Other Topics Concern   • Not on file   Social History Narrative   • Not on file     Social Determinants of Health     Financial Resource Strain: Low Risk  (11/2/2023)    Overall Financial Resource Strain (CARDIA)    • Difficulty of Paying Living Expenses: Not hard at all   Food Insecurity: No Food Insecurity (12/2/2022)    Hunger Vital Sign    • Worried About Running Out of Food in the Last Year: Never true    • Ran Out of Food in the Last Year: Never true   Transportation Needs: No Transportation Needs (11/2/2023)    PRAPARE - Transportation    • Lack of Transportation (Medical): No    • Lack of Transportation (Non-Medical): No   Physical Activity: Not on file   Stress: Not on file   Social Connections: Not on file   Intimate Partner Violence: Not on file   Housing Stability: Low Risk  (12/2/2022)    Housing Stability Vital Sign    • Unable to Pay for Housing in the Last Year: No    • Number of Places Lived in the Last Year: 1    • Unstable Housing in the Last Year: No        Physical Exam:     /88   Pulse 76   Temp 97.7 °F (36.5 °C) (Tympanic)   Resp 20   Ht 5' 7\" (1.702 m)   Wt 104 kg (228 lb 6.4 oz)   BMI 35.77 kg/m²     Physical Exam  Constitutional:       Appearance: Normal appearance.   HENT:      Head: Normocephalic.      Nose: Nose normal.   Eyes:      Conjunctiva/sclera: Conjunctivae normal.   Cardiovascular:      Rate and Rhythm: Normal rate and regular rhythm.      Heart sounds: Normal heart sounds.   Pulmonary:      Effort: Pulmonary effort is normal.      Breath sounds: Normal breath sounds.   Musculoskeletal:         General: " No swelling or tenderness. Normal range of motion.   Skin:     General: Skin is warm and dry.      Findings: No rash.   Neurological:      Mental Status: He is alert and oriented to person, place, and time.   Psychiatric:         Mood and Affect: Mood normal.         Behavior: Behavior normal.         Thought Content: Thought content normal.         Judgment: Judgment normal.          Data:     Pre-operative work-up    Laboratory Results: I have personally reviewed the pertinent laboratory results/reports       Recent Results (from the past 672 hour(s))   EKG 12 lead    Collection Time: 03/26/24 11:45 AM   Result Value Ref Range    Ventricular Rate 108 BPM    Atrial Rate 108 BPM    TX Interval 154 ms    QRSD Interval 134 ms    QT Interval 354 ms    QTC Interval 474 ms    P Axis -10 degrees    QRS Axis -30 degrees    T Wave Axis -1 degrees   Type and screen    Collection Time: 03/26/24 11:59 AM   Result Value Ref Range    ABO Grouping AB     Rh Factor Positive     Antibody Screen Negative     Specimen Expiration Date 20240423    UA w Reflex to Microscopic w Reflex to Culture    Collection Time: 03/26/24 12:00 PM    Specimen: Urine, Clean Catch   Result Value Ref Range    Color, UA Yellow     Clarity, UA Clear     Specific Gravity, UA 1.019 1.003 - 1.030    pH, UA 7.0 4.5, 5.0, 5.5, 6.0, 6.5, 7.0, 7.5, 8.0    Leukocytes, UA Trace (A) Negative    Nitrite, UA Negative Negative    Protein, UA 30 (1+) (A) Negative mg/dl    Glucose, UA Negative Negative mg/dl    Ketones, UA Negative Negative mg/dl    Urobilinogen, UA <2.0 <2.0 mg/dl mg/dl    Bilirubin, UA Negative Negative    Occult Blood, UA Negative Negative   Comprehensive metabolic panel    Collection Time: 03/26/24 12:00 PM   Result Value Ref Range    Sodium 141 135 - 147 mmol/L    Potassium 4.2 3.5 - 5.3 mmol/L    Chloride 104 96 - 108 mmol/L    CO2 27 21 - 32 mmol/L    ANION GAP 10 4 - 13 mmol/L    BUN 23 5 - 25 mg/dL    Creatinine 1.26 0.60 - 1.30 mg/dL    Glucose  107 65 - 140 mg/dL    Calcium 10.4 (H) 8.4 - 10.2 mg/dL    AST 22 13 - 39 U/L    ALT 29 7 - 52 U/L    Alkaline Phosphatase 46 34 - 104 U/L    Total Protein 7.8 6.4 - 8.4 g/dL    Albumin 4.3 3.5 - 5.0 g/dL    Total Bilirubin 0.39 0.20 - 1.00 mg/dL    eGFR 56 ml/min/1.73sq m   CBC and differential    Collection Time: 03/26/24 12:00 PM   Result Value Ref Range    WBC 6.04 4.31 - 10.16 Thousand/uL    RBC 4.80 3.88 - 5.62 Million/uL    Hemoglobin 15.1 12.0 - 17.0 g/dL    Hematocrit 46.9 36.5 - 49.3 %    MCV 98 82 - 98 fL    MCH 31.5 26.8 - 34.3 pg    MCHC 32.2 31.4 - 37.4 g/dL    RDW 13.7 11.6 - 15.1 %    MPV 9.4 8.9 - 12.7 fL    Platelets 269 149 - 390 Thousands/uL    nRBC 0 /100 WBCs    Neutrophils Relative 59 43 - 75 %    Immature Grans % 0 0 - 2 %    Lymphocytes Relative 23 14 - 44 %    Monocytes Relative 12 4 - 12 %    Eosinophils Relative 5 0 - 6 %    Basophils Relative 1 0 - 1 %    Neutrophils Absolute 3.56 1.85 - 7.62 Thousands/µL    Absolute Immature Grans 0.02 0.00 - 0.20 Thousand/uL    Absolute Lymphocytes 1.40 0.60 - 4.47 Thousands/µL    Absolute Monocytes 0.73 0.17 - 1.22 Thousand/µL    Eosinophils Absolute 0.30 0.00 - 0.61 Thousand/µL    Basophils Absolute 0.03 0.00 - 0.10 Thousands/µL   APTT    Collection Time: 03/26/24 12:00 PM   Result Value Ref Range    PTT 28 23 - 37 seconds   Protime-INR    Collection Time: 03/26/24 12:00 PM   Result Value Ref Range    Protime 13.1 11.6 - 14.5 seconds    INR 0.93 0.84 - 1.19   HEMOGLOBIN A1C W/ EAG ESTIMATION    Collection Time: 03/26/24 12:00 PM   Result Value Ref Range    Hemoglobin A1C 5.7 (H) Normal 4.0-5.6%; PreDiabetic 5.7-6.4%; Diabetic >=6.5%; Glycemic control for adults with diabetes <7.0% %     mg/dl   Urine Microscopic    Collection Time: 03/26/24 12:00 PM   Result Value Ref Range    RBC, UA 10-20 (A) None Seen, 1-2 /hpf    WBC, UA 20-30 (A) None Seen, 1-2 /hpf    Epithelial Cells None Seen None Seen, Occasional /hpf    Bacteria, UA None Seen None  Seen, Occasional /hpf   Urine culture    Collection Time: 03/26/24 12:00 PM    Specimen: Urine, Clean Catch   Result Value Ref Range    Urine Culture 20,000-29,000 cfu/ml Escherichia coli (A)     Urine Culture [id number] cfu/ml        Susceptibility    Escherichia coli - ZACHERY     ZID Performed Yes       Amoxicillin + Clavulanate >16/8 Resistant ug/ml     Ampicillin ($$) >16.00 Resistant ug/ml     Ampicillin + Sulbactam ($) >16/8 Resistant ug/ml     Aztreonam ($$$)  <=4 Susceptible ug/ml     Cefazolin ($) >16.00 Resistant ug/ml     Cefepime ($) <=2.00 Susceptible ug/ml     Ceftazidime ($$) >16 Resistant ug/ml     Ceftriaxone ($$) 32.00 Resistant ug/ml     Cefuroxime ($$) >16 Resistant ug/ml     Ciprofloxacin ($)  <=0.25 Susceptible ug/ml     Ertapenem ($$$) <=0.5 Susceptible ug/ml     Gentamicin ($$) <=2 Susceptible ug/ml     Levofloxacin ($) <=0.50 Susceptible ug/ml     Nitrofurantoin <=32 Susceptible ug/ml     Piperacillin + Tazobactam ($$$) <=8 Susceptible ug/ml     Tetracycline <=4 Susceptible ug/ml     Tobramycin ($) <=2 Susceptible ug/ml     Trimethoprim + Sulfamethoxazole ($$$) <=0.5/9.5 Susceptible ug/ml           Assessment & Recommendations:     Problem List Items Addressed This Visit        Cardiovascular and Mediastinum    Benign hypertension with CKD (chronic kidney disease) stage III (HCC)     Complaint with current regimen  Lab Results   Component Value Date    EGFR 56 03/26/2024    EGFR 45 01/24/2024    EGFR 50 12/21/2023    CREATININE 1.26 03/26/2024    CREATININE 1.51 (H) 01/24/2024    CREATININE 1.38 (H) 12/21/2023             Nervous and Auditory    Mixed conductive and sensorineural hearing loss, bilateral       Musculoskeletal and Integument    Tegmen defect of base of skull       Genitourinary    Stage 3a chronic kidney disease (HCC)     Cleared by nephrologist  Lab Results   Component Value Date    EGFR 56 03/26/2024    EGFR 45 01/24/2024    EGFR 50 12/21/2023    CREATININE 1.26 03/26/2024     CREATININE 1.51 (H) 01/24/2024    CREATININE 1.38 (H) 12/21/2023          Nephrolithiasis   Other Visit Diagnoses     Pre-op examination    -  Primary    CSF otorrhea              Pre-Op Evaluation Assessment  73 y.o. male with planned surgery: CRANIOTOMY MIDDLE FOSSA,RETROMASTOID APPROACH FOR ENT (Bilateral: Head)       Bilateral endoscopic middle fossa craniotomies for repair of tegmen defect and CSF leak with temporalis fascia or muscle flap, with neuromonitoring (CN 7/8) and intraoperative lumbar drain placement (Bilateral: Head) .    Known risk factors for perioperative complications: Renal dysfunction.        Current medications which may produce withdrawal symptoms if withheld perioperatively: none.    Pre-Op Evaluation Plan  1. Further preoperative workup as follows:   - None; no further preoperative work-up is required    2. Medication Management/Recommendations:   - Patient has been instructed to avoid herbs or non-directed vitamins the week prior to surgery to ensure no drug interactions with perioperative surgical and anesthetic medications.  - Patient should continue antihypertensive medications up through and including the day of surgery.   - Patient has been instructed to avoid aspirin containing medications or non-steroidal anti-inflammatory drugs for the week preceding surgery.      DAYANNA Risk:  GFR:   eGFR   Date Value Ref Range Status   03/26/2024 56 ml/min/1.73sq m Final         For PCP:  If GFR>60, Hold ACE/ARB/Diuretic on the day of surgery, and NSAIDS 10 days before.    If GFR<45, Consider PRE and POST op Nephrology Consult.    If 46 <GFR> 59 :  If YES: Preop Nephrology consult   If No:  Post Op Nephrology consult.     Clearance  Patient is CLEARED for surgery without any additional cardiac testing. See cardiac and nephrologist clearance notes for instructions please     CHELSIE Nassar  [facility]  [address] [address]  Phone#  [phone]  Fax#   [phone]

## 2024-04-08 NOTE — PROGRESS NOTES
"Daily Note     Today's date: 2024  Patient name: Swapnil Grover  : 1950  MRN: [id number]  Referring provider: Hill Reynoso*  Dx:   Encounter Diagnosis     ICD-10-CM    1. Primary osteoarthritis of both knees  M17.0       2. Chronic pain of both knees  M25.561     M25.562     G89.29                        Subjective: Pt has no new complaints today      Objective: See treatment diary below      Assessment: Patient tolerated treatment well. Walking down hill and stairs remains weak.      Plan: Continue per plan of care.      Eval/ Re-eval Auth #/ Referral # Total visits Start date  Expiration date Total active units  Total manual units  PT only or  PT+OT?   1/15/24                                     1/15 1/26 1/29 1/31 2/2 2/5 2/7 2/9 2/14 2/16 2/20 2/23   Total units authorized                Total units remaining                     2/28 3/1 3/5 3-7 3/12 3/15 3/19 3/21 3/25 3/28 4/1    Total units authorized                Total units remaining                    Precautions: Kidney Disease      Specialty Daily Treatment Diary     Manuals 3/25/24 3/28 4/1 4/24   Visit # 21 22 23 24 25   L PF mobs  Performed HA Performed    Stretch L HS Quad 4 min       L Knee PROM 4 min 3 min HA Performed  5 min           Flex AROM        Ext AROM                Warm-up        NuStep 5 min bike 5 min bike 5 min bike  5 min bike   Neuro Re-Ed        Quad set    40 bolster under feet  30   Stair stretch  3x20 3x20 3x20    SLS 20 5x 10 sec holds  5x10\"  10x  5\"   SLDL                Ther Ex        Mini squat 20 20 20  20   Side step 5 laps at bar       Knee flex  20 4# 20 4# 2x10 4# prone    Knee ext 30  4# 30 4# 30 4# 30 4# 30   SLR 20  4#   10 4#    Heel slides 30    30   LP 10  95#  20  105#  20  115#  10  95#  30  105#   95# 10  105# 10  115# 10 115#  3x10   Step ups 30  8# 20 6\"  20 8\"  20  8\"   Eccentric step down  3x10 6\" step 3x10 6\" step 2x20 anterior 8\" step  2UE support 20  6\"   TRX lunges   "  2x5 B/L 2x5 bilat   Ther Activity        Various height step over 4 x 5 steps               Gait Training        CC resisted walk 7.5#  5x ea fw/bw   12.5#  FWD/BWD 10x 12.5#  FWD/BWD 10x           Modalities        CP

## 2024-04-08 NOTE — PATIENT INSTRUCTIONS
- Patient has been instructed to avoid herbs or non-directed vitamins the week prior to surgery to ensure no drug interactions with perioperative surgical and anesthetic medications.  - Patient should continue antihypertensive medications up through and including the day of surgery.   - Patient has been instructed to avoid aspirin containing medications or non-steroidal anti-inflammatory drugs for the week preceding surgery.

## 2024-04-09 ENCOUNTER — TELEPHONE (OUTPATIENT)
Dept: FAMILY MEDICINE CLINIC | Facility: CLINIC | Age: 74
End: 2024-04-09

## 2024-04-09 DIAGNOSIS — N39.0 ACUTE UTI: Primary | ICD-10-CM

## 2024-04-09 DIAGNOSIS — N17.9 AKI (ACUTE KIDNEY INJURY) (HCC): Primary | ICD-10-CM

## 2024-04-09 RX ORDER — CIPROFLOXACIN 250 MG/1
250 TABLET, FILM COATED ORAL 2 TIMES DAILY
Qty: 10 TABLET | Refills: 0 | Status: SHIPPED | OUTPATIENT
Start: 2024-04-09 | End: 2024-04-14

## 2024-04-09 NOTE — TELEPHONE ENCOUNTER
Please call patient and let him know that his urine culture showed infection and I ordered antibiotic cipro to take one tablet twice a day for 5 days with food.  Take antibiotics until finished with food.   Drink plenty of fluids.    Follow up advised for persistent urinary symptoms or if you develop flank pain, fevers, nausea, or vomiting.    Please call the office if symptoms do not improve after completion of the antibiotics.    CHELSIE Nassar

## 2024-04-09 NOTE — TELEPHONE ENCOUNTER
The patient is scheduled for surgery and had a pre-op yesterday. The surgeons office called asking if Cecile is going to prescribe any antibiotics because the patients urine culture came back positive. She stated that they do not need a call back, we can just call the patient to let him know once antibiotics are prescribed.   Surgeons office: [phone]    Gloria Rocha LPN

## 2024-04-11 ENCOUNTER — OFFICE VISIT (OUTPATIENT)
Dept: PHYSICAL THERAPY | Facility: CLINIC | Age: 74
End: 2024-04-11
Payer: MEDICARE

## 2024-04-11 DIAGNOSIS — M17.0 PRIMARY OSTEOARTHRITIS OF BOTH KNEES: Primary | ICD-10-CM

## 2024-04-11 DIAGNOSIS — M25.562 CHRONIC PAIN OF BOTH KNEES: ICD-10-CM

## 2024-04-11 DIAGNOSIS — M25.561 CHRONIC PAIN OF BOTH KNEES: ICD-10-CM

## 2024-04-11 DIAGNOSIS — G89.29 CHRONIC PAIN OF BOTH KNEES: ICD-10-CM

## 2024-04-11 PROCEDURE — 97110 THERAPEUTIC EXERCISES: CPT | Performed by: PHYSICAL THERAPIST

## 2024-04-11 PROCEDURE — 97112 NEUROMUSCULAR REEDUCATION: CPT | Performed by: PHYSICAL THERAPIST

## 2024-04-11 NOTE — PRE-PROCEDURE INSTRUCTIONS
Pre-Surgery Instructions:   Medication Instructions    acetaminophen (TYLENOL) 325 mg tablet Uses PRN- OK to take day of surgery    amLODIPine (NORVASC) 5 mg tablet Take day of surgery.    b complex vitamins tablet Stop taking 7 days prior to surgery.    BETA CAROTENE PO Stop taking 7 days prior to surgery.    potassium citrate (UROCIT-K) 10 mEq Hold day of surgery.    VITAMIN E PO Stop taking 7 days prior to surgery.      Medication instructions for day surgery reviewed. Please use only a sip of water to take your instructed medications. Avoid all over the counter vitamins, supplements and NSAIDS for one week prior to surgery per anesthesia guidelines. Tylenol is ok to take as needed.     You will receive a call one business day prior to surgery with an arrival time and hospital directions. If your surgery is scheduled on a Monday, the hospital will be calling you on the Friday prior to your surgery. If you have not heard from anyone by 8pm, please call the hospital supervisor through the hospital  at [phone]. ([place] [phone] or [place] [phone]).    Do not eat or drink anything after midnight the night before your surgery, including candy, mints, lifesavers, or chewing gum. Do not drink alcohol 24hrs before your surgery. Try not to smoke at least 24hrs before your surgery.       Follow the pre surgery showering instructions as listed in the “My Surgical Experience Booklet” or otherwise provided by your surgeon's office. Do not use a blade to shave the surgical area 1 week before surgery. It is okay to use a clean electric clippers up to 24 hours before surgery. Do not apply any lotions, creams, including makeup, cologne, deodorant, or perfumes after showering on the day of your surgery. Do not use dry shampoo, hair spray, hair gel, or any type of hair products.     No contact lenses, eye make-up, or artificial eyelashes. Remove nail polish, including gel polish, and any artificial, gel, or  acrylic nails if possible. Remove all jewelry including rings and body piercing jewelry.     Wear causal clothing that is easy to take on and off. Consider your type of surgery.    Keep any valuables, jewelry, piercings at home. Please bring any specially ordered equipment (sling, braces) if indicated.    Arrange for a responsible person to drive you to and from the hospital on the day of your surgery. Please confirm the visitor policy for the day of your procedure when you receive your phone call with an arrival time.     Call the surgeon's office with any new illnesses, exposures, or additional questions prior to surgery.    Please reference your “My Surgical Experience Booklet” for additional information to prepare for your upcoming surgery.     See Geriatric Assessment below...  Cognitive Assessment: no concerns  Falls (last 6 months): 0  Drew Total Score: 22  PHQ- 9 Depression Scale: 0  Nutrition Assessment Score: 11  METS: 9.89    What are your overall health goals? Wants to recover from this procedure     -What brings you strength? Staying healthy and eating    -What activities are important to you? Exercise but my knees need to recover from replacements.

## 2024-04-11 NOTE — PROGRESS NOTES
Daily Note     Today's date: 2024  Patient name: Swapnil Grover  : 1950  MRN: [id number]  Referring provider: Hill Reynoso*  Dx:   Encounter Diagnosis     ICD-10-CM    1. Primary osteoarthritis of both knees  M17.0       2. Chronic pain of both knees  M25.561     M25.562     G89.29             Start Time: 1100  Stop Time: 1150  Total time in clinic (min): 50 minutes    Subjective: Pt has no new complaints today. Pt states he is having an ear surgery next week. Anticipate DC to [facility] next week. Pt also to have R TKR in near future. Spoke to pt about [facility] as an option for continued stretching and strengthening for pre op R TKR as well as continuing post op L TKR strengthening.     Objective: See treatment diary below    Assessment: Patient tolerated treatment well. Pt progressing towards PT goals. Emphasis on neuro re-education of L quads with all therex. AROM L knee improved since last visit and pt pleased with progress. Pt eager to have R TKR performed.       Plan: Continue per plan of care.  Anticipate DC to [facility] after next week. Pt reporting he is planning on having his R TKR in near future.      Eval/ Re-eval Auth #/ Referral # Total visits Start date  Expiration date Total active units  Total manual units  PT only or  PT+OT?   1/15/24                                     1/15 1/26 1/29 1/31 2/ 2 2   Total units authorized                Total units remaining                     2/28 3/1 3 3-7 3/12 3/15 3/19 3/21 3/25 3/28 4/1    Total units authorized                Total units remaining                    Precautions: Kidney Disease      Specialty Daily Treatment Diary     Manuals 3/28 4/1 4/4 4/8/24 4/11/24   Visit # 22 23 24 25 26   L PF mobs Performed HA Performed     Stretch L HS Quad     Manual PROM L knee as tolerated   L Knee PROM 3 min HA Performed  5 min 0-105           Flex AROM        Ext AROM                Warm-up        NuStep 5 min  "bike 5 min bike  5 min bike 5 min Rec bike   Neuro Re-Ed        Quad set   40 bolster under feet  30 x30   Stair stretch 3x20 3x20 3x20  3x20 hold 10 sec   SLS 5x 10 sec holds  5x10\"  10x  5\"    SLDL                Ther Ex        Mini squat 20 20  20 x20   Side step        Knee flex 20 4# 20 4# 2x10 4# prone     Knee ext 30 4# 30 4# 30 4# 30 X30 4#   SLR   10 4#  X20 4#    Heel slides    30    LP  10  95#  30  105#   95# 10  105# 10  115# 10 115#  3x10 X10 95#   105# x20  115# x20   Step ups 20 6\"  20 8\"  20  8\" X20 8\"   Eccentric step down 3x10 6\" step 3x10 6\" step 2x20 anterior 8\" step  2UE support 20  6\" X20 6\" with B UE support on rail   TRX lunges   2x5 B/L 2x5 bilat 2x10 bilateral   Ther Activity        Various height step over                Gait Training        CC resisted walk   12.5#  FWD/BWD 10x 12.5#  FWD/BWD 10x -           Modalities        CP                                      "

## 2024-04-15 ENCOUNTER — OFFICE VISIT (OUTPATIENT)
Dept: PHYSICAL THERAPY | Facility: CLINIC | Age: 74
End: 2024-04-15
Payer: MEDICARE

## 2024-04-15 DIAGNOSIS — M25.562 CHRONIC PAIN OF BOTH KNEES: ICD-10-CM

## 2024-04-15 DIAGNOSIS — M25.561 CHRONIC PAIN OF BOTH KNEES: ICD-10-CM

## 2024-04-15 DIAGNOSIS — M17.0 PRIMARY OSTEOARTHRITIS OF BOTH KNEES: Primary | ICD-10-CM

## 2024-04-15 DIAGNOSIS — G89.29 CHRONIC PAIN OF BOTH KNEES: ICD-10-CM

## 2024-04-15 PROCEDURE — 97110 THERAPEUTIC EXERCISES: CPT | Performed by: PHYSICAL THERAPIST

## 2024-04-15 NOTE — PROGRESS NOTES
"        Daily Note         Today's date: 4/15/2024  Patient name: Swapnil Grover  : 1950  MRN: [id number]  Referring provider: Hill Reynoso*  Dx:   Encounter Diagnosis     ICD-10-CM    1. Primary osteoarthritis of both knees  M17.0       2. Chronic pain of both knees  M25.561     M25.562     G89.29           Subjective: Swapnil Grover reports pain cristiano knees yesterday.  Today is feeling better. States he was standing for long periods time.        Objective: See treatment diary below    Assessment:   PLOC discussed with primary PT . Patient needed cuing for proper form with there ex.  Still needs cuing for proper recruitment of quad without compensation of glutes .       Plan:  progressing towards d/c per primary PT        Eval/ Re-eval Auth #/ Referral # Total visits Start date  Expiration date Total active units  Total manual units  PT only or  PT+OT?   1/15/24                                     1/15 1/26 1/29 1/31 2/2 2/5 2/7 2/9 2/14 2/16 2/20 2/23   Total units authorized                Total units remaining                     2/28 3/1 3/5 3-7 3/12 3/15 3/19 3/21 3/25 3/28 4/1 4/8 4/11 4/15   Total units authorized                  Total units remaining                      Precautions: Kidney Disease      Specialty Daily Treatment Diary     Manuals 3/28 4/1 4/4 4/8/24 4/11/24 4/15/24   Visit # 22 23 24 25 26 27   L PF mobs Performed HA Performed      Stretch L HS Quad     Manual PROM L knee as tolerated performed   L Knee PROM 3 min HA Performed  5 min 0-105 0-105            Flex AROM         Ext AROM                  Warm-up         NuStep 5 min bike 5 min bike  5 min bike 5 min Rec bike 5 min rec bike    Neuro Re-Ed         Quad set   40 bolster under feet  30 x30 30 x    Stair stretch 3x20 3x20 3x20  3x20 hold 10 sec 3 sec x 20    SLS 5x 10 sec holds  5x10\"  10x  5\"  5 sec x 10    SLDL                  Ther Ex         Mini squat 20 20  20 x20 20 x    Side step         Knee flex " "20 4# 20 4# 2x10 4# prone      Knee ext 30 4# 30 4# 30 4# 30 X30 4# 4 lb 30 x    SLR   10 4#  X20 4#  4 lb 20 x    Heel slides    30     LP  10  95#  30  105#   95# 10  105# 10  115# 10 115#  3x10 X10 95#   105# x20  115# x20 95# X10    105# x20  115# x20   Step ups 20 6\"  20 8\"  20  8\" X20 8\" 8 in 20 x    Eccentric step down 3x10 6\" step 3x10 6\" step 2x20 anterior 8\" step  2UE support 20  6\" X20 6\" with B UE support on rail 6 in 20 x cristiano UE support: step downs    TRX lunges   2x5 B/L 2x5 bilat 2x10 bilateral 10 x 2    Ther Activity         Various height step over                  Gait Training         CC resisted walk   12.5#  FWD/BWD 10x 12.5#  FWD/BWD 10x -             Modalities         CP                                           "

## 2024-04-18 ENCOUNTER — OFFICE VISIT (OUTPATIENT)
Dept: OBGYN CLINIC | Facility: CLINIC | Age: 74
End: 2024-04-18

## 2024-04-18 ENCOUNTER — ANESTHESIA EVENT (INPATIENT)
Dept: PERIOP | Facility: HOSPITAL | Age: 74
DRG: 026 | End: 2024-04-18
Payer: MEDICARE

## 2024-04-18 ENCOUNTER — OFFICE VISIT (OUTPATIENT)
Dept: PHYSICAL THERAPY | Facility: CLINIC | Age: 74
End: 2024-04-18
Payer: MEDICARE

## 2024-04-18 ENCOUNTER — APPOINTMENT (OUTPATIENT)
Dept: RADIOLOGY | Facility: CLINIC | Age: 74
End: 2024-04-18
Payer: MEDICARE

## 2024-04-18 VITALS
HEART RATE: 112 BPM | DIASTOLIC BLOOD PRESSURE: 82 MMHG | BODY MASS INDEX: 35.6 KG/M2 | SYSTOLIC BLOOD PRESSURE: 131 MMHG | WEIGHT: 226.8 LBS | HEIGHT: 67 IN

## 2024-04-18 DIAGNOSIS — Z96.652 STATUS POST LEFT KNEE REPLACEMENT: ICD-10-CM

## 2024-04-18 DIAGNOSIS — M17.0 PRIMARY OSTEOARTHRITIS OF BOTH KNEES: Primary | ICD-10-CM

## 2024-04-18 DIAGNOSIS — Z47.1 AFTERCARE FOLLOWING LEFT KNEE JOINT REPLACEMENT SURGERY: ICD-10-CM

## 2024-04-18 DIAGNOSIS — Z96.652 AFTERCARE FOLLOWING LEFT KNEE JOINT REPLACEMENT SURGERY: ICD-10-CM

## 2024-04-18 DIAGNOSIS — Z96.652 STATUS POST LEFT KNEE REPLACEMENT: Primary | ICD-10-CM

## 2024-04-18 PROCEDURE — 99024 POSTOP FOLLOW-UP VISIT: CPT | Performed by: ORTHOPAEDIC SURGERY

## 2024-04-18 PROCEDURE — 97110 THERAPEUTIC EXERCISES: CPT | Performed by: PHYSICAL THERAPIST

## 2024-04-18 PROCEDURE — 73562 X-RAY EXAM OF KNEE 3: CPT

## 2024-04-18 PROCEDURE — 97112 NEUROMUSCULAR REEDUCATION: CPT | Performed by: PHYSICAL THERAPIST

## 2024-04-18 NOTE — PROGRESS NOTES
"        Daily Note         Today's date: 2024  Patient name: Swapnil Grover  : 1950  MRN: [id number]  Referring provider: Hill Reynoso*  Dx:   Encounter Diagnosis     ICD-10-CM    1. Primary osteoarthritis of both knees  M17.0             Subjective: Swapnil Grover is aware that today is his last session and he notices that his knee has good days and bad days, but overall has noticed an improvement with his functional. Especially when he is going up and down the stairs at work.       Objective: See treatment diary below  MMT:  Hip flex: B/L 4-  Knee ext B/L: 4  Dorsiflexion/plantarflexion:    Abduction/ adduction:      Assessment:  Patient has met all goals therefore will be discharged to independent [facility].     Plan: D/c to independent [facility].        Eval/ Re-eval Auth #/ Referral # Total visits Start date  Expiration date Total active units  Total manual units  PT only or  PT+OT?   1/15/24                                     1/15 1/26 1/29 1/31 2 2   Total units authorized                Total units remaining                     2/28 3/1 3/5 3-7 3/12 3/15 3/19 3/21 3/25 3/28 4/1 4/8 4/11 4/15   Total units authorized                  Total units remaining                      Precautions: Kidney Disease      Specialty Daily Treatment Diary     Manuals 3/28 4/1 4/4 4/8/24 4/11/24 4/15/24 4/18   Visit # 22 23 24 25 26 27 28   L PF mobs Performed HA Performed       Stretch L HS Quad     Manual PROM L knee as tolerated performed    L Knee PROM 3 min HA Performed  5 min 0-105 0-105 5-110             Flex AROM          Ext AROM                    Warm-up          NuStep 5 min bike 5 min bike  5 min bike 5 min Rec bike 5 min rec bike  5 min rec bike    Neuro Re-Ed          Quad set   40 bolster under feet  30 x30 30 x     Stair stretch 3x20 3x20 3x20  3x20 hold 10 sec 3 sec x 20  3 sec x20    SLS 5x 10 sec holds  5x10\"  10x  5\"  5 sec x 10     SLDL        " "            Ther Ex          Mini squat 20 20  20 x20 20 x  30x   Side step          Knee flex 20 4# 20 4# 2x10 4# prone    4lb 30x   Knee ext 30 4# 30 4# 30 4# 30 X30 4# 4 lb 30 x  4lb 30x   SLR   10 4#  X20 4#  4 lb 20 x  4lb 20x    Heel slides    30      LP  10  95#  30  105#   95# 10  105# 10  115# 10 115#  3x10 X10 95#   105# x20  115# x20 95# X10    105# x20  115# x20    Step ups 20 6\"  20 8\"  20  8\" X20 8\" 8 in 20 x     Eccentric step down 3x10 6\" step 3x10 6\" step 2x20 anterior 8\" step  2UE support 20  6\" X20 6\" with B UE support on rail 6 in 20 x cristiano UE support: step downs  6 in 20x anterior and lateral, 1UE support    TRX lunges   2x5 B/L 2x5 bilat 2x10 bilateral 10 x 2     Ther Activity          Various height step over                    Gait Training          CC resisted walk   12.5#  FWD/BWD 10x 12.5#  FWD/BWD 10x -               Modalities          CP       5 mins                                       "

## 2024-04-18 NOTE — PROGRESS NOTES
Assessment/Plan:  1. Status post left knee replacement  XR knee 3 vw left non injury      2. Aftercare following left knee joint replacement surgery  XR knee 3 vw left non injury        Scribe Attestation      I,:  Woody Harp am acting as a scribe while in the presence of the attending physician.:       I,:  Bassem Moscoso, DO personally performed the services described in this documentation    as scribed in my presence.:           Swapnil is a pleasant 73-year-old gentleman who returns today for follow-up evaluation 3 months status post left total knee arthroplasty.  I am pleased with his imaging and his clinical presentation today in the office.  He understands he requires prophylactic antibiotics prior to any dental procedure moving forward.  We did spend time today discussing surgery for his right knee, which does have end-stage underlying osteoarthritis.  He does have an upcoming procedure with neurosurgery and ENT at the end of May.  I encouraged him to return to the office after this is completed with a clearance note from his surgeons, at which time we can discuss right total knee arthroplasty.  All of his questions and concerns were addressed today.  We will see him back as needed for the right knee and in 9 months for his anniversary visit for his left knee with new x-ray on arrival.    Subjective: Follow-up evaluation 3 months status post left total knee arthroplasty    Patient ID: Swapnil Grover is a 73 y.o. male who returns today for follow-up evaluation 3-month status post left total knee arthroplasty.  He reports he has been doing well.  He is experiencing minimal pain in his left knee.  He has been returning to activity.  He does note some stiffness in the knee.  He finds that he is more limited recently by his right knee pain.  This mostly bothers him when navigating stairs and hills.  He is interested in discussing surgery for the right knee.  He denies any recent injury or  trauma.    Review of Systems   Constitutional:  Positive for activity change. Negative for chills, fever and unexpected weight change.   HENT:  Negative for hearing loss, nosebleeds and sore throat.    Eyes:  Negative for pain, redness and visual disturbance.   Respiratory:  Negative for cough, shortness of breath and wheezing.    Cardiovascular:  Negative for chest pain, palpitations and leg swelling.   Gastrointestinal:  Negative for abdominal pain, nausea and vomiting.   Endocrine: Negative for polyphagia and polyuria.   Genitourinary:  Negative for dysuria and hematuria.   Musculoskeletal:  Negative for arthralgias, joint swelling and myalgias.        See HPI   Skin:  Negative for rash and wound.   Neurological:  Negative for dizziness, numbness and headaches.   Psychiatric/Behavioral:  Negative for decreased concentration and suicidal ideas. The patient is not nervous/anxious.          Past Medical History:   Diagnosis Date    Arthritis     BPH (benign prostatic hyperplasia)     Breathing difficulty 12/01/2022    CPAP given per pt-s/p surgery    Cancer (HCC) 2015    basal cell of the skull     Chronic kidney disease     Colon polyp     Heart failure (HCC)     12/2/22-pt had ECHO-per pt R. sided block    History of subdural hematoma     Hypertension     Kidney stone     right stone    Renal disorder     Sleep apnea     mild- no CPAP-had nasal septoplasty       Past Surgical History:   Procedure Laterality Date    BASAL CELL CARCINOMA EXCISION  2015    skull    BRAIN SURGERY      in the 1990's-subdural hematoma    CATARACT EXTRACTION Left     COLONOSCOPY      x2    CYSTOSCOPY W/ LASER LITHOTRIPSY Right 11/27/2020    Procedure: CYSTOSCOPY, FLEXIBLE URETEROSCOPY, LASER, AND STENT EXCHANGE,STONE BASKET, RIGHT RETROGRADE;  Surgeon: Sabino Garcia MD;  Location: [facility];  Service: Urology    FL RETROGRADE PYELOGRAM  10/18/2020    FL RETROGRADE PYELOGRAM  11/27/2020    FL RETROGRADE PYELOGRAM  12/01/2022    FL  RETROGRADE PYELOGRAM  12/22/2022    JOINT REPLACEMENT  1/23/2024    LITHOTRIPSY      KS ARTHRP KNE CONDYLE&PLATU MEDIAL&LAT COMPARTMENTS Left 01/23/2024    Procedure: ARTHROPLASTY KNEE TOTAL W ROBOT - overnight, cemented;  Surgeon: Bassem Moscoso DO;  Location: [facility];  Service: Orthopedics    KS CYSTO BLADDER W/URETERAL CATHETERIZATION Right 11/11/2020    Procedure: ESWL RIGHT;  Surgeon: Sabino Garcia MD;  Location: [facility];  Service: Urology    KS CYSTO BLADDER W/URETERAL CATHETERIZATION Right 12/01/2022    Procedure: CYSTOSCOPY RETROGRADE PYELOGRAM WITH INSERTION STENT URETERAL;  Surgeon: Sabino Garcia MD;  Location: [facility];  Service: Urology    KS CYSTO/URETERO W/LITHOTRIPSY &INDWELL STENT INSRT Right 10/18/2020    Procedure: CYSTOSCOPY URETEROSCOPY WITH BASKET EXTRACTION, RETROGRADE PYELOGRAM AND INSERTION STENT URETERAL;  Surgeon: Kris Ac MD;  Location: [facility];  Service: Urology    KS CYSTO/URETERO W/LITHOTRIPSY &INDWELL STENT INSRT Right 12/22/2022    Procedure: CYSTOSCOPY URETEROSCOPY WITH LITHOTRIPSY HOLMIUM LASER, RETROGRADE PYELOGRAM AND INSERTION STENT URETERAL;  Surgeon: Sabino Garcia MD;  Location: [facility];  Service: Urology    SEPTOPLASTY      in the 1990's-trimmed uvula    TONSILLECTOMY  1956    age 6       Family History   Problem Relation Age of Onset    Hypertension Mother     Kidney disease Mother         renal failure-dialysis    Hypertension Father     Stroke Father     Diabetes Sister     Kidney disease Sister         self dialysis at home       Social History     Occupational History    Not on file   Tobacco Use    Smoking status: Never     Passive exposure: Never    Smokeless tobacco: Never   Vaping Use    Vaping status: Never Used   Substance and Sexual Activity    Alcohol use: Yes     Comment: 1 drink a week    Drug use: Never    Sexual activity: Not Currently         Current Outpatient Medications:     acetaminophen (TYLENOL) 325 mg tablet, Take 3  tablets (975 mg total) by mouth every 8 (eight) hours, Disp: , Rfl:     amLODIPine (NORVASC) 5 mg tablet, Take 1 tablet (5 mg total) by mouth daily, Disp: 90 tablet, Rfl: 1    b complex vitamins tablet, Take 1 tablet by mouth 2 (two) times a day, Disp: , Rfl:     BETA CAROTENE PO, Take by mouth in the morning, Disp: , Rfl:     potassium citrate (UROCIT-K) 10 mEq, Take 2 tablets (20 mEq total) by mouth 3 (three) times a day with meals (Patient taking differently: Take 20 mEq by mouth 3 (three) times a day with meals Takes for kidney stones), Disp: 180 tablet, Rfl: 0    VITAMIN D PO, Take by mouth every morning With calcium, Disp: , Rfl:     VITAMIN E PO, Take 268 mg by mouth daily, Disp: , Rfl:     aspirin 325 mg tablet, Take 1 tablet (325 mg total) by mouth 2 (two) times a day (Patient not taking: Reported on 3/13/2024), Disp: 82 tablet, Rfl: 0    docusate sodium (COLACE) 100 mg capsule, Take 1 capsule (100 mg total) by mouth 2 (two) times a day (Patient not taking: Reported on 3/26/2024), Disp: 60 capsule, Rfl: 0    oxyCODONE (Roxicodone) 5 immediate release tablet, Take 1-2 tablets by mouth every 6 hours as needed for pain (Patient not taking: Reported on 4/2/2024), Disp: 40 tablet, Rfl: 0    Allergies   Allergen Reactions    Other Nasal Congestion     Seasonal allergies    Bean Pod Extract - Food Allergy Nausea Only     Soft beans- eg chick peas, lima's, sweet peas       Objective:  Vitals:    04/18/24 0958   BP: 131/82   Pulse: (!) 112       Body mass index is 35.52 kg/m².    Left Knee Exam     Tenderness   The patient is experiencing no tenderness.     Range of Motion   Extension:  0   Flexion:  120     Tests   Varus: negative Valgus: negative  Drawer:  Anterior - negative     Posterior - negative    Other   Erythema: absent  Scars: present  Sensation: normal  Pulse: present  Swelling: none  Effusion: no effusion present    Comments:  Swelling appropriate for stage in postoperative course  Well healed anterior  scar  Stable at 0, 30 and 90 degrees  Neurovascular intact distal  No warmth or erythema            Observations   Left Knee   Negative for effusion.       Physical Exam  Vitals and nursing note reviewed.   Constitutional:       Appearance: Normal appearance. He is well-developed.   HENT:      Head: Normocephalic and atraumatic.      Right Ear: External ear normal.      Left Ear: External ear normal.      Nose: Nose normal.   Eyes:      General: No scleral icterus.     Extraocular Movements: Extraocular movements intact.      Conjunctiva/sclera: Conjunctivae normal.   Cardiovascular:      Rate and Rhythm: Normal rate.   Pulmonary:      Effort: Pulmonary effort is normal. No respiratory distress.   Musculoskeletal:      Cervical back: Normal range of motion and neck supple.      Left knee: No effusion.      Comments: See Ortho exam   Skin:     General: Skin is warm and dry.   Neurological:      General: No focal deficit present.      Mental Status: He is alert and oriented to person, place, and time.   Psychiatric:         Behavior: Behavior normal.         I have personally reviewed pertinent films in PACS.    X-ray of the left knee obtained on 4/18/2024 reviewed demonstrating a well-positioned and aligned cemented total knee arthroplasty without evidence of failure.  There is no new fracture, dislocation, lytic or blastic lesion.    This document was created using speech voice recognition software.   Grammatical errors, random word insertions, pronoun errors, and incomplete sentences are an occasional consequence of this system due to software limitations, ambient noise, and hardware issues.   Any formal questions or concerns about content, text, or information contained within the body of this dictation should be directly addressed to the provider for clarification.

## 2024-04-18 NOTE — PROGRESS NOTES
Goals  Short term goals  (6 weeks)  1.  Patient will have no pain left knee- Partially met   2 . Patient will have full range of motion left knee- Partially met   3.  Patient will report a 50% improvement with activities of daily living- MET  4.  Patient will decrease girth of left knee by 1 to 2 cm.- MET    Long term goals - (12 weeks)  1.  Patient will be independent with home exercise program- MET  2.  Patient will have no gait deviations ambulating on level surfaces.- MET  3.  Patient will report 80 % improvement with activity of daily living function.- MET  4.  Patient will negotiate stairs up and down pain free with use of one railing.- Mostly met     Patient reports 90% improvement from IE to todays session and good verbal understanding of HEP and importance of continuing with functional movements. He is independent with HEP and patient feels that his R>L knee is more inhibiting to his ADL's.

## 2024-04-26 NOTE — H&P
"History & Physical Exam  Swapnil Grover 73 y.o. male MRN: [id number]    Assessment & Plan:   Patient is a 73-year-old male with h/o left craniotomy for SDH evacuation in 1992 in [place] (no reported complications) who p/w several months of progressive worsening bilateral hearing loss, fullness, and CSF leak (\"postnasal drip\") now undergoing bilateral endoscopic middle fossa craniotomies for repair of tegmen defect and CSF leak with temporalis fascia or muscle flap, with neuromonitoring (CN 7/8) and intraoperative lumbar drain placement, after my evaluation and medical clearance. No changes per patient since my last evaluation in clinic.    HPI  Please refer to my office note for full HPI leading up to this surgery.    History:  Past Medical History:   Diagnosis Date    Arthritis     BPH (benign prostatic hyperplasia)     Breathing difficulty 12/01/2022    CPAP given per pt-s/p surgery    Cancer (HCC) 2015    basal cell of the skull     Chronic kidney disease     Colon polyp     Heart failure (HCC)     12/2/22-pt had ECHO-per pt R. sided block    History of subdural hematoma     Hypertension     Kidney stone     right stone    Renal disorder     Sleep apnea     mild- no CPAP-had nasal septoplasty     Past Surgical History:   Procedure Laterality Date    BASAL CELL CARCINOMA EXCISION  2015    skull    BRAIN SURGERY      in the 1990's-subdural hematoma    CATARACT EXTRACTION Left     COLONOSCOPY      x2    CYSTOSCOPY W/ LASER LITHOTRIPSY Right 11/27/2020    Procedure: CYSTOSCOPY, FLEXIBLE URETEROSCOPY, LASER, AND STENT EXCHANGE,STONE BASKET, RIGHT RETROGRADE;  Surgeon: Sabino Garcia MD;  Location: [facility];  Service: Urology    FL RETROGRADE PYELOGRAM  10/18/2020    FL RETROGRADE PYELOGRAM  11/27/2020    FL RETROGRADE PYELOGRAM  12/01/2022    FL RETROGRADE PYELOGRAM  12/22/2022    JOINT REPLACEMENT  1/23/2024    LITHOTRIPSY      MN ARTHRP KNE CONDYLE&PLATU MEDIAL&LAT COMPARTMENTS Left 01/23/2024    Procedure: " ARTHROPLASTY KNEE TOTAL W ROBOT - overnight, cemented;  Surgeon: Bassem Moscoso DO;  Location: [facility];  Service: Orthopedics    MA CYSTO BLADDER W/URETERAL CATHETERIZATION Right 11/11/2020    Procedure: ESWL RIGHT;  Surgeon: Sabino Garcia MD;  Location: [facility];  Service: Urology    MA CYSTO BLADDER W/URETERAL CATHETERIZATION Right 12/01/2022    Procedure: CYSTOSCOPY RETROGRADE PYELOGRAM WITH INSERTION STENT URETERAL;  Surgeon: Sabino Garcia MD;  Location: [facility];  Service: Urology    MA CYSTO/URETERO W/LITHOTRIPSY &INDWELL STENT INSRT Right 10/18/2020    Procedure: CYSTOSCOPY URETEROSCOPY WITH BASKET EXTRACTION, RETROGRADE PYELOGRAM AND INSERTION STENT URETERAL;  Surgeon: Kris Ac MD;  Location: [facility];  Service: Urology    MA CYSTO/URETERO W/LITHOTRIPSY &INDWELL STENT INSRT Right 12/22/2022    Procedure: CYSTOSCOPY URETEROSCOPY WITH LITHOTRIPSY HOLMIUM LASER, RETROGRADE PYELOGRAM AND INSERTION STENT URETERAL;  Surgeon: Sabino Garcia MD;  Location: [facility];  Service: Urology    SEPTOPLASTY      in the 1990's-trimmed uvula    TONSILLECTOMY  1956    age 6       Current medications:  No current facility-administered medications for this encounter.    Current Outpatient Medications:     acetaminophen (TYLENOL) 325 mg tablet, Take 3 tablets (975 mg total) by mouth every 8 (eight) hours, Disp: , Rfl:     amLODIPine (NORVASC) 5 mg tablet, Take 1 tablet (5 mg total) by mouth daily, Disp: 90 tablet, Rfl: 1    b complex vitamins tablet, Take 1 tablet by mouth 2 (two) times a day, Disp: , Rfl:     BETA CAROTENE PO, Take by mouth in the morning, Disp: , Rfl:     potassium citrate (UROCIT-K) 10 mEq, Take 2 tablets (20 mEq total) by mouth 3 (three) times a day with meals (Patient taking differently: Take 20 mEq by mouth 3 (three) times a day with meals Takes for kidney stones), Disp: 180 tablet, Rfl: 0    VITAMIN E PO, Take 268 mg by mouth daily, Disp: , Rfl:     aspirin 325 mg tablet, Take 1  tablet (325 mg total) by mouth 2 (two) times a day (Patient not taking: Reported on 3/13/2024), Disp: 82 tablet, Rfl: 0    docusate sodium (COLACE) 100 mg capsule, Take 1 capsule (100 mg total) by mouth 2 (two) times a day (Patient not taking: Reported on 3/26/2024), Disp: 60 capsule, Rfl: 0    oxyCODONE (Roxicodone) 5 immediate release tablet, Take 1-2 tablets by mouth every 6 hours as needed for pain (Patient not taking: Reported on 4/2/2024), Disp: 40 tablet, Rfl: 0    VITAMIN D PO, Take by mouth every morning With calcium, Disp: , Rfl:     Allergies:  Allergies   Allergen Reactions    Other Nasal Congestion     Seasonal allergies    Bean Pod Extract - Food Allergy Nausea Only     Soft beans- eg chick peas, lima's, sweet peas       Review of systems:  General ROS: negative for chills, fatigue, fever, night sweats, or unintentional weight changes  Respiratory ROS: no cough, shortness of breath, or wheezing  Cardiovascular ROS: no chest pain or dyspnea on exertion  Gastrointestinal ROS: no abdominal pain, change in bowel habits, or black or bloody stools  Genito-Urinary ROS: no dysuria, trouble voiding, or hematuria  Musculoskeletal ROS: negative  Neurological ROS: negative except for symptoms outlined in HPI and Discussion     Physical exam:  Neurologic:  AOX3  PERRL, EOMI  FS  Follows commands briskly throughout  5/5 in all extremities, no drift  Sensation intact throughout  Baseline neurologic deficits as per recent clinic note    CV: regular sinus rhythm without murmur or abnormalities  Respiratory: normal breath sounds  Abdominal: soft, non-tender, normal bowel sounds  Extremities: normal coloration, no edema or visible/gross abnormalities     Lab Results: All relevant preoperative labs reviewed  Imaging: All relevant preoperative imaging reviewed  EKG, Pathology, and Other Studies: All relevant preoperative studies reviewed    We will proceed with surgery as planned.    Billy August MD

## 2024-04-28 RX ORDER — GABAPENTIN 100 MG/1
100 CAPSULE ORAL ONCE
Status: CANCELLED | OUTPATIENT
Start: 2024-04-28 | End: 2024-04-28

## 2024-04-28 NOTE — ANESTHESIA PREPROCEDURE EVALUATION
Procedure:  CRANIOTOMY MIDDLE FOSSA,RETROMASTOID APPROACH FOR ENT (Bilateral: Head)  Bilateral endoscopic middle fossa craniotomies for repair of tegmen defect and CSF leak with temporalis fascia or muscle flap, with neuromonitoring (CN 7/8) and intraoperative lumbar drain placement (Bilateral: Head)    Relevant Problems   ANESTHESIA (within normal limits)  Prior issue with obstruction requiring CPAP in PACU      CARDIO   (+) Hypertension   (+) Mixed hyperlipidemia   (-) Chest pain      ENDO (within normal limits)      GI/HEPATIC (within normal limits)  NPO confirmed  BMI 34.9   (-) Gastroesophageal reflux disease      /RENAL   (+) BPH (benign prostatic hyperplasia)   (+) Benign hypertension with CKD (chronic kidney disease) stage III (HCC)   (+) Hydronephrosis with ureteropelvic junction (UPJ) obstruction   (+) Nephrolithiasis   (+) Stage 3a chronic kidney disease (HCC)      MUSCULOSKELETAL   (+) Arthritis   (+) Primary osteoarthritis of both knees      NEURO/PSYCH (within normal limits)      PULMONARY   (+) Sleep apnea (Does not use CPAP, reports improvement after septoplasty but reports noticeable obstruction when laying flat)   (-) URI (upper respiratory infection)      Allergies   Allergen Reactions    Other Nasal Congestion     Seasonal allergies    Bean Pod Extract - Food Allergy Nausea Only     Soft beans- eg chick peas, lima's, sweet peas     Social History     Tobacco Use    Smoking status: Never     Passive exposure: Never    Smokeless tobacco: Never   Vaping Use    Vaping status: Never Used   Substance Use Topics    Alcohol use: Yes     Comment: 1 drink a week    Drug use: Never     Current Outpatient Medications   Medication Instructions    acetaminophen (TYLENOL) 975 mg, Oral, Every 8 hours    amLODIPine (NORVASC) 5 mg, Oral, Daily    aspirin 325 mg, Oral, 2 times daily    b complex vitamins tablet 1 tablet, Oral, 2 times daily    BETA CAROTENE PO Oral, Daily    docusate sodium (COLACE) 100 mg, Oral,  2 times daily    potassium citrate (UROCIT-K) 10 mEq 20 mEq, Oral, 3 times daily with meals    VITAMIN D PO Oral, Every morning, With calcium    VITAMIN E  mg, Oral, Daily     Lab Results   Component Value Date    WBC 6.04 03/26/2024    HGB 15.1 03/26/2024    HCT 46.9 03/26/2024     03/26/2024    SODIUM 141 03/26/2024    K 4.2 03/26/2024     03/26/2024    CO2 27 03/26/2024    BUN 23 03/26/2024    CREATININE 1.26 03/26/2024    GLUC 107 03/26/2024    HGBA1C 5.7 (H) 03/26/2024    AST 22 03/26/2024    ALT 29 03/26/2024    ALKPHOS 46 03/26/2024    TBILI 0.39 03/26/2024    ALB 4.3 03/26/2024    PROTIME 13.1 03/26/2024    PTT 28 03/26/2024    INR 0.93 03/26/2024     Vitals:    04/29/24 0604   BP: 119/82   Pulse: 104   Resp: 16   Temp: 98.1 °F (36.7 °C)   SpO2: 97%       NM Stress test 1/11/24    Perfusion: There is a left ventricular perfusion defect that is small in size with mild reduction in uptake present in the mid to basal septal location that is reversible.  Summed differential score is 1    Stress ECG: The ECG was not diagnostic due to resting ST-T abnormalities.  Exercise capacity is decreased.  Hypertensive response to exercise.    Stress Function: Left ventricular function post-stress is normal.  Calculated ejection fraction is 76%    Perfusion Defect Conclusion: There is no evidence of transient ischemic dilation (TID).     No major reversible ischemia on perfusion imaging.  There is a very small, mild intensity, reversible defect noted in the IV septum with a summed differential score of 1  Nondiagnostic EKG due to baseline abnormalities  Decreased exercise capacity  Hypertensive response to exercise  Clinical correlation recommended    Echo 12/2/22    Left Ventricle: Left ventricular cavity size is normal. Wall thickness is mildly increased. There is mild concentric hypertrophy. Systolic function is normal.  Definity was used to outline the ventricles.  Estimated ejection fraction is  60-65%. Although no diagnostic regional wall motion abnormality was identified, this possibility cannot be completely excluded on the basis of this study. Diastolic function is normal for age.    Right Ventricle: Systolic function is normal.    Mitral Valve: There is mild annular calcification. There is trace regurgitation.    Tricuspid Valve: There is trace regurgitation    EKG 3/27/24- NSR, rate 97, RBBB    Physical Exam    Airway    Mallampati score: II  TM Distance: >3 FB  Neck ROM: full     Dental       Cardiovascular  Rhythm: regular, Rate: normal, Murmur    Pulmonary   Breath sounds clear to auscultation    Other Findings        Anesthesia Plan  ASA Score- 3     Anesthesia Type- general with ASA Monitors.         Additional Monitors: arterial line.    Airway Plan: ETT.           Plan Factors-Exercise tolerance (METS): >4 METS.    Chart reviewed. EKG reviewed.  Existing labs reviewed. Patient summary reviewed.    Patient is not a current smoker.      Obstructive sleep apnea risk education given perioperatively.        Induction- intravenous.    Postoperative Plan- Plan for postoperative opioid use.     Informed Consent- Anesthetic plan and risks discussed with patient.  I personally reviewed this patient with the CRNA. Discussed and agreed on the Anesthesia Plan with the CRNA..

## 2024-04-29 ENCOUNTER — ANESTHESIA (INPATIENT)
Dept: PERIOP | Facility: HOSPITAL | Age: 74
DRG: 026 | End: 2024-04-29
Payer: MEDICARE

## 2024-04-29 ENCOUNTER — HOSPITAL ENCOUNTER (INPATIENT)
Facility: HOSPITAL | Age: 74
LOS: 7 days | Discharge: HOME | DRG: 026 | End: 2024-05-06
Attending: OTOLARYNGOLOGY | Admitting: NEUROLOGICAL SURGERY
Payer: MEDICARE

## 2024-04-29 DIAGNOSIS — Q16.5 TEGMEN DEFECT OF BASE OF SKULL: Primary | ICD-10-CM

## 2024-04-29 DIAGNOSIS — G96.01 CSF OTORRHEA: ICD-10-CM

## 2024-04-29 DIAGNOSIS — N18.31 STAGE 3A CHRONIC KIDNEY DISEASE (HCC): ICD-10-CM

## 2024-04-29 LAB
ABO GROUP BLD: NORMAL
ANION GAP SERPL CALCULATED.3IONS-SCNC: 10 MMOL/L (ref 4–13)
APTT PPP: 28 SECONDS (ref 23–37)
BASOPHILS # BLD AUTO: 0.02 THOUSANDS/ÂΜL (ref 0–0.1)
BASOPHILS NFR BLD AUTO: 0 % (ref 0–1)
BLD GP AB SCN SERPL QL: NEGATIVE
BUN SERPL-MCNC: 20 MG/DL (ref 5–25)
CALCIUM SERPL-MCNC: 8.7 MG/DL (ref 8.4–10.2)
CHLORIDE SERPL-SCNC: 106 MMOL/L (ref 96–108)
CO2 SERPL-SCNC: 22 MMOL/L (ref 21–32)
CREAT SERPL-MCNC: 1.32 MG/DL (ref 0.6–1.3)
EOSINOPHIL # BLD AUTO: 0.02 THOUSAND/ÂΜL (ref 0–0.61)
EOSINOPHIL NFR BLD AUTO: 0 % (ref 0–6)
ERYTHROCYTE [DISTWIDTH] IN BLOOD BY AUTOMATED COUNT: 13 % (ref 11.6–15.1)
GFR SERPL CREATININE-BSD FRML MDRD: 53 ML/MIN/1.73SQ M
GLUCOSE SERPL-MCNC: 136 MG/DL (ref 65–140)
GLUCOSE SERPL-MCNC: 147 MG/DL (ref 65–140)
HCT VFR BLD AUTO: 45.7 % (ref 36.5–49.3)
HGB BLD-MCNC: 14.7 G/DL (ref 12–17)
IMM GRANULOCYTES # BLD AUTO: 0.03 THOUSAND/UL (ref 0–0.2)
IMM GRANULOCYTES NFR BLD AUTO: 0 % (ref 0–2)
INR PPP: 1.01 (ref 0.84–1.19)
LYMPHOCYTES # BLD AUTO: 0.79 THOUSANDS/ÂΜL (ref 0.6–4.47)
LYMPHOCYTES NFR BLD AUTO: 9 % (ref 14–44)
MCH RBC QN AUTO: 31.1 PG (ref 26.8–34.3)
MCHC RBC AUTO-ENTMCNC: 32.2 G/DL (ref 31.4–37.4)
MCV RBC AUTO: 97 FL (ref 82–98)
MONOCYTES # BLD AUTO: 0.15 THOUSAND/ÂΜL (ref 0.17–1.22)
MONOCYTES NFR BLD AUTO: 2 % (ref 4–12)
NEUTROPHILS # BLD AUTO: 7.88 THOUSANDS/ÂΜL (ref 1.85–7.62)
NEUTS SEG NFR BLD AUTO: 89 % (ref 43–75)
NRBC BLD AUTO-RTO: 0 /100 WBCS
PLATELET # BLD AUTO: 247 THOUSANDS/UL (ref 149–390)
PMV BLD AUTO: 9 FL (ref 8.9–12.7)
POTASSIUM SERPL-SCNC: 3.9 MMOL/L (ref 3.5–5.3)
PROTHROMBIN TIME: 13.2 SECONDS (ref 11.6–14.5)
RBC # BLD AUTO: 4.72 MILLION/UL (ref 3.88–5.62)
RH BLD: POSITIVE
SODIUM SERPL-SCNC: 138 MMOL/L (ref 135–147)
SPECIMEN EXPIRATION DATE: NORMAL
WBC # BLD AUTO: 8.89 THOUSAND/UL (ref 4.31–10.16)

## 2024-04-29 PROCEDURE — 99024 POSTOP FOLLOW-UP VISIT: CPT | Performed by: NEUROLOGICAL SURGERY

## 2024-04-29 PROCEDURE — 00U10KZ SUPPLEMENT CEREBRAL MENINGES WITH NONAUTOLOGOUS TISSUE SUBSTITUTE, OPEN APPROACH: ICD-10-PCS | Performed by: NEUROLOGICAL SURGERY

## 2024-04-29 PROCEDURE — 99223 1ST HOSP IP/OBS HIGH 75: CPT | Performed by: INTERNAL MEDICINE

## 2024-04-29 PROCEDURE — 80048 BASIC METABOLIC PNL TOTAL CA: CPT | Performed by: PHYSICIAN ASSISTANT

## 2024-04-29 PROCEDURE — C1729 CATH, DRAINAGE: HCPCS | Performed by: OTOLARYNGOLOGY

## 2024-04-29 PROCEDURE — 85730 THROMBOPLASTIN TIME PARTIAL: CPT | Performed by: PHYSICIAN ASSISTANT

## 2024-04-29 PROCEDURE — 0KX00ZZ TRANSFER HEAD MUSCLE, OPEN APPROACH: ICD-10-PCS | Performed by: OTOLARYNGOLOGY

## 2024-04-29 PROCEDURE — C1781 MESH (IMPLANTABLE): HCPCS | Performed by: OTOLARYNGOLOGY

## 2024-04-29 PROCEDURE — 99222 1ST HOSP IP/OBS MODERATE 55: CPT | Performed by: INTERNAL MEDICINE

## 2024-04-29 PROCEDURE — 0NU507Z SUPPLEMENT RIGHT TEMPORAL BONE WITH AUTOLOGOUS TISSUE SUBSTITUTE, OPEN APPROACH: ICD-10-PCS | Performed by: OTOLARYNGOLOGY

## 2024-04-29 PROCEDURE — 85610 PROTHROMBIN TIME: CPT | Performed by: PHYSICIAN ASSISTANT

## 2024-04-29 PROCEDURE — 009U30Z DRAINAGE OF SPINAL CANAL WITH DRAINAGE DEVICE, PERCUTANEOUS APPROACH: ICD-10-PCS | Performed by: NEUROLOGICAL SURGERY

## 2024-04-29 PROCEDURE — 86900 BLOOD TYPING SEROLOGIC ABO: CPT | Performed by: OTOLARYNGOLOGY

## 2024-04-29 PROCEDURE — C1713 ANCHOR/SCREW BN/BN,TIS/BN: HCPCS | Performed by: OTOLARYNGOLOGY

## 2024-04-29 PROCEDURE — 15733 MUSC MYOQ/FSCQ FLP H&N PEDCL: CPT | Performed by: NEUROLOGICAL SURGERY

## 2024-04-29 PROCEDURE — 86901 BLOOD TYPING SEROLOGIC RH(D): CPT | Performed by: OTOLARYNGOLOGY

## 2024-04-29 PROCEDURE — 62272 THER SPI PNXR DRG CSF: CPT | Performed by: NEUROLOGICAL SURGERY

## 2024-04-29 PROCEDURE — 62120 REPAIR SKULL CAVITY LESION: CPT | Performed by: OTOLARYNGOLOGY

## 2024-04-29 PROCEDURE — 09UD07Z SUPPLEMENT RIGHT INNER EAR WITH AUTOLOGOUS TISSUE SUBSTITUTE, OPEN APPROACH: ICD-10-PCS | Performed by: NEUROLOGICAL SURGERY

## 2024-04-29 PROCEDURE — 82948 REAGENT STRIP/BLOOD GLUCOSE: CPT

## 2024-04-29 PROCEDURE — 62120 REPAIR SKULL CAVITY LESION: CPT | Performed by: NEUROLOGICAL SURGERY

## 2024-04-29 PROCEDURE — 15733 MUSC MYOQ/FSCQ FLP H&N PEDCL: CPT | Performed by: OTOLARYNGOLOGY

## 2024-04-29 PROCEDURE — 85025 COMPLETE CBC W/AUTO DIFF WBC: CPT | Performed by: PHYSICIAN ASSISTANT

## 2024-04-29 PROCEDURE — 86850 RBC ANTIBODY SCREEN: CPT | Performed by: OTOLARYNGOLOGY

## 2024-04-29 PROCEDURE — 0NQ00ZZ REPAIR SKULL, OPEN APPROACH: ICD-10-PCS | Performed by: NEUROLOGICAL SURGERY

## 2024-04-29 DEVICE — IMPLANTABLE DEVICE
Type: IMPLANTABLE DEVICE | Site: CRANIAL | Status: FUNCTIONAL
Brand: THINFLAP SYSTEM

## 2024-04-29 DEVICE — DURAGEN® PLUS DURAL REGENERATION MATRIX, 3 IN X 3 IN (7.5 CM X 7.5 CM)
Type: IMPLANTABLE DEVICE | Site: BRAIN | Status: FUNCTIONAL
Brand: DURAGEN® PLUS

## 2024-04-29 DEVICE — IMPLANTABLE DEVICE
Type: IMPLANTABLE DEVICE | Site: CRANIAL | Status: FUNCTIONAL
Brand: THINFLAP

## 2024-04-29 RX ORDER — SODIUM CHLORIDE 9 MG/ML
INJECTION, SOLUTION INTRAVENOUS CONTINUOUS PRN
Status: DISCONTINUED | OUTPATIENT
Start: 2024-04-29 | End: 2024-04-29

## 2024-04-29 RX ORDER — SODIUM CHLORIDE 9 MG/ML
75 INJECTION, SOLUTION INTRAVENOUS CONTINUOUS
Status: DISCONTINUED | OUTPATIENT
Start: 2024-04-29 | End: 2024-04-30

## 2024-04-29 RX ORDER — LIDOCAINE HYDROCHLORIDE 10 MG/ML
INJECTION, SOLUTION EPIDURAL; INFILTRATION; INTRACAUDAL; PERINEURAL AS NEEDED
Status: DISCONTINUED | OUTPATIENT
Start: 2024-04-29 | End: 2024-04-29

## 2024-04-29 RX ORDER — AMLODIPINE BESYLATE 5 MG/1
5 TABLET ORAL DAILY
Status: DISCONTINUED | OUTPATIENT
Start: 2024-04-29 | End: 2024-04-29

## 2024-04-29 RX ORDER — LEVETIRACETAM 500 MG/1
500 TABLET ORAL EVERY 12 HOURS SCHEDULED
Status: CANCELLED | OUTPATIENT
Start: 2024-04-29 | End: 2024-05-06

## 2024-04-29 RX ORDER — HYDROMORPHONE HCL IN WATER/PF 6 MG/30 ML
0.2 PATIENT CONTROLLED ANALGESIA SYRINGE INTRAVENOUS EVERY 4 HOURS PRN
Status: DISCONTINUED | OUTPATIENT
Start: 2024-04-29 | End: 2024-04-30

## 2024-04-29 RX ORDER — OXYCODONE HYDROCHLORIDE 5 MG/1
5 TABLET ORAL EVERY 6 HOURS PRN
Status: DISCONTINUED | OUTPATIENT
Start: 2024-04-29 | End: 2024-04-30

## 2024-04-29 RX ORDER — LABETALOL HYDROCHLORIDE 5 MG/ML
10 INJECTION, SOLUTION INTRAVENOUS EVERY 4 HOURS PRN
Status: DISCONTINUED | OUTPATIENT
Start: 2024-04-29 | End: 2024-05-06 | Stop reason: HOSPADM

## 2024-04-29 RX ORDER — LIDOCAINE HYDROCHLORIDE AND EPINEPHRINE 10; 10 MG/ML; UG/ML
INJECTION, SOLUTION INFILTRATION; PERINEURAL AS NEEDED
Status: DISCONTINUED | OUTPATIENT
Start: 2024-04-29 | End: 2024-04-29 | Stop reason: HOSPADM

## 2024-04-29 RX ORDER — CEFAZOLIN SODIUM 2 G/50ML
SOLUTION INTRAVENOUS AS NEEDED
Status: DISCONTINUED | OUTPATIENT
Start: 2024-04-29 | End: 2024-04-29

## 2024-04-29 RX ORDER — ACETAMINOPHEN 325 MG/1
975 TABLET ORAL EVERY 8 HOURS
Status: DISCONTINUED | OUTPATIENT
Start: 2024-04-29 | End: 2024-05-06 | Stop reason: HOSPADM

## 2024-04-29 RX ORDER — KETAMINE HYDROCHLORIDE 50 MG/ML
INJECTION, SOLUTION INTRAMUSCULAR; INTRAVENOUS AS NEEDED
Status: DISCONTINUED | OUTPATIENT
Start: 2024-04-29 | End: 2024-04-29

## 2024-04-29 RX ORDER — ACETAMINOPHEN 325 MG/1
975 TABLET ORAL ONCE
Status: COMPLETED | OUTPATIENT
Start: 2024-04-29 | End: 2024-04-29

## 2024-04-29 RX ORDER — ONDANSETRON 2 MG/ML
INJECTION INTRAMUSCULAR; INTRAVENOUS AS NEEDED
Status: DISCONTINUED | OUTPATIENT
Start: 2024-04-29 | End: 2024-04-29

## 2024-04-29 RX ORDER — EPHEDRINE SULFATE 50 MG/ML
INJECTION INTRAVENOUS AS NEEDED
Status: DISCONTINUED | OUTPATIENT
Start: 2024-04-29 | End: 2024-04-29

## 2024-04-29 RX ORDER — DEXAMETHASONE SODIUM PHOSPHATE 10 MG/ML
INJECTION, SOLUTION INTRAMUSCULAR; INTRAVENOUS AS NEEDED
Status: DISCONTINUED | OUTPATIENT
Start: 2024-04-29 | End: 2024-04-29

## 2024-04-29 RX ORDER — CEFAZOLIN SODIUM 2 G/50ML
2000 SOLUTION INTRAVENOUS ONCE
Status: DISCONTINUED | OUTPATIENT
Start: 2024-04-29 | End: 2024-04-29 | Stop reason: HOSPADM

## 2024-04-29 RX ORDER — HYDROMORPHONE HCL IN WATER/PF 6 MG/30 ML
0.2 PATIENT CONTROLLED ANALGESIA SYRINGE INTRAVENOUS
Status: DISCONTINUED | OUTPATIENT
Start: 2024-04-29 | End: 2024-04-29 | Stop reason: HOSPADM

## 2024-04-29 RX ORDER — POLYETHYLENE GLYCOL 3350 17 G/17G
17 POWDER, FOR SOLUTION ORAL DAILY
Status: DISCONTINUED | OUTPATIENT
Start: 2024-04-30 | End: 2024-05-06 | Stop reason: HOSPADM

## 2024-04-29 RX ORDER — ONDANSETRON 2 MG/ML
4 INJECTION INTRAMUSCULAR; INTRAVENOUS EVERY 4 HOURS PRN
Status: DISCONTINUED | OUTPATIENT
Start: 2024-04-29 | End: 2024-05-06 | Stop reason: HOSPADM

## 2024-04-29 RX ORDER — AMLODIPINE BESYLATE 5 MG/1
5 TABLET ORAL DAILY
Status: DISCONTINUED | OUTPATIENT
Start: 2024-04-29 | End: 2024-05-06 | Stop reason: HOSPADM

## 2024-04-29 RX ORDER — HYDROMORPHONE HCL/PF 1 MG/ML
SYRINGE (ML) INJECTION AS NEEDED
Status: DISCONTINUED | OUTPATIENT
Start: 2024-04-29 | End: 2024-04-29

## 2024-04-29 RX ORDER — ACETAMINOPHEN 160 MG/5ML
650 SUSPENSION ORAL EVERY 6 HOURS PRN
Status: DISCONTINUED | OUTPATIENT
Start: 2024-04-29 | End: 2024-05-02

## 2024-04-29 RX ORDER — FENTANYL CITRATE/PF 50 MCG/ML
50 SYRINGE (ML) INJECTION
Status: DISCONTINUED | OUTPATIENT
Start: 2024-04-29 | End: 2024-04-29 | Stop reason: HOSPADM

## 2024-04-29 RX ORDER — ONDANSETRON 2 MG/ML
4 INJECTION INTRAMUSCULAR; INTRAVENOUS ONCE AS NEEDED
Status: DISCONTINUED | OUTPATIENT
Start: 2024-04-29 | End: 2024-04-29 | Stop reason: HOSPADM

## 2024-04-29 RX ORDER — LIDOCAINE HYDROCHLORIDE 10 MG/ML
INJECTION, SOLUTION EPIDURAL; INFILTRATION; INTRACAUDAL; PERINEURAL
Status: DISPENSED
Start: 2024-04-29 | End: 2024-04-29

## 2024-04-29 RX ORDER — SUCCINYLCHOLINE/SOD CL,ISO/PF 100 MG/5ML
SYRINGE (ML) INTRAVENOUS AS NEEDED
Status: DISCONTINUED | OUTPATIENT
Start: 2024-04-29 | End: 2024-04-29

## 2024-04-29 RX ORDER — POTASSIUM CITRATE 10 MEQ/1
20 TABLET, EXTENDED RELEASE ORAL
Status: DISCONTINUED | OUTPATIENT
Start: 2024-04-29 | End: 2024-05-06 | Stop reason: HOSPADM

## 2024-04-29 RX ORDER — SENNOSIDES 8.6 MG
1 TABLET ORAL DAILY
Status: DISCONTINUED | OUTPATIENT
Start: 2024-04-30 | End: 2024-05-02

## 2024-04-29 RX ORDER — BISACODYL 10 MG
10 SUPPOSITORY, RECTAL RECTAL DAILY PRN
Status: DISCONTINUED | OUTPATIENT
Start: 2024-04-29 | End: 2024-05-06 | Stop reason: HOSPADM

## 2024-04-29 RX ORDER — PROPOFOL 10 MG/ML
INJECTION, EMULSION INTRAVENOUS AS NEEDED
Status: DISCONTINUED | OUTPATIENT
Start: 2024-04-29 | End: 2024-04-29

## 2024-04-29 RX ORDER — CEFAZOLIN SODIUM 2 G/50ML
2000 SOLUTION INTRAVENOUS EVERY 8 HOURS
Status: DISCONTINUED | OUTPATIENT
Start: 2024-04-29 | End: 2024-05-05

## 2024-04-29 RX ORDER — OXYCODONE HYDROCHLORIDE 5 MG/1
5 TABLET ORAL EVERY 4 HOURS PRN
Status: DISCONTINUED | OUTPATIENT
Start: 2024-04-29 | End: 2024-05-06 | Stop reason: HOSPADM

## 2024-04-29 RX ORDER — DOCUSATE SODIUM 100 MG/1
100 CAPSULE, LIQUID FILLED ORAL 2 TIMES DAILY
Status: DISCONTINUED | OUTPATIENT
Start: 2024-04-29 | End: 2024-05-06 | Stop reason: HOSPADM

## 2024-04-29 RX ORDER — SODIUM CHLORIDE 9 MG/ML
100 INJECTION, SOLUTION INTRAVENOUS CONTINUOUS
Status: DISCONTINUED | OUTPATIENT
Start: 2024-04-29 | End: 2024-04-30

## 2024-04-29 RX ORDER — PROPOFOL 10 MG/ML
INJECTION, EMULSION INTRAVENOUS CONTINUOUS PRN
Status: DISCONTINUED | OUTPATIENT
Start: 2024-04-29 | End: 2024-04-29

## 2024-04-29 RX ORDER — FENTANYL CITRATE 50 UG/ML
INJECTION, SOLUTION INTRAMUSCULAR; INTRAVENOUS AS NEEDED
Status: DISCONTINUED | OUTPATIENT
Start: 2024-04-29 | End: 2024-04-29

## 2024-04-29 RX ORDER — LEVETIRACETAM 500 MG/5ML
INJECTION, SOLUTION, CONCENTRATE INTRAVENOUS AS NEEDED
Status: DISCONTINUED | OUTPATIENT
Start: 2024-04-29 | End: 2024-04-29

## 2024-04-29 RX ADMIN — ONDANSETRON 4 MG: 2 INJECTION INTRAMUSCULAR; INTRAVENOUS at 07:56

## 2024-04-29 RX ADMIN — PROPOFOL 200 MG: 10 INJECTION, EMULSION INTRAVENOUS at 07:36

## 2024-04-29 RX ADMIN — LEVETIRACETAM 500 MG: 100 INJECTION, SOLUTION INTRAVENOUS at 08:00

## 2024-04-29 RX ADMIN — PROPOFOL 120 MCG/KG/MIN: 10 INJECTION, EMULSION INTRAVENOUS at 08:05

## 2024-04-29 RX ADMIN — CEFAZOLIN SODIUM 2000 MG: 2 SOLUTION INTRAVENOUS at 08:13

## 2024-04-29 RX ADMIN — DOCUSATE SODIUM 100 MG: 100 CAPSULE, LIQUID FILLED ORAL at 17:43

## 2024-04-29 RX ADMIN — EPHEDRINE SULFATE 10 MG: 50 INJECTION, SOLUTION INTRAVENOUS at 09:28

## 2024-04-29 RX ADMIN — KETAMINE HYDROCHLORIDE 20 MG: 50 INJECTION INTRAMUSCULAR; INTRAVENOUS at 08:23

## 2024-04-29 RX ADMIN — KETAMINE HYDROCHLORIDE 10 MG: 50 INJECTION INTRAMUSCULAR; INTRAVENOUS at 10:18

## 2024-04-29 RX ADMIN — SODIUM CHLORIDE 0.2 MCG/KG/MIN: 900 INJECTION INTRAVENOUS at 08:05

## 2024-04-29 RX ADMIN — HYDROMORPHONE HYDROCHLORIDE 0.5 MG: 1 INJECTION, SOLUTION INTRAMUSCULAR; INTRAVENOUS; SUBCUTANEOUS at 09:23

## 2024-04-29 RX ADMIN — CEFAZOLIN SODIUM 2000 MG: 2 SOLUTION INTRAVENOUS at 12:03

## 2024-04-29 RX ADMIN — SODIUM CHLORIDE 75 ML/HR: 0.9 INJECTION, SOLUTION INTRAVENOUS at 16:30

## 2024-04-29 RX ADMIN — AMLODIPINE BESYLATE 5 MG: 5 TABLET ORAL at 16:32

## 2024-04-29 RX ADMIN — DEXAMETHASONE SODIUM PHOSPHATE 10 MG: 10 INJECTION, SOLUTION INTRAMUSCULAR; INTRAVENOUS at 07:56

## 2024-04-29 RX ADMIN — LIDOCAINE HYDROCHLORIDE 100 MG: 10 INJECTION, SOLUTION EPIDURAL; INFILTRATION; INTRACAUDAL; PERINEURAL at 07:36

## 2024-04-29 RX ADMIN — ACETAMINOPHEN 975 MG: 325 TABLET ORAL at 06:19

## 2024-04-29 RX ADMIN — PHENYLEPHRINE HYDROCHLORIDE 40 MCG/MIN: 10 INJECTION INTRAVENOUS at 08:10

## 2024-04-29 RX ADMIN — POTASSIUM CITRATE 20 MEQ: 10 TABLET, EXTENDED RELEASE ORAL at 16:35

## 2024-04-29 RX ADMIN — PROPOFOL 100 MG: 10 INJECTION, EMULSION INTRAVENOUS at 07:40

## 2024-04-29 RX ADMIN — Medication 100 MG: at 07:36

## 2024-04-29 RX ADMIN — FENTANYL CITRATE 100 MCG: 50 INJECTION INTRAMUSCULAR; INTRAVENOUS at 07:36

## 2024-04-29 RX ADMIN — ACETAMINOPHEN 975 MG: 325 TABLET, FILM COATED ORAL at 16:32

## 2024-04-29 RX ADMIN — CEFAZOLIN SODIUM 2000 MG: 2 SOLUTION INTRAVENOUS at 20:01

## 2024-04-29 RX ADMIN — KETAMINE HYDROCHLORIDE 20 MG: 50 INJECTION INTRAMUSCULAR; INTRAVENOUS at 09:09

## 2024-04-29 RX ADMIN — SODIUM CHLORIDE: 9 INJECTION, SOLUTION INTRAVENOUS at 07:32

## 2024-04-29 RX ADMIN — SODIUM CHLORIDE: 0.9 INJECTION, SOLUTION INTRAVENOUS at 07:47

## 2024-04-29 NOTE — PERIOPERATIVE NURSING NOTE
As per Dr. Mata MD, drain lumbar drain in 1 hour at 1430 today.  Dr. Mata MD, present at PACU bedside and aware of left ear intermittent scant bloody drainage.

## 2024-04-29 NOTE — ANESTHESIA PROCEDURE NOTES
Arterial Line Insertion    Performed by: Jaja Christian CRNA  Authorized by: Jason Elmore MD  Consent: Verbal consent obtained. Written consent obtained.  Risks and benefits: risks, benefits and alternatives were discussed  Consent given by: patient  Patient understanding: patient states understanding of the procedure being performed  Patient consent: the patient's understanding of the procedure matches consent given  Procedure consent: procedure consent matches procedure scheduled  Relevant documents: relevant documents present and verified  Test results: test results available and properly labeled  Patient identity confirmed: arm band and anonymous protocol, patient vented/unresponsive  Preparation: Patient was prepped and draped in the usual sterile fashion.  Indications: hemodynamic monitoring  Orientation:  Right  Location: radial artery  Sedation:  Patient sedated: yes (GAET)  Sedatives: see MAR for details  Analgesia: see MAR for details  Vitals: Vital signs were monitored during sedation.    Procedure Details:  Rajesh's test normal: yes  Needle gauge: 20  Number of attempts: 1    Post-procedure:  Post-procedure: dressing applied  Waveform: good waveform and waveform confirmed  Patient tolerance: patient tolerated the procedure well with no immediate complications

## 2024-04-29 NOTE — OP NOTE
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 Placement lumbar drain  2. 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 Lumbar drain placement without complication, moderate (< 30 cm H2O) opening pressure, clear CSF   2. Multifocal areas of dural dehiscence in bilateral middle fossa, diffuse dural defects covered with dural substitute onlay with Duraseal  3. Harvesting and use of local temporalis muscle flap  4. Repair of bony dehiscences over tegmen and superior semicircular canal with autograft bone     Procedure:  Prior to induction of anesthesia, an audible huddle was performed confirming site of operation, planned operation, need for antibiotics, and other anticipated needs with the patient, surgical, nursing, and anesthesia teams. All agreed to proceed.      First, the patient was positioned in lateral decubitus position for placement of lumbar subarachnoid drain, which was placed with one pass without any complication. Clear CSF, under moderately high opening pressure, encountered. Lumbar drain was confirmed to be functional and clamped.      Then, he was positioned supine. All pressure points were verified to be padded appropriately. Right temporal region, including the ear, was prepped and draped in sterile fashion. Prior to starting the operation the surgical team paused  and performed an audible timeout. The surgical, nursing, and anesthesia personnel agreed with the procedure to be performed.      A small S-shaped incision on the right temporal scalp overlying the ear was incised with a #10 blade scalpel. The dermal layer was  from the muscle layer using Metzenbaum scissors and bovie while maintaining hemostasis with bipolar electrocautery. The temporalis muscle was kept intact while harvesting a local flap for closing.     Using a high-speed drill, a small bone flap was removed in the floor of the middle fossa then extended accordingly for maximal exposure and visualization of the bony floor. The bone fragments and dust were saved for use as autograft later.     20 cc of CSF was released at this time for optimal brain relaxation within the dural dehiscence.     The dura was carefully dissected off the temporal bone using Enhanced Surface Dynamicston microdissecting instruments. Hemostasis was achieved with bipolar electrocautery and gelfoam with thrombin.     Bone fragments and dust were placed on the areas of bony dehiscence in the tegmen and superior semicircular canal. Then a dural substitute was placed on diffuse dural dehiscence at the inferior floor of the middle fossa. Hemostasis was achieved and confirmed.     Finally, a thin layer of Duraseal was placed on the dural substitute and repaired dural layer. Then titanium mesh (large nic hole cover with slit) cranioplasty was performed.    Same process was repeated for the left side. 10 additional cc of CSF was released from lumbar drain for the left side. Dural dehiscence was partially repaired primarily with 4-0 Nurolon sutures in addition to dural substitute onlay and Duraseal.     CN 7 and NILA monitoring returned to baseline at the end of the case.     The local temporalis muscle flap, previously harvested at the beginning of the case, was placed on top of the mesh cranioplasty. Prior to closure, hemostasis was achieved. The wound was  copiously irrigated. All retracting devices were removed from the wound. Prior to closure, surgical count was correct and verified x 1. The wound was closed in the usual multi-layered fashion. The muscle and galeal layers were closed with Vicryl sutures. Skin was reapproximated using 4-0 Monocryl sutures in subcutaneous running fashion. The wound was covered with surgical glue.    At the end of the case, a sign out was performed whereby the anesthesia, surgical, and nursing teams re-confirmed the operation performed, any blood products to be distributed, specimens to be sent, or equipment issues. All parties agreed.     The following portions of this surgery were performed directly by me:  -Lumbar drain placement  -Dural repair  -Bilateral craniotomies     The following portions of this surgery were performed in conjunction with Dr. Wasserman:  -Repair of tegmen tympani defect and superior semicircular canal dehiscence   -Harvesting and use of local temporalis muscle flap   -Initial opening, exposure, and closing of wound     Complications:  None     This surgery required co-surgeons in Neurosurgery (Dr. Billy August) and ENT (Dr. Frances Wasserman).      [facility]    Billy August

## 2024-04-29 NOTE — OP NOTE
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 with neuromonitoring (CN 7/8) and intraoperative lumbar drain placement    Specimen(s):  * No specimens in log * none    Estimated Blood Loss:   100 mL    Drains:  Urethral Catheter Latex 16 Fr. (Active)   Number of days: 0       Ureteral Internal Stent Right ureter 6 Fr. (Active)   Number of days: 494       Lumbar Drain (Active)   Drain Status Open/CSF collection chamber 24 0007   Dressing Status Clean;Dry;Intact 24 0837   Number of days: 0       Anesthesia Type:   General    Operative Indications:  Tegmen defect of base of skull [Q16.5]  CSF otorrhea, bilateral    Operative Findings:  Bilateral dural defects  Right epitympanic tegmen bone dehiscence   Right thinned bone over the superior semicircular canal  Left multiple epitympanic and mastoid tegmen bone dehiscences    Complications:   None    Procedure and Technique:  The patient was brought into the operating room and placed supine on the OR table. Following the induction of general anesthesia, an endotracheal tube was placed by the anesthesia staff. The bed was turned 180 degrees. A lumbar drain was placed by the neurosurgery service. Details of this portion of the  procedure will be included in their separate operative note. The patient was then placed on a horseshoe head hyatt.      ABR monitoring and a facial nerve monitor and  was placed on the face by the Neurophysiology Monitoring Service and was found to be functional. The patient was then prepped and draped in sterile fashion.    A 4 cm lazy-S shaped incision was marked out above the right ear and injected with 1% lidocaine, 1:100,000 epinephrine. Working in tandem with the neurosurgery service, the marked incision was made through the skin, and the skin flap was elevated superiorly and inferior. A 6 x 3 cm anteriorly pedicled temporalis muscle rotation flap was harvested and secured anteriorly for later use.      A nic hole craniotomy was then performed. A nic hole was first drilled, and subsequently the craniectomy was widened using the drill and ronguers, yielding a final craniectomy size of 3 x 2 cm. All of the bone dust and bone chips from this portion of the procedure were saved for later use.     The dura was then elevated from the floor of the middle fossa. As the dissection proceeded more medially, visualization was provided using the 0-degree and 30-degree endoscopes. During this portion of the procedure, the following landmarks were identified and preserved: the greater superficial petrosal nerve, the arcuate eminence. The following findings were identified: there was a tegmen dehiscence overlying the epitympanum. There was thinning of the bone over the superior semicircular canal, which was blue lined. The area was thoroughly irrigated with sterile saline.    A piece of Duragen was trimmed and put over the dura overlying the tegmen. The previously harvested bone dust and bone chips were then used to cover the tegmen dehiscence. The temporalis muscle flap was then rotated into the defect overlying the tegmen. DuraSeal was then applied to all layers of the repair.    A cranioplasty was then performed. A  titanium EVD cover was placed over the defect, insuring that no pressure was placed on the temporalis muscle rotation flap pedicle. This was secured with 4 self-tapping, self-drilling titanium screws.    The skin incision was closed in layers, with the superficial skin layer closed with a running 4-0 Monocryl and coated with a layer of Dermabond. A sterile towel was draped over the incision.    The patient's head was then turned to the right. A 4 cm lazy-S shaped incision was marked out above the left ear and injected with 1% lidocaine, 1:100,000 epinephrine.     Working in tandem with the neurosurgery service, the marked incision was made through the skin, and the skin flap was elevated superiorly and inferior. A 6 x 3 cm anteriorly pedicled temporalis muscle rotation flap was harvested and secured anteriorly for later use.      A nic hole craniotomy was then performed. A nic hole was first drilled, and subsequently the craniectomy was widened using the drill and ronguers, yielding a final craniectomy size of 3 x 2 cm. All of the bone dust and bone chips from this portion of the procedure were saved for later use.     The dura was then elevated from the floor of the middle fossa. As the dissection proceeded more medially, visualization was provided using the 0-degree and 30-degree endoscopes. During this portion of the procedure, the following landmarks were identified and preserved: the greater superficial petrosal nerve, the arcuate eminence. The following findings were identified: there were multiple dehiscences of the tegmen overlying the epitympanum and mastoid. There were multiple dehiscences in the dura. The surrounding dura was thinned. The area was thoroughly irrigated with sterile saline.    A piece of Duragen was trimmed and put over the dura overlying the tegmen. The previously harvested bone dust and bone chips were then used to multiple dehiscences of the tegmen. The temporalis muscle flap was then  rotated into the defect overlying the tegmen. DuraSeal was then applied to all layers of the repair.    A cranioplasty was then performed. A titanium EVD cover was placed over the defect, insuring that no pressure was placed on the temporalis muscle rotation flap pedicle. This was secured with 3 self-tapping, self-drilling titanium screws.    The skin incision was closed in layers, with the superficial skin layer closed with a running 4-0 Monocryl and coated with a layer of Dermabond.     He was then awoken from general anesthesia and taken to the Neurosurgical Intensive Care Unit in stable condition. His postoperative facial function was I/VI on the House-Brackmann scale bilaterally.     The patient tolerated this procedure well without complications.     Dr. Wasserman was present throughout this procedure and performed all key portions    I was present for the entire procedure.    Patient Disposition:  PACU  and extubated and stable        SIGNATURE: Frances Wasserman MD  DATE: April 29, 2024  TIME: 12:34 PM

## 2024-04-29 NOTE — CONSULTS
Consultation - Nephrology   Swapnil Grover 73 y.o. male MRN: [id number]  Unit/Bed#: OR POOL Encounter: [id number]    ASSESSMENT/PLAN:   CKD IIIA: baseline creatinine 1.2-1.3 and follows with Dr Cottrell   Creatinine today stable in baseline at 1.32   CKD due to prior DAYANNA and L renal atrophy   Avoid post op hypotension  Avoid NSAIDS, IV dye or other nephrotoxins   Continue IVF until tolerating po   Check am BMP   Bilateral tegmen defect with CSF leak   S/p endoscopic middle fossa craniotomy to repair defect and leak   Hypertension: BP acceptable   Change amlodipine to hold for SBP <130  Nephrolithiasis   Continue k citrate 20meq po tid     Summary of Plan:   Continue IVF per primary team   Hold amlodipine for SBP <130   Check am BMP     HISTORY OF PRESENT ILLNESS:  Requesting Physician: Frances Wasserman MD  Reason for Consult: CKD -- postop DAYANNA prevention protocol     Swapnil Grover is a 73 y.o. year old male with bilateral tegmen defects with CSF leak who was admitted to [facility] today and underwent elective bilateral endoscopic middle fossa craniotomies for repair of tegmen defect and CSF leak. He has a history of CKD and nephrology was consulted postoperatively as part of the DAYANNA prevention protocol. He is seen and examined postoperatively. He opens eyes to voice but quickly falls back to sleep so history comes from the chart.       PAST MEDICAL HISTORY:  Past Medical History:   Diagnosis Date    Arthritis     BPH (benign prostatic hyperplasia)     Breathing difficulty 12/01/2022    CPAP given per pt-s/p surgery    Cancer (HCC) 2015    basal cell of the skull     Chronic kidney disease     Colon polyp     Heart failure (HCC)     12/2/22-pt had ECHO-per pt R. sided block    History of subdural hematoma     Hypertension     Kidney stone     right stone    Renal disorder     Sleep apnea     mild- no CPAP-had nasal septoplasty       PAST SURGICAL HISTORY:  Past Surgical History:   Procedure Laterality Date     BASAL CELL CARCINOMA EXCISION  2015    skull    BRAIN SURGERY      in the 1990's-subdural hematoma    CATARACT EXTRACTION Left     COLONOSCOPY      x2    CYSTOSCOPY W/ LASER LITHOTRIPSY Right 11/27/2020    Procedure: CYSTOSCOPY, FLEXIBLE URETEROSCOPY, LASER, AND STENT EXCHANGE,STONE BASKET, RIGHT RETROGRADE;  Surgeon: Sabino Garcia MD;  Location: [facility];  Service: Urology    FL RETROGRADE PYELOGRAM  10/18/2020    FL RETROGRADE PYELOGRAM  11/27/2020    FL RETROGRADE PYELOGRAM  12/01/2022    FL RETROGRADE PYELOGRAM  12/22/2022    JOINT REPLACEMENT  1/23/2024    LITHOTRIPSY      TN ARTHRP KNE CONDYLE&PLATU MEDIAL&LAT COMPARTMENTS Left 01/23/2024    Procedure: ARTHROPLASTY KNEE TOTAL W ROBOT - overnight, cemented;  Surgeon: Bassem Moscoso DO;  Location: [facility];  Service: Orthopedics    TN CYSTO BLADDER W/URETERAL CATHETERIZATION Right 11/11/2020    Procedure: ESWL RIGHT;  Surgeon: Sabino Garcia MD;  Location: [facility];  Service: Urology    TN CYSTO BLADDER W/URETERAL CATHETERIZATION Right 12/01/2022    Procedure: CYSTOSCOPY RETROGRADE PYELOGRAM WITH INSERTION STENT URETERAL;  Surgeon: Sabino Garcia MD;  Location: [facility];  Service: Urology    TN CYSTO/URETERO W/LITHOTRIPSY &INDWELL STENT INSRT Right 10/18/2020    Procedure: CYSTOSCOPY URETEROSCOPY WITH BASKET EXTRACTION, RETROGRADE PYELOGRAM AND INSERTION STENT URETERAL;  Surgeon: Kris Ac MD;  Location: [facility];  Service: Urology    TN CYSTO/URETERO W/LITHOTRIPSY &INDWELL STENT INSRT Right 12/22/2022    Procedure: CYSTOSCOPY URETEROSCOPY WITH LITHOTRIPSY HOLMIUM LASER, RETROGRADE PYELOGRAM AND INSERTION STENT URETERAL;  Surgeon: Sabino Garcia MD;  Location: [facility];  Service: Urology    SEPTOPLASTY      in the 1990's-trimmed uvula    TONSILLECTOMY  1956    age 6       ALLERGIES:  Allergies   Allergen Reactions    Other Nasal Congestion     Seasonal allergies    Bean Pod Extract - Food Allergy Nausea Only     Soft beans- eg chick  peas, lima's, sweet peas       SOCIAL HISTORY:  Social History     Substance and Sexual Activity   Alcohol Use Yes    Comment: 1 drink a week     Social History     Substance and Sexual Activity   Drug Use Never     Social History     Tobacco Use   Smoking Status Never    Passive exposure: Never   Smokeless Tobacco Never       FAMILY HISTORY:  Family History   Problem Relation Age of Onset    Hypertension Mother     Kidney disease Mother         renal failure-dialysis    Hypertension Father     Stroke Father     Diabetes Sister     Kidney disease Sister         self dialysis at home       MEDICATIONS:  Scheduled Meds:  Current Facility-Administered Medications   Medication Dose Route Frequency Provider Last Rate    acetaminophen  650 mg Oral Q6H PRN Annia Miller MD      cefazolin  2,000 mg Intravenous Once Billy August MD      fentaNYL  50 mcg Intravenous Q3 min PRN Florin Chisholm CRNA      HYDROmorphone  0.2 mg Intravenous Q5 Min PRN Florin Chisholm, ROLDAN      labetalol  10 mg Intravenous Q4H PRN Jason Elmore MD      lidocaine (PF)           ondansetron  4 mg Intravenous Once PRN Florin Chisholm, CRNA      oxyCODONE  5 mg Oral Q6H PRN Annia Miller MD      sodium chloride  100 mL/hr Intravenous Continuous Florin Chisholm CRNA         PRN Meds:.  acetaminophen    fentaNYL    HYDROmorphone    labetalol    lidocaine (PF)    ondansetron    oxyCODONE    Continuous Infusions:sodium chloride, 100 mL/hr        REVIEW OF SYSTEMS:  A complete review of systems was done. Pertinent positives and negatives noted in the HPI but otherwise the review of systems is negative.    PHYSICAL EXAM:  Current Weight: Weight - Scale: 101 kg (223 lb)  First Weight: Weight - Scale: 103 kg (228 lb)  Vitals:    04/29/24 1436   BP: 138/78   Pulse: 92   Resp: 18   Temp:    SpO2: 99%       Intake/Output Summary (Last 24 hours) at 4/29/2024 1456  Last data filed at 4/29/2024 1305  Gross per 24 hour   Intake 1700 ml   Output  755 ml   Net 945 ml     General:   in no acute distress   Skin:  No rash  Eyes:  Sclerae anicteric, no periorbital edema   ENT:  Moist mucous membranes, bloody drainage L ear  Neck:  Trachea midline, symmetric   Chest:  Clear to auscultation bilaterally with no wheezes, rales or rhonchi  CVS:  Regular rate and rhythm  Abdomen:  Soft, nontender, nondistended  Neuro:  lethargic   Extremities: no lower extremity edema     Lab Results:   Results from last 7 days   Lab Units 04/29/24  1309   WBC Thousand/uL 8.89   HEMOGLOBIN g/dL 14.7   HEMATOCRIT % 45.7   PLATELETS Thousands/uL 247   SODIUM mmol/L 138   POTASSIUM mmol/L 3.9   CHLORIDE mmol/L 106   CO2 mmol/L 22   BUN mg/dL 20   CREATININE mg/dL 1.32*   CALCIUM mg/dL 8.7       Radiology Results:   No orders to display

## 2024-04-29 NOTE — CONSULTS
[facility]  Consult: Critical Care  Name: Swapnil Grover 73 y.o. male I MRN: [id number]  Unit/Bed#: [facility]PHP 718-01 I Date of Admission: 4/29/2024   Date of Service: 4/29/2024 I Hospital Day: 0      Inpatient consult to Neurocritical care  Consult performed by: Teresa Kiser PA-C  Consult ordered by: Ofelia Dee PA-C        Assessment/Plan   Neuro:   Diagnosis: Tegmen defect of base of skull, history of left craniotomy for SDH evacuation in 1992  S/p bilateral endoscopic middle fossa craniotomies for repair of tegmen defect and CSF leak with temporalis muscle flap and lumbar drain placement on 4/29  Plan: neuro checks q1h  Continue lumbar drain, drain 10cc/hr  Cranial incision care per primary team  Stat CTH for any new focality on exam or decrease in GCS >2 points.   Diagnosis: post-operative analgesia  Plan: continue tylenol 975mg q8h [place]  Continue oxycodone 2.5mg/5mg q4h as needed for moderate/severe pain  Continue dilaudid IV 0.2mg q4h as needed for breakthrough pain  Diagnosis: at risk for ICU delirium  Plan: delirium precautions  Regulate sleep wake cycles, frequent redirecting  CAM ICU bid     CV:   Diagnosis: history of HTN  Plan: continue home amlodipine 5mg daily with hold precautions   SBP <160, MAP >65  Diagnosis: hyperlipidemia  Plan: not on medications at home  Outpatient follow up     Pulm:  Diagnosis: obstructive sleep apnea  Plan: not on CPAP at home  Maintain SpO2 >92%    GI:   No active issues  Bowel regimen: colace, miralax, senokot    :   Diagnosis: CKD stage 3  Plan: baseline Cr 1.2-1.3, follows with nephrology outpatient  Monitor renal indices, urine output    F/E/N:   F: NS 75cc/hr, discontinue once tolerating PO  E: monitor and replace per protocol  N: regular diet    Heme/Onc:   Chemical DVT ppx on hold while lumbar drain in place  B/l SCDs    Endo:   No active issues  BG goal 140-180    ID:   Diagnosis: jen-operative antibiotics  Plan:  continue ancef per primary team  Monitor WBC count, fever curve    MSK/Skin:   Frequent turning, repositioning  PT/OT/CM    Disposition: Stepdown Level 1     History of Present Illness     HPI: Swapnil Grover is a 73 y.o. with pmhx of HTN, HLD, ISAIAS not on CPAP at home who presents with bilateral tegmen defects with CSF leak who is admitted s/p elective endoscopic middle fossa craniotomies for CSF repair and tegmen defect repair. [facility], he has no complaints. He denies vision changes, headaches, chest pain, shortness of breath. He denies numbness, tingling, weakness.     History obtained from chart review and the patient.  Review of Systems   Constitutional:  Negative for appetite change, chills and fever.   HENT:  Positive for sore throat (post-operatively).    Eyes:  Negative for pain and visual disturbance.   Respiratory:  Negative for cough and shortness of breath.    Cardiovascular:  Negative for chest pain and palpitations.   Gastrointestinal:  Negative for abdominal pain, diarrhea, nausea and vomiting.   Genitourinary:  Negative for dysuria and hematuria.   Musculoskeletal:  Negative for arthralgias and back pain.   Skin:  Negative for color change and rash.   Neurological:  Negative for dizziness, syncope, facial asymmetry, light-headedness, numbness and headaches.   All other systems reviewed and are negative.    Historical Information   Past Medical History:  No date: Arthritis  No date: BPH (benign prostatic hyperplasia)  12/01/2022: Breathing difficulty      Comment:  CPAP given per pt-s/p surgery  2015: Cancer (HCC)      Comment:  basal cell of the skull   No date: Chronic kidney disease  No date: Colon polyp  No date: Heart failure (HCC)      Comment:  12/2/22-pt had ECHO-per pt R. sided block  No date: History of subdural hematoma  No date: Hypertension  No date: Kidney stone      Comment:  right stone  No date: Renal disorder  No date: Sleep apnea      Comment:  mild-  no CPAP-had nasal septoplasty Past Surgical History:  2015: BASAL CELL CARCINOMA EXCISION      Comment:  skull  No date: BRAIN SURGERY      Comment:  in the 1990's-subdural hematoma  No date: CATARACT EXTRACTION; Left  No date: COLONOSCOPY      Comment:  x2  11/27/2020: CYSTOSCOPY W/ LASER LITHOTRIPSY; Right      Comment:  Procedure: CYSTOSCOPY, FLEXIBLE URETEROSCOPY, LASER, AND               STENT EXCHANGE,STONE BASKET, RIGHT RETROGRADE;  Surgeon:                Sabino Garcia MD;  Location: [facility];  Service:                Urology  10/18/2020: FL RETROGRADE PYELOGRAM  11/27/2020: FL RETROGRADE PYELOGRAM  12/01/2022: FL RETROGRADE PYELOGRAM  12/22/2022: FL RETROGRADE PYELOGRAM  1/23/2024: JOINT REPLACEMENT  No date: LITHOTRIPSY  01/23/2024: SC ARTHRP KNE CONDYLE&PLATU MEDIAL&LAT COMPARTMENTS; Left      Comment:  Procedure: ARTHROPLASTY KNEE TOTAL W ROBOT - overnight,                cemented;  Surgeon: Bassem Moscoso DO;  Location: [facility]               [facility];  Service: Orthopedics  11/11/2020: SC CYSTO BLADDER W/URETERAL CATHETERIZATION; Right      Comment:  Procedure: ESWL RIGHT;  Surgeon: Sabino Garcia MD;                 Location: [facility];  Service: Urology  12/01/2022: SC CYSTO BLADDER W/URETERAL CATHETERIZATION; Right      Comment:  Procedure: CYSTOSCOPY RETROGRADE PYELOGRAM WITH                INSERTION STENT URETERAL;  Surgeon: Sabino Garcia MD;                 Location: [facility];  Service: Urology  10/18/2020: SC CYSTO/URETERO W/LITHOTRIPSY &INDWELL STENT INSRT; Right      Comment:  Procedure: CYSTOSCOPY URETEROSCOPY WITH BASKET                EXTRACTION, RETROGRADE PYELOGRAM AND INSERTION STENT                URETERAL;  Surgeon: Kris Ac MD;  Location: [facility]                MAIN OR;  Service: Urology  12/22/2022: SC CYSTO/URETERO W/LITHOTRIPSY &INDWELL STENT INSRT; Right      Comment:  Procedure: CYSTOSCOPY URETEROSCOPY WITH LITHOTRIPSY                HOLMIUM LASER, RETROGRADE PYELOGRAM  AND INSERTION STENT                URETERAL;  Surgeon: Sabino Garcia MD;  Location: [facility]                MAIN OR;  Service: Urology  No date: SEPTOPLASTY      Comment:  in the 1990's-trimmed uvula  1956: TONSILLECTOMY      Comment:  age 6   Current Outpatient Medications   Medication Instructions    acetaminophen (TYLENOL) 975 mg, Oral, Every 8 hours    amLODIPine (NORVASC) 5 mg, Oral, Daily    aspirin 325 mg, Oral, 2 times daily    b complex vitamins tablet 1 tablet, Oral, 2 times daily    BETA CAROTENE PO Oral, Daily    docusate sodium (COLACE) 100 mg, Oral, 2 times daily    potassium citrate (UROCIT-K) 10 mEq 20 mEq, Oral, 3 times daily with meals    VITAMIN D PO Oral, Every morning, With calcium    VITAMIN E  mg, Oral, Daily    Allergies   Allergen Reactions    Other Nasal Congestion     Seasonal allergies    Bean Pod Extract - Food Allergy Nausea Only     Soft beans- eg chick peas, lima's, sweet peas      Social History     Tobacco Use    Smoking status: Never     Passive exposure: Never    Smokeless tobacco: Never   Vaping Use    Vaping status: Never Used   Substance Use Topics    Alcohol use: Yes     Comment: 1 drink a week    Drug use: Never    Family History   Problem Relation Age of Onset    Hypertension Mother     Kidney disease Mother         renal failure-dialysis    Hypertension Father     Stroke Father     Diabetes Sister     Kidney disease Sister         self dialysis at home          Objective                            Vitals I/O      Most Recent Min/Max in 24hrs   Temp 98.2 °F (36.8 °C) Temp  Min: 98.1 °F (36.7 °C)  Max: 98.2 °F (36.8 °C)   Pulse 92 Pulse  Min: 92  Max: 104   Resp 18 Resp  Min: 16  Max: 20   /78 BP  Min: 119/73  Max: 156/82   O2 Sat 99 % SpO2  Min: 94 %  Max: 99 %      Intake/Output Summary (Last 24 hours) at 4/29/2024 1616  Last data filed at 4/29/2024 1305  Gross per 24 hour   Intake 1700 ml   Output 755 ml   Net 945 ml       Diet Regular; Regular House    Invasive  Monitoring   Arterial Line  Josey /72  Arterial Line BP  Min: 125/64  Max: 158/64    mmHg  Arterial Line MAP (mmHg)  Min: 90 mmHg  Max: 112 mmHg           Physical Exam   Physical Exam  Vitals and nursing note reviewed.   Eyes:      General: No scleral icterus.     Extraocular Movements: Extraocular movements intact.      Conjunctiva/sclera: Conjunctivae normal.      Pupils: Pupils are equal, round, and reactive to light.   Skin:     General: Skin is warm.   HENT:      Head: Normocephalic.      Ears:      Comments: Scant serosang output noted from right ear.      Mouth/Throat:      Mouth: Mucous membranes are moist.   Cardiovascular:      Rate and Rhythm: Normal rate and regular rhythm.      Pulses: Normal pulses.      Heart sounds: Normal heart sounds.   Musculoskeletal:         General: Normal range of motion.   Abdominal: General: Bowel sounds are normal. There is no distension.      Palpations: Abdomen is soft.      Tenderness: There is no abdominal tenderness. There is no guarding.   Constitutional:       General: He is not in acute distress.     Appearance: He is well-developed and well-nourished. He is not ill-appearing or toxic-appearing.   Pulmonary:      Effort: Pulmonary effort is normal. No accessory muscle usage, respiratory distress or accessory muscle usage.      Breath sounds: Normal breath sounds. No wheezing.   Psychiatric:         Speech: Speech is not no expressive aphasia.   Neurological:      General: No focal deficit present.      Mental Status: He is alert and oriented to person, place and time.      Cranial Nerves: No facial asymmetry.      Sensory: No sensory deficit.      Comments: 5/5 strength b/l UE. 3/5 strength LLE hip flexion secondary to recent surgery. 5/5 right lower extremity hip flexion. 5/5 b/l plantar flexion and b/l dorsiflexion             Diagnostic Studies      EKG: na  Imaging:  I have personally reviewed pertinent reports.   and I have personally reviewed  pertinent films in PACS     Medications:  Scheduled PRN   acetaminophen, 975 mg, Q8H  amLODIPine, 5 mg, Daily  cefazolin, 2,000 mg, Q8H  docusate sodium, 100 mg, BID  lidocaine (PF), ,   [START ON 4/30/2024] polyethylene glycol, 17 g, Daily  potassium citrate, 20 mEq, TID With Meals  [START ON 4/30/2024] senna, 1 tablet, Daily      acetaminophen, 650 mg, Q6H PRN  bisacodyl, 10 mg, Daily PRN  HYDROmorphone, 0.2 mg, Q4H PRN  labetalol, 10 mg, Q4H PRN  lidocaine (PF), ,   ondansetron, 4 mg, Q4H PRN  oxyCODONE, 2.5 mg, Q4H PRN   Or  oxyCODONE, 5 mg, Q4H PRN  oxyCODONE, 5 mg, Q6H PRN       Continuous    sodium chloride, 75 mL/hr  sodium chloride, 100 mL/hr         Labs:    CBC    Recent Labs     04/29/24  1309   WBC 8.89   HGB 14.7   HCT 45.7        BMP    Recent Labs     04/29/24  1309   SODIUM 138   K 3.9      CO2 22   AGAP 10   BUN 20   CREATININE 1.32*   CALCIUM 8.7       Coags    Recent Labs     04/29/24  1309   INR 1.01   PTT 28        Additional Electrolytes  No recent results       Blood Gas    No recent results  No recent results LFTs  No recent results    Infectious  No recent results  Glucose  Recent Labs     04/29/24  1309   GLUC 147*                Teresa Kiser PA-C

## 2024-04-29 NOTE — H&P
Chief Complaint   Patient presents with    Ear Problem    Hearing Loss            HPI:  73 y.o. year old male with mixed hearing loss and type B normal volume tympanograms referred for possible otologic surgery after finding potential SCCD on CT temporal bones for followup. He was first seen by Dr. Wasserman for these complaints on 1/4/2024. At that time bilateral middle ear effusions were noted and CT findings included possible bilateral tegmen defects and an MRI was ordered.      Today he reports that he continues to have episodic ear fullness and hearing loss. He has occasional popping in his ears with resolution of his hearing loss. He denies vertigo or imbalance. He denies otalgia, otorrhea. He has postnasal drip and rhinorrhea, which he feels is at his baseline from his allergic rhinitis.       He recently had a left knee replacement surgery (5 weeks ago) and has 3 more weeks of PT scheduled. He has been told that he will need the right ear surgery and thinks that it will be after Easter.      Prior history:  73 year old man with mixed hearing loss and type B normal volume tympanograms referred for possible otologic surgery after finding potential SCCD on CT temporal bones.      The patient reports that he began having hearing difficulty in Right ear 2yr/a, then L ear began having similar concerns 1yr/a. Pt also has complaints of occasional pulsatile tinnitus, The patient denied ever having imbalance or vertigo. He denies otalgia, otorrhea, history of otologic surgery, history of head trauma.  Pt reported seasonal allergy hx.  Pt uses q-tips post shower to cleanhis ear canals frequently, and reported 1 recent episode of bleeding from R EAC post cleaning.     ROS: as documented by the MA and confirmed by Dr. Wasserman     Meds:     Current Outpatient Medications:     acetaminophen (TYLENOL) 325 mg tablet, Take 3 tablets (975 mg total) by mouth every 8 (eight) hours, Disp: , Rfl:     aspirin 325 mg tablet, Take 1  "tablet (325 mg total) by mouth 2 (two) times a day, Disp: 82 tablet, Rfl: 0    b complex vitamins tablet, Take 1 tablet by mouth 2 (two) times a day, Disp: , Rfl:     docusate sodium (COLACE) 100 mg capsule, Take 1 capsule (100 mg total) by mouth 2 (two) times a day, Disp: 60 capsule, Rfl: 0    oxyCODONE (Roxicodone) 5 immediate release tablet, Take 1-2 tablets by mouth every 6 hours as needed for pain, Disp: 40 tablet, Rfl: 0    potassium citrate (UROCIT-K) 10 mEq, Take 2 tablets (20 mEq total) by mouth 3 (three) times a day with meals, Disp: , Rfl:     VITAMIN D PO, Take by mouth every morning With calcium, Disp: , Rfl:     VITAMIN E PO, Take 268 mg by mouth daily, Disp: , Rfl:      ALL:        Allergies   Allergen Reactions    Other Nasal Congestion       Seasonal allergies    Bean Pod Extract - Food Allergy Nausea Only       Soft beans- eg chick peas, lima's, sweet peas         PE:  /82   Pulse 104   Temp 98.1 °F (36.7 °C) (Tympanic)   Resp 16   Ht 5' 7\" (1.702 m)   Wt 103 kg (228 lb)   SpO2 97%   BMI 35.71 kg/m²    Pleasant male NAD  Awake and alert, oriented x 3, appropriate  Respiratory: No stridor, breathing unlabored, no hoarseness   Psychiatric:  mood normal  Chest: clear to auscultation  Cardiac: regular rate and rhythm  Abdomen: nondistended soft nontender  Extremities: no CCE     Tympanogram 6/5/2023  Type B normal volume bilaterally  Tracings reviewed by Dr. Wasserman, who agrees with the impression as noted above.     Audiogram: 5/10/2023  Right ear: mild to moderately severe mixed hearing loss SRT 45 dB %  Left ear: mild to moderately severe mixed hearing loss notch at 4000Hz SRT 45 dB WR 90%  Tracings reviewed by Dr. Wasserman, who agrees with the impression as noted above.     Tympanogram: 5/10/2023  Right ear: type B normal volume  Left ear: type B normal volume  Tracings reviewed by Dr. Wasserman, who agrees with the impression as noted above.     IMAGING:     MRI of the brain/IACs " 1/20/2024  Irregular temporal lobe inferiorly right > > left  Bilateral fluid in the mastoids and middle ears with fluid density extending into the Eustachian tube orifices  Dr. Wasserman has reviewed the images and agrees with the impression as noted above.     CT temporal bones without contrast 6/12/2023  Right ear: mastoid with fluid, possible multiple dehiscences of epitympanic tegmen and mastoid tegmen, fluid in the middle ear, normal ossicular chain, thinning of bone over the superior semicircular canal possible dehiscence, normal external auditory canal  Left ear: mastoid with fluid, possible multiple dehiscences of epitympanic tegmen and mastoid tegmen, fluid in the middle ear, normal ossicular chain, thinning of bone over the superior semicircular canal possible dehiscence, normal external auditory canal  Dr. Wasserman has reviewed the images and agrees with the impression as noted above.     Impression:        Encounter Diagnoses   Name Primary?    Tegmen defect of base of skull Yes    CSF otorrhea      Mixed conductive and sensorineural hearing loss of both ears      ETD (Eustachian tube dysfunction), bilateral      Allergic rhinitis, unspecified seasonality, unspecified trigger           Plan:   The results of the patients MRI were reviewed with the patient in detail  Bilateral minimiddle fossa surgery with tegmen repair and repair of CSF otorrhea with lumbar drainage was recommended. He understands that the risks of the surgery include but are not limited to infection, meningitis, hearing loss, need for further surgery, and that he will need to stay in the hospital for several days after surgery in the intensive care unit.  He has agreed to proceed with surgery.  We will schedule his surgery after he has had his right knee replacement and completed physical therapy for that recovery  He was referred to neurosurgery/Billy August for further evaluation and cosurgical management of his bilateral tegmen dehiscences  and CSF otorrhea  All questions were answered in plain language.  The patient will follow up with Dr. Wasserman 1 week after surgery

## 2024-04-29 NOTE — QUICK NOTE
PACU nurse reached out to me via Tiger text.  Patient had some bloody drainage from his left ear intermittently as well as very slow draining lumbar drain.  Went and assessed patient he is still very sleepy and trying to wake up from anesthesia.  There is some bloody drainage in patient's left ear.  This was cleaned out and no further bloody drainage noted.  Lumbar drain is draining.  Dr. August made aware.  PACU nurse will continue to monitor.

## 2024-04-29 NOTE — QUICK NOTE
Went back to reassess patient.  He is more awake but sleepy.  He is alert and oriented x 3.  He is following commands in all 4 extremities and able to give me a thumbs up.  He offers no complaints.  Lumbar drain, draining more briskly now.  He continues to have small amount of bloody drainage but this left ear but it has slowed down.  He is being transferred to ICU.  Neurosurgery will continue to follow, call with any further questions or concerns.

## 2024-04-30 ENCOUNTER — TELEPHONE (OUTPATIENT)
Dept: NEUROSURGERY | Facility: CLINIC | Age: 74
End: 2024-04-30

## 2024-04-30 LAB
ANION GAP SERPL CALCULATED.3IONS-SCNC: 10 MMOL/L (ref 4–13)
APTT PPP: 27 SECONDS (ref 23–37)
BUN SERPL-MCNC: 22 MG/DL (ref 5–25)
CALCIUM SERPL-MCNC: 8.4 MG/DL (ref 8.4–10.2)
CHLORIDE SERPL-SCNC: 107 MMOL/L (ref 96–108)
CO2 SERPL-SCNC: 23 MMOL/L (ref 21–32)
CREAT SERPL-MCNC: 1.25 MG/DL (ref 0.6–1.3)
ERYTHROCYTE [DISTWIDTH] IN BLOOD BY AUTOMATED COUNT: 13 % (ref 11.6–15.1)
GFR SERPL CREATININE-BSD FRML MDRD: 57 ML/MIN/1.73SQ M
GLUCOSE SERPL-MCNC: 131 MG/DL (ref 65–140)
HCT VFR BLD AUTO: 41.4 % (ref 36.5–49.3)
HGB BLD-MCNC: 13.5 G/DL (ref 12–17)
INR PPP: 1.04 (ref 0.84–1.19)
MCH RBC QN AUTO: 30.8 PG (ref 26.8–34.3)
MCHC RBC AUTO-ENTMCNC: 32.6 G/DL (ref 31.4–37.4)
MCV RBC AUTO: 95 FL (ref 82–98)
PLATELET # BLD AUTO: 267 THOUSANDS/UL (ref 149–390)
PMV BLD AUTO: 9.5 FL (ref 8.9–12.7)
POTASSIUM SERPL-SCNC: 4.4 MMOL/L (ref 3.5–5.3)
PROTHROMBIN TIME: 13.5 SECONDS (ref 11.6–14.5)
RBC # BLD AUTO: 4.38 MILLION/UL (ref 3.88–5.62)
SODIUM SERPL-SCNC: 140 MMOL/L (ref 135–147)
WBC # BLD AUTO: 13.29 THOUSAND/UL (ref 4.31–10.16)

## 2024-04-30 PROCEDURE — 85730 THROMBOPLASTIN TIME PARTIAL: CPT | Performed by: PHYSICIAN ASSISTANT

## 2024-04-30 PROCEDURE — 97163 PT EVAL HIGH COMPLEX 45 MIN: CPT

## 2024-04-30 PROCEDURE — 97167 OT EVAL HIGH COMPLEX 60 MIN: CPT

## 2024-04-30 PROCEDURE — 85027 COMPLETE CBC AUTOMATED: CPT | Performed by: PHYSICIAN ASSISTANT

## 2024-04-30 PROCEDURE — 99233 SBSQ HOSP IP/OBS HIGH 50: CPT | Performed by: INTERNAL MEDICINE

## 2024-04-30 PROCEDURE — 99024 POSTOP FOLLOW-UP VISIT: CPT | Performed by: NEUROLOGICAL SURGERY

## 2024-04-30 PROCEDURE — 80048 BASIC METABOLIC PNL TOTAL CA: CPT | Performed by: PHYSICIAN ASSISTANT

## 2024-04-30 PROCEDURE — 99024 POSTOP FOLLOW-UP VISIT: CPT | Performed by: OTOLARYNGOLOGY

## 2024-04-30 PROCEDURE — 99232 SBSQ HOSP IP/OBS MODERATE 35: CPT | Performed by: INTERNAL MEDICINE

## 2024-04-30 PROCEDURE — 85610 PROTHROMBIN TIME: CPT | Performed by: PHYSICIAN ASSISTANT

## 2024-04-30 RX ORDER — HYDROMORPHONE HCL IN WATER/PF 6 MG/30 ML
0.2 PATIENT CONTROLLED ANALGESIA SYRINGE INTRAVENOUS EVERY 8 HOURS PRN
Status: DISCONTINUED | OUTPATIENT
Start: 2024-04-30 | End: 2024-05-01

## 2024-04-30 RX ADMIN — DOCUSATE SODIUM 100 MG: 100 CAPSULE, LIQUID FILLED ORAL at 08:17

## 2024-04-30 RX ADMIN — DOCUSATE SODIUM 100 MG: 100 CAPSULE, LIQUID FILLED ORAL at 17:17

## 2024-04-30 RX ADMIN — POTASSIUM CITRATE 20 MEQ: 10 TABLET, EXTENDED RELEASE ORAL at 11:33

## 2024-04-30 RX ADMIN — ACETAMINOPHEN 975 MG: 325 TABLET, FILM COATED ORAL at 17:17

## 2024-04-30 RX ADMIN — AMLODIPINE BESYLATE 5 MG: 5 TABLET ORAL at 08:17

## 2024-04-30 RX ADMIN — SENNOSIDES 8.6 MG: 8.6 TABLET, FILM COATED ORAL at 08:17

## 2024-04-30 RX ADMIN — POLYETHYLENE GLYCOL 3350 17 G: 17 POWDER, FOR SOLUTION ORAL at 08:17

## 2024-04-30 RX ADMIN — POTASSIUM CITRATE 20 MEQ: 10 TABLET, EXTENDED RELEASE ORAL at 08:17

## 2024-04-30 RX ADMIN — CEFAZOLIN SODIUM 2000 MG: 2 SOLUTION INTRAVENOUS at 04:00

## 2024-04-30 RX ADMIN — CEFAZOLIN SODIUM 2000 MG: 2 SOLUTION INTRAVENOUS at 11:34

## 2024-04-30 RX ADMIN — ACETAMINOPHEN 975 MG: 325 TABLET, FILM COATED ORAL at 00:36

## 2024-04-30 RX ADMIN — POTASSIUM CITRATE 20 MEQ: 10 TABLET, EXTENDED RELEASE ORAL at 17:17

## 2024-04-30 RX ADMIN — ACETAMINOPHEN 975 MG: 325 TABLET, FILM COATED ORAL at 23:36

## 2024-04-30 RX ADMIN — CEFAZOLIN SODIUM 2000 MG: 2 SOLUTION INTRAVENOUS at 20:09

## 2024-04-30 RX ADMIN — ACETAMINOPHEN 975 MG: 325 TABLET, FILM COATED ORAL at 08:17

## 2024-04-30 NOTE — PLAN OF CARE
Problem: Prexisting or High Potential for Compromised Skin Integrity  Goal: Skin integrity is maintained or improved  Description: INTERVENTIONS:  - Identify patients at risk for skin breakdown  - Assess and monitor skin integrity  - Assess and monitor nutrition and hydration status  - Monitor labs   - Assess for incontinence   - Turn and reposition patient  - Assist with mobility/ambulation  - Relieve pressure over bony prominences  - Avoid friction and shearing  - Provide appropriate hygiene as needed including keeping skin clean and dry  - Evaluate need for skin moisturizer/barrier cream  - Collaborate with interdisciplinary team   - Patient/family teaching  - Consider wound care consult   Outcome: Progressing     Problem: PAIN - ADULT  Goal: Verbalizes/displays adequate comfort level or baseline comfort level  Description: Interventions:  - Encourage patient to monitor pain and request assistance  - Assess pain using appropriate pain scale  - Administer analgesics based on type and severity of pain and evaluate response  - Implement non-pharmacological measures as appropriate and evaluate response  - Consider cultural and social influences on pain and pain management  - Notify physician/advanced practitioner if interventions unsuccessful or patient reports new pain  Outcome: Progressing     Problem: INFECTION - ADULT  Goal: Absence or prevention of progression during hospitalization  Description: INTERVENTIONS:  - Assess and monitor for signs and symptoms of infection  - Monitor lab/diagnostic results  - Monitor all insertion sites, i.e. indwelling lines, tubes, and drains  - Monitor endotracheal if appropriate and nasal secretions for changes in amount and color  - [place] appropriate cooling/warming therapies per order  - Administer medications as ordered  - Instruct and encourage patient and family to use good hand hygiene technique  - Identify and instruct in appropriate isolation precautions for  identified infection/condition  Outcome: Progressing  Goal: Absence of fever/infection during neutropenic period  Description: INTERVENTIONS:  - Monitor WBC    Outcome: Progressing     Problem: SAFETY ADULT  Goal: Patient will remain free of falls  Description: INTERVENTIONS:  - Educate patient/family on patient safety including physical limitations  - Instruct patient to call for assistance with activity   - Consult OT/PT to assist with strengthening/mobility   - Keep Call bell within reach  - Keep bed low and locked with side rails adjusted as appropriate  - Keep care items and personal belongings within reach  - Initiate and maintain comfort rounds  - Make Fall Risk Sign visible to staff  - Offer Toileting every 2 Hours, in advance of need  - Initiate/Maintain bed alarm  - Obtain necessary fall risk management equipment: fall risk sign   - Apply yellow socks and bracelet for high fall risk patients  - Consider moving patient to room near nurses station  Outcome: Progressing  Goal: Maintain or return to baseline ADL function  Description: INTERVENTIONS:  -  Assess patient's ability to carry out ADLs; assess patient's baseline for ADL function and identify physical deficits which impact ability to perform ADLs (bathing, care of mouth/teeth, toileting, grooming, dressing, etc.)  - Assess/evaluate cause of self-care deficits   - Assess range of motion  - Assess patient's mobility; develop plan if impaired  - Assess patient's need for assistive devices and provide as appropriate  - Encourage maximum independence but intervene and supervise when necessary  - Involve family in performance of ADLs  - Assess for home care needs following discharge   - Consider OT consult to assist with ADL evaluation and planning for discharge  - Provide patient education as appropriate  Outcome: Progressing  Goal: Maintains/Returns to pre admission functional level  Description: INTERVENTIONS:  - Perform AM-PAC 6 Click Basic Mobility/  Daily Activity assessment daily.  - Set and communicate daily mobility goal to care team and patient/family/caregiver.   - Collaborate with rehabilitation services on mobility goals if consulted  - Perform Range of Motion 2 times a day.  - Reposition patient every 3 hours.  - Dangle patient 2 times a day  - Stand patient 2 times a day  - Ambulate patient 2 times a day  - Out of bed to chair 2 times a day   - Out of bed for meals 2 times a day  - Out of bed for toileting  - Record patient progress and toleration of activity level   Outcome: Progressing     Problem: DISCHARGE PLANNING  Goal: Discharge to home or other facility with appropriate resources  Description: INTERVENTIONS:  - Identify barriers to discharge w/patient and caregiver  - Arrange for needed discharge resources and transportation as appropriate  - Identify discharge learning needs (meds, wound care, etc.)  - Arrange for interpretive services to assist at discharge as needed  - Refer to Case Management Department for coordinating discharge planning if the patient needs post-hospital services based on physician/advanced practitioner order or complex needs related to functional status, cognitive ability, or social support system  Outcome: Progressing     Problem: Knowledge Deficit  Goal: Patient/family/caregiver demonstrates understanding of disease process, treatment plan, medications, and discharge instructions  Description: Complete learning assessment and assess knowledge base.  Interventions:  - Provide teaching at level of understanding  - Provide teaching via preferred learning methods  Outcome: Progressing     Problem: NEUROSENSORY - ADULT  Goal: Achieves stable or improved neurological status  Description: INTERVENTIONS  - Monitor and report changes in neurological status  - Monitor vital signs such as temperature, blood pressure, glucose, and any other labs ordered   - Initiate measures to prevent increased intracranial pressure  - Monitor  for seizure activity and implement precautions if appropriate      Outcome: Progressing  Goal: Remains free of injury related to seizures activity  Description: INTERVENTIONS  - Maintain airway, patient safety  and administer oxygen as ordered  - Monitor patient for seizure activity, document and report duration and description of seizure to physician/advanced practitioner  - If seizure occurs,  ensure patient safety during seizure  - Reorient patient post seizure  - Seizure pads on all 4 side rails  - Instruct patient/family to notify RN of any seizure activity including if an aura is experienced  - Instruct patient/family to call for assistance with activity based on nursing assessment  - Administer anti-seizure medications if ordered    Outcome: Progressing  Goal: Achieves maximal functionality and self care  Description: INTERVENTIONS  - Monitor swallowing and airway patency with patient fatigue and changes in neurological status  - Encourage and assist patient to increase activity and self care.   - Encourage visually impaired, hearing impaired and aphasic patients to use assistive/communication devices  Outcome: Progressing     Problem: GENITOURINARY - ADULT  Goal: Maintains or returns to baseline urinary function  Description: INTERVENTIONS:  - Assess urinary function  - Encourage oral fluids to ensure adequate hydration if ordered  - Administer IV fluids as ordered to ensure adequate hydration  - Administer ordered medications as needed  - Offer frequent toileting  - Follow urinary retention protocol if ordered  Outcome: Progressing  Goal: Absence of urinary retention  Description: INTERVENTIONS:  - Assess patient’s ability to void and empty bladder  - Monitor I/O  - Bladder scan as needed  - Discuss with physician/AP medications to alleviate retention as needed  - Discuss catheterization for long term situations as appropriate  Outcome: Progressing  Goal: Urinary catheter remains patent  Description:  INTERVENTIONS:  - Assess patency of urinary catheter  - If patient has a chronic dover, consider changing catheter if non-functioning  - Follow guidelines for intermittent irrigation of non-functioning urinary catheter  Outcome: Progressing     Problem: SKIN/TISSUE INTEGRITY - ADULT  Goal: Skin Integrity remains intact(Skin Breakdown Prevention)  Description: Assess:  -Perform Drew assessment every   -Clean and moisturize skin every   -Inspect skin when repositioning, toileting, and assisting with ADLS  -Assess under medical devices such as  every   -Assess extremities for adequate circulation and sensation     Bed Management:  -Have minimal linens on bed & keep smooth, unwrinkled  -Change linens as needed when moist or perspiring  -Avoid sitting or lying in one position for more than  hours while in bed  -Keep HOB at degrees     Toileting:  -Offer bedside commode  -Assess for incontinence every   -Use incontinent care products after each incontinent episode such as     Activity:  -Mobilize patient  times a day  -Encourage activity and walks on unit  -Encourage or provide ROM exercises   -Turn and reposition patient every  Hours  -Use appropriate equipment to lift or move patient in bed  -Instruct/ Assist with weight shifting every  when out of bed in chair  -Consider limitation of chair time  hour intervals    Skin Care:  -Avoid use of baby powder, tape, friction and shearing, hot water or constrictive clothing  -Relieve pressure over bony prominences using   -Do not massage red bony areas    Next Steps:  -Teach patient strategies to minimize risks such as    -Consider consults to  interdisciplinary teams such as   Outcome: Progressing  Goal: Incision(s), wounds(s) or drain site(s) healing without S/S of infection  Description: INTERVENTIONS  - Assess and document dressing, incision, wound bed, drain sites and surrounding tissue  - Provide patient and family education  - Perform skin care/dressing changes every    Outcome: Progressing  Goal: Pressure injury heals and does not worsen  Description: Interventions:  - Implement low air loss mattress or specialty surface (Criteria met)  - Apply silicone foam dressing  - Instruct/assist with weight shifting every  minutes when in chair   - Limit chair time to  hour intervals  - Use special pressure reducing interventions such as  when in chair   - Apply fecal or urinary incontinence containment device   - Perform passive or active ROM every   - Turn and reposition patient & offload bony prominences every  hours   - Utilize friction reducing device or surface for transfers   - Consider consults to  interdisciplinary teams such as   - Use incontinent care products after each incontinent episode such as   - Consider nutrition services referral as needed  Outcome: Progressing     Problem: MUSCULOSKELETAL - ADULT  Goal: Maintain or return mobility to safest level of function  Description: INTERVENTIONS:  - Assess patient's ability to carry out ADLs; assess patient's baseline for ADL function and identify physical deficits which impact ability to perform ADLs (bathing, care of mouth/teeth, toileting, grooming, dressing, etc.)  - Assess/evaluate cause of self-care deficits   - Assess range of motion  - Assess patient's mobility  - Assess patient's need for assistive devices and provide as appropriate  - Encourage maximum independence but intervene and supervise when necessary  - Involve family in performance of ADLs  - Assess for home care needs following discharge   - Consider OT consult to assist with ADL evaluation and planning for discharge  - Provide patient education as appropriate  Outcome: Progressing  Goal: Maintain proper alignment of affected body part  Description: INTERVENTIONS:  - Support, maintain and protect limb and body alignment  - Provide patient/ family with appropriate education  Outcome: Progressing     Problem: Nutrition/Hydration-ADULT  Goal: Nutrient/Hydration  intake appropriate for improving, restoring or maintaining nutritional needs  Description: Monitor and assess patient's nutrition/hydration status for malnutrition. Collaborate with interdisciplinary team and initiate plan and interventions as ordered.  Monitor patient's weight and dietary intake as ordered or per policy. Utilize nutrition screening tool and intervene as necessary. Determine patient's food preferences and provide high-protein, high-caloric foods as appropriate.     INTERVENTIONS:  - Monitor oral intake, urinary output, labs, and treatment plans  - Assess nutrition and hydration status and recommend course of action  - Evaluate amount of meals eaten  - Assist patient with eating if necessary   - Allow adequate time for meals  - Recommend/ encourage appropriate diets, oral nutritional supplements, and vitamin/mineral supplements  - Order, calculate, and assess calorie counts as needed  - Recommend, monitor, and adjust tube feedings and TPN/PPN based on assessed needs  - Assess need for intravenous fluids  - Provide specific nutrition/hydration education as appropriate  - Include patient/family/caregiver in decisions related to nutrition  Outcome: Progressing

## 2024-04-30 NOTE — ASSESSMENT & PLAN NOTE
Hx of CKD 3  - following with  nephrology in the outpt setting, specifically with Dr. Cottrell  - baseline Cr 1.2-1.3    Lab Results   Component Value Date    EGFR 57 04/30/2024    EGFR 53 04/29/2024    EGFR 56 03/26/2024    CREATININE 1.25 04/30/2024    CREATININE 1.32 (H) 04/29/2024    CREATININE 1.26 03/26/2024     Plan:   Nephrology consulted given hx   Pt with stable renal function   4/30 Cr: 1.25  Continue to monitor   Continue mIVF w/ NS @ 100cc/hr   Continue home BP meds with hold parameters   Encourage ongoing outpt follow-up with nephro upon discharge

## 2024-04-30 NOTE — PROGRESS NOTES
"OTOLARYNGOLOGY PROGRESS NOTE    Date of Service: 4/30/2024 6:13 AM    HPI  Patient is a 73 y.o. 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    Overnight Events:   No acute events overnight    PHYSICAL EXAM:  Vitals:    04/30/24 0330   BP: 125/64   Pulse: 80   Resp:    Temp:    SpO2:         General: No acute distress, AOx4  HEENT: incision CDI, denies salty taste in mouth, ss ear drainage  Neurology: No focal deficits  Lungs: Breathing easy, unlabored and even on room air  Cardio: RRR, well perfused  Abd: soft, NT, ND         Intake/Output Summary (Last 24 hours) at 4/30/2024 0613  Last data filed at 4/30/2024 0544  Gross per 24 hour   Intake 1800 ml   Output 2045 ml   Net -245 ml         LABORATORY    Recent Labs     04/29/24  1309   WBC 8.89   HGB 14.7   HCT 45.7          Recent Labs     04/29/24  1309   K 3.9      BUN 20       Invalid input(s): \"PTPAT\", \"PTINR\", \"APTTMNNM\", \"APTTPAT\"      Patient Active Problem List   Diagnosis    Renal colic on right side    Hyperkalemia    Sleep apnea    Hydronephrosis with ureteropelvic junction (UPJ) obstruction    BPH (benign prostatic hyperplasia)    Hypertension    Hyperuricemia    Stage 3a chronic kidney disease (HCC)    Nephrolithiasis    Obesity, morbid (HCC)    Mixed conductive and sensorineural hearing loss, bilateral    Primary osteoarthritis of both knees    Other hemorrhoids    Mixed hyperlipidemia    Pre-operative examination    Arthritis    S/P total knee replacement using cement, left    Benign hypertension with CKD (chronic kidney disease) stage III (HCC)    Tegmen defect of base of skull         ASSESSMENT  Patient is a 73 y.o. 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    PLAN  - continue to monitor for CSF leak   - Lumbar drain per NSGY  - rest of care per primary      Annia Miller, PGY2  Otolaryngology- Head and Neck Surgery  Please " contact [place] [facility] resident role

## 2024-04-30 NOTE — TELEPHONE ENCOUNTER
5/7/24 - PT DISCHARGED TO HOME  5/14/24 2 WK POV W/DEANDRE  6/7/24 6 WK POV W/JUARES  SEE ANA TELEPHONE NOTE CONFIRMING APTS    5/4/24 - PT STILL IN HOSPITAL    5/2/24 - PT STILL IN HOSPITAL    4/30/24 - PT IN [facility]  5/14/24 2 WK POV W/DEANDRE  6/7/24 6 WK POV W/JUARES

## 2024-04-30 NOTE — OCCUPATIONAL THERAPY NOTE
Occupational Therapy Evaluation     Patient Name: Swapnil Grover  Today's Date: 4/30/2024  Problem List  Principal Problem:    Tegmen defect of base of skull  Active Problems:    Hypertension    Stage 3a chronic kidney disease (HCC)    Past Medical History  Past Medical History:   Diagnosis Date    Arthritis     BPH (benign prostatic hyperplasia)     Breathing difficulty 12/01/2022    CPAP given per pt-s/p surgery    Cancer (HCC) 2015    basal cell of the skull     Chronic kidney disease     Colon polyp     Heart failure (HCC)     12/2/22-pt had ECHO-per pt R. sided block    History of subdural hematoma     Hypertension     Kidney stone     right stone    Renal disorder     Sleep apnea     mild- no CPAP-had nasal septoplasty     Past Surgical History  Past Surgical History:   Procedure Laterality Date    BASAL CELL CARCINOMA EXCISION  2015    skull    BRAIN SURGERY      in the 1990's-subdural hematoma    CATARACT EXTRACTION Left     COLONOSCOPY      x2    CYSTOSCOPY W/ LASER LITHOTRIPSY Right 11/27/2020    Procedure: CYSTOSCOPY, FLEXIBLE URETEROSCOPY, LASER, AND STENT EXCHANGE,STONE BASKET, RIGHT RETROGRADE;  Surgeon: Sabino Garcia MD;  Location: [facility];  Service: Urology    FL RETROGRADE PYELOGRAM  10/18/2020    FL RETROGRADE PYELOGRAM  11/27/2020    FL RETROGRADE PYELOGRAM  12/01/2022    FL RETROGRADE PYELOGRAM  12/22/2022    JOINT REPLACEMENT  1/23/2024    LITHOTRIPSY      NM ARTHRP KNE CONDYLE&PLATU MEDIAL&LAT COMPARTMENTS Left 01/23/2024    Procedure: ARTHROPLASTY KNEE TOTAL W ROBOT - overnight, cemented;  Surgeon: Bassem Moscoso DO;  Location: [facility];  Service: Orthopedics    NM CYSTO BLADDER W/URETERAL CATHETERIZATION Right 11/11/2020    Procedure: ESWL RIGHT;  Surgeon: Sabino Garcia MD;  [facility]: [facility];  Service: Urology    NM CYSTO BLADDER W/URETERAL CATHETERIZATION Right 12/01/2022    Procedure: CYSTOSCOPY RETROGRADE PYELOGRAM WITH INSERTION STENT URETERAL;  Surgeon: Sabino  MD Radha;  Location: [facility];  Service: Urology    WA CYSTO/URETERO W/LITHOTRIPSY &INDWELL STENT INSRT Right 10/18/2020    Procedure: CYSTOSCOPY URETEROSCOPY WITH BASKET EXTRACTION, RETROGRADE PYELOGRAM AND INSERTION STENT URETERAL;  Surgeon: Kris Ac MD;  [facility];  Service: Urology    WA CYSTO/URETERO W/LITHOTRIPSY &INDWELL STENT INSRT Right 12/22/2022    Procedure: CYSTOSCOPY URETEROSCOPY WITH LITHOTRIPSY HOLMIUM LASER, RETROGRADE PYELOGRAM AND INSERTION STENT URETERAL;  Surgeon: Sabino Garcia MD;  [facility];  Service: Urology    RETROMASTOID CRANIOTOMY Bilateral 4/29/2024    Procedure: CRANIOTOMY MIDDLE FOSSA,RETROMASTOID APPROACH FOR ENT;  Surgeon: Frances Wasserman MD;  Location:  MAIN OR;  Service: ENT    SEPTOPLASTY      in the 1990's-trimmed uvula    TEGMAN DEFECT REPAIR CSF LEAK Bilateral 4/29/2024    Procedure: Bilateral endoscopic middle fossa craniotomies for repair of tegmen defect and CSF leak with temporalis fascia muscle flap, with neuromonitoring (CN 7/8) and intraoperative lumbar drain placement;  Surgeon: Billy August MD;  Location:  MAIN OR;  Service: Neurosurgery    TONSILLECTOMY  1955    age 6      04/30/24 1155   OT Last Visit   OT Visit Date 04/30/24   Note Type   Note type Evaluation   Pain Assessment   Pain Assessment Tool 0-10   Pain Score No Pain   Restrictions/Precautions   Weight Bearing Precautions Per Order No   Other Precautions Telemetry;Fall Risk;Cognitive;Chair Alarm;Bed Alarm;Multiple lines;Seizure   Home Living   Type of Home House  (first floor set-up except printer on 2nd floor uses for work)   Home Layout Two level;Stairs to enter with rails  (5STE)   Bathroom Shower/Tub Tub/shower unit   Bathroom Toilet Standard   Bathroom Accessibility Accessible   Home Equipment Walker   Prior Function   Level of [place] Independent with ADLs;Independent with IADLS   Lives With (S)  Alone   Receives Help From Friend(s)  (teenage high  school student)   IADLs Independent with medication management;Independent with meal prep;Independent with driving   Vocational Part time employment on weekends   Lifestyle   Autonomy I with ADL's sponge bathes most of the time/IaDL's, no AD with functional mobility   Reciprocal Relationships friend -school student, othewise per pt limited social support   Service to Others part time employement on the weekeends   Intrinsic Gratification being Independent   General   Family/Caregiver Present No   ADL   Eating Assistance 7  Independent   Grooming Assistance 7  Independent   UB Bathing Assistance 5  Supervision/Setup   LB Bathing Assistance 4  Minimal Assistance   UB Dressing Assistance 5  Supervision/Setup   LB Dressing Assistance 3  Moderate Assistance   Toileting Assistance  4  Minimal Assistance   Bed Mobility   Supine to Sit 5  Supervision   Additional items Assist x 1;HOB elevated   Sit to Supine Unable to assess   Transfers   Sit to Stand 4  Minimal assistance   Additional items Assist x 1;Verbal cues   Stand to Sit 4  Minimal assistance  (quick sit to chair, cues to for hand placement)   Additional items Assist x 1   Additional Comments verbal cues for safety to center self with close proximity to chair prior to sitting, quick sit   Functional Mobility   Functional Mobility 4  Minimal assistance   Additional Comments min a x 1 without 2-3 steps from bed to chair, impulsive   Balance   Static Sitting Fair +   Dynamic Sitting Fair   Static Standing Fair -   Dynamic Standing Poor +   Ambulatory Poor +   Activity Tolerance   Activity Tolerance Patient limited by fatigue;Patient limited by pain   Medical Staff Made Aware PT Lucrecia due to the patient's co-morbidities, clinically unstable presentation, and present impairments which are a regression from the patient's baseline.    Nurse Made Aware RN cleared pt for therapy   RUE Assessment   RUE Assessment WFL   LUE Assessment   LUE Assessment WFL   Hand Function    Gross Motor Coordination Functional   Fine Motor Coordination Functional   Vision-Basic Assessment   Current Vision Wears glasses only for reading   Vision - Complex Assessment   Acuity Able to read clock/calendar on wall without difficulty   Cognition   Overall Cognitive Status (S)  Impaired   Arousal/Participation Alert;Responsive;Cooperative   Attention Attends with cues to redirect   Orientation Level Oriented X4   Memory Decreased recall of precautions;Decreased recall of recent events;Decreased short term memory   Following Commands Follows one step commands with increased time or repetition   Comments pt motivated for therapy, impulsive with impaired sustained attention, deficits in judgment, reasoning and safety. Will continue with cognitive assessment 2* to lives alone, limited social support, and drives.   Assessment   Limitation Decreased ADL status;Decreased Safe judgement during ADL;Decreased cognition;Decreased endurance;Decreased self-care trans;Decreased high-level ADLs   Prognosis Fair   Assessment Pt is a 73 y.o. male who was admitted to [facility] on 4/29/2024 with Tegmen defect of base of skull s/p  Bilateral endoscopic middle fossa craniotomies for repair of tegmen defect and CSF leak with temporalis fascia muscle flap. with neuromonitoring (CN 7/8) and intraoperative lumbar drain placement on 4/29  . Patient  has a past medical history of Arthritis, BPH (benign prostatic hyperplasia), Breathing difficulty (12/01/2022), Cancer (HCC) (2015), Chronic kidney disease, Colon polyp, Heart failure (HCC), History of subdural hematoma, Hypertension, Kidney stone, Renal disorder, and Sleep apnea.   At baseline pt was completing I with ADL's/IaDL's, no AD with functional mobility +drives. Pt lives alone in a 2SH with TAYLA first floor set-up. Currently pt requires min/mod a for overall ADLS and min a with RW and cues for safety for functional mobility/transfers. Pt currently presents with  impairments in the following categories -steps to enter environment, limited home support, difficulty performing ADLS, difficulty performing IADLS , limited insight into deficits, and compliance activity tolerance, endurance, standing balance/tolerance, sitting balance/tolerance, insight, safety , judgement , attention , and sequencing . These impairments, as well as pt's fatigue, decreased caregiver support, and risk for falls  limit pt's ability to safely engage in all baseline areas of occupation, includingbathing, dressing, toileting, functional mobility/transfers, community mobility, laundry , driving, house maintenance, medication management, meal prep, cleaning, social participation , and leisure activities   The patient's raw score on the AM-PAC Daily Activity Inpatient Short Form is 16. A raw score of less than 19 suggests the patient may benefit from discharge to post-acute rehabilitation services. Please refer to the recommendation of the Occupational Therapist for safe discharge planning. From OT standpoint, recommend mod level II upon D/C. OT will continue to follow to address the below stated goals.   Goals   Patient Goals go home soon   LTG Time Frame 10-14   Long Term Goal #1 see goals below   Plan   Treatment Interventions ADL retraining;Functional transfer training;Cognitive reorientation;Endurance training;Patient/family training;Equipment evaluation/education;Compensatory technique education;Energy conservation;Activityengagement;Continued evaluation   Goal Expiration Date 05/14/24   OT Frequency 2-3x/wk   Discharge Recommendation   Rehab Resource Intensity Level, OT II (Moderate Resource Intensity)   AM-PAC Daily Activity Inpatient   Lower Body Dressing 2   Bathing 2   Toileting 2   Upper Body Dressing 3   Grooming 3   Eating 4   Daily Activity Raw Score 16   Daily Activity Standardized Score (Calc for Raw Score >=11) 35.96   AM-PAC Applied Cognition Inpatient   Following a Speech/Presentation 2    Understanding Ordinary Conversation 4   Taking Medications 3   Remembering Where Things Are Placed or Put Away 3   Remembering List of 4-5 Errands 2   Taking Care of Complicated Tasks 1   Applied Cognition Raw Score 15   Applied Cognition Standardized Score 33.54   End of Consult   Patient Position at End of Consult Bed/Chair alarm activated;All needs within reach;Bedside chair   Nurse Communication Nurse aware of consult       Occupational Therapy Goals:    *Mod I with bed mobility to engage in functional tasks.  *Mod I Adl's after setup with use of AE PRN  *Mod I toileting and clothing management   *Mod I functional mobility and transfers to/from all surfaces with Fair + dynamic balance and safety for participation in dynamic adls and iadl tasks   *Demonstrate good carryover with safe use of RW during functional tasks   *Assess DME needs   *Increase activity tolerance to 25-30 minutes for participation in adls and enjoyable activities  *Pt to participate in further cognitive testing with good attention and participation to assist with safe d/c recommendations  *Mod I with Simulated IADL management task.  *Demonstrate good carryover of pt/family education and training with good tolerance for increased safety and independence with ADL's/ADl's.  *Pt will improve standing balance to 4-5 minutes with functional tasks to increase I with toileting/transfers.  *Patient will demonstrate 100% carryover of energy conservation techniques t/o functional I/ADL/leisure tasks w/o cues s/p skilled education to increase endurance during functional tasks  *Pt will increase attention to follow 100% simple multi-step verbal commands and be A/Ox4 consistently t/o use of external environmental cues w/ mod I  .  Frances Grady OTR/L

## 2024-04-30 NOTE — CASE MANAGEMENT
Case Management Assessment & Discharge Planning Note    Patient name Swapnil Grover  Location [facility] 718/[facility] 718-01 MRN [id number]  : 1950 Date 2024       Current Admission Date: 2024  Current Admission Diagnosis:Tegmen defect of base of skull   Patient Active Problem List    Diagnosis Date Noted    Tegmen defect of base of skull 2024    Benign hypertension with CKD (chronic kidney disease) stage III (HCC) 2024    S/P total knee replacement using cement, left 2024    Arthritis 2024    Pre-operative examination 2024    Mixed hyperlipidemia 2023    Primary osteoarthritis of both knees 2023    Other hemorrhoids 2023    Mixed conductive and sensorineural hearing loss, bilateral 2023    Obesity, morbid (HCC) 2023    Nephrolithiasis 2023    Stage 3a chronic kidney disease (HCC) 2023    Hyperuricemia 2022    Sleep apnea 2022    Hydronephrosis with ureteropelvic junction (UPJ) obstruction 2022    BPH (benign prostatic hyperplasia) 2022    Hypertension 2022    Hyperkalemia 10/19/2020    Renal colic on right side 10/18/2020      LOS (days): 1  Geometric Mean LOS (GMLOS) (days): 3.1  Days to GMLOS:1.8     OBJECTIVE:    Risk of Unplanned Readmission Score: 8.37         Current admission status: Inpatient       Preferred Pharmacy:   [id number] - [place], [place] - [address]  [address]  [facility] [address]  Phone: [phone] Fax: [phone]    Primary Care Provider: Frank Lombardi, DO    Primary Insurance: MEDICARE  Secondary Insurance: [facility]    ASSESSMENT:  Active Health Care Proxies       Chele Ruiz Health Care Representative - Friend   Primary Phone: [phone] (Home)                           Readmission Root Cause  30 Day Readmission: No    Patient Information  Admitted from:: Home  Mental Status: Alert  During Assessment patient was accompanied by: Not  accompanied during assessment  Assessment information provided by:: Patient  Primary Caregiver: Self  Support Systems: Self  County of Residence: [place]  What city do you live in?: [place]  Type of Current Residence: 2 story home  Upon entering residence, is there a bedroom on the main floor (no further steps)?: Yes  Upon entering residence, is there a bathroom on the main floor (no further steps)?: Yes  Living Arrangements: Lives Alone  Is patient a ?: No    Activities of Daily Living Prior to Admission  Functional Status: Independent  Completes ADLs independently?: Yes  Ambulates independently?: No  Level of ambulatory dependence: Assistance  Does patient use assisted devices?: No  Does patient currently own DME?: Yes  What DME does the patient currently own?: Straight Cane, Walker  Does patient have a history of Outpatient Therapy (PT/OT)?: No  Does the patient have a history of Short-Term Rehab?: Yes (discharged 10 days ago, knee surgery)  Does patient have a history of HHC?: No  Does patient currently have HHC?: No         Patient Information Continued  Income Source: Pension/FPC  Does patient have prescription coverage?: Yes  Does patient receive dialysis treatments?: No  Does patient have a history of substance abuse?: No  Does patient have a history of Mental Health Diagnosis?: No         Means of Transportation  Means of Transport to Appts:: Drives Self      Social Determinants of Health (SDOH)      Flowsheet Row Most Recent Value   Housing Stability    In the last 12 months, was there a time when you were not able to pay the mortgage or rent on time? N   In the last 12 months, how many places have you lived? 1   In the last 12 months, was there a time when you did not have a steady place to sleep or slept in a shelter (including now)? N   Transportation Needs    In the past 12 months, has lack of transportation kept you from medical appointments or from getting medications? no   In the past 12  months, has lack of transportation kept you from meetings, work, or from getting things needed for daily living? No   Food Insecurity    Within the past 12 months, you worried that your food would run out before you got the money to buy more. Never true   Within the past 12 months, the food you bought just didn't last and you didn't have money to get more. Never true   Utilities    In the past 12 months has the electric, gas, oil, or water company threatened to shut off services in your home? No            DISCHARGE DETAILS:    Discharge planning discussed with:: bedside with pt  Freedom of Choice: No  Comments - Freedom of Choice: No aftercare reccs  CM contacted family/caregiver?: No- see comments (N/A)             Contacts  Patient Contacts: Chele Ruiz  Relationship to Patient:: Friend  Contact Method: Phone  Phone Number: [phone]  Reason/Outcome: Emergency Contact        Additional Comments: Pt recently discharged from rehab s/p knee surgery. Pt is scheduled to have the other knee operated on in May. Pt is awaiting release from neurosurg to have the other knee completed. Pt lives alone and has access to 1st floor living. Pt has someone come to the home to take care of heavy lifting, they will also be installing a new handrail.

## 2024-04-30 NOTE — PLAN OF CARE
Problem: PHYSICAL THERAPY ADULT  Goal: Performs mobility at highest level of function for planned discharge setting.  See evaluation for individualized goals.  Description:    Equipment Recommended:  (has RW and w/c for home)       See flowsheet documentation for full assessment, interventions and recommendations.  Note: Prognosis: Good  Problem List: Impaired balance, Decreased mobility, Decreased skin integrity, Pain  Assessment: Pt is 73 y.o. male seen for PT evaluation s/p admit to [facility] on 4/29/2024  w/ Tegmen defect of base of skull. Pt underwent Bilateral endoscopic middle fossa craniotomies for repair of tegmen defect and CSF leak with temporalis fascia muscle flap. with neuromonitoring (CN 7/8) and intraoperative lumbar drain placement on 4/29 and PT was consulted for post op mobility and to assist w/ d/c planning recommendations. Pt   has a past medical history of Arthritis, BPH (benign prostatic hyperplasia), Breathing difficulty, Cancer (HCC), Chronic kidney disease, Colon polyp, Heart failure (HCC), History of subdural hematoma, Hypertension, Kidney stone, Renal disorder, and Sleep apnea.    Due to acute medical issues, ongoing medical management post op ; pain, fall risk, increased reliance on more restrictive AD compared to baseline;  decreased activity tolerance compared to baseline, increased assistance needed from caregiver at current time, continuous monitoring, trending labs;  multiple lines, decline in overall functional mobility status; health management issues; note unstable clinical picture (high complexity).  At baseline, pt reports being functionally indep; not using AD for mobility (has RW and SPC from recent TKA); drives and is indep w/ ADL's ; has neighbor A w/ IADL's prn. Currently,  pt  is requiring S A for bed skills; S for functional transfers and S for ambulation w/o AD 45'x1 + 2 rotational turns w/o LOB. Pt limited by pain and mild dizziness.   Pt presents  functioning below baseline and currently w/ overall mobility deficits 2* to: post op pain; mild dizziness;  SOB/JESUS; fatigue; multiple lines; hearing impairment/ visual impairments;   (Please find additional objective findings from PT assessment regarding body systems outlined above.) Pt will continue to benefit from skilled PT interventions to address stated impairments; to maximize functional potential; for ongoing pt/ family training; and DME needs.  PT is recommending PT Resource Intensity Level  2on d/c at presetn( may progress w/ continued mobility) .      PT will follow for STG as outlined on eval. Pt/ family agreeable to PT POC and goals as stated.  Barriers to Discharge: Decreased caregiver support     Rehab Resource Intensity Level, PT: III (Minimum Resource Intensity)    See flowsheet documentation for full assessment.

## 2024-04-30 NOTE — ASSESSMENT & PLAN NOTE
POD#1 - s/p cristiano middle fossa craniectomy for repair of CSF leak w/ dural substitute and Duraseal using neuromonitoring (CN 7 and NILA) and placement of a lumbar drain (LH/ENT 4/29)   Pt has been following with Dr. August in the  nsgy office with progressively worsening cristiano hearing loss, ear fullness, and CSF leak   h/o left craniotomy for SDH evacuation in 1992 in [place] (no reported complications)   Pt presented to the [facility] on 4/29 to elective suregry for repair of this tegmen defect and csf leak     Imaging:   Post-op imaging not indicated     Plan:   Continue to closely monitor neuro exam  Maintain normotensive BP goals, SBP < 160   1 LD in place   160cc output since drainage   Continue to drain 10cc/hr through today   Plan to clamp tomorrow and tentative removal on Thursday   Continue ancef while LD in place   Continue to monitor incision   Some bloody drainage noted from the L ear, nothing from the R ear   Seems to be slowing this am per nursing   Will continue to closely monitor output   Labs/Vitals:   VSS overnight, remains afebrile and tolerating room air   Post-op labs reviewed this am   Renal function remains at baseline   Mild leukocytosis with WBC ~13, likely reactive and not unexpected, low concern for infection   Pain control:   Tylenol 975mg PO q 8hrs   Oxycodone 2.5mg/5mg PO q 4hrs PRN mod-severe pain   Discontinued the oxy 5mg PO q 6hrs PRN breakthrough pain today   Dilaudid 0.2mg IV q 4hrs PRN breakthrough pain --> will decrease to q 8hrs PRN today and plan to d/c tomorrow   Bowel regimen: colace 100mg BID, senna 1 tab qd, Miralax qd   Advance as needed   Hold all AC/AP meds   No reported use at home   Was previously on ASA 325mg qd after knee replacement, since discontinued   DVT ppx: SCDs, hold chem dvt ppx until cleared by nsgy   Tentative plan to restart chem ppx on POD#2 w/ SQH BID   Home medications re-ordered   PT/OT   Social work following for assistance with dispo once medically  cleared     Neurosurgery will continue to follow as primary. Please reach out with any further questions or concerns.

## 2024-04-30 NOTE — PROGRESS NOTES
[facility]  Progress Note  Name: Swapnil Grover I  MRN: [id number]  Unit/Bed#: PPHP 718-01 I Date of Admission: 4/29/2024   Date of Service: 4/30/2024 I Hospital Day: 1    Assessment/Plan   * Tegmen defect of base of skull  Assessment & Plan  POD#1 - s/p cristiano middle fossa craniectomy for repair of CSF leak w/ dural substitute and Duraseal using neuromonitoring (CN 7 and NILA) and placement of a lumbar drain (LH/ENT 4/29)   Pt has been following with Dr. August in the [facility] office with progressively worsening cristiano hearing loss, ear fullness, and CSF leak   h/o left craniotomy for SDH evacuation in 1992 in [place] (no reported complications)   Pt presented to the [facility] on 4/29 to elective suregry for repair of this tegmen defect and csf leak     Imaging:   Post-op imaging not indicated     Plan:   Continue to closely monitor neuro exam  Maintain normotensive BP goals, SBP < 160   1 LD in place   160cc output since drainage   Continue to drain 10cc/hr through today   Plan to clamp tomorrow and tentative removal on Thursday   Continue ancef while LD in place   Continue to monitor incision   Some bloody drainage noted from the L ear, nothing from the R ear   Seems to be slowing this am per nursing   Will continue to closely monitor output   Labs/Vitals:   VSS overnight, remains afebrile and tolerating room air   Post-op labs reviewed this am   Renal function remains at baseline   Mild leukocytosis with WBC ~13, likely reactive and not unexpected, low concern for infection   Pain control:   Tylenol 975mg PO q 8hrs   Oxycodone 2.5mg/5mg PO q 4hrs PRN mod-severe pain   Discontinued the oxy 5mg PO q 6hrs PRN breakthrough pain today   Dilaudid 0.2mg IV q 4hrs PRN breakthrough pain --> will decrease to q 8hrs PRN today and plan to d/c tomorrow   Bowel regimen: colace 100mg BID, senna 1 tab qd, Miralax qd   Advance as needed   Hold all AC/AP meds   No reported use at home   Was  "previously on ASA 325mg qd after knee replacement, since discontinued   DVT ppx: SCDs, hold chem dvt ppx until cleared by nsgy   Tentative plan to restart chem ppx on POD#2 w/ SQH BID   Home medications re-ordered   PT/OT   Social work following for assistance with dispo once medically cleared     Neurosurgery will continue to follow as primary. Please reach out with any further questions or concerns.       Hypertension  Assessment & Plan  Hx of HTN   - home meds: amlodipine 5mg qd     Plan:   continue home meds   maintain normotensive BP goals, SBP < 160   encoaurged ongoing outpt follow-up with his PCP upon discharge     Stage 3a chronic kidney disease (HCC)  Assessment & Plan  Hx of CKD 3  - following with  nephrology in the outpt setting, specifically with Dr. Cottrell  - baseline Cr 1.2-1.3    Lab Results   Component Value Date    EGFR 57 04/30/2024    EGFR 53 04/29/2024    EGFR 56 03/26/2024    CREATININE 1.25 04/30/2024    CREATININE 1.32 (H) 04/29/2024    CREATININE 1.26 03/26/2024     Plan:   Nephrology consulted given hx   Pt with stable renal function   4/30 Cr: 1.25  Continue to monitor   Continue mIVF w/ NS @ 100cc/hr   Continue home BP meds with hold parameters   Encourage ongoing outpt follow-up with nephro upon discharge            Subjective/Objective   Chief Complaint: \"good morning\"     Subjective: Pt seen and examined this am on taisha RIOS. Pt was admitted to the the SD unit on SD 1 yesterday post-operatively.  Immediately postop and into the evening overnight, the patient was noted to have some drainage from the left ear.  This was described as bloody drainage.  Per nursing this has significantly slowed overnight and into this morning.  On my exam there is some bloody drainage it does not appear to be bloody CSF and more just [place] blood.  Will continue to closely monitor the site.    Patient reports mild right-sided headaches that improved with Tylenol.  He denies any vision changes, trouble " "swallowing, hearing changes, weakness, numbness, seizure-like activity or LOC.  Patient states that at baseline he does have some fluctuations in his hearing with good and bad days.  He states that today he feels his hearing is slightly improved.  He feels this could just be some of his baseline fluctuations but overall does feel it is slightly better which is reassuring.    Objective: Pleasant, obese, elderly male sitting up comfortably in bed.  No acute distress.    I/O         04/28 0701 04/29 0700 04/29 0701 04/30 0700 04/30 0701 05/01 0700    I.V. (mL/kg)  1700 (16.7)     IV Piggyback  100     Total Intake(mL/kg)  1800 (17.6)     Urine (mL/kg/hr)  2475 (1)     Drains  160     Other  30     Blood  100     Total Output  2765     Net  -965                  Invasive Devices       Peripheral Intravenous Line  Duration             Peripheral IV 04/29/24 Left Forearm 1 day    Peripheral IV 04/29/24 Right Forearm 1 day              Arterial Line  Duration             Arterial Line 04/29/24 Right Radial 1 day              Drain  Duration             Lumbar Drain 1 day                  Physical Exam:  Vitals: Blood pressure 106/55, pulse 81, temperature 98.5 °F (36.9 °C), temperature source Oral, resp. rate (!) 25, height 5' 7\" (1.702 m), weight 102 kg (224 lb 13.9 oz), SpO2 97%.,Body mass index is 35.22 kg/m².    General appearance: alert, appears stated age, cooperative and no distress  Head:   - s/p cristiano middle fossa craniotomies.   -Bilateral posterior auricular incisions are clean, dry, intact.  No significant erythema, edema or tenderness.  No drainage or bleeding.  Ears: Scant amount of bloody drainage noted in the left ear.  No drainage noted in the right ear.  Eyes: EOMI, PERRL  Neck: supple, symmetrical, trachea midline and NT  Back: no kyphosis present, no tenderness to percussion or palpation  -1 lumbar drain in place.  Small amount of blood staining around the insertion site.  Drain is functioning well and " "draining clear CSF.  Lungs: non labored breathing  Heart: regular heart rate  Neurologic:   Mental status: Alert, oriented x3, thought content appropriate  Cranial nerves: grossly intact (Cranial nerves II-XII)  Sensory: normal to Lt cristiano throughout   Motor: moving all extremities without focal weakness   Reflexes: 2+ and symmetric  Coordination: finger to nose normal bilaterally, no drift bilaterally    Lab Results:  Results from last 7 days   Lab Units 04/30/24  0636 04/29/24  1309   WBC Thousand/uL 13.29* 8.89   HEMOGLOBIN g/dL 13.5 14.7   HEMATOCRIT % 41.4 45.7   PLATELETS Thousands/uL 267 247   SEGS PCT %  --  89*   MONO PCT %  --  2*   EOS PCT %  --  0     Results from last 7 days   Lab Units 04/30/24  0636 04/29/24  1309   SODIUM mmol/L 140 138   POTASSIUM mmol/L 4.4 3.9   CHLORIDE mmol/L 107 106   CO2 mmol/L 23 22   BUN mg/dL 22 20   CREATININE mg/dL 1.25 1.32*   CALCIUM mg/dL 8.4 8.7             Results from last 7 days   Lab Units 04/30/24  0636 04/29/24  1309   INR  1.04 1.01   PTT seconds 27 28     No results found for: \"TROPONINT\"  ABG:No results found for: \"PHART\", \"XMY8ZDS\", \"PO2ART\", \"WSK9QQK\", \"E2WMEKUK\", \"BEART\", \"SOURCE\"    Imaging Studies: I have personally reviewed pertinent reports.   and I have personally reviewed pertinent films in PACS    No results found.    EKG, Pathology, and Other Studies: I have personally reviewed pertinent reports.      VTE Pharmacologic Prophylaxis: Sequential compression device (Venodyne) , hold chem dvt ppx until cleared by nsgy     VTE Mechanical Prophylaxis: sequential compression device     "

## 2024-04-30 NOTE — PHYSICAL THERAPY NOTE
PHYSICAL THERAPY EVALUATION  NAME:  Swapnil Grover  DATE: 04/30/24    AGE:   73 y.o.  Mrn:   [id number]  ADMIT DX:  Tegmen defect of base of skull [Q16.5]    Past Medical History:   Diagnosis Date    Arthritis     BPH (benign prostatic hyperplasia)     Breathing difficulty 12/01/2022    CPAP given per pt-s/p surgery    Cancer (HCC) 2015    basal cell of the skull     Chronic kidney disease     Colon polyp     Heart failure (HCC)     12/2/22-pt had ECHO-per pt R. sided block    History of subdural hematoma     Hypertension     Kidney stone     right stone    Renal disorder     Sleep apnea     mild- no CPAP-had nasal septoplasty     Past Surgical History:   Procedure Laterality Date    BASAL CELL CARCINOMA EXCISION  2015    skull    BRAIN SURGERY      in the 1990's-subdural hematoma    CATARACT EXTRACTION Left     COLONOSCOPY      x2    CYSTOSCOPY W/ LASER LITHOTRIPSY Right 11/27/2020    Procedure: CYSTOSCOPY, FLEXIBLE URETEROSCOPY, LASER, AND STENT EXCHANGE,STONE BASKET, RIGHT RETROGRADE;  Surgeon: Sabino Garcia MD;  Location: [facility];  Service: Urology    FL RETROGRADE PYELOGRAM  10/18/2020    FL RETROGRADE PYELOGRAM  11/27/2020    FL RETROGRADE PYELOGRAM  12/01/2022    FL RETROGRADE PYELOGRAM  12/22/2022    JOINT REPLACEMENT  1/23/2024    LITHOTRIPSY      VA ARTHRP KNE CONDYLE&PLATU MEDIAL&LAT COMPARTMENTS Left 01/23/2024    Procedure: ARTHROPLASTY KNEE TOTAL W ROBOT - overnight, cemented;  Surgeon: Bassem Moscoso DO;  Location: [facility];  Service: Orthopedics    VA CYSTO BLADDER W/URETERAL CATHETERIZATION Right 11/11/2020    Procedure: ESWL RIGHT;  Surgeon: Sabino Garcia MD;  Location: [facility];  Service: Urology    VA CYSTO BLADDER W/URETERAL CATHETERIZATION Right 12/01/2022    Procedure: CYSTOSCOPY RETROGRADE PYELOGRAM WITH INSERTION STENT URETERAL;  Surgeon: Sabino Garcia MD;  Location: [facility];  Service: Urology    VA CYSTO/URETERO W/LITHOTRIPSY &INDWELL STENT INSRT Right 10/18/2020     Procedure: CYSTOSCOPY URETEROSCOPY WITH BASKET EXTRACTION, RETROGRADE PYELOGRAM AND INSERTION STENT URETERAL;  Surgeon: Kris Ac MD;  Location: [facility];  Service: Urology    [place] CYSTO/URETERO W/LITHOTRIPSY &INDWELL STENT INSRT Right 12/22/2022    Procedure: CYSTOSCOPY URETEROSCOPY WITH LITHOTRIPSY HOLMIUM LASER, RETROGRADE PYELOGRAM AND INSERTION STENT URETERAL;  Surgeon: Sabino Gracia MD;  Location: [facility];  Service: Urology    RETROMASTOID CRANIOTOMY Bilateral 4/29/2024    Procedure: CRANIOTOMY MIDDLE FOSSA,RETROMASTOID APPROACH FOR ENT;  Surgeon: Frances Wasserman MD;  Location:  MAIN OR;  Service: ENT    SEPTOPLASTY      in the 1990's-trimmed uvula    TEGMAN DEFECT REPAIR CSF LEAK Bilateral 4/29/2024    Procedure: Bilateral endoscopic middle fossa craniotomies for repair of tegmen defect and CSF leak with temporalis fascia muscle flap, with neuromonitoring (CN 7/8) and intraoperative lumbar drain placement;  Surgeon: Billy August MD;  Location:  MAIN OR;  Service: Neurosurgery    TONSILLECTOMY  1955    age 6       Length Of Stay: 1  PHYSICAL THERAPY EVALUATION :             04/30/24 1144   PT Last Visit   PT Visit Date 04/30/24   Note Type   Note type Evaluation   Pain Assessment   Pain Assessment Tool 0-10   Pain Score 2   Pain Location/Orientation Location: Head   Pain Onset/Description Onset: Ongoing   Restrictions/Precautions   Weight Bearing Precautions Per Order No   Other Precautions Fall Risk;Pain;Multiple lines;Seizure  (lumbar drain)   Home Living   Type of Home House   Home Layout Two level;Stairs to enter with rails;Bed/bath upstairs  (5STE + HR)   Bathroom Shower/Tub Tub/shower unit   Bathroom Toilet Standard   Bathroom Accessibility Accessible   Home Equipment Walker;Cane   Prior Function   Level of [place] Independent with ADLs;Independent with functional mobility;Independent with IADLS   Lives With Alone   Receives Help From Friend(s);Neighbor   IADLs Independent with  "driving;Independent with meal prep;Independent with medication management   Falls in the last 6 months 0   Vocational Part time employment   Comments At baseline, pt reports being functionally indep; not usign AD for mobility (has RW and SPC from recent TKA); drives and is indep w/ ADL's ; has neighbor A w/ IADL's prn. (recent d/c as indep w/ HEP from OPPT from TKA)   General   Family/Caregiver Present No   Cognition   Arousal/Participation Alert   Attention Within functional limits   Orientation Level Oriented X4   Memory Decreased recall of precautions   Following Commands Follows one step commands without difficulty   Comments pt is impulsive at times (possibly baseline); required cues for safety/ taks focus.   Subjective   Subjective \"Ill be fine\"   RUE Assessment   RUE Assessment WFL   LUE Assessment   LUE Assessment WFL   RLE Assessment   RLE Assessment WFL   LLE Assessment   LLE Assessment WFL  (pt reports \"stiffness from recent TKA\" - has functional ROM and strength)   Coordination   Movements are Fluid and Coordinated 1   Sensation WFL   Light Touch   RLE Light Touch Grossly intact   LLE Light Touch Grossly intact   Sharp/Dull   RLE Sharp/Dull Grossly intact   LLE Sharp/Dull Grossly intact   Proprioception   RLE Proprioception Grossly intact   LLE Proprioception Grossly Intact   Bed Mobility   Supine to Sit 5  Supervision   Additional items Assist x 1;Increased time required;Verbal cues   Additional Comments S EOB sitting   Transfers   Sit to Stand 4  Minimal assistance  (CGA)   Stand to Sit 5  Supervision   Additional items Assist x 1;Increased time required;Verbal cues   Ambulation/Elevation   Gait pattern Improper Weight shift;Decreased foot clearance   Gait Assistance 4  Minimal assist  (CGA > S by completion)   Assistive Device None   Distance 45'   Stair Management Assistance Not tested   Balance   Static Sitting Good   Dynamic Sitting Fair +   Static Standing Fair   Dynamic Standing Fair - "   Ambulatory Fair -  (w/o AD)   Endurance Deficit   Endurance Deficit Yes   Activity Tolerance   Activity Tolerance Patient limited by fatigue;Patient limited by pain   Medical Staff Made Aware OT Nicki   Nurse Made Aware yes- updated- cleared for session   Assessment   Prognosis Good   Problem List Impaired balance;Decreased mobility;Decreased skin integrity;Pain   Assessment Pt is 73 y.o. male seen for PT evaluation s/p admit to [facility] on 4/29/2024  w/ Tegmen defect of base of skull. Pt underwent Bilateral endoscopic middle fossa craniotomies for repair of tegmen defect and CSF leak with temporalis fascia muscle flap. with neuromonitoring (CN 7/8) and intraoperative lumbar drain placement on 4/29 and PT was consulted for post op mobility and to assist w/ d/c planning recommendations. Pt   has a past medical history of Arthritis, BPH (benign prostatic hyperplasia), Breathing difficulty, Cancer (HCC), Chronic kidney disease, Colon polyp, Heart failure (HCC), History of subdural hematoma, Hypertension, Kidney stone, Renal disorder, and Sleep apnea.    Due to acute medical issues, ongoing medical management post op ; pain, fall risk, increased reliance on more restrictive AD compared to baseline;  decreased activity tolerance compared to baseline, increased assistance needed from caregiver at current time, continuous monitoring, trending labs;  multiple lines, decline in overall functional mobility status; health management issues; note unstable clinical picture (high complexity).  At baseline, pt reports being functionally indep; not using AD for mobility (has RW and SPC from recent TKA); drives and is indep w/ ADL's ; has neighbor A w/ IADL's prn. Currently,  pt  is requiring S A for bed skills; S for functional transfers and S for ambulation w/o AD 45'x1 + 2 rotational turns w/o LOB. Pt limited by pain and mild dizziness.   Pt presents functioning below baseline and currently w/ overall mobility  deficits 2* to: post op pain; mild dizziness;  SOB/JESUS; fatigue; multiple lines; hearing impairment/ visual impairments;   (Please find additional objective findings from PT assessment regarding body systems outlined above.) Pt will continue to benefit from skilled PT interventions to address stated impairments; to maximize functional potential; for ongoing pt/ family training; and DME needs.  PT is recommending PT Resource Intensity Level  2on d/c at presetn( may progress w/ continued mobility) .      PT will follow for STG as outlined on eval. Pt/ family agreeable to PT POC and goals as stated.   Barriers to Discharge Decreased caregiver support   Goals   Patient Goals go home   STG Expiration Date 05/14/24   Short Term Goal #1 In 14 days pt will:  Complete bed mobility skills with indep to facilitate safe return to previous living environment and decrease burden on caregivers.  Perform all functional transfers with indep  to facilitate safe return to previous living environment.    Ambulate  with least restrictive AD > 350' indep' without LOB and stable vitals for safe ambulation in home/ community environment.   Complete stair training up/ down flight step/s w/  1HR  for safe access to previous living environment and to increase community access.    Improve balance by 1 grade in order to decrease fall risk.  Actively participate w/ PT for ongoing pt and family education; DME needs and D/C planning to promote highest level of function in least restrictive environment.   Discharge Recommendation   Rehab Resource Intensity Level, PT III (Minimum Resource Intensity)   Equipment Recommended   (has RW and w/c for home)   AM-PAC Basic Mobility Inpatient   Turning in Flat Bed Without Bedrails 4   Lying on Back to Sitting on Edge of Flat Bed Without Bedrails 4   Moving Bed to Chair 4   Standing Up From Chair Using Arms 3   Walk in Room 3   Climb 3-5 Stairs With Railing 3   Basic Mobility Inpatient Raw Score 21   Basic  Mobility Standardized Score 45.55   [facility] Highest Level Of Mobility   -HLM Goal 6: Walk 10 steps or more   -HLM Achieved 7: Walk 25 feet or more   End of Consult   Patient Position at End of Consult Bedside chair;Bed/Chair alarm activated;All needs within reach       The patient's AM-PAC Basic Mobility Inpatient Short Form Raw Score is 21. A Raw score of greater than 16 suggests the patient may benefit from discharge to home. Please also refer to the recommendation of the Physical Therapist for safe discharge planning.

## 2024-04-30 NOTE — PROGRESS NOTES
[facility]  Progress Note: Critical Care  Name: Swapnil Grover 73 y.o. male I MRN: [id number]  Unit/Bed#: [facility] 718-01 I Date of Admission: 4/29/2024   Date of Service: 4/30/2024 I Hospital Day: 1    Assessment/Plan   Neuro:   Diagnosis: tegmen defect of base of skull, history of left craniotomy for SDH evacuation in 1992  S/p bilateral endoscopic middle fossa craniotomies for repair of tegmen defect and CSF leak with temporalis muscle flap and lumbar drain placement on 4/29  Plan: neuro checks q2h during day and q4h at night  Continue lumbar drain, drain 10cc/hr  Cranial incision care per primary  Stat CTH for any new focality on exam or decrease in GCS >2 points  Diagnosis: post-operative analgesia  Plan: continue multimodal pain regimen   Tylenol 975mg q8h [place]  Oxycodone 2.5mg/5mg q4h as needed for moderate/severe pain  IV dilaudid 0.2mg q4h as needed for breakthrough pain  Diagnosis: at risk for ICU delirium  Plan: delirium precautions, CAM ICU bid  Regulate sleep wake cycles, frequent redirection    CV:   Diagnosis: HTN  Plan: continue home amlodipine 5mg daily  SBP <160, MAP >65  Diagnosis: HLD  Plan: refuses statins per PCP note  Outpatient follow up    Pulm:  Diagnosis: ISAIAS, not on CPAP  Plan: maintain SpO2 >92%    GI:   No active issues  Bowel regimen: colace, miralax, senokot    :   Diagnosis: CKD3  Baseline Cr 1.2-1.3  Plan: trend renal indices, avoid hypotension  Monitor UO  D/c urinary catheter, retention protocol  Nephrology following    F/E/N:   F: //  E: monitor and replace per protocol  N: regular diet    Heme/Onc:   DVT ppx: b/l SCDs, chemical dvt ppx per neurosurgery    Endo:   No active issues  BG goal 140-180    ID:   Diagnosis: per-operative atibiotics  Plan: continue post-op ancef per primary team  Monitor WBC and fever curve    MSK/Skin:   No active issues  Frequent turning and repositioning  PT/OT/CM    Disposition: Stepdown Level 1    ICU Core  Measures     A: Assess, Prevent, and Manage Pain Has pain been assessed? Yes  Need for changes to pain regimen? No   B: Both SAT/SAT  N/A   C: Choice of Sedation RASS Goal: N/A patient not on sedation  Need for changes to sedation or analgesia regimen? NA   D: Delirium CAM-ICU: Negative   E: Early Mobility  Plan for early mobility? Yes   F: Family Engagement Plan for family engagement today? Yes       Antibiotic Review: Post op requirements     Review of Invasive Devices:    Kiser Plan: Kiser to be removed. Order has been placed    Josey Plan: Keep arterial line for hemodynamic monitoring and frequent labs    Prophylaxis:  VTE Contraindicated secondary to: LD placement, will start today   Stress Ulcer  not ordered        Significant 24hr Events     24hr events: no acute events overnight. No bowel movement yet, urinary catheter just removed this AM, awaiting to urinate.      Subjective     Review of Systems   Constitutional:  Negative for chills and fever.   HENT:  Negative for sore throat.    Eyes:  Negative for pain and visual disturbance.   Respiratory:  Negative for cough and shortness of breath.    Cardiovascular:  Negative for chest pain and palpitations.   Gastrointestinal:  Negative for abdominal pain, diarrhea, nausea and vomiting.   Genitourinary:  Negative for dysuria and hematuria.   Musculoskeletal:  Negative for arthralgias and back pain.   Skin:  Negative for color change and rash.   Neurological:  Negative for dizziness, seizures, syncope, facial asymmetry, weakness and headaches.   All other systems reviewed and are negative.       Objective                            Vitals I/O      Most Recent Min/Max in 24hrs   Temp 98.1 °F (36.7 °C) Temp  Min: 98.1 °F (36.7 °C)  Max: 98.2 °F (36.8 °C)   Pulse 80 Pulse  Min: 80  Max: 105   Resp 17 Resp  Min: 17  Max: 20   /64 BP  Min: 108/61  Max: 156/82   O2 Sat 96 % SpO2  Min: 92 %  Max: 99 %      Intake/Output Summary (Last 24 hours) at 4/30/2024  0743  Last data filed at 4/30/2024 0544  Gross per 24 hour   Intake 1800 ml   Output 2745 ml   Net -945 ml       Diet Regular; Regular House    Invasive Monitoring   Arterial Line  Josey /56  Arterial Line BP  Min: 105/48  Max: 158/64   MAP 75 mmHg  Arterial Line MAP (mmHg)  Min: 68 mmHg  Max: 112 mmHg           Physical Exam   Physical Exam  Vitals and nursing note reviewed.   Eyes:      General: No scleral icterus.     Extraocular Movements: Extraocular movements intact.      Conjunctiva/sclera: Conjunctivae normal.      Pupils: Pupils are equal, round, and reactive to light.   Skin:     General: Skin is warm.   HENT:      Head: Normocephalic and atraumatic.      Mouth/Throat:      Mouth: Mucous membranes are moist.   Cardiovascular:      Rate and Rhythm: Normal rate and regular rhythm.      Pulses: Normal pulses.      Heart sounds: Normal heart sounds.   Abdominal: General: Bowel sounds are normal. There is no distension.      Palpations: Abdomen is soft.      Tenderness: There is no abdominal tenderness. There is no guarding.   Constitutional:       General: He is not in acute distress.     Appearance: He is well-developed and well-nourished. He is not ill-appearing or toxic-appearing.   Pulmonary:      Effort: Pulmonary effort is normal. No accessory muscle usage, respiratory distress or accessory muscle usage.      Breath sounds: No wheezing.   Psychiatric:         Speech: Speech is not no expressive aphasia.   Neurological:      Mental Status: He is alert and oriented to person, place and time.      Cranial Nerves: No facial asymmetry.      Sensory: No sensory deficit.      Comments: GCS 15. 5/5  strength, 5/5 elbow flexion and extension. 5/5 plantar flexion, dorsiflexion. Sensation intact to b/l UE and b/l LE. Craniotomy incision c/d/I.            Diagnostic Studies      EKG: na  Imaging:  I have personally reviewed pertinent reports.   and I have personally reviewed pertinent films in PACS      Medications:  Scheduled PRN   acetaminophen, 975 mg, Q8H  amLODIPine, 5 mg, Daily  cefazolin, 2,000 mg, Q8H  docusate sodium, 100 mg, BID  polyethylene glycol, 17 g, Daily  potassium citrate, 20 mEq, TID With Meals  senna, 1 tablet, Daily      acetaminophen, 650 mg, Q6H PRN  bisacodyl, 10 mg, Daily PRN  HYDROmorphone, 0.2 mg, Q4H PRN  labetalol, 10 mg, Q4H PRN  ondansetron, 4 mg, Q4H PRN  oxyCODONE, 2.5 mg, Q4H PRN   Or  oxyCODONE, 5 mg, Q4H PRN  oxyCODONE, 5 mg, Q6H PRN       Continuous          Labs:    CBC    Recent Labs     04/29/24  1309 04/30/24  0636   WBC 8.89 13.29*   HGB 14.7 13.5   HCT 45.7 41.4    267     BMP    Recent Labs     04/29/24  1309 04/30/24  0636   SODIUM 138 140   K 3.9 4.4    107   CO2 22 23   AGAP 10 10   BUN 20 22   CREATININE 1.32* 1.25   CALCIUM 8.7 8.4       Coags    Recent Labs     04/29/24  1309 04/30/24  0636   INR 1.01 1.04   PTT 28 27        Additional Electrolytes  No recent results       Blood Gas    No recent results  No recent results LFTs  No recent results    Infectious  No recent results  Glucose  Recent Labs     04/29/24  1309 04/30/24  0636   GLUC 147* 131               Teresa Kiser PA-C

## 2024-04-30 NOTE — PROGRESS NOTES
NEPHROLOGY HOSPITAL PROGRESS NOTE   Swapnil Grover 73 y.o. male MRN: [id number]  Unit/Bed#: [facility] 718-01 Encounter: [id number]  Reason for Consult: CKD, perioperative management.    ASSESSMENT and PLAN:  Chronic kidney disease, stage IIIA  Baseline creatinine 1.2 to 1.3.   Follows with me in the office.   Etiology is previous DAYANNA and left renal atrophy.   Stable renal function. SCr 1.25.   Now off IVF.      Hypertension:  BP fairly controlled.   Home Rx: Amlodipine 5 mg OD.   Continue Amlodipine 5 mg OD.      Tegmen defect of base of skull  S/p bilateral middle fossa craniectomy for CSF leak repair with dural substitute.   Per ENT and neurosurgery.      Nephrolithiasis: Continue K citrate 20 meq TID.   OA s/p L TKR in Jan 2024: Needs R TKR      DISPOSITION:  Stable renal function.   Now off IVF.   Continue Amlodipine 5 mg OD.     Nothing further to add from a renal standpoint. Will sign off.   Feel free to call us back for any questions or concerns. Thank you!    SUBJECTIVE / 24H INTERVAL HISTORY:  No new acute issues.   Tolerating diet.     OBJECTIVE:  Current Weight: Weight - Scale: 102 kg (224 lb 13.9 oz)  Vitals:    04/30/24 0827 04/30/24 0900 04/30/24 1000 04/30/24 1100   BP: 132/61 124/63 106/55 166/55   BP Location: Left arm Left arm Left arm    Pulse: 94 92 81 91   Resp:    17   Temp: 98.5 °F (36.9 °C)      TempSrc: Oral      SpO2: 97%      Weight:       Height:           Intake/Output Summary (Last 24 hours) at 4/30/2024 1258  Last data filed at 4/30/2024 1134  Gross per 24 hour   Intake 1240 ml   Output 2060 ml   Net -820 ml     General: conscious, cooperative, no distress  Skin: dry  Eyes: pink conjunctivae  ENT: moist mucous membranes  Respiratory: equal chest expansion, clear breath sounds.  Cardiovascular: distinct heart sounds, normal rate, regular rhythm, no rub  Abdomen: soft, non tender, non distended, normal bowel sounds  Extremities: no edema.   Genitourinary: no dover catheter.   Neuro:  awake, alert.   Psych: appropriate affect    Medications:    Current Facility-Administered Medications:     acetaminophen (TYLENOL) oral suspension 650 mg, 650 mg, Oral, Q6H PRN, Annia Miller MD    acetaminophen (TYLENOL) tablet 975 mg, 975 mg, Oral, Q8H, Ofelia Dee PA-C, 975 mg at 04/30/24 0817    amLODIPine (NORVASC) tablet 5 mg, 5 mg, Oral, Daily, Cathy Walter PA-C, 5 mg at 04/30/24 0817    bisacodyl (DULCOLAX) rectal suppository 10 mg, 10 mg, Rectal, Daily PRN, Ofelia Dee PA-C    ceFAZolin (ANCEF) IVPB (premix in dextrose) 2,000 mg 50 mL, 2,000 mg, Intravenous, Q8H, Ofelia Dee PA-C, Last Rate: 100 mL/hr at 04/30/24 1134, 2,000 mg at 04/30/24 1134    docusate sodium (COLACE) capsule 100 mg, 100 mg, Oral, BID, Ofelia Dee PA-C, 100 mg at 04/30/24 0817    HYDROmorphone HCl (DILAUDID) injection 0.2 mg, 0.2 mg, Intravenous, Q8H PRN, Alecia Krause PA-C    labetalol (NORMODYNE) injection 10 mg, 10 mg, Intravenous, Q4H PRN, Jason Elmoer MD    ondansetron (ZOFRAN) injection 4 mg, 4 mg, Intravenous, Q4H PRN, Ofelia Dee PA-C    oxyCODONE (ROXICODONE) split tablet 2.5 mg, 2.5 mg, Oral, Q4H PRN **OR** oxyCODONE (ROXICODONE) IR tablet 5 mg, 5 mg, Oral, Q4H PRN, Ofelia Dee PA-C    polyethylene glycol (MIRALAX) packet 17 g, 17 g, Oral, Daily, Ofelia Dee PA-C, 17 g at 04/30/24 0817    potassium citrate (UROCIT-K) CR tablet 20 mEq, 20 mEq, Oral, TID With Meals, RADHA Hood-C, 20 mEq at 04/30/24 1133    senna (SENOKOT) tablet 8.6 mg, 1 tablet, Oral, Daily, RADHA Hood-C, 8.6 mg at 04/30/24 0817    Laboratory Results:  Results from last 7 days   Lab Units 04/30/24  0636 04/29/24  1309   WBC Thousand/uL 13.29* 8.89   HEMOGLOBIN g/dL 13.5 14.7   HEMATOCRIT % 41.4 45.7   PLATELETS Thousands/uL 267 247   POTASSIUM mmol/L 4.4 3.9   CHLORIDE mmol/L 107 106   CO2 mmol/L 23 22   BUN mg/dL 22 20   CREATININE mg/dL 1.25 1.32*   CALCIUM  mg/dL 8.4 8.7

## 2024-04-30 NOTE — ASSESSMENT & PLAN NOTE
Hx of HTN   - home meds: amlodipine 5mg qd     Plan:   continue home meds   maintain normotensive BP goals, SBP < 160   encoaurged ongoing outpt follow-up with his PCP upon discharge

## 2024-05-01 LAB
ANION GAP SERPL CALCULATED.3IONS-SCNC: 10 MMOL/L (ref 4–13)
BASOPHILS # BLD AUTO: 0.04 THOUSANDS/ÂΜL (ref 0–0.1)
BASOPHILS NFR BLD AUTO: 0 % (ref 0–1)
BUN SERPL-MCNC: 22 MG/DL (ref 5–25)
CALCIUM SERPL-MCNC: 8.4 MG/DL (ref 8.4–10.2)
CHLORIDE SERPL-SCNC: 104 MMOL/L (ref 96–108)
CO2 SERPL-SCNC: 26 MMOL/L (ref 21–32)
CREAT SERPL-MCNC: 1.24 MG/DL (ref 0.6–1.3)
EOSINOPHIL # BLD AUTO: 0.13 THOUSAND/ÂΜL (ref 0–0.61)
EOSINOPHIL NFR BLD AUTO: 1 % (ref 0–6)
ERYTHROCYTE [DISTWIDTH] IN BLOOD BY AUTOMATED COUNT: 13.3 % (ref 11.6–15.1)
GFR SERPL CREATININE-BSD FRML MDRD: 57 ML/MIN/1.73SQ M
GLUCOSE SERPL-MCNC: 101 MG/DL (ref 65–140)
HCT VFR BLD AUTO: 41.7 % (ref 36.5–49.3)
HGB BLD-MCNC: 13.4 G/DL (ref 12–17)
IMM GRANULOCYTES # BLD AUTO: 0.03 THOUSAND/UL (ref 0–0.2)
IMM GRANULOCYTES NFR BLD AUTO: 0 % (ref 0–2)
LYMPHOCYTES # BLD AUTO: 1.78 THOUSANDS/ÂΜL (ref 0.6–4.47)
LYMPHOCYTES NFR BLD AUTO: 18 % (ref 14–44)
MAGNESIUM SERPL-MCNC: 1.9 MG/DL (ref 1.9–2.7)
MCH RBC QN AUTO: 30.8 PG (ref 26.8–34.3)
MCHC RBC AUTO-ENTMCNC: 32.1 G/DL (ref 31.4–37.4)
MCV RBC AUTO: 96 FL (ref 82–98)
MONOCYTES # BLD AUTO: 0.93 THOUSAND/ÂΜL (ref 0.17–1.22)
MONOCYTES NFR BLD AUTO: 9 % (ref 4–12)
NEUTROPHILS # BLD AUTO: 7.01 THOUSANDS/ÂΜL (ref 1.85–7.62)
NEUTS SEG NFR BLD AUTO: 72 % (ref 43–75)
NRBC BLD AUTO-RTO: 0 /100 WBCS
PHOSPHATE SERPL-MCNC: 3 MG/DL (ref 2.3–4.1)
PLATELET # BLD AUTO: 228 THOUSANDS/UL (ref 149–390)
PMV BLD AUTO: 9.1 FL (ref 8.9–12.7)
POTASSIUM SERPL-SCNC: 4.1 MMOL/L (ref 3.5–5.3)
RBC # BLD AUTO: 4.35 MILLION/UL (ref 3.88–5.62)
SODIUM SERPL-SCNC: 140 MMOL/L (ref 135–147)
WBC # BLD AUTO: 9.92 THOUSAND/UL (ref 4.31–10.16)

## 2024-05-01 PROCEDURE — 84100 ASSAY OF PHOSPHORUS: CPT

## 2024-05-01 PROCEDURE — 97530 THERAPEUTIC ACTIVITIES: CPT

## 2024-05-01 PROCEDURE — 99232 SBSQ HOSP IP/OBS MODERATE 35: CPT | Performed by: INTERNAL MEDICINE

## 2024-05-01 PROCEDURE — 80048 BASIC METABOLIC PNL TOTAL CA: CPT

## 2024-05-01 PROCEDURE — 83735 ASSAY OF MAGNESIUM: CPT

## 2024-05-01 PROCEDURE — 85025 COMPLETE CBC W/AUTO DIFF WBC: CPT

## 2024-05-01 PROCEDURE — 99024 POSTOP FOLLOW-UP VISIT: CPT | Performed by: OTOLARYNGOLOGY

## 2024-05-01 PROCEDURE — 99024 POSTOP FOLLOW-UP VISIT: CPT | Performed by: NEUROLOGICAL SURGERY

## 2024-05-01 RX ORDER — MAGNESIUM SULFATE HEPTAHYDRATE 40 MG/ML
2 INJECTION, SOLUTION INTRAVENOUS ONCE
Status: COMPLETED | OUTPATIENT
Start: 2024-05-01 | End: 2024-05-01

## 2024-05-01 RX ORDER — HEPARIN SODIUM 5000 [USP'U]/ML
5000 INJECTION, SOLUTION INTRAVENOUS; SUBCUTANEOUS EVERY 12 HOURS SCHEDULED
Status: COMPLETED | OUTPATIENT
Start: 2024-05-01 | End: 2024-05-01

## 2024-05-01 RX ADMIN — MAGNESIUM SULFATE HEPTAHYDRATE 2 G: 40 INJECTION, SOLUTION INTRAVENOUS at 08:02

## 2024-05-01 RX ADMIN — DOCUSATE SODIUM 100 MG: 100 CAPSULE, LIQUID FILLED ORAL at 08:02

## 2024-05-01 RX ADMIN — SENNOSIDES 8.6 MG: 8.6 TABLET, FILM COATED ORAL at 08:02

## 2024-05-01 RX ADMIN — HEPARIN SODIUM 5000 UNITS: 5000 INJECTION INTRAVENOUS; SUBCUTANEOUS at 11:52

## 2024-05-01 RX ADMIN — CEFAZOLIN SODIUM 2000 MG: 2 SOLUTION INTRAVENOUS at 11:52

## 2024-05-01 RX ADMIN — CEFAZOLIN SODIUM 2000 MG: 2 SOLUTION INTRAVENOUS at 04:53

## 2024-05-01 RX ADMIN — ACETAMINOPHEN 975 MG: 325 TABLET, FILM COATED ORAL at 17:30

## 2024-05-01 RX ADMIN — CEFAZOLIN SODIUM 2000 MG: 2 SOLUTION INTRAVENOUS at 20:25

## 2024-05-01 RX ADMIN — POLYETHYLENE GLYCOL 3350 17 G: 17 POWDER, FOR SOLUTION ORAL at 08:02

## 2024-05-01 RX ADMIN — HEPARIN SODIUM 5000 UNITS: 5000 INJECTION INTRAVENOUS; SUBCUTANEOUS at 20:25

## 2024-05-01 RX ADMIN — DOCUSATE SODIUM 100 MG: 100 CAPSULE, LIQUID FILLED ORAL at 17:30

## 2024-05-01 RX ADMIN — POTASSIUM CITRATE 20 MEQ: 10 TABLET, EXTENDED RELEASE ORAL at 08:03

## 2024-05-01 RX ADMIN — POTASSIUM CITRATE 20 MEQ: 10 TABLET, EXTENDED RELEASE ORAL at 17:30

## 2024-05-01 RX ADMIN — POTASSIUM CITRATE 20 MEQ: 10 TABLET, EXTENDED RELEASE ORAL at 11:53

## 2024-05-01 RX ADMIN — ACETAMINOPHEN 975 MG: 325 TABLET, FILM COATED ORAL at 08:02

## 2024-05-01 NOTE — PLAN OF CARE
Problem: OCCUPATIONAL THERAPY ADULT  Goal: Performs self-care activities at highest level of function for planned discharge setting.  See evaluation for individualized goals.  Description: Treatment Interventions: ADL retraining, Functional transfer training, UE strengthening/ROM, Endurance training, Cognitive reorientation, Patient/family training, Equipment evaluation/education, Compensatory technique education, Continued evaluation, Energy conservation, Activityengagement          See flowsheet documentation for full assessment, interventions and recommendations.   Note: Limitation: Decreased ADL status, Decreased Safe judgement during ADL, Decreased cognition, Decreased endurance, Decreased self-care trans, Decreased high-level ADLs  Prognosis: Fair  Assessment: Pt is a 74 yo male who actively participated in skilled OT session on 5/1/2024. OT treatment focused on administering MOCA cognitive assessment to assist with safe d/c planning. Upon arrival, pt found sitting upright in recliner and was agreeable to OT session. Pt scoring 20/30 on MOCA, indicating mild cognitive impairment. (See flowsheet for more MOCA details). At the end of the session, pt was left sitting upright in recliner with all functional needs in reach. Pt demonstrates gradual functional improvements towards OT goals however continues to be functioning below occupational baseline and is still limited by the following limitations/impairments which were addressed through skilled instruction: cognition, generalized weakness, balance, endurance/activity tolerance, postural/trunk control, strength, pain, and safety awareness. At this time, recommend discharge to post-acute rehab when medically stable. The patient's raw score on the -PAC Daily Activity Inpatient Short Form is 16. A raw score of less than 19 suggests the patient may benefit from discharge to post-acute rehabilitation services. Please refer to the recommendation of the Occupational  Therapist for safe discharge planning.  Established OT goals will be continued 2-3x/wk to address acute care needs and underlying performance skills to maximize occupational performance and safety to return to OF.     Rehab Resource Intensity Level, OT: II (Moderate Resource Intensity)

## 2024-05-01 NOTE — ASSESSMENT & PLAN NOTE
Hx of CKD 3  - following with  nephrology in the outpt setting, specifically with Dr. Cottrell  - baseline Cr 1.2-1.3    Lab Results   Component Value Date    EGFR 57 05/01/2024    EGFR 57 04/30/2024    EGFR 53 04/29/2024    CREATININE 1.24 05/01/2024    CREATININE 1.25 04/30/2024    CREATININE 1.32 (H) 04/29/2024     Plan:   Nephrology consulted given hx   Pt with stable renal function, Cr at baseline   Continue to monitor   Continue home BP meds with hold parameters   Encourage ongoing outpt follow-up with nephro upon discharge      Medical Necessity Clause: This procedure was medically necessary because the lesion that was treated was:

## 2024-05-01 NOTE — PLAN OF CARE
Problem: Prexisting or High Potential for Compromised Skin Integrity  Goal: Skin integrity is maintained or improved  Description: INTERVENTIONS:  - Identify patients at risk for skin breakdown  - Assess and monitor skin integrity  - Assess and monitor nutrition and hydration status  - Monitor labs   - Assess for incontinence   - Turn and reposition patient  - Assist with mobility/ambulation  - Relieve pressure over bony prominences  - Avoid friction and shearing  - Provide appropriate hygiene as needed including keeping skin clean and dry  - Evaluate need for skin moisturizer/barrier cream  - Collaborate with interdisciplinary team   - Patient/family teaching  - Consider wound care consult   Outcome: Progressing     Problem: PAIN - ADULT  Goal: Verbalizes/displays adequate comfort level or baseline comfort level  Description: Interventions:  - Encourage patient to monitor pain and request assistance  - Assess pain using appropriate pain scale  - Administer analgesics based on type and severity of pain and evaluate response  - Implement non-pharmacological measures as appropriate and evaluate response  - Consider cultural and social influences on pain and pain management  - Notify physician/advanced practitioner if interventions unsuccessful or patient reports new pain  Outcome: Progressing     Problem: INFECTION - ADULT  Goal: Absence or prevention of progression during hospitalization  Description: INTERVENTIONS:  - Assess and monitor for signs and symptoms of infection  - Monitor lab/diagnostic results  - Monitor all insertion sites, i.e. indwelling lines, tubes, and drains  - Monitor endotracheal if appropriate and nasal secretions for changes in amount and color  - [place] appropriate cooling/warming therapies per order  - Administer medications as ordered  - Instruct and encourage patient and family to use good hand hygiene technique  - Identify and instruct in appropriate isolation precautions for  identified infection/condition  Outcome: Progressing  Goal: Absence of fever/infection during neutropenic period  Description: INTERVENTIONS:  - Monitor WBC    Outcome: Progressing     Problem: SAFETY ADULT  Goal: Patient will remain free of falls  Description: INTERVENTIONS:  - Educate patient/family on patient safety including physical limitations  - Instruct patient to call for assistance with activity   - Consult OT/PT to assist with strengthening/mobility   - Keep Call bell within reach  - Keep bed low and locked with side rails adjusted as appropriate  - Keep care items and personal belongings within reach  - Initiate and maintain comfort rounds  - Make Fall Risk Sign visible to staff  - Offer Toileting every 2 Hours, in advance of need  - Initiate/Maintain bed alarm  - Apply yellow socks and bracelet for high fall risk patients  - Consider moving patient to room near nurses station  Outcome: Progressing  Goal: Maintain or return to baseline ADL function  Description: INTERVENTIONS:  -  Assess patient's ability to carry out ADLs; assess patient's baseline for ADL function and identify physical deficits which impact ability to perform ADLs (bathing, care of mouth/teeth, toileting, grooming, dressing, etc.)  - Assess/evaluate cause of self-care deficits   - Assess range of motion  - Assess patient's mobility; develop plan if impaired  - Assess patient's need for assistive devices and provide as appropriate  - Encourage maximum independence but intervene and supervise when necessary  - Involve family in performance of ADLs  - Assess for home care needs following discharge   - Consider OT consult to assist with ADL evaluation and planning for discharge  - Provide patient education as appropriate  Outcome: Progressing  Goal: Maintains/Returns to pre admission functional level  Description: INTERVENTIONS:  - Perform AM-PAC 6 Click Basic Mobility/ Daily Activity assessment daily.  - Set and communicate daily mobility  goal to care team and patient/family/caregiver.   - Collaborate with rehabilitation services on mobility goals if consulted  - Perform Range of Motion 3 times a day.  - Reposition patient every 2 hours.  - Dangle patient 3 times a day  - Stand patient 3 times a day  - Ambulate patient 2 times a day  - Out of bed to chair 3 times a day   - Out of bed for meals 2 times a day  - Out of bed for toileting  - Record patient progress and toleration of activity level   Outcome: Progressing     Problem: DISCHARGE PLANNING  Goal: Discharge to home or other facility with appropriate resources  Description: INTERVENTIONS:  - Identify barriers to discharge w/patient and caregiver  - Arrange for needed discharge resources and transportation as appropriate  - Identify discharge learning needs (meds, wound care, etc.)  - Arrange for interpretive services to assist at discharge as needed  - Refer to Case Management Department for coordinating discharge planning if the patient needs post-hospital services based on physician/advanced practitioner order or complex needs related to functional status, cognitive ability, or social support system  Outcome: Progressing     Problem: Knowledge Deficit  Goal: Patient/family/caregiver demonstrates understanding of disease process, treatment plan, medications, and discharge instructions  Description: Complete learning assessment and assess knowledge base.  Interventions:  - Provide teaching at level of understanding  - Provide teaching via preferred learning methods  Outcome: Progressing     Problem: NEUROSENSORY - ADULT  Goal: Achieves stable or improved neurological status  Description: INTERVENTIONS  - Monitor and report changes in neurological status  - Monitor vital signs such as temperature, blood pressure, glucose, and any other labs ordered   - Initiate measures to prevent increased intracranial pressure  - Monitor for seizure activity and implement precautions if appropriate       Outcome: Progressing  Goal: Remains free of injury related to seizures activity  Description: INTERVENTIONS  - Maintain airway, patient safety  and administer oxygen as ordered  - Monitor patient for seizure activity, document and report duration and description of seizure to physician/advanced practitioner  - If seizure occurs,  ensure patient safety during seizure  - Reorient patient post seizure  - Seizure pads on all 4 side rails  - Instruct patient/family to notify RN of any seizure activity including if an aura is experienced  - Instruct patient/family to call for assistance with activity based on nursing assessment  - Administer anti-seizure medications if ordered    Outcome: Progressing  Goal: Achieves maximal functionality and self care  Description: INTERVENTIONS  - Monitor swallowing and airway patency with patient fatigue and changes in neurological status  - Encourage and assist patient to increase activity and self care.   - Encourage visually impaired, hearing impaired and aphasic patients to use assistive/communication devices  Outcome: Progressing     Problem: GENITOURINARY - ADULT  Goal: Maintains or returns to baseline urinary function  Description: INTERVENTIONS:  - Assess urinary function  - Encourage oral fluids to ensure adequate hydration if ordered  - Administer IV fluids as ordered to ensure adequate hydration  - Administer ordered medications as needed  - Offer frequent toileting  - Follow urinary retention protocol if ordered  Outcome: Progressing  Goal: Absence of urinary retention  Description: INTERVENTIONS:  - Assess patient’s ability to void and empty bladder  - Monitor I/O  - Bladder scan as needed  - Discuss with physician/AP medications to alleviate retention as needed  - Discuss catheterization for long term situations as appropriate  Outcome: Progressing  Goal: Urinary catheter remains patent  Description: INTERVENTIONS:  - Assess patency of urinary catheter  - If patient  has a chronic dover, consider changing catheter if non-functioning  - Follow guidelines for intermittent irrigation of non-functioning urinary catheter  Outcome: Progressing     Problem: SKIN/TISSUE INTEGRITY - ADULT  Goal: Skin Integrity remains intact(Skin Breakdown Prevention)  Description: Assess:  -Perform Drew assessment every 12  -Clean and moisturize skin every 2  -Inspect skin when repositioning, toileting, and assisting with ADLS  -Assess extremities for adequate circulation and sensation     Bed Management:  -Have minimal linens on bed & keep smooth, unwrinkled  -Change linens as needed when moist or perspiring  -Avoid sitting or lying in one position for more than 2 hours while in bed    Toileting:  -Offer bedside commode  Activity:  -Mobilize patient 3 times a day  -Encourage activity and walks on unit  -Encourage or provide ROM exercises   -Turn and reposition patient every 2 Hours  -Use appropriate equipment to lift or move patient in bed  -Instruct/ Assist with weight shifting every 2 when out of bed in chair  -Consider limitation of chair time 2 hour intervals    Skin Care:  -Avoid use of baby powder, tape, friction and shearing, hot water or constrictive clothing  -Do not massage red bony areas    Outcome: Progressing  Goal: Incision(s), wounds(s) or drain site(s) healing without S/S of infection  Description: INTERVENTIONS  - Assess and document dressing, incision, wound bed, drain sites and surrounding tissue  - Provide patient and family education  Outcome: Progressing  Goal: Pressure injury heals and does not worsen  Description: Interventions:  - Implement low air loss mattress or specialty surface (Criteria met)  - Apply silicone foam dressing  - Utilize friction reducing device or surface for transfers   - Consider nutrition services referral as needed  Outcome: Progressing     Problem: MUSCULOSKELETAL - ADULT  Goal: Maintain or return mobility to safest level of function  Description:  INTERVENTIONS:  - Assess patient's ability to carry out ADLs; assess patient's baseline for ADL function and identify physical deficits which impact ability to perform ADLs (bathing, care of mouth/teeth, toileting, grooming, dressing, etc.)  - Assess/evaluate cause of self-care deficits   - Assess range of motion  - Assess patient's mobility  - Assess patient's need for assistive devices and provide as appropriate  - Encourage maximum independence but intervene and supervise when necessary  - Involve family in performance of ADLs  - Assess for home care needs following discharge   - Consider OT consult to assist with ADL evaluation and planning for discharge  - Provide patient education as appropriate  Outcome: Progressing  Goal: Maintain proper alignment of affected body part  Description: INTERVENTIONS:  - Support, maintain and protect limb and body alignment  - Provide patient/ family with appropriate education  Outcome: Progressing     Problem: Nutrition/Hydration-ADULT  Goal: Nutrient/Hydration intake appropriate for improving, restoring or maintaining nutritional needs  Description: Monitor and assess patient's nutrition/hydration status for malnutrition. Collaborate with interdisciplinary team and initiate plan and interventions as ordered.  Monitor patient's weight and dietary intake as ordered or per policy. Utilize nutrition screening tool and intervene as necessary. Determine patient's food preferences and provide high-protein, high-caloric foods as appropriate.     INTERVENTIONS:  - Monitor oral intake, urinary output, labs, and treatment plans  - Assess nutrition and hydration status and recommend course of action  - Evaluate amount of meals eaten  - Assist patient with eating if necessary   - Allow adequate time for meals  - Recommend/ encourage appropriate diets, oral nutritional supplements, and vitamin/mineral supplements  - Order, calculate, and assess calorie counts as needed  - Recommend,  monitor, and adjust tube feedings and TPN/PPN based on assessed needs  - Assess need for intravenous fluids  - Provide specific nutrition/hydration education as appropriate  - Include patient/family/caregiver in decisions related to nutrition  Outcome: Progressing
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
## 2024-05-01 NOTE — PROGRESS NOTES
[facility]  Progress Note  Name: Swapnil Grover I  MRN: [id number]  Unit/Bed#: [facility] 718-01 I Date of Admission: 4/29/2024   Date of Service: 5/1/2024 I Hospital Day: 2    Assessment/Plan   * Tegmen defect of base of skull  Assessment & Plan  POD#2 - s/p cristiano middle fossa craniectomy for repair of CSF leak w/ dural substitute and Duraseal using neuromonitoring (CN 7 and NILA) and placement of a lumbar drain (LH/ENT 4/29)   Pt has been following with Dr. August in the [facility] office with progressively worsening cristiano hearing loss, ear fullness, and CSF leak   h/o left craniotomy for SDH evacuation in 1992 in [place] (no reported complications)   Pt presented to the [facility] on 4/29 to elective suregry for repair of this tegmen defect and csf leak     Imaging:   Post-op imaging not indicated     Plan:   Continue to closely monitor neuro exam  Maintain normotensive BP goals, SBP < 160   1 LD in place   210cc output in 24hrs   will clamp drain today and tentative plan to remove tomorrow  Continue ancef while LD in place   Continue to monitor incision   Some bloody drainage noted from the L ear, nothing from the R ear   Seems to be slowing this am per nursing   Will continue to closely monitor output   Labs/Vitals:   VSS overnight, remains afebrile and tolerating room air   Am labs reviewed and within normal limits, Cr remains at baseline and post-op leukocytosis resolved today   Pain control:   Pain overall well controlled with just tylenol   Tylenol 975mg PO q 8hrs   Oxycodone 2.5mg/5mg PO q 4hrs PRN mod-severe pain   Discontinued the oxy 5mg PO q 6hrs PRN breakthrough pain today   Dilaudid 0.2mg IV q 8hrs PRN breakthrough pain --> will discontinue today   Bowel regimen: colace 100mg BID, senna 1 tab qd, Miralax qd   Advance as needed   Hold all AC/AP meds   No reported use at home   Was previously on ASA 325mg qd after knee replacement, since discontinued   DVT ppx: SCDs, SQH BID  "to start today   Will be held for LD removal tomorrow   Home medications re-ordered   PT/OT   Social work following for assistance with dispo once medically cleared     Neurosurgery will continue to follow as primary. Please reach out with any further questions or concerns.       Hypertension  Assessment & Plan  Hx of HTN   - home meds: amlodipine 5mg qd     Plan:   continue home meds   maintain normotensive BP goals, SBP < 160   encoaurged ongoing outpt follow-up with his PCP upon discharge     Stage 3a chronic kidney disease (HCC)  Assessment & Plan  Hx of CKD 3  - following with  nephrology in the outpt setting, specifically with Dr. Cottrell  - baseline Cr 1.2-1.3    Lab Results   Component Value Date    EGFR 57 05/01/2024    EGFR 57 04/30/2024    EGFR 53 04/29/2024    CREATININE 1.24 05/01/2024    CREATININE 1.25 04/30/2024    CREATININE 1.32 (H) 04/29/2024     Plan:   Nephrology consulted given hx   Pt with stable renal function, Cr at baseline   Continue to monitor   Continue home BP meds with hold parameters   Encourage ongoing outpt follow-up with nephro upon discharge            Subjective/Objective   Chief Complaint: \" Good morning\"    Subjective: Pt seen and examined this am on rounds. GABRIEL.  Previously noted left ear drainage has remained scant overnight and continues to improve.  No signs of CSF leak on exam.  Lumbar drain clamped this morning.  Patient offers no complaints.  He states he feels like he has a \"sinus fullness\" but feels that Tylenol improves his pain.  Remains neurologically intact.    Objective: Pleasant, elderly male sitting up comfortably in the chair.  No acute distress.    I/O         04/29 0701 04/30 0700 04/30 0701  05/01 0700 05/01 0701  05/02 0700    P.O.  460 240    I.V. (mL/kg) 1700 (16.7)      IV Piggyback 100      Total Intake(mL/kg) 1800 (17.6) 460 (4.5) 240 (2.4)    Urine (mL/kg/hr) 2475 (1) 1325 (0.5)     Drains 160 210     Other 30      Blood 100      Total Output 2765 " "1535     Net -965 -1075 +240                 Invasive Devices       Peripheral Intravenous Line  Duration             Peripheral IV 04/29/24 Left Forearm 2 days    Peripheral IV 04/29/24 Right Forearm 2 days              Drain  Duration             Lumbar Drain 2 days                  Physical Exam:  Vitals: Blood pressure 125/81, pulse 93, temperature 98.5 °F (36.9 °C), temperature source Oral, resp. rate 18, height 5' 7\" (1.702 m), weight 102 kg (224 lb 13.9 oz), SpO2 96%.,Body mass index is 35.22 kg/m².    General appearance: alert, appears stated age, cooperative and no distress  Head:   - s/p cristiano middle fossa crani, posterior auricular incisions are clean, dry, intact.  Surrounding erythema, edema, tenderness.  No drainage or bleeding noted.  -Well-healed previous left craniotomy scar  Ear: Very scant, dried old blood noted in the left ear.  No continuous bloody drainage appreciated.  Right ear without any drainage.  No signs of CSF leak  Eyes: EOMI, PERRL  Neck: supple, symmetrical, trachea midline and NT  Back: no kyphosis present, no tenderness to percussion or palpation  Lungs: non labored breathing, no respiratory distress on room air  Heart: regular heart rate  Neurologic:   Mental status: Alert, oriented x3, thought content appropriate  Cranial nerves: grossly intact (Cranial nerves II-XII)  Sensory: normal to light touch bilaterally throughout  Motor: moving all extremities without focal weakness   Reflexes: 2+ and symmetric  Coordination: finger to nose normal bilaterally, no drift bilaterally    Lab Results:  Results from last 7 days   Lab Units 05/01/24 0536 04/30/24 0636 04/29/24  1309   WBC Thousand/uL 9.92 13.29* 8.89   HEMOGLOBIN g/dL 13.4 13.5 14.7   HEMATOCRIT % 41.7 41.4 45.7   PLATELETS Thousands/uL 228 267 247   SEGS PCT % 72  --  89*   MONO PCT % 9  --  2*   EOS PCT % 1  --  0     Results from last 7 days   Lab Units 05/01/24 0536 04/30/24 0636 04/29/24  1309   SODIUM mmol/L 140 140 138 " "  POTASSIUM mmol/L 4.1 4.4 3.9   CHLORIDE mmol/L 104 107 106   CO2 mmol/L 26 23 22   BUN mg/dL 22 22 20   CREATININE mg/dL 1.24 1.25 1.32*   CALCIUM mg/dL 8.4 8.4 8.7     Results from last 7 days   Lab Units 05/01/24  0536   MAGNESIUM mg/dL 1.9     Results from last 7 days   Lab Units 05/01/24  0536   PHOSPHORUS mg/dL 3.0     Results from last 7 days   Lab Units 04/30/24  0636 04/29/24  1309   INR  1.04 1.01   PTT seconds 27 28     No results found for: \"TROPONINT\"  ABG:No results found for: \"PHART\", \"ZLP5FDQ\", \"PO2ART\", \"WAP6IEI\", \"F5TLHISL\", \"BEART\", \"SOURCE\"    Imaging Studies: I have personally reviewed pertinent reports.   and I have personally reviewed pertinent films in PACS    No results found.    EKG, Pathology, and Other Studies: I have personally reviewed pertinent reports.      VTE Pharmacologic Prophylaxis: Sequential compression device (Venodyne)  and Heparin    VTE Mechanical Prophylaxis: sequential compression device     "

## 2024-05-01 NOTE — ASSESSMENT & PLAN NOTE
POD#2 - s/p cristiano middle fossa craniectomy for repair of CSF leak w/ dural substitute and Duraseal using neuromonitoring (CN 7 and NILA) and placement of a lumbar drain (LH/ENT 4/29)   Pt has been following with Dr. August in the  nsgy office with progressively worsening cristiano hearing loss, ear fullness, and CSF leak   h/o left craniotomy for SDH evacuation in 1992 in [place] (no reported complications)   Pt presented to the [facility] on 4/29 to elective suregry for repair of this tegmen defect and csf leak     Imaging:   Post-op imaging not indicated     Plan:   Continue to closely monitor neuro exam  Maintain normotensive BP goals, SBP < 160   1 LD in place   210cc output in 24hrs   will clamp drain today and tentative plan to remove tomorrow  Continue ancef while LD in place   Continue to monitor incision   Some bloody drainage noted from the L ear, nothing from the R ear   Seems to be slowing this am per nursing   Will continue to closely monitor output   Labs/Vitals:   VSS overnight, remains afebrile and tolerating room air   Am labs reviewed and within normal limits, Cr remains at baseline and post-op leukocytosis resolved today   Pain control:   Pain overall well controlled with just tylenol   Tylenol 975mg PO q 8hrs   Oxycodone 2.5mg/5mg PO q 4hrs PRN mod-severe pain   Discontinued the oxy 5mg PO q 6hrs PRN breakthrough pain today   Dilaudid 0.2mg IV q 8hrs PRN breakthrough pain --> will discontinue today   Bowel regimen: colace 100mg BID, senna 1 tab qd, Miralax qd   Advance as needed   Hold all AC/AP meds   No reported use at home   Was previously on ASA 325mg qd after knee replacement, since discontinued   DVT ppx: SCDs, SQH BID to start today   Will be held for LD removal tomorrow   Home medications re-ordered   PT/OT   Social work following for assistance with dispo once medically cleared     Neurosurgery will continue to follow as primary. Please reach out with any further questions or concerns.

## 2024-05-01 NOTE — OCCUPATIONAL THERAPY NOTE
"  Occupational Therapy Progress Note     Patient Name: Swapnil Grover  Today's Date: 5/1/2024  Problem List  Principal Problem:    Tegmen defect of base of skull  Active Problems:    Hypertension    Stage 3a chronic kidney disease (HCC)            05/01/24 1301   OT Last Visit   OT Visit Date 05/01/24   Note Type   Note Type Treatment   Pain Assessment   Pain Assessment Tool 0-10   Pain Score 3   Pain Location/Orientation Location: Head   Effect of Pain on Daily Activities Impacts ability to engage in valued occupations   Hospital Pain Intervention(s) Repositioned;Ambulation/increased activity;Emotional support   Restrictions/Precautions   Weight Bearing Precautions Per Order No   Other Precautions Cognitive;Chair Alarm;Bed Alarm;Multiple lines;Telemetry;Fall Risk;Pain;Contact/isolation   Lifestyle   Autonomy I with ADL' sponge bathes most of the time/IaDL's, no AD with functional mobility   Reciprocal Relationships friend -school student, othewise per pt limited social support   Service to Others part time employement on the [employer]   Intrinsic Gratification being Independent   ADL   Where Assessed Chair  (Session focused on admistering MOCA cognitive assessment to assist with safe discharge planning)   Eating Assistance 5  Supervision/Setup   Eating Deficit Setup   Toileting Assistance  5  Supervision/Setup   Toileting Deficit Setup;Use of bedpan/urinal setup   Bed Mobility   Supine to Sit Unable to assess   Sit to Supine Unable to assess   Additional Comments Upon arrival, pt found sitting upright in recliner; @ end of session, pt left sitting upright in recliner with all functional needs in reach with chair alarm activated   Transfers   Sit to Stand Unable to assess   Stand to Sit Unable to assess   Additional Comments Session focused on administering MOCA in seated position in recliner   Subjective   Subjective \"I can speak to you in different languages.\"   Cognition   Overall Cognitive Status Impaired "   Arousal/Participation Responsive;Arousable;Cooperative   Attention Attends with cues to redirect   Orientation Level Oriented X4   Memory Decreased recall of precautions;Decreased recall of recent events;Decreased short term memory   Following Commands Follows one step commands with increased time or repetition   Comments Pt scored 20/30 on MOCA indicating mild cognitive impairment, see MOCA part of flowsheet for more details   Activity Tolerance   Activity Tolerance Patient limited by fatigue   Medical Staff Made Aware RN cleared   Assessment   Assessment Pt is a 72 yo male who actively participated in skilled OT session on 5/1/2024. OT treatment focused on administering MOCA cognitive assessment to assist with safe d/c planning. Upon arrival, pt found sitting upright in recliner and was agreeable to OT session. Pt scoring 20/30 on MOCA, indicating mild cognitive impairment. (See flowsheet for more MOCA details). At the end of the session, pt was left sitting upright in recliner with all functional needs in reach. Pt demonstrates gradual functional improvements towards OT goals however continues to be functioning below occupational baseline and is still limited by the following limitations/impairments which were addressed through skilled instruction: cognition, generalized weakness, balance, endurance/activity tolerance, postural/trunk control, strength, pain, and safety awareness. At this time, recommend discharge to post-acute rehab when medically stable. The patient's raw score on the AM-PAC Daily Activity Inpatient Short Form is 16. A raw score of less than 19 suggests the patient may benefit from discharge to post-acute rehabilitation services. Please refer to the recommendation of the Occupational Therapist for safe discharge planning.  Established OT goals will be continued 2-3x/wk to address acute care needs and underlying performance skills to maximize occupational performance and safety to return to  "PLOF.   Plan   Treatment Interventions ADL retraining;Functional transfer training;UE strengthening/ROM;Endurance training;Cognitive reorientation;Patient/family training;Equipment evaluation/education;Compensatory technique education;Continued evaluation;Energy conservation;Activityengagement   Goal Expiration Date 05/14/24   OT Treatment Day 1   OT Frequency 2-3x/wk   Discharge Recommendation   Rehab Resource Intensity Level, OT II (Moderate Resource Intensity)   AM-PAC Daily Activity Inpatient   Lower Body Dressing 2   Bathing 2   Toileting 2   Upper Body Dressing 3   Grooming 3   Eating 4   Daily Activity Raw Score 16   Daily Activity Standardized Score (Calc for Raw Score >=11) 35.96   AM-PAC Applied Cognition Inpatient   Following a Speech/Presentation 2   Understanding Ordinary Conversation 4   Taking Medications 3   Remembering Where Things Are Placed or Put Away 3   Remembering List of 4-5 Errands 2   Taking Care of Complicated Tasks 1   Applied Cognition Raw Score 15   Applied Cognition Standardized Score 33.54   MOCA   Version 7.1   Visuopatial/Executive 5   Naming 3   Memory 0   Attention: Digits 2   Attention: Letters 1   Attention: Serial 1   Language: Repeat 1   Language: Fluency 0   Abstraction 0   Delayed Recall 0   Orientation 6   Does patient have less than or equal to 12 years of education? 1   MOCA Total Score 20   MOCA Comments During serial 7 section of assessment, pt able to subtract 7 from 100, reporting 93, however, pt then reporting 64, 28, 50, 65, perservating on redoing substractions in head, reporting \"that can't be right\" reporting that he uses calcuator for math problems and is not typically good at math; during abstraction part, pt reporting train/bicycle both have wheels and watch/ruler both have numbers; unable to recall any words during delayed recall without cues however able to recall velvet, [place], [place], and red with category cues however unable to recall face despite " multiple choice cues   End of Consult   Education Provided Yes   Patient Position at End of Consult Bedside chair;Bed/Chair alarm activated;All needs within reach   Nurse Communication Nurse aware of consult       Cora Tovar MS, OTR/L

## 2024-05-01 NOTE — PROGRESS NOTES
NEPHROLOGY HOSPITAL PROGRESS NOTE   Swapnil Grover 73 y.o. male MRN: [id number]  Unit/Bed#: [facility] 718-01 Encounter: [id number]  Reason for Consult: CKD, perioperative management.    ASSESSMENT and PLAN:  Chronic kidney disease, stage IIIA  Baseline creatinine 1.2 to 1.3.   Follows with me in the office.   Etiology is previous DAYANNA and left renal atrophy.   Renal function remains stable - SCr 1.24 (at baseline)     Hypertension:  BP controlled.   Home Rx: Amlodipine 5 mg OD.   Continue Amlodipine 5 mg daily.      Tegmen defect of base of skull  S/p bilateral middle fossa craniectomy for CSF leak repair with dural substitute.   Per ENT and neurosurgery.      Nephrolithiasis: Continue K citrate 20 meq TID.   OA s/p L TKR in Jan 2024: Needs R TKR      DISPOSITION:  Overall, renal function has remained stable.   No change to BP meds.   Can follow up with me as an outpatient as scheduled.     Nothing further to add from a renal standpoint. Will sign off.   Feel free to call us back for any questions or concerns. Thank you!    SUBJECTIVE / 24H INTERVAL HISTORY:  Tolerating diet.   No new acute complaints.     OBJECTIVE:  Current Weight: Weight - Scale: 102 kg (224 lb 13.9 oz)  Vitals:    05/01/24 0830 05/01/24 0930 05/01/24 1100 05/01/24 1130   BP: 125/81 131/65 130/72 130/72   BP Location:   Left arm    Pulse: 93 86 94 98   Resp:   18    Temp:   98.3 °F (36.8 °C)    TempSrc:   Oral    SpO2:   96%    Weight:       Height:           Intake/Output Summary (Last 24 hours) at 5/1/2024 1450  Last data filed at 5/1/2024 1159  Gross per 24 hour   Intake 700 ml   Output 1315 ml   Net -615 ml     General: conscious, cooperative, no distress  Skin: dry  Eyes: pink conjunctivae  ENT: moist mucous membranes  Respiratory: equal chest expansion, clear breath sounds.  Cardiovascular: distinct heart sounds, normal rate, regular rhythm, no rub  Abdomen: soft, non tender, non distended, normal bowel sounds  Extremities: no edema.    Genitourinary: no dover catheter.   Neuro: awake, alert.   Psych: appropriate affect    Medications:    Current Facility-Administered Medications:     acetaminophen (TYLENOL) oral suspension 650 mg, 650 mg, Oral, Q6H PRN, Annia Miller MD    acetaminophen (TYLENOL) tablet 975 mg, 975 mg, Oral, Q8H, Ofelia Dee PA-C, 975 mg at 05/01/24 0802    amLODIPine (NORVASC) tablet 5 mg, 5 mg, Oral, Daily, Cathy Walter PA-C, 5 mg at 04/30/24 0817    bisacodyl (DULCOLAX) rectal suppository 10 mg, 10 mg, Rectal, Daily PRN, Ofelia Dee PA-C    ceFAZolin (ANCEF) IVPB (premix in dextrose) 2,000 mg 50 mL, 2,000 mg, Intravenous, Q8H, Ofelia Dee PA-C, Last Rate: 100 mL/hr at 05/01/24 1152, 2,000 mg at 05/01/24 1152    docusate sodium (COLACE) capsule 100 mg, 100 mg, Oral, BID, Ofelia Dee PA-C, 100 mg at 05/01/24 0802    heparin (porcine) subcutaneous injection 5,000 Units, 5,000 Units, Subcutaneous, Q12H KARTIK, Alecia Krause PA-C, 5,000 Units at 05/01/24 1152    labetalol (NORMODYNE) injection 10 mg, 10 mg, Intravenous, Q4H PRN, Jason Elmore MD    ondansetron (ZOFRAN) injection 4 mg, 4 mg, Intravenous, Q4H PRN, RADHA Hood-C    oxyCODONE (ROXICODONE) split tablet 2.5 mg, 2.5 mg, Oral, Q4H PRN **OR** oxyCODONE (ROXICODONE) IR tablet 5 mg, 5 mg, Oral, Q4H PRN, RADHA Hood-C    polyethylene glycol (MIRALAX) packet 17 g, 17 g, Oral, Daily, RADHA Hood-C, 17 g at 05/01/24 0802    potassium citrate (UROCIT-K) CR tablet 20 mEq, 20 mEq, Oral, TID With Meals, Ofelia Dee PA-C, 20 mEq at 05/01/24 1153    senna (SENOKOT) tablet 8.6 mg, 1 tablet, Oral, Daily, Ofelia Dee PA-C, 8.6 mg at 05/01/24 0802    Laboratory Results:  Results from last 7 days   Lab Units 05/01/24  0536 04/30/24  0636 04/29/24  1309   WBC Thousand/uL 9.92 13.29* 8.89   HEMOGLOBIN g/dL 13.4 13.5 14.7   HEMATOCRIT % 41.7 41.4 45.7   PLATELETS Thousands/uL 228 267 247    POTASSIUM mmol/L 4.1 4.4 3.9   CHLORIDE mmol/L 104 107 106   CO2 mmol/L 26 23 22   BUN mg/dL 22 22 20   CREATININE mg/dL 1.24 1.25 1.32*   CALCIUM mg/dL 8.4 8.4 8.7   MAGNESIUM mg/dL 1.9  --   --    PHOSPHORUS mg/dL 3.0  --   --

## 2024-05-01 NOTE — PROGRESS NOTES
"OTOLARYNGOLOGY PROGRESS NOTE    Date of Service: 5/1/2024 5:11 AM    HPI  Patient is a 73 y.o. male s/p repair of b/l middle cranial fossa tegmen defect on 4/29/24.    Overnight Events:   NAEO, AVSS    PHYSICAL EXAM:  Vitals:    05/01/24 0330   BP: 129/61   Pulse: 73   Resp: 18   Temp:    SpO2:         General: No acute distress, AOx4  HEENT: incision CDI, denies salty taste in mouth, ss ear drainage b/l  Neurology: No focal deficits  Lungs: Breathing easy, unlabored and even on room air  Cardio: RRR, well perfused  Abd: soft, NT, ND         Intake/Output Summary (Last 24 hours) at 5/1/2024 0511  Last data filed at 5/1/2024 0330  Gross per 24 hour   Intake 510 ml   Output 3375 ml   Net -2865 ml         LABORATORY    Recent Labs     04/29/24  1309 04/30/24  0636   WBC 8.89 13.29*   HGB 14.7 13.5   HCT 45.7 41.4    267       Recent Labs     04/29/24  1309 04/30/24  0636   K 3.9 4.4    107   BUN 20 22       Invalid input(s): \"PTPAT\", \"PTINR\", \"APTTMNNM\", \"APTTPAT\"      Patient Active Problem List   Diagnosis    Renal colic on right side    Hyperkalemia    Sleep apnea    Hydronephrosis with ureteropelvic junction (UPJ) obstruction    BPH (benign prostatic hyperplasia)    Hypertension    Hyperuricemia    Stage 3a chronic kidney disease (HCC)    Nephrolithiasis    Obesity, morbid (HCC)    Mixed conductive and sensorineural hearing loss, bilateral    Primary osteoarthritis of both knees    Other hemorrhoids    Mixed hyperlipidemia    Pre-operative examination    Arthritis    S/P total knee replacement using cement, left    Benign hypertension with CKD (chronic kidney disease) stage III (HCC)    Tegmen defect of base of skull         ASSESSMENT  Patient is a 73 y.o. male w/ acute and chronic problems as above, who is POD2 from b/l middle cranial fossa tegmen defect repair, no signs of CSF leak    PLAN  - continue to monitor for CSF leak   - Lumbar drain per NSGY  - rest of care per primary    Greg Tao, " PGY-2  Otolaryngology - Head & Neck Surgery

## 2024-05-02 LAB
ANION GAP SERPL CALCULATED.3IONS-SCNC: 10 MMOL/L (ref 4–13)
BASOPHILS # BLD AUTO: 0.04 THOUSANDS/ÂΜL (ref 0–0.1)
BASOPHILS NFR BLD AUTO: 1 % (ref 0–1)
BUN SERPL-MCNC: 23 MG/DL (ref 5–25)
CA-I BLD-SCNC: 1.14 MMOL/L (ref 1.12–1.32)
CALCIUM SERPL-MCNC: 9 MG/DL (ref 8.4–10.2)
CHLORIDE SERPL-SCNC: 102 MMOL/L (ref 96–108)
CO2 SERPL-SCNC: 26 MMOL/L (ref 21–32)
CREAT SERPL-MCNC: 1.2 MG/DL (ref 0.6–1.3)
EOSINOPHIL # BLD AUTO: 0.28 THOUSAND/ÂΜL (ref 0–0.61)
EOSINOPHIL NFR BLD AUTO: 4 % (ref 0–6)
ERYTHROCYTE [DISTWIDTH] IN BLOOD BY AUTOMATED COUNT: 13.2 % (ref 11.6–15.1)
GFR SERPL CREATININE-BSD FRML MDRD: 60 ML/MIN/1.73SQ M
GLUCOSE SERPL-MCNC: 104 MG/DL (ref 65–140)
HCT VFR BLD AUTO: 44.2 % (ref 36.5–49.3)
HGB BLD-MCNC: 14.3 G/DL (ref 12–17)
IMM GRANULOCYTES # BLD AUTO: 0.03 THOUSAND/UL (ref 0–0.2)
IMM GRANULOCYTES NFR BLD AUTO: 0 % (ref 0–2)
LYMPHOCYTES # BLD AUTO: 1.77 THOUSANDS/ÂΜL (ref 0.6–4.47)
LYMPHOCYTES NFR BLD AUTO: 23 % (ref 14–44)
MAGNESIUM SERPL-MCNC: 1.9 MG/DL (ref 1.9–2.7)
MCH RBC QN AUTO: 30.6 PG (ref 26.8–34.3)
MCHC RBC AUTO-ENTMCNC: 32.4 G/DL (ref 31.4–37.4)
MCV RBC AUTO: 95 FL (ref 82–98)
MONOCYTES # BLD AUTO: 0.7 THOUSAND/ÂΜL (ref 0.17–1.22)
MONOCYTES NFR BLD AUTO: 9 % (ref 4–12)
NEUTROPHILS # BLD AUTO: 4.77 THOUSANDS/ÂΜL (ref 1.85–7.62)
NEUTS SEG NFR BLD AUTO: 63 % (ref 43–75)
NRBC BLD AUTO-RTO: 0 /100 WBCS
PHOSPHATE SERPL-MCNC: 3.7 MG/DL (ref 2.3–4.1)
PLATELET # BLD AUTO: 235 THOUSANDS/UL (ref 149–390)
PMV BLD AUTO: 9.2 FL (ref 8.9–12.7)
POTASSIUM SERPL-SCNC: 4.4 MMOL/L (ref 3.5–5.3)
RBC # BLD AUTO: 4.67 MILLION/UL (ref 3.88–5.62)
SODIUM SERPL-SCNC: 138 MMOL/L (ref 135–147)
WBC # BLD AUTO: 7.59 THOUSAND/UL (ref 4.31–10.16)

## 2024-05-02 PROCEDURE — 82330 ASSAY OF CALCIUM: CPT

## 2024-05-02 PROCEDURE — NC001 PR NO CHARGE: Performed by: NEUROLOGICAL SURGERY

## 2024-05-02 PROCEDURE — 84100 ASSAY OF PHOSPHORUS: CPT

## 2024-05-02 PROCEDURE — 97116 GAIT TRAINING THERAPY: CPT

## 2024-05-02 PROCEDURE — 99233 SBSQ HOSP IP/OBS HIGH 50: CPT | Performed by: INTERNAL MEDICINE

## 2024-05-02 PROCEDURE — 85025 COMPLETE CBC W/AUTO DIFF WBC: CPT

## 2024-05-02 PROCEDURE — 99024 POSTOP FOLLOW-UP VISIT: CPT | Performed by: OTOLARYNGOLOGY

## 2024-05-02 PROCEDURE — 80048 BASIC METABOLIC PNL TOTAL CA: CPT

## 2024-05-02 PROCEDURE — 83735 ASSAY OF MAGNESIUM: CPT

## 2024-05-02 PROCEDURE — 99024 POSTOP FOLLOW-UP VISIT: CPT | Performed by: NEUROLOGICAL SURGERY

## 2024-05-02 RX ORDER — HEPARIN SODIUM 5000 [USP'U]/ML
5000 INJECTION, SOLUTION INTRAVENOUS; SUBCUTANEOUS EVERY 12 HOURS SCHEDULED
Status: DISCONTINUED | OUTPATIENT
Start: 2024-05-02 | End: 2024-05-02

## 2024-05-02 RX ORDER — LIDOCAINE HYDROCHLORIDE 10 MG/ML
INJECTION, SOLUTION EPIDURAL; INFILTRATION; INTRACAUDAL; PERINEURAL
Status: COMPLETED
Start: 2024-05-02 | End: 2024-05-02

## 2024-05-02 RX ORDER — MAGNESIUM SULFATE HEPTAHYDRATE 40 MG/ML
2 INJECTION, SOLUTION INTRAVENOUS ONCE
Status: COMPLETED | OUTPATIENT
Start: 2024-05-02 | End: 2024-05-02

## 2024-05-02 RX ORDER — SENNOSIDES 8.6 MG
2 TABLET ORAL DAILY
Status: DISCONTINUED | OUTPATIENT
Start: 2024-05-03 | End: 2024-05-06 | Stop reason: HOSPADM

## 2024-05-02 RX ADMIN — ACETAMINOPHEN 975 MG: 325 TABLET, FILM COATED ORAL at 23:51

## 2024-05-02 RX ADMIN — POLYETHYLENE GLYCOL 3350 17 G: 17 POWDER, FOR SOLUTION ORAL at 08:32

## 2024-05-02 RX ADMIN — DOCUSATE SODIUM 100 MG: 100 CAPSULE, LIQUID FILLED ORAL at 08:32

## 2024-05-02 RX ADMIN — POTASSIUM CITRATE 20 MEQ: 10 TABLET, EXTENDED RELEASE ORAL at 17:07

## 2024-05-02 RX ADMIN — CEFAZOLIN SODIUM 2000 MG: 2 SOLUTION INTRAVENOUS at 04:04

## 2024-05-02 RX ADMIN — ACETAMINOPHEN 975 MG: 325 TABLET, FILM COATED ORAL at 00:18

## 2024-05-02 RX ADMIN — POTASSIUM CITRATE 20 MEQ: 10 TABLET, EXTENDED RELEASE ORAL at 12:48

## 2024-05-02 RX ADMIN — POTASSIUM CITRATE 20 MEQ: 10 TABLET, EXTENDED RELEASE ORAL at 07:28

## 2024-05-02 RX ADMIN — ACETAMINOPHEN 975 MG: 325 TABLET, FILM COATED ORAL at 07:27

## 2024-05-02 RX ADMIN — CEFAZOLIN SODIUM 2000 MG: 2 SOLUTION INTRAVENOUS at 11:46

## 2024-05-02 RX ADMIN — LIDOCAINE HYDROCHLORIDE 50 MG: 10 INJECTION, SOLUTION EPIDURAL; INFILTRATION; INTRACAUDAL at 11:45

## 2024-05-02 RX ADMIN — MAGNESIUM SULFATE HEPTAHYDRATE 2 G: 40 INJECTION, SOLUTION INTRAVENOUS at 08:29

## 2024-05-02 RX ADMIN — AMLODIPINE BESYLATE 5 MG: 5 TABLET ORAL at 08:31

## 2024-05-02 RX ADMIN — ACETAMINOPHEN 975 MG: 325 TABLET, FILM COATED ORAL at 17:07

## 2024-05-02 RX ADMIN — CEFAZOLIN SODIUM 2000 MG: 2 SOLUTION INTRAVENOUS at 19:50

## 2024-05-02 RX ADMIN — SENNOSIDES 8.6 MG: 8.6 TABLET, FILM COATED ORAL at 08:31

## 2024-05-02 RX ADMIN — DOCUSATE SODIUM 100 MG: 100 CAPSULE, LIQUID FILLED ORAL at 17:07

## 2024-05-02 NOTE — PHYSICAL THERAPY NOTE
PHYSICAL THERAPY NOTE          Patient Name: Swapnil Grover  Today's Date: 5/2/2024 05/02/24 1025   PT Last Visit   PT Visit Date 05/02/24   Note Type   Note Type Treatment   Pain Assessment   Pain Assessment Tool 0-10   Pain Score No Pain   Restrictions/Precautions   Weight Bearing Precautions Per Order No   Other Precautions Cognitive;Chair Alarm;Bed Alarm;Multiple lines;Telemetry;Fall Risk;Pain;Contact/isolation   General   Chart Reviewed Yes   Family/Caregiver Present No   Cognition   Overall Cognitive Status Impaired   Arousal/Participation Alert;Cooperative   Attention Attends with cues to redirect   Orientation Level Oriented X4   Memory Decreased recall of precautions;Decreased recall of recent events;Decreased short term memory   Following Commands Follows one step commands with increased time or repetition   Comments Patient is pleasant and cooperative. Fair to poor safety awareness, requiring cues for safety throughout.   Subjective   Subjective Patient agreeable to PT tx   Bed Mobility   Supine to Sit 5  Supervision   Additional items Increased time required;Verbal cues;Bedrails   Transfers   Sit to Stand   (CGA)   Additional items Assist x 1;Verbal cues   Stand to Sit   (CGA)   Additional items Assist x 1;Verbal cues   Additional Comments cues for safety and hand position   Ambulation/Elevation   Gait pattern Improper Weight shift;Decreased foot clearance   Gait Assistance   (CGA)   Additional items Assist x 1;Verbal cues   Assistive Device None   Distance 50'+50'+75'+75'  (intermittent standing rest periods utilized)   Stair Management Assistance   (CGA)   Additional items Assist x 1;Verbal cues;Increased time required   Stair Management Technique One rail L   Number of Stairs 20  (1 step x 20 due to short lines)   Balance   Static Sitting Fair +   Dynamic Sitting Fair   Static Standing Fair -   Dynamic  Standing Fair -   Ambulatory Fair -   Endurance Deficit   Endurance Deficit Yes   Endurance Deficit Description fatigue, weakness   Activity Tolerance   Activity Tolerance Patient tolerated treatment well   Medical Staff Made Aware restorative   Nurse Made Aware RN cleared   Assessment   Prognosis Good   Problem List Impaired balance;Decreased mobility;Decreased skin integrity;Pain   Assessment Patient received in bed. Patient agreeable to therapy. Patient performs bed mobility with supervision. Patient performs functional transfers with contact guard assistance using no assistive device. Patient performs ambulation with contact guard assistance using no assistive device, 50'+50'+75'+75' with standing rest periods between trials.  Patient performs stair training, x20 stairs (1 stair x2 due to short line length) with CGA using L HR.  Patient left in bedside chair with all needs met and call bell/personal items within reach. The patient's AM-[facility] Basic Mobility Inpatient Short Form Raw Score is 18 showing further need for skilled PT services in order to improve functional mobility, decrease need for assistance, and return to PLOF. PT is recommending Level 3 - Minimum Resource Intensity on d/c from hospital.  Will continue to follow as able.   Barriers to Discharge Decreased caregiver support   Goals   Patient Goals to go home   PT Treatment Day 1   Plan   Treatment/Interventions Functional transfer training;ADL retraining;LE strengthening/ROM;Elevations;Therapeutic exercise;Endurance training;Patient/family training;Equipment eval/education;Bed mobility;Gait training;Spoke to nursing;Spoke to case management;OT   Progress Progressing toward goals   PT Frequency 3-5x/wk   Discharge Recommendation   Rehab Resource Intensity Level, PT III (Minimum Resource Intensity)   AM-PAC Basic Mobility Inpatient   Turning in Flat Bed Without Bedrails 3   Lying on Back to Sitting on Edge of Flat Bed Without Bedrails 3   Moving Bed to  Chair 3   Standing Up From Chair Using Arms 3   Walk in Room 3   Climb 3-5 Stairs With Railing 3   Basic Mobility Inpatient Raw Score 18   Basic Mobility Standardized Score 41.05   [facility] Level Of Mobility   -HL Goal 6: Walk 10 steps or more   -HLM Achieved 7: Walk 25 feet or more   End of Consult   Patient Position at End of Consult All needs within reach;Bedside chair;Bed/Chair alarm activated       Jessica Barreto, PT, DPT

## 2024-05-02 NOTE — ARC ADMISSION
After review, patient is denied for ARC.  PT note from today is recommending level III at time of discharge.  He has no speech needs so even if he does have an OT need, he still does not meet acute rehab criteria.  He would have to have the need for at least 2 disciplines.

## 2024-05-02 NOTE — PLAN OF CARE
Problem: Prexisting or High Potential for Compromised Skin Integrity  Goal: Skin integrity is maintained or improved  Description: INTERVENTIONS:  - Identify patients at risk for skin breakdown  - Assess and monitor skin integrity  - Assess and monitor nutrition and hydration status  - Monitor labs   - Assess for incontinence   - Turn and reposition patient  - Assist with mobility/ambulation  - Relieve pressure over bony prominences  - Avoid friction and shearing  - Provide appropriate hygiene as needed including keeping skin clean and dry  - Evaluate need for skin moisturizer/barrier cream  - Collaborate with interdisciplinary team   - Patient/family teaching  - Consider wound care consult   Outcome: Progressing     Problem: PAIN - ADULT  Goal: Verbalizes/displays adequate comfort level or baseline comfort level  Description: Interventions:  - Encourage patient to monitor pain and request assistance  - Assess pain using appropriate pain scale  - Administer analgesics based on type and severity of pain and evaluate response  - Implement non-pharmacological measures as appropriate and evaluate response  - Consider cultural and social influences on pain and pain management  - Notify physician/advanced practitioner if interventions unsuccessful or patient reports new pain  Outcome: Progressing     Problem: INFECTION - ADULT  Goal: Absence or prevention of progression during hospitalization  Description: INTERVENTIONS:  - Assess and monitor for signs and symptoms of infection  - Monitor lab/diagnostic results  - Monitor all insertion sites, i.e. indwelling lines, tubes, and drains  - Monitor endotracheal if appropriate and nasal secretions for changes in amount and color  - [place] appropriate cooling/warming therapies per order  - Administer medications as ordered  - Instruct and encourage patient and family to use good hand hygiene technique  - Identify and instruct in appropriate isolation precautions for  identified infection/condition  Outcome: Progressing  Goal: Absence of fever/infection during neutropenic period  Description: INTERVENTIONS:  - Monitor WBC    Outcome: Progressing     Problem: SAFETY ADULT  Goal: Patient will remain free of falls  Description: INTERVENTIONS:  - Educate patient/family on patient safety including physical limitations  - Instruct patient to call for assistance with activity   - Consult OT/PT to assist with strengthening/mobility   - Keep Call bell within reach  - Keep bed low and locked with side rails adjusted as appropriate  - Keep care items and personal belongings within reach  - Initiate and maintain comfort rounds  - Make Fall Risk Sign visible to staff  - Apply yellow socks and bracelet for high fall risk patients  - Consider moving patient to room near nurses station  Outcome: Progressing  Goal: Maintain or return to baseline ADL function  Description: INTERVENTIONS:  -  Assess patient's ability to carry out ADLs; assess patient's baseline for ADL function and identify physical deficits which impact ability to perform ADLs (bathing, care of mouth/teeth, toileting, grooming, dressing, etc.)  - Assess/evaluate cause of self-care deficits   - Assess range of motion  - Assess patient's mobility; develop plan if impaired  - Assess patient's need for assistive devices and provide as appropriate  - Encourage maximum independence but intervene and supervise when necessary  - Involve family in performance of ADLs  - Assess for home care needs following discharge   - Consider OT consult to assist with ADL evaluation and planning for discharge  - Provide patient education as appropriate  Outcome: Progressing  Goal: Maintains/Returns to pre admission functional level  Description: INTERVENTIONS:  - Perform AM-PAC 6 Click Basic Mobility/ Daily Activity assessment daily.  - Set and communicate daily mobility goal to care team and patient/family/caregiver.   - Collaborate with rehabilitation  services on mobility goals if consulted  -  - Out of bed for toileting  - Record patient progress and toleration of activity level   Outcome: Progressing     Problem: INFECTION - ADULT  Goal: Absence or prevention of progression during hospitalization  Description: INTERVENTIONS:  - Assess and monitor for signs and symptoms of infection  - Monitor lab/diagnostic results  - Monitor all insertion sites, i.e. indwelling lines, tubes, and drains  - Monitor endotracheal if appropriate and nasal secretions for changes in amount and color  - [place] appropriate cooling/warming therapies per order  - Administer medications as ordered  - Instruct and encourage patient and family to use good hand hygiene technique  - Identify and instruct in appropriate isolation precautions for identified infection/condition  5/2/2024 1220 by Napoleon Blackwood RN  Outcome: Progressing  5/2/2024 1219 by Napoleon Blackwood RN  Outcome: Progressing     Problem: SAFETY ADULT  Goal: Patient will remain free of falls  Description: INTERVENTIONS:  - Educate patient/family on patient safety including physical limitations  - Instruct patient to call for assistance with activity   - Consult OT/PT to assist with strengthening/mobility   - Keep Call bell within reach  - Keep bed low and locked with side rails adjusted as appropriate  - Keep care items and personal belongings within reach  - Initiate and maintain comfort rounds  - Make Fall Risk Sign visible to staff  - Offer Toileting every 2   Hours, in advance of need  - Initiate/Maintain bed alarm  - Obtain necessary fall risk management equipment: non skid footwear  - Apply yellow socks and bracelet for high fall risk patients  - Consider moving patient to room near nurses station  5/2/2024 1220 by Napoleon Blackwood RN  Outcome: Progressing  5/2/2024 1219 by Napoleon Blackwood RN  Outcome: Progressing  Goal: Maintain or return to baseline ADL function  Description: INTERVENTIONS:  -  Assess patient's  ability to carry out ADLs; assess patient's baseline for ADL function and identify physical deficits which impact ability to perform ADLs (bathing, care of mouth/teeth, toileting, grooming, dressing, etc.)  - Assess/evaluate cause of self-care deficits   - Assess range of motion  - Assess patient's mobility; develop plan if impaired  - Assess patient's need for assistive devices and provide as appropriate  - Encourage maximum independence but intervene and supervise when necessary  - Involve family in performance of ADLs  - Assess for home care needs following discharge   - Consider OT consult to assist with ADL evaluation and planning for discharge  - Provide patient education as appropriate  5/2/2024 1220 by Napoleon Blackwood RN  Outcome: Progressing  5/2/2024 1219 by Napoleon Blackwood RN  Outcome: Progressing  Goal: Maintains/Returns to pre admission functional level  Description: INTERVENTIONS:  - Perform AM-PAC 6 Click Basic Mobility/ Daily Activity assessment daily.  - Set and communicate daily mobility goal to care team and patient/family/caregiver.   - Collaborate with rehabilitation services on mobility goals if consulted  - Perform Range of Motion 3 times a day.  - Reposition patient every 2 hours.  - Dangle patient 3 times a day  - Stand patient 3 times a day  - Ambulate patient 3 times a day  - Out of bed to chair 3 times a day   - Out of bed for meals 3 times a day  - Out of bed for toileting  - Record patient progress and toleration of activity level   5/2/2024 1220 by Napoleon Blackwood RN  Outcome: Progressing  5/2/2024 1219 by Napoleon Blackwood RN  Outcome: Progressing     Problem: DISCHARGE PLANNING  Goal: Discharge to home or other facility with appropriate resources  Description: INTERVENTIONS:  - Identify barriers to discharge w/patient and caregiver  - Arrange for needed discharge resources and transportation as appropriate  - Identify discharge learning needs (meds, wound care, etc.)  - Arrange for  interpretive services to assist at discharge as needed  - Refer to Case Management Department for coordinating discharge planning if the patient needs post-hospital services based on physician/advanced practitioner order or complex needs related to functional status, cognitive ability, or social support system  5/2/2024 1220 by Napoleon Blackwood RN  Outcome: Progressing  5/2/2024 1219 by Napoleon Blackwood RN  Outcome: Progressing     Problem: Knowledge Deficit  Goal: Patient/family/caregiver demonstrates understanding of disease process, treatment plan, medications, and discharge instructions  Description: Complete learning assessment and assess knowledge base.  Interventions:  - Provide teaching at level of understanding  - Provide teaching via preferred learning methods  5/2/2024 1220 by Napoleon Blackwood RN  Outcome: Progressing  5/2/2024 1219 by Napoleon Blackwood RN  Outcome: Progressing     Problem: NEUROSENSORY - ADULT  Goal: Achieves stable or improved neurological status  Description: INTERVENTIONS  - Monitor and report changes in neurological status  - Monitor vital signs such as temperature, blood pressure, glucose, and any other labs ordered   - Initiate measures to prevent increased intracranial pressure  - Monitor for seizure activity and implement precautions if appropriate      5/2/2024 1220 by Napoleon Blackwood RN  Outcome: Progressing  5/2/2024 1219 by Napoleon Blackwood RN  Outcome: Progressing  Goal: Remains free of injury related to seizures activity  Description: INTERVENTIONS  - Maintain airway, patient safety  and administer oxygen as ordered  - Monitor patient for seizure activity, document and report duration and description of seizure to physician/advanced practitioner  - If seizure occurs,  ensure patient safety during seizure  - Reorient patient post seizure  - Seizure pads on all 4 side rails  - Instruct patient/family to notify RN of any seizure activity including if an aura is experienced  -  Instruct patient/family to call for assistance with activity based on nursing assessment  - Administer anti-seizure medications if ordered    5/2/2024 1220 by Napoleon Balckwood RN  Outcome: Progressing  5/2/2024 1219 by Napoleon Blackwood RN  Outcome: Progressing  Goal: Achieves maximal functionality and self care  Description: INTERVENTIONS  - Monitor swallowing and airway patency with patient fatigue and changes in neurological status  - Encourage and assist patient to increase activity and self care.   - Encourage visually impaired, hearing impaired and aphasic patients to use assistive/communication devices  5/2/2024 1220 by Napoleon Blackwood RN  Outcome: Progressing  5/2/2024 1219 by Napoleon Blackwood RN  Outcome: Progressing     Problem: GENITOURINARY - ADULT  Goal: Maintains or returns to baseline urinary function  Description: INTERVENTIONS:  - Assess urinary function  - Encourage oral fluids to ensure adequate hydration if ordered  - Administer IV fluids as ordered to ensure adequate hydration  - Administer ordered medications as needed  - Offer frequent toileting  - Follow urinary retention protocol if ordered  5/2/2024 1220 by Napoleon Blackwood RN  Outcome: Progressing  5/2/2024 1219 by Napoleon Blackwood RN  Outcome: Progressing  Goal: Absence of urinary retention  Description: INTERVENTIONS:  - Assess patient's ability to void and empty bladder  - Monitor I/O  - Bladder scan as needed  - Discuss with physician/AP medications to alleviate retention as needed  - Discuss catheterization for long term situations as appropriate  5/2/2024 1220 by Napoleon Blackwood RN  Outcome: Progressing  5/2/2024 1219 by Napoleon Blackwood RN  Outcome: Progressing  Goal: Urinary catheter remains patent  Description: INTERVENTIONS:  - Assess patency of urinary catheter  - If patient has a chronic dover, consider changing catheter if non-functioning  - Follow guidelines for intermittent irrigation of non-functioning urinary  catheter  5/2/2024 1220 by Napoleon Blackwood RN  Outcome: Progressing  5/2/2024 1219 by Napoleon Blackwood RN  Outcome: Progressing     Problem: MUSCULOSKELETAL - ADULT  Goal: Maintain or return mobility to safest level of function  Description: INTERVENTIONS:  - Assess patient's ability to carry out ADLs; assess patient's baseline for ADL function and identify physical deficits which impact ability to perform ADLs (bathing, care of mouth/teeth, toileting, grooming, dressing, etc.)  - Assess/evaluate cause of self-care deficits   - Assess range of motion  - Assess patient's mobility  - Assess patient's need for assistive devices and provide as appropriate  - Encourage maximum independence but intervene and supervise when necessary  - Involve family in performance of ADLs  - Assess for home care needs following discharge   - Consider OT consult to assist with ADL evaluation and planning for discharge  - Provide patient education as appropriate  5/2/2024 1220 by Napoleon Blackwood RN  Outcome: Progressing  5/2/2024 1219 by Napoleon Blackwood RN  Outcome: Progressing  Goal: Maintain proper alignment of affected body part  Description: INTERVENTIONS:  - Support, maintain and protect limb and body alignment  - Provide patient/ family with appropriate education  5/2/2024 1220 by Napoleon Blackwood RN  Outcome: Progressing  5/2/2024 1219 by Napoleon Blackwood RN  Outcome: Progressing     Problem: Nutrition/Hydration-ADULT  Goal: Nutrient/Hydration intake appropriate for improving, restoring or maintaining nutritional needs  Description: Monitor and assess patient's nutrition/hydration status for malnutrition. Collaborate with interdisciplinary team and initiate plan and interventions as ordered.  Monitor patient's weight and dietary intake as ordered or per policy. Utilize nutrition screening tool and intervene as necessary. Determine patient's food preferences and provide high-protein, high-caloric foods as appropriate.      INTERVENTIONS:  - Monitor oral intake, urinary output, labs, and treatment plans  - Assess nutrition and hydration status and recommend course of action  - Evaluate amount of meals eaten  - Assist patient with eating if necessary   - Allow adequate time for meals  - Recommend/ encourage appropriate diets, oral nutritional supplements, and vitamin/mineral supplements  - Order, calculate, and assess calorie counts as needed  - Recommend, monitor, and adjust tube feedings and TPN/PPN based on assessed needs  - Assess need for intravenous fluids  - Provide specific nutrition/hydration education as appropriate  - Include patient/family/caregiver in decisions related to nutrition  5/2/2024 1220 by Napoleon Blackwood, RN  Outcome: Progressing  5/2/2024 1219 by Napoleon Blackwood, RN  Outcome: Progressing

## 2024-05-02 NOTE — PROGRESS NOTES
[facility]  Progress Note  Name: Swapnil Grover I  MRN: [id number]  Unit/Bed#: [facility]PHP 718-01 I Date of Admission: 4/29/2024   Date of Service: 5/2/2024 I Hospital Day: 3    Assessment/Plan   * Tegmen defect of base of skull  Assessment & Plan  POD#3 cristiano middle fossa craniectomy for repair of CSF leak w/ dural substitute and Duraseal using neuromonitoring (CN 7 and NILA) and placement of a lumbar drain (LH/ENT 4/29)   Pt has been following with Dr. August in the  nsgy office with progressively worsening cristiano hearing loss, ear fullness, and CSF leak   Of note has h/o left craniotomy for SDH evacuation in 1992 in [place] (no reported complications)     Imaging:   Post-op imaging not indicated     Plan:   Continue to closely monitor neuro exam  Maintain normotensive BP goals, SBP < 160   Lumbar drain has been clamped since yesterday at 11am, OS without drainage, OD with scant bloody drainage this morning - plan to remove this afternoon  Plan to dc ancef once drain is removed  Labs/Vitals:   Labs WNL  Pain control:   Tylenol 975mg q8 hours scheduled  Oxycodone 2.5mg and 5mg q4 hours as needed for moderate and severe pain, respectively  Bowel regimen:  had small BM postop, possible BM this morning   colace 100mg BID, senna 1 tab qd, Miralax qd, prn suppository  Hold all AC/AP meds   No reported use at home   Was previously on ASA 325mg qd after knee replacement, since discontinued   DVT ppx: SCDs, sqh on hold for drain removal today, will resume later this evening  Home medications re-ordered   PT/OT recommending level II-III resource  Social work on board, they will clarify patient wishes on dc in regards to rehab vs home health services    Neurosurgery will continue to follow as primary. Anticipate dc tomorrow.  Please reach out with any further questions or concerns.       Stage 3a chronic kidney disease (HCC)  Assessment & Plan  Hx of CKD 3  - following with  nephrology in the  outpt setting, specifically with Dr. Cottrell  - baseline Cr 1.2-1.3    Lab Results   Component Value Date    EGFR 60 05/02/2024    EGFR 57 05/01/2024    EGFR 57 04/30/2024    CREATININE 1.20 05/02/2024    CREATININE 1.24 05/01/2024    CREATININE 1.25 04/30/2024     Plan:   Nephrology consulted given hx - have signed off at this time  Pt with stable renal function, Cr at baseline   Continue to monitor   Continue home BP meds with hold parameters   Encourage ongoing outpt follow-up with nephro upon discharge       Hypertension  Assessment & Plan  Hx of HTN   - home meds: amlodipine 5mg qd     Plan:   continue home meds   maintain normotensive BP goals, SBP < 160   encoaurged ongoing outpt follow-up with his PCP upon discharge          Subjective/Objective   Chief Complaint: post op     Subjective:  patient states he is okay this morning.  Started with some right ear drainage this morning which is new post op.  On tissue on the table it is scant bloody drainage.  No active drainage noted at this time.  Left ear without any drainage today.  LD has been clamped since yesterday without issue.  Urinating per baseline. Small BM since surgery, hoping to go again this morning.  Hearing still feels muffled to him.  No other issues.    Nursing rounds completed with Napoleon    Objective: sitting up in bed, NAD    I/O         04/30 0701  05/01 0700 05/01 0701  05/02 0700 05/02 0701  05/03 0700    P.O. 460 1200 240    I.V. (mL/kg)       IV Piggyback  100     Total Intake(mL/kg) 460 (4.5) 1300 (12.9) 240 (2.4)    Urine (mL/kg/hr) 1325 (0.5) 600 (0.2)     Drains 210 40     Other       Stool  0     Blood       Total Output 1535 640     Net -1075 +660 +240           Unmeasured Stool Occurrence  2 x             Invasive Devices       Peripheral Intravenous Line  Duration             Peripheral IV 04/29/24 Left Forearm 3 days              Drain  Duration             Lumbar Drain 3 days                    Physical Exam:  Vitals: Blood  "pressure 134/73, pulse 84, temperature 97.8 °F (36.6 °C), temperature source Oral, resp. rate 18, height 5' 7\" (1.702 m), weight 101 kg (222 lb 10.6 oz), SpO2 94%.,Body mass index is 34.87 kg/m².      General appearance: alert, appears stated age, cooperative and no distress  Head: bilateral auricular incisions clean dry intact  Ears:  OS without visible drainage, OD without visible drainage however scant bloody drainage is noted on tissue at the bedside  Eyes: conjugate gaze  Neck: supple, symmetrical, trachea midline   Lungs: non labored breathing  Heart: regular heart rate  Neurologic:   Mental status: Alert, oriented x3, follows commands, thought content appropriate  Cranial nerves: grossly intact (Cranial nerves II-XII)  Motor: moving all extremities without focal weakness  Coordination: finger to nose normal bilaterally, no drift bilaterally      Lab Results:  Results from last 7 days   Lab Units 05/02/24 0519 05/01/24 0536 04/30/24  0636 04/29/24  1309   WBC Thousand/uL 7.59 9.92 13.29* 8.89   HEMOGLOBIN g/dL 14.3 13.4 13.5 14.7   HEMATOCRIT % 44.2 41.7 41.4 45.7   PLATELETS Thousands/uL 235 228 267 247   SEGS PCT % 63 72  --  89*   MONO PCT % 9 9  --  2*   EOS PCT % 4 1  --  0     Results from last 7 days   Lab Units 05/02/24 0519 05/01/24 0536 04/30/24  0636   SODIUM mmol/L 138 140 140   POTASSIUM mmol/L 4.4 4.1 4.4   CHLORIDE mmol/L 102 104 107   CO2 mmol/L 26 26 23   BUN mg/dL 23 22 22   CREATININE mg/dL 1.20 1.24 1.25   CALCIUM mg/dL 9.0 8.4 8.4     Results from last 7 days   Lab Units 05/02/24 0519 05/01/24  0536   MAGNESIUM mg/dL 1.9 1.9     Results from last 7 days   Lab Units 05/02/24 0519 05/01/24  0536   PHOSPHORUS mg/dL 3.7 3.0     Results from last 7 days   Lab Units 04/30/24  0636 04/29/24  1309   INR  1.04 1.01   PTT seconds 27 28         Imaging Studies: I have personally reviewed pertinent reports.   and I have personally reviewed pertinent films in PACS    No results found.    EKG, " Pathology, and Other Studies: I have personally reviewed pertinent reports.      VTE Pharmacologic Prophylaxis: Reason for no pharmacologic prophylaxis - LD removal today    VTE Mechanical Prophylaxis: sequential compression device

## 2024-05-02 NOTE — PROGRESS NOTES
Received a TT from CONOR Bender that the patient has been slowly leaking clear blood tinged fluid from his left ear since LD removal. I personally examined patient and confirmed this; clear blood tinged fluid was on gauze when I placed in ear canal. No obvious halo sign although difficult to appreciate on gauze. Moderate amount of drainage on patient's pillowcase.     He denies any new HA or worsening neuro symptoms.     I discussed this with Dr August, who is recommending IR lumbar drain placement. LD will be in likely through the weekend. Patient was notified of this. SQH was discontinued for anticipated drain placement. Last dose was last night at 2025.

## 2024-05-02 NOTE — PLAN OF CARE
Problem: Prexisting or High Potential for Compromised Skin Integrity  Goal: Skin integrity is maintained or improved  Description: INTERVENTIONS:  - Identify patients at risk for skin breakdown  - Assess and monitor skin integrity  - Assess and monitor nutrition and hydration status  - Monitor labs   - Assess for incontinence   - Turn and reposition patient  - Assist with mobility/ambulation  - Relieve pressure over bony prominences  - Avoid friction and shearing  - Provide appropriate hygiene as needed including keeping skin clean and dry  - Evaluate need for skin moisturizer/barrier cream  - Collaborate with interdisciplinary team   - Patient/family teaching  - Consider wound care consult   Outcome: Progressing     Problem: PAIN - ADULT  Goal: Verbalizes/displays adequate comfort level or baseline comfort level  Description: Interventions:  - Encourage patient to monitor pain and request assistance  - Assess pain using appropriate pain scale  - Administer analgesics based on type and severity of pain and evaluate response  - Implement non-pharmacological measures as appropriate and evaluate response  - Consider cultural and social influences on pain and pain management  - Notify physician/advanced practitioner if interventions unsuccessful or patient reports new pain  Outcome: Progressing     Problem: INFECTION - ADULT  Goal: Absence or prevention of progression during hospitalization  Description: INTERVENTIONS:  - Assess and monitor for signs and symptoms of infection  - Monitor lab/diagnostic results  - Monitor all insertion sites, i.e. indwelling lines, tubes, and drains  - Monitor endotracheal if appropriate and nasal secretions for changes in amount and color  - [place] appropriate cooling/warming therapies per order  - Administer medications as ordered  - Instruct and encourage patient and family to use good hand hygiene technique  - Identify and instruct in appropriate isolation precautions for  identified infection/condition  Outcome: Progressing     Problem: SAFETY ADULT  Goal: Patient will remain free of falls  Description: INTERVENTIONS:  - Educate patient/family on patient safety including physical limitations  - Instruct patient to call for assistance with activity   - Consult OT/PT to assist with strengthening/mobility   - Keep Call bell within reach  - Keep bed low and locked with side rails adjusted as appropriate  - Keep care items and personal belongings within reach  - Initiate and maintain comfort rounds  - Make Fall Risk Sign visible to staff  - Apply yellow socks and bracelet for high fall risk patients  - Consider moving patient to room near nurses station  Outcome: Progressing     Problem: DISCHARGE PLANNING  Goal: Discharge to home or other facility with appropriate resources  Description: INTERVENTIONS:  - Identify barriers to discharge w/patient and caregiver  - Arrange for needed discharge resources and transportation as appropriate  - Identify discharge learning needs (meds, wound care, etc.)  - Arrange for interpretive services to assist at discharge as needed  - Refer to Case Management Department for coordinating discharge planning if the patient needs post-hospital services based on physician/advanced practitioner order or complex needs related to functional status, cognitive ability, or social support system  Outcome: Progressing     Problem: Knowledge Deficit  Goal: Patient/family/caregiver demonstrates understanding of disease process, treatment plan, medications, and discharge instructions  Description: Complete learning assessment and assess knowledge base.  Interventions:  - Provide teaching at level of understanding  - Provide teaching via preferred learning methods  Outcome: Progressing

## 2024-05-02 NOTE — RESTORATIVE TECHNICIAN NOTE
Restorative Technician Note      Patient Name: Swapnil Grover     Note Type: Mobility  Patient Position Upon Consult: Supine  Activity Performed: Ambulated; Dangled; Stood  Assistive Device: Other (Comment) (none, Assisted PT Jessica.)  Education Provided: Yes  Patient Position at End of Consult: Bedside chair; All needs within reach; Bed/Chair alarm activated      Adri PIÑA, Restorative Technician,

## 2024-05-02 NOTE — PLAN OF CARE
Problem: PHYSICAL THERAPY ADULT  Goal: Performs mobility at highest level of function for planned discharge setting.  See evaluation for individualized goals.  Description:    Equipment Recommended:  (has RW and w/c for home)       See flowsheet documentation for full assessment, interventions and recommendations.  Outcome: Progressing  Note: Prognosis: Good  Problem List: Impaired balance, Decreased mobility, Decreased skin integrity, Pain  Assessment: Patient received in bed. Patient agreeable to therapy. Patient performs bed mobility with supervision. Patient performs functional transfers with contact guard assistance using no assistive device. Patient performs ambulation with contact guard assistance using no assistive device, 50'+50'+75'+75' with standing rest periods between trials.  Patient performs stair training, x20 stairs (1 stair x2 due to short line length) with CGA using L HR.  Patient left in bedside chair with all needs met and call bell/personal items within reach. The patient's AM-PAC Basic Mobility Inpatient Short Form Raw Score is 18 showing further need for skilled PT services in order to improve functional mobility, decrease need for assistance, and return to PLOF. PT is recommending Level 3 - Minimum Resource Intensity on d/c from hospital.  Will continue to follow as able.  Barriers to Discharge: Decreased caregiver support     Rehab Resource Intensity Level, PT: III (Minimum Resource Intensity)    See flowsheet documentation for full assessment.

## 2024-05-02 NOTE — ASSESSMENT & PLAN NOTE
POD#3 cristiano middle fossa craniectomy for repair of CSF leak w/ dural substitute and Duraseal using neuromonitoring (CN 7 and NILA) and placement of a lumbar drain (LH/ENT 4/29)   Pt has been following with Dr. August in the  nsgy office with progressively worsening cristiano hearing loss, ear fullness, and CSF leak   Of note has h/o left craniotomy for SDH evacuation in 1992 in [place] (no reported complications)     Imaging:   Post-op imaging not indicated     Plan:   Continue to closely monitor neuro exam  Maintain normotensive BP goals, SBP < 160   Lumbar drain has been clamped since yesterday at 11am, OS without drainage, OD with scant bloody drainage this morning - plan to remove this afternoon  Plan to dc ancef once drain is removed  Labs/Vitals:   Labs WNL  Pain control:   Tylenol 975mg q8 hours scheduled  Oxycodone 2.5mg and 5mg q4 hours as needed for moderate and severe pain, respectively  Bowel regimen:  had small BM postop, possible BM this morning   colace 100mg BID, senna 1 tab qd, Miralax qd, prn suppository  Hold all AC/AP meds   No reported use at home   Was previously on ASA 325mg qd after knee replacement, since discontinued   DVT ppx: SCDs, sqh on hold for drain removal today, will resume later this evening  Home medications re-ordered   PT/OT recommending level II-III resource  Social work on board, they will clarify patient wishes on dc in regards to rehab vs home health services    Neurosurgery will continue to follow as primary. Anticipate dc tomorrow.  Please reach out with any further questions or concerns.

## 2024-05-02 NOTE — PROGRESS NOTES
[facility]  Progress Note: Critical Care  Name: Swapnil Grover 73 y.o. male I MRN: [id number]  Unit/Bed#: PPHP 718-01 I Date of Admission: 4/29/2024   Date of Service: 5/2/2024 I Hospital Day: 3    Assessment/Plan   Neuro:   Diagnosis: tegmen defect of base of skull with history of craniotomy for SDH evacuation on 1992  S/p bilateral endoscopy middle fossa craniotomies with temporalis muscle flap and LD placement 4/29   Plan: neuro checks q4h  LD clamped, d/c per neurosurgery  Incision care per primary team  Stat cth with any new focality on exam or decrease in GCS >2 points  Diagnosis: acute post-operative pain  Plan: continue multimodal pain regimen    CV:   Diagnosis: HTN  Plan: continue amlodipine    Pulm:  Diagnosis: ISAIAS not on CPAP  Plan: outpatient follow up    GI:   No active issues  Bowel regimen: senna, miralax. Last BM 5/1    :   Diagnosis: CKD 3  Plan: at baseline  Continue to monitor UO, renal indices    F/E/N:   F: //  E: monitor and replace per protocol  N: regular diet    Heme/Onc:   DVT ppx: chemical on hold for drain removal, b/l SCDs    Endo:   No active issues    ID:   Diagnosis: per-operative ppx  Plan: on ancef per primary team    MSK/Skin:   PT/OT    Disposition: Stepdown Level 1    ICU Core Measures     A: Assess, Prevent, and Manage Pain Has pain been assessed? Yes  Need for changes to pain regimen? No   B: Both SAT/SAT  N/A   C: Choice of Sedation RASS Goal: N/A patient not on sedation  Need for changes to sedation or analgesia regimen? NA   D: Delirium CAM-ICU: Negative   E: Early Mobility  Plan for early mobility? Yes   F: Family Engagement Plan for family engagement today? Yes       Antibiotic Review: Post op requirements     Review of Invasive Devices:            Prophylaxis:  VTE Contraindicated secondary to: lumbar drain removal   Stress Ulcer  not ordered        Significant 24hr Events     24hr events: no acute events overnight. Would  like to walk around the unit today.      Subjective     Review of Systems   Constitutional:  Negative for appetite change, chills and fever.   HENT:  Negative for ear pain and sore throat.    Eyes:  Negative for pain and visual disturbance.   Respiratory:  Negative for cough and shortness of breath.    Cardiovascular:  Negative for chest pain and palpitations.   Gastrointestinal:  Negative for abdominal pain, constipation, diarrhea, nausea and vomiting.   Genitourinary:  Negative for dysuria and hematuria.   Musculoskeletal:  Positive for arthralgias. Negative for back pain.   Skin:  Negative for color change and rash.   Neurological:  Negative for dizziness, seizures, syncope, facial asymmetry, light-headedness and headaches.   All other systems reviewed and are negative.       Objective                            Vitals I/O      Most Recent Min/Max in 24hrs   Temp 97.5 °F (36.4 °C) Temp  Min: 97.5 °F (36.4 °C)  Max: 98.5 °F (36.9 °C)   Pulse 88 Pulse  Min: 86  Max: 98   Resp 20 Resp  Min: 16  Max: 20   /75 BP  Min: 125/81  Max: 152/75   O2 Sat 97 % SpO2  Min: 93 %  Max: 97 %      Intake/Output Summary (Last 24 hours) at 5/2/2024 0721  Last data filed at 5/2/2024 0538  Gross per 24 hour   Intake 1300 ml   Output 640 ml   Net 660 ml       Diet Regular; Regular House    Invasive Monitoring           Physical Exam   Physical Exam  Vitals and nursing note reviewed.   Eyes:      General: No scleral icterus.     Extraocular Movements: Extraocular movements intact.      Conjunctiva/sclera: Conjunctivae normal.      Pupils: Pupils are equal, round, and reactive to light.   HENT:      Head: Normocephalic and atraumatic.      Comments: B/l incisions are clean dry intact     Right Ear: Drainage present.      Left Ear: Drainage present.      Mouth/Throat:      Mouth: Mucous membranes are moist.   Cardiovascular:      Rate and Rhythm: Normal rate and regular rhythm.      Pulses: Normal pulses.      Heart sounds: Normal  heart sounds.   Abdominal: General: Bowel sounds are normal. There is no distension.      Palpations: Abdomen is soft.      Tenderness: There is no abdominal tenderness. There is no guarding.   Constitutional:       General: He is not in acute distress.     Appearance: He is well-developed and well-nourished. He is not ill-appearing or toxic-appearing.   Pulmonary:      Effort: Pulmonary effort is normal. No accessory muscle usage, respiratory distress or accessory muscle usage.      Breath sounds: Normal breath sounds. No wheezing.   Psychiatric:         Speech: Speech is not no expressive aphasia.   Neurological:      General: No focal deficit present.      Mental Status: He is alert and oriented to person, place and time.      Cranial Nerves: No facial asymmetry.      Sensory: No sensory deficit.      Comments: Strength 5/5 in b/l upper extremities. Strength 4/5 in b/l LE            Diagnostic Studies      EKG: na  Imaging:  I have personally reviewed pertinent reports.   and I have personally reviewed pertinent films in PACS     Medications:  Scheduled PRN   acetaminophen, 975 mg, Q8H  amLODIPine, 5 mg, Daily  cefazolin, 2,000 mg, Q8H  docusate sodium, 100 mg, BID  magnesium sulfate, 2 g, Once  polyethylene glycol, 17 g, Daily  potassium citrate, 20 mEq, TID With Meals  senna, 1 tablet, Daily      acetaminophen, 650 mg, Q6H PRN  bisacodyl, 10 mg, Daily PRN  labetalol, 10 mg, Q4H PRN  ondansetron, 4 mg, Q4H PRN  oxyCODONE, 2.5 mg, Q4H PRN   Or  oxyCODONE, 5 mg, Q4H PRN       Continuous          Labs:    CBC    Recent Labs     05/01/24  0536 05/02/24  0519   WBC 9.92 7.59   HGB 13.4 14.3   HCT 41.7 44.2    235     BMP    Recent Labs     05/01/24  0536 05/02/24  0519   SODIUM 140 138   K 4.1 4.4    102   CO2 26 26   AGAP 10 10   BUN 22 23   CREATININE 1.24 1.20   CALCIUM 8.4 9.0       Coags    No recent results     Additional Electrolytes  Recent Labs     05/01/24  0536 05/02/24  0519   MG 1.9 1.9    PHOS 3.0 3.7   CAIONIZED  --  1.14          Blood Gas    No recent results  No recent results LFTs  No recent results    Infectious  No recent results  Glucose  Recent Labs     05/01/24  0536 05/02/24  0519   GLUC 101 104               Teresa Kiser PA-C

## 2024-05-02 NOTE — PROGRESS NOTES
"OTOLARYNGOLOGY PROGRESS NOTE    Date of Service: 5/2/2024 6:23 AM    HPI  Patient is a 73 y.o. male s/p repair of b/l middle cranial fossa tegmen defect on 4/29/24.    Overnight Events:   NAEO, AVSS    PHYSICAL EXAM:  Vitals:    05/01/24 2300   Temp: 97.5 °F (36.4 °C)   SpO2: 97%        General: No acute distress, AOx4  HEENT: incision CDI, denies salty taste in mouth, ss ear drainage b/l  Neurology: No focal deficits  Lungs: Breathing easy, unlabored and even on room air  Cardio: RRR, well perfused  Abd: soft, NT, ND         Intake/Output Summary (Last 24 hours) at 5/2/2024 0623  Last data filed at 5/2/2024 0538  Gross per 24 hour   Intake 1300 ml   Output 650 ml   Net 650 ml         LABORATORY    Recent Labs     04/29/24  1309 04/30/24  0636 05/01/24  0536 05/02/24  0519   WBC 8.89 13.29* 9.92 7.59   HGB 14.7 13.5 13.4 14.3   HCT 45.7 41.4 41.7 44.2    267 228 235       Recent Labs     04/29/24  1309 04/30/24  0636 05/01/24  0536 05/02/24  0519   K 3.9 4.4 4.1 4.4    107 104 102   BUN 20 22 22 23   PHOS  --   --  3.0 3.7   MG  --   --  1.9 1.9       Invalid input(s): \"PTPAT\", \"PTINR\", \"APTTMNNM\", \"APTTPAT\"      Patient Active Problem List   Diagnosis    Renal colic on right side    Hyperkalemia    Sleep apnea    Hydronephrosis with ureteropelvic junction (UPJ) obstruction    BPH (benign prostatic hyperplasia)    Hypertension    Hyperuricemia    Stage 3a chronic kidney disease (HCC)    Nephrolithiasis    Obesity, morbid (HCC)    Mixed conductive and sensorineural hearing loss, bilateral    Primary osteoarthritis of both knees    Other hemorrhoids    Mixed hyperlipidemia    Pre-operative examination    Arthritis    S/P total knee replacement using cement, left    Benign hypertension with CKD (chronic kidney disease) stage III (HCC)    Tegmen defect of base of skull         ASSESSMENT  Patient is a 73 y.o. male w/ acute and chronic problems as above, s/p b/l middle cranial fossa tegmen defect repair  " 4/29/24, no signs of CSF leak    PLAN  - continue to monitor for CSF leak   - Lumbar drain per NSGY  - rest of care per primary    Annia Miller, PGY2  Otolaryngology- Head and Neck Surgery  Please contact [facility] E[facility] resident role

## 2024-05-02 NOTE — ASSESSMENT & PLAN NOTE
Hx of CKD 3  - following with  nephrology in the outpt setting, specifically with Dr. Cottrell  - baseline Cr 1.2-1.3    Lab Results   Component Value Date    EGFR 60 05/02/2024    EGFR 57 05/01/2024    EGFR 57 04/30/2024    CREATININE 1.20 05/02/2024    CREATININE 1.24 05/01/2024    CREATININE 1.25 04/30/2024     Plan:   Nephrology consulted given hx - have signed off at this time  Pt with stable renal function, Cr at baseline   Continue to monitor   Continue home BP meds with hold parameters   Encourage ongoing outpt follow-up with nephro upon discharge

## 2024-05-02 NOTE — TREATMENT PLAN
Lumbar drain removed without issue, sutured closed with 3-0 ethilon in sterile fashion.  No drainage appreciated at the lumbar drain exit site with palpation.  Of note, some drainage from left ear noted on pillowcase after procedure finished, blood tinged serous vs CSF drainage.  Upon sitting up no drainage appreciated.  Will continue to monitor for now.  RN updated as well.  Hep sq to resume this evening.

## 2024-05-03 ENCOUNTER — ANESTHESIA EVENT (INPATIENT)
Dept: PERIOP | Facility: HOSPITAL | Age: 74
DRG: 026 | End: 2024-05-03
Payer: MEDICARE

## 2024-05-03 ENCOUNTER — ANESTHESIA (INPATIENT)
Dept: PERIOP | Facility: HOSPITAL | Age: 74
DRG: 026 | End: 2024-05-03
Payer: MEDICARE

## 2024-05-03 LAB
ANION GAP SERPL CALCULATED.3IONS-SCNC: 12 MMOL/L (ref 4–13)
BASOPHILS # BLD AUTO: 0.03 THOUSANDS/ÂΜL (ref 0–0.1)
BASOPHILS NFR BLD AUTO: 0 % (ref 0–1)
BUN SERPL-MCNC: 21 MG/DL (ref 5–25)
CALCIUM SERPL-MCNC: 9.3 MG/DL (ref 8.4–10.2)
CHLORIDE SERPL-SCNC: 100 MMOL/L (ref 96–108)
CO2 SERPL-SCNC: 24 MMOL/L (ref 21–32)
CREAT SERPL-MCNC: 1.24 MG/DL (ref 0.6–1.3)
EOSINOPHIL # BLD AUTO: 0.3 THOUSAND/ÂΜL (ref 0–0.61)
EOSINOPHIL NFR BLD AUTO: 4 % (ref 0–6)
ERYTHROCYTE [DISTWIDTH] IN BLOOD BY AUTOMATED COUNT: 12.9 % (ref 11.6–15.1)
GFR SERPL CREATININE-BSD FRML MDRD: 57 ML/MIN/1.73SQ M
GLUCOSE SERPL-MCNC: 108 MG/DL (ref 65–140)
HCT VFR BLD AUTO: 44.8 % (ref 36.5–49.3)
HGB BLD-MCNC: 14.7 G/DL (ref 12–17)
IMM GRANULOCYTES # BLD AUTO: 0.02 THOUSAND/UL (ref 0–0.2)
IMM GRANULOCYTES NFR BLD AUTO: 0 % (ref 0–2)
LYMPHOCYTES # BLD AUTO: 1.25 THOUSANDS/ÂΜL (ref 0.6–4.47)
LYMPHOCYTES NFR BLD AUTO: 17 % (ref 14–44)
MAGNESIUM SERPL-MCNC: 2.1 MG/DL (ref 1.9–2.7)
MCH RBC QN AUTO: 30.5 PG (ref 26.8–34.3)
MCHC RBC AUTO-ENTMCNC: 32.8 G/DL (ref 31.4–37.4)
MCV RBC AUTO: 93 FL (ref 82–98)
MONOCYTES # BLD AUTO: 0.6 THOUSAND/ÂΜL (ref 0.17–1.22)
MONOCYTES NFR BLD AUTO: 8 % (ref 4–12)
NEUTROPHILS # BLD AUTO: 5 THOUSANDS/ÂΜL (ref 1.85–7.62)
NEUTS SEG NFR BLD AUTO: 71 % (ref 43–75)
NRBC BLD AUTO-RTO: 0 /100 WBCS
PLATELET # BLD AUTO: 266 THOUSANDS/UL (ref 149–390)
PMV BLD AUTO: 9.1 FL (ref 8.9–12.7)
POTASSIUM SERPL-SCNC: 4.1 MMOL/L (ref 3.5–5.3)
RBC # BLD AUTO: 4.82 MILLION/UL (ref 3.88–5.62)
SODIUM SERPL-SCNC: 136 MMOL/L (ref 135–147)
WBC # BLD AUTO: 7.2 THOUSAND/UL (ref 4.31–10.16)

## 2024-05-03 PROCEDURE — 62272 THER SPI PNXR DRG CSF: CPT | Performed by: NEUROLOGICAL SURGERY

## 2024-05-03 PROCEDURE — 00JU3ZZ INSPECTION OF SPINAL CANAL, PERCUTANEOUS APPROACH: ICD-10-PCS | Performed by: NEUROLOGICAL SURGERY

## 2024-05-03 PROCEDURE — 99233 SBSQ HOSP IP/OBS HIGH 50: CPT | Performed by: INTERNAL MEDICINE

## 2024-05-03 PROCEDURE — 85025 COMPLETE CBC W/AUTO DIFF WBC: CPT

## 2024-05-03 PROCEDURE — 009U30Z DRAINAGE OF SPINAL CANAL WITH DRAINAGE DEVICE, PERCUTANEOUS APPROACH: ICD-10-PCS | Performed by: NEUROLOGICAL SURGERY

## 2024-05-03 PROCEDURE — 99024 POSTOP FOLLOW-UP VISIT: CPT | Performed by: NEUROLOGICAL SURGERY

## 2024-05-03 PROCEDURE — 62272 THER SPI PNXR DRG CSF: CPT | Performed by: PHYSICIAN ASSISTANT

## 2024-05-03 PROCEDURE — 80048 BASIC METABOLIC PNL TOTAL CA: CPT

## 2024-05-03 PROCEDURE — 83735 ASSAY OF MAGNESIUM: CPT

## 2024-05-03 PROCEDURE — 62327 NJX INTERLAMINAR LMBR/SAC: CPT | Performed by: NEUROLOGICAL SURGERY

## 2024-05-03 RX ORDER — LIDOCAINE HYDROCHLORIDE 10 MG/ML
10 INJECTION, SOLUTION EPIDURAL; INFILTRATION; INTRACAUDAL; PERINEURAL ONCE
Status: COMPLETED | OUTPATIENT
Start: 2024-05-03 | End: 2024-05-03

## 2024-05-03 RX ORDER — FENTANYL CITRATE 50 UG/ML
INJECTION, SOLUTION INTRAMUSCULAR; INTRAVENOUS AS NEEDED
Status: DISCONTINUED | OUTPATIENT
Start: 2024-05-03 | End: 2024-05-03

## 2024-05-03 RX ORDER — SODIUM CHLORIDE, SODIUM LACTATE, POTASSIUM CHLORIDE, CALCIUM CHLORIDE 600; 310; 30; 20 MG/100ML; MG/100ML; MG/100ML; MG/100ML
50 INJECTION, SOLUTION INTRAVENOUS CONTINUOUS
Status: DISCONTINUED | OUTPATIENT
Start: 2024-05-03 | End: 2024-05-04

## 2024-05-03 RX ORDER — LIDOCAINE HYDROCHLORIDE 10 MG/ML
INJECTION, SOLUTION EPIDURAL; INFILTRATION; INTRACAUDAL; PERINEURAL AS NEEDED
Status: DISCONTINUED | OUTPATIENT
Start: 2024-05-03 | End: 2024-05-03

## 2024-05-03 RX ORDER — HYDROMORPHONE HCL IN WATER/PF 6 MG/30 ML
0.2 PATIENT CONTROLLED ANALGESIA SYRINGE INTRAVENOUS
Status: DISCONTINUED | OUTPATIENT
Start: 2024-05-03 | End: 2024-05-03 | Stop reason: HOSPADM

## 2024-05-03 RX ORDER — METHOCARBAMOL 500 MG/1
500 TABLET, FILM COATED ORAL EVERY 6 HOURS PRN
Status: DISCONTINUED | OUTPATIENT
Start: 2024-05-03 | End: 2024-05-06 | Stop reason: HOSPADM

## 2024-05-03 RX ORDER — ONDANSETRON 2 MG/ML
4 INJECTION INTRAMUSCULAR; INTRAVENOUS ONCE AS NEEDED
Status: DISCONTINUED | OUTPATIENT
Start: 2024-05-03 | End: 2024-05-03 | Stop reason: HOSPADM

## 2024-05-03 RX ORDER — FENTANYL CITRATE/PF 50 MCG/ML
25 SYRINGE (ML) INJECTION
Status: DISCONTINUED | OUTPATIENT
Start: 2024-05-03 | End: 2024-05-03 | Stop reason: HOSPADM

## 2024-05-03 RX ORDER — PROPOFOL 10 MG/ML
INJECTION, EMULSION INTRAVENOUS AS NEEDED
Status: DISCONTINUED | OUTPATIENT
Start: 2024-05-03 | End: 2024-05-03

## 2024-05-03 RX ORDER — CEFAZOLIN SODIUM 1 G/3ML
INJECTION, POWDER, FOR SOLUTION INTRAMUSCULAR; INTRAVENOUS AS NEEDED
Status: DISCONTINUED | OUTPATIENT
Start: 2024-05-03 | End: 2024-05-03

## 2024-05-03 RX ORDER — SODIUM CHLORIDE, SODIUM LACTATE, POTASSIUM CHLORIDE, CALCIUM CHLORIDE 600; 310; 30; 20 MG/100ML; MG/100ML; MG/100ML; MG/100ML
INJECTION, SOLUTION INTRAVENOUS CONTINUOUS PRN
Status: DISCONTINUED | OUTPATIENT
Start: 2024-05-03 | End: 2024-05-03

## 2024-05-03 RX ADMIN — POTASSIUM CITRATE 20 MEQ: 10 TABLET, EXTENDED RELEASE ORAL at 10:11

## 2024-05-03 RX ADMIN — METHOCARBAMOL 500 MG: 500 TABLET ORAL at 01:41

## 2024-05-03 RX ADMIN — DOCUSATE SODIUM 100 MG: 100 CAPSULE, LIQUID FILLED ORAL at 10:11

## 2024-05-03 RX ADMIN — CEFAZOLIN SODIUM 2000 MG: 2 SOLUTION INTRAVENOUS at 20:00

## 2024-05-03 RX ADMIN — DOCUSATE SODIUM 100 MG: 100 CAPSULE, LIQUID FILLED ORAL at 17:01

## 2024-05-03 RX ADMIN — ACETAMINOPHEN 975 MG: 325 TABLET, FILM COATED ORAL at 10:11

## 2024-05-03 RX ADMIN — PROPOFOL 75 MCG/KG/MIN: 10 INJECTION, EMULSION INTRAVENOUS at 15:15

## 2024-05-03 RX ADMIN — POTASSIUM CITRATE 20 MEQ: 10 TABLET, EXTENDED RELEASE ORAL at 17:01

## 2024-05-03 RX ADMIN — FENTANYL CITRATE 25 MCG: 50 INJECTION INTRAMUSCULAR; INTRAVENOUS at 15:34

## 2024-05-03 RX ADMIN — FENTANYL CITRATE 25 MCG: 50 INJECTION INTRAMUSCULAR; INTRAVENOUS at 15:32

## 2024-05-03 RX ADMIN — LIDOCAINE HYDROCHLORIDE 50 MG: 10 INJECTION, SOLUTION EPIDURAL; INFILTRATION; INTRACAUDAL; PERINEURAL at 15:13

## 2024-05-03 RX ADMIN — LIDOCAINE HYDROCHLORIDE 10 ML: 10 INJECTION, SOLUTION EPIDURAL; INFILTRATION; INTRACAUDAL; PERINEURAL at 09:00

## 2024-05-03 RX ADMIN — AMLODIPINE BESYLATE 5 MG: 5 TABLET ORAL at 10:11

## 2024-05-03 RX ADMIN — SODIUM CHLORIDE, SODIUM LACTATE, POTASSIUM CHLORIDE, AND CALCIUM CHLORIDE: .6; .31; .03; .02 INJECTION, SOLUTION INTRAVENOUS at 14:57

## 2024-05-03 RX ADMIN — CEFAZOLIN 2000 MG: 1 INJECTION, POWDER, FOR SOLUTION INTRAMUSCULAR; INTRAVENOUS at 15:15

## 2024-05-03 RX ADMIN — PROPOFOL 30 MG: 10 INJECTION, EMULSION INTRAVENOUS at 15:13

## 2024-05-03 RX ADMIN — CEFAZOLIN SODIUM 2000 MG: 2 SOLUTION INTRAVENOUS at 03:09

## 2024-05-03 NOTE — PROGRESS NOTES
[facility]  Progress Note: Critical Care  Name: Swapnil Grover 73 y.o. 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  S/p b/l endoscopic middle fossa craniotomies with temporalis muscle flap and LD placement on 4/29  LD removed on 5/2, now complicated by CSF leak  Plan: replacement of LD by IR today  Neuro checks q4h  Incision care per primary team  Stat CTH with any new focality on exam or decrease in GCS >2 points  Diagnosis: acute post-op pain  Plan: continue multimodal pain regimen    CV:   Diagnosis: HTN  Plan: continue amlodipine    Pulm:  Diagnosis: ISAIAS not on CPAP  Plan: outpatient follow up    GI:   No active issues  Bowel regimen: senna, miralax    :   Diagnosis: CKD3  Plan: at baseline  Continue to monitor UO and trend renal indices    F/E/N:   F: //  E: monitor and replace per protocol  N: regular diet    Heme/Onc:   DVT ppx: chemical on hold for LD placement, b/l SCDs    Endo:   No active issues    ID:   Diagnosis: jen-op abx  Plan: continue on ancef given replacement of LD    MSK/Skin:   PT/OT/CM    Disposition: Stepdown Level 1    ICU Core Measures     A: Assess, Prevent, and Manage Pain Has pain been assessed? Yes  Need for changes to pain regimen? No   B: Both SAT/SAT  N/A   C: Choice of Sedation RASS Goal: N/A patient not on sedation  Need for changes to sedation or analgesia regimen? NA   D: Delirium CAM-ICU: Negative   E: Early Mobility  Plan for early mobility? Yes   F: Family Engagement Plan for family engagement today? Yes       Antibiotic Review: Post op requirements     Review of Invasive Devices:            Prophylaxis:  VTE Contraindicated secondary to: LD placement   Stress Ulcer  not ordered        Significant 24hr Events     24hr events: no  acute events overnight, continues with significant drainage from left ear.      Subjective     Review of Systems   Constitutional:  Negative for chills and fever.   HENT:  Negative for sore throat.    Eyes:  Negative for pain and visual disturbance.   Respiratory:  Negative for cough and shortness of breath.    Cardiovascular:  Negative for chest pain and palpitations.   Gastrointestinal:  Negative for abdominal pain, constipation, diarrhea, nausea and vomiting.   Genitourinary:  Negative for difficulty urinating, dysuria and hematuria.   Musculoskeletal:  Positive for back pain. Negative for arthralgias.   Skin:  Negative for color change and rash.   Neurological:  Negative for dizziness, seizures, syncope, facial asymmetry, weakness, light-headedness and headaches.   All other systems reviewed and are negative.       Objective                            Vitals I/O      Most Recent Min/Max in 24hrs   Temp (!) 97.4 °F (36.3 °C) Temp  Min: 97.4 °F (36.3 °C)  Max: 98.2 °F (36.8 °C)   Pulse 70 Pulse  Min: 70  Max: 102   Resp 20 Resp  Min: 18  Max: 20   /86 BP  Min: 120/81  Max: 148/86   O2 Sat 95 % SpO2  Min: 94 %  Max: 99 %      Intake/Output Summary (Last 24 hours) at 5/3/2024 0702  Last data filed at 5/2/2024 1634  Gross per 24 hour   Intake 1000 ml   Output 450 ml   Net 550 ml       Diet Regular; Regular House    Invasive Monitoring           Physical Exam   Physical Exam  Vitals and nursing note reviewed.   Eyes:      General: No scleral icterus.     Extraocular Movements: Extraocular movements intact.      Conjunctiva/sclera: Conjunctivae normal.      Pupils: Pupils are equal, round, and reactive to light.   Skin:     General: Skin is warm.   HENT:      Head: Normocephalic and atraumatic.      Comments: Incisions clean, dry, intact. B/l drainage from ear canals     Right Ear: Drainage present.      Left Ear: Drainage present.      Mouth/Throat:      Mouth: Mucous membranes are moist.   Cardiovascular:       Rate and Rhythm: Normal rate and regular rhythm.      Pulses: Normal pulses.      Heart sounds: Normal heart sounds.   Abdominal: General: Bowel sounds are normal. There is no distension.      Palpations: Abdomen is soft.      Tenderness: There is no abdominal tenderness. There is no guarding.   Constitutional:       General: He is not in acute distress.     Appearance: He is well-developed and well-nourished. He is not ill-appearing or toxic-appearing.   Psychiatric:         Speech: Speech is not no expressive aphasia.   Neurological:      General: No focal deficit present.      Mental Status: He is alert and oriented to person, place and time.      Cranial Nerves: No facial asymmetry.      Sensory: No sensory deficit.      Motor: Strength full and intact in all extremities. No motor deficit.            Diagnostic Studies      EKG: na  Imaging:  I have personally reviewed pertinent reports.   and I have personally reviewed pertinent films in PACS     Medications:  Scheduled PRN   acetaminophen, 975 mg, Q8H  amLODIPine, 5 mg, Daily  cefazolin, 2,000 mg, Q8H  docusate sodium, 100 mg, BID  polyethylene glycol, 17 g, Daily  potassium citrate, 20 mEq, TID With Meals  senna, 2 tablet, Daily      bisacodyl, 10 mg, Daily PRN  labetalol, 10 mg, Q4H PRN  methocarbamol, 500 mg, Q6H PRN  ondansetron, 4 mg, Q4H PRN  oxyCODONE, 2.5 mg, Q4H PRN   Or  oxyCODONE, 5 mg, Q4H PRN       Continuous          Labs:    CBC    Recent Labs     05/02/24  0519 05/03/24  0429   WBC 7.59 7.20   HGB 14.3 14.7   HCT 44.2 44.8    266     BMP    Recent Labs     05/02/24  0519 05/03/24  0429   SODIUM 138 136   K 4.4 4.1    100   CO2 26 24   AGAP 10 12   BUN 23 21   CREATININE 1.20 1.24   CALCIUM 9.0 9.3       Coags    No recent results     Additional Electrolytes  Recent Labs     05/02/24  0519 05/03/24  0429   MG 1.9 2.1   PHOS 3.7  --    CAIONIZED 1.14  --           Blood Gas    No recent results  No recent results LFTs  No recent  results    Infectious  No recent results  Glucose  Recent Labs     05/02/24  0519 05/03/24  0429   GLUC 104 108               Teresa Kiser PA-C

## 2024-05-03 NOTE — ASSESSMENT & PLAN NOTE
POD#4 cristiano middle fossa craniectomy for repair of CSF leak w/ dural substitute and Duraseal using neuromonitoring (CN 7 and NILA) and placement of a lumbar drain (LH/ENT 4/29)   Pt has been following with Dr. August in the  nsgy office with progressively worsening cristiano hearing loss, ear fullness, and CSF leak   Of note has h/o left craniotomy for SDH evacuation in 1992 in [place] (no reported complications)     Imaging:   Post-op imaging not indicated     Plan:   Continue to closely monitor neuro exam  Maintain normotensive BP goals, SBP < 160   Lumbar drain removed 5/3, patient developed recurrence of left otorrhea concerning for CSF.  Attempted bedside lumbar drain placement however unsuccessful.  Will plan to perform in the OR later this afternoon with Dr. August - plan to clamp new drain Venu night and monitor for otorrhea through Monday morning.  If ongoing drainage occurs, he may come to need shunt placement for CSF diversion which tentatively would happen Monday afternoon.  NPO for now  Will order updated coags  Hep sq discontinued, no doses given today  Labs/Vitals:   Labs WNL 5/3  Pain control:   Tylenol 975mg q8 hours scheduled  Oxycodone 2.5mg and 5mg q4 hours as needed for moderate and severe pain, respectively  Bowel regimen:  had small BM postop   colace 100mg BID, senna 1 tab qd, Miralax qd, prn suppository  Hold all AC/AP meds   No reported use at home   Was previously on ASA 325mg qd after knee replacement, since discontinued   DVT ppx: SCDs, sqh on hold  Home medications re-ordered   PT/OT - denied acute rehab, will likely progress to home without outpatient services, will reeval when closer to dc  Social work on board to assist with dispo planning  NCC following given patient will need to remain step down 1  ENT following as well     Neurosurgery will continue to follow as primary. Patient is not cleared for dc at this time. Please reach out with any further questions or concerns.

## 2024-05-03 NOTE — OP NOTE
OPERATIVE REPORT  PATIENT NAME: Swapnil Grover    :  1950  MRN: [id number]  Pt Location:  OR ROOM 17    SURGERY DATE: 5/3/2024    Surgeons and Role:  Billy August MD - Primary  Devan Berry, SANTOSH - Assisting    Preop Diagnosis:  CSF otorrhea [G96.01]    Post-Op Diagnosis Codes:  CSF otorrhea [G96.01]    Procedure(s):  Lumbar subarachnoid drain placement    Specimen(s):  None    Estimated Blood Loss:   Minimal    Drains:   Lumbar drain    Anesthesia Type:   IV Sedation with Anesthesia    Operative Indications:  CSF otorrhea [G96.01]    Operative Findings:  Patient placed in right lateral decubitus position and gently sedated with IV medications per Anesthesia team.  L4/5 interspaced was marked using anatomical landmarks. The entire lumbar spinal region was prepped with betadine and draped in sterile fashion. A spinal needle with stylet was used to access thecal sac with one attempt.      Slightly blood-tinged CSF was encountered under low pressure (<5 cm H20). Lumbar drain catheter was threaded into the needle until ~15 cm at the skin. Needle was then removed around the catheter without complication. Guidewire was removed from lumbar drain. There was still spontaneous CSF flow through the distal tip.    Lumbar drain catheter was anchored in place with multiple sutures as well as large Tegaderms. Distal tip of lumbar drain was attached to the drainage system without complication and taped in place. There was still spontaneous CSF flow through the connected lumbar drain.    Patient tolerated the procedure well and remained neurologically stable without complications.    Contribution:  I performed the entire procedure with assistance from RADHA Berry.    Disposition:  PACU the return to stepdown unit with NCCU monitoring     Billy August M.D.

## 2024-05-03 NOTE — CASE MANAGEMENT
Case Management Discharge Planning Note    Patient name Swapnil Grover  Location [facility] 718/[facility] 718-01 MRN [id number]  : 1950 Date 5/3/2024       Current Admission Date: 2024  Current Admission Diagnosis:Tegmen defect of base of skull   Patient Active Problem List    Diagnosis Date Noted    Tegmen defect of base of skull 2024    Benign hypertension with CKD (chronic kidney disease) stage III (HCC) 2024    S/P total knee replacement using cement, left 2024    Arthritis 2024    Pre-operative examination 2024    Mixed hyperlipidemia 2023    Primary osteoarthritis of both knees 2023    Other hemorrhoids 2023    Mixed conductive and sensorineural hearing loss, bilateral 2023    Obesity, morbid (HCC) 2023    Nephrolithiasis 2023    Stage 3a chronic kidney disease (HCC) 2023    Hyperuricemia 2022    Sleep apnea 2022    Hydronephrosis with ureteropelvic junction (UPJ) obstruction 2022    BPH (benign prostatic hyperplasia) 2022    Hypertension 2022    Hyperkalemia 10/19/2020    Renal colic on right side 10/18/2020      LOS (days): 4  Geometric Mean LOS (GMLOS) (days): 3.1  Days to GMLOS:-1     OBJECTIVE:  Risk of Unplanned Readmission Score: 9.55         Current admission status: Inpatient   Preferred Pharmacy:   [id number] - [place], [place] - [address]  [facility] [address]  [facility] [address]  Phone: [phone] Fax: [phone]    Primary Care Provider: Frank Lombardi, DO    Primary Insurance: MEDICARE  Secondary Insurance: [facility]    DISCHARGE DETAILS:     Additional Comments: Pt will remain through the weekend. Reinsetion of lumbar drain unlikely will be done over the weekend

## 2024-05-03 NOTE — PLAN OF CARE
Problem: Prexisting or High Potential for Compromised Skin Integrity  Goal: Skin integrity is maintained or improved  Description: INTERVENTIONS:  - Identify patients at risk for skin breakdown  - Assess and monitor skin integrity  - Assess and monitor nutrition and hydration status  - Monitor labs   - Assess for incontinence   - Turn and reposition patient  - Assist with mobility/ambulation  - Relieve pressure over bony prominences  - Avoid friction and shearing  - Provide appropriate hygiene as needed including keeping skin clean and dry  - Evaluate need for skin moisturizer/barrier cream  - Collaborate with interdisciplinary team   - Patient/family teaching  - Consider wound care consult   Outcome: Progressing     Problem: PAIN - ADULT  Goal: Verbalizes/displays adequate comfort level or baseline comfort level  Description: Interventions:  - Encourage patient to monitor pain and request assistance  - Assess pain using appropriate pain scale  - Administer analgesics based on type and severity of pain and evaluate response  - Implement non-pharmacological measures as appropriate and evaluate response  - Consider cultural and social influences on pain and pain management  - Notify physician/advanced practitioner if interventions unsuccessful or patient reports new pain  Outcome: Progressing     Problem: INFECTION - ADULT  Goal: Absence or prevention of progression during hospitalization  Description: INTERVENTIONS:  - Assess and monitor for signs and symptoms of infection  - Monitor lab/diagnostic results  - Monitor all insertion sites, i.e. indwelling lines, tubes, and drains  - Monitor endotracheal if appropriate and nasal secretions for changes in amount and color  - Post Falls appropriate cooling/warming therapies per order  - Administer medications as ordered  - Instruct and encourage patient and family to use good hand hygiene technique  - Identify and instruct in appropriate isolation precautions for  identified infection/condition  Outcome: Progressing  Goal: Absence of fever/infection during neutropenic period  Description: INTERVENTIONS:  - Monitor WBC    Outcome: Progressing     Problem: SAFETY ADULT  Goal: Patient will remain free of falls  Description: INTERVENTIONS:  - Educate patient/family on patient safety including physical limitations  - Instruct patient to call for assistance with activity   - Consult OT/PT to assist with strengthening/mobility   - Keep Call bell within reach  - Keep bed low and locked with side rails adjusted as appropriate  - Keep care items and personal belongings within reach  - Initiate and maintain comfort rounds  - Make Fall Risk Sign visible to staff  - Offer Toileting every 2   Hours, in advance of need  - Initiate/Maintain bed alarm  - Obtain necessary fall risk management equipment: non skid footwear  - Apply yellow socks and bracelet for high fall risk patients  - Consider moving patient to room near nurses station  Outcome: Progressing  Goal: Maintain or return to baseline ADL function  Description: INTERVENTIONS:  -  Assess patient's ability to carry out ADLs; assess patient's baseline for ADL function and identify physical deficits which impact ability to perform ADLs (bathing, care of mouth/teeth, toileting, grooming, dressing, etc.)  - Assess/evaluate cause of self-care deficits   - Assess range of motion  - Assess patient's mobility; develop plan if impaired  - Assess patient's need for assistive devices and provide as appropriate  - Encourage maximum independence but intervene and supervise when necessary  - Involve family in performance of ADLs  - Assess for home care needs following discharge   - Consider OT consult to assist with ADL evaluation and planning for discharge  - Provide patient education as appropriate  Outcome: Progressing  Goal: Maintains/Returns to pre admission functional level  Description: INTERVENTIONS:  - Perform AM-PAC 6 Click Basic  Mobility/ Daily Activity assessment daily.  - Set and communicate daily mobility goal to care team and patient/family/caregiver.   - Collaborate with rehabilitation services on mobility goals if consulted  - Perform Range of Motion 3 times a day.  - Reposition patient every 2 hours.  - Dangle patient 3 times a day  - Stand patient 3 times a day  - Ambulate patient 3 times a day  - Out of bed to chair 3 times a day   - Out of bed for meals 3 times a day  - Out of bed for toileting  - Record patient progress and toleration of activity level   Outcome: Progressing     Problem: DISCHARGE PLANNING  Goal: Discharge to home or other facility with appropriate resources  Description: INTERVENTIONS:  - Identify barriers to discharge w/patient and caregiver  - Arrange for needed discharge resources and transportation as appropriate  - Identify discharge learning needs (meds, wound care, etc.)  - Arrange for interpretive services to assist at discharge as needed  - Refer to Case Management Department for coordinating discharge planning if the patient needs post-hospital services based on physician/advanced practitioner order or complex needs related to functional status, cognitive ability, or social support system  Outcome: Progressing     Problem: Knowledge Deficit  Goal: Patient/family/caregiver demonstrates understanding of disease process, treatment plan, medications, and discharge instructions  Description: Complete learning assessment and assess knowledge base.  Interventions:  - Provide teaching at level of understanding  - Provide teaching via preferred learning methods  Outcome: Progressing     Problem: NEUROSENSORY - ADULT  Goal: Achieves stable or improved neurological status  Description: INTERVENTIONS  - Monitor and report changes in neurological status  - Monitor vital signs such as temperature, blood pressure, glucose, and any other labs ordered   - Initiate measures to prevent increased intracranial pressure  -  Monitor for seizure activity and implement precautions if appropriate      Outcome: Progressing  Goal: Remains free of injury related to seizures activity  Description: INTERVENTIONS  - Maintain airway, patient safety  and administer oxygen as ordered  - Monitor patient for seizure activity, document and report duration and description of seizure to physician/advanced practitioner  - If seizure occurs,  ensure patient safety during seizure  - Reorient patient post seizure  - Seizure pads on all 4 side rails  - Instruct patient/family to notify RN of any seizure activity including if an aura is experienced  - Instruct patient/family to call for assistance with activity based on nursing assessment  - Administer anti-seizure medications if ordered    Outcome: Progressing  Goal: Achieves maximal functionality and self care  Description: INTERVENTIONS  - Monitor swallowing and airway patency with patient fatigue and changes in neurological status  - Encourage and assist patient to increase activity and self care.   - Encourage visually impaired, hearing impaired and aphasic patients to use assistive/communication devices  Outcome: Progressing     Problem: GENITOURINARY - ADULT  Goal: Maintains or returns to baseline urinary function  Description: INTERVENTIONS:  - Assess urinary function  - Encourage oral fluids to ensure adequate hydration if ordered  - Administer IV fluids as ordered to ensure adequate hydration  - Administer ordered medications as needed  - Offer frequent toileting  - Follow urinary retention protocol if ordered  Outcome: Progressing  Goal: Absence of urinary retention  Description: INTERVENTIONS:  - Assess patient's ability to void and empty bladder  - Monitor I/O  - Bladder scan as needed  - Discuss with physician/AP medications to alleviate retention as needed  - Discuss catheterization for long term situations as appropriate  Outcome: Progressing  Goal: Urinary catheter remains  patent  Description: INTERVENTIONS:  - Assess patency of urinary catheter  - If patient has a chronic dover, consider changing catheter if non-functioning  - Follow guidelines for intermittent irrigation of non-functioning urinary catheter  Outcome: Progressing     Problem: MUSCULOSKELETAL - ADULT  Goal: Maintain or return mobility to safest level of function  Description: INTERVENTIONS:  - Assess patient's ability to carry out ADLs; assess patient's baseline for ADL function and identify physical deficits which impact ability to perform ADLs (bathing, care of mouth/teeth, toileting, grooming, dressing, etc.)  - Assess/evaluate cause of self-care deficits   - Assess range of motion  - Assess patient's mobility  - Assess patient's need for assistive devices and provide as appropriate  - Encourage maximum independence but intervene and supervise when necessary  - Involve family in performance of ADLs  - Assess for home care needs following discharge   - Consider OT consult to assist with ADL evaluation and planning for discharge  - Provide patient education as appropriate  Outcome: Progressing  Goal: Maintain proper alignment of affected body part  Description: INTERVENTIONS:  - Support, maintain and protect limb and body alignment  - Provide patient/ family with appropriate education  Outcome: Progressing     Problem: Nutrition/Hydration-ADULT  Goal: Nutrient/Hydration intake appropriate for improving, restoring or maintaining nutritional needs  Description: Monitor and assess patient's nutrition/hydration status for malnutrition. Collaborate with interdisciplinary team and initiate plan and interventions as ordered.  Monitor patient's weight and dietary intake as ordered or per policy. Utilize nutrition screening tool and intervene as necessary. Determine patient's food preferences and provide high-protein, high-caloric foods as appropriate.     INTERVENTIONS:  - Monitor oral intake, urinary output, labs, and  treatment plans  - Assess nutrition and hydration status and recommend course of action  - Evaluate amount of meals eaten  - Assist patient with eating if necessary   - Allow adequate time for meals  - Recommend/ encourage appropriate diets, oral nutritional supplements, and vitamin/mineral supplements  - Order, calculate, and assess calorie counts as needed  - Recommend, monitor, and adjust tube feedings and TPN/PPN based on assessed needs  - Assess need for intravenous fluids  - Provide specific nutrition/hydration education as appropriate  - Include patient/family/caregiver in decisions related to nutrition  Outcome: Progressing

## 2024-05-03 NOTE — ANESTHESIA PREPROCEDURE EVALUATION
Procedure:  Lumbar subarachnoid drain placement (Bilateral: Spine Lumbar)    Relevant Problems   ANESTHESIA (within normal limits)      CARDIO   (+) Hypertension   (+) Mixed hyperlipidemia      /RENAL   (+) BPH (benign prostatic hyperplasia)   (+) Benign hypertension with CKD (chronic kidney disease) stage III (HCC)   (+) Hydronephrosis with ureteropelvic junction (UPJ) obstruction   (+) Nephrolithiasis   (+) Stage 3a chronic kidney disease (HCC)      MUSCULOSKELETAL   (+) Arthritis   (+) Primary osteoarthritis of both knees      PULMONARY   (+) Sleep apnea       TTE 12/2/2022    Left Ventricle: Left ventricular cavity size is normal. Wall thickness is mildly increased. There is mild concentric hypertrophy. Systolic function is normal.  Definity was used to outline the ventricles.  Estimated ejection fraction is 60-65%. Although no diagnostic regional wall motion abnormality was identified, this possibility cannot be completely excluded on the basis of this study. Diastolic function is normal for age.    Right Ventricle: Systolic function is normal.    Mitral Valve: There is mild annular calcification. There is trace regurgitation.    Tricuspid Valve: There is trace regurgitation.      Physical Exam    Airway    Mallampati score: III  TM Distance: >3 FB  Neck ROM: limited     Dental   No notable dental hx     Cardiovascular      Pulmonary      Other Findings        Anesthesia Plan  ASA Score- 3     Anesthesia Type- IV sedation with anesthesia with ASA Monitors.         Additional Monitors:     Airway Plan:            Plan Factors-Exercise tolerance (METS): >4 METS.    Chart reviewed. EKG reviewed.  Existing labs reviewed. Patient summary reviewed.    Patient is not a current smoker.      Obstructive sleep apnea risk education given perioperatively.        Induction- intravenous.    Postoperative Plan- Plan for postoperative opioid use.     Informed Consent- Anesthetic plan and risks discussed with patient.  I  personally reviewed this patient with the CRNA. Discussed and agreed on the Anesthesia Plan with the CRNA..

## 2024-05-03 NOTE — ASSESSMENT & PLAN NOTE
Hx of CKD 3  - following with  nephrology in the outpt setting, specifically with Dr. Cottrell  - baseline Cr 1.2-1.3    Lab Results   Component Value Date    EGFR 57 05/03/2024    EGFR 60 05/02/2024    EGFR 57 05/01/2024    CREATININE 1.24 05/03/2024    CREATININE 1.20 05/02/2024    CREATININE 1.24 05/01/2024     Plan:   Nephrology consulted given hx - have signed off at this time  Pt with stable renal function, Cr at baseline   Continue to monitor   Continue home BP meds with hold parameters   Encourage ongoing outpt follow-up with nephro upon discharge

## 2024-05-03 NOTE — PROCEDURES
Clinical Note    The goals and alternatives to insertion of a lumbar drain were discussed with patient. The risks were discussed in detail.     1. Risk of neurological injury with new pain, weakness or numbness, difficulties with bowel or bladder function.  2. Risk of meningitis  3.  Risk of retained catheter.    Once all questions were answered to their satisfaction, they asked to proceed with surgery.  Written and verbal consent were personally obtained.    Procedure Note    The patient was asked to lie on his left side in the fetal position.  Of the lumbar spine was identified and marked.  An insertion point approximately 1 cm rostral to the insertion point of the previous lumbar drain was marked.  The skin was then prepped and draped in the usual sterile manner.  A timeout was undertaken.  1% Xylocaine without epinephrine was used to infiltrate the soft tissue.      The Touhy needle was inserted until the lamina was encountered.  The needle was advanced along the lamina until the interspace was identified.  The needle then was advanced and there was a loss of resistance.  The stylette was removed but no CSF was obtained.  The needle was backed out slightly and was asked to further flex his knees in order to open the interspace.  The needle was then advanced and another polyp was obtained.  The patient had pressure in his lower back but denied any radicular symptoms into either leg.  Once again CSF was not obtained.  The needle was advanced to the hub without egress of CSF.  The patient was asked to cough but no CSF was obtained.  At this point, the patient indicated that he is quite uncomfortable.    The decision was made to stop the procedure.  The needle was removed and a Band-Aid was applied.  No lumbar drain was placed.  The results of the procedure were discussed with the patient and he will likely proceed to IR or to the OR for placement under fluoroscopy and anesthetic.  Neurologic examination stable  throughout the case.    Of note, during the procedure there was egress of clear CSF from his left ear.

## 2024-05-03 NOTE — PROGRESS NOTES
"OTOLARYNGOLOGY PROGRESS NOTE    Date of Service: 5/3/2024 6:17 AM    HPI  Patient is a 73 y.o. male s/p repair of b/l middle cranial fossa tegmen defect on 4/29/24.    Overnight Events:   LD removed and drainage from R ear observed, clear fluid      PHYSICAL EXAM:  Vitals:    05/02/24 2236   BP: 133/75   Pulse: 80   Resp: 18   Temp: (!) 97.4 °F (36.3 °C)   SpO2: 95%        General: No acute distress, AOx4  HEENT: incision CDI, denies salty taste in mouth, ss drainage ear R, L with moderate clear drainage   Neurology: No focal deficits  Lungs: Breathing easy, unlabored and even on room air  Cardio: RRR, well perfused  Abd: soft, NT, ND         Intake/Output Summary (Last 24 hours) at 5/3/2024 0617  Last data filed at 5/2/2024 1634  Gross per 24 hour   Intake 1000 ml   Output 450 ml   Net 550 ml         LABORATORY    Recent Labs     04/30/24 0636 05/01/24  0536 05/02/24  0519 05/03/24  0429   WBC 13.29* 9.92 7.59 7.20   HGB 13.5 13.4 14.3 14.7   HCT 41.4 41.7 44.2 44.8    228 235 266       Recent Labs     04/30/24 0636 05/01/24  0536 05/02/24  0519 05/03/24  0429   K 4.4 4.1 4.4 4.1    104 102 100   BUN 22 22 23 21   PHOS  --  3.0 3.7  --    MG  --  1.9 1.9 2.1       Invalid input(s): \"PTPAT\", \"PTINR\", \"APTTMNNM\", \"APTTPAT\"      Patient Active Problem List   Diagnosis    Renal colic on right side    Hyperkalemia    Sleep apnea    Hydronephrosis with ureteropelvic junction (UPJ) obstruction    BPH (benign prostatic hyperplasia)    Hypertension    Hyperuricemia    Stage 3a chronic kidney disease (HCC)    Nephrolithiasis    Obesity, morbid (HCC)    Mixed conductive and sensorineural hearing loss, bilateral    Primary osteoarthritis of both knees    Other hemorrhoids    Mixed hyperlipidemia    Pre-operative examination    Arthritis    S/P total knee replacement using cement, left    Benign hypertension with CKD (chronic kidney disease) stage III (HCC)    Tegmen defect of base of skull "         ASSESSMENT  Patient is a 73 y.o. male w/ acute and chronic problems as above, s/p b/l middle cranial fossa tegmen defect repair  4/29/24, L side leaking clear fluid s/p LD removal 5/2/24.    PLAN  - continue to monitor CSF leak   - Lumbar drain per NSGY- considering replacement with IR  - rest of care per primary    Annia Miller, PGY2  Otolaryngology- Head and Neck Surgery  Please contact Racheladelia Carrasco ENT resident role

## 2024-05-03 NOTE — PLAN OF CARE
Problem: Prexisting or High Potential for Compromised Skin Integrity  Goal: Skin integrity is maintained or improved  Description: INTERVENTIONS:  - Identify patients at risk for skin breakdown  - Assess and monitor skin integrity  - Assess and monitor nutrition and hydration status  - Monitor labs   - Assess for incontinence   - Turn and reposition patient  - Assist with mobility/ambulation  - Relieve pressure over bony prominences  - Avoid friction and shearing  - Provide appropriate hygiene as needed including keeping skin clean and dry  - Evaluate need for skin moisturizer/barrier cream  - Collaborate with interdisciplinary team   - Patient/family teaching  - Consider wound care consult   Outcome: Progressing     Problem: PAIN - ADULT  Goal: Verbalizes/displays adequate comfort level or baseline comfort level  Description: Interventions:  - Encourage patient to monitor pain and request assistance  - Assess pain using appropriate pain scale  - Administer analgesics based on type and severity of pain and evaluate response  - Implement non-pharmacological measures as appropriate and evaluate response  - Consider cultural and social influences on pain and pain management  - Notify physician/advanced practitioner if interventions unsuccessful or patient reports new pain  Outcome: Progressing     Problem: INFECTION - ADULT  Goal: Absence or prevention of progression during hospitalization  Description: INTERVENTIONS:  - Assess and monitor for signs and symptoms of infection  - Monitor lab/diagnostic results  - Monitor all insertion sites, i.e. indwelling lines, tubes, and drains  - Monitor endotracheal if appropriate and nasal secretions for changes in amount and color  - [place] appropriate cooling/warming therapies per order  - Administer medications as ordered  - Instruct and encourage patient and family to use good hand hygiene technique  - Identify and instruct in appropriate isolation precautions for  identified infection/condition  Outcome: Progressing  Goal: Absence of fever/infection during neutropenic period  Description: INTERVENTIONS:  - Monitor WBC    Outcome: Progressing     Problem: SAFETY ADULT  Goal: Patient will remain free of falls  Description: INTERVENTIONS:  - Educate patient/family on patient safety including physical limitations  - Instruct patient to call for assistance with activity   - Consult OT/PT to assist with strengthening/mobility   - Keep Call bell within reach  - Keep bed low and locked with side rails adjusted as appropriate  - Keep care items and personal belongings within reach  - Initiate and maintain comfort rounds  - Make Fall Risk Sign visible to staff  - Offer Toileting every 2   Hours, in advance of need  - Initiate/Maintain bed alarm  - Obtain necessary fall risk management equipment: non skid footwear  - Apply yellow socks and bracelet for high fall risk patients  - Consider moving patient to room near nurses station  Outcome: Progressing  Goal: Maintain or return to baseline ADL function  Description: INTERVENTIONS:  -  Assess patient's ability to carry out ADLs; assess patient's baseline for ADL function and identify physical deficits which impact ability to perform ADLs (bathing, care of mouth/teeth, toileting, grooming, dressing, etc.)  - Assess/evaluate cause of self-care deficits   - Assess range of motion  - Assess patient's mobility; develop plan if impaired  - Assess patient's need for assistive devices and provide as appropriate  - Encourage maximum independence but intervene and supervise when necessary  - Involve family in performance of ADLs  - Assess for home care needs following discharge   - Consider OT consult to assist with ADL evaluation and planning for discharge  - Provide patient education as appropriate  Outcome: Progressing  Goal: Maintains/Returns to pre admission functional level  Description: INTERVENTIONS:  - Perform AM-PAC 6 Click Basic  Mobility/ Daily Activity assessment daily.  - Set and communicate daily mobility goal to care team and patient/family/caregiver.   - Collaborate with rehabilitation services on mobility goals if consulted  - Perform Range of Motion 3 times a day.  - Reposition patient every 2 hours.  - Dangle patient 3 times a day  - Stand patient 3 times a day  - Ambulate patient 3 times a day  - Out of bed to chair 3 times a day   - Out of bed for meals 3 times a day  - Out of bed for toileting  - Record patient progress and toleration of activity level   Outcome: Progressing     Problem: DISCHARGE PLANNING  Goal: Discharge to home or other facility with appropriate resources  Description: INTERVENTIONS:  - Identify barriers to discharge w/patient and caregiver  - Arrange for needed discharge resources and transportation as appropriate  - Identify discharge learning needs (meds, wound care, etc.)  - Arrange for interpretive services to assist at discharge as needed  - Refer to Case Management Department for coordinating discharge planning if the patient needs post-hospital services based on physician/advanced practitioner order or complex needs related to functional status, cognitive ability, or social support system  Outcome: Progressing     Problem: Knowledge Deficit  Goal: Patient/family/caregiver demonstrates understanding of disease process, treatment plan, medications, and discharge instructions  Description: Complete learning assessment and assess knowledge base.  Interventions:  - Provide teaching at level of understanding  - Provide teaching via preferred learning methods  Outcome: Progressing     Problem: NEUROSENSORY - ADULT  Goal: Achieves stable or improved neurological status  Description: INTERVENTIONS  - Monitor and report changes in neurological status  - Monitor vital signs such as temperature, blood pressure, glucose, and any other labs ordered   - Initiate measures to prevent increased intracranial pressure  -  Monitor for seizure activity and implement precautions if appropriate      Outcome: Progressing  Goal: Remains free of injury related to seizures activity  Description: INTERVENTIONS  - Maintain airway, patient safety  and administer oxygen as ordered  - Monitor patient for seizure activity, document and report duration and description of seizure to physician/advanced practitioner  - If seizure occurs,  ensure patient safety during seizure  - Reorient patient post seizure  - Seizure pads on all 4 side rails  - Instruct patient/family to notify RN of any seizure activity including if an aura is experienced  - Instruct patient/family to call for assistance with activity based on nursing assessment  - Administer anti-seizure medications if ordered    Outcome: Progressing  Goal: Achieves maximal functionality and self care  Description: INTERVENTIONS  - Monitor swallowing and airway patency with patient fatigue and changes in neurological status  - Encourage and assist patient to increase activity and self care.   - Encourage visually impaired, hearing impaired and aphasic patients to use assistive/communication devices  Outcome: Progressing     Problem: GENITOURINARY - ADULT  Goal: Maintains or returns to baseline urinary function  Description: INTERVENTIONS:  - Assess urinary function  - Encourage oral fluids to ensure adequate hydration if ordered  - Administer IV fluids as ordered to ensure adequate hydration  - Administer ordered medications as needed  - Offer frequent toileting  - Follow urinary retention protocol if ordered  Outcome: Progressing  Goal: Absence of urinary retention  Description: INTERVENTIONS:  - Assess patient's ability to void and empty bladder  - Monitor I/O  - Bladder scan as needed  - Discuss with physician/AP medications to alleviate retention as needed  - Discuss catheterization for long term situations as appropriate  Outcome: Progressing  Goal: Urinary catheter remains  patent  Description: INTERVENTIONS:  - Assess patency of urinary catheter  - If patient has a chronic dover, consider changing catheter if non-functioning  - Follow guidelines for intermittent irrigation of non-functioning urinary catheter  Outcome: Progressing     Problem: MUSCULOSKELETAL - ADULT  Goal: Maintain or return mobility to safest level of function  Description: INTERVENTIONS:  - Assess patient's ability to carry out ADLs; assess patient's baseline for ADL function and identify physical deficits which impact ability to perform ADLs (bathing, care of mouth/teeth, toileting, grooming, dressing, etc.)  - Assess/evaluate cause of self-care deficits   - Assess range of motion  - Assess patient's mobility  - Assess patient's need for assistive devices and provide as appropriate  - Encourage maximum independence but intervene and supervise when necessary  - Involve family in performance of ADLs  - Assess for home care needs following discharge   - Consider OT consult to assist with ADL evaluation and planning for discharge  - Provide patient education as appropriate  Outcome: Progressing  Goal: Maintain proper alignment of affected body part  Description: INTERVENTIONS:  - Support, maintain and protect limb and body alignment  - Provide patient/ family with appropriate education  Outcome: Progressing     Problem: Nutrition/Hydration-ADULT  Goal: Nutrient/Hydration intake appropriate for improving, restoring or maintaining nutritional needs  Description: Monitor and assess patient's nutrition/hydration status for malnutrition. Collaborate with interdisciplinary team and initiate plan and interventions as ordered.  Monitor patient's weight and dietary intake as ordered or per policy. Utilize nutrition screening tool and intervene as necessary. Determine patient's food preferences and provide high-protein, high-caloric foods as appropriate.     INTERVENTIONS:  - Monitor oral intake, urinary output, labs, and  treatment plans  - Assess nutrition and hydration status and recommend course of action  - Evaluate amount of meals eaten  - Assist patient with eating if necessary   - Allow adequate time for meals  - Recommend/ encourage appropriate diets, oral nutritional supplements, and vitamin/mineral supplements  - Order, calculate, and assess calorie counts as needed  - Recommend, monitor, and adjust tube feedings and TPN/PPN based on assessed needs  - Assess need for intravenous fluids  - Provide specific nutrition/hydration education as appropriate  - Include patient/family/caregiver in decisions related to nutrition  Outcome: Progressing

## 2024-05-03 NOTE — RESTORATIVE TECHNICIAN NOTE
Restorative Technician Note      Patient Name: Swapnil Grover     Note Type: Mobility (Attempted to see pt for activity, pt is currently on bedrest per nursing.)  Adri PIÑA, Restorative Technician,

## 2024-05-03 NOTE — ANESTHESIA POSTPROCEDURE EVALUATION
Post-Op Assessment Note    CV Status:  Stable  Pain Score: 2    Pain management: adequate       Mental Status:  Alert and awake   Hydration Status:  Euvolemic   PONV Controlled:  Controlled   Airway Patency:  Patent  Two or more mitigation strategies used for obstructive sleep apnea   Post Op Vitals Reviewed: Yes    No anethesia notable event occurred.    Staff: ROLDAN               /85 (05/03/24 1550)    Temp 99.2 °F (37.3 °C) (05/03/24 1550)    Pulse 91 (05/03/24 1550)   Resp 19 (05/03/24 1550)    SpO2 97 % (05/03/24 1550)

## 2024-05-03 NOTE — PROGRESS NOTES
[facility]  Progress Note  Name: Swapnil Grover I  MRN: [id number]  Unit/Bed#: PPHP 718-01 I Date of Admission: 4/29/2024   Date of Service: 5/3/2024 I Hospital Day: 4    Assessment/Plan   * Tegmen defect of base of skull  Assessment & Plan  POD#4 cristiano middle fossa craniectomy for repair of CSF leak w/ dural substitute and Duraseal using neuromonitoring (CN 7 and NILA) and placement of a lumbar drain (LH/ENT 4/29)   Pt has been following with Dr. August in the  nsgy office with progressively worsening cristiano hearing loss, ear fullness, and CSF leak   Of note has h/o left craniotomy for SDH evacuation in 1992 in [place] (no reported complications)     Imaging:   Post-op imaging not indicated     Plan:   Continue to closely monitor neuro exam  Maintain normotensive BP goals, SBP < 160   Lumbar drain removed 5/3, patient developed recurrence of left otorrhea concerning for CSF.  Attempted bedside lumbar drain placement however unsuccessful.  Will plan to perform in the OR later this afternoon with Dr. August - plan to clamp new drain Venu night and monitor for otorrhea through Monday morning.  If ongoing drainage occurs, he may come to need shunt placement for CSF diversion which tentatively would happen Monday afternoon.  NPO for now  Will order updated coags  Hep sq discontinued, no doses given today  Labs/Vitals:   Labs WNL 5/3  Pain control:   Tylenol 975mg q8 hours scheduled  Oxycodone 2.5mg and 5mg q4 hours as needed for moderate and severe pain, respectively  Bowel regimen:  had small BM postop   colace 100mg BID, senna 1 tab qd, Miralax qd, prn suppository  Hold all AC/AP meds   No reported use at home   Was previously on ASA 325mg qd after knee replacement, since discontinued   DVT ppx: SCDs, sqh on hold  Home medications re-ordered   PT/OT - denied acute rehab, will likely progress to home without outpatient services, will reeval when closer to [place]  Social work on board to  assist with dispo planning  NCC following given patient will need to remain step down 1  ENT following as well     Neurosurgery will continue to follow as primary. Patient is not cleared for dc at this time. Please reach out with any further questions or concerns.       Stage 3a chronic kidney disease (HCC)  Assessment & Plan  Hx of CKD 3  - following with  nephrology in the outpt setting, specifically with Dr. Cottrell  - baseline Cr 1.2-1.3    Lab Results   Component Value Date    EGFR 57 05/03/2024    EGFR 60 05/02/2024    EGFR 57 05/01/2024    CREATININE 1.24 05/03/2024    CREATININE 1.20 05/02/2024    CREATININE 1.24 05/01/2024     Plan:   Nephrology consulted given hx - have signed off at this time  Pt with stable renal function, Cr at baseline   Continue to monitor   Continue home BP meds with hold parameters   Encourage ongoing outpt follow-up with nephro upon discharge       Hypertension  Assessment & Plan  Hx of HTN   - home meds: amlodipine 5mg qd     Plan:   continue home meds   maintain normotensive BP goals, SBP < 160   encoaurged ongoing outpt follow-up with his PCP upon discharge          Subjective/Objective   Chief Complaint: post op    Subjective:  patient states since drain was removed he has been having ongoing drainage from his left ear that appears to be blood tinged CSF.  He confirms that he had no significant otorrhea yesterday prior to drain removal.  Continues with hearing issues.  He is a bit frustrated by the situation but understands need for LD replacement.  Attempted LD placement at the bedside with Dr. Jones however unsuccessful - plan to perform in the OR later this afternoon.  No headaches since drain has been removed.    Objective: laying in bed, NAD    I/O         05/01 0701 05/02 0700 05/02 0701  05/03 0700 05/03 0701 05/04 0700    P.O. 1200 1000 120    IV Piggyback 100      Total Intake(mL/kg) 1300 (12.9) 1000 (10.2) 120 (1.2)    Urine (mL/kg/hr) 600 (0.2) 450 (0.2)     Drains  "40      Stool 0      Total Output 640 450     Net +660 +550 +120           Unmeasured Stool Occurrence 2 x  1 x            Invasive Devices       Peripheral Intravenous Line  Duration             Peripheral IV 05/03/24 Right Antecubital <1 day              Drain  Duration             Lumbar Drain 4 days                    Physical Exam:  Vitals: Blood pressure 144/78, pulse 94, temperature 97.9 °F (36.6 °C), resp. rate 16, height 5' 7\" (1.702 m), weight 98.4 kg (216 lb 14.9 oz), SpO2 96%.,Body mass index is 33.98 kg/m².      General appearance: alert, appears stated age, cooperative and no distress  Head:bilateral auricular incisions intact  Ears:  blood tinged CSF drainage from left ear noted on pillow  Eyes: conjugate gaze  Neck: supple, symmetrical, trachea midline   Back:  prior LD site without swelling or drainage, attempted LD placement at the bedside without success  Lungs: non labored breathing  Heart: regular heart rate  Neurologic:   Mental status: Alert, oriented x3, follows commands, thought content appropriate  Cranial nerves: grossly intact (Cranial nerves II-XII)  Motor: moving all extremities without focal weakness       Lab Results:  Results from last 7 days   Lab Units 05/03/24 0429 05/02/24 0519 05/01/24  0536   WBC Thousand/uL 7.20 7.59 9.92   HEMOGLOBIN g/dL 14.7 14.3 13.4   HEMATOCRIT % 44.8 44.2 41.7   PLATELETS Thousands/uL 266 235 228   SEGS PCT % 71 63 72   MONO PCT % 8 9 9   EOS PCT % 4 4 1     Results from last 7 days   Lab Units 05/03/24 0429 05/02/24 0519 05/01/24  0536   SODIUM mmol/L 136 138 140   POTASSIUM mmol/L 4.1 4.4 4.1   CHLORIDE mmol/L 100 102 104   CO2 mmol/L 24 26 26   BUN mg/dL 21 23 22   CREATININE mg/dL 1.24 1.20 1.24   CALCIUM mg/dL 9.3 9.0 8.4     Results from last 7 days   Lab Units 05/03/24 0429 05/02/24 0519 05/01/24  0536   MAGNESIUM mg/dL 2.1 1.9 1.9     Results from last 7 days   Lab Units 05/02/24 0519 05/01/24  0536   PHOSPHORUS mg/dL 3.7 3.0     Results " from last 7 days   Lab Units 04/30/24  0636 04/29/24  1309   INR  1.04 1.01   PTT seconds 27 28       Imaging Studies: I have personally reviewed pertinent reports.   and I have personally reviewed pertinent films in PACS    No results found.    EKG, Pathology, and Other Studies: I have personally reviewed pertinent reports.      VTE Pharmacologic Prophylaxis: Reason for no pharmacologic prophylaxis - LD placement today    VTE Mechanical Prophylaxis: sequential compression device

## 2024-05-04 LAB
ANION GAP SERPL CALCULATED.3IONS-SCNC: 11 MMOL/L (ref 4–13)
BUN SERPL-MCNC: 22 MG/DL (ref 5–25)
CALCIUM SERPL-MCNC: 9 MG/DL (ref 8.4–10.2)
CHLORIDE SERPL-SCNC: 103 MMOL/L (ref 96–108)
CO2 SERPL-SCNC: 24 MMOL/L (ref 21–32)
CREAT SERPL-MCNC: 1.31 MG/DL (ref 0.6–1.3)
GFR SERPL CREATININE-BSD FRML MDRD: 54 ML/MIN/1.73SQ M
GLUCOSE SERPL-MCNC: 108 MG/DL (ref 65–140)
POTASSIUM SERPL-SCNC: 4.3 MMOL/L (ref 3.5–5.3)
SODIUM SERPL-SCNC: 138 MMOL/L (ref 135–147)

## 2024-05-04 PROCEDURE — 99024 POSTOP FOLLOW-UP VISIT: CPT | Performed by: NEUROLOGICAL SURGERY

## 2024-05-04 PROCEDURE — 99024 POSTOP FOLLOW-UP VISIT: CPT | Performed by: OTOLARYNGOLOGY

## 2024-05-04 PROCEDURE — 99233 SBSQ HOSP IP/OBS HIGH 50: CPT | Performed by: INTERNAL MEDICINE

## 2024-05-04 PROCEDURE — 80048 BASIC METABOLIC PNL TOTAL CA: CPT | Performed by: PHYSICIAN ASSISTANT

## 2024-05-04 RX ORDER — LIDOCAINE HYDROCHLORIDE 10 MG/ML
5 INJECTION, SOLUTION EPIDURAL; INFILTRATION; INTRACAUDAL; PERINEURAL ONCE
Status: COMPLETED | OUTPATIENT
Start: 2024-05-04 | End: 2024-05-04

## 2024-05-04 RX ADMIN — AMLODIPINE BESYLATE 5 MG: 5 TABLET ORAL at 08:38

## 2024-05-04 RX ADMIN — CEFAZOLIN SODIUM 2000 MG: 2 SOLUTION INTRAVENOUS at 20:32

## 2024-05-04 RX ADMIN — LIDOCAINE HYDROCHLORIDE 5 ML: 10 INJECTION, SOLUTION EPIDURAL; INFILTRATION; INTRACAUDAL; PERINEURAL at 11:12

## 2024-05-04 RX ADMIN — ACETAMINOPHEN 975 MG: 325 TABLET, FILM COATED ORAL at 00:08

## 2024-05-04 RX ADMIN — POTASSIUM CITRATE 20 MEQ: 10 TABLET, EXTENDED RELEASE ORAL at 08:38

## 2024-05-04 RX ADMIN — POTASSIUM CITRATE 20 MEQ: 10 TABLET, EXTENDED RELEASE ORAL at 11:35

## 2024-05-04 RX ADMIN — ACETAMINOPHEN 975 MG: 325 TABLET, FILM COATED ORAL at 17:28

## 2024-05-04 RX ADMIN — CEFAZOLIN SODIUM 2000 MG: 2 SOLUTION INTRAVENOUS at 12:21

## 2024-05-04 RX ADMIN — CEFAZOLIN SODIUM 2000 MG: 2 SOLUTION INTRAVENOUS at 03:48

## 2024-05-04 RX ADMIN — POTASSIUM CITRATE 20 MEQ: 10 TABLET, EXTENDED RELEASE ORAL at 17:28

## 2024-05-04 RX ADMIN — ACETAMINOPHEN 975 MG: 325 TABLET, FILM COATED ORAL at 08:38

## 2024-05-04 NOTE — PROGRESS NOTES
"OTOLARYNGOLOGY PROGRESS NOTE    Date of Service: 5/4/2024 9:45 AM    HPI  Patient is a 73 y.o. male s/p repair of b/l middle cranial fossa tegmen defect on 4/29/24.    Overnight Events: Lumbar drain replaced yesterday by neurosurgery.  Patient has not noticed any significant ear drainage or salty taste in the mouth.  He denies any headache although he does have some pain around the scalp where the incisions were.    PHYSICAL EXAM:  Vitals:    05/04/24 0736   BP:    Pulse:    Resp:    Temp: 100.5 °F (38.1 °C)   SpO2:         General: No acute distress, AOx4  HEENT: Bilateral periauricular incisions clean dry and intact.  No signs of any significant otorrhea in either sommer.  Neurology: No focal deficits  Lungs: Breathing easy, unlabored and even on room air  Cardio: RRR, well perfused  Abd: soft, NT, ND         Intake/Output Summary (Last 24 hours) at 5/4/2024 0945  Last data filed at 5/4/2024 0701  Gross per 24 hour   Intake 150 ml   Output 1100 ml   Net -950 ml         LABORATORY    Recent Labs     05/02/24 0519 05/03/24  0429   WBC 7.59 7.20   HGB 14.3 14.7   HCT 44.2 44.8    266       Recent Labs     05/02/24 0519 05/03/24 0429 05/04/24  0529   K 4.4 4.1 4.3    100 103   BUN 23 21 22   PHOS 3.7  --   --    MG 1.9 2.1  --        Invalid input(s): \"PTPAT\", \"PTINR\", \"APTTMNNM\", \"APTTPAT\"      Patient Active Problem List   Diagnosis    Renal colic on right side    Hyperkalemia    Sleep apnea    Hydronephrosis with ureteropelvic junction (UPJ) obstruction    BPH (benign prostatic hyperplasia)    Hypertension    Hyperuricemia    Stage 3a chronic kidney disease (HCC)    Nephrolithiasis    Obesity, morbid (HCC)    Mixed conductive and sensorineural hearing loss, bilateral    Primary osteoarthritis of both knees    Other hemorrhoids    Mixed hyperlipidemia    Pre-operative examination    Arthritis    S/P total knee replacement using cement, left    Benign hypertension with CKD (chronic kidney disease) " stage III (HCC)    Tegmen defect of base of skull         ASSESSMENT  Patient is a 73 y.o. male w/ acute and chronic problems as above, s/p b/l middle cranial fossa tegmen defect repair 4/29/24, s/p replacement of lumbar drain by NSG on 5/3.    PLAN  - monitor for signs of CSF leak.  - LD mgt per NSG.  - rest of care per primary.    Damon Arredondo MD PGY-3  [facility] and [facility]ck Surgery  Available on [employer] Text.  Please contact ENT Resident Jenkin[place]ones Text Role for any questions or concerns.

## 2024-05-04 NOTE — PROGRESS NOTES
[facility]  Progress Note: Critical Care  Name: Swapnil Grover 73 y.o. male I MRN: [id number]  Unit/Bed#: PPHP 718-01 I Date of Admission: 4/29/2024   Date of Service: 5/4/2024 I Hospital Day: 5    Assessment/Plan     S/p bilateral middle fossa crani for repair of tegmen defect     Neuro:   Diagnosis: Tegmen defect of base of skull  Diagnosis: Hx craniotomy for SDH evacuation in 1992  S/p b/l endoscopic middle fossa craniotomies with temporalis muscle flap and LD placement on 4/29  LD removed on 5/2 c/b CSF leak  LD replaced on 5/3 w/IR  Plan:  Removal of 5/3 lumbar drain (LD) with neurosurgery today - they have low suspicion for any further leak   Neuro checks q4h  Incision care per primary team  Stat CTH with any new focality on exam or decrease in GCS >2 points  Sz precautions  Diagnosis: Post-op pain  Plan: Acetaminophen 975 mg q8h, saba 2.5/5 for mod.severe pain - adequately managed, has not needed breakthrough     CV:   Diagnosis: Hypertension  Blood Pressure: 131/85  Plan: Continue amlodipine, SBP goal <160    Pulm:  Diagnosis: ISAIAS not on CPAP  Plan: Maintain O2>88%  Outpatient follow up     GI:   Diagnosis: Bowel regimen  Last BM 5/3  Plan: Continue daily miralax, daily senna    :   Diagnosis: CKD III  Recent Labs     05/02/24  0519 05/03/24  0429 05/04/24  0529   CREATININE 1.20 1.24 1.31*   EGFR 60 57 54     Estimated Creatinine Clearance: 57 mL/min (A) (by C-G formula based on SCr of 1.31 mg/dL (H)).  Baseline Cr 1.2-1.30  Plan: Avoid nephrotoxins  Renally dose meds as indicated    F/E/N:   F: No further crystalloids  E: Goal Mg>2.0, Phos>3.0, K>4.0.  N: Regular house diet       Heme/Onc:   Diagnosis: DVT ppx  Plan: On hold for LD removal, can restart tonight     Endo:   No active issues      ID:   Diagnosis: Low-grade fever x1, 100.4F  Will confirm if real with next set of vitals/confirm if fever persistent >1 hour; otherwise HR back in 80s, no leukocytosis  (stable at 7k), and asx. No current suspicion for active/ongoing infection.   Plan: Trend fever curve, CBC, and overall vital signs for signs of SIRS  On cefazolin for jen-op period, recent LP removal and replacement - continue at neurosurgery's discretion    MSK/Skin:   No active issues  PT OT     Disposition: Stepdown Level 1    ICU Core Measures     A: Assess, Prevent, and Manage Pain Has pain been assessed? Yes  Need for changes to pain regimen? No   B: Both SAT/SAT  N/A   C: Choice of Sedation RASS Goal: 0 Alert and Calm  Need for changes to sedation or analgesia regimen? No   D: Delirium CAM-ICU: Negative   E: Early Mobility  Plan for early mobility? Yes   F: Family Engagement Plan for family engagement today? Yes       Antibiotic Review: on cefazolin per neurosurgery, D6    Review of Invasive Devices:            Prophylaxis:  VTE Contraindicated secondary to: LP drain removal, for several hours after    Stress Ulcer  not ordered        Significant 24hr Events     24hr events: LP drain placed yesterday w/IR, with CSF return.  Patient without complaints.      Subjective     Review of Systems   Constitutional:  Negative for chills, fatigue and fever.   Respiratory:  Negative for cough and shortness of breath.    Cardiovascular:  Negative for chest pain and palpitations.   Gastrointestinal:  Negative for abdominal pain, constipation, diarrhea, nausea and vomiting.   Skin:  Negative for rash.   Neurological:  Negative for weakness and numbness.        Objective                            Vitals I/O      Most Recent Min/Max in 24hrs   Temp 100.5 °F (38.1 °C) Temp  Min: 98.3 °F (36.8 °C)  Max: 100.5 °F (38.1 °C)   Pulse (!) 111 Pulse  Min: 78  Max: 111   Resp 16 Resp  Min: 14  Max: 19   /85 BP  Min: 102/64  Max: 147/75   O2 Sat 96 % SpO2  Min: 94 %  Max: 98 %      Intake/Output Summary (Last 24 hours) at 5/4/2024 1116  Last data filed at 5/4/2024 0701  Gross per 24 hour   Intake 150 ml   Output 1100 ml   Net  -950 ml     LP output: 50 mL serosanguinous fluid at bedside this AM    Diet Regular; Regular House    Invasive Monitoring   none        Physical Exam   Physical Exam  Vitals reviewed.   Eyes:      Pupils: Pupils are equal, round, and reactive to light.   Skin:     General: Skin is warm and dry.   Cardiovascular:      Rate and Rhythm: Normal rate and regular rhythm.      Heart sounds: No murmur heard.     No friction rub. No gallop.   Abdominal:      Palpations: Abdomen is soft.      Tenderness: There is no abdominal tenderness.      Comments: Central obesity   Constitutional:       General: He is not in acute distress.     Appearance: He is well-developed and well-nourished. He is not toxic-appearing.      Interventions: He is not sedated.  Pulmonary:      Effort: Pulmonary effort is normal. No accessory muscle usage, respiratory distress or accessory muscle usage.      Breath sounds: No wheezing or rales.   Neurological:      Mental Status: He is alert and oriented to person, place and time.            Diagnostic Studies      EKG: NSR  Imaging: No new  I have personally reviewed pertinent reports.       Medications:  Scheduled PRN   acetaminophen, 975 mg, Q8H  amLODIPine, 5 mg, Daily  cefazolin, 2,000 mg, Q8H  docusate sodium, 100 mg, BID  polyethylene glycol, 17 g, Daily  potassium citrate, 20 mEq, TID With Meals  senna, 2 tablet, Daily      bisacodyl, 10 mg, Daily PRN  labetalol, 10 mg, Q4H PRN  methocarbamol, 500 mg, Q6H PRN  ondansetron, 4 mg, Q4H PRN  oxyCODONE, 2.5 mg, Q4H PRN   Or  oxyCODONE, 5 mg, Q4H PRN       Continuous    lactated ringers, 50 mL/hr         Labs:    CBC    Recent Labs     05/03/24  0429   WBC 7.20   HGB 14.7   HCT 44.8        BMP    Recent Labs     05/03/24  0429 05/04/24  0529   SODIUM 136 138   K 4.1 4.3    103   CO2 24 24   AGAP 12 11   BUN 21 22   CREATININE 1.24 1.31*   CALCIUM 9.3 9.0       Coags    No recent results     Additional Electrolytes  Recent Labs      05/03/24  0429   MG 2.1          Blood Gas    No recent results  No recent results LFTs  No recent results    Infectious  No recent results  Glucose  Recent Labs     05/03/24  0429 05/04/24  0529   GLUC 108 108               Zeinab Jose MD   PGY-3 Internal Medicine  [place] Resident

## 2024-05-04 NOTE — PLAN OF CARE
Problem: Prexisting or High Potential for Compromised Skin Integrity  Goal: Skin integrity is maintained or improved  Description: INTERVENTIONS:  - Identify patients at risk for skin breakdown  - Assess and monitor skin integrity  - Assess and monitor nutrition and hydration status  - Monitor labs   - Assess for incontinence   - Turn and reposition patient  - Assist with mobility/ambulation  - Relieve pressure over bony prominences  - Avoid friction and shearing  - Provide appropriate hygiene as needed including keeping skin clean and dry  - Evaluate need for skin moisturizer/barrier cream  - Collaborate with interdisciplinary team   - Patient/family teaching  - Consider wound care consult   Outcome: Progressing     Problem: PAIN - ADULT  Goal: Verbalizes/displays adequate comfort level or baseline comfort level  Description: Interventions:  - Encourage patient to monitor pain and request assistance  - Assess pain using appropriate pain scale  - Administer analgesics based on type and severity of pain and evaluate response  - Implement non-pharmacological measures as appropriate and evaluate response  - Consider cultural and social influences on pain and pain management  - Notify physician/advanced practitioner if interventions unsuccessful or patient reports new pain  Outcome: Progressing     Problem: INFECTION - ADULT  Goal: Absence or prevention of progression during hospitalization  Description: INTERVENTIONS:  - Assess and monitor for signs and symptoms of infection  - Monitor lab/diagnostic results  - Monitor all insertion sites, i.e. indwelling lines, tubes, and drains  - Monitor endotracheal if appropriate and nasal secretions for changes in amount and color  - [place] appropriate cooling/warming therapies per order  - Administer medications as ordered  - Instruct and encourage patient and family to use good hand hygiene technique  - Identify and instruct in appropriate isolation precautions for  identified infection/condition  Outcome: Progressing  Goal: Absence of fever/infection during neutropenic period  Description: INTERVENTIONS:  - Monitor WBC    Outcome: Progressing     Problem: SAFETY ADULT  Goal: Patient will remain free of falls  Description: INTERVENTIONS:  - Educate patient/family on patient safety including physical limitations  - Instruct patient to call for assistance with activity   - Consult OT/PT to assist with strengthening/mobility   - Keep Call bell within reach  - Keep bed low and locked with side rails adjusted as appropriate  - Keep care items and personal belongings within reach  - Initiate and maintain comfort rounds  - Make Fall Risk Sign visible to staff  - Offer Toileting every 2   Hours, in advance of need  - Initiate/Maintain bed alarm  - Obtain necessary fall risk management equipment: non skid footwear  - Apply yellow socks and bracelet for high fall risk patients  - Consider moving patient to room near nurses station  Outcome: Progressing  Goal: Maintain or return to baseline ADL function  Description: INTERVENTIONS:  -  Assess patient's ability to carry out ADLs; assess patient's baseline for ADL function and identify physical deficits which impact ability to perform ADLs (bathing, care of mouth/teeth, toileting, grooming, dressing, etc.)  - Assess/evaluate cause of self-care deficits   - Assess range of motion  - Assess patient's mobility; develop plan if impaired  - Assess patient's need for assistive devices and provide as appropriate  - Encourage maximum independence but intervene and supervise when necessary  - Involve family in performance of ADLs  - Assess for home care needs following discharge   - Consider OT consult to assist with ADL evaluation and planning for discharge  - Provide patient education as appropriate  Outcome: Progressing  Goal: Maintains/Returns to pre admission functional level  Description: INTERVENTIONS:  - Perform AM-PAC 6 Click Basic  Mobility/ Daily Activity assessment daily.  - Set and communicate daily mobility goal to care team and patient/family/caregiver.   - Collaborate with rehabilitation services on mobility goals if consulted  - Perform Range of Motion 3 times a day.  - Reposition patient every 2 hours.  - Dangle patient 3 times a day  - Stand patient 3 times a day  - Ambulate patient 3 times a day  - Out of bed to chair 3 times a day   - Out of bed for meals 3 times a day  - Out of bed for toileting  - Record patient progress and toleration of activity level   Outcome: Progressing     Problem: DISCHARGE PLANNING  Goal: Discharge to home or other facility with appropriate resources  Description: INTERVENTIONS:  - Identify barriers to discharge w/patient and caregiver  - Arrange for needed discharge resources and transportation as appropriate  - Identify discharge learning needs (meds, wound care, etc.)  - Arrange for interpretive services to assist at discharge as needed  - Refer to Case Management Department for coordinating discharge planning if the patient needs post-hospital services based on physician/advanced practitioner order or complex needs related to functional status, cognitive ability, or social support system  Outcome: Progressing     Problem: Knowledge Deficit  Goal: Patient/family/caregiver demonstrates understanding of disease process, treatment plan, medications, and discharge instructions  Description: Complete learning assessment and assess knowledge base.  Interventions:  - Provide teaching at level of understanding  - Provide teaching via preferred learning methods  Outcome: Progressing     Problem: NEUROSENSORY - ADULT  Goal: Achieves stable or improved neurological status  Description: INTERVENTIONS  - Monitor and report changes in neurological status  - Monitor vital signs such as temperature, blood pressure, glucose, and any other labs ordered   - Initiate measures to prevent increased intracranial pressure  -  Monitor for seizure activity and implement precautions if appropriate      Outcome: Progressing  Goal: Remains free of injury related to seizures activity  Description: INTERVENTIONS  - Maintain airway, patient safety  and administer oxygen as ordered  - Monitor patient for seizure activity, document and report duration and description of seizure to physician/advanced practitioner  - If seizure occurs,  ensure patient safety during seizure  - Reorient patient post seizure  - Seizure pads on all 4 side rails  - Instruct patient/family to notify RN of any seizure activity including if an aura is experienced  - Instruct patient/family to call for assistance with activity based on nursing assessment  - Administer anti-seizure medications if ordered    Outcome: Progressing  Goal: Achieves maximal functionality and self care  Description: INTERVENTIONS  - Monitor swallowing and airway patency with patient fatigue and changes in neurological status  - Encourage and assist patient to increase activity and self care.   - Encourage visually impaired, hearing impaired and aphasic patients to use assistive/communication devices  Outcome: Progressing     Problem: GENITOURINARY - ADULT  Goal: Maintains or returns to baseline urinary function  Description: INTERVENTIONS:  - Assess urinary function  - Encourage oral fluids to ensure adequate hydration if ordered  - Administer IV fluids as ordered to ensure adequate hydration  - Administer ordered medications as needed  - Offer frequent toileting  - Follow urinary retention protocol if ordered  Outcome: Progressing  Goal: Absence of urinary retention  Description: INTERVENTIONS:  - Assess patient's ability to void and empty bladder  - Monitor I/O  - Bladder scan as needed  - Discuss with physician/AP medications to alleviate retention as needed  - Discuss catheterization for long term situations as appropriate  Outcome: Progressing  Goal: Urinary catheter remains  patent  Description: INTERVENTIONS:  - Assess patency of urinary catheter  - If patient has a chronic dover, consider changing catheter if non-functioning  - Follow guidelines for intermittent irrigation of non-functioning urinary catheter  Outcome: Progressing     Problem: MUSCULOSKELETAL - ADULT  Goal: Maintain or return mobility to safest level of function  Description: INTERVENTIONS:  - Assess patient's ability to carry out ADLs; assess patient's baseline for ADL function and identify physical deficits which impact ability to perform ADLs (bathing, care of mouth/teeth, toileting, grooming, dressing, etc.)  - Assess/evaluate cause of self-care deficits   - Assess range of motion  - Assess patient's mobility  - Assess patient's need for assistive devices and provide as appropriate  - Encourage maximum independence but intervene and supervise when necessary  - Involve family in performance of ADLs  - Assess for home care needs following discharge   - Consider OT consult to assist with ADL evaluation and planning for discharge  - Provide patient education as appropriate  Outcome: Progressing  Goal: Maintain proper alignment of affected body part  Description: INTERVENTIONS:  - Support, maintain and protect limb and body alignment  - Provide patient/ family with appropriate education  Outcome: Progressing     Problem: Nutrition/Hydration-ADULT  Goal: Nutrient/Hydration intake appropriate for improving, restoring or maintaining nutritional needs  Description: Monitor and assess patient's nutrition/hydration status for malnutrition. Collaborate with interdisciplinary team and initiate plan and interventions as ordered.  Monitor patient's weight and dietary intake as ordered or per policy. Utilize nutrition screening tool and intervene as necessary. Determine patient's food preferences and provide high-protein, high-caloric foods as appropriate.     INTERVENTIONS:  - Monitor oral intake, urinary output, labs, and  treatment plans  - Assess nutrition and hydration status and recommend course of action  - Evaluate amount of meals eaten  - Assist patient with eating if necessary   - Allow adequate time for meals  - Recommend/ encourage appropriate diets, oral nutritional supplements, and vitamin/mineral supplements  - Order, calculate, and assess calorie counts as needed  - Recommend, monitor, and adjust tube feedings and TPN/PPN based on assessed needs  - Assess need for intravenous fluids  - Provide specific nutrition/hydration education as appropriate  - Include patient/family/caregiver in decisions related to nutrition  Outcome: Progressing

## 2024-05-04 NOTE — ASSESSMENT & PLAN NOTE
POD#5 cristiano middle fossa craniectomy for repair of CSF leak w/ dural substitute and Duraseal using neuromonitoring (CN 7 and NILA) and placement of a lumbar drain (LH/ENT 4/29)   Pt has been following with Dr. August in the  nsgy office with progressively worsening cristiano hearing loss, ear fullness, and CSF leak   Of note has h/o left craniotomy for SDH evacuation in 1992 in [place] (no reported complications) ]    Imaging:   Post-op imaging not indicated     Plan:   Continue to closely monitor neuro exam  Maintain normotensive BP goals, SBP < 160   Lumbar drain removed 5/3, patient developed recurrence of left otorrhea concerning for CSF.  Attempted bedside lumbar drain placement however unsuccessful.  Had replacement of lumbar drain in the OR later that afternoon however no drainage from drain overnight when opened.  New lumbar drain removed at the bedside 5/4 without difficulty.  Will keep monitoring for increase in otorrhea; of note, no drainage from ears since new lumbar drain was placed, nothing thus far since removed.  If otorrhea occurs again, may need replacement of lumbar drain  If otorrhea persists despite conservative measures, may need  shunt placement for CSF diversion  Labs/Vitals:   Labs WNL 5/3  Pain control:   Tylenol 975mg q8 hours scheduled  Oxycodone 2.5mg and 5mg q4 hours as needed for moderate and severe pain, respectively  Bowel regimen:  had small BM postop   colace 100mg BID, senna 1 tab qd, Miralax qd, prn suppository  Hold all AC/AP meds   No reported use at home   Was previously on ASA 325mg qd after knee replacement, since discontinued   DVT ppx: SCDs, sqh on hold  Home medications re-ordered   PT/OT - denied acute rehab, will likely progress to home without outpatient services, will reeval when closer to [place]  Social work on board to assist with dispo planning  [facility] following given patient will need to remain step down 1  ENT following as well     Neurosurgery will continue to follow as primary.  Patient is not cleared for dc at this time. Please reach out with any further questions or concerns.

## 2024-05-04 NOTE — PLAN OF CARE
Problem: Prexisting or High Potential for Compromised Skin Integrity  Goal: Skin integrity is maintained or improved  Description: INTERVENTIONS:  - Identify patients at risk for skin breakdown  - Assess and monitor skin integrity  - Assess and monitor nutrition and hydration status  - Monitor labs   - Assess for incontinence   - Turn and reposition patient  - Assist with mobility/ambulation  - Relieve pressure over bony prominences  - Avoid friction and shearing  - Provide appropriate hygiene as needed including keeping skin clean and dry  - Evaluate need for skin moisturizer/barrier cream  - Collaborate with interdisciplinary team   - Patient/family teaching  - Consider wound care consult   Outcome: Progressing     Problem: PAIN - ADULT  Goal: Verbalizes/displays adequate comfort level or baseline comfort level  Description: Interventions:  - Encourage patient to monitor pain and request assistance  - Assess pain using appropriate pain scale  - Administer analgesics based on type and severity of pain and evaluate response  - Implement non-pharmacological measures as appropriate and evaluate response  - Consider cultural and social influences on pain and pain management  - Notify physician/advanced practitioner if interventions unsuccessful or patient reports new pain  Outcome: Progressing     Problem: INFECTION - ADULT  Goal: Absence or prevention of progression during hospitalization  Description: INTERVENTIONS:  - Assess and monitor for signs and symptoms of infection  - Monitor lab/diagnostic results  - Monitor all insertion sites, i.e. indwelling lines, tubes, and drains  - Monitor endotracheal if appropriate and nasal secretions for changes in amount and color  - [place] appropriate cooling/warming therapies per order  - Administer medications as ordered  - Instruct and encourage patient and family to use good hand hygiene technique  - Identify and instruct in appropriate isolation precautions for  identified infection/condition  Outcome: Progressing  Goal: Absence of fever/infection during neutropenic period  Description: INTERVENTIONS:  - Monitor WBC    Outcome: Progressing     Problem: SAFETY ADULT  Goal: Patient will remain free of falls  Description: INTERVENTIONS:  - Educate patient/family on patient safety including physical limitations  - Instruct patient to call for assistance with activity   - Consult OT/PT to assist with strengthening/mobility   - Keep Call bell within reach  - Keep bed low and locked with side rails adjusted as appropriate  - Keep care items and personal belongings within reach  - Initiate and maintain comfort rounds  - Make Fall Risk Sign visible to staff  - Offer Toileting every 2   Hours, in advance of need  - Initiate/Maintain bed alarm  - Obtain necessary fall risk management equipment: non skid footwear  - Apply yellow socks and bracelet for high fall risk patients  - Consider moving patient to room near nurses station  Outcome: Progressing  Goal: Maintain or return to baseline ADL function  Description: INTERVENTIONS:  -  Assess patient's ability to carry out ADLs; assess patient's baseline for ADL function and identify physical deficits which impact ability to perform ADLs (bathing, care of mouth/teeth, toileting, grooming, dressing, etc.)  - Assess/evaluate cause of self-care deficits   - Assess range of motion  - Assess patient's mobility; develop plan if impaired  - Assess patient's need for assistive devices and provide as appropriate  - Encourage maximum independence but intervene and supervise when necessary  - Involve family in performance of ADLs  - Assess for home care needs following discharge   - Consider OT consult to assist with ADL evaluation and planning for discharge  - Provide patient education as appropriate  Outcome: Progressing  Goal: Maintains/Returns to pre admission functional level  Description: INTERVENTIONS:  - Perform AM-PAC 6 Click Basic  Mobility/ Daily Activity assessment daily.  - Set and communicate daily mobility goal to care team and patient/family/caregiver.   - Collaborate with rehabilitation services on mobility goals if consulted  - Perform Range of Motion 3 times a day.  - Reposition patient every 2 hours.  - Dangle patient 3 times a day  - Stand patient 3 times a day  - Ambulate patient 3 times a day  - Out of bed to chair 3 times a day   - Out of bed for meals 3 times a day  - Out of bed for toileting  - Record patient progress and toleration of activity level   Outcome: Progressing     Problem: DISCHARGE PLANNING  Goal: Discharge to home or other facility with appropriate resources  Description: INTERVENTIONS:  - Identify barriers to discharge w/patient and caregiver  - Arrange for needed discharge resources and transportation as appropriate  - Identify discharge learning needs (meds, wound care, etc.)  - Arrange for interpretive services to assist at discharge as needed  - Refer to Case Management Department for coordinating discharge planning if the patient needs post-hospital services based on physician/advanced practitioner order or complex needs related to functional status, cognitive ability, or social support system  Outcome: Progressing     Problem: Knowledge Deficit  Goal: Patient/family/caregiver demonstrates understanding of disease process, treatment plan, medications, and discharge instructions  Description: Complete learning assessment and assess knowledge base.  Interventions:  - Provide teaching at level of understanding  - Provide teaching via preferred learning methods  Outcome: Progressing     Problem: NEUROSENSORY - ADULT  Goal: Achieves stable or improved neurological status  Description: INTERVENTIONS  - Monitor and report changes in neurological status  - Monitor vital signs such as temperature, blood pressure, glucose, and any other labs ordered   - Initiate measures to prevent increased intracranial pressure  -  Monitor for seizure activity and implement precautions if appropriate      Outcome: Progressing  Goal: Remains free of injury related to seizures activity  Description: INTERVENTIONS  - Maintain airway, patient safety  and administer oxygen as ordered  - Monitor patient for seizure activity, document and report duration and description of seizure to physician/advanced practitioner  - If seizure occurs,  ensure patient safety during seizure  - Reorient patient post seizure  - Seizure pads on all 4 side rails  - Instruct patient/family to notify RN of any seizure activity including if an aura is experienced  - Instruct patient/family to call for assistance with activity based on nursing assessment  - Administer anti-seizure medications if ordered    Outcome: Progressing  Goal: Achieves maximal functionality and self care  Description: INTERVENTIONS  - Monitor swallowing and airway patency with patient fatigue and changes in neurological status  - Encourage and assist patient to increase activity and self care.   - Encourage visually impaired, hearing impaired and aphasic patients to use assistive/communication devices  Outcome: Progressing     Problem: GENITOURINARY - ADULT  Goal: Maintains or returns to baseline urinary function  Description: INTERVENTIONS:  - Assess urinary function  - Encourage oral fluids to ensure adequate hydration if ordered  - Administer IV fluids as ordered to ensure adequate hydration  - Administer ordered medications as needed  - Offer frequent toileting  - Follow urinary retention protocol if ordered  Outcome: Progressing  Goal: Absence of urinary retention  Description: INTERVENTIONS:  - Assess patient's ability to void and empty bladder  - Monitor I/O  - Bladder scan as needed  - Discuss with physician/AP medications to alleviate retention as needed  - Discuss catheterization for long term situations as appropriate  Outcome: Progressing  Goal: Urinary catheter remains  patent  Description: INTERVENTIONS:  - Assess patency of urinary catheter  - If patient has a chronic dover, consider changing catheter if non-functioning  - Follow guidelines for intermittent irrigation of non-functioning urinary catheter  Outcome: Progressing     Problem: MUSCULOSKELETAL - ADULT  Goal: Maintain or return mobility to safest level of function  Description: INTERVENTIONS:  - Assess patient's ability to carry out ADLs; assess patient's baseline for ADL function and identify physical deficits which impact ability to perform ADLs (bathing, care of mouth/teeth, toileting, grooming, dressing, etc.)  - Assess/evaluate cause of self-care deficits   - Assess range of motion  - Assess patient's mobility  - Assess patient's need for assistive devices and provide as appropriate  - Encourage maximum independence but intervene and supervise when necessary  - Involve family in performance of ADLs  - Assess for home care needs following discharge   - Consider OT consult to assist with ADL evaluation and planning for discharge  - Provide patient education as appropriate  Outcome: Progressing  Goal: Maintain proper alignment of affected body part  Description: INTERVENTIONS:  - Support, maintain and protect limb and body alignment  - Provide patient/ family with appropriate education  Outcome: Progressing     Problem: Nutrition/Hydration-ADULT  Goal: Nutrient/Hydration intake appropriate for improving, restoring or maintaining nutritional needs  Description: Monitor and assess patient's nutrition/hydration status for malnutrition. Collaborate with interdisciplinary team and initiate plan and interventions as ordered.  Monitor patient's weight and dietary intake as ordered or per policy. Utilize nutrition screening tool and intervene as necessary. Determine patient's food preferences and provide high-protein, high-caloric foods as appropriate.     INTERVENTIONS:22  - Monitor oral intake, urinary output, labs, and  treatment plans  - Assess nutrition and hydration status and recommend course of action  - Evaluate amount of meals eaten  - Assist patient with eating if necessary   - Allow adequate time for meals  - Recommend/ encourage appropriate diets, oral nutritional supplements, and vitamin/mineral supplements  - Order, calculate, and assess calorie counts as needed  - Recommend, monitor, and adjust tube feedings and TPN/PPN based on assessed needs  - Assess need for intravenous fluids  - Provide specific nutrition/hydration education as appropriate  - Include patient/family/caregiver in decisions related to nutrition  Outcome: Progressing

## 2024-05-04 NOTE — ASSESSMENT & PLAN NOTE
Hx of CKD 3  - following with  nephrology in the outpt setting, specifically with Dr. Cottrell  - baseline Cr 1.2-1.3    Lab Results   Component Value Date    EGFR 54 05/04/2024    EGFR 57 05/03/2024    EGFR 60 05/02/2024    CREATININE 1.31 (H) 05/04/2024    CREATININE 1.24 05/03/2024    CREATININE 1.20 05/02/2024     Plan:   Nephrology consulted given hx - have signed off at this time  Pt with stable renal function, Cr at baseline   Continue to monitor   Continue home BP meds with hold parameters   Encourage ongoing outpt follow-up with nephro upon discharge

## 2024-05-04 NOTE — PROGRESS NOTES
[facility]  Progress Note  Name: Swapnil Grover I  MRN: [id number]  Unit/Bed#: PPHP 718-01 I Date of Admission: 4/29/2024   Date of Service: 5/4/2024 I Hospital Day: 5    Assessment/Plan   * Tegmen defect of base of skull  Assessment & Plan  POD#5 cristiano middle fossa craniectomy for repair of CSF leak w/ dural substitute and Duraseal using neuromonitoring (CN 7 and NILA) and placement of a lumbar drain (LH/ENT 4/29)   Pt has been following with Dr. August in the  nsgy office with progressively worsening cristiano hearing loss, ear fullness, and CSF leak   Of note has h/o left craniotomy for SDH evacuation in 1992 in [place] (no reported complications) ]    Imaging:   Post-op imaging not indicated     Plan:   Continue to closely monitor neuro exam  Maintain normotensive BP goals, SBP < 160   Lumbar drain removed 5/3, patient developed recurrence of left otorrhea concerning for CSF.  Attempted bedside lumbar drain placement however unsuccessful.  Had replacement of lumbar drain in the OR later that afternoon however no drainage from drain overnight when opened.  New lumbar drain removed at the bedside 5/4 without difficulty.  Will keep monitoring for increase in otorrhea; of note, no drainage from ears since new lumbar drain was placed, nothing thus far since removed.  If otorrhea occurs again, may need replacement of lumbar drain  If otorrhea persists despite conservative measures, may need  shunt placement for CSF diversion  Labs/Vitals:   Labs WNL 5/3  Pain control:   Tylenol 975mg q8 hours scheduled  Oxycodone 2.5mg and 5mg q4 hours as needed for moderate and severe pain, respectively  Bowel regimen:  had small BM postop   colace 100mg BID, senna 1 tab qd, Miralax qd, prn suppository  Hold all AC/AP meds   No reported use at home   Was previously on ASA 325mg qd after knee replacement, since discontinued   DVT ppx: SCDs, sqh on hold  Home medications re-ordered   PT/OT - denied  acute rehab, will likely progress to home without outpatient services, will reeval when closer to dc  Social work on board to assist with dispo planning  NCC following given patient will need to remain step down 1  ENT following as well     Neurosurgery will continue to follow as primary. Patient is not cleared for dc at this time. Please reach out with any further questions or concerns.       Stage 3a chronic kidney disease (HCC)  Assessment & Plan  Hx of CKD 3  - following with  nephrology in the outpt setting, specifically with Dr. Cottrell  - baseline Cr 1.2-1.3    Lab Results   Component Value Date    EGFR 54 05/04/2024    EGFR 57 05/03/2024    EGFR 60 05/02/2024    CREATININE 1.31 (H) 05/04/2024    CREATININE 1.24 05/03/2024    CREATININE 1.20 05/02/2024     Plan:   Nephrology consulted given hx - have signed off at this time  Pt with stable renal function, Cr at baseline   Continue to monitor   Continue home BP meds with hold parameters   Encourage ongoing outpt follow-up with nephro upon discharge       Hypertension  Assessment & Plan  Hx of HTN   - home meds: amlodipine 5mg qd     Plan:   continue home meds   maintain normotensive BP goals, SBP < 160   encoaurged ongoing outpt follow-up with his PCP upon discharge            Subjective/Objective   Chief Complaint: post op    Subjective:  patient doing well.  Offers no complaints.  No reported drainage from drain since placement, does not seem to be working.  Interestingly, since replacement, no otorrhea has been noted either. Hearing remains the same.    Objective:  sitting up in bed, NAD    I/O         05/02 0701  05/03 0700 05/03 0701  05/04 0700 05/04 0701  05/05 0700    P.O. 1000 120     I.V. (mL/kg)  150 (1.5)     IV Piggyback       Total Intake(mL/kg) 1000 (10.2) 270 (2.7)     Urine (mL/kg/hr) 450 (0.2) 1550 (0.7) 0 (0)    Drains  0 0    Stool  0 0    Total Output 450 1550 0    Net +550 -1280 0           Unmeasured Urine Occurrence   1 x    Unmeasured  "Stool Occurrence  1 x 1 x            Invasive Devices       Peripheral Intravenous Line  Duration             Peripheral IV 05/03/24 Right Antecubital 1 day              Drain  Duration             Lumbar Drain 5 days    Lumbar Drain <1 day                    Physical Exam:  Vitals: Blood pressure 137/87, pulse 97, temperature 97.9 °F (36.6 °C), resp. rate 20, height 5' 7\" (1.702 m), weight 98.7 kg (217 lb 9.6 oz), SpO2 98%.,Body mass index is 34.08 kg/m².      General appearance: alert, appears stated age, cooperative and no distress  Head: bilateral auricular incisions CDI  Eyes: conjugate gaze  Neck: supple, symmetrical, trachea midline   Lungs: non labored breathing  Heart: regular heart rate  Neurologic:   Mental status: Alert, oriented x3, follows commands, thought content appropriate  Cranial nerves: grossly intact (Cranial nerves II-XII)  Motor: moving all extremities without focal weakness         Lab Results:  Results from last 7 days   Lab Units 05/03/24 0429 05/02/24  0519 05/01/24  0536   WBC Thousand/uL 7.20 7.59 9.92   HEMOGLOBIN g/dL 14.7 14.3 13.4   HEMATOCRIT % 44.8 44.2 41.7   PLATELETS Thousands/uL 266 235 228   SEGS PCT % 71 63 72   MONO PCT % 8 9 9   EOS PCT % 4 4 1     Results from last 7 days   Lab Units 05/04/24  0529 05/03/24  0429 05/02/24  0519   SODIUM mmol/L 138 136 138   POTASSIUM mmol/L 4.3 4.1 4.4   CHLORIDE mmol/L 103 100 102   CO2 mmol/L 24 24 26   BUN mg/dL 22 21 23   CREATININE mg/dL 1.31* 1.24 1.20   CALCIUM mg/dL 9.0 9.3 9.0     Results from last 7 days   Lab Units 05/03/24  0429 05/02/24  0519 05/01/24  0536   MAGNESIUM mg/dL 2.1 1.9 1.9     Results from last 7 days   Lab Units 05/02/24  0519 05/01/24  0536   PHOSPHORUS mg/dL 3.7 3.0     Results from last 7 days   Lab Units 04/30/24  0636 04/29/24  1309   INR  1.04 1.01   PTT seconds 27 28         Imaging Studies: I have personally reviewed pertinent reports.   and I have personally reviewed pertinent films in PACS    No " results found.    EKG, Pathology, and Other Studies: I have personally reviewed pertinent reports.      VTE Pharmacologic Prophylaxis: Reason for no pharmacologic prophylaxis - possible need for LD replacement tomorrow    VTE Mechanical Prophylaxis: sequential compression device

## 2024-05-05 LAB
ANION GAP SERPL CALCULATED.3IONS-SCNC: 10 MMOL/L (ref 4–13)
BASOPHILS # BLD AUTO: 0.04 THOUSANDS/ÂΜL (ref 0–0.1)
BASOPHILS NFR BLD AUTO: 1 % (ref 0–1)
BUN SERPL-MCNC: 23 MG/DL (ref 5–25)
CALCIUM SERPL-MCNC: 8.9 MG/DL (ref 8.4–10.2)
CHLORIDE SERPL-SCNC: 102 MMOL/L (ref 96–108)
CO2 SERPL-SCNC: 25 MMOL/L (ref 21–32)
CREAT SERPL-MCNC: 1.28 MG/DL (ref 0.6–1.3)
EOSINOPHIL # BLD AUTO: 0.38 THOUSAND/ÂΜL (ref 0–0.61)
EOSINOPHIL NFR BLD AUTO: 5 % (ref 0–6)
ERYTHROCYTE [DISTWIDTH] IN BLOOD BY AUTOMATED COUNT: 13.2 % (ref 11.6–15.1)
GFR SERPL CREATININE-BSD FRML MDRD: 55 ML/MIN/1.73SQ M
GLUCOSE SERPL-MCNC: 102 MG/DL (ref 65–140)
HCT VFR BLD AUTO: 44.2 % (ref 36.5–49.3)
HGB BLD-MCNC: 13.9 G/DL (ref 12–17)
IMM GRANULOCYTES # BLD AUTO: 0.03 THOUSAND/UL (ref 0–0.2)
IMM GRANULOCYTES NFR BLD AUTO: 0 % (ref 0–2)
LYMPHOCYTES # BLD AUTO: 1.49 THOUSANDS/ÂΜL (ref 0.6–4.47)
LYMPHOCYTES NFR BLD AUTO: 18 % (ref 14–44)
MAGNESIUM SERPL-MCNC: 1.9 MG/DL (ref 1.9–2.7)
MCH RBC QN AUTO: 30.3 PG (ref 26.8–34.3)
MCHC RBC AUTO-ENTMCNC: 31.4 G/DL (ref 31.4–37.4)
MCV RBC AUTO: 96 FL (ref 82–98)
MONOCYTES # BLD AUTO: 0.93 THOUSAND/ÂΜL (ref 0.17–1.22)
MONOCYTES NFR BLD AUTO: 11 % (ref 4–12)
NEUTROPHILS # BLD AUTO: 5.26 THOUSANDS/ÂΜL (ref 1.85–7.62)
NEUTS SEG NFR BLD AUTO: 65 % (ref 43–75)
NRBC BLD AUTO-RTO: 0 /100 WBCS
PHOSPHATE SERPL-MCNC: 3.4 MG/DL (ref 2.3–4.1)
PLATELET # BLD AUTO: 264 THOUSANDS/UL (ref 149–390)
PMV BLD AUTO: 9.5 FL (ref 8.9–12.7)
POTASSIUM SERPL-SCNC: 4 MMOL/L (ref 3.5–5.3)
RBC # BLD AUTO: 4.59 MILLION/UL (ref 3.88–5.62)
SODIUM SERPL-SCNC: 137 MMOL/L (ref 135–147)
WBC # BLD AUTO: 8.13 THOUSAND/UL (ref 4.31–10.16)

## 2024-05-05 PROCEDURE — 99024 POSTOP FOLLOW-UP VISIT: CPT | Performed by: NEUROLOGICAL SURGERY

## 2024-05-05 PROCEDURE — 99232 SBSQ HOSP IP/OBS MODERATE 35: CPT | Performed by: REGISTERED NURSE

## 2024-05-05 PROCEDURE — 80048 BASIC METABOLIC PNL TOTAL CA: CPT

## 2024-05-05 PROCEDURE — 85025 COMPLETE CBC W/AUTO DIFF WBC: CPT

## 2024-05-05 PROCEDURE — 84100 ASSAY OF PHOSPHORUS: CPT

## 2024-05-05 PROCEDURE — 83735 ASSAY OF MAGNESIUM: CPT

## 2024-05-05 RX ADMIN — ACETAMINOPHEN 975 MG: 325 TABLET, FILM COATED ORAL at 00:10

## 2024-05-05 RX ADMIN — CEFAZOLIN SODIUM 2000 MG: 2 SOLUTION INTRAVENOUS at 04:26

## 2024-05-05 RX ADMIN — AMLODIPINE BESYLATE 5 MG: 5 TABLET ORAL at 08:12

## 2024-05-05 RX ADMIN — ACETAMINOPHEN 975 MG: 325 TABLET, FILM COATED ORAL at 23:15

## 2024-05-05 RX ADMIN — POTASSIUM CITRATE 20 MEQ: 10 TABLET, EXTENDED RELEASE ORAL at 17:00

## 2024-05-05 RX ADMIN — ACETAMINOPHEN 975 MG: 325 TABLET, FILM COATED ORAL at 17:00

## 2024-05-05 RX ADMIN — ACETAMINOPHEN 975 MG: 325 TABLET, FILM COATED ORAL at 08:12

## 2024-05-05 RX ADMIN — POTASSIUM CITRATE 20 MEQ: 10 TABLET, EXTENDED RELEASE ORAL at 08:12

## 2024-05-05 RX ADMIN — POTASSIUM CITRATE 20 MEQ: 10 TABLET, EXTENDED RELEASE ORAL at 12:02

## 2024-05-05 NOTE — PROGRESS NOTES
"OTOLARYNGOLOGY PROGRESS NOTE    Date of Service: 5/5/2024 8:31 AM    HPI  Patient is a 73 y.o. male s/p repair of b/l middle cranial fossa tegmen defect on 4/29/24. LD removed on 5/3/24. Pt developed L otorrhea concerning for CSF, LD replaced. New LD not draining on 5/4/24, removed.    Overnight Events:  NAEO, AVSS, endorsed some L otorrhea at 3pm yesterday    PHYSICAL EXAM:  Vitals:    05/05/24 0709   BP: 146/71   Pulse: 100   Resp: 18   Temp: 98.2 °F (36.8 °C)   SpO2:         General: No acute distress, AOx4  HEENT: Bilateral periauricular incisions clean dry and intact.  No signs of any significant otorrhea in either sommer  Neurology: No focal deficits  Lungs: Breathing easy, unlabored and even on room air  Cardio: RRR, well perfused  Abd: soft, NT, ND         Intake/Output Summary (Last 24 hours) at 5/5/2024 0831  Last data filed at 5/5/2024 0801  Gross per 24 hour   Intake 938 ml   Output 1250 ml   Net -312 ml         LABORATORY    Recent Labs     05/03/24  0429 05/05/24  0526   WBC 7.20 8.13   HGB 14.7 13.9   HCT 44.8 44.2    264       Recent Labs     05/03/24  0429 05/04/24  0529 05/05/24  0526   K 4.1 4.3 4.0    103 102   BUN 21 22 23   PHOS  --   --  3.4   MG 2.1  --  1.9       Invalid input(s): \"PTPAT\", \"PTINR\", \"APTTMNNM\", \"APTTPAT\"      Patient Active Problem List   Diagnosis    Renal colic on right side    Hyperkalemia    Sleep apnea    Hydronephrosis with ureteropelvic junction (UPJ) obstruction    BPH (benign prostatic hyperplasia)    Hypertension    Hyperuricemia    Stage 3a chronic kidney disease (HCC)    Nephrolithiasis    Obesity, morbid (HCC)    Mixed conductive and sensorineural hearing loss, bilateral    Primary osteoarthritis of both knees    Other hemorrhoids    Mixed hyperlipidemia    Pre-operative examination    Arthritis    S/P total knee replacement using cement, left    Benign hypertension with CKD (chronic kidney disease) stage III (HCC)    Tegmen defect of base of skull "         ASSESSMENT  Patient is a 73 y.o. male w/ acute and chronic problems as above, s/p b/l middle cranial fossa tegmen defect repair 4/29/24, s/p replacement of lumbar drain by NSG on 5/3/24 (not draining), new LD removed on 5/4/24, no signs of otorrhea at this time    PLAN  - monitor for signs of CSF leak  - LD mgt / replacement decision per NSG  - rest of care per primary    Greg Tao, PGY-2  Otolaryngology - Head & Neck Surgery

## 2024-05-05 NOTE — ASSESSMENT & PLAN NOTE
Lab Results   Component Value Date    EGFR 54 05/04/2024    EGFR 57 05/03/2024    EGFR 60 05/02/2024    CREATININE 1.31 (H) 05/04/2024    CREATININE 1.24 05/03/2024    CREATININE 1.20 05/02/2024   Trend BUN and creat   Monitor urine output

## 2024-05-05 NOTE — PLAN OF CARE
Problem: Prexisting or High Potential for Compromised Skin Integrity  Goal: Skin integrity is maintained or improved  Description: INTERVENTIONS:  - Identify patients at risk for skin breakdown  - Assess and monitor skin integrity  - Assess and monitor nutrition and hydration status  - Monitor labs   - Assess for incontinence   - Turn and reposition patient  - Assist with mobility/ambulation  - Relieve pressure over bony prominences  - Avoid friction and shearing  - Provide appropriate hygiene as needed including keeping skin clean and dry  - Evaluate need for skin moisturizer/barrier cream  - Collaborate with interdisciplinary team   - Patient/family teaching  - Consider wound care consult   Outcome: Progressing     Problem: PAIN - ADULT  Goal: Verbalizes/displays adequate comfort level or baseline comfort level  Description: Interventions:  - Encourage patient to monitor pain and request assistance  - Assess pain using appropriate pain scale  - Administer analgesics based on type and severity of pain and evaluate response  - Implement non-pharmacological measures as appropriate and evaluate response  - Consider cultural and social influences on pain and pain management  - Notify physician/advanced practitioner if interventions unsuccessful or patient reports new pain  Outcome: Progressing     Problem: SAFETY ADULT  Goal: Patient will remain free of falls  Description: INTERVENTIONS:  - Educate patient/family on patient safety including physical limitations  - Instruct patient to call for assistance with activity   - Consult OT/PT to assist with strengthening/mobility   - Keep Call bell within reach  - Keep bed low and locked with side rails adjusted as appropriate  - Keep care items and personal belongings within reach  - Initiate and maintain comfort rounds  - Make Fall Risk Sign visible to staff  - Offer Toileting every 2   Hours, in advance of need  - Initiate/Maintain bed alarm  - Obtain necessary fall risk  management equipment: non skid footwear  - Apply yellow socks and bracelet for high fall risk patients  - Consider moving patient to room near nurses station  Outcome: Progressing  Goal: Maintain or return to baseline ADL function  Description: INTERVENTIONS:  -  Assess patient's ability to carry out ADLs; assess patient's baseline for ADL function and identify physical deficits which impact ability to perform ADLs (bathing, care of mouth/teeth, toileting, grooming, dressing, etc.)  - Assess/evaluate cause of self-care deficits   - Assess range of motion  - Assess patient's mobility; develop plan if impaired  - Assess patient's need for assistive devices and provide as appropriate  - Encourage maximum independence but intervene and supervise when necessary  - Involve family in performance of ADLs  - Assess for home care needs following discharge   - Consider OT consult to assist with ADL evaluation and planning for discharge  - Provide patient education as appropriate  Outcome: Progressing  Goal: Maintains/Returns to pre admission functional level  Description: INTERVENTIONS:  - Perform AM-PAC 6 Click Basic Mobility/ Daily Activity assessment daily.  - Set and communicate daily mobility goal to care team and patient/family/caregiver.   - Collaborate with rehabilitation services on mobility goals if consulted  - Perform Range of Motion 3 times a day.  - Reposition patient every 2 hours.  - Dangle patient 3 times a day  - Stand patient 3 times a day  - Ambulate patient 3 times a day  - Out of bed to chair 3 times a day   - Out of bed for meals 3 times a day  - Out of bed for toileting  - Record patient progress and toleration of activity level   Outcome: Progressing     Problem: DISCHARGE PLANNING  Goal: Discharge to home or other facility with appropriate resources  Description: INTERVENTIONS:  - Identify barriers to discharge w/patient and caregiver  - Arrange for needed discharge resources and transportation as  appropriate  - Identify discharge learning needs (meds, wound care, etc.)  - Arrange for interpretive services to assist at discharge as needed  - Refer to Case Management Department for coordinating discharge planning if the patient needs post-hospital services based on physician/advanced practitioner order or complex needs related to functional status, cognitive ability, or social support system  Outcome: Progressing     Problem: Knowledge Deficit  Goal: Patient/family/caregiver demonstrates understanding of disease process, treatment plan, medications, and discharge instructions  Description: Complete learning assessment and assess knowledge base.  Interventions:  - Provide teaching at level of understanding  - Provide teaching via preferred learning methods  Outcome: Progressing     Problem: NEUROSENSORY - ADULT  Goal: Achieves stable or improved neurological status  Description: INTERVENTIONS  - Monitor and report changes in neurological status  - Monitor vital signs such as temperature, blood pressure, glucose, and any other labs ordered   - Initiate measures to prevent increased intracranial pressure  - Monitor for seizure activity and implement precautions if appropriate      Outcome: Progressing  Goal: Remains free of injury related to seizures activity  Description: INTERVENTIONS  - Maintain airway, patient safety  and administer oxygen as ordered  - Monitor patient for seizure activity, document and report duration and description of seizure to physician/advanced practitioner  - If seizure occurs,  ensure patient safety during seizure  - Reorient patient post seizure  - Seizure pads on all 4 side rails  - Instruct patient/family to notify RN of any seizure activity including if an aura is experienced  - Instruct patient/family to call for assistance with activity based on nursing assessment  - Administer anti-seizure medications if ordered    Outcome: Progressing  Goal: Achieves maximal functionality and  self care  Description: INTERVENTIONS  - Monitor swallowing and airway patency with patient fatigue and changes in neurological status  - Encourage and assist patient to increase activity and self care.   - Encourage visually impaired, hearing impaired and aphasic patients to use assistive/communication devices  Outcome: Progressing     Problem: GENITOURINARY - ADULT  Goal: Maintains or returns to baseline urinary function  Description: INTERVENTIONS:  - Assess urinary function  - Encourage oral fluids to ensure adequate hydration if ordered  - Administer IV fluids as ordered to ensure adequate hydration  - Administer ordered medications as needed  - Offer frequent toileting  - Follow urinary retention protocol if ordered  Outcome: Progressing  Goal: Absence of urinary retention  Description: INTERVENTIONS:  - Assess patient's ability to void and empty bladder  - Monitor I/O  - Bladder scan as needed  - Discuss with physician/AP medications to alleviate retention as needed  - Discuss catheterization for long term situations as appropriate  Outcome: Progressing  Goal: Urinary catheter remains patent  Description: INTERVENTIONS:  - Assess patency of urinary catheter  - If patient has a chronic dover, consider changing catheter if non-functioning  - Follow guidelines for intermittent irrigation of non-functioning urinary catheter  Outcome: Progressing     Problem: GENITOURINARY - ADULT  Goal: Maintains or returns to baseline urinary function  Description: INTERVENTIONS:  - Assess urinary function  - Encourage oral fluids to ensure adequate hydration if ordered  - Administer IV fluids as ordered to ensure adequate hydration  - Administer ordered medications as needed  - Offer frequent toileting  - Follow urinary retention protocol if ordered  Outcome: Progressing  Goal: Absence of urinary retention  Description: INTERVENTIONS:  - Assess patient's ability to void and empty bladder  - Monitor I/O  - Bladder scan as  needed  - Discuss with physician/AP medications to alleviate retention as needed  - Discuss catheterization for long term situations as appropriate  Outcome: Progressing  Goal: Urinary catheter remains patent  Description: INTERVENTIONS:  - Assess patency of urinary catheter  - If patient has a chronic dover, consider changing catheter if non-functioning  - Follow guidelines for intermittent irrigation of non-functioning urinary catheter  Outcome: Progressing     Problem: MUSCULOSKELETAL - ADULT  Goal: Maintain or return mobility to safest level of function  Description: INTERVENTIONS:  - Assess patient's ability to carry out ADLs; assess patient's baseline for ADL function and identify physical deficits which impact ability to perform ADLs (bathing, care of mouth/teeth, toileting, grooming, dressing, etc.)  - Assess/evaluate cause of self-care deficits   - Assess range of motion  - Assess patient's mobility  - Assess patient's need for assistive devices and provide as appropriate  - Encourage maximum independence but intervene and supervise when necessary  - Involve family in performance of ADLs  - Assess for home care needs following discharge   - Consider OT consult to assist with ADL evaluation and planning for discharge  - Provide patient education as appropriate  Outcome: Progressing  Goal: Maintain proper alignment of affected body part  Description: INTERVENTIONS:  - Support, maintain and protect limb and body alignment  - Provide patient/ family with appropriate education  Outcome: Progressing     Problem: Nutrition/Hydration-ADULT  Goal: Nutrient/Hydration intake appropriate for improving, restoring or maintaining nutritional needs  Description: Monitor and assess patient's nutrition/hydration status for malnutrition. Collaborate with interdisciplinary team and initiate plan and interventions as ordered.  Monitor patient's weight and dietary intake as ordered or per policy. Utilize nutrition screening tool  and intervene as necessary. Determine patient's food preferences and provide high-protein, high-caloric foods as appropriate.     INTERVENTIONS:  - Monitor oral intake, urinary output, labs, and treatment plans  - Assess nutrition and hydration status and recommend course of action  - Evaluate amount of meals eaten  - Assist patient with eating if necessary   - Allow adequate time for meals  - Recommend/ encourage appropriate diets, oral nutritional supplements, and vitamin/mineral supplements  - Order, calculate, and assess calorie counts as needed  - Recommend, monitor, and adjust tube feedings and TPN/PPN based on assessed needs  - Assess need for intravenous fluids  - Provide specific nutrition/hydration education as appropriate  - Include patient/family/caregiver in decisions related to nutrition  Outcome: Progressing

## 2024-05-05 NOTE — PLAN OF CARE
Problem: Prexisting or High Potential for Compromised Skin Integrity  Goal: Skin integrity is maintained or improved  Description: INTERVENTIONS:  - Identify patients at risk for skin breakdown  - Assess and monitor skin integrity  - Assess and monitor nutrition and hydration status  - Monitor labs   - Assess for incontinence   - Turn and reposition patient  - Assist with mobility/ambulation  - Relieve pressure over bony prominences  - Avoid friction and shearing  - Provide appropriate hygiene as needed including keeping skin clean and dry  - Evaluate need for skin moisturizer/barrier cream  - Collaborate with interdisciplinary team   - Patient/family teaching  - Consider wound care consult   Outcome: Progressing     Problem: PAIN - ADULT  Goal: Verbalizes/displays adequate comfort level or baseline comfort level  Description: Interventions:  - Encourage patient to monitor pain and request assistance  - Assess pain using appropriate pain scale  - Administer analgesics based on type and severity of pain and evaluate response  - Implement non-pharmacological measures as appropriate and evaluate response  - Consider cultural and social influences on pain and pain management  - Notify physician/advanced practitioner if interventions unsuccessful or patient reports new pain  Outcome: Progressing     Problem: INFECTION - ADULT  Goal: Absence or prevention of progression during hospitalization  Description: INTERVENTIONS:  - Assess and monitor for signs and symptoms of infection  - Monitor lab/diagnostic results  - Monitor all insertion sites, i.e. indwelling lines, tubes, and drains  - Monitor endotracheal if appropriate and nasal secretions for changes in amount and color  - [place] appropriate cooling/warming therapies per order  - Administer medications as ordered  - Instruct and encourage patient and family to use good hand hygiene technique  - Identify and instruct in appropriate isolation precautions for  identified infection/condition  Outcome: Progressing  Goal: Absence of fever/infection during neutropenic period  Description: INTERVENTIONS:  - Monitor WBC    Outcome: Progressing     Problem: SAFETY ADULT  Goal: Patient will remain free of falls  Description: INTERVENTIONS:  - Educate patient/family on patient safety including physical limitations  - Instruct patient to call for assistance with activity   - Consult OT/PT to assist with strengthening/mobility   - Keep Call bell within reach  - Keep bed low and locked with side rails adjusted as appropriate  - Keep care items and personal belongings within reach  - Initiate and maintain comfort rounds  - Make Fall Risk Sign visible to staff  - Offer Toileting every 2   Hours, in advance of need  - Initiate/Maintain bed alarm  - Obtain necessary fall risk management equipment: non skid footwear  - Apply yellow socks and bracelet for high fall risk patients  - Consider moving patient to room near nurses station  Outcome: Progressing  Goal: Maintain or return to baseline ADL function  Description: INTERVENTIONS:  -  Assess patient's ability to carry out ADLs; assess patient's baseline for ADL function and identify physical deficits which impact ability to perform ADLs (bathing, care of mouth/teeth, toileting, grooming, dressing, etc.)  - Assess/evaluate cause of self-care deficits   - Assess range of motion  - Assess patient's mobility; develop plan if impaired  - Assess patient's need for assistive devices and provide as appropriate  - Encourage maximum independence but intervene and supervise when necessary  - Involve family in performance of ADLs  - Assess for home care needs following discharge   - Consider OT consult to assist with ADL evaluation and planning for discharge  - Provide patient education as appropriate  Outcome: Progressing  Goal: Maintains/Returns to pre admission functional level  Description: INTERVENTIONS:  - Perform AM-PAC 6 Click Basic  Mobility/ Daily Activity assessment daily.  - Set and communicate daily mobility goal to care team and patient/family/caregiver.   - Collaborate with rehabilitation services on mobility goals if consulted  - Perform Range of Motion 3 times a day.  - Reposition patient every 2 hours.  - Dangle patient 3 times a day  - Stand patient 3 times a day  - Ambulate patient 3 times a day  - Out of bed to chair 3 times a day   - Out of bed for meals 3 times a day  - Out of bed for toileting  - Record patient progress and toleration of activity level   Outcome: Progressing     Problem: DISCHARGE PLANNING  Goal: Discharge to home or other facility with appropriate resources  Description: INTERVENTIONS:  - Identify barriers to discharge w/patient and caregiver  - Arrange for needed discharge resources and transportation as appropriate  - Identify discharge learning needs (meds, wound care, etc.)  - Arrange for interpretive services to assist at discharge as needed  - Refer to Case Management Department for coordinating discharge planning if the patient needs post-hospital services based on physician/advanced practitioner order or complex needs related to functional status, cognitive ability, or social support system  Outcome: Progressing     Problem: NEUROSENSORY - ADULT  Goal: Achieves stable or improved neurological status  Description: INTERVENTIONS  - Monitor and report changes in neurological status  - Monitor vital signs such as temperature, blood pressure, glucose, and any other labs ordered   - Initiate measures to prevent increased intracranial pressure  - Monitor for seizure activity and implement precautions if appropriate      Outcome: Progressing  Goal: Remains free of injury related to seizures activity  Description: INTERVENTIONS  - Maintain airway, patient safety  and administer oxygen as ordered  - Monitor patient for seizure activity, document and report duration and description of seizure to physician/advanced  practitioner  - If seizure occurs,  ensure patient safety during seizure  - Reorient patient post seizure  - Seizure pads on all 4 side rails  - Instruct patient/family to notify RN of any seizure activity including if an aura is experienced  - Instruct patient/family to call for assistance with activity based on nursing assessment  - Administer anti-seizure medications if ordered    Outcome: Progressing  Goal: Achieves maximal functionality and self care  Description: INTERVENTIONS  - Monitor swallowing and airway patency with patient fatigue and changes in neurological status  - Encourage and assist patient to increase activity and self care.   - Encourage visually impaired, hearing impaired and aphasic patients to use assistive/communication devices  Outcome: Progressing     Problem: MUSCULOSKELETAL - ADULT  Goal: Maintain or return mobility to safest level of function  Description: INTERVENTIONS:  - Assess patient's ability to carry out ADLs; assess patient's baseline for ADL function and identify physical deficits which impact ability to perform ADLs (bathing, care of mouth/teeth, toileting, grooming, dressing, etc.)  - Assess/evaluate cause of self-care deficits   - Assess range of motion  - Assess patient's mobility  - Assess patient's need for assistive devices and provide as appropriate  - Encourage maximum independence but intervene and supervise when necessary  - Involve family in performance of ADLs  - Assess for home care needs following discharge   - Consider OT consult to assist with ADL evaluation and planning for discharge  - Provide patient education as appropriate  Outcome: Progressing  Goal: Maintain proper alignment of affected body part  Description: INTERVENTIONS:  - Support, maintain and protect limb and body alignment  - Provide patient/ family with appropriate education  Outcome: Progressing     Problem: Nutrition/Hydration-ADULT  Goal: Nutrient/Hydration intake appropriate for improving,  restoring or maintaining nutritional needs  Description: Monitor and assess patient's nutrition/hydration status for malnutrition. Collaborate with interdisciplinary team and initiate plan and interventions as ordered.  Monitor patient's weight and dietary intake as ordered or per policy. Utilize nutrition screening tool and intervene as necessary. Determine patient's food preferences and provide high-protein, high-caloric foods as appropriate.     INTERVENTIONS:  - Monitor oral intake, urinary output, labs, and treatment plans  - Assess nutrition and hydration status and recommend course of action  - Evaluate amount of meals eaten  - Assist patient with eating if necessary   - Allow adequate time for meals  - Recommend/ encourage appropriate diets, oral nutritional supplements, and vitamin/mineral supplements  - Order, calculate, and assess calorie counts as needed  - Recommend, monitor, and adjust tube feedings and TPN/PPN based on assessed needs  - Assess need for intravenous fluids  - Provide specific nutrition/hydration education as appropriate  - Include patient/family/caregiver in decisions related to nutrition  Outcome: Progressing

## 2024-05-05 NOTE — PROGRESS NOTES
[facility]  Progress Note  Name: Swapnil Grover I  MRN: [id number]  Unit/Bed#: [facility] 718-01 I Date of Admission: 4/29/2024   Date of Service: 5/5/2024 I Hospital Day: 6    Assessment/Plan   Stage 3a chronic kidney disease (HCC)  Assessment & Plan  Lab Results   Component Value Date    EGFR 54 05/04/2024    EGFR 57 05/03/2024    EGFR 60 05/02/2024    CREATININE 1.31 (H) 05/04/2024    CREATININE 1.24 05/03/2024    CREATININE 1.20 05/02/2024   Trend BUN and creat   Monitor urine output     Hypertension  Assessment & Plan  Continue amlodipine   As needed labetalol    * Tegmen defect of base of skull  Assessment & Plan  LD removed   Monitor for ear drainage   Neurosurg following              Disposition: Stepdown Level 1    ICU Core Measures     A: Assess, Prevent, and Manage Pain Has pain been assessed? Yes  Need for changes to pain regimen? No   B: Both SAT/SAT  N/A   C: Choice of Sedation RASS Goal: 0 Alert and Calm  Need for changes to sedation or analgesia regimen? No   D: Delirium CAM-ICU: Negative   E: Early Mobility  Plan for early mobility? Yes   F: Family Engagement Plan for family engagement today? Yes       Antibiotic Review: Antibiotics to be discontinued today    Review of Invasive Devices:            Prophylaxis:  VTE Contraindicated secondary to: start today    Stress Ulcer  not ordered         Significant 24hr Events     24hr events: LD removed yesterday- small amount of drainage from left ear this AM      Subjective   Review of Systems   Musculoskeletal:  Positive for back pain.   All other systems reviewed and are negative.     Objective                            Vitals I/O      Most Recent Min/Max in 24hrs   Temp 97.8 °F (36.6 °C) Temp  Min: 97.8 °F (36.6 °C)  Max: 100.5 °F (38.1 °C)   Pulse 83 Pulse  Min: 83  Max: 111   Resp 16 Resp  Min: 16  Max: 20   /67 BP  Min: 110/55  Max: 137/87   O2 Sat 99 % SpO2  Min: 95 %  Max: 99 %      Intake/Output Summary  (Last 24 hours) at 5/5/2024 0528  Last data filed at 5/4/2024 2100  Gross per 24 hour   Intake 720 ml   Output 700 ml   Net 20 ml       Diet Regular; Regular House    Invasive Monitoring           Physical Exam   Physical Exam  Vitals and nursing note reviewed.   Eyes:      Pupils: Pupils are equal, round, and reactive to light.   Skin:     General: Skin is warm.      Capillary Refill: Capillary refill takes less than 2 seconds.   HENT:      Head: Normocephalic.      Comments: Incision CDI      Right Ear: Hearing normal.      Left Ear: Hearing normal.      Mouth/Throat:      Mouth: Mucous membranes are moist.   Cardiovascular:      Rate and Rhythm: Normal rate.      Pulses: Normal pulses.   Musculoskeletal:         General: Normal range of motion.   Abdominal:      Palpations: Abdomen is soft.   Constitutional:       Appearance: He is well-developed.   Pulmonary:      Breath sounds: Normal breath sounds.   Neurological:      General: No focal deficit present.      Mental Status: He is alert and oriented to person, place and time. Mental status is at baseline.      Cranial Nerves: No dysarthria or facial asymmetry.      Motor: gross motor function is at baseline for patient. Strength full and intact in all extremities.            Diagnostic Studies      EKG:   Imaging:  I have personally reviewed pertinent reports.       Medications:  Scheduled PRN   acetaminophen, 975 mg, Q8H  amLODIPine, 5 mg, Daily  cefazolin, 2,000 mg, Q8H  docusate sodium, 100 mg, BID  polyethylene glycol, 17 g, Daily  potassium citrate, 20 mEq, TID With Meals  senna, 2 tablet, Daily      bisacodyl, 10 mg, Daily PRN  labetalol, 10 mg, Q4H PRN  methocarbamol, 500 mg, Q6H PRN  ondansetron, 4 mg, Q4H PRN  oxyCODONE, 2.5 mg, Q4H PRN   Or  oxyCODONE, 5 mg, Q4H PRN       Continuous          Labs:    CBC    No recent results  BMP    Recent Labs     05/04/24  0529   SODIUM 138   K 4.3      CO2 24   AGAP 11   BUN 22   CREATININE 1.31*   CALCIUM 9.0        Coags    No recent results     Additional Electrolytes  No recent results       Blood Gas    No recent results  No recent results LFTs  No recent results    Infectious  No recent results  Glucose  Recent Labs     05/04/24  0529   GLUC 108               ADELAIDE LaraNP

## 2024-05-06 ENCOUNTER — TRANSITIONAL CARE MANAGEMENT (OUTPATIENT)
Dept: FAMILY MEDICINE CLINIC | Facility: CLINIC | Age: 74
End: 2024-05-06

## 2024-05-06 VITALS
WEIGHT: 217.46 LBS | DIASTOLIC BLOOD PRESSURE: 89 MMHG | OXYGEN SATURATION: 99 % | SYSTOLIC BLOOD PRESSURE: 135 MMHG | HEIGHT: 67 IN | BODY MASS INDEX: 34.13 KG/M2 | HEART RATE: 90 BPM | TEMPERATURE: 98.3 F | RESPIRATION RATE: 19 BRPM

## 2024-05-06 DIAGNOSIS — N20.0 NEPHROLITHIASIS: ICD-10-CM

## 2024-05-06 LAB
ANION GAP SERPL CALCULATED.3IONS-SCNC: 9 MMOL/L (ref 4–13)
BASOPHILS # BLD AUTO: 0.04 THOUSANDS/ÂΜL (ref 0–0.1)
BASOPHILS NFR BLD AUTO: 1 % (ref 0–1)
BUN SERPL-MCNC: 19 MG/DL (ref 5–25)
CALCIUM SERPL-MCNC: 8.9 MG/DL (ref 8.4–10.2)
CHLORIDE SERPL-SCNC: 102 MMOL/L (ref 96–108)
CO2 SERPL-SCNC: 26 MMOL/L (ref 21–32)
CREAT SERPL-MCNC: 1.03 MG/DL (ref 0.6–1.3)
EOSINOPHIL # BLD AUTO: 0.38 THOUSAND/ÂΜL (ref 0–0.61)
EOSINOPHIL NFR BLD AUTO: 6 % (ref 0–6)
ERYTHROCYTE [DISTWIDTH] IN BLOOD BY AUTOMATED COUNT: 13 % (ref 11.6–15.1)
GFR SERPL CREATININE-BSD FRML MDRD: 72 ML/MIN/1.73SQ M
GLUCOSE SERPL-MCNC: 96 MG/DL (ref 65–140)
HCT VFR BLD AUTO: 43.6 % (ref 36.5–49.3)
HGB BLD-MCNC: 13.9 G/DL (ref 12–17)
IMM GRANULOCYTES # BLD AUTO: 0.02 THOUSAND/UL (ref 0–0.2)
IMM GRANULOCYTES NFR BLD AUTO: 0 % (ref 0–2)
LYMPHOCYTES # BLD AUTO: 1.11 THOUSANDS/ÂΜL (ref 0.6–4.47)
LYMPHOCYTES NFR BLD AUTO: 17 % (ref 14–44)
MAGNESIUM SERPL-MCNC: 2 MG/DL (ref 1.9–2.7)
MCH RBC QN AUTO: 30.4 PG (ref 26.8–34.3)
MCHC RBC AUTO-ENTMCNC: 31.9 G/DL (ref 31.4–37.4)
MCV RBC AUTO: 95 FL (ref 82–98)
MONOCYTES # BLD AUTO: 0.7 THOUSAND/ÂΜL (ref 0.17–1.22)
MONOCYTES NFR BLD AUTO: 11 % (ref 4–12)
NEUTROPHILS # BLD AUTO: 4.16 THOUSANDS/ÂΜL (ref 1.85–7.62)
NEUTS SEG NFR BLD AUTO: 65 % (ref 43–75)
NRBC BLD AUTO-RTO: 0 /100 WBCS
PHOSPHATE SERPL-MCNC: 3.2 MG/DL (ref 2.3–4.1)
PLATELET # BLD AUTO: 269 THOUSANDS/UL (ref 149–390)
PMV BLD AUTO: 9.4 FL (ref 8.9–12.7)
POTASSIUM SERPL-SCNC: 4.1 MMOL/L (ref 3.5–5.3)
RBC # BLD AUTO: 4.57 MILLION/UL (ref 3.88–5.62)
SODIUM SERPL-SCNC: 137 MMOL/L (ref 135–147)
WBC # BLD AUTO: 6.41 THOUSAND/UL (ref 4.31–10.16)

## 2024-05-06 PROCEDURE — 80048 BASIC METABOLIC PNL TOTAL CA: CPT

## 2024-05-06 PROCEDURE — 83735 ASSAY OF MAGNESIUM: CPT

## 2024-05-06 PROCEDURE — 99024 POSTOP FOLLOW-UP VISIT: CPT | Performed by: NEUROLOGICAL SURGERY

## 2024-05-06 PROCEDURE — 85025 COMPLETE CBC W/AUTO DIFF WBC: CPT

## 2024-05-06 PROCEDURE — 99024 POSTOP FOLLOW-UP VISIT: CPT | Performed by: OTOLARYNGOLOGY

## 2024-05-06 PROCEDURE — 84100 ASSAY OF PHOSPHORUS: CPT

## 2024-05-06 RX ADMIN — ACETAMINOPHEN 975 MG: 325 TABLET, FILM COATED ORAL at 08:47

## 2024-05-06 RX ADMIN — AMLODIPINE BESYLATE 5 MG: 5 TABLET ORAL at 08:47

## 2024-05-06 RX ADMIN — POTASSIUM CITRATE 20 MEQ: 10 TABLET, EXTENDED RELEASE ORAL at 11:47

## 2024-05-06 NOTE — ASSESSMENT & PLAN NOTE
POD#6 cristiano middle fossa craniectomy for repair of CSF leak w/ dural substitute and Duraseal using neuromonitoring (CN 7 and NILA) and placement of a lumbar drain (LH/ENT 4/29)   Pt has been following with Dr. August in the  nsgy office with progressively worsening cristiano hearing loss, ear fullness, and CSF leak   Of note has h/o left craniotomy for SDH evacuation in 1992 in [place] (no reported complications) ]    Imaging:   Post-op imaging not indicated     Plan:   Continue to closely monitor neuro exam  Maintain normotensive BP goals, SBP < 160   Lumbar drain removed 5/3, patient developed recurrence of left otorrhea concerning for CSF.  Attempted bedside lumbar drain placement however unsuccessful.  Had replacement of lumbar drain in the OR later that afternoon however no drainage from drain overnight when opened.  New lumbar drain removed at the bedside 5/4 without difficulty.  Thankfully patient has not had significant recurrence of otorrhea.  No need for LD replacement.  Labs/Vitals:   Labs WNL 5/3  Pain control:   Tylenol 975mg q8 hours scheduled  Oxycodone 2.5mg and 5mg q4 hours as needed for moderate and severe pain, respectively - has not been taking this  Bowel regimen:  having regular BM   colace 100mg BID, senna 1 tab qd, Miralax qd, prn suppository  Hold all AC/AP meds   No reported use at home   Was previously on ASA 325mg qd after knee replacement, since discontinued   DVT ppx: SCDs  Home medications re-ordered   PT/OT - dc to home with outpatient therapies when appropriate  Social work on board to assist with dispo planning  ENT following, plan for close outpatient follow up  Discharge instructions discussed and all questions answered    Neurosurgery will continue to follow as primary. Dc to home today. Follow up with Dr. August for 2 week POV.  Please reach out with any further questions or concerns.

## 2024-05-06 NOTE — CASE MANAGEMENT
Case Management Discharge Planning Note    Patient name Swapnil Grover  Location [facility]-01 MRN [id number]  : 1950 Date 2024       Current Admission Date: 2024  Current Admission Diagnosis:Tegmen defect of base of skull   Patient Active Problem List    Diagnosis Date Noted    Tegmen defect of base of skull 2024    Benign hypertension with CKD (chronic kidney disease) stage III (HCC) 2024    S/P total knee replacement using cement, left 2024    Arthritis 2024    Pre-operative examination 2024    Mixed hyperlipidemia 2023    Primary osteoarthritis of both knees 2023    Other hemorrhoids 2023    Mixed conductive and sensorineural hearing loss, bilateral 2023    Obesity, morbid (HCC) 2023    Nephrolithiasis 2023    Stage 3a chronic kidney disease (HCC) 2023    Hyperuricemia 2022    Sleep apnea 2022    Hydronephrosis with ureteropelvic junction (UPJ) obstruction 2022    BPH (benign prostatic hyperplasia) 2022    Hypertension 2022    Hyperkalemia 10/19/2020    Renal colic on right side 10/18/2020      LOS (days): 7  Geometric Mean LOS (GMLOS) (days): 3.1  Days to GMLOS:-4     OBJECTIVE:  Risk of Unplanned Readmission Score: 9.68         Current admission status: Inpatient   Preferred Pharmacy:   [id number] - [place], [place] - [address]  [facility] [address]  [facility] [address]  Phone: [phone] Fax: [phone]    Primary Care Provider: Frank Lombardi, DO    Primary Insurance: MEDICARE  Secondary Insurance: [facility]    DISCHARGE DETAILS:    Discharge planning discussed with:: Patient           Were Treatment Team discharge recommendations reviewed with patient/caregiver?: Yes  Did patient/caregiver verbalize understanding of patient care needs?: Yes  Were patient/caregiver advised of the risks associated with not following Treatment Team discharge  recommendations?: Yes      Requested Home Health Care         Is the patient interested in HHC at discharge?: No        Treatment Team Recommendation: Home  Discharge Destination Plan:: Home           IMM Given (Date):: 05/06/24  IMM Given to:: Patient     Additional Comments: Patient will discharge home today, self care. He has no transportation home. He will need LYFT scheduled. Patient does not have any service needs, and per Provider outpatient services will be arranged post NSGY follow-up if needed.

## 2024-05-06 NOTE — DISCHARGE INSTR - AVS FIRST PAGE
Discharge Instructions  Tegman defect repair    Surgical incision care:  Incisions may be left open to air, but should remain clean.  FIVE days after surgery you START showering using a baby shampoo including head incision.  Rinse off shampoo and pat dry.   Wash hair AT LEAST every other day  Continue to use clean towel and washcloth for 2 weeks post-op.  Avoid rubbing the incision but gently massage hair.   Do NOT immerse the incisions in water for 6 weeks.  Staples/suture will be removed at your 2 week postoperative visit.   Do not apply any creams or ointments to the incision, unless otherwise instructed by [facility].  Contact office if increasing redness, drainage, pain or swelling occurs around the incisions or if you develop a fever greater than 101F  Do not dye/perm hair or use any hair products until cleared by Neurosurgery.             Activity:  Do not lift, push or pull more than 10 pounds for 2 weeks.  Avoid bending, lifting and twisting for 2 weeks. No running. No athletic activities until cleared.   No driving for at least 2 weeks or until cleared by Neurosurgery.   Do not use a hair dryer, and NO hair products such as mousse, oils, and gels. Do not brush your hair away from the incision since this will put strain on the suture line.  Do NOT dye or perm hair for 6 weeks or until cleared by physician.  Continue to change bed linens and pajamas more frequently. Wear clean clothes daily.   May walk as tolerated. Recommend 4 short walks daily.         Postoperative medication:  [facility] will provide pain medication in the postoperative period. All prescriptions must come from a single practice.   Take medications as prescribed.  Call office with any questions/concerns.  May use over the counter Tylenol. No NSAIDs (ie. Ibuprofen, Aleve, Advil, Naproxen)  Please contact office for questions regarding dosage and modifications.  No antiplatelet or  anticoagulation medication (ie. Coumadin, Aspirin, Plavix) until cleared by [facility], unless otherwise instructed. Please contact . [facility] if you have any questions about the effects of any of your medications on blood clotting.  Do not operate heavy machinery or vehicles while taking sedating medications.  Use a bowel regimen while on opioids as they induce constipation. Ie. Senokot-S, Miralax, Colace, etc. Increase fiber and water intake.      ** Please notify the office if incision becomes red, swollen, tender, or has increased drainage, and temp>101.  Return to the ER if you experience increased headache, difficulty walking, change in vision or speech, drowsiness, weakness, nausea/vomiting, or seizures.**

## 2024-05-06 NOTE — PLAN OF CARE
Problem: Prexisting or High Potential for Compromised Skin Integrity  Goal: Skin integrity is maintained or improved  Description: INTERVENTIONS:  - Identify patients at risk for skin breakdown  - Assess and monitor skin integrity  - Assess and monitor nutrition and hydration status  - Monitor labs   - Assess for incontinence   - Turn and reposition patient  - Assist with mobility/ambulation  - Relieve pressure over bony prominences  - Avoid friction and shearing  - Provide appropriate hygiene as needed including keeping skin clean and dry  - Evaluate need for skin moisturizer/barrier cream  - Collaborate with interdisciplinary team   - Patient/family teaching  - Consider wound care consult   Outcome: Progressing     Problem: PAIN - ADULT  Goal: Verbalizes/displays adequate comfort level or baseline comfort level  Description: Interventions:  - Encourage patient to monitor pain and request assistance  - Assess pain using appropriate pain scale  - Administer analgesics based on type and severity of pain and evaluate response  - Implement non-pharmacological measures as appropriate and evaluate response  - Consider cultural and social influences on pain and pain management  - Notify physician/advanced practitioner if interventions unsuccessful or patient reports new pain  Outcome: Progressing     Problem: INFECTION - ADULT  Goal: Absence or prevention of progression during hospitalization  Description: INTERVENTIONS:  - Assess and monitor for signs and symptoms of infection  - Monitor lab/diagnostic results  - Monitor all insertion sites, i.e. indwelling lines, tubes, and drains  - Monitor endotracheal if appropriate and nasal secretions for changes in amount and color  - [place] appropriate cooling/warming therapies per order  - Administer medications as ordered  - Instruct and encourage patient and family to use good hand hygiene technique  - Identify and instruct in appropriate isolation precautions for  identified infection/condition  Outcome: Progressing  Goal: Absence of fever/infection during neutropenic period  Description: INTERVENTIONS:  - Monitor WBC    Outcome: Progressing     Problem: SAFETY ADULT  Goal: Patient will remain free of falls  Description: INTERVENTIONS:  - Educate patient/family on patient safety including physical limitations  - Instruct patient to call for assistance with activity   - Consult OT/PT to assist with strengthening/mobility   - Keep Call bell within reach  - Keep bed low and locked with side rails adjusted as appropriate  - Keep care items and personal belongings within reach  - Initiate and maintain comfort rounds  - Make Fall Risk Sign visible to staff  - Offer Toileting every 2   Hours, in advance of need  - Initiate/Maintain bed alarm  - Obtain necessary fall risk management equipment: non skid footwear  - Apply yellow socks and bracelet for high fall risk patients  - Consider moving patient to room near nurses station  Outcome: Progressing  Goal: Maintain or return to baseline ADL function  Description: INTERVENTIONS:  -  Assess patient's ability to carry out ADLs; assess patient's baseline for ADL function and identify physical deficits which impact ability to perform ADLs (bathing, care of mouth/teeth, toileting, grooming, dressing, etc.)  - Assess/evaluate cause of self-care deficits   - Assess range of motion  - Assess patient's mobility; develop plan if impaired  - Assess patient's need for assistive devices and provide as appropriate  - Encourage maximum independence but intervene and supervise when necessary  - Involve family in performance of ADLs  - Assess for home care needs following discharge   - Consider OT consult to assist with ADL evaluation and planning for discharge  - Provide patient education as appropriate  Outcome: Progressing  Goal: Maintains/Returns to pre admission functional level  Description: INTERVENTIONS:  - Perform AM-PAC 6 Click Basic  Mobility/ Daily Activity assessment daily.  - Set and communicate daily mobility goal to care team and patient/family/caregiver.   - Collaborate with rehabilitation services on mobility goals if consulted  - Perform Range of Motion 3 times a day.  - Reposition patient every 2 hours.  - Dangle patient 3 times a day  - Stand patient 3 times a day  - Ambulate patient 3 times a day  - Out of bed to chair 3 times a day   - Out of bed for meals 3 times a day  - Out of bed for toileting  - Record patient progress and toleration of activity level   Outcome: Progressing     Problem: DISCHARGE PLANNING  Goal: Discharge to home or other facility with appropriate resources  Description: INTERVENTIONS:  - Identify barriers to discharge w/patient and caregiver  - Arrange for needed discharge resources and transportation as appropriate  - Identify discharge learning needs (meds, wound care, etc.)  - Arrange for interpretive services to assist at discharge as needed  - Refer to Case Management Department for coordinating discharge planning if the patient needs post-hospital services based on physician/advanced practitioner order or complex needs related to functional status, cognitive ability, or social support system  Outcome: Progressing     Problem: Knowledge Deficit  Goal: Patient/family/caregiver demonstrates understanding of disease process, treatment plan, medications, and discharge instructions  Description: Complete learning assessment and assess knowledge base.  Interventions:  - Provide teaching at level of understanding  - Provide teaching via preferred learning methods  Outcome: Progressing     Problem: GENITOURINARY - ADULT  Goal: Maintains or returns to baseline urinary function  Description: INTERVENTIONS:  - Assess urinary function  - Encourage oral fluids to ensure adequate hydration if ordered  - Administer IV fluids as ordered to ensure adequate hydration  - Administer ordered medications as needed  - Offer  frequent toileting  - Follow urinary retention protocol if ordered  Outcome: Progressing  Goal: Absence of urinary retention  Description: INTERVENTIONS:  - Assess patient's ability to void and empty bladder  - Monitor I/O  - Bladder scan as needed  - Discuss with physician/AP medications to alleviate retention as needed  - Discuss catheterization for long term situations as appropriate  Outcome: Progressing  Goal: Urinary catheter remains patent  Description: INTERVENTIONS:  - Assess patency of urinary catheter  - If patient has a chronic dover, consider changing catheter if non-functioning  - Follow guidelines for intermittent irrigation of non-functioning urinary catheter  Outcome: Progressing     Problem: Nutrition/Hydration-ADULT  Goal: Nutrient/Hydration intake appropriate for improving, restoring or maintaining nutritional needs  Description: Monitor and assess patient's nutrition/hydration status for malnutrition. Collaborate with interdisciplinary team and initiate plan and interventions as ordered.  Monitor patient's weight and dietary intake as ordered or per policy. Utilize nutrition screening tool and intervene as necessary. Determine patient's food preferences and provide high-protein, high-caloric foods as appropriate.     INTERVENTIONS:  - Monitor oral intake, urinary output, labs, and treatment plans  - Assess nutrition and hydration status and recommend course of action  - Evaluate amount of meals eaten  - Assist patient with eating if necessary   - Allow adequate time for meals  - Recommend/ encourage appropriate diets, oral nutritional supplements, and vitamin/mineral supplements  - Order, calculate, and assess calorie counts as needed  - Recommend, monitor, and adjust tube feedings and TPN/PPN based on assessed needs  - Assess need for intravenous fluids  - Provide specific nutrition/hydration education as appropriate  - Include patient/family/caregiver in decisions related to  nutrition  Outcome: Progressing     Problem: MUSCULOSKELETAL - ADULT  Goal: Maintain or return mobility to safest level of function  Description: INTERVENTIONS:  - Assess patient's ability to carry out ADLs; assess patient's baseline for ADL function and identify physical deficits which impact ability to perform ADLs (bathing, care of mouth/teeth, toileting, grooming, dressing, etc.)  - Assess/evaluate cause of self-care deficits   - Assess range of motion  - Assess patient's mobility  - Assess patient's need for assistive devices and provide as appropriate  - Encourage maximum independence but intervene and supervise when necessary  - Involve family in performance of ADLs  - Assess for home care needs following discharge   - Consider OT consult to assist with ADL evaluation and planning for discharge  - Provide patient education as appropriate  Outcome: Progressing  Goal: Maintain proper alignment of affected body part  Description: INTERVENTIONS:  - Support, maintain and protect limb and body alignment  - Provide patient/ family with appropriate education  Outcome: Progressing

## 2024-05-06 NOTE — PROGRESS NOTES
"OTOLARYNGOLOGY PROGRESS NOTE    Date of Service: 5/6/2024 6:12 AM    HPI  Patient is a 73 y.o. male s/p repair of b/l middle cranial fossa tegmen defect on 4/29/24. LD removed on 5/3/24. Pt developed L otorrhea concerning for CSF, LD replaced. New LD not draining on 5/4/24, removed.    Overnight Events:  NAEO, AVSS, no otorrhea    PHYSICAL EXAM:  Vitals:    05/06/24 0350   BP: 132/78   Pulse: 71   Resp:    Temp: (!) 97.1 °F (36.2 °C)   SpO2: 98%        General: No acute distress, AOx4  HEENT: Bilateral periauricular incisions clean dry and intact.  No signs of any significant otorrhea in either EAC  Neurology: No focal deficits  Lungs: Breathing easy, unlabored and even on room air  Cardio: RRR, well perfused  Abd: soft, NT, ND         Intake/Output Summary (Last 24 hours) at 5/6/2024 0612  Last data filed at 5/6/2024 0200  Gross per 24 hour   Intake 898 ml   Output 600 ml   Net 298 ml         LABORATORY    Recent Labs     05/05/24  0526   WBC 8.13   HGB 13.9   HCT 44.2          Recent Labs     05/04/24  0529 05/05/24  0526   K 4.3 4.0    102   BUN 22 23   PHOS  --  3.4   MG  --  1.9       Invalid input(s): \"PTPAT\", \"PTINR\", \"APTTMNNM\", \"APTTPAT\"      Patient Active Problem List   Diagnosis    Renal colic on right side    Hyperkalemia    Sleep apnea    Hydronephrosis with ureteropelvic junction (UPJ) obstruction    BPH (benign prostatic hyperplasia)    Hypertension    Hyperuricemia    Stage 3a chronic kidney disease (HCC)    Nephrolithiasis    Obesity, morbid (HCC)    Mixed conductive and sensorineural hearing loss, bilateral    Primary osteoarthritis of both knees    Other hemorrhoids    Mixed hyperlipidemia    Pre-operative examination    Arthritis    S/P total knee replacement using cement, left    Benign hypertension with CKD (chronic kidney disease) stage III (HCC)    Tegmen defect of base of skull         ASSESSMENT  Patient is a 73 y.o. male w/ acute and chronic problems as above, s/p b/l middle " cranial fossa tegmen defect repair 4/29/24, s/p replacement of lumbar drain by NSG on 5/3/24 (not draining), new LD removed on 5/4/24, no signs of otorrhea at this time.    PLAN  - monitor for signs of CSF leak  - LD mgt / replacement decision per NSG  - rest of care per primary    Greg Tao, PGY-2  Otolaryngology - Head & Neck Surgery

## 2024-05-06 NOTE — DISCHARGE SUMMARY
[facility]  Discharge- Swapnil Grover 1950, 73 y.o. male MRN: [id number]  [facility] Encounter: [id number]  Primary Care Provider: Frank Lombardi, DO   Date and time admitted to hospital: 4/29/2024  5:33 AM    * Tegmen defect of base of skull  Assessment & Plan  POD#6 cristiano middle fossa craniectomy for repair of CSF leak w/ dural substitute and Duraseal using neuromonitoring (CN 7 and NILA) and placement of a lumbar drain (LH/ENT 4/29)   Pt has been following with Dr. August in the  nsgy office with progressively worsening cristiano hearing loss, ear fullness, and CSF leak   Of note has h/o left craniotomy for SDH evacuation in 1992 in [place] (no reported complications) ]    Imaging:   Post-op imaging not indicated     Plan:   Continue to closely monitor neuro exam  Maintain normotensive BP goals, SBP < 160   Lumbar drain removed 5/3, patient developed recurrence of left otorrhea concerning for CSF.  Attempted bedside lumbar drain placement however unsuccessful.  Had replacement of lumbar drain in the OR later that afternoon however no drainage from drain overnight when opened.  New lumbar drain removed at the bedside 5/4 without difficulty.  Thankfully patient has not had significant recurrence of otorrhea.  No need for LD replacement.  Labs/Vitals:   Labs WNL 5/3  Pain control:   Tylenol 975mg q8 hours scheduled  Oxycodone 2.5mg and 5mg q4 hours as needed for moderate and severe pain, respectively - has not been taking this  Bowel regimen:  having regular BM   colace 100mg BID, senna 1 tab qd, Miralax qd, prn suppository  Hold all AC/AP meds   No reported use at home   Was previously on ASA 325mg qd after knee replacement, since discontinued   DVT ppx: SCDs  Home medications re-ordered   PT/OT - dc to home with outpatient therapies when appropriate  Social work on board to assist with dispo planning  ENT following, plan for close outpatient follow up  Discharge  instructions discussed and all questions answered    Neurosurgery will continue to follow as primary. Dc to home today. Follow up with Dr. August for 2 week POV.  Please reach out with any further questions or concerns.       Stage 3a chronic kidney disease (HCC)  Assessment & Plan  Hx of CKD 3  - following with  nephrology in the outpt setting, specifically with Dr. Cottrell  - baseline Cr 1.2-1.3    Lab Results   Component Value Date    EGFR 72 05/06/2024    EGFR 55 05/05/2024    EGFR 54 05/04/2024    CREATININE 1.03 05/06/2024    CREATININE 1.28 05/05/2024    CREATININE 1.31 (H) 05/04/2024     Plan:   Nephrology consulted given hx - have signed off at this time  Pt with stable renal function, Cr at baseline   Continue to monitor   Continue home BP meds with hold parameters   Encourage ongoing outpt follow-up with nephro upon discharge       Hypertension  Assessment & Plan  Hx of HTN   - home meds: amlodipine 5mg qd     Plan:   continue home meds   maintain normotensive BP goals, SBP < 160   encoaurged ongoing outpt follow-up with his PCP upon discharge     Subjective/Objective   Chief Complaint: post op    Subjective:  patient is doing well today.  He is ready to go home.  He has not had much in the way of otorrhea.  Feels the right ear is slightly improving in hearing.  He is having regular bowel movements and urinating per baseline.  Eating and drinking well.    Objective: Sitting up in bed, NAD    I/O         05/04 0701  05/05 0700 05/05 0701  05/06 0700 05/06 0701  05/07 0700    P.O. 820 898     I.V. (mL/kg)       Total Intake(mL/kg) 820 (8.3) 898 (9.1)     Urine (mL/kg/hr) 1250 (0.5) 600 (0.3)     Drains 0      Stool 0      Total Output 1250 600     Net -430 +298            Unmeasured Urine Occurrence 1 x      Unmeasured Stool Occurrence 1 x 1 x             Invasive Devices       Drain  Duration             Lumbar Drain 7 days    Lumbar Drain 2 days                    Physical Exam:  Vitals: Blood pressure  "135/89, pulse 90, temperature 98.3 °F (36.8 °C), resp. rate 19, height 5' 7\" (1.702 m), weight 98.6 kg (217 lb 7.4 oz), SpO2 99%.,Body mass index is 34.06 kg/m².      General appearance: alert, appears stated age, cooperative and no distress  Head: Bilateral auricular incisions clean dry intact, no otorrhea appreciated bilaterally  Eyes: Conjugate gaze  Neck: supple, symmetrical, trachea midline   Lungs: non labored breathing  Heart: regular heart rate  Neurologic:   Mental status: Alert, oriented x 3, follows commands, thought content appropriate  Cranial nerves: grossly intact (Cranial nerves II-XII)  Motor: moving all extremities without focal weakness       Lab Results:  Results from last 7 days   Lab Units 05/06/24  0548 05/05/24  0526 05/03/24  0429   WBC Thousand/uL 6.41 8.13 7.20   HEMOGLOBIN g/dL 13.9 13.9 14.7   HEMATOCRIT % 43.6 44.2 44.8   PLATELETS Thousands/uL 269 264 266   SEGS PCT % 65 65 71   MONO PCT % 11 11 8   EOS PCT % 6 5 4     Results from last 7 days   Lab Units 05/06/24  0548 05/05/24  0526 05/04/24  0529   SODIUM mmol/L 137 137 138   POTASSIUM mmol/L 4.1 4.0 4.3   CHLORIDE mmol/L 102 102 103   CO2 mmol/L 26 25 24   BUN mg/dL 19 23 22   CREATININE mg/dL 1.03 1.28 1.31*   CALCIUM mg/dL 8.9 8.9 9.0     Results from last 7 days   Lab Units 05/06/24  0548 05/05/24  0526 05/03/24  0429   MAGNESIUM mg/dL 2.0 1.9 2.1     Results from last 7 days   Lab Units 05/06/24  0548 05/05/24  0526 05/02/24  0519   PHOSPHORUS mg/dL 3.2 3.4 3.7     Results from last 7 days   Lab Units 04/30/24  0636 04/29/24  1309   INR  1.04 1.01   PTT seconds 27 28       Imaging Studies: I have personally reviewed pertinent reports.   and I have personally reviewed pertinent films in PACS    No results found.    EKG, Pathology, and Other Studies: I have personally reviewed pertinent reports.      VTE Pharmacologic Prophylaxis: Plan for discharge, no subcu heparin ordered    VTE Mechanical Prophylaxis: sequential compression " device          Medical Problems       Resolved Problems  Date Reviewed: 5/5/2024   None         Discharge Date: 5/6/2024    Admitting Diagnosis: Tegmen defect of base of skull [Q16.5]    Discharge Diagnosis:   Status post bilateral middle fossa craniectomy for repair of CSF leak with dural substitute and DuraSeal using neuromonitoring and placement of lumbar drain  Tegmen defect  Stage III chronic kidney disease  Hypertension    Attending: Billy August MD    Consulting Physician(s): ENT, anesthesiology, NCC    Procedures Performed: bilateral middle fossa craniectomy for repair of CSF leak with dural substitute and DuraSeal using neuromonitoring and placement of lumbar drain    Hospital Course: Swapnil Grover is a 74 y/o male who presented to the outpatient office complaining of several months of progressive worsening bilateral hearing loss, fullness and CSF leak characterizes postnasal drip.  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 patient underwent bilateral middle fossa craniectomy for repair of CSF leak with dural substitute and DuraSeal using neuromonitoring and placement of lumbar drain under general anesthesia with minimal blood loss and no complications.  Patient was kept in the PACU until stable and then moved to the stepdown 1 unit.  Patient had lumbar drain placed intraoperatively however upon removal on postop day 3 he developed increase in left-sided otorrhea.  On postop day 4 bedside lumbar drain placement was attempted however unsuccessful.  He required replacement of lumbar drain in the operating room later that afternoon but unfortunately did not drain well.  Lumbar drain was removed on postop day 5 and monitored for otorrhea which significantly improved.  PT and OT were consulted and recommended level 3 minimal resource.  Patient was cleared medically for discharge on postop day 7.  Prior to discharge,  postoperative instructions were discussed with patient.  During that time, all questions and concerns were addressed.  Patient will follow up outpatient in 2 weeks for an incision check with  Dr. August.      Condition at Discharge: good     Discharge instructions/Information to patient and family:   See after visit summary for information provided to patient and family.      Provisions for Follow-Up Care:  See after visit summary for information related to follow-up care and any pertinent home health orders.      Disposition: Home        Planned Readmission: No    Discharge Statement   I spent 25 minutes discharging the patient. This time was spent on the day of discharge. I had direct contact with the patient on the day of discharge. Additional documentation is required if more than 30 minutes were spent on discharge.     Discharge Medications:  See after visit summary for reconciled discharge medications provided to patient and family.

## 2024-05-06 NOTE — PROGRESS NOTES
Pastoral Care Progress Note    2024  Patient: Swapnil Grover : 1950  Admission Date & Time: 2024 0533  MRN: [id number] CSN: [id number]        Pt sitting at side of bed and presented with a sharp wit and is happy to go home today.  offered conversation and encouragement for self care. Chaplains remain available.             Chaplaincy Interventions Utilized:   Empowerment: Encouraged self-care    Exploration: Pt being discharged today       24 1200   Clinical Encounter Type   Visited With Patient

## 2024-05-06 NOTE — ASSESSMENT & PLAN NOTE
Hx of CKD 3  - following with  nephrology in the outpt setting, specifically with Dr. Cottrell  - baseline Cr 1.2-1.3    Lab Results   Component Value Date    EGFR 72 05/06/2024    EGFR 55 05/05/2024    EGFR 54 05/04/2024    CREATININE 1.03 05/06/2024    CREATININE 1.28 05/05/2024    CREATININE 1.31 (H) 05/04/2024     Plan:   Nephrology consulted given hx - have signed off at this time  Pt with stable renal function, Cr at baseline   Continue to monitor   Continue home BP meds with hold parameters   Encourage ongoing outpt follow-up with nephro upon discharge

## 2024-05-07 ENCOUNTER — TELEPHONE (OUTPATIENT)
Age: 74
End: 2024-05-07

## 2024-05-07 RX ORDER — POTASSIUM CITRATE 10 MEQ/1
20 TABLET, EXTENDED RELEASE ORAL
Qty: 180 TABLET | Refills: 3 | Status: SHIPPED | OUTPATIENT
Start: 2024-05-07

## 2024-05-10 ENCOUNTER — OFFICE VISIT (OUTPATIENT)
Dept: FAMILY MEDICINE CLINIC | Facility: CLINIC | Age: 74
End: 2024-05-10
Payer: MEDICARE

## 2024-05-10 VITALS
TEMPERATURE: 97 F | RESPIRATION RATE: 20 BRPM | HEART RATE: 100 BPM | WEIGHT: 225 LBS | SYSTOLIC BLOOD PRESSURE: 138 MMHG | BODY MASS INDEX: 35.31 KG/M2 | DIASTOLIC BLOOD PRESSURE: 78 MMHG | HEIGHT: 67 IN

## 2024-05-10 DIAGNOSIS — G96.01 CSF OTORRHEA: ICD-10-CM

## 2024-05-10 DIAGNOSIS — N18.31 STAGE 3A CHRONIC KIDNEY DISEASE (HCC): ICD-10-CM

## 2024-05-10 DIAGNOSIS — I12.9 BENIGN HYPERTENSION WITH CKD (CHRONIC KIDNEY DISEASE) STAGE III (HCC): Chronic | ICD-10-CM

## 2024-05-10 DIAGNOSIS — N20.0 NEPHROLITHIASIS: ICD-10-CM

## 2024-05-10 DIAGNOSIS — Q16.5 TEGMEN DEFECT OF BASE OF SKULL: Primary | ICD-10-CM

## 2024-05-10 DIAGNOSIS — N18.30 BENIGN HYPERTENSION WITH CKD (CHRONIC KIDNEY DISEASE) STAGE III (HCC): Chronic | ICD-10-CM

## 2024-05-10 PROCEDURE — 99496 TRANSJ CARE MGMT HIGH F2F 7D: CPT | Performed by: NURSE PRACTITIONER

## 2024-05-10 NOTE — PROGRESS NOTES
"Assessment/Plan:    1. Tegmen defect of base of skull  Comments:  managed by neurosurgery and will follow with them and ENT    2. CSF otorrhea    3. Benign hypertension with CKD (chronic kidney disease) stage III (HCC)  Assessment & Plan:  BP stable with current regimen  Lab Results   Component Value Date    EGFR 72 05/06/2024    EGFR 55 05/05/2024    EGFR 54 05/04/2024    CREATININE 1.03 05/06/2024    CREATININE 1.28 05/05/2024    CREATININE 1.31 (H) 05/04/2024       4. Stage 3a chronic kidney disease (HCC)  Assessment & Plan:  Managed by nephrologist  Lab Results   Component Value Date    EGFR 72 05/06/2024    EGFR 55 05/05/2024    EGFR 54 05/04/2024    CREATININE 1.03 05/06/2024    CREATININE 1.28 05/05/2024    CREATININE 1.31 (H) 05/04/2024       5. Nephrolithiasis  Assessment & Plan:  Managed by nephrologist              Patient Instructions:  Supportive care discussed and advised.  Advised to RTO for any worsening and no improvement.   Follow up for no improvement and worsening of conditions.  Patient advised and educated when to see immediate medical care.    Return if symptoms worsen or fail to improve.      Future Appointments   Date Time Provider Department Center   5/14/2024  1:30 PM Billy August MD [facility]   5/16/2024 12:00 PM Frances Wasserman MD [facility] ENT [facility] [address] [place] [address]   6/7/2024  1:00 PM Billy August MD [facility]           Subjective:      Patient ID: Swapnil Grover is a 73 y.o. male.    Chief Complaint   Patient presents with   • Transition of Care Management     Jmoyle MADHURIN         Vitals:  /78   Pulse 100   Temp (!) 97 °F (36.1 °C)   Resp 20   Ht 5' 7\" (1.702 m)   Wt 102 kg (225 lb)   BMI 35.24 kg/m²     HPI  Patient is here to follow up after recent surgery.  Stated that doing well after surgery. Stated that hearing on right side has improved. Stated that still does not hear from left ear and stated that no drainage from left ear since discharge. " Denies any headache, dizziness, fever and chills.   Will be following with neurosurgery and ENT.  Complaint with medications and tolerating it well          TCM Call     Date and time call was made  5/6/2024  1:35 PM    Hospital care reviewed  Records reviewed    Patient was hospitial[facility] at  [facility]    Date of Admission  04/29/24    Date of discharge  05/06/24    Diagnosis  Tegmen defect of base of skull    Disposition  Home    Were the patients medications reviewed and updated  Yes    Current Symptoms  None      TCM Call     Post hospital issues  None    Should patient be enrolled in anticoag monitoring?  No    Scheduled for follow up?  Yes    Patients specialists  Other (comment)    Other specialists names  ENT and Neurosurgery    Did you obtain your prescribed medications  Yes    Do you need help managing your prescriptions or medications  No    Is transportation to your appointment needed  No    I have advised the patient to call PCP with any new or worsening symptoms  Jason Brothers    Living Arrangements  Alone    Support System  Family    The type of support provided  Physical; Emotional    Do you have social support  Yes, as much as I need    Are you recieving any outpatient services  No    Are you recieving home care services  No    Interperter language line needed  No    Counseling  Patient    Counseling topics  Activities of daily living; Importance of RX compliance; patient and family education; instructions for management; Risk factor reduction    Comments  I spoke with Swapnil who states that he is doing fine. No c/o at this time. He has mild pain, which is controlled. He knows to call his surgeon if any fevers, etc Jason Brothers                The following portions of the patient's history were reviewed and updated as appropriate: allergies, current medications, past family history, past medical history, past social history, past surgical history and problem list.      Review of Systems    Constitutional:  Negative for chills, diaphoresis, fatigue, fever and unexpected weight change.   HENT:  Positive for ear pain. Negative for congestion, dental problem, drooling, ear discharge, facial swelling, hearing loss, mouth sores, nosebleeds, postnasal drip, rhinorrhea, sinus pressure, sinus pain, sneezing, sore throat, tinnitus, trouble swallowing and voice change.    Respiratory:  Negative for cough, chest tightness, shortness of breath and wheezing.    Cardiovascular: Negative.    Gastrointestinal:  Negative for abdominal pain, constipation, diarrhea, nausea and vomiting.   Musculoskeletal:  Positive for arthralgias.   Skin:  Positive for wound (surgical wound).   Neurological:  Negative for dizziness, light-headedness and headaches.         Objective:    Social History     Tobacco Use   Smoking Status Never   • Passive exposure: Never   Smokeless Tobacco Never       Allergies:   Allergies   Allergen Reactions   • Other Nasal Congestion     Seasonal allergies         Current Outpatient Medications   Medication Sig Dispense Refill   • acetaminophen (TYLENOL) 325 mg tablet Take 3 tablets (975 mg total) by mouth every 8 (eight) hours     • amLODIPine (NORVASC) 5 mg tablet Take 1 tablet (5 mg total) by mouth daily 90 tablet 1   • b complex vitamins tablet Take 1 tablet by mouth 2 pills twice daily     • BETA CAROTENE PO Take by mouth in the morning     • potassium citrate (UROCIT-K) 10 mEq Take 2 tablets (20 mEq total) by mouth 3 (three) times a day with meals 180 tablet 3   • VITAMIN D PO Take by mouth every morning With calcium     • VITAMIN E PO Take 268 mg by mouth daily       No current facility-administered medications for this visit.          Physical Exam  Vitals reviewed.   Constitutional:       Appearance: He is well-developed. He is obese.   HENT:      Head: Normocephalic.      Right Ear: Tympanic membrane, ear canal and external ear normal.      Left Ear: External ear normal.      Ears:       Comments: Dried blood noticed in left ear  Surgical wound on bilateral scalp area around ear lobes are glued and well approximated and no signs of infection noted at this time     Nose: Nose normal.      Right Sinus: No maxillary sinus tenderness or frontal sinus tenderness.      Left Sinus: No maxillary sinus tenderness or frontal sinus tenderness.      Mouth/Throat:      Mouth: No oral lesions.      Pharynx: No oropharyngeal exudate or posterior oropharyngeal erythema.   Cardiovascular:      Rate and Rhythm: Normal rate and regular rhythm.      Heart sounds: Normal heart sounds.   Pulmonary:      Effort: Pulmonary effort is normal.      Breath sounds: Normal breath sounds.   Musculoskeletal:      Cervical back: Neck supple.   Lymphadenopathy:      Cervical:      Right cervical: No superficial or posterior cervical adenopathy.     Left cervical: No superficial or posterior cervical adenopathy.   Skin:     General: Skin is warm and dry.          Neurological:      Mental Status: He is alert and oriented to person, place, and time.   Psychiatric:         Behavior: Behavior normal.         Thought Content: Thought content normal.         Judgment: Judgment normal.                     CHELSIE Nassar

## 2024-05-10 NOTE — ASSESSMENT & PLAN NOTE
BP stable with current regimen  Lab Results   Component Value Date    EGFR 72 05/06/2024    EGFR 55 05/05/2024    EGFR 54 05/04/2024    CREATININE 1.03 05/06/2024    CREATININE 1.28 05/05/2024    CREATININE 1.31 (H) 05/04/2024

## 2024-05-10 NOTE — ASSESSMENT & PLAN NOTE
Managed by nephrologist  Lab Results   Component Value Date    EGFR 72 05/06/2024    EGFR 55 05/05/2024    EGFR 54 05/04/2024    CREATININE 1.03 05/06/2024    CREATININE 1.28 05/05/2024    CREATININE 1.31 (H) 05/04/2024

## 2024-05-14 ENCOUNTER — OFFICE VISIT (OUTPATIENT)
Dept: NEUROSURGERY | Facility: CLINIC | Age: 74
End: 2024-05-14

## 2024-05-14 ENCOUNTER — APPOINTMENT (OUTPATIENT)
Dept: LAB | Facility: CLINIC | Age: 74
End: 2024-05-14

## 2024-05-14 ENCOUNTER — APPOINTMENT (OUTPATIENT)
Dept: LAB | Facility: HOSPITAL | Age: 74
End: 2024-05-14

## 2024-05-14 VITALS
WEIGHT: 225 LBS | BODY MASS INDEX: 35.31 KG/M2 | DIASTOLIC BLOOD PRESSURE: 93 MMHG | OXYGEN SATURATION: 98 % | SYSTOLIC BLOOD PRESSURE: 156 MMHG | HEIGHT: 67 IN | HEART RATE: 96 BPM | TEMPERATURE: 98.9 F

## 2024-05-14 DIAGNOSIS — G96.01 CSF OTORRHEA: Primary | ICD-10-CM

## 2024-05-14 DIAGNOSIS — Q16.5 TEGMEN DEFECT OF BASE OF SKULL: ICD-10-CM

## 2024-05-14 PROCEDURE — 99024 POSTOP FOLLOW-UP VISIT: CPT | Performed by: NEUROLOGICAL SURGERY

## 2024-05-14 NOTE — PROGRESS NOTES
"Neurosurgery Office Note  Swapnil Grover 73 y.o. male MRN: [id number]      Assessment/Plan        Problem List Items Addressed This Visit       Tegmen defect of base of skull     Other Visit Diagnoses       CSF otorrhea    -  Primary              Discussion:    Patient is a 73-year-old male with h/o left craniotomy for SDH evacuation in 1992 in [place] (no reported complications) who p/w several months of progressive worsening bilateral hearing loss, fullness, and CSF leak (\"postnasal drip\") s/p bilateral endoscopic middle fossa craniotomies for repair of tegmen defect and CSF leak with temporalis muscle flap and intraoperative lumbar drain placement on 4/29/24, c/b recurrent CSF otorrhea after LD removal requiring replacement of LD in OR on 5/3/24, which stopped functioning then was removed on 5/4, discharged home on 5/6.    Today, he reports no new neurologic symptoms or deficits including CSF leak in bilateral ears. He brought tissue containing scattered bloody-only droplets that he collected for the past several days, no CSF. For brief periods of time he has \"hearing back to normal in the left ear\" intermittently and \"I can hear more high frequencies in the right ear\".     Incisions are healing well without erythema, dehiscence, drainage, or underlying fluid collection.     Prior LD sites CDI (\"just itches\"). No new radicular symptoms in legs.     At this time, I have no acute neurosurgical or postoperative concerns. Continue close postoperative follow-up, next for 6-week POV as scheduled first week of June (no imaging unless clinically indicated). At that time, I will consider clearance for right knee surgery.    All questions and concerns were addressed during this visit.          CHIEF COMPLAINT    Chief Complaint   Patient presents with    Post-op     2 Wk P/O   S/p Lumbar subarachnoid drain placement on 5/3/24          HISTORY    History of Present Illness     73 y.o. year old male     HPI    See " Discussion    REVIEW OF SYSTEMS    Review of Systems   Constitutional: Negative.    HENT: Negative.     Eyes: Negative.    Respiratory: Negative.     Cardiovascular: Negative.    Gastrointestinal: Negative.    Endocrine: Negative.    Genitourinary: Negative.    Musculoskeletal: Negative.    Skin: Negative.    Allergic/Immunologic: Negative.    Neurological: Negative.         Cyrus Amado Swapnil   2 Wk P/O   S/p Lumbar subarachnoid drain placement on 5/3/24     Patient is doing well and has no complaints     Hematological: Negative.    Psychiatric/Behavioral: Negative.     All other systems reviewed and are negative.        Meds/Allergies     Current Outpatient Medications   Medication Sig Dispense Refill    acetaminophen (TYLENOL) 325 mg tablet Take 3 tablets (975 mg total) by mouth every 8 (eight) hours      amLODIPine (NORVASC) 5 mg tablet Take 1 tablet (5 mg total) by mouth daily 90 tablet 1    b complex vitamins tablet Take 1 tablet by mouth 2 pills twice daily      BETA CAROTENE PO Take by mouth in the morning      potassium citrate (UROCIT-K) 10 mEq Take 2 tablets (20 mEq total) by mouth 3 (three) times a day with meals 180 tablet 3    VITAMIN D PO Take by mouth every morning With calcium      VITAMIN E PO Take 268 mg by mouth daily       No current facility-administered medications for this visit.       Allergies   Allergen Reactions    Other Nasal Congestion     Seasonal allergies       PAST HISTORY    Past Medical History:   Diagnosis Date    Arthritis     BPH (benign prostatic hyperplasia)     Breathing difficulty 12/01/2022    CPAP given per pt-s/p surgery    Cancer (HCC) 2015    basal cell of the skull     Chronic kidney disease     Colon polyp     Heart failure (HCC)     12/2/22-pt had ECHO-per pt R. sided block    History of subdural hematoma     Hypertension     Kidney stone     right stone    Renal disorder     Sleep apnea     mild- no CPAP-had nasal septoplasty       Past Surgical History:   Procedure  Laterality Date    BASAL CELL CARCINOMA EXCISION  2015    skull    BRAIN SURGERY      in the 1990's-subdural hematoma    CATARACT EXTRACTION Left     COLONOSCOPY      x2    CYSTOSCOPY W/ LASER LITHOTRIPSY Right 11/27/2020    Procedure: CYSTOSCOPY, FLEXIBLE URETEROSCOPY, LASER, AND STENT EXCHANGE,STONE BASKET, RIGHT RETROGRADE;  Surgeon: Sabino Garcia MD;  Location: [facility];  Service: Urology    FL RETROGRADE PYELOGRAM  10/18/2020    FL RETROGRADE PYELOGRAM  11/27/2020    FL RETROGRADE PYELOGRAM  12/01/2022    FL RETROGRADE PYELOGRAM  12/22/2022    JOINT REPLACEMENT  1/23/2024    LITHOTRIPSY      LUMBAR EPIDURAL INJECTION Bilateral 5/3/2024    Procedure: Lumbar subarachnoid drain placement;  Surgeon: Billy August MD;  Location:  MAIN OR;  Service: Neurosurgery    NC ARTHRP KNE CONDYLE&PLATU MEDIAL&LAT COMPARTMENTS Left 01/23/2024    Procedure: ARTHROPLASTY KNEE TOTAL W ROBOT - overnight, cemented;  Surgeon: Bassem Moscoso DO;  Location: [facility];  Service: Orthopedics    NC CYSTO BLADDER W/URETERAL CATHETERIZATION Right 11/11/2020    Procedure: ESWL RIGHT;  Surgeon: Sabino Garcia MD;  Location: [facility];  Service: Urology    NC CYSTO BLADDER W/URETERAL CATHETERIZATION Right 12/01/2022    Procedure: CYSTOSCOPY RETROGRADE PYELOGRAM WITH INSERTION STENT URETERAL;  Surgeon: Sabino Garcia MD;  Location: [facility];  Service: Urology    NC CYSTO/URETERO W/LITHOTRIPSY &INDWELL STENT INSRT Right 10/18/2020    Procedure: CYSTOSCOPY URETEROSCOPY WITH BASKET EXTRACTION, RETROGRADE PYELOGRAM AND INSERTION STENT URETERAL;  Surgeon: Kris Ac MD;  Location: [facility];  Service: Urology    NC CYSTO/URETERO W/LITHOTRIPSY &INDWELL STENT INSRT Right 12/22/2022    Procedure: CYSTOSCOPY URETEROSCOPY WITH LITHOTRIPSY HOLMIUM LASER, RETROGRADE PYELOGRAM AND INSERTION STENT URETERAL;  Surgeon: Sabino Garcia MD;  Location: [facility];  Service: Urology    RETROMASTOID CRANIOTOMY Bilateral 4/29/2024    Procedure:  "CRANIOTOMY MIDDLE FOSSA,RETROMASTOID APPROACH FOR ENT;  Surgeon: Frances Wasserman MD;  Location: [facility];  Service: ENT    SEPTOPLASTY      in the 1990's-trimmed uvula    TEGMAN DEFECT REPAIR CSF LEAK Bilateral 4/29/2024    Procedure: Bilateral endoscopic middle fossa craniotomies for repair of tegmen defect and CSF leak with temporalis fascia muscle flap, with neuromonitoring (CN 7/8) and intraoperative lumbar drain placement;  Surgeon: Billy August MD;  Location: [facility];  Service: Neurosurgery    TONSILLECTOMY  1955    age 6       Social History     Tobacco Use    Smoking status: Never     Passive exposure: Never    Smokeless tobacco: Never   Vaping Use    Vaping status: Never Used   Substance Use Topics    Alcohol use: Yes     Comment: 1 drink a week    Drug use: Never       Family History   Problem Relation Age of Onset    Hypertension Mother     Kidney disease Mother         renal failure-dialysis    Hypertension Father     Stroke Father     Diabetes Sister     Kidney disease Sister         self dialysis at home         The following portions of the patient's history were reviewed in this encounter and updated as appropriate: Past medical, surgical, family, and social history, as well as medications, allergies, and review of systems.        EXAM    Vitals:Blood pressure 156/93, pulse 96, temperature 98.9 °F (37.2 °C), temperature source Temporal, height 5' 7\" (1.702 m), weight 102 kg (225 lb), SpO2 98%.,There is no height or weight on file to calculate BMI.     Physical Exam  Vitals and nursing note reviewed.   Constitutional:       Appearance: Normal appearance. He is normal weight.   HENT:      Head: Normocephalic and atraumatic.   Eyes:      Extraocular Movements: Extraocular movements intact and EOM normal.      Pupils: Pupils are equal, round, and reactive to light.   Cardiovascular:      Rate and Rhythm: Normal rate and regular rhythm.      Pulses: Normal pulses.      Heart sounds: Normal heart " sounds.   Pulmonary:      Effort: Pulmonary effort is normal.      Breath sounds: Normal breath sounds.   Abdominal:      General: Abdomen is flat.      Palpations: Abdomen is soft.   Musculoskeletal:         General: Normal range of motion.      Cervical back: Normal range of motion.   Neurological:      General: No focal deficit present.      Mental Status: He is alert and oriented to person, place, and time. Mental status is at baseline.      GCS: GCS eye subscore is 4. GCS verbal subscore is 5. GCS motor subscore is 6.      Sensory: Sensation is intact.      Motor: Motor strength is normal.Motor function is intact.      Coordination: Coordination is intact.      Gait: Gait is intact.      Deep Tendon Reflexes: Reflexes are normal and symmetric.   Psychiatric:         Mood and Affect: Mood normal.         Speech: Speech normal.         Behavior: Behavior normal.         Neurologic Exam     Mental Status   Oriented to person, place, and time.   Attention: normal.   Speech: speech is normal   Level of consciousness: alert    Cranial Nerves     CN II   Visual fields full to confrontation.   Visual acuity: normal  Right visual field deficit: none  Left visual field deficit: none     CN III, IV, VI   Pupils are equal, round, and reactive to light.  Extraocular motions are normal.   CN III: no CN III palsy  CN VI: no CN VI palsy  Nystagmus: none   Diplopia: none  Ophthalmoparesis: none  Upgaze: normal  Downgaze: normal  Conjugate gaze: present    CN V   Facial sensation intact.   Right facial sensation deficit: none  Left facial sensation deficit: none    CN VII   Facial expression full, symmetric.   Right facial weakness: none  Left facial weakness: none    CN VIII   CN VIII normal.     CN IX, X   CN IX normal.   CN X normal.   Palate: symmetric    CN XI   CN XI normal.   Right sternocleidomastoid strength: normal  Left sternocleidomastoid strength: normal  Right trapezius strength: normal  Left trapezius strength:  normal    CN XII   CN XII normal.   Tongue deviation: none    Motor Exam   Muscle bulk: normal  Overall muscle tone: normal  Right arm pronator drift: absent  Left arm pronator drift: absent    Strength   Strength 5/5 throughout.     Sensory Exam   Light touch normal.     Gait, Coordination, and Reflexes     Gait  Gait: normal    Reflexes   Reflexes 2+ except as noted.   No CSF leak in bilateral ears  Incisions healing well       MEDICAL DECISION MAKING    Imaging Studies:     XR knee 3 vw left non injury    Result Date: 4/28/2024  Narrative: XR KNEE 3 VW LEFT NON INJURY INDICATION: Z96.652: Presence of left artificial knee joint Z47.1: Aftercare following joint replacement surgery Z96.652: Presence of left artificial knee joint. COMPARISON: Left knee x-ray dated February 7, 2024. FINDINGS: No acute fracture or dislocation. No joint effusion. Total knee arthroplasty without evidence of a hardware complication or failure. No lytic or blastic osseous lesion. Unremarkable soft tissues.     Impression: Total knee arthroplasty without evidence of a hardware complication or failure. Workstation performed: [id number]       I have personally reviewed pertinent reports.   and I have personally reviewed pertinent films in PACS      Billy August M.D.  Neurosurgeon

## 2024-05-16 ENCOUNTER — APPOINTMENT (OUTPATIENT)
Dept: LAB | Facility: CLINIC | Age: 74
End: 2024-05-16
Payer: MEDICARE

## 2024-05-16 DIAGNOSIS — N20.1 URETERAL CALCULUS: ICD-10-CM

## 2024-05-16 DIAGNOSIS — N18.30 BENIGN HYPERTENSION WITH CKD (CHRONIC KIDNEY DISEASE) STAGE III (HCC): ICD-10-CM

## 2024-05-16 DIAGNOSIS — I12.9 BENIGN HYPERTENSION WITH CKD (CHRONIC KIDNEY DISEASE) STAGE III (HCC): ICD-10-CM

## 2024-05-16 LAB
CALCIUM 24H UR-MCNC: 248.2 MG/24 HRS (ref 100–300)
PERIOD: 24 HOURS
PH UR STRIP.AUTO: 6 [PH]
SODIUM 24H UR-SRATE: 153 MMOL/24 HRS (ref 40–220)
SPECIMEN VOL UR: 1700 ML
URATE 24H UR-MCNC: 681.7 MG/24 HRS (ref 250–800)

## 2024-05-16 PROCEDURE — 36415 COLL VENOUS BLD VENIPUNCTURE: CPT

## 2024-05-16 PROCEDURE — 82340 ASSAY OF CALCIUM IN URINE: CPT

## 2024-05-16 PROCEDURE — 82507 ASSAY OF CITRATE: CPT

## 2024-05-16 PROCEDURE — 84300 ASSAY OF URINE SODIUM: CPT

## 2024-05-16 PROCEDURE — 83945 ASSAY OF OXALATE: CPT

## 2024-05-16 PROCEDURE — 84560 ASSAY OF URINE/URIC ACID: CPT

## 2024-05-16 PROCEDURE — 83986 ASSAY PH BODY FLUID NOS: CPT

## 2024-05-21 LAB
CITRATE 24H UR-MCNC: 216 MG/L
CITRATE 24H UR-MRATE: 367 MG/24 HR (ref 320–1240)

## 2024-05-22 LAB — MISCELLANEOUS LAB TEST RESULT: NORMAL

## 2024-06-04 DIAGNOSIS — N20.0 NEPHROLITHIASIS: ICD-10-CM

## 2024-06-04 RX ORDER — POTASSIUM CITRATE 10 MEQ/1
20 TABLET, EXTENDED RELEASE ORAL
Qty: 180 TABLET | Refills: 1 | Status: SHIPPED | OUTPATIENT
Start: 2024-06-04

## 2024-06-07 ENCOUNTER — OFFICE VISIT (OUTPATIENT)
Dept: NEUROSURGERY | Facility: CLINIC | Age: 74
End: 2024-06-07

## 2024-06-07 VITALS
HEIGHT: 67 IN | HEART RATE: 90 BPM | SYSTOLIC BLOOD PRESSURE: 150 MMHG | BODY MASS INDEX: 35.31 KG/M2 | TEMPERATURE: 99 F | WEIGHT: 225 LBS | DIASTOLIC BLOOD PRESSURE: 82 MMHG

## 2024-06-07 DIAGNOSIS — G96.01 CSF OTORRHEA: Primary | ICD-10-CM

## 2024-06-07 PROCEDURE — 99024 POSTOP FOLLOW-UP VISIT: CPT | Performed by: NEUROLOGICAL SURGERY

## 2024-06-07 NOTE — PROGRESS NOTES
"Neurosurgery Office Note  Swapnil Grover 73 y.o. male MRN: [id number]      Assessment & Plan        Problem List Items Addressed This Visit       Visit Diagnoses       CSF otorrhea    -  Primary              Discussion:    Patient was previously evaluated on 5/14/2024.    He is a 73-year-old male with h/o left craniotomy for SDH evacuation in 1992 in [place] (no reported complications) who p/w several months of progressive worsening bilateral hearing loss, fullness, and CSF leak (\"postnasal drip\") s/p bilateral endoscopic middle fossa craniotomies for repair of tegmen defect and CSF leak with temporalis muscle flap and intraoperative lumbar drain placement on 4/29/24, c/b recurrent CSF otorrhea after LD removal requiring replacement of LD in OR on 5/3/24, which stopped functioning then was removed on 5/4, discharged home on 5/6.    Evaluated recently by Dr. Wasserman (ENT) on 5/16/2024.    Today, he reports no new neurologic symptoms or deficits including CSF leak in bilateral ears. L hearing has improved, R hearing unchanged. He is doing well and has no complaints (other than chronic right knee pain, waiting for surgery).     Incisions are healing well without erythema, edema, dehiscence, drainage, or underlying fluid collection.      Prior LD sites CDI. No new radicular symptoms in legs.     At this time, I have no acute neurosurgical or postoperative concerns. Continue close postoperative follow-up, next for 3-month POV (no imaging unless clinically indicated).     He remains neurologically stable without new symptoms or deficits. I provide clearance for right knee surgery. I have directly messaged Dr. Moscoso.     All questions and concerns were addressed during this visit.          CHIEF COMPLAINT    Chief Complaint   Patient presents with    Post-op     4 Wk P/O   S/p Lumbar subarachnoid drain placement on 5/3/24        HISTORY    History of Present Illness     73 y.o. year old male     HPI    See " Discussion    REVIEW OF SYSTEMS    Review of Systems   Constitutional: Negative.    Eyes: Negative.    Respiratory: Negative.     Cardiovascular: Negative.    Gastrointestinal: Negative.    Endocrine: Negative.    Genitourinary: Negative.    Musculoskeletal: Negative.         4 Wk P/O   S/p Lumbar subarachnoid drain placement on 5/3/24       Hearing on left ear has improved but the right has not. Patient is doing well.   Skin: Negative.    Allergic/Immunologic: Negative.    Neurological: Negative.    Hematological: Negative.    Psychiatric/Behavioral: Negative.     All other systems reviewed and are negative.        Meds/Allergies     Current Outpatient Medications   Medication Sig Dispense Refill    acetaminophen (TYLENOL) 325 mg tablet Take 3 tablets (975 mg total) by mouth every 8 (eight) hours (Patient not taking: Reported on 5/14/2024)      amLODIPine (NORVASC) 5 mg tablet Take 1 tablet (5 mg total) by mouth daily 90 tablet 1    b complex vitamins tablet Take 1 tablet by mouth 2 pills twice daily      BETA CAROTENE PO Take by mouth in the morning      potassium citrate (UROCIT-K) 10 mEq Take 2 tablets (20 mEq total) by mouth 3 (three) times a day with meals 180 tablet 1    VITAMIN D PO Take by mouth every morning With calcium      VITAMIN E PO Take 268 mg by mouth daily       No current facility-administered medications for this visit.       Allergies   Allergen Reactions    Other Nasal Congestion     Seasonal allergies       PAST HISTORY    Past Medical History:   Diagnosis Date    Arthritis     BPH (benign prostatic hyperplasia)     Breathing difficulty 12/01/2022    CPAP given per pt-s/p surgery    Cancer (HCC) 2015    basal cell of the skull     Chronic kidney disease     Colon polyp     Heart failure (HCC)     12/2/22-pt had ECHO-per pt R. sided block    History of subdural hematoma     Hypertension     Kidney stone     right stone    Renal disorder     Sleep apnea     mild- no CPAP-had nasal septoplasty        Past Surgical History:   Procedure Laterality Date    BASAL CELL CARCINOMA EXCISION  2015    skull    BRAIN SURGERY      in the 1990's-subdural hematoma    CATARACT EXTRACTION Left     COLONOSCOPY      x2    CYSTOSCOPY W/ LASER LITHOTRIPSY Right 11/27/2020    Procedure: CYSTOSCOPY, FLEXIBLE URETEROSCOPY, LASER, AND STENT EXCHANGE,STONE BASKET, RIGHT RETROGRADE;  Surgeon: Sabino Garcia MD;  Location: [facility];  Service: Urology    FL RETROGRADE PYELOGRAM  10/18/2020    FL RETROGRADE PYELOGRAM  11/27/2020    FL RETROGRADE PYELOGRAM  12/01/2022    FL RETROGRADE PYELOGRAM  12/22/2022    JOINT REPLACEMENT  1/23/2024    LITHOTRIPSY      LUMBAR EPIDURAL INJECTION Bilateral 5/3/2024    Procedure: Lumbar subarachnoid drain placement;  Surgeon: Billy August MD;  Location:  MAIN OR;  Service: Neurosurgery    UT ARTHRP KNE CONDYLE&PLATU MEDIAL&LAT COMPARTMENTS Left 01/23/2024    Procedure: ARTHROPLASTY KNEE TOTAL W ROBOT - overnight, cemented;  Surgeon: Bassem Moscoso DO;  Location: [facility];  Service: Orthopedics    UT CYSTO BLADDER W/URETERAL CATHETERIZATION Right 11/11/2020    Procedure: ESWL RIGHT;  Surgeon: Sabino Garcia MD;  [facility]: [facility];  Service: Urology    UT CYSTO BLADDER W/URETERAL CATHETERIZATION Right 12/01/2022    Procedure: CYSTOSCOPY RETROGRADE PYELOGRAM WITH INSERTION STENT URETERAL;  Surgeon: Sabino Garcia MD;  [facility]: [facility];  Service: Urology    UT CYSTO/URETERO W/LITHOTRIPSY &INDWELL STENT INSRT Right 10/18/2020    Procedure: CYSTOSCOPY URETEROSCOPY WITH BASKET EXTRACTION, RETROGRADE PYELOGRAM AND INSERTION STENT URETERAL;  Surgeon: Kris Ac MD;  [facility];  Service: Urology    UT CYSTO/URETERO W/LITHOTRIPSY &INDWELL STENT INSRT Right 12/22/2022    Procedure: CYSTOSCOPY URETEROSCOPY WITH LITHOTRIPSY HOLMIUM LASER, RETROGRADE PYELOGRAM AND INSERTION STENT URETERAL;  Surgeon: Sabino Garcia MD;  [facility]: [facility];  Service: Urology    RETROMASTOID  "CRANIOTOMY Bilateral 4/29/2024    Procedure: CRANIOTOMY MIDDLE FOSSA,RETROMASTOID APPROACH FOR ENT;  Surgeon: Frances Wasserman MD;  [facility]: [facility];  Service: ENT    SEPTOPLASTY      in the 1990's-trimmed uvula    TEGMAN DEFECT REPAIR CSF LEAK Bilateral 4/29/2024    Procedure: Bilateral endoscopic middle fossa craniotomies for repair of tegmen defect and CSF leak with temporalis fascia muscle flap, with neuromonitoring (CN 7/8) and intraoperative lumbar drain placement;  Surgeon: Billy August MD;  [facility]: [facility];  Service: Neurosurgery    TONSILLECTOMY  1956    age 6       Social History     Tobacco Use    Smoking status: Never     Passive exposure: Never    Smokeless tobacco: Never   Vaping Use    Vaping status: Never Used   Substance Use Topics    Alcohol use: Yes     Comment: 1 drink a week    Drug use: Never       Family History   Problem Relation Age of Onset    Hypertension Mother     Kidney disease Mother         renal failure-dialysis    Hypertension Father     Stroke Father     Diabetes Sister     Kidney disease Sister         self dialysis at home         The following portions of the patient's history were reviewed in this encounter and updated as appropriate: Past medical, surgical, family, and social history, as well as medications, allergies, and review of systems.        EXAM    Vitals:Blood pressure 150/82, pulse 90, temperature 99 °F (37.2 °C), temperature source Temporal, height 5' 7\" (1.702 m), weight 102 kg (225 lb).,There is no height or weight on file to calculate BMI.     Physical Exam  Vitals and nursing note reviewed.   Constitutional:       Appearance: Normal appearance. He is normal weight.   HENT:      Head: Normocephalic and atraumatic.   Eyes:      Extraocular Movements: Extraocular movements intact and EOM normal.      Pupils: Pupils are equal, round, and reactive to light.   Cardiovascular:      Rate and Rhythm: Normal rate and regular rhythm.      Pulses: Normal pulses. "      Heart sounds: Normal heart sounds.   Pulmonary:      Effort: Pulmonary effort is normal.      Breath sounds: Normal breath sounds.   Abdominal:      General: Abdomen is flat.      Palpations: Abdomen is soft.   Musculoskeletal:         General: Normal range of motion.      Cervical back: Normal range of motion.   Neurological:      General: No focal deficit present.      Mental Status: He is alert and oriented to person, place, and time. Mental status is at baseline.      GCS: GCS eye subscore is 4. GCS verbal subscore is 5. GCS motor subscore is 6.      Sensory: Sensation is intact.      Motor: Motor strength is normal.Motor function is intact.      Coordination: Coordination is intact.      Gait: Gait is intact.      Deep Tendon Reflexes: Reflexes are normal and symmetric.   Psychiatric:         Mood and Affect: Mood normal.         Speech: Speech normal.         Behavior: Behavior normal.         Neurologic Exam     Mental Status   Oriented to person, place, and time.   Attention: normal.   Speech: speech is normal   Level of consciousness: alert    Cranial Nerves     CN II   Visual fields full to confrontation.   Visual acuity: normal  Right visual field deficit: none  Left visual field deficit: none     CN III, IV, VI   Pupils are equal, round, and reactive to light.  Extraocular motions are normal.   CN III: no CN III palsy  CN VI: no CN VI palsy  Nystagmus: none   Diplopia: none  Ophthalmoparesis: none  Upgaze: normal  Downgaze: normal  Conjugate gaze: present    CN V   Facial sensation intact.   Right facial sensation deficit: none  Left facial sensation deficit: none    CN VII   Facial expression full, symmetric.   Right facial weakness: none  Left facial weakness: none    CN IX, X   CN IX normal.   CN X normal.   Palate: symmetric    CN XI   CN XI normal.   Right sternocleidomastoid strength: normal  Left sternocleidomastoid strength: normal  Right trapezius strength: normal  Left trapezius strength:  normal    CN XII   CN XII normal.   Tongue deviation: none  L hearing has improved, R hearing unchanged     Motor Exam   Muscle bulk: normal  Overall muscle tone: normal  Right arm pronator drift: absent  Left arm pronator drift: absent    Strength   Strength 5/5 throughout.     Sensory Exam   Light touch normal.     Gait, Coordination, and Reflexes     Gait  Gait: normal    Reflexes   Reflexes 2+ except as noted.   Incisions healed   No CSF otorrhea bilaterally  No nuchal rigidity      MEDICAL DECISION MAKING    Imaging Studies:     No results found.    I have personally reviewed pertinent reports.   and I have personally reviewed pertinent films in PACS      Billy August M.D.  Neurosurgeon

## 2024-06-13 ENCOUNTER — OFFICE VISIT (OUTPATIENT)
Dept: OBGYN CLINIC | Facility: CLINIC | Age: 74
End: 2024-06-13
Payer: MEDICARE

## 2024-06-13 ENCOUNTER — TELEPHONE (OUTPATIENT)
Age: 74
End: 2024-06-13

## 2024-06-13 ENCOUNTER — PREP FOR PROCEDURE (OUTPATIENT)
Dept: OBGYN CLINIC | Facility: CLINIC | Age: 74
End: 2024-06-13

## 2024-06-13 VITALS
BODY MASS INDEX: 36.19 KG/M2 | SYSTOLIC BLOOD PRESSURE: 144 MMHG | WEIGHT: 230.6 LBS | DIASTOLIC BLOOD PRESSURE: 80 MMHG | HEIGHT: 67 IN | HEART RATE: 105 BPM

## 2024-06-13 DIAGNOSIS — G89.29 CHRONIC PAIN OF RIGHT KNEE: ICD-10-CM

## 2024-06-13 DIAGNOSIS — Z87.448 HISTORY OF CHRONIC KIDNEY DISEASE: ICD-10-CM

## 2024-06-13 DIAGNOSIS — Q16.5 TEGMEN DEFECT OF BASE OF SKULL: ICD-10-CM

## 2024-06-13 DIAGNOSIS — M25.561 CHRONIC PAIN OF RIGHT KNEE: ICD-10-CM

## 2024-06-13 DIAGNOSIS — Z86.79 HISTORY OF HYPERTENSION: ICD-10-CM

## 2024-06-13 DIAGNOSIS — M17.11 PRIMARY OSTEOARTHRITIS OF RIGHT KNEE: Primary | ICD-10-CM

## 2024-06-13 DIAGNOSIS — Z01.818 PRE-OP EXAM: ICD-10-CM

## 2024-06-13 PROCEDURE — 99215 OFFICE O/P EST HI 40 MIN: CPT | Performed by: ORTHOPAEDIC SURGERY

## 2024-06-13 NOTE — TELEPHONE ENCOUNTER
Entered patients chart to verify PCP for PreOp appt to be from ambulatory referral by ortho.  Surgery date not yet made.

## 2024-06-13 NOTE — PROGRESS NOTES
Assessment/Plan:  1. Primary osteoarthritis of right knee  Case request operating room: ARTHROPLASTY KNEE TOTAL W ROBOT - RIGHT - CEMENTED - OVERNIGHT    Comprehensive metabolic panel    Hemoglobin A1C W/EAG Estimation    CBC and differential    Protime-INR    APTT    MRSA culture    Ambulatory referral to Cardiology    Ambulatory referral to Family Practice    Ambulatory referral to Physical Therapy    EKG 12 lead    Case request operating room: ARTHROPLASTY KNEE TOTAL W ROBOT - RIGHT - CEMENTED - OVERNIGHT      2. Chronic pain of right knee  Case request operating room: ARTHROPLASTY KNEE TOTAL W ROBOT - RIGHT - CEMENTED - OVERNIGHT    Comprehensive metabolic panel    Hemoglobin A1C W/EAG Estimation    CBC and differential    Protime-INR    APTT    MRSA culture    Ambulatory referral to Cardiology    Ambulatory referral to Family Practice    Ambulatory referral to Physical Therapy    EKG 12 lead    Case request operating room: ARTHROPLASTY KNEE TOTAL W ROBOT - RIGHT - CEMENTED - OVERNIGHT      3. History of hypertension  Ambulatory referral to Cardiology      4. History of chronic kidney disease        5. Pre-op exam  Ambulatory referral to Cardiology    Ambulatory referral to Family Practice    Ambulatory referral to Physical Therapy      6. Tegmen defect of base of skull          Scribe Attestation      I,:  Woody Harp am acting as a scribe while in the presence of the attending physician.:       I,:  Bassem Moscoso, DO personally performed the services described in this documentation    as scribed in my presence.:           Swapnil is a pleasant 73-year-old gentleman who returns today for follow-up evaluation for his right knee.  He remains symptomatic of his end-stage underlying right knee osteoarthritis despite activity modification, maintenance of appropriate weight, over-the-counter oral and topical medications and physical therapy.  Based on the progression of his underlying disease and his persistent  pain, he is a good candidate for robotic assisted right total knee arthroplasty.  He denies a history of DVT/PE, MRSA infection, malignancy, GI bleeding or peptic ulcer.  He is not a smoker and is not using turmeric currently.  He understands he needs clearance from his primary care physician and cardiologist.  His nephrologist provided a recent surgical clearance as did his neurosurgeon after his recent procedure.  He will meet with my surgery scheduler today to pick a date for his procedure and make preoperative arrangements.  He will participate in outpatient physical therapy postoperatively.  Due to his multiple medical issues and recent neurosurgical and ENT interventions he will likely need a prolonged stay in the hospital to ensure safety on appropriate discharge and close medical observation.  We anticipate that he will likely need a 2 midnight stay.  We will utilize cemented implants once again.  All of his questions and concerns were addressed today.  We will see him back at time of surgery with a 2-week postoperative follow-up.    Subjective: Follow-up evaluation for right knee    Patient ID: Swapnil Grover is a 73 y.o. male who returns today for follow-up evaluation for his right knee.  He recently underwent a neurosurgical and ENT procedure and was given clearance to pursue right total knee arthroplasty.  He continues to experience diffuse aching pain about his right knee that can be moderate to severe depending on his level of activity.  He is very pleased with the outcome of his left knee surgery and would like to pursue right total knee arthroplasty.  He denies any new injury or trauma.    Review of Systems   Constitutional:  Positive for activity change. Negative for chills, fever and unexpected weight change.   HENT:  Negative for hearing loss, nosebleeds and sore throat.    Eyes:  Negative for pain, redness and visual disturbance.   Respiratory:  Negative for cough, shortness of breath and  wheezing.    Cardiovascular:  Negative for chest pain, palpitations and leg swelling.   Gastrointestinal:  Negative for abdominal pain, nausea and vomiting.   Endocrine: Negative for polyphagia and polyuria.   Genitourinary:  Negative for dysuria and hematuria.   Musculoskeletal:  Positive for arthralgias and myalgias. Negative for joint swelling.        See HPI   Skin:  Negative for rash and wound.   Neurological:  Negative for dizziness, numbness and headaches.   Psychiatric/Behavioral:  Negative for decreased concentration and suicidal ideas. The patient is not nervous/anxious.          Past Medical History:   Diagnosis Date    Arthritis     BPH (benign prostatic hyperplasia)     Breathing difficulty 12/01/2022    CPAP given per pt-s/p surgery    Cancer (HCC) 2015    basal cell of the skull     Chronic kidney disease     Colon polyp     Heart failure (HCC)     12/2/22-pt had ECHO-per pt R. sided block    History of subdural hematoma     Hypertension     Kidney stone     right stone    Renal disorder     Sleep apnea     mild- no CPAP-had nasal septoplasty       Past Surgical History:   Procedure Laterality Date    BASAL CELL CARCINOMA EXCISION  2015    skull    BRAIN SURGERY      in the 1990's-subdural hematoma    CATARACT EXTRACTION Left     COLONOSCOPY      x2    CYSTOSCOPY W/ LASER LITHOTRIPSY Right 11/27/2020    Procedure: CYSTOSCOPY, FLEXIBLE URETEROSCOPY, LASER, AND STENT EXCHANGE,STONE BASKET, RIGHT RETROGRADE;  Surgeon: Sabino Garcia MD;  [facility];  Service: Urology    FL RETROGRADE PYELOGRAM  10/18/2020    FL RETROGRADE PYELOGRAM  11/27/2020    FL RETROGRADE PYELOGRAM  12/01/2022    FL RETROGRADE PYELOGRAM  12/22/2022    JOINT REPLACEMENT  1/23/2024    LITHOTRIPSY      LUMBAR EPIDURAL INJECTION Bilateral 5/3/2024    Procedure: Lumbar subarachnoid drain placement;  Surgeon: Billy August MD;  Location:  [facility] OR;  Service: Neurosurgery    NE ARTHRP KNE CONDYLE&PLATU MEDIAL&LAT COMPARTMENTS  Left 01/23/2024    Procedure: ARTHROPLASTY KNEE TOTAL W ROBOT - overnight, cemented;  Surgeon: Bassem Moscoso DO;  [facility]: [facility];  Service: Orthopedics    IL CYSTO BLADDER W/URETERAL CATHETERIZATION Right 11/11/2020    Procedure: ESWL RIGHT;  Surgeon: Sabino Garcia MD;  [facility];  Service: Urology    IL CYSTO BLADDER W/URETERAL CATHETERIZATION Right 12/01/2022    Procedure: CYSTOSCOPY RETROGRADE PYELOGRAM WITH INSERTION STENT URETERAL;  Surgeon: Sabino Garcia MD;  [facility];  Service: Urology    IL CYSTO/URETERO W/LITHOTRIPSY &INDWELL STENT INSRT Right 10/18/2020    Procedure: CYSTOSCOPY URETEROSCOPY WITH BASKET EXTRACTION, RETROGRADE PYELOGRAM AND INSERTION STENT URETERAL;  Surgeon: Kris Ac MD;  [facility];  Service: Urology    IL CYSTO/URETERO W/LITHOTRIPSY &INDWELL STENT INSRT Right 12/22/2022    Procedure: CYSTOSCOPY URETEROSCOPY WITH LITHOTRIPSY HOLMIUM LASER, RETROGRADE PYELOGRAM AND INSERTION STENT URETERAL;  Surgeon: Sabino Garcia MD;  [facility];  Service: Urology    RETROMASTOID CRANIOTOMY Bilateral 4/29/2024    Procedure: CRANIOTOMY MIDDLE FOSSA,RETROMASTOID APPROACH FOR ENT;  Surgeon: Frances Wasserman MD;  Location:  [facility] OR;  Service: ENT    SEPTOPLASTY      in the 1990's-trimmed uvula    TEGMAN DEFECT REPAIR CSF LEAK Bilateral 4/29/2024    Procedure: Bilateral endoscopic middle fossa craniotomies for repair of tegmen defect and CSF leak with temporalis fascia muscle flap, with neuromonitoring (CN 7/8) and intraoperative lumbar drain placement;  Surgeon: Billy August MD;  Location:  [facility] OR;  Service: Neurosurgery    TONSILLECTOMY  1956    age 6       Family History   Problem Relation Age of Onset    Hypertension Mother     Kidney disease Mother         renal failure-dialysis    Hypertension Father     Stroke Father     Diabetes Sister     Kidney disease Sister         self dialysis at home       Social History     Occupational  History    Not on file   Tobacco Use    Smoking status: Never     Passive exposure: Never    Smokeless tobacco: Never   Vaping Use    Vaping status: Never Used   Substance and Sexual Activity    Alcohol use: Yes     Comment: 1 drink a week    Drug use: Never    Sexual activity: Not Currently         Current Outpatient Medications:     amLODIPine (NORVASC) 5 mg tablet, Take 1 tablet (5 mg total) by mouth daily, Disp: 90 tablet, Rfl: 1    b complex vitamins tablet, Take 1 tablet by mouth 2 pills twice daily, Disp: , Rfl:     BETA CAROTENE PO, Take by mouth in the morning, Disp: , Rfl:     potassium citrate (UROCIT-K) 10 mEq, Take 2 tablets (20 mEq total) by mouth 3 (three) times a day with meals, Disp: 180 tablet, Rfl: 1    VITAMIN D PO, Take by mouth every morning With calcium, Disp: , Rfl:     VITAMIN E PO, Take 268 mg by mouth daily, Disp: , Rfl:     acetaminophen (TYLENOL) 325 mg tablet, Take 3 tablets (975 mg total) by mouth every 8 (eight) hours (Patient not taking: Reported on 5/14/2024), Disp: , Rfl:     Allergies   Allergen Reactions    Other Nasal Congestion     Seasonal allergies       Objective:  Vitals:    06/13/24 1357   BP: 144/80   Pulse: 105       Body mass index is 36.12 kg/m².    Right Knee Exam     Tenderness   The patient is experiencing tenderness in the medial joint line and patella.    Range of Motion   Extension:  0   Flexion:  120     Tests   Varus: negative Valgus: negative  Drawer:  Anterior - negative    Posterior - negative    Other   Erythema: absent  Scars: absent  Sensation: normal  Pulse: present  Swelling: none  Effusion: no effusion present    Comments:  5-7 degree varus deformity passively correctable  Stable at 0, 30 and 90 degrees  Neurovascularly in tact distally  No warmth or erythema  Parapatellar crepitance noted  Patellofemoral grind: positive          Observations     Right Knee   Negative for effusion.       Physical Exam  Vitals and nursing note reviewed.    Constitutional:       Appearance: Normal appearance. He is well-developed.   HENT:      Head: Normocephalic and atraumatic.      Right Ear: External ear normal.      Left Ear: External ear normal.      Nose: Nose normal.   Eyes:      General: No scleral icterus.     Extraocular Movements: Extraocular movements intact.      Conjunctiva/sclera: Conjunctivae normal.   Cardiovascular:      Rate and Rhythm: Normal rate.   Pulmonary:      Effort: Pulmonary effort is normal. No respiratory distress.   Musculoskeletal:      Cervical back: Normal range of motion and neck supple.      Right knee: No effusion.      Comments: See Ortho exam   Skin:     General: Skin is warm and dry.   Neurological:      General: No focal deficit present.      Mental Status: He is alert and oriented to person, place, and time.   Psychiatric:         Behavior: Behavior normal.         I have personally reviewed pertinent films in PACS.    X-ray of the left knee with magnification marker obtained on 8/17/2023 reviewed demonstrating end-stage degenerative change in a varus pattern with medial and patellofemoral bone-on-bone contact.  There is tricompartmental sclerosis and significant osteophytosis.  There is no acute fracture, dislocation, lytic or blastic lesion.    The patient was counseled in detail regarding the diagnosis, the treatment options available, the prognosis of each treatment option, the potential risks and complications.  These are, but are not limited to; deep vein thrombosis, pulmonary embolism, neurologic and vascular injury, infection, instability, leg length discrepancy, dislocation, hematoma, reflex sympathetic dystrophy, loss of range of motion, ankylosis of the knee, fracture, screw or prosthetic perforation, chronic pain, acute pain, chronic leg pain and edema, loosening, death, heart attack, and stroke.  The patient's questions were answered in detail.  The patient demonstrates understanding of these risks and wishes to  proceed with surgery.    This document was created using speech voice recognition software.   Grammatical errors, random word insertions, pronoun errors, and incomplete sentences are an occasional consequence of this system due to software limitations, ambient noise, and hardware issues.   Any formal questions or concerns about content, text, or information contained within the body of this dictation should be directly addressed to the provider for clarification.

## 2024-06-14 ENCOUNTER — OFFICE VISIT (OUTPATIENT)
Dept: CARDIOLOGY CLINIC | Facility: CLINIC | Age: 74
End: 2024-06-14
Payer: MEDICARE

## 2024-06-14 ENCOUNTER — TELEPHONE (OUTPATIENT)
Dept: OBGYN CLINIC | Facility: CLINIC | Age: 74
End: 2024-06-14

## 2024-06-14 VITALS
HEART RATE: 96 BPM | DIASTOLIC BLOOD PRESSURE: 70 MMHG | SYSTOLIC BLOOD PRESSURE: 120 MMHG | OXYGEN SATURATION: 96 % | BODY MASS INDEX: 36.1 KG/M2 | HEIGHT: 67 IN | WEIGHT: 230 LBS

## 2024-06-14 DIAGNOSIS — M17.11 PRIMARY OSTEOARTHRITIS OF RIGHT KNEE: ICD-10-CM

## 2024-06-14 DIAGNOSIS — E78.2 MIXED HYPERLIPIDEMIA: ICD-10-CM

## 2024-06-14 DIAGNOSIS — Z01.818 PRE-OP EXAM: Primary | ICD-10-CM

## 2024-06-14 DIAGNOSIS — G89.29 CHRONIC PAIN OF RIGHT KNEE: ICD-10-CM

## 2024-06-14 DIAGNOSIS — M25.561 CHRONIC PAIN OF RIGHT KNEE: ICD-10-CM

## 2024-06-14 DIAGNOSIS — I10 PRIMARY HYPERTENSION: ICD-10-CM

## 2024-06-14 DIAGNOSIS — Z86.79 HISTORY OF HYPERTENSION: ICD-10-CM

## 2024-06-14 PROCEDURE — 93000 ELECTROCARDIOGRAM COMPLETE: CPT | Performed by: INTERNAL MEDICINE

## 2024-06-14 PROCEDURE — 99213 OFFICE O/P EST LOW 20 MIN: CPT | Performed by: INTERNAL MEDICINE

## 2024-06-14 NOTE — PROGRESS NOTES
" Subjective:     Swapnil Grover is a 73 y.o. male  who presents to the office today for a preoperative consultation at the request of surgeon Dr. Moscoso who plans on performing right knee arthroplasty on July 3, 2024.    Planned anesthesia is spinal and IV sedation. The patient has no known anesthesia issues. Most recent surgery was left knee arthroplasty. There were no complications with procedure.    Activity is limited due to knee pain.  He is unable to ambulate upstairs.  He is also unable to walk 4 blocks uninterrupted.  He does not have any chest pain or shortness of breath with ambulation.  He denies any previous cardiac history.  He had an echocardiogram in December 2022 after lithotripsy procedure which was grossly normal.    The following portions of the patient's history were reviewed and updated as appropriate: allergies, current medications, past family history, past medical history, past social history, past surgical history, and problem list.    Review of Systems  Review of Systems   Respiratory:  Negative for shortness of breath.    Cardiovascular:  Negative for chest pain, palpitations and leg swelling.   Musculoskeletal:  Positive for arthralgias.   All other systems reviewed and are negative.         Objective:      Physical Exam  /70 (BP Location: Right arm, Patient Position: Sitting, Cuff Size: Large)   Pulse 96   Ht 5' 7\" (1.702 m)   Wt 104 kg (230 lb)   SpO2 96%   BMI 36.02 kg/m²    Physical Exam   Constitutional: He appears healthy. No distress.   Eyes: Pupils are equal, round, and reactive to light. Conjunctivae are normal.   Neck: No JVD present.   Cardiovascular: Normal rate, regular rhythm and normal heart sounds. Exam reveals no gallop and no friction rub.   No murmur heard.  Pulmonary/Chest: Effort normal and breath sounds normal. He has no wheezes. He has no rales.   Musculoskeletal:         General: No tenderness, deformity or edema.      Cervical back: Normal range " of motion and neck supple.   Neurological: He is alert and oriented to person, place, and time.   Skin: Skin is warm and dry.        Cardiographics  ECG: NSR with RBBB  Echocardiogram: normal and reviewed by myself - December 2022      Lab Review   Lab Results   Component Value Date    K 4.1 05/06/2024     05/06/2024    CO2 26 05/06/2024    BUN 19 05/06/2024    CREATININE 1.03 05/06/2024    CALCIUM 8.9 05/06/2024     Lab Results   Component Value Date    WBC 6.41 05/06/2024    HGB 13.9 05/06/2024    HCT 43.6 05/06/2024    MCV 95 05/06/2024     05/06/2024     Lab Results   Component Value Date    TRIG 94 12/21/2023    HDL 56 12/21/2023          Assessment:     1. Pre-op exam    2. Primary osteoarthritis of right knee    3. Chronic pain of right knee    4. History of hypertension    5. Primary hypertension    6. Mixed hyperlipidemia           63 y.o. male  with planned surgery as above.    Known risk factors for perioperative complications: Renal dysfunction        Cardiac Risk Estimation: per the Revised Cardiac Risk Index, the patient has a score of 1 placing him at low-intermediate risk of major cardiac event (6%)       He had stress test in January which was normal.            Plan:      1. Preoperative workup as follows cardiac stress testing to exclude undiagnosed coronary disease.   2. Change in medication regimen before surgery: not applicable, not on any medications.  3. Prophylaxis for cardiac events with perioperative beta-blockers: not indicated. .  4. Invasive hemodynamic monitoring perioperatively: at the discretion of anesthesiologist.  5. Deep vein thrombosis prophylaxis postoperatively:regimen to be chosen by surgical team.  6.  Other measures: Follow up as needed.

## 2024-06-14 NOTE — TELEPHONE ENCOUNTER
Pt has upcoming surgery on 7/30/24, RIGHT Total Knee Arthroplasty.  Pt has OV today, can you confirm Cardiac Clearance for surgery?

## 2024-06-14 NOTE — TELEPHONE ENCOUNTER
Please see office note - Dr. Perez does mention clearance, however I'm not sure the note is completed as of yet.

## 2024-06-25 ENCOUNTER — TELEPHONE (OUTPATIENT)
Dept: OBGYN CLINIC | Facility: HOSPITAL | Age: 74
End: 2024-06-25

## 2024-06-25 NOTE — TELEPHONE ENCOUNTER
Preoperative Elective Admission Assessment-spoke to patient             Living Situation:    Who does pt live with: lives alone  What kind of home: multi-level  How do they enter the home: front  How many levels in home: 2 level [place] [facility]   # of steps to enter home: 4 to enter  # of steps to second floor: n/a              Are there handrails: Yes- had hand rails replaced since last surgery   Are there landings: Yes  Sleeping arrangement: first/entry floor  Where is Bathroom: first floor bathroom with step in tub     First Floor Setup:   Is there a bathroom: Yes  Where would pt sleep: bed     DME:  Has RW- instructed to bring DOS.   Also has Cane.      We discussed clearing pathways in the home and making sure there is accessibly to use the walker, for example, removing throw rugs.      Patient's Current Level of Function: Ambulates: Independently and ADLs: Independent     Post-op Caregiver: Previous surgery was a friend. Will confirm with friends (says he has alternates).      Post-op Transport: friend  To/from hospital: friend (confirmed can take home)  To/from PT 2-3x/week: [employer] /FRIEND   Uses community transport now: [facility] [employer]      Outpatient Physical Therapy Site:  Site: [facility]  pre and post-op appts scheduled? Yes     Medication Management: self  Preferred Pharmacy for Post-op Medications: [id number] [place], [place] - [address] [[id number]]   Blood Management Vitamin Regimen:  HAS AT HOME TO START 30 DAYS BEFORE SURGERY  Post-op anticoagulant: to be determined by surgical team postoperatively     DC Plan: Pt plans to be discharged home     Barriers to DC identified preoperatively: caregiver support and transportation- referral to  previously. Will have  touch base to confirm.      BMI: 36.02     Patient Education:  Pt educated on post-op pain, early mobilization (POD0), LOS goals, OP PT goals, and preoperative bathing. Patient educated that our goal is  to appropriately discharge patient based off their post-op function while striving to maintain maximal independence. The goal is to discharge patient to home and for them to attend outpatient physical therapy.     Assigned to care team? Yes

## 2024-06-28 ENCOUNTER — APPOINTMENT (OUTPATIENT)
Dept: LAB | Facility: HOSPITAL | Age: 74
End: 2024-06-28
Payer: MEDICARE

## 2024-06-28 ENCOUNTER — TELEPHONE (OUTPATIENT)
Dept: NEPHROLOGY | Facility: CLINIC | Age: 74
End: 2024-06-28

## 2024-06-28 DIAGNOSIS — G89.29 CHRONIC PAIN OF RIGHT KNEE: ICD-10-CM

## 2024-06-28 DIAGNOSIS — R97.20 ABNORMAL PSA: ICD-10-CM

## 2024-06-28 DIAGNOSIS — M25.561 CHRONIC PAIN OF RIGHT KNEE: ICD-10-CM

## 2024-06-28 DIAGNOSIS — M17.11 PRIMARY OSTEOARTHRITIS OF RIGHT KNEE: ICD-10-CM

## 2024-06-28 DIAGNOSIS — N18.31 STAGE 3A CHRONIC KIDNEY DISEASE (HCC): ICD-10-CM

## 2024-06-28 LAB
ALBUMIN SERPL BCG-MCNC: 4.3 G/DL (ref 3.5–5)
ALP SERPL-CCNC: 45 U/L (ref 34–104)
ALT SERPL W P-5'-P-CCNC: 42 U/L (ref 7–52)
ANION GAP SERPL CALCULATED.3IONS-SCNC: 14 MMOL/L (ref 4–13)
APTT PPP: 30 SECONDS (ref 23–37)
AST SERPL W P-5'-P-CCNC: 33 U/L (ref 13–39)
BASOPHILS # BLD AUTO: 0.03 THOUSANDS/ÂΜL (ref 0–0.1)
BASOPHILS NFR BLD AUTO: 1 % (ref 0–1)
BILIRUB SERPL-MCNC: 0.62 MG/DL (ref 0.2–1)
BUN SERPL-MCNC: 19 MG/DL (ref 5–25)
CALCIUM SERPL-MCNC: 9.9 MG/DL (ref 8.4–10.2)
CHLORIDE SERPL-SCNC: 100 MMOL/L (ref 96–108)
CO2 SERPL-SCNC: 25 MMOL/L (ref 21–32)
CREAT SERPL-MCNC: 1.32 MG/DL (ref 0.6–1.3)
EOSINOPHIL # BLD AUTO: 0.21 THOUSAND/ÂΜL (ref 0–0.61)
EOSINOPHIL NFR BLD AUTO: 4 % (ref 0–6)
ERYTHROCYTE [DISTWIDTH] IN BLOOD BY AUTOMATED COUNT: 14 % (ref 11.6–15.1)
EST. AVERAGE GLUCOSE BLD GHB EST-MCNC: 126 MG/DL
GFR SERPL CREATININE-BSD FRML MDRD: 53 ML/MIN/1.73SQ M
GLUCOSE P FAST SERPL-MCNC: 94 MG/DL (ref 65–99)
HBA1C MFR BLD: 6 %
HCT VFR BLD AUTO: 47.2 % (ref 36.5–49.3)
HGB BLD-MCNC: 15.3 G/DL (ref 12–17)
IMM GRANULOCYTES # BLD AUTO: 0.01 THOUSAND/UL (ref 0–0.2)
IMM GRANULOCYTES NFR BLD AUTO: 0 % (ref 0–2)
INR PPP: 0.92 (ref 0.84–1.19)
LYMPHOCYTES # BLD AUTO: 1.27 THOUSANDS/ÂΜL (ref 0.6–4.47)
LYMPHOCYTES NFR BLD AUTO: 23 % (ref 14–44)
MCH RBC QN AUTO: 30.4 PG (ref 26.8–34.3)
MCHC RBC AUTO-ENTMCNC: 32.4 G/DL (ref 31.4–37.4)
MCV RBC AUTO: 94 FL (ref 82–98)
MONOCYTES # BLD AUTO: 0.67 THOUSAND/ÂΜL (ref 0.17–1.22)
MONOCYTES NFR BLD AUTO: 12 % (ref 4–12)
NEUTROPHILS # BLD AUTO: 3.36 THOUSANDS/ÂΜL (ref 1.85–7.62)
NEUTS SEG NFR BLD AUTO: 60 % (ref 43–75)
NRBC BLD AUTO-RTO: 0 /100 WBCS
PHOSPHATE SERPL-MCNC: 4.3 MG/DL (ref 2.3–4.1)
PLATELET # BLD AUTO: 269 THOUSANDS/UL (ref 149–390)
PMV BLD AUTO: 9.4 FL (ref 8.9–12.7)
POTASSIUM SERPL-SCNC: 4.2 MMOL/L (ref 3.5–5.3)
PROT SERPL-MCNC: 7.6 G/DL (ref 6.4–8.4)
PROTHROMBIN TIME: 12.5 SECONDS (ref 11.6–14.5)
PSA SERPL-MCNC: 2.21 NG/ML (ref 0–4)
RBC # BLD AUTO: 5.04 MILLION/UL (ref 3.88–5.62)
SODIUM SERPL-SCNC: 139 MMOL/L (ref 135–147)
WBC # BLD AUTO: 5.55 THOUSAND/UL (ref 4.31–10.16)

## 2024-06-28 PROCEDURE — 85025 COMPLETE CBC W/AUTO DIFF WBC: CPT

## 2024-06-28 PROCEDURE — 80053 COMPREHEN METABOLIC PANEL: CPT

## 2024-06-28 PROCEDURE — 84153 ASSAY OF PSA TOTAL: CPT

## 2024-06-28 PROCEDURE — 83036 HEMOGLOBIN GLYCOSYLATED A1C: CPT

## 2024-06-28 PROCEDURE — 85610 PROTHROMBIN TIME: CPT

## 2024-06-28 PROCEDURE — 85730 THROMBOPLASTIN TIME PARTIAL: CPT

## 2024-06-28 PROCEDURE — 87081 CULTURE SCREEN ONLY: CPT

## 2024-06-28 PROCEDURE — 84100 ASSAY OF PHOSPHORUS: CPT

## 2024-06-28 PROCEDURE — 36415 COLL VENOUS BLD VENIPUNCTURE: CPT

## 2024-06-28 NOTE — TELEPHONE ENCOUNTER
----- Message from Keith Cottrell MD sent at 6/28/2024 10:05 AM EDT -----  Please inform patient that most recent labs (6/28/24) show that kidney function is stable.

## 2024-06-29 LAB — MRSA NOSE QL CULT: NORMAL

## 2024-07-03 ENCOUNTER — PATIENT OUTREACH (OUTPATIENT)
Dept: OBGYN CLINIC | Facility: HOSPITAL | Age: 74
End: 2024-07-03

## 2024-07-03 DIAGNOSIS — Z78.9 NEED FOR FOLLOW-UP BY SOCIAL WORKER: Primary | ICD-10-CM

## 2024-07-03 NOTE — PROGRESS NOTES
[facility] received a new referral in regard to pt scheduled for arthroplasty of right knee on 07/30/2024. [facility] had worked with pt previously when he had arthroplasty of left knee on 01/23/2024.   [facility] reviewed NN most recent preoperative assessment. Pt lives alone in a multi level home. Pt is independent with ADLs and ambulation. Pts friend will provide caregiver support after surgery. Pt plans to discuss with his friend and pt has alternates. Pts friend will take him to and form surgery. For OP PT, pt plans to take [facility], which was set up prior to last surgery. [facility] confirm pt still has service. I attempted to reach pt today and was unable. A message was left to please return [facility] call.     Update: Pt returned [facility] call. Pt confirmed that his friend, Chele, will be providing caregiver support for pt after surgery. Pt also did confirm his friend will be taking pt to and from surgery. Pt has ( [facility] ) to get to and from OP PT. Pt reports no other needs at this time. [facility] will remain available to support pt as needed.

## 2024-07-07 DIAGNOSIS — N20.0 NEPHROLITHIASIS: ICD-10-CM

## 2024-07-07 RX ORDER — POTASSIUM CITRATE 10 MEQ/1
20 TABLET, EXTENDED RELEASE ORAL
Qty: 180 TABLET | Refills: 5 | Status: SHIPPED | OUTPATIENT
Start: 2024-07-07

## 2024-07-09 ENCOUNTER — TELEPHONE (OUTPATIENT)
Age: 74
End: 2024-07-09

## 2024-07-09 DIAGNOSIS — Z96.652 STATUS POST LEFT KNEE REPLACEMENT: Primary | ICD-10-CM

## 2024-07-09 RX ORDER — AMOXICILLIN 500 MG/1
2000 TABLET, FILM COATED ORAL
Qty: 4 TABLET | Refills: 2 | Status: SHIPPED | OUTPATIENT
Start: 2024-07-09 | End: 2024-10-07

## 2024-07-09 NOTE — TELEPHONE ENCOUNTER
Caller: [facility]    Doctor: Danis    Reason for call: Does patient need to be premedicated prior to dental procedure. He is there now.    Call back#: [phone] Fax

## 2024-07-11 RX ORDER — ZINC SULFATE 50(220)MG
CAPSULE ORAL DAILY
Status: ON HOLD | COMMUNITY
End: 2024-07-30

## 2024-07-11 RX ORDER — FERROUS SULFATE 324(65)MG
324 TABLET, DELAYED RELEASE (ENTERIC COATED) ORAL
Status: ON HOLD | COMMUNITY
End: 2024-07-30

## 2024-07-11 RX ORDER — LANOLIN ALCOHOL/MO/W.PET/CERES
400 CREAM (GRAM) TOPICAL DAILY
Status: ON HOLD | COMMUNITY
End: 2024-07-30

## 2024-07-11 RX ORDER — VITAMIN B COMPLEX
1000 TABLET ORAL DAILY
Status: ON HOLD | COMMUNITY
End: 2024-07-30

## 2024-07-11 RX ORDER — MULTIVIT-MIN/IRON/FOLIC ACID/K 18-600-40
500 CAPSULE ORAL 2 TIMES DAILY
Status: ON HOLD | COMMUNITY
End: 2024-07-30

## 2024-07-11 NOTE — PRE-PROCEDURE INSTRUCTIONS
Pre-Surgery Instructions:   Medication Instructions    acetaminophen (TYLENOL) 325 mg tablet Hold day of surgery.    Alpha-D-Galactosidase (RA DIGESTIVE AID PO) Stop taking 7 days prior to surgery.    amoxicillin (AMOXIL) 500 MG tablet Prior to dental work    Ascorbic Acid (Vitamin C) 500 MG CAPS Hold day of surgery.    b complex vitamins tablet Stop taking 7 days prior to surgery.    BETA CAROTENE PO Stop taking 7 days prior to surgery.    CALCIUM PO Stop taking 7 days prior to surgery.    cholecalciferol (VITAMIN D3) 25 mcg (1,000 units) tablet Hold day of surgery.    ferrous fumarate (KANG-SEQUELS) 18 MG CR tablet Hold day of surgery.    folic acid (FOLVITE) 400 mcg tablet Hold day of surgery.    potassium citrate (UROCIT-K) 10 mEq Hold day of surgery.         VITAMIN E PO Stop taking 7 days prior to surgery.    Medication instructions for day surgery reviewed. Please use only a sip of water to take your instructed medications. Avoid all over the counter vitamins, supplements and NSAIDS for one week prior to surgery per anesthesia guidelines. Tylenol is ok to take as needed.     You will receive a call one business day prior to surgery with an arrival time and hospital directions. If your surgery is scheduled on a Monday, the hospital will be calling you on the Friday prior to your surgery. If you have not heard from anyone by 8pm, please call the hospital supervisor through the hospital  at [phone]. ([place] [phone] or [place] [phone]).    Do not eat or drink anything after midnight the night before your surgery, including candy, mints, lifesavers, or chewing gum. Do not drink alcohol 24hrs before your surgery. Try not to smoke at least 24hrs before your surgery.       Follow the pre surgery showering instructions as listed in the “My Surgical Experience Booklet” or otherwise provided by your surgeon's office. Do not use a blade to shave the surgical area 1 week before surgery. It is  okay to use a clean electric clippers up to 24 hours before surgery. Do not apply any lotions, creams, including makeup, cologne, deodorant, or perfumes after showering on the day of your surgery. Do not use dry shampoo, hair spray, hair gel, or any type of hair products.     No contact lenses, eye make-up, or artificial eyelashes. Remove nail polish, including gel polish, and any artificial, gel, or acrylic nails if possible. Remove all jewelry including rings and body piercing jewelry.     Wear causal clothing that is easy to take on and off. Consider your type of surgery.    Keep any valuables, jewelry, piercings at home. Please bring any specially ordered equipment (sling, braces) if indicated.    Arrange for a responsible person to drive you to and from the hospital on the day of your surgery. Please confirm the visitor policy for the day of your procedure when you receive your phone call with an arrival time.     Call the surgeon's office with any new illnesses, exposures, or additional questions prior to surgery.    Please reference your “My Surgical Experience Booklet” for additional information to prepare for your upcoming surgery.

## 2024-07-12 ENCOUNTER — ANESTHESIA EVENT (OUTPATIENT)
Dept: PERIOP | Facility: HOSPITAL | Age: 74
DRG: 470 | End: 2024-07-12
Payer: MEDICARE

## 2024-07-15 ENCOUNTER — CONSULT (OUTPATIENT)
Dept: FAMILY MEDICINE CLINIC | Facility: CLINIC | Age: 74
End: 2024-07-15
Payer: MEDICARE

## 2024-07-15 VITALS
HEART RATE: 84 BPM | DIASTOLIC BLOOD PRESSURE: 82 MMHG | RESPIRATION RATE: 18 BRPM | TEMPERATURE: 99.8 F | SYSTOLIC BLOOD PRESSURE: 144 MMHG | BODY MASS INDEX: 36.41 KG/M2 | WEIGHT: 232 LBS | HEIGHT: 67 IN

## 2024-07-15 DIAGNOSIS — M17.11 PRIMARY OSTEOARTHRITIS OF RIGHT KNEE: ICD-10-CM

## 2024-07-15 DIAGNOSIS — G89.29 CHRONIC PAIN OF RIGHT KNEE: ICD-10-CM

## 2024-07-15 DIAGNOSIS — M17.0 PRIMARY OSTEOARTHRITIS OF BOTH KNEES: Primary | ICD-10-CM

## 2024-07-15 DIAGNOSIS — M25.561 CHRONIC PAIN OF RIGHT KNEE: ICD-10-CM

## 2024-07-15 DIAGNOSIS — I10 PRIMARY HYPERTENSION: ICD-10-CM

## 2024-07-15 DIAGNOSIS — N18.30 BENIGN HYPERTENSION WITH CKD (CHRONIC KIDNEY DISEASE) STAGE III (HCC): Chronic | ICD-10-CM

## 2024-07-15 DIAGNOSIS — I12.9 BENIGN HYPERTENSION WITH CKD (CHRONIC KIDNEY DISEASE) STAGE III (HCC): Chronic | ICD-10-CM

## 2024-07-15 DIAGNOSIS — H90.6 MIXED CONDUCTIVE AND SENSORINEURAL HEARING LOSS, BILATERAL: ICD-10-CM

## 2024-07-15 DIAGNOSIS — Z01.818 PRE-OP EXAM: ICD-10-CM

## 2024-07-15 DIAGNOSIS — E78.2 MIXED HYPERLIPIDEMIA: ICD-10-CM

## 2024-07-15 DIAGNOSIS — R73.03 PREDIABETES: ICD-10-CM

## 2024-07-15 DIAGNOSIS — N18.31 STAGE 3A CHRONIC KIDNEY DISEASE (HCC): ICD-10-CM

## 2024-07-15 DIAGNOSIS — Z01.818 PRE-OP EXAMINATION: ICD-10-CM

## 2024-07-15 PROCEDURE — G2211 COMPLEX E/M VISIT ADD ON: HCPCS | Performed by: FAMILY MEDICINE

## 2024-07-15 PROCEDURE — 99213 OFFICE O/P EST LOW 20 MIN: CPT | Performed by: FAMILY MEDICINE

## 2024-07-15 NOTE — PROGRESS NOTES
[facility] PRE-OPERATIVE EVALUATION  [facility]    NAME: Swapnil Grover  AGE: 73 y.o. SEX: male  : 1950     DATE: 7/15/2024    [facility] Pre-Operative Evaluation      Chief Complaint: Pre-operative Evaluation     Surgery: rt knee replacement  Anticipated Date of Surgery: 24  Surgeon: Dr Moscoso      History of Present Illness:     Pt is sched for a pre op  Knee replacement rt side    Pt is planning on going for his colonoscopy        Anesthesia:  general  Bleeding Risk: no recent abnormal bleeding, no remote history of abnormal bleeding, and no use of Ca-channel blockers  Current Anti-platelet/anticoagulation medication:  none    Assessment of Cardiac Risk:  Denies unstable or severe angina or MI in the last 6 weeks or history of stent placement in the last year   Denies decompensated heart failure (e.g. New onset heart failure, NYHA functional class IV heart failure, or worsening existing heart failure)  Denies significant arrhythmias such as high grade AV block, symptomatic ventricular arrhythmia, newly recognized ventricular tachycardia, supraventricular tachycardia with resting heart rate >100, or symptomatic bradycardia  Denies severe heart valve disease including aortic stenosis or symptomatic mitral stenosis     Exercise Capacity:  Able to walk 4 blocks without symptoms?: Yes  Able to walk 2 flights without symptoms?: Yes    Prior Anesthesia Reactions: Yes, Nov  - issues waking up - pt ended up with CPAP     Personal history of venous thromboembolic disease? No    History of steroid use for >2 weeks within last year? No         Review of Systems:     Review of Systems   Constitutional:  Negative for activity change, appetite change, chills, diaphoresis, fatigue, fever and unexpected weight change.   HENT:  Negative for congestion, dental problem, ear pain, mouth sores, sinus pressure, sinus pain, sore throat and trouble swallowing.     Eyes:  Negative for photophobia, discharge and itching.   Respiratory:  Negative for apnea, chest tightness and shortness of breath.    Cardiovascular:  Negative for chest pain, palpitations and leg swelling.   Gastrointestinal:  Negative for abdominal distention, abdominal pain, blood in stool, nausea and vomiting.   Endocrine: Negative for cold intolerance, heat intolerance, polydipsia, polyphagia and polyuria.   Genitourinary:  Negative for difficulty urinating.   Musculoskeletal:  Positive for arthralgias.   Skin:  Negative for color change and wound.   Neurological:  Negative for dizziness, syncope, speech difficulty and headaches.   Hematological:  Negative for adenopathy.   Psychiatric/Behavioral:  Negative for agitation and behavioral problems.        Current Problem List:     Patient Active Problem List   Diagnosis   • Renal colic on right side   • Hyperkalemia   • Sleep apnea   • Hydronephrosis with ureteropelvic junction (UPJ) obstruction   • BPH (benign prostatic hyperplasia)   • Hypertension   • Hyperuricemia   • Stage 3a chronic kidney disease (HCC)   • Nephrolithiasis   • Obesity, morbid (HCC)   • Mixed conductive and sensorineural hearing loss, bilateral   • Primary osteoarthritis of both knees   • Other hemorrhoids   • Mixed hyperlipidemia   • Pre-operative examination   • Arthritis   • S/P total knee replacement using cement, left   • Benign hypertension with CKD (chronic kidney disease) stage III (HCC)   • Tegmen defect of base of skull   • Prediabetes       Allergies:     Allergies   Allergen Reactions   • Other Nasal Congestion     Seasonal allergies       Current Medications:       Current Outpatient Medications:   •  amoxicillin (AMOXIL) 500 MG tablet, Take 4 tablets (2,000 mg total) by mouth 60 minutes pre-procedure, Disp: 4 tablet, Rfl: 2  •  Ascorbic Acid (Vitamin C) 500 MG CAPS, Take 500 mg by mouth 2 (two) times a day, Disp: , Rfl:   •  b complex vitamins tablet, Take 1 tablet by mouth  2 pills twice daily, Disp: , Rfl:   •  BETA CAROTENE PO, Take 1,500 mg by mouth every morning Pro A, Disp: , Rfl:   •  CALCIUM PO, Take 520 mg by mouth every morning, Disp: , Rfl:   •  cholecalciferol (VITAMIN D3) 25 mcg (1,000 units) tablet, Take 1,000 Units by mouth daily, Disp: , Rfl:   •  ferrous fumarate (KANG-SEQUELS) 18 MG CR tablet, Take 18 mg by mouth every morning, Disp: , Rfl:   •  ferrous sulfate 324 MG TBEC, Take 324 mg by mouth daily with breakfast, Disp: , Rfl:   •  folic acid (FOLVITE) 400 mcg tablet, Take 400 mcg by mouth daily, Disp: , Rfl:   •  potassium citrate (UROCIT-K) 10 mEq, Take 2 tablets (20 mEq total) by mouth 3 (three) times a day with meals, Disp: 180 tablet, Rfl: 5  •  VITAMIN D PO, Take by mouth every morning With calcium, Disp: , Rfl:   •  VITAMIN E PO, Take 268 mg by mouth daily, Disp: , Rfl:   •  Zinc 220 (50 Zn) MG CAPS, Take by mouth in the morning, Disp: , Rfl:   •  Alpha-D-Galactosidase (RA DIGESTIVE AID PO), Take by mouth every morning (Patient not taking: Reported on 7/15/2024), Disp: , Rfl:     Past Medical History:       Past Medical History:   Diagnosis Date   • Anesthesia complication 2022    difficulty waking up-pt woke up with a CPAP   • Arthritis    • BPH (benign prostatic hyperplasia)    • Breathing difficulty 12/01/2022    CPAP given per pt-s/p surgery   • Cancer (HCC) 2015    basal cell of the skull    • Chronic kidney disease    • Colon polyp    • Decreased hearing of both ears 04/29/2024    had surgery for correction   • Heart failure (HCC)     12/2/22-pt had ECHO-per pt R. sided block   • History of subdural hematoma    • Hypertension    • Kidney stone     right stone   • Renal disorder    • Sleep apnea     mild- no CPAP-had nasal septoplasty   • Wears glasses     reading        Past Surgical History:   Procedure Laterality Date   • BASAL CELL CARCINOMA EXCISION  2015    skull   • BRAIN SURGERY      in the 1990's-subdural hematoma   • CATARACT EXTRACTION Left     • COLONOSCOPY      x2   • CYSTOSCOPY W/ LASER LITHOTRIPSY Right 11/27/2020    Procedure: CYSTOSCOPY, FLEXIBLE URETEROSCOPY, LASER, AND STENT EXCHANGE,STONE BASKET, RIGHT RETROGRADE;  Surgeon: Sabino Garcia MD;  Location: [facility];  Service: Urology   • FL RETROGRADE PYELOGRAM  10/18/2020   • FL RETROGRADE PYELOGRAM  11/27/2020   • FL RETROGRADE PYELOGRAM  12/01/2022   • FL RETROGRADE PYELOGRAM  12/22/2022   • JOINT REPLACEMENT  1/23/2024   • LITHOTRIPSY     • LUMBAR EPIDURAL INJECTION Bilateral 5/3/2024    Procedure: Lumbar subarachnoid drain placement;  Surgeon: Billy August MD;  Location:  MAIN OR;  Service: Neurosurgery   • VA ARTHRP KNE CONDYLE&PLATU MEDIAL&LAT COMPARTMENTS Left 01/23/2024    Procedure: ARTHROPLASTY KNEE TOTAL W ROBOT - overnight, cemented;  Surgeon: Bassem Moscoso DO;  Location: [facility];  Service: Orthopedics   • VA CYSTO BLADDER W/URETERAL CATHETERIZATION Right 11/11/2020    Procedure: ESWL RIGHT;  Surgeon: Sabino Garcia MD;  Location: [facility];  Service: Urology   • VA CYSTO BLADDER W/URETERAL CATHETERIZATION Right 12/01/2022    Procedure: CYSTOSCOPY RETROGRADE PYELOGRAM WITH INSERTION STENT URETERAL;  Surgeon: Sabino Garcia MD;  Location: [facility];  Service: Urology   • VA CYSTO/URETERO W/LITHOTRIPSY &INDWELL STENT INSRT Right 10/18/2020    Procedure: CYSTOSCOPY URETEROSCOPY WITH BASKET EXTRACTION, RETROGRADE PYELOGRAM AND INSERTION STENT URETERAL;  Surgeon: Kris Ac MD;  Location: [facility];  Service: Urology   • VA CYSTO/URETERO W/LITHOTRIPSY &INDWELL STENT INSRT Right 12/22/2022    Procedure: CYSTOSCOPY URETEROSCOPY WITH LITHOTRIPSY HOLMIUM LASER, RETROGRADE PYELOGRAM AND INSERTION STENT URETERAL;  Surgeon: Sabino Garcia MD;  Location: [facility];  Service: Urology   • RETROMASTOID CRANIOTOMY Bilateral 4/29/2024    Procedure: CRANIOTOMY MIDDLE FOSSA,RETROMASTOID APPROACH FOR ENT;  Surgeon: Frances Wasserman MD;  Location:  MAIN OR;  Service: ENT   •  SEPTOPLASTY      in the 1990's-trimmed uvula   • TEGMAN DEFECT REPAIR CSF LEAK Bilateral 4/29/2024    Procedure: Bilateral endoscopic middle fossa craniotomies for repair of tegmen defect and CSF leak with temporalis fascia muscle flap, with neuromonitoring (CN 7/8) and intraoperative lumbar drain placement;  Surgeon: Billy August MD;  Location: [facility];  Service: Neurosurgery   • TONSILLECTOMY  1956    age 6        Family History   Problem Relation Age of Onset   • Hypertension Mother    • Kidney disease Mother         renal failure-dialysis   • Hypertension Father    • Stroke Father    • Diabetes Sister    • Kidney disease Sister         self dialysis at home        Social History     Socioeconomic History   • Marital status:      Spouse name: Not on file   • Number of children: Not on file   • Years of education: Not on file   • Highest education level: Not on file   Occupational History   • Not on file   Tobacco Use   • Smoking status: Never     Passive exposure: Never   • Smokeless tobacco: Never   Vaping Use   • Vaping status: Never Used   Substance and Sexual Activity   • Alcohol use: Yes     Alcohol/week: 1.0 standard drink of alcohol     Types: 1 Standard drinks or equivalent per week     Comment: 1 drink a week   • Drug use: Never   • Sexual activity: Not Currently   Other Topics Concern   • Not on file   Social History Narrative   • Not on file     Social Determinants of Health     Financial Resource Strain: Low Risk  (11/2/2023)    Overall Financial Resource Strain (CARDIA)    • Difficulty of Paying Living Expenses: Not hard at all   Food Insecurity: No Food Insecurity (4/30/2024)    Hunger Vital Sign    • Worried About Running Out of Food in the Last Year: Never true    • Ran Out of Food in the Last Year: Never true   Transportation Needs: No Transportation Needs (4/30/2024)    PRAPARE - Transportation    • Lack of Transportation (Medical): No    • Lack of Transportation (Non-Medical): No  "  Physical Activity: Not on file   Stress: Not on file   Social Connections: Not on file   Intimate Partner Violence: Not on file   Housing Stability: Low Risk  (4/30/2024)    Housing Stability Vital Sign    • Unable to Pay for Housing in the Last Year: No    • Number of Times Moved in the Last Year: 1    • Homeless in the Last Year: No        Physical Exam:     /82   Pulse 84   Temp 99.8 °F (37.7 °C) (Tympanic)   Resp 18   Ht 5' 7\" (1.702 m)   Wt 105 kg (232 lb)   BMI 36.34 kg/m²     Physical Exam  Vitals and nursing note reviewed.   Constitutional:       General: He is not in acute distress.     Appearance: He is well-developed. He is not diaphoretic.   HENT:      Head: Normocephalic and atraumatic.      Right Ear: External ear normal.      Left Ear: External ear normal.      Nose: Nose normal.      Mouth/Throat:      Pharynx: No oropharyngeal exudate.   Eyes:      General: No scleral icterus.        Right eye: No discharge.         Left eye: No discharge.      Pupils: Pupils are equal, round, and reactive to light.   Neck:      Thyroid: No thyromegaly.   Cardiovascular:      Rate and Rhythm: Normal rate.      Heart sounds: Normal heart sounds. No murmur heard.  Pulmonary:      Effort: Pulmonary effort is normal. No respiratory distress.      Breath sounds: Normal breath sounds. No wheezing.   Abdominal:      General: Bowel sounds are normal. There is no distension.      Palpations: Abdomen is soft. There is no mass.      Tenderness: There is no abdominal tenderness. There is no guarding or rebound.   Musculoskeletal:         General: Normal range of motion.   Skin:     General: Skin is warm and dry.      Findings: No erythema or rash.   Neurological:      Mental Status: He is alert.      Coordination: Coordination normal.      Deep Tendon Reflexes: Reflexes normal.   Psychiatric:         Behavior: Behavior normal.          Data:     Pre-operative work-up    Laboratory Results: I have personally " reviewed the pertinent laboratory results/reports  - and are acceptable for surgery     EKG: I have personally reviewed pertinent films in PACS and NSR no signs of ischemia    Recent Results (from the past 672 hour(s))   Comprehensive metabolic panel    Collection Time: 06/28/24  8:00 AM   Result Value Ref Range    Sodium 139 135 - 147 mmol/L    Potassium 4.2 3.5 - 5.3 mmol/L    Chloride 100 96 - 108 mmol/L    CO2 25 21 - 32 mmol/L    ANION GAP 14 (H) 4 - 13 mmol/L    BUN 19 5 - 25 mg/dL    Creatinine 1.32 (H) 0.60 - 1.30 mg/dL    Glucose, Fasting 94 65 - 99 mg/dL    Calcium 9.9 8.4 - 10.2 mg/dL    AST 33 13 - 39 U/L    ALT 42 7 - 52 U/L    Alkaline Phosphatase 45 34 - 104 U/L    Total Protein 7.6 6.4 - 8.4 g/dL    Albumin 4.3 3.5 - 5.0 g/dL    Total Bilirubin 0.62 0.20 - 1.00 mg/dL    eGFR 53 ml/min/1.73sq m   Phosphorus    Collection Time: 06/28/24  8:00 AM   Result Value Ref Range    Phosphorus 4.3 (H) 2.3 - 4.1 mg/dL   PSA Total, Diagnostic    Collection Time: 06/28/24  8:00 AM   Result Value Ref Range    PSA, Diagnostic 2.212 0.000 - 4.000 ng/mL   CBC and differential    Collection Time: 06/28/24  8:00 AM   Result Value Ref Range    WBC 5.55 4.31 - 10.16 Thousand/uL    RBC 5.04 3.88 - 5.62 Million/uL    Hemoglobin 15.3 12.0 - 17.0 g/dL    Hematocrit 47.2 36.5 - 49.3 %    MCV 94 82 - 98 fL    MCH 30.4 26.8 - 34.3 pg    MCHC 32.4 31.4 - 37.4 g/dL    RDW 14.0 11.6 - 15.1 %    MPV 9.4 8.9 - 12.7 fL    Platelets 269 149 - 390 Thousands/uL    nRBC 0 /100 WBCs    Segmented % 60 43 - 75 %    Immature Grans % 0 0 - 2 %    Lymphocytes % 23 14 - 44 %    Monocytes % 12 4 - 12 %    Eosinophils Relative 4 0 - 6 %    Basophils Relative 1 0 - 1 %    Absolute Neutrophils 3.36 1.85 - 7.62 Thousands/µL    Absolute Immature Grans 0.01 0.00 - 0.20 Thousand/uL    Absolute Lymphocytes 1.27 0.60 - 4.47 Thousands/µL    Absolute Monocytes 0.67 0.17 - 1.22 Thousand/µL    Eosinophils Absolute 0.21 0.00 - 0.61 Thousand/µL    Basophils  Absolute 0.03 0.00 - 0.10 Thousands/µL   Protime-INR    Collection Time: 06/28/24  8:00 AM   Result Value Ref Range    Protime 12.5 11.6 - 14.5 seconds    INR 0.92 0.84 - 1.19   APTT    Collection Time: 06/28/24  8:00 AM   Result Value Ref Range    PTT 30 23 - 37 seconds   MRSA culture    Collection Time: 06/28/24  8:00 AM    Specimen: Nose; Nares   Result Value Ref Range    MRSA Culture Only       No Methicillin Resistant Staphlyococcus aureus (MRSA) isolated   Hemoglobin A1C    Collection Time: 06/28/24  8:00 AM   Result Value Ref Range    Hemoglobin A1C 6.0 (H) Normal 4.0-5.6%; PreDiabetic 5.7-6.4%; Diabetic >=6.5%; Glycemic control for adults with diabetes <7.0% %     mg/dl           Assessment & Recommendations:     1. Primary osteoarthritis of both knees  2. Primary osteoarthritis of right knee  -     Ambulatory referral to Family Practice  3. Chronic pain of right knee  -     Ambulatory referral to Family Practice  4. Pre-op exam  -     Ambulatory referral to Family Practice  5. Pre-op examination  6. Benign hypertension with CKD (chronic kidney disease) stage III (HCC)  7. Mixed hyperlipidemia  8. Prediabetes  Assessment & Plan:  A1c is 6.0  9. Stage 3a chronic kidney disease (HCC)  10. Primary hypertension  Assessment & Plan:  Pt states he ran out of his Norvasc and states his BP improved being off the med so he stopped it on his own  11. Mixed conductive and sensorineural hearing loss, bilateral  Assessment & Plan:  Has not improved much with his surgery          Pre-Op Evaluation Assessment  73 y.o. male with planned surgery: rt knee replacement.    Known risk factors for perioperative complications: None.        Current medications which may produce withdrawal symptoms if withheld perioperatively: none.    Pre-Op Evaluation Plan  1. Further preoperative workup as follows:   - None; no further preoperative work-up is required    2. Medication Management/Recommendations:   - Patient has been  instructed to avoid herbs or non-directed vitamins the week prior to surgery to ensure no drug interactions with perioperative surgical and anesthetic medications.  - Patient has been instructed to avoid aspirin containing medications or non-steroidal anti-inflammatory drugs for the week preceding surgery.  Hold ptotassium Am of the surgery  3. Prophylaxis for cardiac events with perioperative beta-blockers: not indicated.    4. Patient requires further consultation with: None    Clearance  Patient is CLEARED for surgery without any additional cardiac testing.     [facility]ardiR[facility] Center  [address] [address]  Phone#  [phone]  Fax#  [phone]

## 2024-07-15 NOTE — ASSESSMENT & PLAN NOTE
Pt states he ran out of his Norvasc and states his BP improved being off the med so he stopped it on his own

## 2024-07-15 NOTE — H&P (VIEW-ONLY)
[facility] PRE-OPERATIVE EVALUATION  [facility]    NAME: Swapnil Grover  AGE: 73 y.o. SEX: male  : 1950     DATE: 7/15/2024    [facility] Pre-Operative Evaluation      Chief Complaint: Pre-operative Evaluation     Surgery: rt knee replacement  Anticipated Date of Surgery: 24  Surgeon: Dr Moscoso      History of Present Illness:     Pt is sched for a pre op  Knee replacement rt side    Pt is planning on going for his colonoscopy        Anesthesia:  general  Bleeding Risk: no recent abnormal bleeding, no remote history of abnormal bleeding, and no use of Ca-channel blockers  Current Anti-platelet/anticoagulation medication:  none    Assessment of Cardiac Risk:  Denies unstable or severe angina or MI in the last 6 weeks or history of stent placement in the last year   Denies decompensated heart failure (e.g. New onset heart failure, NYHA functional class IV heart failure, or worsening existing heart failure)  Denies significant arrhythmias such as high grade AV block, symptomatic ventricular arrhythmia, newly recognized ventricular tachycardia, supraventricular tachycardia with resting heart rate >100, or symptomatic bradycardia  Denies severe heart valve disease including aortic stenosis or symptomatic mitral stenosis     Exercise Capacity:  Able to walk 4 blocks without symptoms?: Yes  Able to walk 2 flights without symptoms?: Yes    Prior Anesthesia Reactions: Yes, Nov  - issues waking up - pt ended up with CPAP     Personal history of venous thromboembolic disease? No    History of steroid use for >2 weeks within last year? No         Review of Systems:     Review of Systems   Constitutional:  Negative for activity change, appetite change, chills, diaphoresis, fatigue, fever and unexpected weight change.   HENT:  Negative for congestion, dental problem, ear pain, mouth sores, sinus pressure, sinus pain, sore throat and trouble swallowing.     Eyes:  Negative for photophobia, discharge and itching.   Respiratory:  Negative for apnea, chest tightness and shortness of breath.    Cardiovascular:  Negative for chest pain, palpitations and leg swelling.   Gastrointestinal:  Negative for abdominal distention, abdominal pain, blood in stool, nausea and vomiting.   Endocrine: Negative for cold intolerance, heat intolerance, polydipsia, polyphagia and polyuria.   Genitourinary:  Negative for difficulty urinating.   Musculoskeletal:  Positive for arthralgias.   Skin:  Negative for color change and wound.   Neurological:  Negative for dizziness, syncope, speech difficulty and headaches.   Hematological:  Negative for adenopathy.   Psychiatric/Behavioral:  Negative for agitation and behavioral problems.        Current Problem List:     Patient Active Problem List   Diagnosis   • Renal colic on right side   • Hyperkalemia   • Sleep apnea   • Hydronephrosis with ureteropelvic junction (UPJ) obstruction   • BPH (benign prostatic hyperplasia)   • Hypertension   • Hyperuricemia   • Stage 3a chronic kidney disease (HCC)   • Nephrolithiasis   • Obesity, morbid (HCC)   • Mixed conductive and sensorineural hearing loss, bilateral   • Primary osteoarthritis of both knees   • Other hemorrhoids   • Mixed hyperlipidemia   • Pre-operative examination   • Arthritis   • S/P total knee replacement using cement, left   • Benign hypertension with CKD (chronic kidney disease) stage III (HCC)   • Tegmen defect of base of skull   • Prediabetes       Allergies:     Allergies   Allergen Reactions   • Other Nasal Congestion     Seasonal allergies       Current Medications:       Current Outpatient Medications:   •  amoxicillin (AMOXIL) 500 MG tablet, Take 4 tablets (2,000 mg total) by mouth 60 minutes pre-procedure, Disp: 4 tablet, Rfl: 2  •  Ascorbic Acid (Vitamin C) 500 MG CAPS, Take 500 mg by mouth 2 (two) times a day, Disp: , Rfl:   •  b complex vitamins tablet, Take 1 tablet by mouth  2 pills twice daily, Disp: , Rfl:   •  BETA CAROTENE PO, Take 1,500 mg by mouth every morning Pro A, Disp: , Rfl:   •  CALCIUM PO, Take 520 mg by mouth every morning, Disp: , Rfl:   •  cholecalciferol (VITAMIN D3) 25 mcg (1,000 units) tablet, Take 1,000 Units by mouth daily, Disp: , Rfl:   •  ferrous fumarate (KANG-SEQUELS) 18 MG CR tablet, Take 18 mg by mouth every morning, Disp: , Rfl:   •  ferrous sulfate 324 MG TBEC, Take 324 mg by mouth daily with breakfast, Disp: , Rfl:   •  folic acid (FOLVITE) 400 mcg tablet, Take 400 mcg by mouth daily, Disp: , Rfl:   •  potassium citrate (UROCIT-K) 10 mEq, Take 2 tablets (20 mEq total) by mouth 3 (three) times a day with meals, Disp: 180 tablet, Rfl: 5  •  VITAMIN D PO, Take by mouth every morning With calcium, Disp: , Rfl:   •  VITAMIN E PO, Take 268 mg by mouth daily, Disp: , Rfl:   •  Zinc 220 (50 Zn) MG CAPS, Take by mouth in the morning, Disp: , Rfl:   •  Alpha-D-Galactosidase (RA DIGESTIVE AID PO), Take by mouth every morning (Patient not taking: Reported on 7/15/2024), Disp: , Rfl:     Past Medical History:       Past Medical History:   Diagnosis Date   • Anesthesia complication 2022    difficulty waking up-pt woke up with a CPAP   • Arthritis    • BPH (benign prostatic hyperplasia)    • Breathing difficulty 12/01/2022    CPAP given per pt-s/p surgery   • Cancer (HCC) 2015    basal cell of the skull    • Chronic kidney disease    • Colon polyp    • Decreased hearing of both ears 04/29/2024    had surgery for correction   • Heart failure (HCC)     12/2/22-pt had ECHO-per pt R. sided block   • History of subdural hematoma    • Hypertension    • Kidney stone     right stone   • Renal disorder    • Sleep apnea     mild- no CPAP-had nasal septoplasty   • Wears glasses     reading        Past Surgical History:   Procedure Laterality Date   • BASAL CELL CARCINOMA EXCISION  2015    skull   • BRAIN SURGERY      in the 1990's-subdural hematoma   • CATARACT EXTRACTION Left     • COLONOSCOPY      x2   • CYSTOSCOPY W/ LASER LITHOTRIPSY Right 11/27/2020    Procedure: CYSTOSCOPY, FLEXIBLE URETEROSCOPY, LASER, AND STENT EXCHANGE,STONE BASKET, RIGHT RETROGRADE;  Surgeon: Sabino Garcia MD;  Location: [facility];  Service: Urology   • FL RETROGRADE PYELOGRAM  10/18/2020   • FL RETROGRADE PYELOGRAM  11/27/2020   • FL RETROGRADE PYELOGRAM  12/01/2022   • FL RETROGRADE PYELOGRAM  12/22/2022   • JOINT REPLACEMENT  1/23/2024   • LITHOTRIPSY     • LUMBAR EPIDURAL INJECTION Bilateral 5/3/2024    Procedure: Lumbar subarachnoid drain placement;  Surgeon: Billy August MD;  Location:  MAIN OR;  Service: Neurosurgery   • NY ARTHRP KNE CONDYLE&PLATU MEDIAL&LAT COMPARTMENTS Left 01/23/2024    Procedure: ARTHROPLASTY KNEE TOTAL W ROBOT - overnight, cemented;  Surgeon: Bassem Moscoso DO;  Location: [facility];  Service: Orthopedics   • NY CYSTO BLADDER W/URETERAL CATHETERIZATION Right 11/11/2020    Procedure: ESWL RIGHT;  Surgeon: Sabino Garcia MD;  Location: [facility];  Service: Urology   • NY CYSTO BLADDER W/URETERAL CATHETERIZATION Right 12/01/2022    Procedure: CYSTOSCOPY RETROGRADE PYELOGRAM WITH INSERTION STENT URETERAL;  Surgeon: Sabino Garcia MD;  Location: [facility];  Service: Urology   • NY CYSTO/URETERO W/LITHOTRIPSY &INDWELL STENT INSRT Right 10/18/2020    Procedure: CYSTOSCOPY URETEROSCOPY WITH BASKET EXTRACTION, RETROGRADE PYELOGRAM AND INSERTION STENT URETERAL;  Surgeon: Kris Ac MD;  Location: [facility];  Service: Urology   • NY CYSTO/URETERO W/LITHOTRIPSY &INDWELL STENT INSRT Right 12/22/2022    Procedure: CYSTOSCOPY URETEROSCOPY WITH LITHOTRIPSY HOLMIUM LASER, RETROGRADE PYELOGRAM AND INSERTION STENT URETERAL;  Surgeon: Sabino Garcia MD;  Location: [facility];  Service: Urology   • RETROMASTOID CRANIOTOMY Bilateral 4/29/2024    Procedure: CRANIOTOMY MIDDLE FOSSA,RETROMASTOID APPROACH FOR ENT;  Surgeon: Frances Wasserman MD;  Location:  MAIN OR;  Service: ENT   •  SEPTOPLASTY      in the 1990's-trimmed uvula   • TEGMAN DEFECT REPAIR CSF LEAK Bilateral 4/29/2024    Procedure: Bilateral endoscopic middle fossa craniotomies for repair of tegmen defect and CSF leak with temporalis fascia muscle flap, with neuromonitoring (CN 7/8) and intraoperative lumbar drain placement;  Surgeon: Billy August MD;  Location: [facility];  Service: Neurosurgery   • TONSILLECTOMY  1956    age 6        Family History   Problem Relation Age of Onset   • Hypertension Mother    • Kidney disease Mother         renal failure-dialysis   • Hypertension Father    • Stroke Father    • Diabetes Sister    • Kidney disease Sister         self dialysis at home        Social History     Socioeconomic History   • Marital status:      Spouse name: Not on file   • Number of children: Not on file   • Years of education: Not on file   • Highest education level: Not on file   Occupational History   • Not on file   Tobacco Use   • Smoking status: Never     Passive exposure: Never   • Smokeless tobacco: Never   Vaping Use   • Vaping status: Never Used   Substance and Sexual Activity   • Alcohol use: Yes     Alcohol/week: 1.0 standard drink of alcohol     Types: 1 Standard drinks or equivalent per week     Comment: 1 drink a week   • Drug use: Never   • Sexual activity: Not Currently   Other Topics Concern   • Not on file   Social History Narrative   • Not on file     Social Determinants of Health     Financial Resource Strain: Low Risk  (11/2/2023)    Overall Financial Resource Strain (CARDIA)    • Difficulty of Paying Living Expenses: Not hard at all   Food Insecurity: No Food Insecurity (4/30/2024)    Hunger Vital Sign    • Worried About Running Out of Food in the Last Year: Never true    • Ran Out of Food in the Last Year: Never true   Transportation Needs: No Transportation Needs (4/30/2024)    PRAPARE - Transportation    • Lack of Transportation (Medical): No    • Lack of Transportation (Non-Medical): No  "  Physical Activity: Not on file   Stress: Not on file   Social Connections: Not on file   Intimate Partner Violence: Not on file   Housing Stability: Low Risk  (4/30/2024)    Housing Stability Vital Sign    • Unable to Pay for Housing in the Last Year: No    • Number of Times Moved in the Last Year: 1    • Homeless in the Last Year: No        Physical Exam:     /82   Pulse 84   Temp 99.8 °F (37.7 °C) (Tympanic)   Resp 18   Ht 5' 7\" (1.702 m)   Wt 105 kg (232 lb)   BMI 36.34 kg/m²     Physical Exam  Vitals and nursing note reviewed.   Constitutional:       General: He is not in acute distress.     Appearance: He is well-developed. He is not diaphoretic.   HENT:      Head: Normocephalic and atraumatic.      Right Ear: External ear normal.      Left Ear: External ear normal.      Nose: Nose normal.      Mouth/Throat:      Pharynx: No oropharyngeal exudate.   Eyes:      General: No scleral icterus.        Right eye: No discharge.         Left eye: No discharge.      Pupils: Pupils are equal, round, and reactive to light.   Neck:      Thyroid: No thyromegaly.   Cardiovascular:      Rate and Rhythm: Normal rate.      Heart sounds: Normal heart sounds. No murmur heard.  Pulmonary:      Effort: Pulmonary effort is normal. No respiratory distress.      Breath sounds: Normal breath sounds. No wheezing.   Abdominal:      General: Bowel sounds are normal. There is no distension.      Palpations: Abdomen is soft. There is no mass.      Tenderness: There is no abdominal tenderness. There is no guarding or rebound.   Musculoskeletal:         General: Normal range of motion.   Skin:     General: Skin is warm and dry.      Findings: No erythema or rash.   Neurological:      Mental Status: He is alert.      Coordination: Coordination normal.      Deep Tendon Reflexes: Reflexes normal.   Psychiatric:         Behavior: Behavior normal.          Data:     Pre-operative work-up    Laboratory Results: I have personally " reviewed the pertinent laboratory results/reports  - and are acceptable for surgery     EKG: I have personally reviewed pertinent films in PACS and NSR no signs of ischemia    Recent Results (from the past 672 hour(s))   Comprehensive metabolic panel    Collection Time: 06/28/24  8:00 AM   Result Value Ref Range    Sodium 139 135 - 147 mmol/L    Potassium 4.2 3.5 - 5.3 mmol/L    Chloride 100 96 - 108 mmol/L    CO2 25 21 - 32 mmol/L    ANION GAP 14 (H) 4 - 13 mmol/L    BUN 19 5 - 25 mg/dL    Creatinine 1.32 (H) 0.60 - 1.30 mg/dL    Glucose, Fasting 94 65 - 99 mg/dL    Calcium 9.9 8.4 - 10.2 mg/dL    AST 33 13 - 39 U/L    ALT 42 7 - 52 U/L    Alkaline Phosphatase 45 34 - 104 U/L    Total Protein 7.6 6.4 - 8.4 g/dL    Albumin 4.3 3.5 - 5.0 g/dL    Total Bilirubin 0.62 0.20 - 1.00 mg/dL    eGFR 53 ml/min/1.73sq m   Phosphorus    Collection Time: 06/28/24  8:00 AM   Result Value Ref Range    Phosphorus 4.3 (H) 2.3 - 4.1 mg/dL   PSA Total, Diagnostic    Collection Time: 06/28/24  8:00 AM   Result Value Ref Range    PSA, Diagnostic 2.212 0.000 - 4.000 ng/mL   CBC and differential    Collection Time: 06/28/24  8:00 AM   Result Value Ref Range    WBC 5.55 4.31 - 10.16 Thousand/uL    RBC 5.04 3.88 - 5.62 Million/uL    Hemoglobin 15.3 12.0 - 17.0 g/dL    Hematocrit 47.2 36.5 - 49.3 %    MCV 94 82 - 98 fL    MCH 30.4 26.8 - 34.3 pg    MCHC 32.4 31.4 - 37.4 g/dL    RDW 14.0 11.6 - 15.1 %    MPV 9.4 8.9 - 12.7 fL    Platelets 269 149 - 390 Thousands/uL    nRBC 0 /100 WBCs    Segmented % 60 43 - 75 %    Immature Grans % 0 0 - 2 %    Lymphocytes % 23 14 - 44 %    Monocytes % 12 4 - 12 %    Eosinophils Relative 4 0 - 6 %    Basophils Relative 1 0 - 1 %    Absolute Neutrophils 3.36 1.85 - 7.62 Thousands/µL    Absolute Immature Grans 0.01 0.00 - 0.20 Thousand/uL    Absolute Lymphocytes 1.27 0.60 - 4.47 Thousands/µL    Absolute Monocytes 0.67 0.17 - 1.22 Thousand/µL    Eosinophils Absolute 0.21 0.00 - 0.61 Thousand/µL    Basophils  Absolute 0.03 0.00 - 0.10 Thousands/µL   Protime-INR    Collection Time: 06/28/24  8:00 AM   Result Value Ref Range    Protime 12.5 11.6 - 14.5 seconds    INR 0.92 0.84 - 1.19   APTT    Collection Time: 06/28/24  8:00 AM   Result Value Ref Range    PTT 30 23 - 37 seconds   MRSA culture    Collection Time: 06/28/24  8:00 AM    Specimen: Nose; Nares   Result Value Ref Range    MRSA Culture Only       No Methicillin Resistant Staphlyococcus aureus (MRSA) isolated   Hemoglobin A1C    Collection Time: 06/28/24  8:00 AM   Result Value Ref Range    Hemoglobin A1C 6.0 (H) Normal 4.0-5.6%; PreDiabetic 5.7-6.4%; Diabetic >=6.5%; Glycemic control for adults with diabetes <7.0% %     mg/dl           Assessment & Recommendations:     1. Primary osteoarthritis of both knees  2. Primary osteoarthritis of right knee  -     Ambulatory referral to Family Practice  3. Chronic pain of right knee  -     Ambulatory referral to Family Practice  4. Pre-op exam  -     Ambulatory referral to Family Practice  5. Pre-op examination  6. Benign hypertension with CKD (chronic kidney disease) stage III (HCC)  7. Mixed hyperlipidemia  8. Prediabetes  Assessment & Plan:  A1c is 6.0  9. Stage 3a chronic kidney disease (HCC)  10. Primary hypertension  Assessment & Plan:  Pt states he ran out of his Norvasc and states his BP improved being off the med so he stopped it on his own  11. Mixed conductive and sensorineural hearing loss, bilateral  Assessment & Plan:  Has not improved much with his surgery          Pre-Op Evaluation Assessment  73 y.o. male with planned surgery: rt knee replacement.    Known risk factors for perioperative complications: None.        Current medications which may produce withdrawal symptoms if withheld perioperatively: none.    Pre-Op Evaluation Plan  1. Further preoperative workup as follows:   - None; no further preoperative work-up is required    2. Medication Management/Recommendations:   - Patient has been  instructed to avoid herbs or non-directed vitamins the week prior to surgery to ensure no drug interactions with perioperative surgical and anesthetic medications.  - Patient has been instructed to avoid aspirin containing medications or non-steroidal anti-inflammatory drugs for the week preceding surgery.  Hold ptotassium Am of the surgery  3. Prophylaxis for cardiac events with perioperative beta-blockers: not indicated.    4. Patient requires further consultation with: None    Clearance  Patient is CLEARED for surgery without any additional cardiac testing.     Frank LombardiBryn Mawr Rehabilitation Hospital  [address] [address]  Phone#  [phone]  Fax#  [phone]

## 2024-07-25 ENCOUNTER — OFFICE VISIT (OUTPATIENT)
Dept: NEUROSURGERY | Facility: CLINIC | Age: 74
End: 2024-07-25

## 2024-07-25 VITALS
OXYGEN SATURATION: 97 % | TEMPERATURE: 98.1 F | HEART RATE: 96 BPM | BODY MASS INDEX: 36.41 KG/M2 | RESPIRATION RATE: 18 BRPM | DIASTOLIC BLOOD PRESSURE: 84 MMHG | SYSTOLIC BLOOD PRESSURE: 146 MMHG | HEIGHT: 67 IN | WEIGHT: 232 LBS

## 2024-07-25 DIAGNOSIS — Q16.5 TEGMEN DEFECT OF BASE OF SKULL: ICD-10-CM

## 2024-07-25 DIAGNOSIS — G96.01 CSF OTORRHEA: Primary | ICD-10-CM

## 2024-07-25 PROCEDURE — 99024 POSTOP FOLLOW-UP VISIT: CPT | Performed by: NEUROLOGICAL SURGERY

## 2024-07-25 NOTE — PROGRESS NOTES
"Neurosurgery Office Note  Swapnil Grover 73 y.o. male MRN: [id number]      Assessment & Plan        Problem List Items Addressed This Visit       Tegmen defect of base of skull     Other Visit Diagnoses       CSF otorrhea    -  Primary              Discussion:  Patient was previously evaluated on 5/14/2024 and 6/7/24.     He is a 73-year-old male with h/o left craniotomy for SDH evacuation in 1992 in [place] (no reported complications) who p/w several months of progressive worsening bilateral hearing loss, fullness, and CSF leak (\"postnasal drip\") s/p bilateral endoscopic middle fossa craniotomies for repair of tegmen defect and CSF leak with temporalis muscle flap and intraoperative lumbar drain placement on 4/29/24, c/b recurrent CSF otorrhea after LD removal requiring replacement of LD in OR on 5/3/24, which stopped functioning then was removed on 5/4, discharged home on 5/6.    Today, he reports no new neurologic symptoms or deficits including CSF leak in bilateral ears. L hearing has improved, R hearing unchanged. He is doing well and has no complaints (other than chronic right knee pain, waiting for surgery).     Incisions are healing well without erythema, edema, dehiscence, drainage, or underlying fluid collection.      Prior LD sites CDI. No new radicular symptoms in legs.     At this time, I have no acute neurosurgical or postoperative concerns. Continue close postoperative follow-up, next for 6-month POV (no imaging unless clinically indicated).      I previously provided clearance for right knee surgery per Dr. Moscoso, now scheduled next week on 7/30/24.     All questions and concerns were addressed during this visit.          CHIEF COMPLAINT    Chief Complaint   Patient presents with    Post-op     3-month POV       HISTORY    History of Present Illness     73 y.o. year old male     HPI    See Discussion    REVIEW OF SYSTEMS    Review of Systems   Constitutional: Negative.    HENT:  Positive for hearing " loss (b/l varies, left ear worse than right).    Eyes: Negative.    Respiratory: Negative.     Cardiovascular: Negative.    Gastrointestinal: Negative.    Endocrine: Negative.    Genitourinary: Negative.    Musculoskeletal: Negative.    Skin: Negative.    Allergic/Immunologic: Negative.    Neurological: Negative.    Hematological: Negative.    Psychiatric/Behavioral: Negative.           Meds/Allergies     Current Outpatient Medications   Medication Sig Dispense Refill    Alpha-D-Galactosidase (RA DIGESTIVE AID PO) Take by mouth every morning      Ascorbic Acid (Vitamin C) 500 MG CAPS Take 500 mg by mouth 2 (two) times a day      b complex vitamins tablet Take 1 tablet by mouth 2 pills twice daily      BETA CAROTENE PO Take 1,500 mg by mouth every morning Pro A      CALCIUM PO Take 520 mg by mouth every morning      cholecalciferol (VITAMIN D3) 25 mcg (1,000 units) tablet Take 1,000 Units by mouth daily      ferrous fumarate (KANG-SEQUELS) 18 MG CR tablet Take 18 mg by mouth every morning      folic acid (FOLVITE) 400 mcg tablet Take 400 mcg by mouth daily      potassium citrate (UROCIT-K) 10 mEq Take 2 tablets (20 mEq total) by mouth 3 (three) times a day with meals 180 tablet 5    VITAMIN D PO Take by mouth every morning With calcium      VITAMIN E PO Take 268 mg by mouth daily      amoxicillin (AMOXIL) 500 MG tablet Take 4 tablets (2,000 mg total) by mouth 60 minutes pre-procedure (Patient not taking: Reported on 7/25/2024) 4 tablet 2    ferrous sulfate 324 MG TBEC Take 324 mg by mouth daily with breakfast (Patient not taking: Reported on 7/25/2024)      Zinc 220 (50 Zn) MG CAPS Take by mouth in the morning (Patient not taking: Reported on 7/25/2024)       No current facility-administered medications for this visit.       Allergies   Allergen Reactions    Other Nasal Congestion     Seasonal allergies       PAST HISTORY    Past Medical History:   Diagnosis Date    Anesthesia complication 2022    difficulty waking  up-pt woke up with a CPAP    Arthritis     BPH (benign prostatic hyperplasia)     Breathing difficulty 12/01/2022    CPAP given per pt-s/p surgery    Cancer (HCC) 2015    basal cell of the skull     Chronic kidney disease     Colon polyp     Decreased hearing of both ears 04/29/2024    had surgery for correction    Heart failure (HCC)     12/2/22-pt had ECHO-per pt R. sided block    History of subdural hematoma     Hypertension     Kidney stone     right stone    Renal disorder     Sleep apnea     mild- no CPAP-had nasal septoplasty    Wears glasses     reading       Past Surgical History:   Procedure Laterality Date    BASAL CELL CARCINOMA EXCISION  2015    skull    BRAIN SURGERY      in the 1990's-subdural hematoma    CATARACT EXTRACTION Left     COLONOSCOPY      x2    CYSTOSCOPY W/ LASER LITHOTRIPSY Right 11/27/2020    Procedure: CYSTOSCOPY, FLEXIBLE URETEROSCOPY, LASER, AND STENT EXCHANGE,STONE BASKET, RIGHT RETROGRADE;  Surgeon: Sabino Garcia MD;  Location: [facility];  Service: Urology    FL RETROGRADE PYELOGRAM  10/18/2020    FL RETROGRADE PYELOGRAM  11/27/2020    FL RETROGRADE PYELOGRAM  12/01/2022    FL RETROGRADE PYELOGRAM  12/22/2022    JOINT REPLACEMENT  1/23/2024    LITHOTRIPSY      LUMBAR EPIDURAL INJECTION Bilateral 5/3/2024    Procedure: Lumbar subarachnoid drain placement;  Surgeon: Billy August MD;  Location:  MAIN OR;  Service: Neurosurgery    WI ARTHRP KNE CONDYLE&PLATU MEDIAL&LAT COMPARTMENTS Left 01/23/2024    Procedure: ARTHROPLASTY KNEE TOTAL W ROBOT - overnight, cemented;  Surgeon: Bassem Moscoso DO;  Location: [facility];  Service: Orthopedics    WI CYSTO BLADDER W/URETERAL CATHETERIZATION Right 11/11/2020    Procedure: ESWL RIGHT;  Surgeon: Sabino Garcia MD;  Location: [facility];  Service: Urology    WI CYSTO BLADDER W/URETERAL CATHETERIZATION Right 12/01/2022    Procedure: CYSTOSCOPY RETROGRADE PYELOGRAM WITH INSERTION STENT URETERAL;  Surgeon: Sabino Garcia MD;  Location: [facility];  Service: Urology    MA CYSTO/URETERO W/LITHOTRIPSY &INDWELL STENT INSRT Right 10/18/2020    Procedure: CYSTOSCOPY URETEROSCOPY WITH BASKET EXTRACTION, RETROGRADE PYELOGRAM AND INSERTION STENT URETERAL;  Surgeon: Kris Ac MD;  Location: [facility];  Service: Urology    MA CYSTO/URETERO W/LITHOTRIPSY &INDWELL STENT INSRT Right 12/22/2022    Procedure: CYSTOSCOPY URETEROSCOPY WITH LITHOTRIPSY HOLMIUM LASER, RETROGRADE PYELOGRAM AND INSERTION STENT URETERAL;  Surgeon: Sabino Garcia MD;  Location: [facility];  Service: Urology    RETROMASTOID CRANIOTOMY Bilateral 4/29/2024    Procedure: CRANIOTOMY MIDDLE FOSSA,RETROMASTOID APPROACH FOR ENT;  Surgeon: Frances Wasserman MD;  Location:  MAIN OR;  Service: ENT    SEPTOPLASTY      in the 1990's-trimmed uvula    TEGMAN DEFECT REPAIR CSF LEAK Bilateral 4/29/2024    Procedure: Bilateral endoscopic middle fossa craniotomies for repair of tegmen defect and CSF leak with temporalis fascia muscle flap, with neuromonitoring (CN 7/8) and intraoperative lumbar drain placement;  Surgeon: Billy August MD;  Location:  MAIN OR;  Service: Neurosurgery    TONSILLECTOMY  1955    age 6       Social History     Tobacco Use    Smoking status: Never     Passive exposure: Never    Smokeless tobacco: Never   Vaping Use    Vaping status: Never Used   Substance Use Topics    Alcohol use: Yes     Alcohol/week: 1.0 standard drink of alcohol     Types: 1 Standard drinks or equivalent per week     Comment: 1 drink a week    Drug use: Never       Family History   Problem Relation Age of Onset    Hypertension Mother     Kidney disease Mother         renal failure-dialysis    Hypertension Father     Stroke Father     Diabetes Sister     Kidney disease Sister         self dialysis at home         The following portions of the patient's history were reviewed in this encounter and updated as appropriate: Past medical, surgical, family, and social history, as well as medications,  "allergies, and review of systems.      EXAM    Vitals:Blood pressure 146/84, pulse 96, temperature 98.1 °F (36.7 °C), temperature source Temporal, resp. rate 18, height 5' 7\" (1.702 m), weight 105 kg (232 lb), SpO2 97%.,Body mass index is 36.34 kg/m².     Physical Exam  Vitals and nursing note reviewed.   Constitutional:       Appearance: Normal appearance. He is normal weight.   HENT:      Head: Normocephalic and atraumatic.   Eyes:      Extraocular Movements: Extraocular movements intact and EOM normal.      Pupils: Pupils are equal, round, and reactive to light.   Cardiovascular:      Rate and Rhythm: Normal rate and regular rhythm.      Pulses: Normal pulses.      Heart sounds: Normal heart sounds.   Pulmonary:      Effort: Pulmonary effort is normal.      Breath sounds: Normal breath sounds.   Abdominal:      General: Abdomen is flat.      Palpations: Abdomen is soft.   Musculoskeletal:         General: Normal range of motion.      Cervical back: Normal range of motion.   Neurological:      General: No focal deficit present.      Mental Status: He is alert and oriented to person, place, and time. Mental status is at baseline.      GCS: GCS eye subscore is 4. GCS verbal subscore is 5. GCS motor subscore is 6.      Sensory: Sensation is intact.      Motor: Motor strength is normal.Motor function is intact.      Coordination: Coordination is intact.      Gait: Gait is intact.      Deep Tendon Reflexes: Reflexes are normal and symmetric.   Psychiatric:         Mood and Affect: Mood normal.         Speech: Speech normal.         Behavior: Behavior normal.         Neurologic Exam     Mental Status   Oriented to person, place, and time.   Attention: normal.   Speech: speech is normal   Level of consciousness: alert    Cranial Nerves     CN II   Visual fields full to confrontation.   Visual acuity: normal  Right visual field deficit: none  Left visual field deficit: none     CN III, IV, VI   Pupils are equal, round, " and reactive to light.  Extraocular motions are normal.   CN III: no CN III palsy  CN VI: no CN VI palsy  Nystagmus: none   Diplopia: none  Ophthalmoparesis: none  Upgaze: normal  Downgaze: normal  Conjugate gaze: present    CN V   Facial sensation intact.   Right facial sensation deficit: none  Left facial sensation deficit: none    CN VII   Facial expression full, symmetric.   Right facial weakness: none  Left facial weakness: none    CN IX, X   CN IX normal.   CN X normal.   Palate: symmetric    CN XI   CN XI normal.   Right sternocleidomastoid strength: normal  Left sternocleidomastoid strength: normal  Right trapezius strength: normal  Left trapezius strength: normal    CN XII   CN XII normal.   Tongue deviation: none  Baseline hearing loss unchanged     Motor Exam   Muscle bulk: normal  Overall muscle tone: normal  Right arm pronator drift: absent  Left arm pronator drift: absent    Strength   Strength 5/5 throughout.     Sensory Exam   Light touch normal.     Gait, Coordination, and Reflexes     Gait  Gait: normal    Reflexes   Reflexes 2+ except as noted.   No CSF leak in bilateral ears  No nuchal rigidity   Incisions are healing well      MEDICAL DECISION MAKING    Imaging Studies:     No results found.    I have personally reviewed pertinent reports.   and I have personally reviewed pertinent films in PACS      Billy August M.D.  Neurosurgeon

## 2024-07-29 NOTE — DISCHARGE INSTR - AVS FIRST PAGE
Total Knee Replacement     WHAT YOU SHOULD KNOW:   Total knee replacement is surgery to replace a knee joint damaged by wear, injury, or disease. It is also called a total knee arthroplasty. The knee joint is where your femur (thigh bone) and tibia (large lower leg bone or shin bone) meet. A small bone called the patella (kneecap) protects your knee joint.            AFTER YOU LEAVE:     Medicines:   Pain medicine:  You may be given a prescription medicine to decrease pain. Do not wait until the pain is severe before you take this medicine. We recommend that you take Tylenol 975mg every 8 hours for baseline pain control. Oxycodone can be used as needed, but no more than 1-2 tablets every 6 hours.    Stool softeners  make it easier for you to have a bowel movement. You may need this medicine to treat or prevent constipation.  You will be given Colace 100mg to take twice a day    Anticoagulants  are a type of blood thinner medicine that helps prevent clots. Clots can cause strokes, heart attacks, and death. These medicines may cause you to bleed or bruise more easily.  You will be given aspirin 325 mg to take twice a day for 6 weeks after surgery.    Watch for bleeding from your gums or nose. Watch for blood in your urine and bowel movements. Use a soft washcloth and a soft toothbrush. If you shave, use an electric razor. Avoid activities that can cause bruising or bleeding.    Take your medicine as directed.  Call your healthcare provider if you think your medicine is not helping or if you have side effects. Tell him if you are allergic to any medicine. Keep a list of the medicines, vitamins, and herbs you take. Include the amounts, and when and why you take them. Bring the list or the pill bottles to follow-up visits. Carry your medicine list with you in case of an emergency.    Follow up with your orthopedist as directed:  You may need to return to have your wound checked and stitches, staples, or drain removed.  Write down your questions so you remember to ask them during your visits.  Please make an appointment to see Dr. Moscoso 2 weeks postoperatively.    Physical therapy:  A physical therapist teaches you exercises to help improve movement and strength, and to decrease pain. You will begin outpatient physical therapy later this week as planned.     Self-care:   Wound Care:  Please leave the Mepilex dressing in place for 7 days after surgery. After 7 days, you may remove the dressing and leave the incision open to air.  If the incision is uncomfortable under clothing after the Mepilex is removed, you may cover it with a a dry sterile dressing. Once the Mepilex dressing has been removed, please keep the incision dry for another week until your follow up appointment.    Use ice:  Ice helps decrease swelling and pain. Ice may also help prevent tissue damage. Use an ice pack or put crushed ice in a plastic bag. Cover it with a towel, and place it on your knee for 15 to 20 minutes every hour as directed.    Use a cane, walker, or crutches as directed:  These devices will help decrease your risk of falling. Your therapist will guide you about transitioning from a walker to cane to no assistive device.    Wear pressure stockings:  These are long, tight stockings that put pressure on your legs to promote blood flow and prevent clots. You may remove the stocking on your non-operative leg when you get home. The stocking on your operative leg should remain on for 2-3 days. Afterwards, you may put the stocking on or off as needed for swelling.             Contact your orthopedist if:  You have a fever over 101.0°F.    You have trouble moving or bending your joint.    You have increased pain and swelling in your joint, even after you take pain medicine.    You have back pain or lower leg pain when you bend your foot upwards.    You have questions or concerns about your condition or care.    Blood soaks through/saturates your  bandage.    Your incision comes apart.    Your incision is red, swollen, or draining pus.    You cannot walk or move your joint, or the limb is numb.    Your leg feels warm, tender, and painful. It may look swollen and red.     Seek immediate care or call 911 if:   You feel like you are going to faint.    You have a seizure or feel confused.     You feel lightheaded, short of breath, and have chest pain.    You have chest pain when you take a deep breath or cough. You may cough up blood.    © 2014 [employer]. Information is for End User's use only and may not be sold, redistributed or otherwise used for commercial purposes. All illustrations and images included in CareNotes® are the copyrighted property of [employer] or [employer].  The above information is an  only. It is not intended as medical advice for individual conditions or treatments. Talk to your doctor, nurse or pharmacist before following any medical regimen to see if it is safe and effective for you.

## 2024-07-30 ENCOUNTER — HOSPITAL ENCOUNTER (INPATIENT)
Facility: HOSPITAL | Age: 74
LOS: 1 days | Discharge: HOME | DRG: 470 | End: 2024-07-31
Attending: ORTHOPAEDIC SURGERY | Admitting: ORTHOPAEDIC SURGERY
Payer: MEDICARE

## 2024-07-30 ENCOUNTER — ANESTHESIA (OUTPATIENT)
Dept: PERIOP | Facility: HOSPITAL | Age: 74
DRG: 470 | End: 2024-07-30
Payer: MEDICARE

## 2024-07-30 ENCOUNTER — APPOINTMENT (INPATIENT)
Dept: RADIOLOGY | Facility: HOSPITAL | Age: 74
DRG: 470 | End: 2024-07-30
Payer: MEDICARE

## 2024-07-30 DIAGNOSIS — Z96.651 S/P TOTAL KNEE REPLACEMENT USING CEMENT, RIGHT: Primary | ICD-10-CM

## 2024-07-30 LAB
GLUCOSE SERPL-MCNC: 101 MG/DL (ref 65–140)
GLUCOSE SERPL-MCNC: 153 MG/DL (ref 65–140)
GLUCOSE SERPL-MCNC: 83 MG/DL (ref 65–140)

## 2024-07-30 PROCEDURE — 82948 REAGENT STRIP/BLOOD GLUCOSE: CPT

## 2024-07-30 PROCEDURE — 97110 THERAPEUTIC EXERCISES: CPT

## 2024-07-30 PROCEDURE — C1776 JOINT DEVICE (IMPLANTABLE): HCPCS | Performed by: ORTHOPAEDIC SURGERY

## 2024-07-30 PROCEDURE — 27447 TOTAL KNEE ARTHROPLASTY: CPT | Performed by: ORTHOPAEDIC SURGERY

## 2024-07-30 PROCEDURE — 97163 PT EVAL HIGH COMPLEX 45 MIN: CPT

## 2024-07-30 PROCEDURE — S2900 ROBOTIC SURGICAL SYSTEM: HCPCS | Performed by: ORTHOPAEDIC SURGERY

## 2024-07-30 PROCEDURE — C9290 INJ, BUPIVACAINE LIPOSOME: HCPCS | Performed by: ANESTHESIOLOGY

## 2024-07-30 PROCEDURE — 8E0Y0CZ ROBOTIC ASSISTED PROCEDURE OF LOWER EXTREMITY, OPEN APPROACH: ICD-10-PCS | Performed by: ORTHOPAEDIC SURGERY

## 2024-07-30 PROCEDURE — 73560 X-RAY EXAM OF KNEE 1 OR 2: CPT

## 2024-07-30 PROCEDURE — 0SRC0J9 REPLACEMENT OF RIGHT KNEE JOINT WITH SYNTHETIC SUBSTITUTE, CEMENTED, OPEN APPROACH: ICD-10-PCS | Performed by: ORTHOPAEDIC SURGERY

## 2024-07-30 DEVICE — ATTUNE PATELLA MEDIALIZED DOME 38MM CEMENTED AOX
Type: IMPLANTABLE DEVICE | Site: KNEE | Status: FUNCTIONAL
Brand: ATTUNE

## 2024-07-30 DEVICE — ATTUNE KNEE SYSTEM TIBIAL BASE FIXED BEARING SIZE 6 CEMENTED
Type: IMPLANTABLE DEVICE | Site: KNEE | Status: FUNCTIONAL
Brand: ATTUNE

## 2024-07-30 DEVICE — ATTUNE KNEE SYSTEM TIBIAL INSERT FIXED BEARING MEDIAL STABILIZED RIGHT AOX 6, 8MM
Type: IMPLANTABLE DEVICE | Site: KNEE | Status: FUNCTIONAL
Brand: ATTUNE

## 2024-07-30 DEVICE — ATTUNE KNEE SYSTEM FEMORAL CRUCIATE RETAINING SIZE 6 RIGHT CEMENTED
Type: IMPLANTABLE DEVICE | Site: KNEE | Status: FUNCTIONAL
Brand: ATTUNE

## 2024-07-30 RX ORDER — OXYCODONE HYDROCHLORIDE 5 MG/1
5 TABLET ORAL EVERY 4 HOURS PRN
Status: DISCONTINUED | OUTPATIENT
Start: 2024-07-30 | End: 2024-07-31 | Stop reason: HOSPADM

## 2024-07-30 RX ORDER — ASPIRIN 325 MG
325 TABLET ORAL 2 TIMES DAILY
Status: DISCONTINUED | OUTPATIENT
Start: 2024-07-30 | End: 2024-07-31 | Stop reason: HOSPADM

## 2024-07-30 RX ORDER — GABAPENTIN 300 MG/1
300 CAPSULE ORAL ONCE
Status: COMPLETED | OUTPATIENT
Start: 2024-07-30 | End: 2024-07-30

## 2024-07-30 RX ORDER — GABAPENTIN 100 MG/1
200 CAPSULE ORAL 2 TIMES DAILY
Status: DISCONTINUED | OUTPATIENT
Start: 2024-07-30 | End: 2024-07-31 | Stop reason: HOSPADM

## 2024-07-30 RX ORDER — MAGNESIUM HYDROXIDE/ALUMINUM HYDROXICE/SIMETHICONE 120; 1200; 1200 MG/30ML; MG/30ML; MG/30ML
30 SUSPENSION ORAL EVERY 6 HOURS PRN
Status: DISCONTINUED | OUTPATIENT
Start: 2024-07-30 | End: 2024-07-31 | Stop reason: HOSPADM

## 2024-07-30 RX ORDER — ACETAMINOPHEN 325 MG/1
975 TABLET ORAL ONCE
Status: COMPLETED | OUTPATIENT
Start: 2024-07-30 | End: 2024-07-30

## 2024-07-30 RX ORDER — PANTOPRAZOLE SODIUM 40 MG/1
40 TABLET, DELAYED RELEASE ORAL DAILY
Status: DISCONTINUED | OUTPATIENT
Start: 2024-07-31 | End: 2024-07-31 | Stop reason: HOSPADM

## 2024-07-30 RX ORDER — DEXAMETHASONE SODIUM PHOSPHATE 4 MG/ML
10 INJECTION, SOLUTION INTRA-ARTICULAR; INTRALESIONAL; INTRAMUSCULAR; INTRAVENOUS; SOFT TISSUE ONCE
Status: COMPLETED | OUTPATIENT
Start: 2024-07-31 | End: 2024-07-31

## 2024-07-30 RX ORDER — PROPOFOL 10 MG/ML
INJECTION, EMULSION INTRAVENOUS AS NEEDED
Status: DISCONTINUED | OUTPATIENT
Start: 2024-07-30 | End: 2024-07-30

## 2024-07-30 RX ORDER — PHENYLEPHRINE HCL IN 0.9% NACL 1 MG/10 ML
SYRINGE (ML) INTRAVENOUS AS NEEDED
Status: DISCONTINUED | OUTPATIENT
Start: 2024-07-30 | End: 2024-07-30

## 2024-07-30 RX ORDER — OXYCODONE HCL 10 MG/1
10 TABLET, FILM COATED, EXTENDED RELEASE ORAL ONCE
Status: COMPLETED | OUTPATIENT
Start: 2024-07-30 | End: 2024-07-30

## 2024-07-30 RX ORDER — MIDAZOLAM HYDROCHLORIDE 2 MG/2ML
INJECTION, SOLUTION INTRAMUSCULAR; INTRAVENOUS AS NEEDED
Status: DISCONTINUED | OUTPATIENT
Start: 2024-07-30 | End: 2024-07-30

## 2024-07-30 RX ORDER — OXYCODONE HYDROCHLORIDE 10 MG/1
10 TABLET ORAL EVERY 4 HOURS PRN
Status: DISCONTINUED | OUTPATIENT
Start: 2024-07-30 | End: 2024-07-31 | Stop reason: HOSPADM

## 2024-07-30 RX ORDER — ONDANSETRON 2 MG/ML
4 INJECTION INTRAMUSCULAR; INTRAVENOUS ONCE AS NEEDED
Status: DISCONTINUED | OUTPATIENT
Start: 2024-07-30 | End: 2024-07-30 | Stop reason: HOSPADM

## 2024-07-30 RX ORDER — BUPIVACAINE HYDROCHLORIDE 7.5 MG/ML
INJECTION, SOLUTION INTRASPINAL AS NEEDED
Status: DISCONTINUED | OUTPATIENT
Start: 2024-07-30 | End: 2024-07-30

## 2024-07-30 RX ORDER — SODIUM CHLORIDE 9 MG/ML
75 INJECTION, SOLUTION INTRAVENOUS CONTINUOUS
Status: DISCONTINUED | OUTPATIENT
Start: 2024-07-30 | End: 2024-07-31

## 2024-07-30 RX ORDER — DOCUSATE SODIUM 100 MG/1
100 CAPSULE, LIQUID FILLED ORAL 2 TIMES DAILY
Status: DISCONTINUED | OUTPATIENT
Start: 2024-07-30 | End: 2024-07-31 | Stop reason: HOSPADM

## 2024-07-30 RX ORDER — BUPIVACAINE HYDROCHLORIDE 2.5 MG/ML
INJECTION, SOLUTION EPIDURAL; INFILTRATION; INTRACAUDAL AS NEEDED
Status: DISCONTINUED | OUTPATIENT
Start: 2024-07-30 | End: 2024-07-30 | Stop reason: HOSPADM

## 2024-07-30 RX ORDER — CEFAZOLIN SODIUM 2 G/50ML
2000 SOLUTION INTRAVENOUS ONCE
Status: COMPLETED | OUTPATIENT
Start: 2024-07-30 | End: 2024-07-30

## 2024-07-30 RX ORDER — POTASSIUM CITRATE 10 MEQ/1
20 TABLET, EXTENDED RELEASE ORAL
Status: DISCONTINUED | OUTPATIENT
Start: 2024-07-30 | End: 2024-07-31 | Stop reason: HOSPADM

## 2024-07-30 RX ORDER — CEFAZOLIN SODIUM 2 G/50ML
2000 SOLUTION INTRAVENOUS EVERY 8 HOURS
Status: COMPLETED | OUTPATIENT
Start: 2024-07-30 | End: 2024-07-31

## 2024-07-30 RX ORDER — SODIUM CHLORIDE, SODIUM LACTATE, POTASSIUM CHLORIDE, CALCIUM CHLORIDE 600; 310; 30; 20 MG/100ML; MG/100ML; MG/100ML; MG/100ML
100 INJECTION, SOLUTION INTRAVENOUS CONTINUOUS
Status: CANCELLED | OUTPATIENT
Start: 2024-07-30

## 2024-07-30 RX ORDER — ONDANSETRON 2 MG/ML
4 INJECTION INTRAMUSCULAR; INTRAVENOUS EVERY 6 HOURS PRN
Status: DISCONTINUED | OUTPATIENT
Start: 2024-07-30 | End: 2024-07-31 | Stop reason: HOSPADM

## 2024-07-30 RX ORDER — TRANEXAMIC ACID 10 MG/ML
1000 INJECTION, SOLUTION INTRAVENOUS ONCE
Status: COMPLETED | OUTPATIENT
Start: 2024-07-30 | End: 2024-07-30

## 2024-07-30 RX ORDER — PROPOFOL 10 MG/ML
INJECTION, EMULSION INTRAVENOUS CONTINUOUS PRN
Status: DISCONTINUED | OUTPATIENT
Start: 2024-07-30 | End: 2024-07-30

## 2024-07-30 RX ORDER — LIDOCAINE HYDROCHLORIDE 10 MG/ML
INJECTION, SOLUTION EPIDURAL; INFILTRATION; INTRACAUDAL; PERINEURAL AS NEEDED
Status: DISCONTINUED | OUTPATIENT
Start: 2024-07-30 | End: 2024-07-30

## 2024-07-30 RX ORDER — ACETAMINOPHEN 325 MG/1
975 TABLET ORAL EVERY 8 HOURS
Status: DISCONTINUED | OUTPATIENT
Start: 2024-07-30 | End: 2024-07-31 | Stop reason: HOSPADM

## 2024-07-30 RX ORDER — MAGNESIUM HYDROXIDE 1200 MG/15ML
LIQUID ORAL AS NEEDED
Status: DISCONTINUED | OUTPATIENT
Start: 2024-07-30 | End: 2024-07-30 | Stop reason: HOSPADM

## 2024-07-30 RX ORDER — HYDROMORPHONE HCL/PF 1 MG/ML
0.5 SYRINGE (ML) INJECTION EVERY 2 HOUR PRN
Status: DISCONTINUED | OUTPATIENT
Start: 2024-07-30 | End: 2024-07-31 | Stop reason: HOSPADM

## 2024-07-30 RX ORDER — SODIUM CHLORIDE, SODIUM LACTATE, POTASSIUM CHLORIDE, CALCIUM CHLORIDE 600; 310; 30; 20 MG/100ML; MG/100ML; MG/100ML; MG/100ML
125 INJECTION, SOLUTION INTRAVENOUS CONTINUOUS
Status: DISCONTINUED | OUTPATIENT
Start: 2024-07-30 | End: 2024-07-30

## 2024-07-30 RX ORDER — FENTANYL CITRATE/PF 50 MCG/ML
50 SYRINGE (ML) INJECTION
Status: DISCONTINUED | OUTPATIENT
Start: 2024-07-30 | End: 2024-07-30 | Stop reason: HOSPADM

## 2024-07-30 RX ORDER — CHLORHEXIDINE GLUCONATE ORAL RINSE 1.2 MG/ML
15 SOLUTION DENTAL ONCE
Status: COMPLETED | OUTPATIENT
Start: 2024-07-30 | End: 2024-07-30

## 2024-07-30 RX ORDER — BUPIVACAINE HYDROCHLORIDE 5 MG/ML
INJECTION, SOLUTION EPIDURAL; INTRACAUDAL
Status: DISCONTINUED | OUTPATIENT
Start: 2024-07-30 | End: 2024-07-30

## 2024-07-30 RX ADMIN — PROPOFOL 80 MCG/KG/MIN: 10 INJECTION, EMULSION INTRAVENOUS at 11:27

## 2024-07-30 RX ADMIN — MIDAZOLAM 2 MG: 1 INJECTION INTRAMUSCULAR; INTRAVENOUS at 11:36

## 2024-07-30 RX ADMIN — ACETAMINOPHEN 975 MG: 325 TABLET ORAL at 18:08

## 2024-07-30 RX ADMIN — CHLORHEXIDINE GLUCONATE 15 ML: 1.2 RINSE ORAL at 09:03

## 2024-07-30 RX ADMIN — CEFAZOLIN SODIUM 2000 MG: 2 SOLUTION INTRAVENOUS at 21:15

## 2024-07-30 RX ADMIN — SODIUM CHLORIDE, SODIUM LACTATE, POTASSIUM CHLORIDE, AND CALCIUM CHLORIDE: .6; .31; .03; .02 INJECTION, SOLUTION INTRAVENOUS at 13:27

## 2024-07-30 RX ADMIN — Medication 50 MCG: at 12:17

## 2024-07-30 RX ADMIN — SODIUM CHLORIDE, SODIUM LACTATE, POTASSIUM CHLORIDE, AND CALCIUM CHLORIDE 125 ML/HR: .6; .31; .03; .02 INJECTION, SOLUTION INTRAVENOUS at 09:02

## 2024-07-30 RX ADMIN — SODIUM CHLORIDE 75 ML/HR: 0.9 INJECTION, SOLUTION INTRAVENOUS at 18:14

## 2024-07-30 RX ADMIN — GABAPENTIN 200 MG: 100 CAPSULE ORAL at 18:09

## 2024-07-30 RX ADMIN — POTASSIUM CITRATE 20 MEQ: 10 TABLET, EXTENDED RELEASE ORAL at 18:17

## 2024-07-30 RX ADMIN — ASPIRIN 325 MG: 325 TABLET ORAL at 21:14

## 2024-07-30 RX ADMIN — CEFAZOLIN SODIUM 2000 MG: 2 SOLUTION INTRAVENOUS at 11:25

## 2024-07-30 RX ADMIN — PROPOFOL 30 MG: 10 INJECTION, EMULSION INTRAVENOUS at 11:27

## 2024-07-30 RX ADMIN — OXYCODONE HYDROCHLORIDE 10 MG: 10 TABLET, FILM COATED, EXTENDED RELEASE ORAL at 09:03

## 2024-07-30 RX ADMIN — MIDAZOLAM 2 MG: 1 INJECTION INTRAMUSCULAR; INTRAVENOUS at 11:12

## 2024-07-30 RX ADMIN — LIDOCAINE HYDROCHLORIDE 50 MG: 10 INJECTION, SOLUTION EPIDURAL; INFILTRATION; INTRACAUDAL; PERINEURAL at 11:27

## 2024-07-30 RX ADMIN — Medication 100 MCG: at 12:05

## 2024-07-30 RX ADMIN — TRANEXAMIC ACID 1000 MG: 10 INJECTION, SOLUTION INTRAVENOUS at 11:41

## 2024-07-30 RX ADMIN — BUPIVACAINE HYDROCHLORIDE IN DEXTROSE 1.6 ML: 7.5 INJECTION, SOLUTION SUBARACHNOID at 11:23

## 2024-07-30 RX ADMIN — BUPIVACAINE 10 ML: 13.3 INJECTION, SUSPENSION, LIPOSOMAL INFILTRATION at 14:21

## 2024-07-30 RX ADMIN — ACETAMINOPHEN 975 MG: 325 TABLET ORAL at 09:03

## 2024-07-30 RX ADMIN — GABAPENTIN 300 MG: 300 CAPSULE ORAL at 09:03

## 2024-07-30 RX ADMIN — BUPIVACAINE HYDROCHLORIDE 10 ML: 5 INJECTION, SOLUTION EPIDURAL; INTRACAUDAL; PERINEURAL at 14:21

## 2024-07-30 NOTE — PHYSICAL THERAPY NOTE
PHYSICAL THERAPY EVALUATION/TREATMENT       07/30/24 2561   PT Last Visit   PT Visit Date 07/30/24   Note Type   Note type Evaluation   Pain Assessment   Pain Assessment Tool 0-10   Pain Score 4   Pain Location/Orientation Orientation: Right;Location: Knee   Pain Onset/Description Onset: Ongoing;Descriptor: Sore   Effect of Pain on Daily Activities limits rest/mobility   Patient's Stated Pain Goal No pain   Hospital Pain Intervention(s) Repositioned;Ambulation/increased activity   Multiple Pain Sites No   Restrictions/Precautions   Weight Bearing Precautions Per Order Yes   RLE Weight Bearing Per Order WBAT   Other Precautions Fall Risk;Pain;Bed Alarm;Chair Alarm;Hard of hearing   Home Living   Type of Home House   Home Layout Multi-level;Performs ADLs on one level;Able to live on main level with bedroom/bathroom;Stairs to enter with rails  (4 TAYLA)   Bathroom Toilet Standard   Home Equipment Walker;Cane  (Knee immobilizer from previous knee replacement 1/2024)   Prior Function   Level of [place] Independent with ADLs;Independent with functional mobility;Independent with IADLS   Lives With Alone   Receives Help From Friend(s)   IADLs Independent with driving;Independent with meal prep;Independent with medication management   Falls in the last 6 months 0   Vocational Retired   General   Additional Pertinent History Pt is a 73 year-old male who was admitted to the hospital today (7/30/24) now seen POD #0 s/ R TKA. Pt had L TKA 1/2024.   Family/Caregiver Present No   Cognition   Overall Cognitive Status WFL   Arousal/Participation Cooperative   Orientation Level Oriented X4   Following Commands Follows multistep commands with increased time or repetition   Subjective   Subjective Pt agreeable to PT session this afternoon   RLE Assessment   RLE Assessment   (Hip/ankle WFL ; Knee 1/5 ; R knee ROM 0-82 degrees with mild ERP flexion)   LLE Assessment   LLE Assessment WFL   Bed Mobility   Supine to Sit 4  Minimal  assistance   Additional items Assist x 1;Verbal cues;Increased time required;HOB elevated;Bedrails   Sit to Supine Unable to assess   Additional Comments transferred to bedside chair   Transfers   Sit to Stand 4  Minimal assistance   Additional items Assist x 1;Verbal cues;Bedrails   Stand to Sit 4  Minimal assistance   Additional items Assist x 1;Verbal cues;Armrests;Increased time required   Ambulation/Elevation   Gait pattern Antalgic;Decreased R stance;Short stride;Step to  (decreased gait speed)   Gait Assistance 4  Minimal assist   Additional items Assist x 1;Verbal cues   Assistive Device Rolling walker   Distance 2 feet + 12 feet  (6 feet forward/backward to chair)   Stair Management Assistance Not tested   Balance   Static Sitting Fair +   Dynamic Sitting Fair   Static Standing Fair   Dynamic Standing Fair -   Ambulatory Poor +  (RW)   Activity Tolerance   Activity Tolerance Patient tolerated treatment well;Patient limited by fatigue   Nurse Made Aware yes RN Marce   Assessment   Prognosis Good   Problem List Decreased strength;Decreased endurance;Decreased range of motion;Impaired balance;Decreased mobility;Pain;Decreased skin integrity;Decreased safety awareness   Assessment Patient seen for Physical Therapy evaluation. Patient admitted with S/P total knee replacement using cement, right.  Comorbidities affecting patient's physical performance include: arthritis, CKD, hypertension, hyperlipidemia, and osteoarthritis. S/p L TKA 1/2024.  Personal factors affecting patient at time of initial evaluation include: lives in 2 story house, ambulating with assistive device, stairs to enter home, inability to navigate community distances, inability to navigate level surfaces without external assistance, and inability to perform IADLS . Prior to admission, patient was independent with functional mobility without assistive device, independent with ADLS, independent with IADLS, living alone in 2 story home with 4  steps to enter, ambulating household distance, and ambulating community distances.  Please find objective findings from Physical Therapy assessment regarding body systems outlined above with impairments and limitations including weakness, decreased ROM, impaired balance, decreased endurance, gait deviations, pain, decreased activity tolerance, decreased functional mobility tolerance, decreased safety awareness, fall risk, and decreased skin integrity.  The Barthel Index was used as a functional outcome tool presenting with a score of Barthel Index Score: 45 today indicating marked limitations of functional mobility and ADLS.  Patient's clinical presentation is currently unstable/unpredictable as seen in patient's presentation of changing level of pain, increased fall risk, new onset of impairment of functional mobility, decreased endurance, and new onset of weakness. Pt would benefit from continued Physical Therapy treatment to address deficits as defined above and maximize level of functional mobility. As demonstrated by objective findings, the assigned level of complexity for this evaluation is high.The patient's AM-PAC Basic Mobility Inpatient Short Form Raw Score is 16. A Raw score of less than or equal to 16 suggests the patient may benefit from discharge to post-acute rehabilitation services, however expect pt to progress post-operatively with plan to start OP PT later this week. Please also refer to the recommendation of the Physical Therapist for safe discharge planning.   Goals   Patient Goals to decrease R knee pain, improve ability to walk   STG Expiration Date 08/06/24   Short Term Goal #1 Pt will be I with HEP ; Pt will perform bed mobility/transfers IND ; Standing balance will improve to fair+ to decrease risk of falls ; Pt will ambulate x 150 feet Mod i with RW ; Pt will negotiate x 4 steps with rail/cane + supervision to enter/exit home ; AMPAC score will improve >18/24 to demonstrate improved  functional independence   LTG Expiration Date 08/13/24   Long Term Goal #1 Standing balance will improve to good to decrease risk of falls ; Pt will ambulate x 250 feet Mod i with least restrictive device ; Pt will negotiate x 4 steps with rail/cane Mod I to enter/exit home ; AMPAC score will improve >2024 to demonstrate improved functional independence   Plan   Treatment/Interventions Functional transfer training;ADL retraining;LE strengthening/ROM;Therapeutic exercise;Endurance training;Bed mobility;Gait training;Spoke to nursing   PT Frequency Twice a day   Discharge Recommendation   Rehab Resource Intensity Level, PT III (Minimum Resource Intensity)   AM-PAC Basic Mobility Inpatient   Turning in Flat Bed Without Bedrails 3   Lying on Back to Sitting on Edge of Flat Bed Without Bedrails 3   Moving Bed to Chair 3   Standing Up From Chair Using Arms 3   Walk in Room 3   Climb 3-5 Stairs With Railing 1   Basic Mobility Inpatient Raw Score 16   Basic Mobility Standardized Score 38.32   [facility] Highest Level Of Mobility   -[facility] Goal 5: Stand one or more mins   -[facility] Achieved 6: Walk 10 steps or more   Barthel Index   Feeding 10   Bathing 0   Grooming Score 5   Dressing Score 5   Bladder Score 0   Bowels Score 10   Toilet Use Score 5   Transfers (Bed/Chair) Score 10   Mobility (Level Surface) Score 0   Stairs Score 0   Barthel Index Score 45   Additional Treatment Session   Start Time 1731   End Time 1741   Treatment Assessment S: Pt agreeable to PT session this afternoon O/A: Pt able to perform exercise as mentioned below with intermittent assist/cues to improve form/ROM. HEP reviewed and provided to pt - to review again during next session - pt verbalized understanding. P: pt will benefit from skilled PT to address deficits to maximize IND for safe d/c, plan to start OP PT later this week   Exercises   TKR Supine;Sitting;Right  (Ankle pumps, quad/glute sets, heel slides, LAQ x 5-10 reps R)   End of Consult    Patient Position at End of Consult Bedside chair;All needs within reach   Licensure   [place] License Number  Jennifer Alfaro [id number]

## 2024-07-30 NOTE — OP NOTE
OPERATIVE REPORT  PATIENT NAME: Swapnil Grover  : 1950  MRN: [id number]  Pt Location:  [facility] OR ROOM 01    Surgery Date: 2024    Surgeons and Role:     * Bassem Moscoso,  - Primary     * Hill Reynoso PA-C - Assisting     * Suly Stover MD - Resident - Assisting      * Bo Harp,  ATC/OTC - 2nd Assist    Preop Diagnosis:  Primary osteoarthritis of right knee [M17.11]  Chronic pain of right knee [M25.561, G89.29]    Post-Op Diagnosis Codes:     * Primary osteoarthritis of right knee [M17.11]     * Chronic pain of right knee [M25.561, G89.29]    Procedure(s):  Right - ARTHROPLASTY KNEE TOTAL W ROBOT - CEMENTED    Specimens:  * No specimens in log *    Estimated Blood Loss:   20 mL    Drains: none  Urethral Catheter Latex 16 Fr. (Active)   Reasons to continue Urinary Catheter  Post-operative urological requirements 24 1330   Goal for Removal Remove POD#1 24 1330   Site Assessment Skin intact 24 1330   Collection Container Standard drainage bag 24 1330   Securement Method Securing device (Describe) 24 1330   Number of days: 0       Lumbar Drain (Active)   Number of days: 92       Lumbar Drain (Active)   Number of days: 88     Anesthesia Type:   Spinal with sedation, postoperative adductor canal block with Exparel    Antibiotics: Ancef 2 g    Intravenous fluids: 1 L    Urine output: Kiser: 200 cc    Tourniquet time: 67 minutes at 250 mmHg    Implant Name Type Inv. Item Serial No.  Lot No. LRB No. Used Action   COMPONENT PATELLA 38MM MEDIAL DOME ATTUNE - [id number]  COMPONENT PATELLA 38MM MEDIAL DOME ATTUNE  DEPUY [id number] Right 1 Implanted   INSERT TIB SZ 6 8MM RT MED STAB ATTUNE - [id number]  INSERT TIB SZ 6 8MM RT MED STAB ATTUNE  DEPUY [id number] Right 1 Implanted   COMPONENT FEM SZ 6 RT CMNT CR ATTUNE - [id number]  COMPONENT FEM SZ 6 RT CMNT CR ATTUNE  DEPUY [id number] Right 1 Implanted   BASEPLATE TIBIAL SZ 6 CMNT FX BRNG ATTUNE S PLUS  - [id number]  BASEPLATE TIBIAL SZ 6 CMNT FX BRNG ATTUNE S PLUS  DEPUY [id number] Right 1 Implanted     Operative Indications:  Primary osteoarthritis of right knee [M17.11]  Chronic pain of right knee [M25.561, G89.29]  Patient is a 73-year-old male known to me for treatment of his activity related right knee pain.  The patient has attempted multiple forms of conservative measure treatment and all have failed to provide long-lasting relief of his discomfort.  With failed conservative treatment, persistent activity related pain, and end-stage osteoarthritis on x-ray I recommended he undergo robotic assisted right total knee arthroplasty.  He was agreeable to this.  Consents were signed and placed in the chart.  Patient was optimized for the intended procedure and ultimately underwent robotic assisted right total knee arthroplasty on 7/30/2024.    Operative Findings:  Intraoperatively the patient was found to have a range of motion from 4 degrees of flexion to 115 degrees of flexion with a 12 degree varus alignment.  There is grade 4 degenerative change diffusely throughout the knee.  There was robust osteophyte formation in all compartments of the knee.  Robotic assisted right total knee arthroplasty was successfully performed without complication.  The final construct had excellent range of motion from 0 to 135 degrees.  The knee had excellent stability at 0 degrees 30 degrees and 90 degrees.  The patella tracked midline and flat.  The limb was restored to 4 degrees of varus alignment.  After closure of the arthrotomy range of motion, stability, patellar tracking were unchanged.  Patient tolerated procedure well.  There were no complications.    Complications:   None    Knee Approach: Medial Parapatellar    Procedure and Technique:  The patient was identified and marked in the preoperative holding area. Final questions were addressed. Consents were visualized for correct laterality and the intended procedure. Patient  was taken back to the operating room where they were transferred to the operating room table and administered spinal anesthesia by the anesthesia staff. Tourniquet was then applied to the right thigh. The right lower extremity was prepped and draped in the standard sterile fashion with the addition of the knee positioner.  The patient was administered 2 g of IV Ancef. Tranexamic acid was administered by the anesthesia staff.  A final timeout was performed and all members of the operating room staff were in agreement of the intended procedure and the correct laterality was confirmed.  An anterior knee incision was marked over the anterior right knee and carried over the medial portion of the patella down medial to the tibial tubercle.  The leg was exsanguinated and the tourniquet was inflated to 250 mmHg.  An incision was made over the anterior knee using a scalpel, and sharp dissection was carried deep through Irena's fascia creating full thickness flaps.  The extensor mechanism was identified.  A standard medial parapatellar arthrotomy was performed using a scalpel, and upon doing so benign-appearing yellow intra-articular fluid was expressed.  The anterior horn of the medial and lateral meniscus was removed. 50% of the patellar fat pad was removed for proper exposure. The distal arthrotomy was used to initiate a medial release around the proximal tibia at the joint line to the mid coronal plane.  On inspection there was diffuse grade 4 degenerative change in the medial compartment, lateral compartment, and patellofemoral compartment.  Preparations were made for robotic assisted total knee arthroplasty.  There was robust osteophytes in all compartments of the knee.  The periarticular osteophytes were removed from around the distal femur, proximal tibia, and intercondylar notch.  A partial synovectomy was performed.  The remnants of the ACL and the PCL were removed with electrocautery.  The visualized portions of  the medial and lateral meniscus were removed.  The distal femur and proximal tibial arrays were placed without complication.  The checkpoints were created the distal femur proximal tibia.  The hip center was captured.  Anatomic registration was carried out in sequence.  Range of motion was evaluated and the knee was in 12 degrees of varus, and the range of motion was from 4-115 degrees.  The Proadjust graph was created.  Through manipulating the position of the femoral and tibial implants a well-balanced Proadjust graph was created and this was excepted as the intraoperative plan.  There was significant wear of the proximal medial tibia.  The robot was brought into the surgical field.  The retractors were placed around the distal femur proximal tibia.  Robotic assisted resection was performed of the distal femur in sequence without damage to the periarticular tissues.  The tibia was subluxed anteriorly and the proximal tibia was resected without complication.  All bony fragments were removed and resection appeared to be complete.  The knee was brought out into the full extension in the posterior compartment was evaluated.  The remnants of the medial and lateral meniscus were removed with electrocautery.  Large osteophytes had to be removed from the posterior compartment.  Based on the intraoperative plan a size 6 femoral notch guide was placed and the trochlea was prepared.  The size 6 right CR trial was placed upon the distal femur and a size 8 polyethylene medial stabilized insert was placed into the articulation.  The knee was cycled through a range of motion and is range of motion was evaluated.  The overall alignment of the limb was restored to 4 degrees of varus, and range of motion was from 0-135 degrees.  This construct had excellent stability at 0 degrees, 30 degrees, 90 degrees.  This was deemed to be the final construct.  The arrays were removed from the distal femur proximal tibia without complication.   The lug holes in the femur were drilled.  The trials were removed and the tibia was subluxed anteriorly.  The tibia was sized and was found that a size 6 base plate would be appropriate.  This was aligned with the medial 1/3 of the proximal tibial tubercle and pinned into place.  The tibia was then reamed, broached, and finished in sequence.  The base plate was removed.    Attention was then turned to the patella. The osteo synovial reflection was revealed using a Bovie. The patella height measured 26 millimeters prior to resection.  The cutting guide was set for the appropriate depth and the patella was evenly resected using oscillating saw. The patella was sized and found to be a 38 mm patellar button.  The 38 mm patellar button was then medialized over the patella and the lug holes were drilled. A trial patella button was placed upon the patella and the pre osteotomy patellar height was restored.  A lateral facetectomy of the patella was performed. The soft tissues of the posterior capsule were cauterized using Bovie to ensure hemostasis. The posterior capsule and medial and lateral periarticular soft tissues were injected with a periarticular analgesic cocktail.     The knee was again irrigated in preparation for cementation.  The tibia was subluxed and all retractors were in place for cementation when final implants were confirmed and placed upon the back table.  2 bags of Palacos cement without gentamicin were mixed. And the final size 6 S+ tibial implant, 8 mm AOX CR medial stabilized polyethylene insert, size 6 right CR femur, and 38 mm patella button were cemented in sequence.  Excess cement was removed after each implant was in place.  As the cement cured the remainder of the periarticular pain cocktail was injected into the soft tissues around the knee.  Irrigation then followed with IrriSept followed by normal saline solution.  After the cement had cured the joint was inspected for any residual loose  bodies of cement and these were removed. The tourniquet was released after 67 minutes at 250 mmHg.     The tracking and stability of the final components were assessed. The patella tracked midline without tilt, and the knee was stable in both flexion and extension, coronal stability was unchanged, and the knee demonstrated range of motion from 0-135°.  Hemostasis was achieved using electrocautery.       Closure began using a #1 barbed bidirectional suture for the arthrotomy.  After closure of the arthrotomy range of motion to gravity was tested and was found to be 0-135° of flexion. Quarter percent Marcaine plain was injected in the subcutaneous soft tissues.  The deep subcutaneous tissues were reapproximated with undyed 0 Vicryl, the superficial subcutaneous tissues were reapproximated with undyed 2-0 Vicryl, the skin edges reapproximated with a running 3-0 bidirectional barbed Monocryl suture, and the skin edges were sealed with Exofin.  After the Exofin had dried a silver impregnated Mepilex dressing was placed over the incision.  The drapes were removed and MARTY stockings were placed on the bilateral lower extremities. Patient was then awakened from anesthesia without difficulty, and transferred to PACU in stable condition.      I was present for all critical portions of the procedure.    Patient Disposition:  PACU       SIGNATURE: Bassem Moscoso DO  DATE: July 30, 2024  TIME: 2:31 PM

## 2024-07-30 NOTE — ANESTHESIA POSTPROCEDURE EVALUATION
Post-Op Assessment Note    CV Status:  Stable  Pain Score: 0    Pain management: adequate       Mental Status:  Sleepy and arousable   Hydration Status:  Euvolemic and stable   PONV Controlled:  Controlled   Airway Patency:  Patent     Post Op Vitals Reviewed: Yes    No anethesia notable event occurred.    Staff: CRNA   Comments: oral airway #100 in place for obstruction, in PACU            BP      Temp      Pulse     Resp      SpO2

## 2024-07-30 NOTE — PLAN OF CARE
Problem: PHYSICAL THERAPY ADULT  Goal: Performs mobility at highest level of function for planned discharge setting.  See evaluation for individualized goals.  Description: Treatment/Interventions: Functional transfer training, ADL retraining, LE strengthening/ROM, Therapeutic exercise, Endurance training, Bed mobility, Gait training, Spoke to nursing          See flowsheet documentation for full assessment, interventions and recommendations.  Note: Prognosis: Good  Problem List: Decreased strength, Decreased endurance, Decreased range of motion, Impaired balance, Decreased mobility, Pain, Decreased skin integrity, Decreased safety awareness  Assessment: Patient seen for Physical Therapy evaluation. Patient admitted with S/P total knee replacement using cement, right.  Comorbidities affecting patient's physical performance include: arthritis, CKD, hypertension, hyperlipidemia, and osteoarthritis. S/p L TKA 1/2024.  Personal factors affecting patient at time of initial evaluation include: lives in 2 story house, ambulating with assistive device, stairs to enter home, inability to navigate community distances, inability to navigate level surfaces without external assistance, and inability to perform IADLS . Prior to admission, patient was independent with functional mobility without assistive device, independent with ADLS, independent with IADLS, living alone in 2 story home with 4 steps to enter, ambulating household distance, and ambulating community distances.  Please find objective findings from Physical Therapy assessment regarding body systems outlined above with impairments and limitations including weakness, decreased ROM, impaired balance, decreased endurance, gait deviations, pain, decreased activity tolerance, decreased functional mobility tolerance, decreased safety awareness, fall risk, and decreased skin integrity.  The Barthel Index was used as a functional outcome tool presenting with a score of  Barthel Index Score: 45 today indicating marked limitations of functional mobility and ADLS.  Patient's clinical presentation is currently unstable/unpredictable as seen in patient's presentation of changing level of pain, increased fall risk, new onset of impairment of functional mobility, decreased endurance, and new onset of weakness. Pt would benefit from continued Physical Therapy treatment to address deficits as defined above and maximize level of functional mobility. As demonstrated by objective findings, the assigned level of complexity for this evaluation is high.The patient's AM-PAC Basic Mobility Inpatient Short Form Raw Score is 16. A Raw score of less than or equal to 16 suggests the patient may benefit from discharge to post-acute rehabilitation services, however expect pt to progress post-operatively with plan to start OP PT later this week. Please also refer to the recommendation of the Physical Therapist for safe discharge planning.        Rehab Resource Intensity Level, PT: III (Minimum Resource Intensity)    See flowsheet documentation for full assessment.

## 2024-07-30 NOTE — ANESTHESIA PROCEDURE NOTES
Peripheral Block    Patient location during procedure: holding area  Start time: 7/30/2024 2:21 PM  Reason for block: at surgeon's request and post-op pain management  Staffing  Performed by: Park Haque MD  Authorized by: Park Haque MD    Preanesthetic Checklist  Completed: patient identified, IV checked, site marked, risks and benefits discussed, surgical consent, monitors and equipment checked, pre-op evaluation and timeout performed  Peripheral Block  Patient position: supine  Prep: ChloraPrep  Patient monitoring: frequent blood pressure checks, continuous pulse oximetry and heart rate  Block type: Adductor Canal  Laterality: right  Injection technique: single-shot  Procedures: ultrasound guided, Ultrasound guidance required for the procedure to increase accuracy and safety of medication placement and decrease risk of complications.  Ultrasound permanent image saved  bupivacaine (PF) (MARCAINE) 0.5 % injection 20 mL - Perineural   10 mL - 7/30/2024 2:21:00 PM  bupivacaine liposomal (EXPAREL) 1.3 % injection 20 mL - Perineural   10 mL - 7/30/2024 2:21:00 PM  Needle  Needle type: Stimuplex   Needle gauge: 20 G  Needle length: 4 in  Needle localization: anatomical landmarks and ultrasound guidance  Needle insertion depth: 7 cm  Assessment  Injection assessment: incremental injection, frequent aspiration, injected with ease, negative aspiration, negative for heart rate change, no paresthesia on injection, no symptoms of intraneural/intravenous injection and needle tip visualized at all times  Paresthesia pain: none  Post-procedure:  site cleaned  patient tolerated the procedure well with no immediate complications

## 2024-07-30 NOTE — ANESTHESIA PREPROCEDURE EVALUATION
Procedure:  ARTHROPLASTY KNEE TOTAL W ROBOT - RIGHT - CEMENTED - OVERNIGHT (Right: Knee)    Relevant Problems   ANESTHESIA (within normal limits)      CARDIO   (+) Hypertension   (+) Mixed hyperlipidemia      /RENAL   (+) BPH (benign prostatic hyperplasia)   (+) Benign hypertension with CKD (chronic kidney disease) stage III (HCC)   (+) Hydronephrosis with ureteropelvic junction (UPJ) obstruction   (+) Nephrolithiasis   (+) Stage 3a chronic kidney disease (HCC)      MUSCULOSKELETAL   (+) Arthritis   (+) Primary osteoarthritis of both knees      PULMONARY   (+) Sleep apnea        Physical Exam    Airway    Mallampati score: II  TM Distance: >3 FB  Neck ROM: full     Dental   Comment: Denies loose     Cardiovascular  Rhythm: regular, Rate: normal    Pulmonary   Breath sounds clear to auscultation    Other Findings        Anesthesia Plan  ASA Score- 3     Anesthesia Type- spinal with ASA Monitors.         Additional Monitors:     Airway Plan:     Comment: Spinal with MAC/Sedation; post-procedure adductor canal block.       Plan Factors-Exercise tolerance (METS): >4 METS.    Chart reviewed. EKG reviewed.  Existing labs reviewed. Patient summary reviewed.    Patient is not a current smoker.      Obstructive sleep apnea risk education given perioperatively.        Induction- intravenous.    Postoperative Plan- Plan for postoperative opioid use.         Informed Consent- Anesthetic plan and risks discussed with patient.  I personally reviewed this patient with the CRNA. Discussed and agreed on the Anesthesia Plan with the CRNA..

## 2024-07-30 NOTE — INTERVAL H&P NOTE
H&P reviewed. After examining the patient I find no changes in the patients condition since the H&P had been written.  Patient reports his activity related right knee pain continues to be significant and severe.  The pain radiates throughout the right knee.  Please see today's exam below.  Patient denies any recent fever, chills, nausea, vomiting, headache, chest pain, trouble breathing.  The patient has been seen and cleared by his medical subspecialist in preparation for the procedure.  The patient is a good candidate for aspirin postoperatively for DVT prophylaxis.  The patient will be a good candidate for outpatient physical therapy following his discharge from the hospital.  All questions addressed this morning.  Proceed the OR for robotic assisted right total knee arthroplasty.    Vitals:    07/30/24 0857   BP: 146/88   Pulse: 93   Resp: 16   Temp: 98.4 °F (36.9 °C)   SpO2: 96%     Right Knee Exam      Tenderness   The patient is experiencing tenderness in the medial joint line and patella.     Range of Motion   Extension:  0   Flexion:  120      Tests   Varus: negative Valgus: negative  Drawer:  Anterior - negative    Posterior - negative     Other   Erythema: absent  Scars: absent  Sensation: normal  Pulse: present  Swelling: none  Effusion: no effusion present     Comments:  5-7 degree varus deformity passively correctable  Stable at 0, 30 and 90 degrees  Neurovascularly in tact distally  No warmth or erythema  Parapatellar crepitance noted  Patellofemoral grind: positive    Bassem Moscoso D.O.  Division of Adult Reconstruction  Department of Orthopaedic Surgery  [facility]

## 2024-07-30 NOTE — ANESTHESIA PROCEDURE NOTES
Spinal Block    Patient location during procedure: OR  Start time: 7/30/2024 11:23 AM  Reason for block: procedure for pain and at surgeon's request  Staffing  Performed by: Park Haque MD  Authorized by: Park Haque MD    Preanesthetic Checklist  Completed: patient identified, IV checked, site marked, risks and benefits discussed, surgical consent, monitors and equipment checked, pre-op evaluation and timeout performed  Spinal Block  Patient position: sitting  Prep: ChloraPrep and site prepped and draped  Patient monitoring: frequent blood pressure checks, continuous pulse ox and heart rate  Approach: midline  Location: L3-4  Needle  Needle type: Pencan   Needle gauge: 24 G  Needle length: 4 in  Assessment  Sensory level: T8.  Injection Assessment:  negative aspiration for heme, no paresthesia on injection and positive aspiration for clear CSF.  Post-procedure:  site cleaned

## 2024-07-30 NOTE — PLAN OF CARE
Problem: PAIN - ADULT  Goal: Verbalizes/displays adequate comfort level or baseline comfort level  Description: Interventions:  - Encourage patient to monitor pain and request assistance  - Assess pain using appropriate pain scale  - Administer analgesics based on type and severity of pain and evaluate response  - Implement non-pharmacological measures as appropriate and evaluate response  - Consider cultural and social influences on pain and pain management  - Notify physician/advanced practitioner if interventions unsuccessful or patient reports new pain  Outcome: Progressing     Problem: INFECTION - ADULT  Goal: Absence or prevention of progression during hospitalization  Description: INTERVENTIONS:  - Assess and monitor for signs and symptoms of infection  - Monitor lab/diagnostic results  - Monitor all insertion sites, i.e. indwelling lines, tubes, and drains  - Monitor endotracheal if appropriate and nasal secretions for changes in amount and color  - Duarte appropriate cooling/warming therapies per order  - Administer medications as ordered  - Instruct and encourage patient and family to use good hand hygiene technique  - Identify and instruct in appropriate isolation precautions for identified infection/condition  Outcome: Progressing  Goal: Absence of fever/infection during neutropenic period  Description: INTERVENTIONS:  - Monitor WBC    Outcome: Progressing     Problem: SAFETY ADULT  Goal: Patient will remain free of falls  Description: INTERVENTIONS:  - Educate patient/family on patient safety including physical limitations  - Instruct patient to call for assistance with activity   - Consult OT/PT to assist with strengthening/mobility   - Keep Call bell within reach  - Keep bed low and locked with side rails adjusted as appropriate  - Keep care items and personal belongings within reach  - Initiate and maintain comfort rounds  - Make Fall Risk Sign visible to staff  - Offer Toileting every 2 Hours,  in advance of need  - Initiate/Maintain bed alarm  - Obtain necessary fall risk management equipment: call bell   - Apply yellow socks and bracelet for high fall risk patients  - Consider moving patient to room near nurses station  Outcome: Progressing  Goal: Maintain or return to baseline ADL function  Description: INTERVENTIONS:  -  Assess patient's ability to carry out ADLs; assess patient's baseline for ADL function and identify physical deficits which impact ability to perform ADLs (bathing, care of mouth/teeth, toileting, grooming, dressing, etc.)  - Assess/evaluate cause of self-care deficits   - Assess range of motion  - Assess patient's mobility; develop plan if impaired  - Assess patient's need for assistive devices and provide as appropriate  - Encourage maximum independence but intervene and supervise when necessary  - Involve family in performance of ADLs  - Assess for home care needs following discharge   - Consider OT consult to assist with ADL evaluation and planning for discharge  - Provide patient education as appropriate  Outcome: Progressing  Goal: Maintains/Returns to pre admission functional level  Description: INTERVENTIONS:  - Perform AM-PAC 6 Click Basic Mobility/ Daily Activity assessment daily.  - Set and communicate daily mobility goal to care team and patient/family/caregiver.   - Collaborate with rehabilitation services on mobility goals if consulted  - Perform Range of Motion 3 times a day.  - Reposition patient every 3 hours.  - Dangle patient 3 times a day  - Stand patient 3 times a day  - Ambulate patient 3 times a day  - Out of bed to chair 3 times a day   - Out of bed for meals 3 times a day  - Out of bed for toileting  - Record patient progress and toleration of activity level   Outcome: Progressing     Problem: DISCHARGE PLANNING  Goal: Discharge to home or other facility with appropriate resources  Description: INTERVENTIONS:  - Identify barriers to discharge w/patient and  caregiver  - Arrange for needed discharge resources and transportation as appropriate  - Identify discharge learning needs (meds, wound care, etc.)  - Arrange for interpretive services to assist at discharge as needed  - Refer to Case Management Department for coordinating discharge planning if the patient needs post-hospital services based on physician/advanced practitioner order or complex needs related to functional status, cognitive ability, or social support system  Outcome: Progressing     Problem: Knowledge Deficit  Goal: Patient/family/caregiver demonstrates understanding of disease process, treatment plan, medications, and discharge instructions  Description: Complete learning assessment and assess knowledge base.  Interventions:  - Provide teaching at level of understanding  - Provide teaching via preferred learning methods  Outcome: Progressing

## 2024-07-31 VITALS
DIASTOLIC BLOOD PRESSURE: 71 MMHG | OXYGEN SATURATION: 94 % | WEIGHT: 221 LBS | HEIGHT: 67 IN | TEMPERATURE: 98.9 F | SYSTOLIC BLOOD PRESSURE: 120 MMHG | BODY MASS INDEX: 34.69 KG/M2 | RESPIRATION RATE: 18 BRPM | HEART RATE: 69 BPM

## 2024-07-31 LAB
ANION GAP SERPL CALCULATED.3IONS-SCNC: 8 MMOL/L (ref 4–13)
BASOPHILS # BLD AUTO: 0.03 THOUSANDS/ÂΜL (ref 0–0.1)
BASOPHILS NFR BLD AUTO: 0 % (ref 0–1)
BUN SERPL-MCNC: 20 MG/DL (ref 5–25)
CALCIUM SERPL-MCNC: 8.3 MG/DL (ref 8.4–10.2)
CHLORIDE SERPL-SCNC: 103 MMOL/L (ref 96–108)
CO2 SERPL-SCNC: 24 MMOL/L (ref 21–32)
CREAT SERPL-MCNC: 1.3 MG/DL (ref 0.6–1.3)
EOSINOPHIL # BLD AUTO: 0.2 THOUSAND/ÂΜL (ref 0–0.61)
EOSINOPHIL NFR BLD AUTO: 2 % (ref 0–6)
ERYTHROCYTE [DISTWIDTH] IN BLOOD BY AUTOMATED COUNT: 14.2 % (ref 11.6–15.1)
GFR SERPL CREATININE-BSD FRML MDRD: 54 ML/MIN/1.73SQ M
GLUCOSE SERPL-MCNC: 87 MG/DL (ref 65–140)
HCT VFR BLD AUTO: 39.3 % (ref 36.5–49.3)
HGB BLD-MCNC: 12.8 G/DL (ref 12–17)
IMM GRANULOCYTES # BLD AUTO: 0.03 THOUSAND/UL (ref 0–0.2)
IMM GRANULOCYTES NFR BLD AUTO: 0 % (ref 0–2)
LYMPHOCYTES # BLD AUTO: 1.14 THOUSANDS/ÂΜL (ref 0.6–4.47)
LYMPHOCYTES NFR BLD AUTO: 12 % (ref 14–44)
MCH RBC QN AUTO: 30.6 PG (ref 26.8–34.3)
MCHC RBC AUTO-ENTMCNC: 32.6 G/DL (ref 31.4–37.4)
MCV RBC AUTO: 94 FL (ref 82–98)
MONOCYTES # BLD AUTO: 0.87 THOUSAND/ÂΜL (ref 0.17–1.22)
MONOCYTES NFR BLD AUTO: 9 % (ref 4–12)
NEUTROPHILS # BLD AUTO: 6.98 THOUSANDS/ÂΜL (ref 1.85–7.62)
NEUTS SEG NFR BLD AUTO: 77 % (ref 43–75)
NRBC BLD AUTO-RTO: 0 /100 WBCS
PLATELET # BLD AUTO: 206 THOUSANDS/UL (ref 149–390)
PMV BLD AUTO: 9.5 FL (ref 8.9–12.7)
POTASSIUM SERPL-SCNC: 4.5 MMOL/L (ref 3.5–5.3)
RBC # BLD AUTO: 4.18 MILLION/UL (ref 3.88–5.62)
SODIUM SERPL-SCNC: 135 MMOL/L (ref 135–147)
WBC # BLD AUTO: 9.25 THOUSAND/UL (ref 4.31–10.16)

## 2024-07-31 PROCEDURE — 80048 BASIC METABOLIC PNL TOTAL CA: CPT | Performed by: PHYSICIAN ASSISTANT

## 2024-07-31 PROCEDURE — 97110 THERAPEUTIC EXERCISES: CPT

## 2024-07-31 PROCEDURE — 99024 POSTOP FOLLOW-UP VISIT: CPT | Performed by: PHYSICIAN ASSISTANT

## 2024-07-31 PROCEDURE — 97167 OT EVAL HIGH COMPLEX 60 MIN: CPT

## 2024-07-31 PROCEDURE — 97116 GAIT TRAINING THERAPY: CPT

## 2024-07-31 PROCEDURE — 85025 COMPLETE CBC W/AUTO DIFF WBC: CPT | Performed by: PHYSICIAN ASSISTANT

## 2024-07-31 RX ORDER — DOCUSATE SODIUM 100 MG/1
100 CAPSULE, LIQUID FILLED ORAL 2 TIMES DAILY
Qty: 60 CAPSULE | Refills: 0 | Status: SHIPPED | OUTPATIENT
Start: 2024-07-31

## 2024-07-31 RX ORDER — ACETAMINOPHEN 325 MG/1
975 TABLET ORAL EVERY 8 HOURS
Start: 2024-07-31

## 2024-07-31 RX ORDER — OXYCODONE HYDROCHLORIDE 5 MG/1
TABLET ORAL
Qty: 40 TABLET | Refills: 0 | Status: SHIPPED | OUTPATIENT
Start: 2024-07-31

## 2024-07-31 RX ORDER — ASPIRIN 325 MG
325 TABLET ORAL 2 TIMES DAILY
Qty: 82 TABLET | Refills: 0 | Status: SHIPPED | OUTPATIENT
Start: 2024-07-31

## 2024-07-31 RX ADMIN — ACETAMINOPHEN 975 MG: 325 TABLET ORAL at 00:42

## 2024-07-31 RX ADMIN — POTASSIUM CITRATE 20 MEQ: 10 TABLET, EXTENDED RELEASE ORAL at 12:28

## 2024-07-31 RX ADMIN — GABAPENTIN 200 MG: 100 CAPSULE ORAL at 08:21

## 2024-07-31 RX ADMIN — POTASSIUM CITRATE 20 MEQ: 10 TABLET, EXTENDED RELEASE ORAL at 09:19

## 2024-07-31 RX ADMIN — SODIUM CHLORIDE 75 ML/HR: 0.9 INJECTION, SOLUTION INTRAVENOUS at 05:07

## 2024-07-31 RX ADMIN — ASPIRIN 325 MG: 325 TABLET ORAL at 08:21

## 2024-07-31 RX ADMIN — OXYCODONE HYDROCHLORIDE 5 MG: 5 TABLET ORAL at 08:22

## 2024-07-31 RX ADMIN — PANTOPRAZOLE SODIUM 40 MG: 40 TABLET, DELAYED RELEASE ORAL at 05:07

## 2024-07-31 RX ADMIN — DEXAMETHASONE SODIUM PHOSPHATE 10 MG: 4 INJECTION, SOLUTION INTRAMUSCULAR; INTRAVENOUS at 12:27

## 2024-07-31 RX ADMIN — CEFAZOLIN SODIUM 2000 MG: 2 SOLUTION INTRAVENOUS at 05:07

## 2024-07-31 RX ADMIN — ACETAMINOPHEN 975 MG: 325 TABLET ORAL at 08:22

## 2024-07-31 NOTE — PLAN OF CARE
Problem: OCCUPATIONAL THERAPY ADULT  Goal: Performs self-care activities at highest level of function for planned discharge setting.  See evaluation for individualized goals.  Description: Treatment Interventions: ADL retraining, Functional transfer training, Endurance training, Equipment evaluation/education, Patient/family training, Energy conservation, Activityengagement          See flowsheet documentation for full assessment, interventions and recommendations.   Note: Limitation: Decreased ADL status, Decreased endurance, Decreased self-care trans, Decreased high-level ADLs (impaired pain, standing tolerance, antiticipated R LE ROM / strength)     Assessment: Pt is an 74yo male s/p R TKA w/ Dr. Moscoso on 7/30/24. Pt reports living alone in multi level home w/ first floor set- up. Pt reports I w/ ADL/ IADL w/ out use of AD; access to RW, cane. Significant PMH impacting his occupational performance includes L TKA (1/23/24), basal cell carcinoma excision (2015), hx subdural hematoma (1990's), HTN, decreased hearing, B endoscopic middle fossa craniotomies for repair of tegmen defect / CSF leak w/ temporalis muscle flap/ intraoperative lumbar drain placement (04/9/24). Upon eval, pt alert and generally oriented. Able to follow direction w/ increased time due to hard of hearing. Improved hearing on L and benefit from minimal distractions / good eye contact. Pt required mod I grooming for + time, mod I UBD, S LBD, S sit <> stand, S using RW functional mobility. Pt is completing ADLs below baseline level of I due to anticipated R LE ROM / strength deficits, impaired pain, standing tolerance. Pt would benefit from OT in acute care to max I w/ ADLs. No post acute OT rehab needs when medically stable for discharge from acute care. Will continue to follow     Rehab Resource Intensity Level, OT: No post-acute rehabilitation needs (DC home usnig RW w/ OPPT)

## 2024-07-31 NOTE — PHYSICAL THERAPY NOTE
PT TREATMENT     07/31/24 0932   PT Last Visit   PT Visit Date 07/31/24   Note Type   Note Type BID visit/treatment   Pain Assessment   Pain Assessment Tool 0-10   Pain Score 3   Pain Location/Orientation Orientation: Right;Location: Knee   Pain Onset/Description Onset: Ongoing   Effect of Pain on Daily Activities Limits comfort and activity tolerance   Patient's Stated Pain Goal No pain   Hospital Pain Intervention(s) Cold applied;Repositioned;Ambulation/increased activity;Emotional support;Rest   Restrictions/Precautions   RLE Weight Bearing Per Order WBAT   Other Precautions Pain;Fall Risk;Bed Alarm;Chair Alarm;Hard of hearing;WBS;Contact Isolation  ([place] on room air)   General   Chart Reviewed Yes   Family/Caregiver Present No   Cognition   Overall Cognitive Status WFL   Arousal/Participation Cooperative   Attention Attends with cues to redirect   Orientation Level Oriented X4   Memory Within functional limits   Following Commands Follows multistep commands with increased time or repetition   Comments At least 2 patient identifiers including name and date of birth   Subjective   Subjective Patient reports 3/10 pain to right knee   Bed Mobility   Supine to Sit 5  Supervision   Additional items Assist x 1;Verbal cues;Increased time required;Bedrails;HOB elevated   Transfers   Sit to Stand 4  Minimal assistance  (CGA)   Additional items Assist x 1;Verbal cues;Increased time required;Armrests  (Cues for safe hand placement)   Stand to Sit 5  Supervision   Additional items Armrests;Assist x 1;Verbal cues;Increased time required  (Cues for safe hand placement)   Ambulation/Elevation   Gait pattern Decreased heel strike;Decreased R stance;Short stride;Step through pattern;Antalgic;Inconsistent leyla   Gait Assistance 5  Supervision   Additional items Assist x 1;Verbal cues;Tactile cues   Assistive Device Rolling walker   Distance 25 feet with change in direction; 75 feet with change in direction with 1 brief  standing rest break   Stair Management Assistance 5  Supervision   Additional items Assist x 1;Verbal cues;Tactile cues   Stair Management Technique Step to pattern;Two rails   Number of Stairs 4   Balance   Static Sitting Fair +   Static Standing Fair  (With roller walker)   Ambulatory   (Fair to occasional F- with roller walker)   Endurance Deficit   Endurance Deficit Yes   Activity Tolerance   Activity Tolerance Patient limited by fatigue;Patient limited by pain   Nurse Made Aware CONOR Zheng   Exercises   Quad Sets 10 reps;Right;Supine   Heelslides AAROM;10 reps;Right;Supine   Glute Sets 10 reps;Supine   Hip Abduction AAROM;10 reps;Right;Supine   Knee AROM Short Arc Quad AAROM;10 reps;Right;Supine   Knee AROM Long Arc Quad AAROM;10 reps;Right;Sitting   Ankle Pumps 10 reps;Right;Sitting   Assessment   Problem List Decreased strength;Decreased range of motion;Decreased endurance;Impaired balance;Decreased mobility;Decreased safety awareness;Pain;Orthopedic restrictions   Assessment Patient agreeable to skilled PT session this morning.  Patient with good tolerance to right lower extremity TherEX, bed mobility, transfers, gait with a roller walker on level surfaces and gait up/down stairs with use of bilateral rails.  Patient is progressing well status post right TKA.  Patient will be safe for discharge this afternoon following second session of PT services.  During stair training, patient did request use of 2 rails and declined second trial of stairs with 1 rail and cane.  Will reattempt stairs in p.m. with 1 rail and cane as well as gait with a roller walker.  Patient remains appropriate for Level III Minimum Resource Intensity when medically stable for discharge.    The patient's AM-PAC Basic Mobility Inpatient Short Form Raw Score is 18. A Raw score of greater than 16 suggests the patient may benefit from discharge to home. Please also refer to the recommendation of the Physical Therapist for safe discharge  planning.   Goals   [facility] Expiration Date 08/06/24   Short Term Goal #1 Pt will be I with HEP ; Pt will perform bed mobility/transfers IND ; Standing balance will improve to fair+ to decrease risk of falls ; Pt will ambulate x 150 feet Mod i with RW ; Pt will negotiate x 4 steps with rail/cane + supervision to enter/exit home ; AMPAC score will improve >18/24 to demonstrate improved functional independence   Plan   Treatment/Interventions ADL retraining;Functional transfer training;LE strengthening/ROM;Elevations;Therapeutic exercise;Endurance training;Patient/family training;Equipment eval/education;Bed mobility;Gait training;Compensatory technique education;Spoke to nursing;Spoke to case management;OT   PT Frequency Twice a day   Discharge Recommendation   Rehab Resource Intensity Level, PT III (Minimum Resource Intensity)   AM-PAC Basic Mobility Inpatient   Turning in Flat Bed Without Bedrails 3   Lying on Back to Sitting on Edge of Flat Bed Without Bedrails 3   Moving Bed to Chair 3   Standing Up From Chair Using Arms 3   Walk in Room 3   Climb 3-5 Stairs With Railing 3   Basic Mobility Inpatient Raw Score 18   Basic Mobility Standardized Score 41.05   [facility] Highest Level Of Mobility   [place] Goal 6: Walk 10 steps or more   Education   Education Provided Mobility training;Home exercise program;Assistive device   Patient Reinforcement needed;Explanation/teachback used   End of Consult   Patient Position at End of Consult Bed/Chair alarm activated;Bedside chair;All needs within reach  (Cold pack to right knee)   Licensure   [place] License Number  Cassi ROQUE [place], [place] [id number]   Portions of the documentation may have been created using voice recognition software. Occasional wrong word or sound alike substitutions may have occurred due to the inherent limitation of the voice recognition software. Read the chart carefully and recognize, using context, where substitutions have occurred.

## 2024-07-31 NOTE — OCCUPATIONAL THERAPY NOTE
Occupational Therapy Evaluation     Patient Name: Swapnil Grover  Today's Date: 7/31/2024  Problem List  Principal Problem:    S/P total knee replacement using cement, right    Past Medical History  Past Medical History:   Diagnosis Date    Anesthesia complication 2022    difficulty waking up-pt woke up with a CPAP    Arthritis     BPH (benign prostatic hyperplasia)     Breathing difficulty 12/01/2022    CPAP given per pt-s/p surgery    Cancer (HCC) 2015    basal cell of the skull     Chronic kidney disease     Colon polyp     Decreased hearing of both ears 04/29/2024    had surgery for correction    Heart failure (HCC)     12/2/22-pt had ECHO-per pt R. sided block    History of subdural hematoma     Hypertension     Kidney stone     right stone    Renal disorder     Sleep apnea     mild- no CPAP-had nasal septoplasty    Wears glasses     reading     Past Surgical History  Past Surgical History:   Procedure Laterality Date    BASAL CELL CARCINOMA EXCISION  2015    skull    BRAIN SURGERY      in the 1990's-subdural hematoma    CATARACT EXTRACTION Left     COLONOSCOPY      x2    CYSTOSCOPY W/ LASER LITHOTRIPSY Right 11/27/2020    Procedure: CYSTOSCOPY, FLEXIBLE URETEROSCOPY, LASER, AND STENT EXCHANGE,STONE BASKET, RIGHT RETROGRADE;  Surgeon: Sabino Garcia MD;  Location: [facility];  Service: Urology    FL RETROGRADE PYELOGRAM  10/18/2020    FL RETROGRADE PYELOGRAM  11/27/2020    FL RETROGRADE PYELOGRAM  12/01/2022    FL RETROGRADE PYELOGRAM  12/22/2022    JOINT REPLACEMENT  1/23/2024    LITHOTRIPSY      LUMBAR EPIDURAL INJECTION Bilateral 5/3/2024    Procedure: Lumbar subarachnoid drain placement;  Surgeon: Billy August MD;  Location:  MAIN OR;  Service: Neurosurgery    SC ARTHRP SARA CONDYLE&PLATU MEDIAL&LAT COMPARTMENTS Left 01/23/2024    Procedure: ARTHROPLASTY KNEE TOTAL W ROBOT - overnight, cemented;  Surgeon: Bassem Moscoso DO;  Location: [facility];  Service: Orthopedics    SC ARTHRP SARA  CONDYLE&PLATU MEDIAL&LAT COMPARTMENTS Right 7/30/2024    Procedure: ARTHROPLASTY KNEE TOTAL W ROBOT - RIGHT - CEMENTED - OVERNIGHT;  Surgeon: Bassem Moscoso DO;  [facility];  Service: Orthopedics    PA CYSTO BLADDER W/URETERAL CATHETERIZATION Right 11/11/2020    Procedure: ESWL RIGHT;  Surgeon: Sabino Garcia MD;  [facility];  Service: Urology    PA CYSTO BLADDER W/URETERAL CATHETERIZATION Right 12/01/2022    Procedure: CYSTOSCOPY RETROGRADE PYELOGRAM WITH INSERTION STENT URETERAL;  Surgeon: Sabino Garcia MD;  [facility];  Service: Urology    PA CYSTO/URETERO W/LITHOTRIPSY &INDWELL STENT INSRT Right 10/18/2020    Procedure: CYSTOSCOPY URETEROSCOPY WITH BASKET EXTRACTION, RETROGRADE PYELOGRAM AND INSERTION STENT URETERAL;  Surgeon: Kris Ac MD;  [facility];  Service: Urology    PA CYSTO/URETERO W/LITHOTRIPSY &INDWELL STENT INSRT Right 12/22/2022    Procedure: CYSTOSCOPY URETEROSCOPY WITH LITHOTRIPSY HOLMIUM LASER, RETROGRADE PYELOGRAM AND INSERTION STENT URETERAL;  Surgeon: Sabino Garcia MD;  [facility]: [facility];  Service: Urology    RETROMASTOID CRANIOTOMY Bilateral 4/29/2024    Procedure: CRANIOTOMY MIDDLE FOSSA,RETROMASTOID APPROACH FOR ENT;  Surgeon: Frances Wasserman MD;  Location:  MAIN OR;  Service: ENT    SEPTOPLASTY      in the 1990's-trimmed uvula    TEGMAN DEFECT REPAIR CSF LEAK Bilateral 4/29/2024    Procedure: Bilateral endoscopic middle fossa craniotomies for repair of tegmen defect and CSF leak with temporalis fascia muscle flap, with neuromonitoring (CN 7/8) and intraoperative lumbar drain placement;  Surgeon: Billy August MD;  Location:  MAIN OR;  Service: Neurosurgery    TONSILLECTOMY  1955    age 6         07/31/24 1149   OT Last Visit   OT Visit Date 07/31/24  (Wednesday)   Note Type   Note type Evaluation  (Eval [id number])   Additional Comments Identified pt by full name and birthdate   Pain Assessment   Pain Assessment Tool 0-10  "  Pain Score No Pain  (upon therapist arrival seated OOB in chair \"I am not moving though\")   Pain Location/Orientation Orientation: Right;Location: Knee;Location: Leg   Hospital Pain Intervention(s) Cold applied;Repositioned;Ambulation/increased activity;Emotional support   Restrictions/Precautions   Weight Bearing Precautions Per Order Yes   RLE Weight Bearing Per Order WBAT  (s/p R TKA w/ Dr. Moscoso on 7/30/24)   Braces or Orthoses Other (Comment)  (B thigh high compression stockings; Pt brought knee immobilizer from L TKA)   Other Precautions WBS;Contact/isolation;Fall Risk;Pain  ([place] on room air)   Home Living   Type of Home House   Home Layout Multi-level;Performs ADLs on one level;Able to live on main level with bedroom/bathroom  (4 TAYLA)   Bathroom Shower/Tub Tub/shower unit   Bathroom Toilet Standard  (w/ bidet attachment)   Bathroom Equipment Grab bars in shower   Bathroom Accessibility Accessible   Home Equipment Walker;Cane;Grab bars  (pt reports not using AD for functional mobility prior to R TKA)   Additional Comments Pt reports living alone in 2 SH w/ first floor set- up. Pt confirmed that his printer is upstairs on 2nd floor but he will not need to go up their for at least one month   Prior Function   Level of [place] Independent with ADLs;Independent with functional mobility;Independent with IADLS  (+ drive)   Lives With (S)  Alone   Receives Help From Friend(s)   IADLs Independent with driving;Independent with meal prep;Independent with medication management   Falls in the last 6 months 0   Vocational Retired   Comments Pt reports I w/ ADLs prior to admission w/ out use of AD.   Lifestyle   Autonomy Pt reports I w/ ADLs at baseline w/ out use of AD; + drive. Pt reports that he did laundry prior to admission   Reciprocal Relationships Pt reports living alone.   Service to Others Pt reports retired and worked for the [employer]. \"I always say that I have a hobby that turned into a career, and " "my career is still my hobby\"   Intrinsic Gratification Pt reports enjoying watching the news in [place] on You Tube in the AM and watching trains.   General   Additional Pertinent History Pt is s/p R TKA on 7/30/24 w/ Dr. Moscoso; anticipated POD # 1 DC   Family/Caregiver Present No   Additional General Comments Personal and environmental factors supporting performance includes independent at baseline, access to AD/ DME; barriers include lives alone, multi level home, limited support   Subjective   Subjective \"I have been there done that\"   ADL   Where Assessed Chair  (vs in stance using RW)   Eating Assistance 7  Independent   Grooming Assistance 6  Modified Independent   Grooming Deficit Setup;Standing with assistive device   UB Bathing Assistance Unable to assess  (anticipate mod I based on fxal obs skills, clinical judgement)   LB Bathing Assistance Unable to assess  (anticipate mod I seated after set- up based on fxal obs skills, clinical judgement)   UB Dressing Assistance 6  Modified independent  (in stance w/ fair balance)   UB Dressing Deficit Setup   LB Dressing Assistance 5  Supervision/Setup  (seated to thread clothing; cues for tech to thread R LE first)   LB Dressing Deficit Setup;Supervision/safety;Increased time to complete   Toileting Assistance    (Pt reports no concerns completing toilet transfer. Pt stated I did it before.)   Bed Mobility   Supine to Sit Unable to assess   Sit to Supine Unable to assess   Additional Comments Pt seated OOB in chair finishing lunch upon therapist arrival, and OOB in chair post eval   Transfers   Sit to Stand 5  Supervision   Additional items Assist x 1;Armrests;Increased time required   Stand to Sit 5  Supervision   Additional items Assist x 1;Armrests;Increased time required   Toilet transfer   (Pt verbalized understanding tech; declined need to practice)   Car transfer   (Pt verbalized understanding car transfer tech)   Additional Comments Pt performed sit <> " stand 2X   Functional Mobility   Functional Mobility 5  Supervision   Additional Comments short distance in room   Additional items Rolling walker   Balance   Static Sitting Fair +   Dynamic Sitting Fair   Static Standing Fair   Ambulatory Fair   Activity Tolerance   Activity Tolerance Patient tolerated treatment well   Medical Staff Made Aware spoke w/ Martha ALDANA   Nurse Made Aware spoke w/ RNMarce and Isabela HERNANDEZ Assessment   RUE Assessment WFL   RUE Strength   RUE Overall Strength Within Functional Limits - able to perform ADL tasks with strength   LUE Assessment   LUE Assessment WFL   LUE Strength   LUE Overall Strength Within Functional Limits - able to perform ADL tasks with strength   Hand Function   Gross Motor Coordination Functional   Fine Motor Coordination Functional   Cognition   Overall Cognitive Status WFL  (appears WFL, alert / generally oriented)   Arousal/Participation Alert;Cooperative   Attention Attends with cues to redirect   Orientation Level Oriented X4   Memory Within functional limits   Following Commands Follows multistep commands without difficulty   Comments Alert, oriented and able to follow directions during ADLs; Hard of hearing.   Assessment   Limitation Decreased ADL status;Decreased endurance;Decreased self-care trans;Decreased high-level ADLs  (impaired pain, standing tolerance, antiticipated R LE ROM / strength)   Assessment Pt is an 74yo male s/p R TKA w/ Dr. Moscoso on 7/30/24. Pt reports living alone in multi level home w/ first floor set- up. Pt reports I w/ ADL/ IADL w/ out use of AD; access to RW, cane. Significant PMH impacting his occupational performance includes L TKA (1/23/24), basal cell carcinoma excision (2015), hx subdural hematoma (1990's), HTN, decreased hearing, B endoscopic middle fossa craniotomies for repair of tegmen defect / CSF leak w/ temporalis muscle flap/ intraoperative lumbar drain placement (04/9/24). Upon eval, pt alert and generally oriented.  Able to follow direction w/ increased time due to hard of hearing. Improved hearing on L and benefit from minimal distractions / good eye contact. Pt required mod I grooming for + time, mod I UBD, S LBD, S sit <> stand, S using RW functional mobility. Pt is completing ADLs below baseline level of I due to anticipated R LE ROM / strength deficits, impaired pain, standing tolerance. Pt would benefit from OT in acute care to max I w/ ADLs. No post acute OT rehab needs when medically stable for discharge from acute care. Will continue to follow   Goals   Patient Goals Pt stated that he would like to return home and signed up for [employer] in case he needs a ride   Plan   Treatment Interventions ADL retraining;Functional transfer training;Endurance training;Equipment evaluation/education;Patient/family training;Energy conservation;Activityengagement   Goal Expiration Date 08/07/24   OT Treatment Day 0  (Wed 7/31/24)   OT Frequency 3-5x/wk   Discharge Recommendation   Rehab Resource Intensity Level, OT No post-acute rehabilitation needs  (DC home usnig RW w/ OPPT)   AM-PAC Daily Activity Inpatient   Lower Body Dressing 3   Bathing 3   Toileting 3   Upper Body Dressing 4   Grooming 4   Eating 4   Daily Activity Raw Score 21   Daily Activity Standardized Score (Calc for Raw Score >=11) 44.27   AM-PAC Applied Cognition Inpatient   Following a Speech/Presentation 3   Understanding Ordinary Conversation 4   Taking Medications 4   Remembering Where Things Are Placed or Put Away 4   Remembering List of 4-5 Errands 4   Taking Care of Complicated Tasks 4   Applied Cognition Raw Score 23   Applied Cognition Standardized Score 53.08   Barthel Index   Feeding 10   Bathing 0   Grooming Score 5   Dressing Score 5   Bladder Score 10   Bowels Score 10   Toilet Use Score 5   Transfers (Bed/Chair) Score 10   Mobility (Level Surface) Score 0   Stairs Score 5   Barthel Index Score 60   End of Consult   Education Provided Yes   Patient Position at  End of Consult Bedside chair;All needs within reach   Nurse Communication Nurse aware of consult   Licensure   [place] License Number  Jacquelin Thornejonathan, OTR/L [id number]       The patient's raw score on the AM-PAC Daily Activity Inpatient Short Form is 21. A raw score of greater than or equal to 19 suggests the patient may benefit from discharge to home. Please refer to the recommendation of the Occupational Therapist for safe discharge planning.    Pt goals to be met by 8/7/24 to max I w/ ADLs and improve engagement includes:    -Pt will complete bed mobility supine <> sit independently    -Pt will complete functional transfers to bed, chair, and toilet using LRAD, DME as needed using LRAD, DME as needed independently    -Pt will complete LBD w/ mod I for + time to max I, return home    -Pt will complete UBD independently    -Pt will consistently engage in functional mobility household distances independently using LRAD to max I and minimize burden of care to return home alone    -Pt will demonstrate improved AMPAC score to at least 24 to max I w/ ADLs    -Pt will demonstrate improved functional standing tolerance for at least 15 minutes w/ at least fair + balance while engaged in grooming/ bathing in stance at sink w/ mod I to max I w/ light IADLs, meal prep to return home alone      Jacquelin Snell, OTR/L  [id number]  [id number]

## 2024-07-31 NOTE — PLAN OF CARE
Problem: PAIN - ADULT  Goal: Verbalizes/displays adequate comfort level or baseline comfort level  Description: Interventions:  - Encourage patient to monitor pain and request assistance  - Assess pain using appropriate pain scale  - Administer analgesics based on type and severity of pain and evaluate response  - Implement non-pharmacological measures as appropriate and evaluate response  - Consider cultural and social influences on pain and pain management  - Notify physician/advanced practitioner if interventions unsuccessful or patient reports new pain  Outcome: Progressing     Problem: INFECTION - ADULT  Goal: Absence or prevention of progression during hospitalization  Description: INTERVENTIONS:  - Assess and monitor for signs and symptoms of infection  - Monitor lab/diagnostic results  - Monitor all insertion sites, i.e. indwelling lines, tubes, and drains  - Monitor endotracheal if appropriate and nasal secretions for changes in amount and color  - [place] appropriate cooling/warming therapies per order  - Administer medications as ordered  - Instruct and encourage patient and family to use good hand hygiene technique  - Identify and instruct in appropriate isolation precautions for identified infection/condition  Outcome: Progressing  Goal: Absence of fever/infection during neutropenic period  Description: INTERVENTIONS:  - Monitor WBC    Outcome: Progressing     Problem: SAFETY ADULT  Goal: Patient will remain free of falls  Description: INTERVENTIONS:  - Educate patient/family on patient safety including physical limitations  - Instruct patient to call for assistance with activity   - Consult OT/PT to assist with strengthening/mobility   - Keep Call bell within reach  - Keep bed low and locked with side rails adjusted as appropriate  - Keep care items and personal belongings within reach  - Initiate and maintain comfort rounds  - Make Fall Risk Sign visible to staff  - Offer Toileting every 2 Hours,  in advance of need  - Initiate/Maintain bed/chair alarm  - Obtain necessary fall risk management equipment: walker  - Apply yellow socks and bracelet for high fall risk patients  - Consider moving patient to room near nurses station  Outcome: Progressing  Goal: Maintain or return to baseline ADL function  Description: INTERVENTIONS:  -  Assess patient's ability to carry out ADLs; assess patient's baseline for ADL function and identify physical deficits which impact ability to perform ADLs (bathing, care of mouth/teeth, toileting, grooming, dressing, etc.)  - Assess/evaluate cause of self-care deficits   - Assess range of motion  - Assess patient's mobility; develop plan if impaired  - Assess patient's need for assistive devices and provide as appropriate  - Encourage maximum independence but intervene and supervise when necessary  - Involve family in performance of ADLs  - Assess for home care needs following discharge   - Consider OT consult to assist with ADL evaluation and planning for discharge  - Provide patient education as appropriate  Outcome: Progressing  Goal: Maintains/Returns to pre admission functional level  Description: INTERVENTIONS:  - Perform AM-PAC 6 Click Basic Mobility/ Daily Activity assessment daily.  - Set and communicate daily mobility goal to care team and patient/family/caregiver.   - Collaborate with rehabilitation services on mobility goals if consulted  - Perform Range of Motion 3 times a day.  - Reposition patient every 3 hours.  - Dangle patient 3 times a day  - Stand patient 3 times a day  - Ambulate patient 3 times a day  - Out of bed to chair 3 times a day   - Out of bed for meals 3 times a day  - Out of bed for toileting  - Record patient progress and toleration of activity level   Outcome: Progressing     Problem: DISCHARGE PLANNING  Goal: Discharge to home or other facility with appropriate resources  Description: INTERVENTIONS:  - Identify barriers to discharge w/patient and  caregiver  - Arrange for needed discharge resources and transportation as appropriate  - Identify discharge learning needs (meds, wound care, etc.)  - Arrange for interpretive services to assist at discharge as needed  - Refer to Case Management Department for coordinating discharge planning if the patient needs post-hospital services based on physician/advanced practitioner order or complex needs related to functional status, cognitive ability, or social support system  Outcome: Progressing     Problem: Knowledge Deficit  Goal: Patient/family/caregiver demonstrates understanding of disease process, treatment plan, medications, and discharge instructions  Description: Complete learning assessment and assess knowledge base.  Interventions:  - Provide teaching at level of understanding  - Provide teaching via preferred learning methods  Outcome: Progressing

## 2024-07-31 NOTE — PROGRESS NOTES
"Progress Note - Orthopedics   Swapnil Grover 73 y.o. male MRN: [id number]  Unit/Bed#: [address] [place] [address] Encounter: [id number]    Assessment:  POD#1 s/p robotic assisted cemented right total knee arthroplasty    Plan:  Ancef 2g IV x 2 for 24 hours postop - completed  DVT prophylaxis: Aspirin 325mg BID/SCD's/Ambulation  PT/OT- WBAT RLE  Analgesia PRN  Follow up AM labs - stable, DC IVF  Kiser DC - monitor for void  Dressing- monitor for drainage, Mepilex may remain in place for 7 days  Discharge planning -he has done exceptionally well in his recovery. Patient has met all discharge criteria with nursing and therapy.  He will begin outpatient physical therapy later this week.  All discharge instructions patient questions were discussed in detail at bedside prior to discharge.  He will return to see Dr. Moscoso in 2 weeks for continued postoperative care    Weight bearing: As tolerated    VTE Pharmacologic Prophylaxis: Aspirin 325mg BID  VTE Mechanical Prophylaxis: foot pump applied    Subjective: Patient seen and examined this afternoon.  Has done exceptionally well with physical and Occupational Therapy.  His pain is controlled and he did not have any overnight events. No complaints    Vitals: Blood pressure 120/71, pulse 69, temperature 98.9 °F (37.2 °C), resp. rate 18, height 5' 7\" (1.702 m), weight 100 kg (221 lb), SpO2 94%.,Body mass index is 34.61 kg/m².      Intake/Output Summary (Last 24 hours) at 7/31/2024 1151  Last data filed at 7/31/2024 0801  Gross per 24 hour   Intake 2216.25 ml   Output 2370 ml   Net -153.75 ml       Invasive Devices       Peripheral Intravenous Line  Duration             Peripheral IV 07/30/24 Left;Dorsal (posterior) Hand 1 day              Drain  Duration             Lumbar Drain 93 days    Lumbar Drain 88 days                    Physical Exam: General: NAD, A&Ox3  Ortho Exam: RLE: right anterior knee dsg c/d/I, compartments soft, thigh and calf nontender, +SILT L2-S1, +EHL/DF/PF, foot " warm, TEDs in place    Lab, Imaging and other studies: I have personally reviewed pertinent lab results.  Postoperative x-rays reviewed and acceptable    Lab Results   Component Value Date    WBC 9.25 07/31/2024    HGB 12.8 07/31/2024    HCT 39.3 07/31/2024    MCV 94 07/31/2024     07/31/2024     Lab Results   Component Value Date    SODIUM 135 07/31/2024    K 4.5 07/31/2024     07/31/2024    CO2 24 07/31/2024    AGAP 8 07/31/2024    BUN 20 07/31/2024    CREATININE 1.30 07/31/2024    GLUC 87 07/31/2024    GLUF 94 06/28/2024    CALCIUM 8.3 (L) 07/31/2024    AST 33 06/28/2024    ALT 42 06/28/2024    ALKPHOS 45 06/28/2024    TP 7.6 06/28/2024    TBILI 0.62 06/28/2024    EGFR 54 07/31/2024     Hill Reynoso PA-C

## 2024-07-31 NOTE — PLAN OF CARE
Problem: PHYSICAL THERAPY ADULT  Goal: Performs mobility at highest level of function for planned discharge setting.  See evaluation for individualized goals.  Description: Treatment/Interventions: ADL retraining, Functional transfer training, LE strengthening/ROM, Elevations, Therapeutic exercise, Endurance training, Patient/family training, Equipment eval/education, Bed mobility, Gait training, Compensatory technique education, Spoke to nursing, Spoke to case management, OT          See flowsheet documentation for full assessment, interventions and recommendations.  7/31/2024 0943 by Cassi Flores PT  Outcome: Progressing  Note: Prognosis: Good  Problem List: Decreased strength, Decreased range of motion, Decreased endurance, Impaired balance, Decreased mobility, Decreased safety awareness, Pain, Orthopedic restrictions  Assessment: Patient agreeable to skilled PT session this morning.  Patient with good tolerance to right lower extremity TherEX, bed mobility, transfers, gait with a roller walker on level surfaces and gait up/down stairs with use of bilateral rails.  Patient is progressing well status post right TKA.  Patient will be safe for discharge this afternoon following second session of PT services.  During stair training, patient did request use of 2 rails and declined second trial of stairs with 1 rail and cane.  Will reattempt stairs in p.m. with 1 rail and cane as well as gait with a roller walker.  Patient remains appropriate for Level III Minimum Resource Intensity when medically stable for discharge.        Rehab Resource Intensity Level, PT: III (Minimum Resource Intensity)    See flowsheet documentation for full assessment.     7/31/2024 0943 by Cassi Flores, PT  Outcome: Progressing  Note: Prognosis: Good  Problem List: Decreased strength, Decreased range of motion, Decreased endurance, Impaired balance, Decreased mobility, Decreased safety awareness, Pain, Orthopedic  restrictions  Assessment: Patient agreeable to skilled PT session this morning.  Patient with good tolerance to right lower extremity TherEX, bed mobility, transfers, gait with a roller walker on level surfaces and gait up/down stairs with use of bilateral rails.  Patient is progressing well status post right TKA.  Patient will be safe for discharge this afternoon following second session of PT services.  During stair training, patient did request use of 2 rails and declined second trial of stairs with 1 rail and cane.  Will reattempt stairs in p.m. with 1 rail and cane as well as gait with a roller walker.  Patient remains appropriate for Level III Minimum Resource Intensity when medically stable for discharge.        Rehab Resource Intensity Level, PT: III (Minimum Resource Intensity)    See flowsheet documentation for full assessment.

## 2024-07-31 NOTE — PLAN OF CARE
Problem: PHYSICAL THERAPY ADULT  Goal: Performs mobility at highest level of function for planned discharge setting.  See evaluation for individualized goals.  Description: Treatment/Interventions: Functional transfer training, ADL retraining, LE strengthening/ROM, Elevations, Therapeutic exercise, Endurance training, Patient/family training, Equipment eval/education, Bed mobility, Gait training, Compensatory technique education, Spoke to nursing          See flowsheet documentation for full assessment, interventions and recommendations.  7/31/2024 1329 by Cassi Flores PT  Outcome: Progressing  Note: Prognosis: Good  Problem List: Decreased strength, Decreased range of motion, Decreased endurance, Impaired balance, Decreased mobility, Pain, Orthopedic restrictions  Assessment: Patient agreeable to PT session this afternoon.  Patient is noted with continuing improvement in transfers, gait with a roller walker on level surfaces, gait on stairs, balance and endurance.  Patient remains safe for discharge this afternoon.  Patient remains appropriate for Level III Minimum Resource Intensity upon discharge.        Rehab Resource Intensity Level, PT: III (Minimum Resource Intensity)    See flowsheet documentation for full assessment.     7/31/2024 0943 by Cassi Flores PT  Outcome: Progressing  Note: Prognosis: Good  Problem List: Decreased strength, Decreased range of motion, Decreased endurance, Impaired balance, Decreased mobility, Decreased safety awareness, Pain, Orthopedic restrictions  Assessment: Patient agreeable to skilled PT session this morning.  Patient with good tolerance to right lower extremity TherEX, bed mobility, transfers, gait with a roller walker on level surfaces and gait up/down stairs with use of bilateral rails.  Patient is progressing well status post right TKA.  Patient will be safe for discharge this afternoon following second session of PT services.  During stair training,  patient did request use of 2 rails and declined second trial of stairs with 1 rail and cane.  Will reattempt stairs in p.m. with 1 rail and cane as well as gait with a roller walker.  Patient remains appropriate for Level III Minimum Resource Intensity when medically stable for discharge.        Rehab Resource Intensity Level, PT: III (Minimum Resource Intensity)    See flowsheet documentation for full assessment.

## 2024-07-31 NOTE — PHYSICAL THERAPY NOTE
PT TREATMENT     07/31/24 1323   PT Last Visit   PT Visit Date 07/31/24   Note Type   Note Type BID visit/treatment   Pain Assessment   Pain Assessment Tool 0-10   Pain Score 3   Pain Location/Orientation Orientation: Right;Location: Knee   Pain Onset/Description Onset: Ongoing   Effect of Pain on Daily Activities Limits comfort and activity tolerance   Patient's Stated Pain Goal No pain   Hospital Pain Intervention(s) Repositioned;Ambulation/increased activity;Emotional support;Rest;Cold applied   Restrictions/Precautions   RLE Weight Bearing Per Order WBAT   Other Precautions Pain;WBS   General   Chart Reviewed Yes   Family/Caregiver Present No   Cognition   Overall Cognitive Status WFL   Arousal/Participation Cooperative   Attention Within functional limits   Orientation Level Oriented X4   Memory Within functional limits   Following Commands Follows all commands and directions without difficulty   Comments At least 2 patient identifiers including name and date of birth   Subjective   Subjective Patient reports 3/10 pain to right knee   Bed Mobility   Additional Comments Patient received out of bed in chair   Transfers   Sit to Stand 7  Independent   Additional items Armrests;Verbal cues;Increased time required   Stand to Sit 7  Independent   Additional items Armrests;Verbal cues;Increased time required   Ambulation/Elevation   Gait pattern Decreased R stance;Decreased foot clearance;Decreased heel strike;Antalgic   Gait Assistance 6  Modified independent   Additional items Assist x 1;Verbal cues;Tactile cues   Assistive Device Rolling walker   Distance 100 feet x 2 reps with change in direction   Stair Management Assistance 6  Modified independent   Additional items Assist x 1;Verbal cues;Tactile cues   Stair Management Technique Step to pattern;Two rails   Number of Stairs 4   Balance   Static Sitting Good   Static Standing Fair  (With roller walker)   Ambulatory Fair  (With roller walker)   Endurance  Deficit   Endurance Deficit Yes   Activity Tolerance   Activity Tolerance Patient limited by fatigue;Patient limited by pain   Nurse Made Aware CONOR Zheng   Assessment   Problem List Decreased strength;Decreased range of motion;Decreased endurance;Impaired balance;Decreased mobility;Pain;Orthopedic restrictions   Assessment Patient agreeable to PT session this afternoon.  Patient is noted with continuing improvement in transfers, gait with a roller walker on level surfaces, gait on stairs, balance and endurance.  Patient remains safe for discharge this afternoon.  Patient remains appropriate for Level III Minimum Resource Intensity upon discharge.    The patient's [facility] Basic Mobility Inpatient Short Form Raw Score is 18. A Raw score of greater than 16 suggests the patient may benefit from discharge to home. Please also refer to the recommendation of the Physical Therapist for safe discharge planning.   Goals   [facility] Expiration Date 08/06/24   Short Term Goal #1 Pt will be I with HEP ; Pt will perform bed mobility/transfers IND ; Standing balance will improve to fair+ to decrease risk of falls ; Pt will ambulate x 150 feet Mod i with RW ; Pt will negotiate x 4 steps with rail/cane + supervision to enter/exit home ; AMPAC score will improve >18/24 to demonstrate improved functional independence   Plan   Treatment/Interventions Functional transfer training;ADL retraining;LE strengthening/ROM;Elevations;Therapeutic exercise;Endurance training;Patient/family training;Equipment eval/education;Bed mobility;Gait training;Compensatory technique education;Spoke to nursing   PT Frequency Twice a day   Discharge Recommendation   Rehab Resource Intensity Level, PT III (Minimum Resource Intensity)   AM-PAC Basic Mobility Inpatient   Turning in Flat Bed Without Bedrails 3   Lying on Back to Sitting on Edge of Flat Bed Without Bedrails 3   Moving Bed to Chair 3   Standing Up From Chair Using Arms 3   Walk in Room 3   Climb 3-5  Stairs With Railing 3   Basic Mobility Inpatient Raw Score 18   Basic Mobility Standardized Score 41.05   [facility] Highest Level Of Mobility   -HLM Goal 6: Walk 10 steps or more   -HLM Achieved 7: Walk 25 feet or more   Education   Education Provided Mobility training;Assistive device   Patient Demonstrates acceptance/verbal understanding;Explanation/teachback used   End of Consult   Patient Position at End of Consult Bed/Chair alarm activated;Bedside chair;All needs within reach   Licensure   [place] License Number  Cassi Flores, PT [id number]   Portions of the documentation may have been created using voice recognition software. Occasional wrong word or sound alike substitutions may have occurred due to the inherent limitation of the voice recognition software. Read the chart carefully and recognize, using context, where substitutions have occurred.

## 2024-08-01 ENCOUNTER — TRANSITIONAL CARE MANAGEMENT (OUTPATIENT)
Dept: FAMILY MEDICINE CLINIC | Facility: CLINIC | Age: 74
End: 2024-08-01

## 2024-08-01 ENCOUNTER — TELEPHONE (OUTPATIENT)
Dept: OBGYN CLINIC | Facility: HOSPITAL | Age: 74
End: 2024-08-01

## 2024-08-01 DIAGNOSIS — N20.0 NEPHROLITHIASIS: ICD-10-CM

## 2024-08-01 NOTE — TELEPHONE ENCOUNTER
"Patient contacted for a postoperative follow up assessment. Patient reports doing \"fine.\" Patient states current pain level of a 2-3/10  when sitting and 1/10 when walking with RW. Patient reports swelling and dressing is clean, dry and intact. Patient is icing the site regularly.     Confirmed upcoming appointments w/ patient:   PT 8/2  Postop 8/14    We reviewed patients AVS medication list. Patient is taking ASA 325mg BID (Educated on importance of taking asa as prescribed for blood clot prevention, pt verbalizes understanding), . Patient has not yet had a BM but \"is on the verge of one.\"  Pt states he has not started Tylenol 975mg every 8 hours, Colace 100mg BID, or Oxycodone 5mg PRN. Discussed and educated on nerve block w/ TKA, instructed Tylenol to be started around the clock as prescribed to help transition from nerve block. Educated on postop constipation, instructed use of Colace 100mg BID and importance of starting medication before constipation would become a concern. Pt verbalizes understanding and agreeable to plan.     Patient denies nausea, vomiting, abdominal pain, chest pain, shortness of breath, fever, dizziness and calf pain. Patient does not have any other questions or concerns at this time. Pt was encouraged to call with any questions, concerns or issues.    "

## 2024-08-02 ENCOUNTER — EVALUATION (OUTPATIENT)
Dept: PHYSICAL THERAPY | Facility: CLINIC | Age: 74
End: 2024-08-02
Payer: MEDICARE

## 2024-08-02 DIAGNOSIS — G89.29 CHRONIC PAIN OF RIGHT KNEE: ICD-10-CM

## 2024-08-02 DIAGNOSIS — M25.561 CHRONIC PAIN OF RIGHT KNEE: ICD-10-CM

## 2024-08-02 DIAGNOSIS — M17.11 PRIMARY LOCALIZED OSTEOARTHRITIS OF RIGHT KNEE: Primary | ICD-10-CM

## 2024-08-02 PROCEDURE — 97161 PT EVAL LOW COMPLEX 20 MIN: CPT | Performed by: PHYSICAL THERAPIST

## 2024-08-02 RX ORDER — POTASSIUM CITRATE 10 MEQ/1
20 TABLET, EXTENDED RELEASE ORAL
Qty: 180 TABLET | Refills: 1 | Status: SHIPPED | OUTPATIENT
Start: 2024-08-02

## 2024-08-02 NOTE — PROGRESS NOTES
PT Evaluation     Today's date: 2024  Patient name: Swapnil Grover  : 1950  MRN: [id number]  Referring provider: Hill Reynoso*  Dx:   Encounter Diagnosis     ICD-10-CM    1. Primary localized osteoarthritis of right knee  M17.11       2. Chronic pain of right knee  M25.561     G89.29                      Assessment  Impairments: abnormal gait, abnormal muscle firing, abnormal muscle tone, abnormal or restricted ROM, abnormal movement, activity intolerance, impaired balance, impaired physical strength, lacks appropriate home exercise program, pain with function, weight-bearing intolerance, poor posture  and poor body mechanics    Assessment details: Swapnil Grover is a 73 y.o. male who presents to physical therapy with pain, decreased LE range of motion, decreased LE strength, fair balance, impaired function and decreased activity tolerance. Patient's clinical presentation is consistent with their referring diagnosis of Primary localized osteoarthritis of right knee  (primary encounter diagnosis)  Chronic pain of right knee. The pt presents with functional limitations of ADLs, recreational activities, ambulation, performing household chores and stair negotiation. Pt would benefit from physical therapy services to address these limitations and maximize function. Pt was instructed and educated on home exercise program today and demonstrates understanding.     Understanding of Dx/Px/POC: good     Prognosis: good    Goals  Short term goals  (6 weeks)  1.  Patient will have no pain right knee  2 . Patient will have full range of motion right knee  3.  Patient will report a 50% improvement with activities of daily living   4.  Patient will decrease girth of right knee by 1 to 2 cm.     Long term goals - (12 weeks)  1.  Patient will be independent with home exercise program  2.  Patient will have no gait deviations ambulating on level surfaces.   3.  Patient will report 80 % improvement  with activity of daily living function.   4.  Patient will negotiate stairs up and down pain free with use of one railing.       Plan  Patient would benefit from: PT eval and skilled physical therapy  Planned modality interventions: cryotherapy    Planned therapy interventions: ADL training, balance/weight bearing training, joint mobilization, manual therapy, massage, neuromuscular re-education, patient education, postural training, strengthening, stretching, functional ROM exercises, therapeutic activities, therapeutic exercise, gait training and home exercise program    Frequency: 2x week  Duration in weeks: 12  Treatment plan discussed with: patient  Plan details: 2 to 3 visits per week for 12 weeks        Subjective Evaluation    History of Present Illness  Date of surgery: 2024  Mechanism of injury: He reports multiple year history of right knee pain.  He has failed conservative treatments.  He followed up with Dr. Moscoso.  He underwent Right TKA on Tuesday.  He stayed in the hospital one night.  He was discharged the next day.  He has been referred to PT.   Patient Goals  Patient goals for therapy: decreased pain and independence with ADLs/IADLs    Pain  Current pain ratin  At best pain ratin  At worst pain ratin  Location: Right knee  Quality: dull ache, throbbing and sharp  Relieving factors: ice, medications and rest  Aggravating factors: walking, standing and stair climbing    Social Support    Employment status: working (Part time )  Exercise history: House work          Objective     Observations     Additional Observation Details  He has Mepilex dressing and compression stocking in place.  He agreed to remove compression stocking this weekend but to leave Mepilex dressing in place.    Active Range of Motion   Left Knee   Flexion: 108 degrees   Extension: 0 degrees     Right Knee   Flexion: 88 degrees   Extension: -2 degrees     Mobility   Patellar Mobility:     Right Knee    Hypomobile: medial, lateral, superior and inferior     Strength/Myotome Testing     Left Knee   Flexion: 5  Extension: 5    Right Knee   Flexion: 3  Extension: 3-    Swelling     Left Knee Girth Measurement (cm)   Joint line: 42.1 cm    Right Knee Girth Measurement (cm)   Joint line: 46.8 cm    Ambulation   Weight-Bearing Status   Assistive device used: front-wheeled walker    Observational Gait   Gait: antalgic   Decreased walking speed and right stance time.                    Eval/ Re-eval Auth #/ Referral # Total visits Start date  Expiration date Total active units  Total manual units  PT only or  PT+OT?   8/2/24 8/2              Total units authorized                Total units remaining                    Precautions: Kidney Disease , Hearing Loss      Specialty Daily Treatment Diary     Manuals 8/2/24       Visit # 1       PF mobs        Stretch HS Quad        Knee PROM 5 min               Warm-up        NuStep 5 niles       Neuro Re-Ed        Qset 20       Ankle pumps 20 HR standing      Hip abd stand 20                       Ther Ex        Mini squat        Side step        Knee flex        Knee ext 20       SLR        Heel slides 20       Leg press                Ther Activity                        Gait Training        W/ rw reviewed               Modalities        CP

## 2024-08-05 ENCOUNTER — OFFICE VISIT (OUTPATIENT)
Dept: PHYSICAL THERAPY | Facility: CLINIC | Age: 74
End: 2024-08-05
Payer: MEDICARE

## 2024-08-05 DIAGNOSIS — M25.561 CHRONIC PAIN OF RIGHT KNEE: ICD-10-CM

## 2024-08-05 DIAGNOSIS — M17.11 PRIMARY LOCALIZED OSTEOARTHRITIS OF RIGHT KNEE: Primary | ICD-10-CM

## 2024-08-05 DIAGNOSIS — G89.29 CHRONIC PAIN OF RIGHT KNEE: ICD-10-CM

## 2024-08-05 PROCEDURE — 97110 THERAPEUTIC EXERCISES: CPT

## 2024-08-05 PROCEDURE — 97112 NEUROMUSCULAR REEDUCATION: CPT

## 2024-08-05 NOTE — PROGRESS NOTES
Daily Note     Today's date: 2024  Patient name: Swapnil Grover  : 1950  MRN: [id number]  Referring provider: Hill Reynoso*  Dx:   Encounter Diagnosis     ICD-10-CM    1. Primary localized osteoarthritis of right knee  M17.11       2. Chronic pain of right knee  M25.561     G89.29                      Subjective: Patient states the nerve block has been wearing off, he also states he had trouble performing knee extension in seated position and was only able to perform 8 reps for HEP. He ambulates with SPC      Objective: See treatment diary below      Assessment: Tolerated treatment well. Passively he was able to achieve 95 degrees of flexion and 0 degrees of extension. At end of session, patient was performing repeated knee flexion over step, which he was able to achieve 100 degrees of knee flexion. He demo's decreased strength in RLE as he was unable to perform SLR independently and req 50% clinician assistanvce. He also required verbal cueing to keep torso vertically straight when performing hip abd to prevent hip hike compensation. Plan to remove bandaid next visit. Patient demonstrated fatigue post treatment, exhibited good technique with therapeutic exercises, and would benefit from continued PT to promote knee mobility and strength following TKA.       Plan: Continue POC established by primary PT. Remove dressing next visit per Ortho instruction      Eval/ Re-eval Auth #/ Referral # Total visits Start date  Expiration date Total active units  Total manual units  PT only or  PT+OT?   24             Total units authorized                Total units remaining                    Precautions: Kidney Disease , Hearing Loss      Specialty Daily Treatment Diary     Manuals 24      Visit # 1 2      PF mobs        Stretch HS Quad        Knee PROM 5 min 5 min   Flex 95 deg  Ext 0 deg              Warm-up        NuStep 5 niles 5 min lvl  2       Neuro Re-Ed        Qset 20 20      Ankle pumps 20 HR standing 20       Hip abd stand 20 20                      Ther Ex        Mini squat  20      Side step        Knee flex  At step 20 - 100 deg flexion      Knee ext 20 SAQ 20      SLR  2x5 AA 50%      Heel slides 20 20      Leg press                Ther Activity                        Gait Training        W/ rw reviewed  Dressing removal              Modalities        CP

## 2024-08-08 ENCOUNTER — OFFICE VISIT (OUTPATIENT)
Dept: PHYSICAL THERAPY | Facility: CLINIC | Age: 74
End: 2024-08-08
Payer: MEDICARE

## 2024-08-08 DIAGNOSIS — G89.29 CHRONIC PAIN OF RIGHT KNEE: ICD-10-CM

## 2024-08-08 DIAGNOSIS — M17.11 PRIMARY LOCALIZED OSTEOARTHRITIS OF RIGHT KNEE: Primary | ICD-10-CM

## 2024-08-08 DIAGNOSIS — M25.561 CHRONIC PAIN OF RIGHT KNEE: ICD-10-CM

## 2024-08-08 PROCEDURE — 97112 NEUROMUSCULAR REEDUCATION: CPT | Performed by: PHYSICAL THERAPIST

## 2024-08-08 PROCEDURE — 97110 THERAPEUTIC EXERCISES: CPT | Performed by: PHYSICAL THERAPIST

## 2024-08-08 NOTE — PROGRESS NOTES
Daily Note     Today's date: 2024  Patient name: Swapnil Grover  : 1950  MRN: [id number]  Referring provider: Hill Reynoso*  Dx:   Encounter Diagnosis     ICD-10-CM    1. Primary localized osteoarthritis of right knee  M17.11       2. Chronic pain of right knee  M25.561     G89.29           Start Time: 1130  Stop Time: 1230  Total time in clinic (min): 60 minutes    Subjective: Patient reporting overall feeling better. Pt reporting he had to take oxy last night due to difficulty with sleeping. He ambulates with SPC into PT clinic.       Objective: See treatment diary below. Incision healing well. No signs of infection. Dressing removed R knee today.      Media Information      Document Information    Clinical Image - Mobile Device   R TKR bandage removal   2024 12:06   Attached To:   Office Visit on 24 with Iwona Pennington PT   Source Information    Iwona Pennington PT  [facility] [place]   Document History        Assessment: Tolerated treatment well. Emphasis of tx on neuro re-education of R quad strengthening with all therex.  Pt able to perform SLR flexion independently. Pt able to perform therex without reports of increase pain post PT.  Patient demonstrated fatigue post treatment, exhibited good technique with therapeutic exercises, and would benefit from continued PT to promote knee mobility and strength following TKA.       Plan: Continue POC established by primary PT. Add leg press NV.     Eval/ Re-eval Auth #/ Referral # Total visits Start date  Expiration date Total active units  Total manual units  PT only or  PT+OT?   24             Total units authorized                Total units remaining                    Precautions: Kidney Disease , Hearing Loss      Specialty Daily Treatment Diary     Manuals 24     Visit # 1 2 3     PF mobs        Stretch HS Quad        Knee PROM 5 min 5 min   Flex 95 deg  Ext 0  "deg   0-100 deg AROM               Warm-up        NuStep 5 niles 5 min lvl 2  15 min lev 5     Neuro Re-Ed        Qset 20 20 X20 hold 5     Ankle pumps 20 HR standing 20  X20 standing     Hip abd stand 20 20         LAQ x12 reps              Ther Ex        Mini squat  20 x20     Side step   Sidestepping 10'x4     Knee flex  At step 20 - 100 deg flexion      Knee ext 20 SAQ 20 SAQ x20     SLR  2x5 AA 50% 2x5 AROM     Heel slides 20 20 X20 peanut     Leg press           Lunge stretch on 8\" x25 hold 10     Ther Activity                        Gait Training        W/ rw reviewed  Dressing removal R knee             Modalities        CP                                  "

## 2024-08-12 ENCOUNTER — OFFICE VISIT (OUTPATIENT)
Dept: PHYSICAL THERAPY | Facility: CLINIC | Age: 74
End: 2024-08-12
Payer: MEDICARE

## 2024-08-12 DIAGNOSIS — M25.561 CHRONIC PAIN OF RIGHT KNEE: ICD-10-CM

## 2024-08-12 DIAGNOSIS — M17.11 PRIMARY LOCALIZED OSTEOARTHRITIS OF RIGHT KNEE: Primary | ICD-10-CM

## 2024-08-12 DIAGNOSIS — G89.29 CHRONIC PAIN OF RIGHT KNEE: ICD-10-CM

## 2024-08-12 PROCEDURE — 97112 NEUROMUSCULAR REEDUCATION: CPT | Performed by: PHYSICAL THERAPIST

## 2024-08-12 PROCEDURE — 97110 THERAPEUTIC EXERCISES: CPT | Performed by: PHYSICAL THERAPIST

## 2024-08-12 NOTE — PROGRESS NOTES
Daily Note     Today's date: 2024  Patient name: Swapnil Grover  : 1950  MRN: [id number]  Referring provider: Hill Reynoso*  Dx:   Encounter Diagnosis     ICD-10-CM    1. Primary localized osteoarthritis of right knee  M17.11       2. Chronic pain of right knee  M25.561     G89.29           Start Time: 1045  Stop Time: 1150  Total time in clinic (min): 65 minutes    Subjective: Patient reporting overall feeling better. Pt ambulating into PT clinic without AD. Swelling improving. Pt reporting compliance with CP.      Objective: See treatment diary below. Incision healing well. No signs of infection.      Assessment: Tolerated treatment well. Emphasis of tx on neuro re-education of R quad strengthening with all therex.  Pt able to perform SLR flexion independently with increased reps today. Pt able to perform therex without reports of increase pain post PT.  Patient demonstrated fatigue post treatment, exhibited good technique with therapeutic exercises, and would benefit from continued PT to promote knee mobility and strength following TKA.       Plan: Continue POC established by primary PT. Continue leg press NV.     Eval/ Re-eval Auth #/ Referral # Total visits Start date  Expiration date Total active units  Total manual units  PT only or  PT+OT?   24             Total units authorized                Total units remaining                    Precautions: Kidney Disease , Hearing Loss      Specialty Daily Treatment Diary     Manuals 24    Visit # 1 2 3 4    PF mobs        Stretch HS Quad        Knee PROM 5 min 5 min   Flex 95 deg  Ext 0 deg   0-100 deg AROM   0-100 degrees AROM            Warm-up        NuStep 5 niles 5 min lvl 2  15 min lev 5 12 min lev 5    Neuro Re-Ed        Qset 20 20 X20 hold 5 X20 hold 5    Ankle pumps 20 HR standing 20  X20 standing X20 standing heel raises    Hip abd stand 20 20         LAQ x12  "reps  LAQ x15            Ther Ex        Mini squat  20 x20 x20    Side step   Sidestepping 10'x4 Sidestepping 10'x10    Knee flex  At step 20 - 100 deg flexion      Knee ext 20 SAQ 20 SAQ x20 SAQ x50 seated in chair    SLR  2x5 AA 50% 2x5 AROM 2x10    Heel slides 20 20 X20 peanut X20 peanut    Leg press    75# x10, 85# 3x10       Lunge stretch on 8\" x25 hold 10 Lunge stretch on 8\" x20 hold 10 ea    Ther Activity                        Gait Training        W/ rw reviewed  Dressing removal R knee             Modalities        CP                                  "

## 2024-08-13 ENCOUNTER — PATIENT OUTREACH (OUTPATIENT)
Dept: OBGYN CLINIC | Facility: HOSPITAL | Age: 74
End: 2024-08-13

## 2024-08-13 NOTE — PROGRESS NOTES
[facility] contacted pt today to follow up after surgery. Pt reports doing well and that he no longer he needs any help from anyone. Pt reports no additional needs or concerns. SWCM will close referral and remain available.

## 2024-08-14 ENCOUNTER — OFFICE VISIT (OUTPATIENT)
Dept: PHYSICAL THERAPY | Facility: CLINIC | Age: 74
End: 2024-08-14
Payer: MEDICARE

## 2024-08-14 ENCOUNTER — APPOINTMENT (OUTPATIENT)
Dept: RADIOLOGY | Facility: CLINIC | Age: 74
End: 2024-08-14
Payer: MEDICARE

## 2024-08-14 ENCOUNTER — OFFICE VISIT (OUTPATIENT)
Dept: OBGYN CLINIC | Facility: CLINIC | Age: 74
End: 2024-08-14

## 2024-08-14 ENCOUNTER — APPOINTMENT (OUTPATIENT)
Dept: PHYSICAL THERAPY | Facility: CLINIC | Age: 74
End: 2024-08-14
Payer: MEDICARE

## 2024-08-14 VITALS
BODY MASS INDEX: 35.94 KG/M2 | DIASTOLIC BLOOD PRESSURE: 103 MMHG | HEART RATE: 96 BPM | HEIGHT: 67 IN | WEIGHT: 229 LBS | SYSTOLIC BLOOD PRESSURE: 166 MMHG

## 2024-08-14 DIAGNOSIS — M17.11 PRIMARY LOCALIZED OSTEOARTHRITIS OF RIGHT KNEE: Primary | ICD-10-CM

## 2024-08-14 DIAGNOSIS — G89.29 CHRONIC PAIN OF RIGHT KNEE: ICD-10-CM

## 2024-08-14 DIAGNOSIS — Z96.651 STATUS POST RIGHT KNEE REPLACEMENT: ICD-10-CM

## 2024-08-14 DIAGNOSIS — Z96.651 AFTERCARE FOLLOWING RIGHT KNEE JOINT REPLACEMENT SURGERY: ICD-10-CM

## 2024-08-14 DIAGNOSIS — M25.561 CHRONIC PAIN OF RIGHT KNEE: ICD-10-CM

## 2024-08-14 DIAGNOSIS — Z96.651 STATUS POST RIGHT KNEE REPLACEMENT: Primary | ICD-10-CM

## 2024-08-14 DIAGNOSIS — Z47.1 AFTERCARE FOLLOWING RIGHT KNEE JOINT REPLACEMENT SURGERY: ICD-10-CM

## 2024-08-14 PROBLEM — M17.0 PRIMARY OSTEOARTHRITIS OF BOTH KNEES: Status: RESOLVED | Noted: 2023-11-02 | Resolved: 2024-08-14

## 2024-08-14 PROCEDURE — 97112 NEUROMUSCULAR REEDUCATION: CPT

## 2024-08-14 PROCEDURE — 99024 POSTOP FOLLOW-UP VISIT: CPT | Performed by: ORTHOPAEDIC SURGERY

## 2024-08-14 PROCEDURE — 97110 THERAPEUTIC EXERCISES: CPT

## 2024-08-14 PROCEDURE — 73562 X-RAY EXAM OF KNEE 3: CPT

## 2024-08-14 RX ORDER — ASPIRIN 81 MG/1
81 TABLET, CHEWABLE ORAL 2 TIMES DAILY
Qty: 56 TABLET | Refills: 0 | Status: SHIPPED | OUTPATIENT
Start: 2024-08-14 | End: 2024-09-11

## 2024-08-14 NOTE — PROGRESS NOTES
Assessment/Plan:  1. Status post right knee replacement  XR knee 3 vw right non injury    aspirin 81 mg chewable tablet      2. Aftercare following right knee joint replacement surgery  aspirin 81 mg chewable tablet        Scribe Attestation      I,:  Hill Reynoso PA-C am acting as a scribe while in the presence of the attending physician.:       I,:  Bassem Moscoso, DO personally performed the services described in this documentation    as scribed in my presence.:           Swapnil is a pleasant 73-year-old presenting today for follow-up 2 weeks after a robotic assisted press-fit right total knee arthroplasty.  He is doing very well at this time.  His prosthesis remains stable on imaging and exam.  His incision is healing very nicely without sign of infection or dehiscence.  He may now get it wet in the shower, but should avoid direct scrubbing or saturation.  He will continue with aspirin for DVT prophylaxis for the next 4 weeks.  However, since the full dose was upsetting his stomach, we did provide him with a prescription for aspirin 81 mg to take twice a day for the next 4 weeks.  He was encouraged to take this with food as well.  We encouraged him to continue his efforts at physical therapy with a focus on regaining range of motion.  He will call if he needs a refill of oxycodone.  We will plan to see him back in 4 weeks for a clinical reevaluation.  He expressed understanding all of his questions were addressed today    Subjective: 2 weeks s/p robotic assisted pressfit right TKA    Patient ID: Swapnil Grover is a 73 y.o. male presenting today for follow-up of surgery.  He reports that he is doing fairly well.  He has been relying on Tylenol and occasionally using oxycodone for pain control.  He has been working diligently with physical therapy to regain range of motion.  He has discontinued his walker and cane and is now ambulating without assistive device.  He denies any injuries or falls.   He is continued with his aspirin for DVT prophylaxis.  He has kept the incision dry    Review of Systems   Constitutional:  Positive for activity change.   HENT:  Positive for hearing loss.    Eyes: Negative.    Respiratory: Negative.     Cardiovascular:  Positive for leg swelling.   Gastrointestinal: Negative.    Endocrine: Negative.    Genitourinary: Negative.    Musculoskeletal:  Positive for arthralgias, gait problem, joint swelling and myalgias.   Skin: Negative.    Allergic/Immunologic: Negative.    Hematological: Negative.    Psychiatric/Behavioral: Negative.       Past Medical History:   Diagnosis Date    Anesthesia complication 2022    difficulty waking up-pt woke up with a CPAP    Arthritis     BPH (benign prostatic hyperplasia)     Breathing difficulty 12/01/2022    CPAP given per pt-s/p surgery    Cancer (HCC) 2015    basal cell of the skull     Chronic kidney disease     Colon polyp     Decreased hearing of both ears 04/29/2024    had surgery for correction    Heart failure (HCC)     12/2/22-pt had ECHO-per pt R. sided block    History of subdural hematoma     Hypertension     Kidney stone     right stone    Renal disorder     Sleep apnea     mild- no CPAP-had nasal septoplasty    Wears glasses     reading     Past Surgical History:   Procedure Laterality Date    BASAL CELL CARCINOMA EXCISION  2015    skull    BRAIN SURGERY      in the 1990's-subdural hematoma    CATARACT EXTRACTION Left     COLONOSCOPY      x2    CYSTOSCOPY W/ LASER LITHOTRIPSY Right 11/27/2020    Procedure: CYSTOSCOPY, FLEXIBLE URETEROSCOPY, LASER, AND STENT EXCHANGE,STONE BASKET, RIGHT RETROGRADE;  Surgeon: Sabino Garcia MD;  Location: [facility];  Service: Urology    FL RETROGRADE PYELOGRAM  10/18/2020    FL RETROGRADE PYELOGRAM  11/27/2020    FL RETROGRADE PYELOGRAM  12/01/2022    FL RETROGRADE PYELOGRAM  12/22/2022    JOINT REPLACEMENT  1/23/2024    LITHOTRIPSY      LUMBAR EPIDURAL INJECTION Bilateral 5/3/2024    Procedure:  Lumbar subarachnoid drain placement;  Surgeon: Billy August MD;  Location:  MAIN OR;  Service: Neurosurgery    PA ARTHRP KNE CONDYLE&PLATU MEDIAL&LAT COMPARTMENTS Left 01/23/2024    Procedure: ARTHROPLASTY KNEE TOTAL W ROBOT - overnight, cemented;  Surgeon: Bassem Moscoso DO;  Location: [facility];  Service: Orthopedics    PA ARTHRP KNE CONDYLE&PLATU MEDIAL&LAT COMPARTMENTS Right 7/30/2024    Procedure: ARTHROPLASTY KNEE TOTAL W ROBOT - RIGHT - CEMENTED - OVERNIGHT;  Surgeon: Bassem Moscoso DO;  Location: [facility];  Service: Orthopedics    PA CYSTO BLADDER W/URETERAL CATHETERIZATION Right 11/11/2020    Procedure: ESWL RIGHT;  Surgeon: Sabino Garcia MD;  Location: [facility];  Service: Urology    PA CYSTO BLADDER W/URETERAL CATHETERIZATION Right 12/01/2022    Procedure: CYSTOSCOPY RETROGRADE PYELOGRAM WITH INSERTION STENT URETERAL;  Surgeon: Sabino Garcia MD;  Location: [facility];  Service: Urology    PA CYSTO/URETERO W/LITHOTRIPSY &INDWELL STENT INSRT Right 10/18/2020    Procedure: CYSTOSCOPY URETEROSCOPY WITH BASKET EXTRACTION, RETROGRADE PYELOGRAM AND INSERTION STENT URETERAL;  Surgeon: Kris Ac MD;  Location: [facility];  Service: Urology    PA CYSTO/URETERO W/LITHOTRIPSY &INDWELL STENT INSRT Right 12/22/2022    Procedure: CYSTOSCOPY URETEROSCOPY WITH LITHOTRIPSY HOLMIUM LASER, RETROGRADE PYELOGRAM AND INSERTION STENT URETERAL;  Surgeon: Sabino Garcia MD;  Location: [facility];  Service: Urology    RETROMASTOID CRANIOTOMY Bilateral 4/29/2024    Procedure: CRANIOTOMY MIDDLE FOSSA,RETROMASTOID APPROACH FOR ENT;  Surgeon: Frances Wasserman MD;  Location:  MAIN OR;  Service: ENT    SEPTOPLASTY      in the 1990's-trimmed uvula    TEGMAN DEFECT REPAIR CSF LEAK Bilateral 4/29/2024    Procedure: Bilateral endoscopic middle fossa craniotomies for repair of tegmen defect and CSF leak with temporalis fascia muscle flap, with neuromonitoring (CN 7/8) and intraoperative lumbar drain placement;   Surgeon: Billy August MD;  Location: [facility];  Service: Neurosurgery    TONSILLECTOMY  1956    age 6       Family History   Problem Relation Age of Onset    Hypertension Mother     Kidney disease Mother         renal failure-dialysis    Hypertension Father     Stroke Father     Diabetes Sister     Kidney disease Sister         self dialysis at home       Social History     Occupational History    Not on file   Tobacco Use    Smoking status: Never     Passive exposure: Never    Smokeless tobacco: Never   Vaping Use    Vaping status: Never Used   Substance and Sexual Activity    Alcohol use: Yes     Alcohol/week: 1.0 standard drink of alcohol     Types: 1 Standard drinks or equivalent per week     Comment: 1 drink a week    Drug use: Never    Sexual activity: Not Currently         Current Outpatient Medications:     acetaminophen (TYLENOL) 325 mg tablet, Take 3 tablets (975 mg total) by mouth every 8 (eight) hours, Disp: , Rfl:     amoxicillin (AMOXIL) 500 MG tablet, Take 4 tablets (2,000 mg total) by mouth 60 minutes pre-procedure, Disp: 4 tablet, Rfl: 2    aspirin 81 mg chewable tablet, Chew 1 tablet (81 mg total) 2 (two) times a day for 28 days, Disp: 56 tablet, Rfl: 0    b complex vitamins tablet, Take 1 tablet by mouth 2 pills twice daily, Disp: , Rfl:     CALCIUM PO, Take 520 mg by mouth every morning, Disp: , Rfl:     docusate sodium (COLACE) 100 mg capsule, Take 1 capsule (100 mg total) by mouth 2 (two) times a day, Disp: 60 capsule, Rfl: 0    ferrous fumarate (KANG-SEQUELS) 18 MG CR tablet, Take 18 mg by mouth every morning, Disp: , Rfl:     potassium citrate (UROCIT-K) 10 mEq, Take 2 tablets (20 mEq total) by mouth 3 (three) times a day with meals, Disp: 180 tablet, Rfl: 1    VITAMIN D PO, Take by mouth every morning With calcium, Disp: , Rfl:     VITAMIN E PO, Take 268 mg by mouth daily, Disp: , Rfl:     Alpha-D-Galactosidase (RA DIGESTIVE AID PO), Take by mouth every morning (Patient not taking:  Reported on 8/14/2024), Disp: , Rfl:     oxyCODONE (Roxicodone) 5 immediate release tablet, Take 1-2 by mouth every 6 hours as needed (Patient not taking: Reported on 8/2/2024), Disp: 40 tablet, Rfl: 0    Allergies   Allergen Reactions    Other Nasal Congestion     Seasonal allergies       Objective:  Vitals:    08/14/24 1045   BP: (!) 166/103   Pulse: 96       Body mass index is 35.87 kg/m².    Right Knee Exam     Tenderness   The patient is experiencing no tenderness.     Range of Motion   Extension:  abnormal Right knee extension: 3.  Flexion:  abnormal Right knee flexion: 95.    Tests   Varus: negative Valgus: negative  Drawer:  Anterior - negative      Patellar apprehension: negative    Other   Erythema: absent  Scars: present  Sensation: normal  Pulse: present  Swelling: mild  Effusion: no effusion present    Comments:  Anterior incision well-approximated without erythema, warmth, drainage, or dehiscence.  No sign of infection  Patella tracks midline flat without crepitance  Prosthesis stable to varus and valgus stressing at 3, 30, 90 degrees  Soft endpoint at end of extension and flexion  Ambulates with a slightly antalgic gait on the right without assistive device  Thigh calf soft and nontender  Grossly distally neurovascular intact          Observations     Right Knee   Negative for effusion.       Physical Exam  Vitals and nursing note reviewed.   Constitutional:       Appearance: Normal appearance. He is well-developed.      Comments: Body mass index is 35.87 kg/m².   HENT:      Head: Normocephalic and atraumatic.      Right Ear: External ear normal.      Left Ear: External ear normal.   Eyes:      Extraocular Movements: Extraocular movements intact.      Conjunctiva/sclera: Conjunctivae normal.   Cardiovascular:      Rate and Rhythm: Normal rate.      Pulses: Normal pulses.   Pulmonary:      Effort: Pulmonary effort is normal.   Musculoskeletal:      Cervical back: Normal range of motion.      Right  knee: No effusion.      Comments: See ortho exam   Skin:     General: Skin is warm and dry.   Neurological:      General: No focal deficit present.      Mental Status: He is alert and oriented to person, place, and time. Mental status is at baseline.   Psychiatric:         Mood and Affect: Mood normal.         Behavior: Behavior normal.         Thought Content: Thought content normal.         Judgment: Judgment normal.       I have personally reviewed pertinent films in PACS of the updated x-rays taken today of his right knee which demonstrate a well aligned well-fixed press-fit total knee prosthesis.  There are no signs of loosening, periprosthetic fracture, or other interval complication.    This document was created using speech voice recognition software.   Grammatical errors, random word insertions, pronoun errors, and incomplete sentences are an occasional consequence of this system due to software limitations, ambient noise, and hardware issues.   Any formal questions or concerns about content, text, or information contained within the body of this dictation should be directly addressed to the provider for clarification.

## 2024-08-14 NOTE — PROGRESS NOTES
Daily Note     Today's date: 2024  Patient name: Swapnil Grover  : 1950  MRN: [id number]  Referring provider: Hill Reynoso*  Dx:   Encounter Diagnosis     ICD-10-CM    1. Primary localized osteoarthritis of right knee  M17.11       2. Chronic pain of right knee  M25.561     G89.29                      Subjective: Had follow up with Dr. Moscoso and he was cleared to drive car as long as he is no longer taking oxycodone. He would like to start driving by Wednesday so he can return to the chiropractor.       Objective: See treatment diary below      Assessment: Tolerated treatment well. Improved quality of  movement with SLR and mini squat. Educated patient to start practicing driving a car in an empty parking lot. Suggested to patient to not only practice steady driving, but also practicing hard stops and turns in preparation of any event that he may encounter driving with other cars. Patient demonstrated fatigue post treatment, exhibited good technique with therapeutic exercises, and would benefit from continued PT to promote functional mobility of right knee.      Plan: Continue POC established by primary PT and progress as tolerated.     Eval/ Re-eval Auth #/ Referral # Total visits Start date  Expiration date Total active units  Total manual units  PT only or  PT+OT?   24          Total units authorized                Total units remaining                    Precautions: Kidney Disease , Hearing Loss      Specialty Daily Treatment Diary     Manuals 24   Visit # 1 2 3 4 5   PF mobs        Stretch HS Quad        Knee PROM 5 min 5 min   Flex 95 deg  Ext 0 deg   0-100 deg AROM   0-100 degrees AROM 0-100 AROM           Warm-up        NuStep 5 niles 5 min lvl 2  15 min lev 5 12 min lev 5 12 min lvl 5 pre    Neuro Re-Ed        Qset 20 20 X20 hold 5 X20 hold 5    Ankle pumps 20 HR standing 20  X20 standing X20  "standing heel raises X20 standing heel raises   Hip abd stand 20 20         LAQ x12 reps  LAQ x15 LAQ x20 reps         Bridge 2x10   Ther Ex        Mini squat  20 x20 x20 X20    Side step   Sidestepping 10'x4 Sidestepping 10'x10 Sidestepping 10'x10   Knee flex  At step 20 - 100 deg flexion      Knee ext 20 SAQ 20 SAQ x20 SAQ x50 seated in chair SAQ x20   SLR  2x5 AA 50% 2x5 AROM 2x10 2x9   Heel slides 20 20 X20 peanut X20 peanut    Leg press    75# x10, 85# 3x10 95# 3x10  105# x10      Lunge stretch on 8\" x25 hold 10 Lunge stretch on 8\" x20 hold 10 ea Lunge stretch on 8\" x20 hold 10 ea   Ther Activity             Patient education on driving            Gait Training        W/ rw reviewed  Dressing removal R knee             Modalities        CP                                    "

## 2024-08-16 ENCOUNTER — OFFICE VISIT (OUTPATIENT)
Dept: PHYSICAL THERAPY | Facility: CLINIC | Age: 74
End: 2024-08-16
Payer: MEDICARE

## 2024-08-16 DIAGNOSIS — G89.29 CHRONIC PAIN OF RIGHT KNEE: ICD-10-CM

## 2024-08-16 DIAGNOSIS — M25.561 CHRONIC PAIN OF RIGHT KNEE: ICD-10-CM

## 2024-08-16 DIAGNOSIS — M17.11 PRIMARY LOCALIZED OSTEOARTHRITIS OF RIGHT KNEE: Primary | ICD-10-CM

## 2024-08-16 PROCEDURE — 97110 THERAPEUTIC EXERCISES: CPT | Performed by: PHYSICAL THERAPIST

## 2024-08-16 PROCEDURE — 97112 NEUROMUSCULAR REEDUCATION: CPT | Performed by: PHYSICAL THERAPIST

## 2024-08-16 NOTE — PROGRESS NOTES
Daily Note     Today's date: 2024  Patient name: Swapnil Grover  : 1950  MRN: [id number]  Referring provider: Hill Reynoso*  Dx:   Encounter Diagnosis     ICD-10-CM    1. Primary localized osteoarthritis of right knee  M17.11       2. Chronic pain of right knee  M25.561     G89.29                      Subjective: Swapnil has soreness today right knee.      Objective: See treatment diary below      Assessment: Tolerated treatment well.  Added step ups today.  He used good technique on a six inch stair but had mild discomfort.        Plan: Continue POC established by primary PT and progress as tolerated.  Increase step up height., SLS       Eval/ Re-eval Auth #/ Referral # Total visits Start date  Expiration date Total active units  Total manual units  PT only or  PT+OT?   24         Total units authorized                Total units remaining                    Precautions: Kidney Disease , Hearing Loss      Specialty Daily Treatment Diary     Manuals 24   Visit # 2 3 4 5 6   PF mobs        Stretch HS Quad        Knee PROM 5 min   Flex 95 deg  Ext 0 deg   0-100 deg AROM   0-100 degrees AROM 0-100 AROM 0 to 104 deg            Warm-up        NuStep 5 min lvl 2  15 min lev 5 12 min lev 5 12 min lvl 5 pre  10 min   Neuro Re-Ed        Qset 20 X20 hold 5 X20 hold 5     Ankle pumps HR standing 20  X20 standing X20 standing heel raises X20 standing heel raises 20  HR   Hip abd stand 20         LAQ x12 reps  LAQ x15 LAQ x20 reps  30  LAQ       Bridge 2x10    Ther Ex        Mini squat 20 x20 x20 X20  20   Side step  Sidestepping 10'x4 Sidestepping 10'x10 Sidestepping 10'x10 5 x 20 ft   Knee flex At step 20 - 100 deg flexion       Knee ext SAQ 20 SAQ x20 SAQ x50 seated in chair SAQ x20 20  SAQ   SLR 2x5 AA 50% 2x5 AROM 2x10 2x9 2x10   Heel slides 20 X20 peanut X20 peanut     Leg press   75# x10, 85# 3x10 95#  "3x10  105# x10 3x10  95#  2x10  105#     Lunge stretch on 8\" x25 hold 10 Lunge stretch on 8\" x20 hold 10 ea Lunge stretch on 8\" x20 hold 10 ea    Step ups     6\"  20                   Ther Activity            Patient education on driving  Manual PROM AF           Gait Training        W/ rw  Dressing removal R knee              Modalities        CP                                    "

## 2024-08-19 ENCOUNTER — OFFICE VISIT (OUTPATIENT)
Dept: PHYSICAL THERAPY | Facility: CLINIC | Age: 74
End: 2024-08-19
Payer: MEDICARE

## 2024-08-19 DIAGNOSIS — G89.29 CHRONIC PAIN OF RIGHT KNEE: ICD-10-CM

## 2024-08-19 DIAGNOSIS — M25.561 CHRONIC PAIN OF RIGHT KNEE: ICD-10-CM

## 2024-08-19 DIAGNOSIS — M17.11 PRIMARY LOCALIZED OSTEOARTHRITIS OF RIGHT KNEE: Primary | ICD-10-CM

## 2024-08-19 PROCEDURE — 97112 NEUROMUSCULAR REEDUCATION: CPT

## 2024-08-19 PROCEDURE — 97110 THERAPEUTIC EXERCISES: CPT

## 2024-08-19 NOTE — PROGRESS NOTES
"Daily Note     Today's date: 2024  Patient name: Swapnil Grover  : 1950  MRN: [id number]  Referring provider: Hill Reynoso*  Dx:   Encounter Diagnosis     ICD-10-CM    1. Primary localized osteoarthritis of right knee  M17.11       2. Chronic pain of right knee  M25.561     G89.29           Start Time: 1145  Stop Time: 1230  Total time in clinic (min): 45 minutes    Subjective: Pt reports feeling sore and stiff today due to his exercises yesterday.       Objective: See treatment diary below      Assessment: Tolerated treatment well. Pt demo'd slight decrease in ROM due to his soreness. His stiffness and soreness decreased throughout the session. He demo'd great tolerance to step ups. Trialed 10\" step today where he had some pain in his knee when he was fatigued. Pt demo'd good tolerance to SL balance requiring some UE A. Educated pt about driving as he would like to return to that this week. He denies use of opioid medications now. Discussed w/ primary PT who was agreeable to clearing him for driving as long as he was comfortable and confident doing so. Continue progressing pt as he can tolerate per primary PT.       Plan: Continue per POC.          Eval/ Re-eval Auth #/ Referral # Total visits Start date  Expiration date Total active units  Total manual units  PT only or  PT+OT?   24        Total units authorized                Total units remaining                    Precautions: Kidney Disease , Hearing Loss      Specialty Daily Treatment Diary     Manuals 24   Visit # 3 4 5 6 7   PF mobs        Stretch HS Quad        Knee PROM   0-100 deg AROM   0-100 degrees AROM 0-100 AROM 0 to 104 deg  0-98 deg           Warm-up        NuStep 15 min lev 5 12 min lev 5 12 min lvl 5 pre  10 min 10 min   Neuro Re-Ed        Qset X20 hold 5 X20 hold 5      Ankle pumps X20 standing X20 standing heel raises " "X20 standing heel raises 20  HR    Hip abd stand         LAQ x12 reps  LAQ x15 LAQ x20 reps  30  LAQ 30 3\" LAQ   SL balance     3x30\"       Bridge 2x10     Ther Ex        Mini squat x20 x20 X20  20 2x10 rail w/ calf raise   Side step Sidestepping 10'x4 Sidestepping 10'x10 Sidestepping 10'x10 5 x 20 ft    Knee flex        Knee ext SAQ x20 SAQ x50 seated in chair SAQ x20 20  SAQ    SLR 2x5 AROM 2x10 2x9 2x10 2x12   Heel slides X20 peanut X20 peanut   X30 peanut   Leg press  75# x10, 85# 3x10 95# 3x10  105# x10 3x10  95#  2x10  105# 3x10  95#    Lunge stretch on 8\" x25 hold 10 Lunge stretch on 8\" x20 hold 10 ea Lunge stretch on 8\" x20 hold 10 ea  Lunge stretch 8\" x20   Step ups    6\"  20 8\" 30  10\" 10                   Ther Activity           Patient education on driving  Manual PROM AF            Gait Training        W/ rw Dressing removal R knee               Modalities        CP                                    "

## 2024-08-21 ENCOUNTER — OFFICE VISIT (OUTPATIENT)
Dept: PHYSICAL THERAPY | Facility: CLINIC | Age: 74
End: 2024-08-21
Payer: MEDICARE

## 2024-08-21 DIAGNOSIS — M17.11 PRIMARY LOCALIZED OSTEOARTHRITIS OF RIGHT KNEE: Primary | ICD-10-CM

## 2024-08-21 DIAGNOSIS — M25.561 CHRONIC PAIN OF RIGHT KNEE: ICD-10-CM

## 2024-08-21 DIAGNOSIS — G89.29 CHRONIC PAIN OF RIGHT KNEE: ICD-10-CM

## 2024-08-21 PROCEDURE — 97112 NEUROMUSCULAR REEDUCATION: CPT

## 2024-08-21 PROCEDURE — 97110 THERAPEUTIC EXERCISES: CPT

## 2024-08-21 NOTE — PROGRESS NOTES
"Daily Note     Today's date: 2024  Patient name: Swapnil Grover  : 1950  MRN: [id number]  Referring provider: Hill Reynoso*  Dx:   Encounter Diagnosis     ICD-10-CM    1. Primary localized osteoarthritis of right knee  M17.11       2. Chronic pain of right knee  M25.561     G89.29             Start Time: 1015  Stop Time: 1100  Total time in clinic (min): 45 minutes    Subjective: Patient feels better than Monday.       Objective: See treatment diary below      Assessment: Tolerated treatment well. Patient demonstrates loss of R knee flexion during today's session despite having improved functional mobility since last visit. Performed leg press at a higher intensity during today's visit, with no adverse effects. He is progressing well at this time. Continue to challenge strength as tolerated.      Plan: Continue per POC.          Eval/ Re-eval Auth #/ Referral # Total visits Start date  Expiration date Total active units  Total manual units  PT only or  PT+OT?   24        Total units authorized                Total units remaining                    Precautions: Kidney Disease , Hearing Loss      Specialty Daily Treatment Diary     Manuals 24   Visit # 3 4 5 6 7 8   PF mobs         Stretch HS Quad         Knee PROM   0-100 deg AROM   0-100 degrees AROM 0-100 AROM 0 to 104 deg  0-98 deg 0-95 deg             Warm-up         NuStep 15 min lev 5 12 min lev 5 12 min lvl 5 pre  10 min 10 min 10 min    Neuro Re-Ed         Qset X20 hold 5 X20 hold 5       Ankle pumps X20 standing X20 standing heel raises X20 standing heel raises 20  HR     Hip abd stand          LAQ x12 reps  LAQ x15 LAQ x20 reps  30  LAQ 30 3\" LAQ 30 3\" LAQ   SL balance     3x30\"  3x30\"       Bridge 2x10      Ther Ex         Mini squat x20 x20 X20  20 2x10 rail w/ calf raise 3x10 rail w/ calf raise   Side step Sidestepping " "10'x4 Sidestepping 10'x10 Sidestepping 10'x10 5 x 20 ft     Knee flex         Knee ext SAQ x20 SAQ x50 seated in chair SAQ x20 20  SAQ     SLR 2x5 AROM 2x10 2x9 2x10 2x12    Heel slides X20 peanut X20 peanut   X30 peanut X30 peanut   Leg press  75# x10, 85# 3x10 95# 3x10  105# x10 3x10  95#  2x10  105# 3x10  95# 1x10 95#   2x10 105#   2x10 115#    Lunge stretch on 8\" x25 hold 10 Lunge stretch on 8\" x20 hold 10 ea Lunge stretch on 8\" x20 hold 10 ea  Lunge stretch 8\" x20 Lunge stretch 8\" x20   Step ups    6\"  20 8\" 30  10\" 10 8\" 30   Step downs      6\" 20             Ther Activity            Patient education on driving  Manual PROM AF              Gait Training         W/ rw Dressing removal R knee                 Modalities         CP                                      "

## 2024-08-23 ENCOUNTER — OFFICE VISIT (OUTPATIENT)
Dept: PHYSICAL THERAPY | Facility: CLINIC | Age: 74
End: 2024-08-23
Payer: MEDICARE

## 2024-08-23 DIAGNOSIS — M25.561 CHRONIC PAIN OF RIGHT KNEE: ICD-10-CM

## 2024-08-23 DIAGNOSIS — M17.11 PRIMARY LOCALIZED OSTEOARTHRITIS OF RIGHT KNEE: Primary | ICD-10-CM

## 2024-08-23 DIAGNOSIS — G89.29 CHRONIC PAIN OF RIGHT KNEE: ICD-10-CM

## 2024-08-23 PROCEDURE — 97110 THERAPEUTIC EXERCISES: CPT

## 2024-08-23 PROCEDURE — 97112 NEUROMUSCULAR REEDUCATION: CPT

## 2024-08-26 ENCOUNTER — OFFICE VISIT (OUTPATIENT)
Dept: PHYSICAL THERAPY | Facility: CLINIC | Age: 74
End: 2024-08-26
Payer: MEDICARE

## 2024-08-26 DIAGNOSIS — M17.11 PRIMARY LOCALIZED OSTEOARTHRITIS OF RIGHT KNEE: Primary | ICD-10-CM

## 2024-08-26 DIAGNOSIS — G89.29 CHRONIC PAIN OF RIGHT KNEE: ICD-10-CM

## 2024-08-26 DIAGNOSIS — M25.561 CHRONIC PAIN OF RIGHT KNEE: ICD-10-CM

## 2024-08-26 PROCEDURE — 97112 NEUROMUSCULAR REEDUCATION: CPT

## 2024-08-26 PROCEDURE — 97110 THERAPEUTIC EXERCISES: CPT

## 2024-08-26 NOTE — PROGRESS NOTES
"Daily Note     Today's date: 2024  Patient name: Swapnil Grover  : 1950  MRN: [id number]  Referring provider: Hill Reynoso*  Dx:   Encounter Diagnosis     ICD-10-CM    1. Primary localized osteoarthritis of right knee  M17.11       2. Chronic pain of right knee  M25.561     G89.29             Start Time: 1015  Stop Time: 1100  Total time in clinic (min): 45 minutes    Subjective: Pt reports feeling sore today and that his knee kept him up last night. Pt took tylenol today.       Objective: See treatment diary below      Assessment: Tolerated treatment well. Session was adjusted to accommodate for his soreness today. Pt's ROM is gradually improving. He continues to improve in his strength and function. His tolerance to activity is also improving, needing less rest in between sets. Patient demonstrated fatigue post treatment, exhibited good technique with therapeutic exercises, and would benefit from continued PT to promote functional mobility of right knee following TKA.      Plan: Continue POC established by primary PT and progress as tolerated      Eval/ Re-eval Auth #/ Referral # Total visits Start date  Expiration date Total active units  Total manual units  PT only or  PT+OT?   24      Total units authorized                Total units remaining                    Precautions: Kidney Disease , Hearing Loss      Specialty Daily Treatment Diary     Manuals 24   Visit # 5 6 7 8 9 10   PF mobs         Stretch HS Quad         Knee PROM 0-100 AROM 0 to 104 deg  0-98 deg 0-95 deg  0-98 deg 0-100            Warm-up         NuStep 12 min lvl 5 pre  10 min 10 min 10 min  10 min  10 min   Neuro Re-Ed         Qset         Ankle pumps X20 standing heel raises 20  HR       Hip abd stand          LAQ x20 reps  30  LAQ 30 3\" LAQ 30 3\" LAQ 30 3\" LAQ 40 3\" LAQ   SL balance   3x30\" " " 3x30\"  Foam 3\" x30 3x30\" on foam     Bridge 2x10        Ther Ex         Mini squat X20  20 2x10 rail w/ calf raise 3x10 rail w/ calf raise 3x10 rail w/ calf raise 3x15 rail w/ calf raise   Side step Sidestepping 10'x10 5 x 20 ft       Knee flex         Knee ext SAQ x20 20  SAQ       SLR 2x9 2x10 2x12  2x15 3x15   Heel slides   X30 peanut X30 peanut X30 peanut X30 peanut   Leg press 95# 3x10  105# x10 3x10  95#  2x10  105# 3x10  95# 1x10 95#   2x10 105#   2x10 115# 1x10 95#  2x10 105#  1x10 115#  1x10 125#     Lunge stretch on 8\" x20 hold 10 ea  Lunge stretch 8\" x20 Lunge stretch 8\" x20 Lunge stretch 8\" x20 Lunge stretch 8\" x20   Step ups  6\"  20 8\" 30  10\" 10 8\" 30 8\" 30 8\" 30   Step downs    6\" 20  6\" 20  6\" 20             Ther Activity          Patient education on driving  Manual PROM AF                Gait Training         W/ rw                  Modalities         CP                                        "

## 2024-08-28 ENCOUNTER — OFFICE VISIT (OUTPATIENT)
Dept: PHYSICAL THERAPY | Facility: CLINIC | Age: 74
End: 2024-08-28
Payer: MEDICARE

## 2024-08-28 DIAGNOSIS — G89.29 CHRONIC PAIN OF RIGHT KNEE: ICD-10-CM

## 2024-08-28 DIAGNOSIS — M25.561 CHRONIC PAIN OF RIGHT KNEE: ICD-10-CM

## 2024-08-28 DIAGNOSIS — M17.11 PRIMARY LOCALIZED OSTEOARTHRITIS OF RIGHT KNEE: Primary | ICD-10-CM

## 2024-08-28 PROCEDURE — 97110 THERAPEUTIC EXERCISES: CPT

## 2024-08-28 NOTE — PROGRESS NOTES
"Daily Note     Today's date: 2024  Patient name: Swapnil Grover  : 1950  MRN: [id number]  Referring provider: Hill Reynoso*  Dx:   Encounter Diagnosis     ICD-10-CM    1. Primary localized osteoarthritis of right knee  M17.11       2. Chronic pain of right knee  M25.561     G89.29               Start Time: 1105  Stop Time: 1147  Total time in clinic (min): 42 minutes    Subjective: Pt reports R knee feels similar to L knee today and he has no new complaints.        Objective: See treatment diary below      Assessment: Tolerated treatment well.  Pt continues to progress with ROM. Pt tolerated STS for a more functional squatting ; proper muscle activation noted.  Patient demonstrated fatigue post treatment, exhibited good technique with therapeutic exercises, and would benefit from continued PT to promote functional mobility of right knee following TKA.      Plan: Continue POC established by primary PT and progress as tolerated      Eval/ Re-eval Auth #/ Referral # Total visits Start date  Expiration date Total active units  Total manual units  PT only or  PT+OT?   24      Total units authorized                Total units remaining                    Precautions: Kidney Disease , Hearing Loss      Specialty Daily Treatment Diary     Manuals 24   Visit # 6 7 8 9 10 11   PF mobs         Stretch HS Quad         Knee PROM 0 to 104 deg  0-98 deg 0-95 deg  0-98 deg 0-100             Warm-up         NuStep 10 min 10 min 10 min  10 min  10 min 8 min   Neuro Re-Ed         Qset         Ankle pumps 20  HR        Hip abd stand          30  LAQ 30 3\" LAQ 30 3\" LAQ 30 3\" LAQ 40 3\" LAQ 30x LAQ    SL balance  3x30\"  3x30\"  Foam 3\" x30 3x30\" on foam              Ther Ex         Mini squat 20 2x10 rail w/ calf raise 3x10 rail w/ calf raise 3x10 rail w/ calf raise 3x15 rail " "w/ calf raise STS x15   Side step 5 x 20 ft     Heel raises x20   Knee flex         Knee ext 20  SAQ        SLR 2x10 2x12  2x15 3x15 3x10   Heel slides  X30 peanut X30 peanut X30 peanut X30 peanut Heel [id number]\"   Leg press 3x10  95#  2x10  105# 3x10  95# 1x10 95#   2x10 105#   2x10 115# 1x10 95#  2x10 105#  1x10 115#  1x10 125#  #115 3x10     Lunge stretch 8\" x20 Lunge stretch 8\" x20 Lunge stretch 8\" x20 Lunge stretch 8\" x20 Lunge stretch 15x10\"   Step ups 6\"  20 8\" 30  10\" 10 8\" 30 8\" 30 8\" 30    Step downs   6\" 20  6\" 20  6\" 20  6\" 2x10 up and over with controled descent         Knee extension hangs 3' #4   Ther Activity          Manual PROM AF                 Gait Training         W/ rw                  Modalities         CP                                        "

## 2024-08-30 ENCOUNTER — OFFICE VISIT (OUTPATIENT)
Dept: PHYSICAL THERAPY | Facility: CLINIC | Age: 74
End: 2024-08-30
Payer: MEDICARE

## 2024-08-30 DIAGNOSIS — M25.561 CHRONIC PAIN OF RIGHT KNEE: ICD-10-CM

## 2024-08-30 DIAGNOSIS — M17.11 PRIMARY LOCALIZED OSTEOARTHRITIS OF RIGHT KNEE: Primary | ICD-10-CM

## 2024-08-30 DIAGNOSIS — G89.29 CHRONIC PAIN OF RIGHT KNEE: ICD-10-CM

## 2024-08-30 PROCEDURE — 97110 THERAPEUTIC EXERCISES: CPT | Performed by: PHYSICAL THERAPIST

## 2024-08-30 PROCEDURE — 97112 NEUROMUSCULAR REEDUCATION: CPT | Performed by: PHYSICAL THERAPIST

## 2024-08-30 NOTE — PROGRESS NOTES
"Daily Note     Today's date: 2024  Patient name: Swapnil Grover  : 1950  MRN: [id number]  Referring provider: Hill Reynoso*  Dx:   Encounter Diagnosis     ICD-10-CM    1. Primary localized osteoarthritis of right knee  M17.11       2. Chronic pain of right knee  M25.561     G89.29                          Subjective: Pt reports mild stiffness today right knee.        Objective: See treatment diary below      Assessment: Tolerated treatment well.  Swapnil is progressing well with ROM restoration.  He would benefit from vurther functional strengthening.  Balance exercises remain difficult for him.       Plan: Continue POC established by primary PT and progress as tolerated  Step up with glute drive for strengthening and balance.         Eval/ Re-eval Auth #/ Referral # Total visits Start date  Expiration date Total active units  Total manual units  PT only or  PT+OT?   24   Total units authorized                Total units remaining                    Precautions: Kidney Disease , Hearing Loss      Specialty Daily Treatment Diary     Manuals 24   Visit # 8 9 10 11 12   PF mobs        Stretch HS Quad        Knee PROM 0-95 deg  0-98 deg 0-100  2 to 113 deg           Warm-up        NuStep 10 min  10 min  10 min 8 min 7 min bike   Neuro Re-Ed        Hip abd stand         30 3\" LAQ 30 3\" LAQ 40 3\" LAQ 30x LAQ     SL balance 3x30\"  Foam 3\" x30 3x30\" on foam              Ther Ex        Mini squat 3x10 rail w/ calf raise 3x10 rail w/ calf raise 3x15 rail w/ calf raise STS x15 30   Side step    Heel raises x20    Knee flex     3#  30   Knee ext     3#  30  LAQ  3#  30  SAQ   SLR  2x15 3x15 3x10 30   Heel slides X30 peanut X30 peanut X30 peanut Heel [id number]\" 30   Leg press 1x10 95#   2x10 105#   2x10 115# 1x10 95#  2x10 105#  1x10 115#  1x10 125#  #115 3x10 105#  " "3x15  115#  1x15    Lunge stretch 8\" x20 Lunge stretch 8\" x20 Lunge stretch 8\" x20 Lunge stretch 15x10\"    Step ups 8\" 30 8\" 30 8\" 30  8\"  20  Rever step up 8\"  20  Step overs  8\"  10   Step downs 6\" 20  6\" 20  6\" 20  6\" 2x10 up and over with controled descent        Knee extension hangs 3' #4    Ther Activity                        Gait Training        W/ rw                Modalities        CP                                      "

## 2024-09-03 ENCOUNTER — OFFICE VISIT (OUTPATIENT)
Dept: PHYSICAL THERAPY | Facility: CLINIC | Age: 74
End: 2024-09-03
Payer: MEDICARE

## 2024-09-03 DIAGNOSIS — M17.11 PRIMARY LOCALIZED OSTEOARTHRITIS OF RIGHT KNEE: Primary | ICD-10-CM

## 2024-09-03 DIAGNOSIS — M25.561 CHRONIC PAIN OF RIGHT KNEE: ICD-10-CM

## 2024-09-03 DIAGNOSIS — N20.0 NEPHROLITHIASIS: ICD-10-CM

## 2024-09-03 DIAGNOSIS — G89.29 CHRONIC PAIN OF RIGHT KNEE: ICD-10-CM

## 2024-09-03 PROCEDURE — 97110 THERAPEUTIC EXERCISES: CPT | Performed by: PHYSICAL THERAPIST

## 2024-09-03 NOTE — PROGRESS NOTES
"Daily Note     Today's date: 9/3/2024  Patient name: Swapnil Grover  : 1950  MRN: [id number]  Referring provider: Hill Reynoso*  Dx:   Encounter Diagnosis     ICD-10-CM    1. Primary localized osteoarthritis of right knee  M17.11       2. Chronic pain of right knee  M25.561     G89.29                          Subjective: Swapnil has mild stiffness today       Objective: See treatment diary below      Assessment: Tolerated treatment well.    He can perform up and down stairs without pain. His decent is rapid and he would benefit from continued eccentric strengthening.      Plan: Continue POC established by primary PT and progress as tolerated  Step up with glute drive for strengthening and balance.         Eval/ Re-eval Auth #/ Referral # Total visits Start date  Expiration date Total active units  Total manual units  PT only or  PT+OT?   24   Total units authorized                Total units remaining                    Precautions: Kidney Disease , Hearing Loss      Specialty Daily Treatment Diary     Manuals 8/23/24 8/26/24 8/28/24 8/30/24 9/3/24   Visit # 9 10 11 12 13   PF mobs        Stretch HS Quad        Knee PROM 0-98 deg 0-100  2 to 113 deg AF             Warm-up        NuStep 10 min  10 min 8 min 7 min bike 8 min   Neuro Re-Ed        Hip abd stand         30 3\" LAQ 40 3\" LAQ 30x LAQ      SL balance Foam 3\" x30 3x30\" on foam    20 on foam           Ther Ex        Mini squat 3x10 rail w/ calf raise 3x15 rail w/ calf raise STS x15 30 30   Side step   Heel raises x20  20   Knee flex    3#  30 4#  30   Knee ext    3#  30  LAQ  3#  30  SAQ 4#  30  LAQ  4#  30  SAQ   SLR 2x15 3x15 3x10 30 30   Heel slides X30 peanut X30 peanut Heel [id number]\" 30 30   Leg press 1x10 95#  2x10 105#  1x10 115#  1x10 125#  #115 3x10 105#  3x15  115#  1x15 125#   3x10    Lunge stretch 8\" x20 Lunge stretch 8\" x20 " "Lunge stretch 15x10\"     Step ups 8\" 30 8\" 30  8\"  20  Rever step up 8\"  20  Step overs  8\"  10 8\"  20  Rever step up 8\"  20  Step overs  8\"  10   Step downs 6\" 20  6\" 20  6\" 2x10 up and over with controled descent        Knee extension hangs 3' #4     Ther Activity                        Gait Training        W/ rw                Modalities        CP                                      "

## 2024-09-04 RX ORDER — POTASSIUM CITRATE 10 MEQ/1
20 TABLET, EXTENDED RELEASE ORAL
Qty: 180 TABLET | Refills: 5 | Status: SHIPPED | OUTPATIENT
Start: 2024-09-04

## 2024-09-06 ENCOUNTER — OFFICE VISIT (OUTPATIENT)
Dept: PHYSICAL THERAPY | Facility: CLINIC | Age: 74
End: 2024-09-06
Payer: MEDICARE

## 2024-09-06 DIAGNOSIS — M25.561 CHRONIC PAIN OF RIGHT KNEE: ICD-10-CM

## 2024-09-06 DIAGNOSIS — G89.29 CHRONIC PAIN OF RIGHT KNEE: ICD-10-CM

## 2024-09-06 DIAGNOSIS — M17.11 PRIMARY LOCALIZED OSTEOARTHRITIS OF RIGHT KNEE: Primary | ICD-10-CM

## 2024-09-06 PROCEDURE — 97110 THERAPEUTIC EXERCISES: CPT | Performed by: PHYSICAL THERAPIST

## 2024-09-06 NOTE — PROGRESS NOTES
"Daily Note     Today's date: 2024  Patient name: Swapnil Grover  : 1950  MRN: [id number]  Referring provider: Hill Reynoso*  Dx:   Encounter Diagnosis     ICD-10-CM    1. Primary localized osteoarthritis of right knee  M17.11       2. Chronic pain of right knee  M25.561     G89.29                          Subjective: He has no major complaints today.      Objective: See treatment diary below      Assessment: Tolerated treatment well.  Stairs concentrically and eccentrically at eight inches high is tolerable for patient.  AROM flexion to 113 degrees.      Plan: Continue POC established by primary PT and progress as tolerated  Step up with glute drive for strengthening and balance.         Eval/ Re-eval Auth #/ Referral # Total visits Start date  Expiration date Total active units  Total manual units  PT only or  PT+OT?   24   Total units authorized                Total units remaining                    Precautions: Kidney Disease , Hearing Loss      Specialty Daily Treatment Diary     Manuals 8/23/24 8/26/24 8/28/24 8/30/24 9/3/24 9/6/24   Visit # 9 10 11 12 13 14   PF mobs         Stretch HS Quad         Knee PROM 0-98 deg 0-100  2 to 113 deg AF   AF            Warm-up         NuStep 10 min  10 min 8 min 7 min bike 8 min 8 min   Neuro Re-Ed         Hip abd stand          30 3\" LAQ 40 3\" LAQ 30x LAQ       SL balance Foam 3\" x30 3x30\" on foam    20 on foam 20 on foam            Ther Ex         Mini squat 3x10 rail w/ calf raise 3x15 rail w/ calf raise STS x15 30 30 30   Side step   Heel raises x20  20 20   Knee flex    3#  30 4#  30 4#  30   Knee ext    3#  30  LAQ  3#  30  SAQ 4#  30  LAQ  4#  30  SAQ 4#  30  LAQ  4#  30  SAQ   SLR 2x15 3x15 3x10 30 30 30   Heel slides X30 peanut X30 peanut Heel [id number]\" 30 30 30   Leg press 1x10 95#  2x10 105#  1x10 115#  1x10 125#  #115 3x10 105#  " "3x15  115#  1x15 125#   3x10 135 10  125 30    Lunge stretch 8\" x20 Lunge stretch 8\" x20 Lunge stretch 15x10\"      Step ups 8\" 30 8\" 30  8\"  20  Rever step up 8\"  20  Step overs  8\"  10 8\"  20  Rever step up 8\"  20  Step overs  8\"  10 8\"  20  Rever step up 8\"  20  Step overs  8\"  10   Step downs 6\" 20  6\" 20  6\" 2x10 up and over with controled descent         Knee extension hangs 3' #4      Ther Activity                           Gait Training         W/ rw                  Modalities         CP                                        "

## 2024-09-09 ENCOUNTER — OFFICE VISIT (OUTPATIENT)
Dept: PHYSICAL THERAPY | Facility: CLINIC | Age: 74
End: 2024-09-09
Payer: MEDICARE

## 2024-09-09 DIAGNOSIS — M17.11 PRIMARY LOCALIZED OSTEOARTHRITIS OF RIGHT KNEE: Primary | ICD-10-CM

## 2024-09-09 DIAGNOSIS — G89.29 CHRONIC PAIN OF RIGHT KNEE: ICD-10-CM

## 2024-09-09 DIAGNOSIS — M25.561 CHRONIC PAIN OF RIGHT KNEE: ICD-10-CM

## 2024-09-09 PROCEDURE — 97112 NEUROMUSCULAR REEDUCATION: CPT | Performed by: PHYSICAL THERAPIST

## 2024-09-09 PROCEDURE — 97110 THERAPEUTIC EXERCISES: CPT | Performed by: PHYSICAL THERAPIST

## 2024-09-09 NOTE — PROGRESS NOTES
"Daily Note     Today's date: 2024  Patient name: Swapnil Grover  : 1950  MRN: [id number]  Referring provider: Hill Reynoso*  Dx:   Encounter Diagnosis     ICD-10-CM    1. Primary localized osteoarthritis of right knee  M17.11       2. Chronic pain of right knee  M25.561     G89.29                          Subjective: Swapnil has no major complaints today.      Objective: See treatment diary below      Assessment: Tolerated treatment well.  Swapnil's ROM is progressing nicely and his function is normalizing well.        Plan: Continue POC and progress as tolerated  Step up with glute drive for strengthening and balance.  Possible discharge next session.          Eval/ Re-eval Auth #/ Referral # Total visits Start date  Expiration date Total active units  Total manual units  PT only or  PT+OT?   24   Total units authorized                Total units remaining                    Precautions: Kidney Disease , Hearing Loss      Specialty Daily Treatment Diary     Manuals 8/26/24 8/28/24 8/30/24 9/3/24 9/6/24 9/9/24   Visit # 10 11 12 13 14 15   PF mobs         Stretch HS Quad         Knee PROM 0-100  2 to 113 deg AF   AF AF            Warm-up         NuStep 10 min 8 min 7 min bike 8 min 8 min 8 min   Neuro Re-Ed         SL balance 3x30\" on foam    20 on foam 20 on foam 20 on foam                     Ther Ex         Mini squat 3x15 rail w/ calf raise STS x15 30 30 30 30   Side step  Heel raises x20  20 20    Knee flex   3#  30 4#  30 4#  30 30  4#   Knee ext   3#  30  LAQ  3#  30  SAQ 4#  30  LAQ  4#  30  SAQ 4#  30  LAQ  4#  30  SAQ 30  4#  LAQ and SAQ   SLR 3x15 3x10 30 30 30 30   Heel slides X30 peanut Heel [id number]\" 30 30 30 30   Leg press  #115 3x10 105#  3x15  115#  1x15 125#   3x10 135 10  125 30 115 to 135#  3x10    Lunge stretch 8\" x20 Lunge stretch 15x10\"       Step ups 8\" 30  8\"  20  Rever " "step up 8\"  20  Step overs  8\"  10 8\"  20  Rever step up 8\"  20  Step overs  8\"  10 8\"  20  Rever step up 8\"  20  Step overs  8\"  10 8\"  20  Rev step up  20  Step overs 20   Step downs 6\" 20  6\" 2x10 up and over with controled descent         Knee extension hangs 3' #4       Ther Activity                           Gait Training                           Modalities         CP                                        "

## 2024-09-11 ENCOUNTER — OFFICE VISIT (OUTPATIENT)
Dept: OBGYN CLINIC | Facility: CLINIC | Age: 74
End: 2024-09-11

## 2024-09-11 VITALS
BODY MASS INDEX: 35.63 KG/M2 | HEART RATE: 82 BPM | WEIGHT: 227 LBS | SYSTOLIC BLOOD PRESSURE: 140 MMHG | HEIGHT: 67 IN | DIASTOLIC BLOOD PRESSURE: 80 MMHG

## 2024-09-11 DIAGNOSIS — Z47.1 AFTERCARE FOLLOWING RIGHT KNEE JOINT REPLACEMENT SURGERY: ICD-10-CM

## 2024-09-11 DIAGNOSIS — Z96.651 AFTERCARE FOLLOWING RIGHT KNEE JOINT REPLACEMENT SURGERY: ICD-10-CM

## 2024-09-11 DIAGNOSIS — Z96.651 STATUS POST RIGHT KNEE REPLACEMENT: Primary | ICD-10-CM

## 2024-09-11 PROCEDURE — 99024 POSTOP FOLLOW-UP VISIT: CPT | Performed by: ORTHOPAEDIC SURGERY

## 2024-09-11 NOTE — PROGRESS NOTES
Assessment/Plan:  1. Status post right knee replacement        2. Aftercare following right knee joint replacement surgery          Scribe Attestation      I,:  Woody Harp am acting as a scribe while in the presence of the attending physician.:       I,:  Bassem Moscoso, DO personally performed the services described in this documentation    as scribed in my presence.:           Swapnil is a pleasant 73-year-old gentleman who returns today for follow-up evaluation 6 weeks status post right total knee arthroplasty. I am pleased with his imaging and clinical presentation today in the office. He should continue with his efforts at physical therapy with discharge to a home exercise program as appropriate. He no longer requires DVT prophylaxis. He may continue returning to activity to his tolerance. We will see him back in 6 weeks for his 3 month postoperative appointment with new x-ray on arrival.     Subjective: Follow-up evaluation 6 weeks status post right total knee arthroplasty    Patient ID: Swapnil Grover is a 73 y.o. male who returns today for follow-up evaluation 6 weeks status post right total knee arthroplasty. He reports he has been doing well. He has been participating in physical therapy and has completed aspirin for DVT prophylaxis. He has been returning to activity without limitation. He is pleased with his progress. He denies any new injury or trauma.     Review of Systems   Constitutional:  Positive for activity change. Negative for chills, fever and unexpected weight change.   HENT:  Negative for hearing loss, nosebleeds and sore throat.    Eyes:  Negative for pain, redness and visual disturbance.   Respiratory:  Negative for cough, shortness of breath and wheezing.    Cardiovascular:  Negative for chest pain, palpitations and leg swelling.   Gastrointestinal:  Negative for abdominal pain, nausea and vomiting.   Endocrine: Negative for polyphagia and polyuria.   Genitourinary:  Negative for  dysuria and hematuria.   Musculoskeletal:  Positive for myalgias. Negative for arthralgias and joint swelling.        See HPI   Skin:  Negative for rash and wound.   Neurological:  Negative for dizziness, numbness and headaches.   Psychiatric/Behavioral:  Negative for decreased concentration and suicidal ideas. The patient is not nervous/anxious.          Past Medical History:   Diagnosis Date    Anesthesia complication 2022    difficulty waking up-pt woke up with a CPAP    Arthritis     BPH (benign prostatic hyperplasia)     Breathing difficulty 12/01/2022    CPAP given per pt-s/p surgery    Cancer (HCC) 2015    basal cell of the skull     Chronic kidney disease     Colon polyp     Decreased hearing of both ears 04/29/2024    had surgery for correction    Heart failure (HCC)     12/2/22-pt had ECHO-per pt R. sided block    History of subdural hematoma     HL (hearing loss) 2022    Hypertension     Kidney stone     right stone    Migraine 1964    Renal disorder     Sleep apnea     mild- no CPAP-had nasal septoplasty    Tinnitus ?    Tonsillitis 1955?    Wears glasses     reading       Past Surgical History:   Procedure Laterality Date    BASAL CELL CARCINOMA EXCISION  2015    skull    BRAIN SURGERY      in the 1990's-subdural hematoma    CATARACT EXTRACTION Left     COLONOSCOPY      x2    CYSTOSCOPY W/ LASER LITHOTRIPSY Right 11/27/2020    Procedure: CYSTOSCOPY, FLEXIBLE URETEROSCOPY, LASER, AND STENT EXCHANGE,STONE BASKET, RIGHT RETROGRADE;  Surgeon: Sabino Garcia MD;  Location: [facility];  Service: Urology    FL RETROGRADE PYELOGRAM  10/18/2020    FL RETROGRADE PYELOGRAM  11/27/2020    FL RETROGRADE PYELOGRAM  12/01/2022    FL RETROGRADE PYELOGRAM  12/22/2022    JOINT REPLACEMENT  1/23/2024    LITHOTRIPSY      LUMBAR EPIDURAL INJECTION Bilateral 5/3/2024    Procedure: Lumbar subarachnoid drain placement;  Surgeon: Billy August MD;  Location:  MAIN OR;  Service: Neurosurgery    MO ARTHRP KNE CONDYLE&PLATU  MEDIAL&LAT COMPARTMENTS Left 01/23/2024    Procedure: ARTHROPLASTY KNEE TOTAL W ROBOT - overnight, cemented;  Surgeon: Bassem Moscoso DO;  Location: [facility];  Service: Orthopedics    NC ARTHRP KNE CONDYLE&PLATU MEDIAL&LAT COMPARTMENTS Right 7/30/2024    Procedure: ARTHROPLASTY KNEE TOTAL W ROBOT - RIGHT - CEMENTED - OVERNIGHT;  Surgeon: Bassem Moscoso DO;  [facility]: [facility];  Service: Orthopedics    NC CYSTO BLADDER W/URETERAL CATHETERIZATION Right 11/11/2020    Procedure: ESWL RIGHT;  Surgeon: Sabino Garcia MD;  [facility]: [facility];  Service: Urology    NC CYSTO BLADDER W/URETERAL CATHETERIZATION Right 12/01/2022    Procedure: CYSTOSCOPY RETROGRADE PYELOGRAM WITH INSERTION STENT URETERAL;  Surgeon: Sabino Garcia MD;  [facility];  Service: Urology    NC CYSTO/URETERO W/LITHOTRIPSY &INDWELL STENT INSRT Right 10/18/2020    Procedure: CYSTOSCOPY URETEROSCOPY WITH BASKET EXTRACTION, RETROGRADE PYELOGRAM AND INSERTION STENT URETERAL;  Surgeon: Kris Ac MD;  [facility]: [facility];  Service: Urology    NC CYSTO/URETERO W/LITHOTRIPSY &INDWELL STENT INSRT Right 12/22/2022    Procedure: CYSTOSCOPY URETEROSCOPY WITH LITHOTRIPSY HOLMIUM LASER, RETROGRADE PYELOGRAM AND INSERTION STENT URETERAL;  Surgeon: Sabino Garcia MD;  [facility]: [facility];  Service: Urology    RETROMASTOID CRANIOTOMY Bilateral 4/29/2024    Procedure: CRANIOTOMY MIDDLE FOSSA,RETROMASTOID APPROACH FOR ENT;  Surgeon: Frances Wasserman MD;  Location:  MAIN OR;  Service: ENT    SEPTOPLASTY      in the 1990's-trimmed uvula    TEGMAN DEFECT REPAIR CSF LEAK Bilateral 4/29/2024    Procedure: Bilateral endoscopic middle fossa craniotomies for repair of tegmen defect and CSF leak with temporalis fascia muscle flap, with neuromonitoring (CN 7/8) and intraoperative lumbar drain placement;  Surgeon: Billy August MD;  Location:  MAIN OR;  Service: Neurosurgery    TONSILLECTOMY  1956    age 6       Family History   Problem Relation Age  of Onset    Hypertension Mother     Kidney disease Mother         renal failure-dialysis    Diabetes Mother     Hypertension Father     Stroke Father     Diabetes Father     Diabetes Sister     Kidney disease Sister         self dialysis at home       Social History     Occupational History    Not on file   Tobacco Use    Smoking status: Never     Passive exposure: Never    Smokeless tobacco: Never   Vaping Use    Vaping status: Never Used   Substance and Sexual Activity    Alcohol use: Not Currently     Alcohol/week: 1.0 standard drink of alcohol     Comment: 1 drink a week    Drug use: Never    Sexual activity: Not Currently         Current Outpatient Medications:     acetaminophen (TYLENOL) 325 mg tablet, Take 3 tablets (975 mg total) by mouth every 8 (eight) hours, Disp: , Rfl:     amoxicillin (AMOXIL) 500 MG tablet, Take 4 tablets (2,000 mg total) by mouth 60 minutes pre-procedure, Disp: 4 tablet, Rfl: 2    b complex vitamins tablet, Take 1 tablet by mouth 2 pills twice daily, Disp: , Rfl:     CALCIUM PO, Take 520 mg by mouth every morning, Disp: , Rfl:     docusate sodium (COLACE) 100 mg capsule, Take 1 capsule (100 mg total) by mouth 2 (two) times a day, Disp: 60 capsule, Rfl: 0    ferrous fumarate (KANG-SEQUELS) 18 MG CR tablet, Take 18 mg by mouth every morning, Disp: , Rfl:     potassium citrate (UROCIT-K) 10 mEq, Take 2 tablets (20 mEq total) by mouth 3 (three) times a day with meals, Disp: 180 tablet, Rfl: 5    VITAMIN D PO, Take by mouth every morning With calcium, Disp: , Rfl:     VITAMIN E PO, Take 268 mg by mouth daily, Disp: , Rfl:     Alpha-D-Galactosidase (RA DIGESTIVE AID PO), Take by mouth every morning (Patient not taking: Reported on 8/14/2024), Disp: , Rfl:     Allergies   Allergen Reactions    Other Nasal Congestion     Seasonal allergies       Objective:  Vitals:    09/11/24 1101   BP: 140/80   Pulse: 82       Body mass index is 35.55 kg/m².    Right Knee Exam     Tenderness   The patient  is experiencing no tenderness.     Tests   Varus: negative Valgus: negative  Drawer:  Anterior - negative      Patellar apprehension: negative    Other   Erythema: absent  Scars: present  Sensation: normal  Pulse: present  Swelling: none  Effusion: no effusion present    Comments:  Well-healed surgical scar  Stable at 0, 30 and 90 degrees  Neurovascular intact distally  No warmth erythema            Observations     Right Knee   Negative for effusion.       Physical Exam  Vitals and nursing note reviewed.   Constitutional:       Appearance: Normal appearance. He is well-developed.   HENT:      Head: Normocephalic and atraumatic.      Right Ear: External ear normal.      Left Ear: External ear normal.      Nose: Nose normal.   Eyes:      General: No scleral icterus.     Extraocular Movements: Extraocular movements intact.      Conjunctiva/sclera: Conjunctivae normal.   Cardiovascular:      Rate and Rhythm: Normal rate.   Pulmonary:      Effort: Pulmonary effort is normal. No respiratory distress.   Musculoskeletal:      Cervical back: Normal range of motion and neck supple.      Right knee: No effusion.      Comments: See Ortho exam   Skin:     General: Skin is warm and dry.   Neurological:      General: No focal deficit present.      Mental Status: He is alert and oriented to person, place, and time.   Psychiatric:         Behavior: Behavior normal.       This document was created using speech voice recognition software.   Grammatical errors, random word insertions, pronoun errors, and incomplete sentences are an occasional consequence of this system due to software limitations, ambient noise, and hardware issues.   Any formal questions or concerns about content, text, or information contained within the body of this dictation should be directly addressed to the provider for clarification.

## 2024-09-12 ENCOUNTER — OFFICE VISIT (OUTPATIENT)
Dept: PHYSICAL THERAPY | Facility: CLINIC | Age: 74
End: 2024-09-12
Payer: MEDICARE

## 2024-09-12 DIAGNOSIS — G89.29 CHRONIC PAIN OF RIGHT KNEE: ICD-10-CM

## 2024-09-12 DIAGNOSIS — M17.11 PRIMARY LOCALIZED OSTEOARTHRITIS OF RIGHT KNEE: Primary | ICD-10-CM

## 2024-09-12 DIAGNOSIS — M25.561 CHRONIC PAIN OF RIGHT KNEE: ICD-10-CM

## 2024-09-12 PROCEDURE — 97112 NEUROMUSCULAR REEDUCATION: CPT | Performed by: PHYSICAL THERAPIST

## 2024-09-12 PROCEDURE — 97110 THERAPEUTIC EXERCISES: CPT | Performed by: PHYSICAL THERAPIST

## 2024-09-12 NOTE — PROGRESS NOTES
PT Discharge    Patient has done well with PT.  He feels at least 90% better overallPatient has achieved all goals at this time and will continue to perform HEP independently.  D/C at this time.

## 2024-09-12 NOTE — PROGRESS NOTES
"Daily Note     Today's date: 2024  Patient name: Swapnil Grover  : 1950  MRN: [id number]  Referring provider: Hill Reynoso*  Dx:   Encounter Diagnosis     ICD-10-CM    1. Primary localized osteoarthritis of right knee  M17.11       2. Chronic pain of right knee  M25.561     G89.29                          Subjective: He feels at least 90% better overall.  He has no functional complaints around the house.      Objective: See treatment diary below      Assessment:  Swapnil has achieved all goals at this time.  ROM 0 to 116 degrees actively.  Strength WFL right lower extremity.      Plan:   Discharge at this time secondary to achieving all PT and personal goals.         Eval/ Re-eval Auth #/ Referral # Total visits Start date  Expiration date Total active units  Total manual units  PT only or  PT+OT?   24   Total units authorized                Total units remaining                    Precautions: Kidney Disease , Hearing Loss      Specialty Daily Treatment Diary     Manuals 8/30/24 9/3/24 9/6/24 9/9/24 9/12/24   Visit # 12 13 14 15 16   PF mobs        Stretch HS Quad        Knee PROM 2 to 113 deg AF   AF AF AF           Warm-up        NuStep 7 min bike 8 min 8 min 8 min 8 min   Neuro Re-Ed        SL balance  20 on foam 20 on foam 20 on foam 20 on foam                   Ther Ex        Mini squat 30 30 30 30 30   Side step  20 20     Knee flex 3#  30 4#  30 4#  30 30  4# 30  4#   Knee ext 3#  30  LAQ  3#  30  SAQ 4#  30  LAQ  4#  30  SAQ 4#  30  LAQ  4#  30  SAQ 30  4#  LAQ and SAQ 30  4#  LAQ and SAQ   SLR 30 30 30 30 30   Heel slides 30 30 30 30 30   Leg press 105#  3x15  115#  1x15 125#   3x10 135 10  125 30 115 to 135#  3x10 115 to 135#  3x10           Step ups 8\"  20  Rever step up 8\"  20  Step overs  8\"  10 8\"  20  Rever step up 8\"  20  Step overs  8\"  10 8\"  20  Rever step up 8\"  20  Step " "overs  8\"  10 8\"  20  Rev step up  20  Step overs 20 8\"  20  Rev step up  20  Step overs 20   Step downs                Ther Activity                        Gait Training                        Modalities        CP                                      "

## 2024-10-04 DIAGNOSIS — N20.0 NEPHROLITHIASIS: ICD-10-CM

## 2024-10-04 RX ORDER — POTASSIUM CITRATE 10 MEQ/1
20 TABLET, EXTENDED RELEASE ORAL
Qty: 180 TABLET | Refills: 1 | Status: SHIPPED | OUTPATIENT
Start: 2024-10-04

## 2024-10-23 ENCOUNTER — OFFICE VISIT (OUTPATIENT)
Dept: OBGYN CLINIC | Facility: CLINIC | Age: 74
End: 2024-10-23

## 2024-10-23 ENCOUNTER — APPOINTMENT (OUTPATIENT)
Dept: RADIOLOGY | Facility: CLINIC | Age: 74
End: 2024-10-23
Payer: MEDICARE

## 2024-10-23 VITALS
WEIGHT: 227 LBS | BODY MASS INDEX: 35.63 KG/M2 | HEART RATE: 89 BPM | DIASTOLIC BLOOD PRESSURE: 88 MMHG | HEIGHT: 67 IN | SYSTOLIC BLOOD PRESSURE: 151 MMHG

## 2024-10-23 DIAGNOSIS — Z47.1 AFTERCARE FOLLOWING RIGHT KNEE JOINT REPLACEMENT SURGERY: ICD-10-CM

## 2024-10-23 DIAGNOSIS — Z96.651 STATUS POST RIGHT KNEE REPLACEMENT: ICD-10-CM

## 2024-10-23 DIAGNOSIS — Z96.651 AFTERCARE FOLLOWING RIGHT KNEE JOINT REPLACEMENT SURGERY: ICD-10-CM

## 2024-10-23 DIAGNOSIS — Z96.651 STATUS POST RIGHT KNEE REPLACEMENT: Primary | ICD-10-CM

## 2024-10-23 PROCEDURE — 99024 POSTOP FOLLOW-UP VISIT: CPT | Performed by: ORTHOPAEDIC SURGERY

## 2024-10-23 PROCEDURE — 73562 X-RAY EXAM OF KNEE 3: CPT

## 2024-10-23 NOTE — PROGRESS NOTES
Assessment/Plan:  1. Status post right knee replacement  XR knee 3 vw right non injury      2. Aftercare following right knee joint replacement surgery  XR knee 3 vw right non injury        Scribe Attestation      I,:  Hill Reynoso PA-C am acting as a scribe while in the presence of the attending physician.:       I,:  Bassem Moscoso, DO personally performed the services described in this documentation    as scribed in my presence.:           Swapnil is a very pleasant 73-year-old gentleman presenting today for follow-up 3 months after a robotic assisted cemented right total knee arthroplasty.  He has done exceptionally well in his recovery.  The prosthesis remains stable on imaging and exam.  He is pain-free with activities of daily living and enjoyment.  We anticipate that his stiffness first thing in the morning will continue to resolve as his knee replacement matures.  He does understand the need for antibiotic prophylaxis before any dental appointments, and will notify us if he needs a prescription.  We will plan to see him back in 9 months.  At that time, we would like bilateral knee x-rays for the annual appointment for his right knee and 18 months postop for his left knee.  This will serve as the annual for both appointments.  He expressed understanding all of his questions were addressed    Subjective: 3 months status post robotic assisted cemented right total knee arthroplasty    Patient ID: Swapnil Grover is a 73 y.o. male presenting today for follow-up 3 months after a robotic assisted cemented right total knee arthroplasty.  He reports that he is doing quite well and very happy.  He occasionally has some start up stiffness, but otherwise no significant pain or limitations with activities of daily living and enjoyment.  He is very pleased with his outcome overall.  He also states that his left knee which was done 9 months ago was doing quite well.    Review of Systems   Constitutional:  Negative.    HENT: Negative.     Eyes: Negative.    Respiratory: Negative.     Cardiovascular: Negative.    Gastrointestinal: Negative.    Endocrine: Negative.    Genitourinary: Negative.    Musculoskeletal: Negative.    Skin: Negative.    Allergic/Immunologic: Negative.    Neurological: Negative.    Hematological: Negative.    Psychiatric/Behavioral: Negative.       Past Medical History:   Diagnosis Date    Anesthesia complication 2022    difficulty waking up-pt woke up with a CPAP    Arthritis     BPH (benign prostatic hyperplasia)     Breathing difficulty 12/01/2022    CPAP given per pt-s/p surgery    Cancer (HCC) 2015    basal cell of the skull     Chronic kidney disease     Colon polyp     Decreased hearing of both ears 04/29/2024    had surgery for correction    Heart failure (HCC)     12/2/22-pt had ECHO-per pt R. sided block    History of subdural hematoma     HL (hearing loss) 2022    Hypertension     Kidney stone     right stone    Migraine 1964    Renal disorder     Sleep apnea     mild- no CPAP-had nasal septoplasty    Tinnitus ?    Tonsillitis 1955?    Wears glasses     reading       Past Surgical History:   Procedure Laterality Date    BASAL CELL CARCINOMA EXCISION  2015    skull    BRAIN SURGERY      in the 1990's-subdural hematoma    CATARACT EXTRACTION Left     COLONOSCOPY      x2    CYSTOSCOPY W/ LASER LITHOTRIPSY Right 11/27/2020    Procedure: CYSTOSCOPY, FLEXIBLE URETEROSCOPY, LASER, AND STENT EXCHANGE,STONE BASKET, RIGHT RETROGRADE;  Surgeon: Sabino Garcia MD;  Location: [facility];  Service: Urology    FL RETROGRADE PYELOGRAM  10/18/2020    FL RETROGRADE PYELOGRAM  11/27/2020    FL RETROGRADE PYELOGRAM  12/01/2022    FL RETROGRADE PYELOGRAM  12/22/2022    JOINT REPLACEMENT  1/23/2024    LITHOTRIPSY      LUMBAR EPIDURAL INJECTION Bilateral 5/3/2024    Procedure: Lumbar subarachnoid drain placement;  Surgeon: Billy August MD;  Location:  [facility] OR;  Service: Neurosurgery    MA ARTHRP LARISSAE  CONDYLE&PLATU MEDIAL&LAT COMPARTMENTS Left 01/23/2024    Procedure: ARTHROPLASTY KNEE TOTAL W ROBOT - overnight, cemented;  Surgeon: Bassem Moscoso DO;  Location: [facility];  Service: Orthopedics    HI ARTHRP KNE CONDYLE&PLATU MEDIAL&LAT COMPARTMENTS Right 7/30/2024    Procedure: ARTHROPLASTY KNEE TOTAL W ROBOT - RIGHT - CEMENTED - OVERNIGHT;  Surgeon: Bassem Moscoso DO;  [facility];  Service: Orthopedics    HI CYSTO BLADDER W/URETERAL CATHETERIZATION Right 11/11/2020    Procedure: ESWL RIGHT;  Surgeon: Sabino Garcia MD;  [facility];  Service: Urology    HI CYSTO BLADDER W/URETERAL CATHETERIZATION Right 12/01/2022    Procedure: CYSTOSCOPY RETROGRADE PYELOGRAM WITH INSERTION STENT URETERAL;  Surgeon: Sabino Garcia MD;  [facility];  Service: Urology    HI CYSTO/URETERO W/LITHOTRIPSY &INDWELL STENT INSRT Right 10/18/2020    Procedure: CYSTOSCOPY URETEROSCOPY WITH BASKET EXTRACTION, RETROGRADE PYELOGRAM AND INSERTION STENT URETERAL;  Surgeon: Kris Ac MD;  [facility];  Service: Urology    HI CYSTO/URETERO W/LITHOTRIPSY &INDWELL STENT INSRT Right 12/22/2022    Procedure: CYSTOSCOPY URETEROSCOPY WITH LITHOTRIPSY HOLMIUM LASER, RETROGRADE PYELOGRAM AND INSERTION STENT URETERAL;  Surgeon: Sabino Garcia MD;  [facility];  Service: Urology    RETROMASTOID CRANIOTOMY Bilateral 4/29/2024    Procedure: CRANIOTOMY MIDDLE FOSSA,RETROMASTOID APPROACH FOR ENT;  Surgeon: Frances Wasserman MD;  Location:  [facility];  Service: ENT    SEPTOPLASTY      in the 1990's-trimmed uvula    TEGMAN DEFECT REPAIR CSF LEAK Bilateral 4/29/2024    Procedure: Bilateral endoscopic middle fossa craniotomies for repair of tegmen defect and CSF leak with temporalis fascia muscle flap, with neuromonitoring (CN 7/8) and intraoperative lumbar drain placement;  Surgeon: Billy August MD;  Location:  [facility];  Service: Neurosurgery    TONSILLECTOMY  1956    age 6       Family History    Problem Relation Age of Onset    Hypertension Mother     Kidney disease Mother         renal failure-dialysis    Diabetes Mother     Hypertension Father     Stroke Father     Diabetes Father     Diabetes Sister     Kidney disease Sister         self dialysis at home       Social History     Occupational History    Not on file   Tobacco Use    Smoking status: Never     Passive exposure: Never    Smokeless tobacco: Never   Vaping Use    Vaping status: Never Used   Substance and Sexual Activity    Alcohol use: Not Currently     Alcohol/week: 1.0 standard drink of alcohol     Comment: 1 drink a week    Drug use: Never    Sexual activity: Not Currently         Current Outpatient Medications:     b complex vitamins tablet, Take 1 tablet by mouth 2 pills twice daily, Disp: , Rfl:     CALCIUM PO, Take 520 mg by mouth every morning, Disp: , Rfl:     ferrous fumarate (KANG-SEQUELS) 18 MG CR tablet, Take 18 mg by mouth every morning, Disp: , Rfl:     potassium citrate (UROCIT-K) 10 mEq, Take 2 tablets (20 mEq total) by mouth 3 (three) times a day with meals, Disp: 180 tablet, Rfl: 1    VITAMIN D PO, Take by mouth every morning With calcium, Disp: , Rfl:     VITAMIN E PO, Take 268 mg by mouth daily, Disp: , Rfl:     Alpha-D-Galactosidase (RA DIGESTIVE AID PO), Take by mouth every morning (Patient not taking: Reported on 8/14/2024), Disp: , Rfl:     Allergies   Allergen Reactions    Other Nasal Congestion     Seasonal allergies       Objective:  Vitals:    10/23/24 1114   BP: 151/88   Pulse: 89       Body mass index is 35.55 kg/m².    Right Knee Exam     Muscle Strength   The patient has normal right knee strength.    Tenderness   The patient is experiencing no tenderness.     Range of Motion   Extension:  0 normal   Flexion:  120 normal     Tests   Varus: negative Valgus: negative  Drawer:  Anterior - negative      Patellar apprehension: negative    Other   Erythema: absent  Scars: present  Sensation: normal  Pulse:  present  Swelling: none  Effusion: no effusion present    Comments:  Well-healed surgical scar with slight keloid  Stable at 0, 30 and 90 degrees  Neurovascular intact distally  No warmth erythema  Patella tracks midline flat without crepitance  Ambulates with a normal symmetrical gait          Observations     Right Knee   Negative for effusion.       Physical Exam  Vitals and nursing note reviewed.   Constitutional:       Appearance: Normal appearance. He is well-developed.      Comments: Body mass index is 35.55 kg/m².   HENT:      Head: Normocephalic and atraumatic.      Right Ear: External ear normal.      Left Ear: External ear normal.   Eyes:      Extraocular Movements: Extraocular movements intact.      Conjunctiva/sclera: Conjunctivae normal.   Cardiovascular:      Rate and Rhythm: Normal rate.      Pulses: Normal pulses.   Pulmonary:      Effort: Pulmonary effort is normal.   Musculoskeletal:      Cervical back: Normal range of motion.      Right knee: No effusion.      Comments: See ortho exam   Skin:     General: Skin is warm and dry.   Neurological:      General: No focal deficit present.      Mental Status: He is alert and oriented to person, place, and time. Mental status is at baseline.   Psychiatric:         Mood and Affect: Mood normal.         Behavior: Behavior normal.         Thought Content: Thought content normal.         Judgment: Judgment normal.         I have personally reviewed pertinent films in PACS of the updated x-rays taken today of his right knee and compared them to previous postoperative films which demonstrate a well aligned well-fixed cemented total knee prosthesis.  There is no signs of loosening, periprosthetic fracture, or other interval complication.    This document was created using speech voice recognition software.   Grammatical errors, random word insertions, pronoun errors, and incomplete sentences are an occasional consequence of this system due to software  limitations, ambient noise, and hardware issues.   Any formal questions or concerns about content, text, or information contained within the body of this dictation should be directly addressed to the provider for clarification.

## 2024-11-01 ENCOUNTER — OFFICE VISIT (OUTPATIENT)
Dept: CARDIOLOGY CLINIC | Facility: CLINIC | Age: 74
End: 2024-11-01
Payer: MEDICARE

## 2024-11-01 VITALS
OXYGEN SATURATION: 92 % | BODY MASS INDEX: 35.79 KG/M2 | HEIGHT: 67 IN | HEART RATE: 100 BPM | DIASTOLIC BLOOD PRESSURE: 84 MMHG | SYSTOLIC BLOOD PRESSURE: 128 MMHG | WEIGHT: 228 LBS

## 2024-11-01 DIAGNOSIS — E78.2 MIXED HYPERLIPIDEMIA: Primary | ICD-10-CM

## 2024-11-01 DIAGNOSIS — I45.10 RBBB: ICD-10-CM

## 2024-11-01 DIAGNOSIS — N18.31 STAGE 3A CHRONIC KIDNEY DISEASE (HCC): ICD-10-CM

## 2024-11-01 DIAGNOSIS — N18.30 BENIGN HYPERTENSION WITH CKD (CHRONIC KIDNEY DISEASE) STAGE III (HCC): Chronic | ICD-10-CM

## 2024-11-01 DIAGNOSIS — I12.9 BENIGN HYPERTENSION WITH CKD (CHRONIC KIDNEY DISEASE) STAGE III (HCC): Chronic | ICD-10-CM

## 2024-11-01 PROCEDURE — 99213 OFFICE O/P EST LOW 20 MIN: CPT | Performed by: INTERNAL MEDICINE

## 2024-11-01 NOTE — PROGRESS NOTES
Cardiology   Denver Perez DO, [facility]  Victor Hugo Orourke MD, [facility]  Justin Ramirez MD, [facility]  Alfonzo Paredes MD, [facility]  -------------------------------------------------------------------  [facility]  [address], [address], [address]  [place], [place], [address]  [phone]    Cardiology Follow Up  Swapnil Grover  1950  [id number]          Assessment/Plan:    1. Mixed hyperlipidemia    2. Benign hypertension with CKD (chronic kidney disease) stage III (HCC)    3. Stage 3a chronic kidney disease (HCC)    4. RBBB      - Patient doing well without any chest pain or shortness of breath  - Discussed risk factor reduction including refraining from smoking, eating a diet high in fruits and vegetables, maintaining a healthy weight, limiting screen time along with controlling BP and cholesterol.  Encouraged to exercise 150 minutes a week at a moderate level such as a fast walk or 75 minutes of high intensity.  - Consider adding statin to get LDL closer to 100  - Continue routine follow up with nephrology and PMD  - Follow up as needed.         Interval History:     Swapnil Grover is 73 y.o. male here for followup of hypertension and hyperlipidemia.  He has been seen twice over the past year because of preoperative clearance.  Once for his left knee and the second time for his right.  Both surgeries were uncomplicated.  He has been feeling well without any chest pain, shortness of breath, lower extreme edema, orthopnea or paroxysmal nocturnal dyspnea.  He follows with nephrology as he has a history of nephrectomy.  Not currently on any blood pressure medications.  Most recent lipid panel in December 2023 showed an LDL of 139 mg/dL with total cholesterol of 214.  This is in line with previous levels.  He has not been on medication in the past.     He had a stress test in January 2024 which was grossly normal.  There was a hypertensive response to exercise and a  "very small area of ischemia that may have been due to artifact.  Echocardiogram showed an EF of 60 to 65%.    The following portions of the patient's history were reviewed and updated as appropriate: allergies, current medications, past family history, past medical history, past social history, past surgical history, and problem list.       Current Outpatient Medications:     b complex vitamins tablet, Take 1 tablet by mouth 2 pills twice daily, Disp: , Rfl:     CALCIUM PO, Take 520 mg by mouth every morning, Disp: , Rfl:     ferrous fumarate (KANG-SEQUELS) 18 MG CR tablet, Take 18 mg by mouth every morning, Disp: , Rfl:     potassium citrate (UROCIT-K) 10 mEq, Take 2 tablets (20 mEq total) by mouth 3 (three) times a day with meals, Disp: 180 tablet, Rfl: 1    VITAMIN D PO, Take by mouth every morning With calcium, Disp: , Rfl:     VITAMIN E PO, Take 268 mg by mouth daily, Disp: , Rfl:     Alpha-D-Galactosidase (RA DIGESTIVE AID PO), Take by mouth every morning (Patient not taking: Reported on 8/14/2024), Disp: , Rfl:         Review of Systems:  Review of Systems   Respiratory:  Negative for shortness of breath.    Cardiovascular:  Negative for chest pain, palpitations and leg swelling.   Musculoskeletal:  Positive for arthralgias.   All other systems reviewed and are negative.        Physical Exam:  Vitals:  Vitals:    11/01/24 1052   BP: 128/84   BP Location: Right arm   Patient Position: Sitting   Cuff Size: Large   Pulse: 100   SpO2: 92%   Weight: 103 kg (228 lb)   Height: 5' 7\" (1.702 m)     Physical Exam   Constitutional: He appears healthy. No distress.   Eyes: Pupils are equal, round, and reactive to light. Conjunctivae are normal.   Neck: No JVD present.   Cardiovascular: Normal rate, regular rhythm and normal heart sounds. Exam reveals no gallop and no friction rub.   No murmur heard.  Pulmonary/Chest: Effort normal and breath sounds normal. He has no wheezes. He has no rales.   Musculoskeletal:         " General: No tenderness, deformity or edema.      Cervical back: Normal range of motion and neck supple.   Neurological: He is alert and oriented to person, place, and time.   Skin: Skin is warm and dry.        Cardiographics:  EKG: Personally reviewed NSR with RBBB  Last known EF: 55%    This note was completed in part utilizing M-Modal Fluency Direct Software.  Grammatical errors, random word insertions, spelling mistakes, and incomplete sentences can be an occasional consequence of this system secondary to software limitations, ambient noise, and hardware issues.  If you have any questions or concerns about the content, text, or information contained within the body of this dictation, please contact the provider for clarification.

## 2024-11-07 ENCOUNTER — OFFICE VISIT (OUTPATIENT)
Dept: NEUROSURGERY | Facility: CLINIC | Age: 74
End: 2024-11-07
Payer: MEDICARE

## 2024-11-07 VITALS
HEIGHT: 67 IN | OXYGEN SATURATION: 96 % | BODY MASS INDEX: 35.79 KG/M2 | HEART RATE: 88 BPM | DIASTOLIC BLOOD PRESSURE: 82 MMHG | WEIGHT: 228 LBS | SYSTOLIC BLOOD PRESSURE: 135 MMHG | TEMPERATURE: 98.2 F

## 2024-11-07 DIAGNOSIS — G96.01 CSF OTORRHEA: ICD-10-CM

## 2024-11-07 DIAGNOSIS — Q16.5 TEGMEN DEFECT OF BASE OF SKULL: Primary | ICD-10-CM

## 2024-11-07 PROCEDURE — 99213 OFFICE O/P EST LOW 20 MIN: CPT | Performed by: NEUROLOGICAL SURGERY

## 2024-11-07 NOTE — PROGRESS NOTES
"Ambulatory Visit  Name: Swapnil Grover      : 1950      MRN: [id number]  Encounter Provider: Billy August MD  Encounter Date: 2024   Encounter department: [facility] NEUROSURGICAL [facility]    Assessment & Plan      History of Present Illness     Patient was previously evaluated on 2024, 24, 24.     He is a 73-year-old male with h/o left craniotomy for SDH evacuation in  in [place] (no reported complications) who p/w several months of progressive worsening bilateral hearing loss, fullness, and CSF leak (\"postnasal drip\") s/p bilateral endoscopic middle fossa craniotomies for repair of tegmen defect and CSF leak with temporalis muscle flap and intraoperative lumbar drain placement on 24, c/b recurrent CSF otorrhea after LD removal requiring replacement of LD in OR on 5/3/24, which stopped functioning then was removed on , discharged home on .     Today, he reports no new neurologic symptoms or deficits including CSF leak in bilateral ears. Bilateral hearing has been improving. He is \"depressed\" today because after this visit he has to take his dog to the vet \"for the last time\" (\"she's dying\").     Incisions are healing well without erythema, edema, dehiscence, drainage, or underlying fluid collection.      Prior LD sites CDI. No new radicular symptoms in legs.    Underwent R TKA by Dr. Moscoso (Orthopedic Surgery) in July and has been recovering well.     At this time, I have no acute neurosurgical or postoperative concerns. Continue close postoperative follow-up, next for 1-year POV (no imaging unless clinically indicated).       All questions and concerns were addressed during this visit.    HPI  Review of Systems   Constitutional: Negative.    HENT:  Positive for hearing loss and tinnitus.    Eyes: Negative.    Respiratory: Negative.     Cardiovascular: Negative.    Gastrointestinal: Negative.    Endocrine: Negative.    Genitourinary: Negative.    Musculoskeletal: " "Negative.    Skin: Negative.    Allergic/Immunologic: Negative.    Neurological: Negative.         [address]yrus [address] , Swapnil   6 Mo F/u -  S/p Lumbar subarachnoid drain placement on 5/3/24     Patient is doing well   Still has some swooshing sound in b/l ears and some hearing loss    Hematological: Negative.    Psychiatric/Behavioral: Negative.     All other systems reviewed and are negative.    I have personally reviewed the MA's review of systems and made changes as necessary.    /82   Pulse 88   Temp 98.2 °F (36.8 °C)   Ht 5' 7\" (1.702 m)   Wt 103 kg (228 lb)   SpO2 96%   BMI 35.71 kg/m²     Physical Exam  Vitals and nursing note reviewed.   Constitutional:       Appearance: Normal appearance. He is normal weight.   HENT:      Head: Normocephalic and atraumatic.   Eyes:      Extraocular Movements: Extraocular movements intact.      Pupils: Pupils are equal, round, and reactive to light.   Cardiovascular:      Rate and Rhythm: Normal rate and regular rhythm.      Pulses: Normal pulses.      Heart sounds: Normal heart sounds.   Pulmonary:      Effort: Pulmonary effort is normal.      Breath sounds: Normal breath sounds.   Abdominal:      General: Abdomen is flat.      Palpations: Abdomen is soft.   Musculoskeletal:         General: Normal range of motion.      Cervical back: Normal range of motion.   Neurological:      General: No focal deficit present.      Mental Status: He is alert and oriented to person, place, and time. Mental status is at baseline.      GCS: GCS eye subscore is 4. GCS verbal subscore is 5. GCS motor subscore is 6.      Sensory: Sensation is intact.      Motor: Motor strength is normal.Motor function is intact.      Coordination: Coordination is intact.      Gait: Gait is intact.      Deep Tendon Reflexes: Reflexes are normal and symmetric.   Psychiatric:         Mood and Affect: Mood normal.         Speech: Speech normal.         Behavior: Behavior normal.       Neurologic Exam     Mental " Status   Oriented to person, place, and time.   Attention: normal. Concentration: normal.   Speech: speech is normal   Level of consciousness: alert    Cranial Nerves     CN III, IV, VI   Pupils are equal, round, and reactive to light.    CN V   Facial sensation intact.     CN VII   Facial expression full, symmetric.     CN IX, X   CN IX normal.   CN X normal.   L hearing improved, R hearing unchanged  No otorrhea     Motor Exam     Strength   Strength 5/5 throughout.     Sensory Exam   Light touch normal.     Gait, Coordination, and Reflexes     Gait  Gait: normal    Reflexes   Reflexes 2+ except as noted.       I have reviewed the following images/report studies in PACS: No results found.     Administrative Statements   I have spent a total time of 30 minutes in caring for this patient on the day of the visit/encounter including Diagnostic results, Prognosis, Risks and benefits of tx options, Instructions for management, Patient and family education, Importance of tx compliance, Risk factor reductions, Impressions, Counseling / Coordination of care, Documenting in the medical record, Reviewing / ordering tests, medicine, procedures  , Obtaining or reviewing history  , and Communicating with other healthcare professionals .

## 2024-11-18 ENCOUNTER — APPOINTMENT (OUTPATIENT)
Dept: LAB | Facility: HOSPITAL | Age: 74
End: 2024-11-18
Attending: INTERNAL MEDICINE
Payer: MEDICARE

## 2024-11-18 ENCOUNTER — HOSPITAL ENCOUNTER (OUTPATIENT)
Dept: RADIOLOGY | Facility: HOSPITAL | Age: 74
Discharge: HOME | End: 2024-11-18
Payer: MEDICARE

## 2024-11-18 DIAGNOSIS — N18.30 BENIGN HYPERTENSION WITH CKD (CHRONIC KIDNEY DISEASE) STAGE III (HCC): ICD-10-CM

## 2024-11-18 DIAGNOSIS — I12.9 BENIGN HYPERTENSION WITH CKD (CHRONIC KIDNEY DISEASE) STAGE III (HCC): ICD-10-CM

## 2024-11-18 DIAGNOSIS — N20.0 RENAL CALCULUS: ICD-10-CM

## 2024-11-18 LAB
25(OH)D3 SERPL-MCNC: 30.7 NG/ML (ref 30–100)
ALBUMIN SERPL BCG-MCNC: 4.4 G/DL (ref 3.5–5)
ANION GAP SERPL CALCULATED.3IONS-SCNC: 9 MMOL/L (ref 4–13)
BACTERIA UR QL AUTO: ABNORMAL /HPF
BILIRUB UR QL STRIP: NEGATIVE
BUN SERPL-MCNC: 22 MG/DL (ref 5–25)
CALCIUM SERPL-MCNC: 10.1 MG/DL (ref 8.4–10.2)
CHLORIDE SERPL-SCNC: 104 MMOL/L (ref 96–108)
CLARITY UR: ABNORMAL
CO2 SERPL-SCNC: 29 MMOL/L (ref 21–32)
COLOR UR: YELLOW
CREAT SERPL-MCNC: 1.37 MG/DL (ref 0.6–1.3)
CREAT UR-MCNC: 105.8 MG/DL
ERYTHROCYTE [DISTWIDTH] IN BLOOD BY AUTOMATED COUNT: 14 % (ref 11.6–15.1)
GFR SERPL CREATININE-BSD FRML MDRD: 51 ML/MIN/1.73SQ M
GLUCOSE SERPL-MCNC: 112 MG/DL (ref 65–140)
GLUCOSE UR STRIP-MCNC: NEGATIVE MG/DL
HCT VFR BLD AUTO: 49.3 % (ref 36.5–49.3)
HGB BLD-MCNC: 15.9 G/DL (ref 12–17)
HGB UR QL STRIP.AUTO: NEGATIVE
HYALINE CASTS #/AREA URNS LPF: ABNORMAL /LPF
KETONES UR STRIP-MCNC: NEGATIVE MG/DL
LEUKOCYTE ESTERASE UR QL STRIP: ABNORMAL
MAGNESIUM SERPL-MCNC: 2 MG/DL (ref 1.9–2.7)
MCH RBC QN AUTO: 30.1 PG (ref 26.8–34.3)
MCHC RBC AUTO-ENTMCNC: 32.3 G/DL (ref 31.4–37.4)
MCV RBC AUTO: 93 FL (ref 82–98)
MICROALBUMIN UR-MCNC: 247.1 MG/L
MICROALBUMIN/CREAT 24H UR: 234 MG/G CREATININE (ref 0–30)
MUCOUS THREADS UR QL AUTO: ABNORMAL
NITRITE UR QL STRIP: NEGATIVE
NON-SQ EPI CELLS URNS QL MICRO: ABNORMAL /HPF
PH UR STRIP.AUTO: 7 [PH]
PHOSPHATE SERPL-MCNC: 3.5 MG/DL (ref 2.3–4.1)
PLATELET # BLD AUTO: 282 THOUSANDS/UL (ref 149–390)
PMV BLD AUTO: 9.1 FL (ref 8.9–12.7)
POTASSIUM SERPL-SCNC: 4.4 MMOL/L (ref 3.5–5.3)
PROT UR STRIP-MCNC: ABNORMAL MG/DL
PTH-INTACT SERPL-MCNC: 22.1 PG/ML (ref 12–88)
RBC # BLD AUTO: 5.29 MILLION/UL (ref 3.88–5.62)
RBC #/AREA URNS AUTO: ABNORMAL /HPF
SODIUM SERPL-SCNC: 142 MMOL/L (ref 135–147)
SP GR UR STRIP.AUTO: 1.02 (ref 1–1.03)
UROBILINOGEN UR STRIP-ACNC: <2 MG/DL
WBC # BLD AUTO: 6.13 THOUSAND/UL (ref 4.31–10.16)
WBC #/AREA URNS AUTO: ABNORMAL /HPF

## 2024-11-18 PROCEDURE — 83970 ASSAY OF PARATHORMONE: CPT

## 2024-11-18 PROCEDURE — 36415 COLL VENOUS BLD VENIPUNCTURE: CPT

## 2024-11-18 PROCEDURE — 82306 VITAMIN D 25 HYDROXY: CPT

## 2024-11-18 PROCEDURE — 83735 ASSAY OF MAGNESIUM: CPT

## 2024-11-18 PROCEDURE — 74018 RADEX ABDOMEN 1 VIEW: CPT

## 2024-11-18 PROCEDURE — 81001 URINALYSIS AUTO W/SCOPE: CPT

## 2024-11-18 PROCEDURE — 80069 RENAL FUNCTION PANEL: CPT

## 2024-11-18 PROCEDURE — 82043 UR ALBUMIN QUANTITATIVE: CPT

## 2024-11-18 PROCEDURE — 82570 ASSAY OF URINE CREATININE: CPT

## 2024-11-18 PROCEDURE — 85027 COMPLETE CBC AUTOMATED: CPT

## 2024-11-19 ENCOUNTER — TELEPHONE (OUTPATIENT)
Dept: NEPHROLOGY | Facility: CLINIC | Age: 74
End: 2024-11-19

## 2024-11-19 NOTE — TELEPHONE ENCOUNTER
Pt advised that labs of 11/18/24 show stable kidney function per Dr. Cottrell.    ----- Message from Keith Cottrell MD sent at 11/18/2024  5:56 PM EST -----  Please inform patient that most recent labs (11/18/24) show that kidney function is stable.

## 2024-11-20 ENCOUNTER — APPOINTMENT (OUTPATIENT)
Dept: LAB | Facility: HOSPITAL | Age: 74
End: 2024-11-20
Attending: SPECIALIST
Payer: MEDICARE

## 2024-11-20 ENCOUNTER — HOSPITAL ENCOUNTER (OUTPATIENT)
Dept: RADIOLOGY | Facility: HOSPITAL | Age: 74
Discharge: HOME | End: 2024-11-20
Attending: SPECIALIST
Payer: MEDICARE

## 2024-11-20 DIAGNOSIS — N20.0 RENAL CALCULUS: ICD-10-CM

## 2024-11-20 LAB
CALCIUM 24H UR-MCNC: 369 MG/24 HRS (ref 100–300)
PERIOD: 24 HOURS
PH UR STRIP.AUTO: 6 [PH]
SODIUM 24H UR-SRATE: 165.6 MMOL/24 HRS (ref 40–220)
SPECIMEN VOL UR: 1800 ML
URATE 24H UR-MCNC: 657 MG/24 HRS (ref 250–800)

## 2024-11-20 PROCEDURE — 82507 ASSAY OF CITRATE: CPT

## 2024-11-20 PROCEDURE — 82340 ASSAY OF CALCIUM IN URINE: CPT

## 2024-11-20 PROCEDURE — 83945 ASSAY OF OXALATE: CPT

## 2024-11-20 PROCEDURE — 84560 ASSAY OF URINE/URIC ACID: CPT

## 2024-11-20 PROCEDURE — 83986 ASSAY PH BODY FLUID NOS: CPT

## 2024-11-20 PROCEDURE — 76775 US EXAM ABDO BACK WALL LIM: CPT

## 2024-11-20 PROCEDURE — 84300 ASSAY OF URINE SODIUM: CPT

## 2024-11-22 LAB
CITRATE 24H UR-MCNC: 300 MG/L
CITRATE 24H UR-MRATE: 540 MG/24 HR (ref 320–1240)
OXALATE 24H UR-MRATE: 38 MG/24 HR (ref 7–44)
OXALATE UR-MCNC: 21 MG/L

## 2024-12-09 DIAGNOSIS — N20.0 NEPHROLITHIASIS: ICD-10-CM

## 2024-12-10 RX ORDER — POTASSIUM CITRATE 10 MEQ/1
20 TABLET, EXTENDED RELEASE ORAL
Qty: 180 TABLET | Refills: 0 | Status: SHIPPED | OUTPATIENT
Start: 2024-12-10

## 2024-12-11 ENCOUNTER — TELEPHONE (OUTPATIENT)
Dept: NEPHROLOGY | Facility: CLINIC | Age: 74
End: 2024-12-11

## 2024-12-11 NOTE — TELEPHONE ENCOUNTER
Left msg. with pt to have a sooner appt 12/11 at 4pm with Dr Cottrell in the [place] office.  Informed to call to see if still available.

## 2024-12-27 ENCOUNTER — OFFICE VISIT (OUTPATIENT)
Dept: NEPHROLOGY | Facility: CLINIC | Age: 74
End: 2024-12-27
Payer: MEDICARE

## 2024-12-27 VITALS
HEIGHT: 67 IN | BODY MASS INDEX: 35.47 KG/M2 | SYSTOLIC BLOOD PRESSURE: 120 MMHG | HEART RATE: 99 BPM | DIASTOLIC BLOOD PRESSURE: 84 MMHG | WEIGHT: 226 LBS | RESPIRATION RATE: 94 BRPM

## 2024-12-27 DIAGNOSIS — I12.9 BENIGN HYPERTENSION WITH CHRONIC KIDNEY DISEASE: ICD-10-CM

## 2024-12-27 DIAGNOSIS — N18.31 STAGE 3A CHRONIC KIDNEY DISEASE (HCC): Primary | ICD-10-CM

## 2024-12-27 DIAGNOSIS — N18.9 CHRONIC KIDNEY DISEASE-MINERAL AND BONE DISORDER: ICD-10-CM

## 2024-12-27 DIAGNOSIS — E83.9 CHRONIC KIDNEY DISEASE-MINERAL AND BONE DISORDER: ICD-10-CM

## 2024-12-27 DIAGNOSIS — M89.9 CHRONIC KIDNEY DISEASE-MINERAL AND BONE DISORDER: ICD-10-CM

## 2024-12-27 DIAGNOSIS — N20.0 NEPHROLITHIASIS: ICD-10-CM

## 2024-12-27 PROCEDURE — 99214 OFFICE O/P EST MOD 30 MIN: CPT | Performed by: INTERNAL MEDICINE

## 2024-12-27 NOTE — PROGRESS NOTES
NEPHROLOGY OFFICE PROGRESS NOTE   Swapnil Grover 74 y.o. male MRN: [id number]  DATE: 12/27/24  Reason for visit: Continued evaluation and management of CKD    ASSESSMENT & PLAN:  Chronic kidney disease, stage IIIA:  Baseline creatinine is around 1.2-1.3.  Etiology is probably sequelae from previous DAYANNA and left renal atrophy.  Renal function is stable - SCr 1.37.   Treatment: good BP control. Stone prevention.     Hypertension  Office BP is at goal (<130/80)  Rx: None.   Continue to monitor off BP meds.     Nephrolithiasis  Uric acid stones based on stone analysis.  Rx: K-citrate 20 meq TID.   24 hour urine in Nov 2024 - volume 1.8 liters, citrate 540, pH 6.0, uric acid 657 mg.   K citrate increased by Dr. Garcia last month.   Urine volume is suboptimal - advised to increase fluid intake to >2 liters.   May need Allopurinol if continues to form stones.     Mineral bone disease  PTH is at goal in November 2024.  Calcium and phosphorus are at goal.  Vitamin D level is at goal in November 2024.      Patient Instructions   You have chronic kidney disease (CKD) and your kidney function is stable.   I recommend no changes to your medications today.   Follow up in 6 months - please obtain blood work 1-2 weeks prior to the appointment.     Please check your BP twice daily for 1 week and bring a log with your next visit.   Please avoid taking NSAIDs (Ibuprofen, Motrin, Aleve, Advil, Naproxen, Celebrex, etc.)  Make sure you are eating healthy and have adequate exercise.     SUBJECTIVE / INTERVAL HISTORY:  Swapnil was last seen in the office in early April 2024.   He was admitted to [facility] in late 2024 after undergoing bilateral middle fossa craniectomy for CSF leak repair with dural substitute.   His kidney function was stable during the hospital stay.    He underwent a right knee replacement in July 2024.  Since his knee replacement, he completed physical therapy.    He saw Dr. Garcia a month ago and had a 24 hour urine  "and imaging done.   His K-citrate was increased by Dr. Garcia to 2 tabs TID.   He is due for a repeat 24 hour urine in Feb or Mar 2025.   Not checking BP at home.   Knee pain is resolved.   No new acute issues.   He self discontinued Amlodipine a few months ago and reports that home BP was at goal after stopping it.     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     Previous work up:   3/9/23 Renal US: R 15.8 cm, L 12.1 cm, no hydronephrosis.  Large intrarenal calculi on the right kidney.  Bilateral renal cysts.    8/25/23 SPEP & UPEP no M spike, KL ratio 1.32,     ALLERGIES:   Allergies   Allergen Reactions    Other Nasal Congestion     Seasonal allergies     REVIEW OF SYSTEMS:  Review of Systems   Constitutional:  Negative for appetite change, chills, fatigue and fever.   Respiratory:  Negative for cough and shortness of breath.    Cardiovascular:  Negative for chest pain and leg swelling.   Gastrointestinal:  Negative for abdominal pain, diarrhea, nausea and vomiting.   Genitourinary:  Negative for hematuria.   Musculoskeletal:  Positive for back pain. Negative for arthralgias.   Neurological:  Negative for dizziness and light-headedness.     OBJECTIVE:  /84 (BP Location: Left arm, Patient Position: Sitting, Cuff Size: Large)   Pulse 99   Resp (!) 94   Ht 5' 7\" (1.702 m)   Wt 103 kg (226 lb)   BMI 35.40 kg/m²   Current Weight:   Body mass index is 35.4 kg/m².  Physical Exam  Constitutional:       General: He is not in acute distress.     Appearance: Normal appearance. He is well-developed. He is not ill-appearing, toxic-appearing or diaphoretic.   HENT:      Head: Normocephalic and atraumatic.   Eyes:      General: No scleral icterus.     Conjunctiva/sclera: Conjunctivae normal.   Neck:      Vascular: No JVD. "   Cardiovascular:      Rate and Rhythm: Normal rate and regular rhythm.      Heart sounds: Normal heart sounds.   Pulmonary:      Effort: Pulmonary effort is normal. No respiratory distress.      Breath sounds: Normal breath sounds.   Abdominal:      General: Bowel sounds are normal.      Palpations: Abdomen is soft.   Musculoskeletal:      Cervical back: Neck supple.      Right lower leg: No edema.      Left lower leg: No edema.   Skin:     General: Skin is warm and dry.   Neurological:      Mental Status: He is alert and oriented to person, place, and time.   Psychiatric:         Mood and Affect: Mood normal.         Behavior: Behavior normal.         Judgment: Judgment normal.       Medications:  Current Outpatient Medications:     Alpha-D-Galactosidase (RA DIGESTIVE AID PO), Take by mouth every morning (Patient not taking: Reported on 8/14/2024), Disp: , Rfl:     b complex vitamins tablet, Take 1 tablet by mouth 2 pills twice daily, Disp: , Rfl:     CALCIUM PO, Take 520 mg by mouth every morning, Disp: , Rfl:     ferrous fumarate (KANG-SEQUELS) 18 MG CR tablet, Take 18 mg by mouth every morning, Disp: , Rfl:     potassium citrate (UROCIT-K) 10 mEq, Take 2 tablets (20 mEq total) by mouth 3 (three) times a day with meals, Disp: 180 tablet, Rfl: 0    VITAMIN D PO, Take by mouth every morning With calcium, Disp: , Rfl:     VITAMIN E PO, Take 268 mg by mouth daily, Disp: , Rfl:     Laboratory Results:  Lab Results   Component Value Date    WBC 6.13 11/18/2024    HGB 15.9 11/18/2024    HCT 49.3 11/18/2024    MCV 93 11/18/2024     11/18/2024     Lab Results   Component Value Date    SODIUM 142 11/18/2024    K 4.4 11/18/2024     11/18/2024    CO2 29 11/18/2024    BUN 22 11/18/2024    CREATININE 1.37 (H) 11/18/2024    GLUC 112 11/18/2024    CALCIUM 10.1 11/18/2024

## 2024-12-27 NOTE — PATIENT INSTRUCTIONS
You have chronic kidney disease (CKD) and your kidney function is stable.   I recommend no changes to your medications today.   Follow up in 6 months - please obtain blood work 1-2 weeks prior to the appointment.     Please check your BP twice daily for 1 week and bring a log with your next visit.   Please avoid taking NSAIDs (Ibuprofen, Motrin, Aleve, Advil, Naproxen, Celebrex, etc.)  Make sure you are eating healthy and have adequate exercise.

## 2025-03-06 ENCOUNTER — CONSULT (OUTPATIENT)
Dept: FAMILY MEDICINE CLINIC | Facility: CLINIC | Age: 75
End: 2025-03-06
Payer: MEDICARE

## 2025-03-06 VITALS
SYSTOLIC BLOOD PRESSURE: 130 MMHG | RESPIRATION RATE: 18 BRPM | DIASTOLIC BLOOD PRESSURE: 72 MMHG | TEMPERATURE: 97 F | HEART RATE: 94 BPM | HEIGHT: 67 IN | BODY MASS INDEX: 35.06 KG/M2 | WEIGHT: 223.4 LBS

## 2025-03-06 DIAGNOSIS — R73.03 PREDIABETES: ICD-10-CM

## 2025-03-06 DIAGNOSIS — D33.3 RIGHT ACOUSTIC NEUROMA (HCC): ICD-10-CM

## 2025-03-06 DIAGNOSIS — N18.31 STAGE 3A CHRONIC KIDNEY DISEASE (HCC): ICD-10-CM

## 2025-03-06 DIAGNOSIS — E78.2 MIXED HYPERLIPIDEMIA: ICD-10-CM

## 2025-03-06 DIAGNOSIS — Z12.11 SCREEN FOR COLON CANCER: ICD-10-CM

## 2025-03-06 DIAGNOSIS — H25.9 SENILE CATARACT OF RIGHT EYE, UNSPECIFIED AGE-RELATED CATARACT TYPE: Primary | ICD-10-CM

## 2025-03-06 DIAGNOSIS — Z01.818 PRE-OP EXAMINATION: ICD-10-CM

## 2025-03-06 DIAGNOSIS — I10 PRIMARY HYPERTENSION: ICD-10-CM

## 2025-03-06 DIAGNOSIS — Z00.00 MEDICARE ANNUAL WELLNESS VISIT, SUBSEQUENT: ICD-10-CM

## 2025-03-06 PROBLEM — E66.01 OBESITY, MORBID (HCC): Status: RESOLVED | Noted: 2023-04-26 | Resolved: 2025-03-06

## 2025-03-06 PROCEDURE — G2211 COMPLEX E/M VISIT ADD ON: HCPCS | Performed by: FAMILY MEDICINE

## 2025-03-06 PROCEDURE — G0439 PPPS, SUBSEQ VISIT: HCPCS | Performed by: FAMILY MEDICINE

## 2025-03-06 PROCEDURE — 99214 OFFICE O/P EST MOD 30 MIN: CPT | Performed by: FAMILY MEDICINE

## 2025-03-06 NOTE — PROGRESS NOTES
Name: Swapnil Grover      : 1950      MRN: [id number]  Encounter Provider: Frank Lombardi, DO  Encounter Date: 3/6/2025   Encounter department: [facility]    Assessment & Plan  Senile cataract of right eye, unspecified age-related cataract type         Pre-op examination  Pt is medically optimized for surgery  Pt did not have forms  Pt states no labs required  Pt states no EKG       Medicare annual wellness visit, subsequent         Primary hypertension  stable  Orders:  •  CBC; Future  •  Comprehensive metabolic panel; Future  •  Lipid Panel with Direct LDL reflex; Future  •  Hemoglobin A1C; Future    Mixed hyperlipidemia    Orders:  •  CBC; Future  •  Comprehensive metabolic panel; Future  •  Lipid Panel with Direct LDL reflex; Future  •  Hemoglobin A1C; Future    Right acoustic neuroma (HCC)         Stage 3a chronic kidney disease (HCC)  Lab Results   Component Value Date    EGFR 51 2024    EGFR 54 2024    EGFR 53 2024    CREATININE 1.37 (H) 2024    CREATININE 1.30 2024    CREATININE 1.32 (H) 2024     Orders:  •  CBC; Future  •  Comprehensive metabolic panel; Future  •  Lipid Panel with Direct LDL reflex; Future  •  Hemoglobin A1C; Future    Screen for colon cancer    Orders:  •  Ambulatory referral to Gastroenterology; Future    Prediabetes    Orders:  •  CBC; Future  •  Comprehensive metabolic panel; Future  •  Lipid Panel with Direct LDL reflex; Future  •  Hemoglobin A1C; Future      Depression Screening and Follow-up Plan: Patient was screened for depression during today's encounter. They screened negative with a PHQ-2 score of 0.      ASCVD Risk Management:  The 10-year ASCVD risk score (Leann DK, et al., 2019) is: 22.1%    Patent does not have underlying ASCVD. Their ASCVD Risk is intermediate (7.5 to < 20 %).    Preventive health issues were discussed with patient, and age appropriate screening tests were ordered as noted in patient's After  Visit Summary. Personalized health advice and appropriate referrals for health education or preventive services given if needed, as noted in patient's After Visit Summary.    History of Present Illness     Pt is sched for a pre op and pt is also due for an AWV  PT states he does not need a EKG  Pt states he did not need labs  PT santos snopt have forms to fill out  Pt states he needs a Colonoscopy    Pre-op Exam  Surgery: RT cataract  Anticipated Date of Surgery: 3/11/25  Surgeon: Dr Holland    Pt needs pre op for catarct surgery    Pt states in the past he came out of anesthesia with a breathing problem - he woke up on a cpap - not something he usually does - pt will let anesthesia know    Previous history of bleeding disorders or clots?: No  Previous Anesthesia reaction?: Yes  Prolonged steroid use in the last 6 months?: No    Assessment of Cardiac Risk:   - Unstable or severe angina or MI in the last 6 weeks or history of stent placement in the last year?: No   - Decompensated heart failure (e.g. New onset heart failure, NYHA  Class IV heart failure, or worsening existing heart failure)?: No  - Significant arrhythmias such as high grade AV block, symptomatic ventricular arrhythmia, newly recognized ventricular tachycardia, supraventricular tachycardia with resting heart rate >100, or symptomatic bradycardia?: No  - Severe heart valve disease including aortic stenosis or symptomatic mitral stenosis?: No      Pre-operative Risk Factors:  Elevated-risk surgery: No    History of cerebrovascular disease: No    History of ischemic heart disease: No  Pre-operative treatment with insulin: No  Pre-operative creatinine >2 mg/dL: No    History of congestive heart failure: No    Duke Activity Status Index (DASI):   DASI Total Score: 58.2  METs: 9.9    Medications of Perioperative Concern:       Pt is not on any prescriptions  Not to take any supplements imn the am of surgery  Pt advised to not take any Aspirin products 5 days  prior to surgery  Do not start fish oils       Patient Care Team:  Frank Lombardi, DO as PCP - General (Family Medicine)  Hortencia Ge PA-C as Physician Assistant (Nephrology)  Bassem Moscoso DO as Consulting Physician (Orthopedic Surgery)  CHELSIE Guerrero as Nurse Practitioner (Otolaryngology)  Sabino Garcia MD as Consulting Physician (Urology)  Keith Cottrell MD (Nephrology)    Review of Systems  Medical History Reviewed by provider this encounter:       Annual Wellness Visit Questionnaire   Swapnil is here for his Subsequent Wellness visit. Last Medicare Wellness visit information reviewed, patient interviewed, no change since last AWV.     Health Risk Assessment:   Patient rates overall health as good. Patient feels that their physical health rating is same. Patient is satisfied with their life. Eyesight was rated as same. Hearing was rated as same. Patient feels that their emotional and mental health rating is same. Patients states they are sometimes angry. Patient states they are never, rarely unusually tired/fatigued. Pain experienced in the last 7 days has been none. Patient states that he has experienced no weight loss or gain in last 6 months.     Depression Screening:   PHQ-2 Score: 0      Fall Risk Screening:   In the past year, patient has experienced: no history of falling in past year      Home Safety:  Patient does not have trouble with stairs inside or outside of their home. Patient has working smoke alarms and has working carbon monoxide detector. Home safety hazards include: none.     Nutrition:   Current diet is Regular.     Medications:   Patient is currently taking over-the-counter supplements. OTC medications include: see medication list. Patient is able to manage medications.     Activities of Daily Living (ADLs)/Instrumental Activities of Daily Living (IADLs):   Walk and transfer into and out of bed and chair?: Yes  Dress and groom yourself?: Yes    Bathe or shower yourself?: Yes     Feed yourself? Yes  Do your laundry/housekeeping?: Yes  Manage your money, pay your bills and track your expenses?: Yes  Make your own meals?: Yes    Do your own shopping?: Yes    Previous Hospitalizations:   Any hospitalizations or ED visits within the last 12 months?: Yes    How many hospitalizations have you had in the last year?: 1-2    Hospitalization Comments: Surgery     Advance Care Planning:   Living will: No      Cognitive Screening:   Provider or family/friend/caregiver concerned regarding cognition?: No    PREVENTIVE SCREENINGS      Cardiovascular Screening:    General: Screening Not Indicated, History Lipid Disorder and Risks and Benefits Discussed    Due for: Lipid Panel      Diabetes Screening:     General: Screening Current and Risks and Benefits Discussed    Due for: Blood Glucose      Colorectal Cancer Screening:     General: Risks and Benefits Discussed    Due for: Colonoscopy - High Risk      Prostate Cancer Screening:    General: Screening Current      Osteoporosis Screening:    General: Patient Declines      Abdominal Aortic Aneurysm (AAA) Screening:    Risk factors include: age between 65-74 yo        General: Screening Not Indicated      Lung Cancer Screening:     General: Screening Not Indicated      Hepatitis C Screening:    General: Screening Current      Preventive Screening Comments: Pt sees Dr hamilton as urologist    Screening, Brief Intervention, and Referral to Treatment (SBIRT)     Screening      AUDIT-C Screenin) How often did you have a drink containing alcohol in the past year? monthly or less  2) How many drinks did you have on a typical day when you were drinking in the past year? 1 to 2  3) How often did you have 6 or more drinks on one occasion in the past year? never    AUDIT-C Score: 1  Interpretation: Score 0-3 (male): Negative screen for alcohol misuse    Single Item Drug Screening:  How often have you used an illegal drug (including marijuana) or a prescription  "medication for non-medical reasons in the past year? never    Single Item Drug Screen Score: 0  Interpretation: Negative screen for possible drug use disorder    Social Drivers of Health     Financial Resource Strain: Low Risk  (11/2/2023)    Overall Financial Resource Strain (CARDIA)    • Difficulty of Paying Living Expenses: Not hard at all   Food Insecurity: No Food Insecurity (3/6/2025)    Hunger Vital Sign    • Worried About Running Out of Food in the Last Year: Never true    • Ran Out of Food in the Last Year: Never true   Transportation Needs: No Transportation Needs (3/6/2025)    PRAPARE - Transportation    • Lack of Transportation (Medical): No    • Lack of Transportation (Non-Medical): No   Housing Stability: Low Risk  (3/6/2025)    Housing Stability Vital Sign    • Unable to Pay for Housing in the Last Year: No    • Number of Times Moved in the Last Year: 0    • Homeless in the Last Year: No   Utilities: Not At Risk (3/6/2025)    [facility] Utilities    • Threatened with loss of utilities: No     No results found.    Objective   /72   Pulse 94   Temp (!) 97 °F (36.1 °C)   Resp 18   Ht 5' 7\" (1.702 m)   Wt 101 kg (223 lb 6.4 oz)   BMI 34.99 kg/m²     Physical Exam    "

## 2025-03-06 NOTE — PATIENT INSTRUCTIONS
Medicare Preventive Visit Patient Instructions  Thank you for completing your Welcome to Medicare Visit or Medicare Annual Wellness Visit today. Your next wellness visit will be due in one year (3/7/2026).  The screening/preventive services that you may require over the next 5-10 years are detailed below. Some tests may not apply to you based off risk factors and/or age. Screening tests ordered at today's visit but not completed yet may show as past due. Also, please note that scanned in results may not display below.  Preventive Screenings:  Service Recommendations Previous Testing/Comments   Colorectal Cancer Screening  Colonoscopy    Fecal Occult Blood Test (FOBT)/Fecal Immunochemical Test (FIT)  Fecal DNA/Cologuard Test  Flexible Sigmoidoscopy Age: 45-75 years old   Colonoscopy: every 10 years (May be performed more frequently if at higher risk)  OR  FOBT/FIT: every 1 year  OR  Cologuard: every 3 years  OR  Sigmoidoscopy: every 5 years  Screening may be recommended earlier than age 45 if at higher risk for colorectal cancer. Also, an individualized decision between you and your healthcare provider will decide whether screening between the ages of 76-85 would be appropriate. Colonoscopy: Not on file  FOBT/FIT: Not on file  Cologuard: Not on file  Sigmoidoscopy: Not on file          Prostate Cancer Screening Individualized decision between patient and health care provider in men between ages of 55-69   Medicare will cover every 12 months beginning on the day after your 50th birthday PSA: 2.212 ng/mL     Screening Current     Hepatitis C Screening Once for adults born between 1945 and 1965  More frequently in patients at high risk for Hepatitis C Hep C Antibody: 12/21/2023    Screening Current   Diabetes Screening 1-2 times per year if you're at risk for diabetes or have pre-diabetes Fasting glucose: 94 mg/dL (6/28/2024)  A1C: 6.0 % (6/28/2024)  Screening Current   Cholesterol Screening Once every 5 years if you  don't have a lipid disorder. May order more often based on risk factors. Lipid panel: 12/21/2023  Screening Not Indicated  History Lipid Disorder      Other Preventive Screenings Covered by Medicare:  Abdominal Aortic Aneurysm (AAA) Screening: covered once if your at risk. You're considered to be at risk if you have a family history of AAA or a male between the age of 65-75 who smoking at least 100 cigarettes in your lifetime.  Lung Cancer Screening: covers low dose CT scan once per year if you meet all of the following conditions: (1) Age 55-77; (2) No signs or symptoms of lung cancer; (3) Current smoker or have quit smoking within the last 15 years; (4) You have a tobacco smoking history of at least 20 pack years (packs per day x number of years you smoked); (5) You get a written order from a healthcare provider.  Glaucoma Screening: covered annually if you're considered high risk: (1) You have diabetes OR (2) Family history of glaucoma OR (3)  aged 50 and older OR (4)  American aged 65 and older  Osteoporosis Screening: covered every 2 years if you meet one of the following conditions: (1) Have a vertebral abnormality; (2) On glucocorticoid therapy for more than 3 months; (3) Have primary hyperparathyroidism; (4) On osteoporosis medications and need to assess response to drug therapy.  HIV Screening: covered annually if you're between the age of 15-65. Also covered annually if you are younger than 15 and older than 65 with risk factors for HIV infection. For pregnant patients, it is covered up to 3 times per pregnancy.    Immunizations:  Immunization Recommendations   Influenza Vaccine Annual influenza vaccination during flu season is recommended for all persons aged >= 6 months who do not have contraindications   Pneumococcal Vaccine   * Pneumococcal conjugate vaccine = PCV13 (Prevnar 13), PCV15 (Vaxneuvance), PCV20 (Prevnar 20)  * Pneumococcal polysaccharide vaccine = PPSV23 (Pneumovax)  Adults 19-63 yo with certain risk factors or if 65+ yo  If never received any pneumonia vaccine: recommend Prevnar 20 (PCV20)  Give PCV20 if previously received 1 dose of PCV13 or PPSV23   Hepatitis B Vaccine 3 dose series if at intermediate or high risk (ex: diabetes, end stage renal disease, liver disease)   Respiratory syncytial virus (RSV) Vaccine - COVERED BY MEDICARE PART D  * RSVPreF3 (Arexvy) CDC recommends that adults 60 years of age and older may receive a single dose of RSV vaccine using shared clinical decision-making (SCDM)   Tetanus (Td) Vaccine - COST NOT COVERED BY MEDICARE PART B Following completion of primary series, a booster dose should be given every 10 years to maintain immunity against tetanus. Td may also be given as tetanus wound prophylaxis.   Tdap Vaccine - COST NOT COVERED BY MEDICARE PART B Recommended at least once for all adults. For pregnant patients, recommended with each pregnancy.   Shingles Vaccine (Shingrix) - COST NOT COVERED BY MEDICARE PART B  2 shot series recommended in those 19 years and older who have or will have weakened immune systems or those 50 years and older     Health Maintenance Due:      Topic Date Due   • Colorectal Cancer Screening  Never done   • Hepatitis C Screening  Completed     Immunizations Due:      Topic Date Due   • Pneumococcal Vaccine: 65+ Years (1 of 2 - PCV) Never done   • Influenza Vaccine (1) Never done   • COVID-19 Vaccine (1 - 2024-25 season) Never done     Advance Directives   What are advance directives?  Advance directives are legal documents that state your wishes and plans for medical care. These plans are made ahead of time in case you lose your ability to make decisions for yourself. Advance directives can apply to any medical decision, such as the treatments you want, and if you want to donate organs.   What are the types of advance directives?  There are many types of advance directives, and each state has rules about how to use  them. You may choose a combination of any of the following:  Living will:  This is a written record of the treatment you want. You can also choose which treatments you do not want, which to limit, and which to stop at a certain time. This includes surgery, medicine, IV fluid, and tube feedings.   Durable power of  for healthcare (DPAHC):  This is a written record that states who you want to make healthcare choices for you when you are unable to make them for yourself. This person, called a proxy, is usually a family member or a friend. You may choose more than 1 proxy.  Do not resuscitate (DNR) order:  A DNR order is used in case your heart stops beating or you stop breathing. It is a request not to have certain forms of treatment, such as CPR. A DNR order may be included in other types of advance directives.  Medical directive:  This covers the care that you want if you are in a coma, near death, or unable to make decisions for yourself. You can list the treatments you want for each condition. Treatment may include pain medicine, surgery, blood transfusions, dialysis, IV or tube feedings, and a ventilator (breathing machine).  Values history:  This document has questions about your views, beliefs, and how you feel and think about life. This information can help others choose the care that you would choose.  Why are advance directives important?  An advance directive helps you control your care. Although spoken wishes may be used, it is better to have your wishes written down. Spoken wishes can be misunderstood, or not followed. Treatments may be given even if you do not want them. An advance directive may make it easier for your family to make difficult choices about your care.   Weight Management   Why it is important to manage your weight:  Being overweight increases your risk of health conditions such as heart disease, high blood pressure, type 2 diabetes, and certain types of cancer. It can also  increase your risk for osteoarthritis, sleep apnea, and other respiratory problems. Aim for a slow, steady weight loss. Even a small amount of weight loss can lower your risk of health problems.  How to lose weight safely:  A safe and healthy way to lose weight is to eat fewer calories and get regular exercise. You can lose up about 1 pound a week by decreasing the number of calories you eat by 500 calories each day.   Healthy meal plan for weight management:  A healthy meal plan includes a variety of foods, contains fewer calories, and helps you stay healthy. A healthy meal plan includes the following:  Eat whole-grain foods more often.  A healthy meal plan should contain fiber. Fiber is the part of grains, fruits, and vegetables that is not broken down by your body. Whole-grain foods are healthy and provide extra fiber in your diet. Some examples of whole-grain foods are whole-wheat breads and pastas, oatmeal, brown rice, and bulgur.  Eat a variety of vegetables every day.  Include dark, leafy greens such as spinach, kale, camila greens, and mustard greens. Eat yellow and orange vegetables such as carrots, sweet potatoes, and winter squash.   Eat a variety of fruits every day.  Choose fresh or canned fruit (canned in its own juice or light syrup) instead of juice. Fruit juice has very little or no fiber.  Eat low-fat dairy foods.  Drink fat-free (skim) milk or 1% milk. Eat fat-free yogurt and low-fat cottage cheese. Try low-fat cheeses such as mozzarella and other reduced-fat cheeses.  Choose meat and other protein foods that are low in fat.  Choose beans or other legumes such as split peas or lentils. Choose fish, skinless poultry (chicken or turkey), or lean cuts of red meat (beef or pork). Before you cook meat or poultry, cut off any visible fat.   Use less fat and oil.  Try baking foods instead of frying them. Add less fat, such as margarine, sour cream, regular salad dressing and mayonnaise to foods. Eat  fewer high-fat foods. Some examples of high-fat foods include french fries, doughnuts, ice cream, and cakes.  Eat fewer sweets.  Limit foods and drinks that are high in sugar. This includes candy, cookies, regular soda, and sweetened drinks.  Exercise:  Exercise at least 30 minutes per day on most days of the week. Some examples of exercise include walking, biking, dancing, and swimming. You can also fit in more physical activity by taking the stairs instead of the elevator or parking farther away from stores. Ask your healthcare provider about the best exercise plan for you.      © Copyright [employer] 2018 Information is for End User's use only and may not be sold, redistributed or otherwise used for commercial purposes. All illustrations and images included in CareNotes® are the copyrighted property of [employer] or [employer]

## 2025-03-06 NOTE — ASSESSMENT & PLAN NOTE
Orders:  •  CBC; Future  •  Comprehensive metabolic panel; Future  •  Lipid Panel with Direct LDL reflex; Future  •  Hemoglobin A1C; Future

## 2025-03-06 NOTE — ASSESSMENT & PLAN NOTE
Lab Results   Component Value Date    EGFR 51 11/18/2024    EGFR 54 07/31/2024    EGFR 53 06/28/2024    CREATININE 1.37 (H) 11/18/2024    CREATININE 1.30 07/31/2024    CREATININE 1.32 (H) 06/28/2024     Orders:  •  CBC; Future  •  Comprehensive metabolic panel; Future  •  Lipid Panel with Direct LDL reflex; Future  •  Hemoglobin A1C; Future

## 2025-03-06 NOTE — ASSESSMENT & PLAN NOTE
stable  Orders:  •  CBC; Future  •  Comprehensive metabolic panel; Future  •  Lipid Panel with Direct LDL reflex; Future  •  Hemoglobin A1C; Future

## 2025-03-13 ENCOUNTER — APPOINTMENT (OUTPATIENT)
Dept: LAB | Facility: HOSPITAL | Age: 75
End: 2025-03-13

## 2025-03-18 ENCOUNTER — APPOINTMENT (OUTPATIENT)
Dept: LAB | Facility: HOSPITAL | Age: 75
End: 2025-03-18
Attending: SPECIALIST
Payer: MEDICARE

## 2025-03-18 DIAGNOSIS — N20.0 URIC ACID NEPHROLITHIASIS: ICD-10-CM

## 2025-03-18 LAB
CALCIUM 24H UR-MCNC: 204.75 MG/24 HRS (ref 100–300)
PERIOD: 24 HOURS
PH UR STRIP.AUTO: 6.5 [PH]
SODIUM 24H UR-SRATE: 231.75 MMOL/24 HRS (ref 40–220)
SPECIMEN VOL UR: 2250 ML
URATE 24H UR-MCNC: 625.5 MG/24 HRS (ref 250–800)

## 2025-03-18 PROCEDURE — 83986 ASSAY PH BODY FLUID NOS: CPT

## 2025-03-18 PROCEDURE — 84300 ASSAY OF URINE SODIUM: CPT

## 2025-03-18 PROCEDURE — 82507 ASSAY OF CITRATE: CPT

## 2025-03-18 PROCEDURE — 84560 ASSAY OF URINE/URIC ACID: CPT

## 2025-03-18 PROCEDURE — 83945 ASSAY OF OXALATE: CPT

## 2025-03-18 PROCEDURE — 82340 ASSAY OF CALCIUM IN URINE: CPT

## 2025-03-21 LAB
CITRATE 24H UR-MCNC: 330 MG/L
CITRATE 24H UR-MRATE: 743 MG/24 HR (ref 320–1240)
OXALATE 24H UR-MRATE: 29 MG/24 HR (ref 7–44)
OXALATE UR-MCNC: 13 MG/L

## 2025-05-06 ENCOUNTER — OFFICE VISIT (OUTPATIENT)
Dept: NEUROSURGERY | Facility: CLINIC | Age: 75
End: 2025-05-06
Payer: MEDICARE

## 2025-05-06 VITALS
SYSTOLIC BLOOD PRESSURE: 140 MMHG | TEMPERATURE: 97.2 F | HEART RATE: 95 BPM | DIASTOLIC BLOOD PRESSURE: 80 MMHG | OXYGEN SATURATION: 98 %

## 2025-05-06 DIAGNOSIS — G96.01 CSF OTORRHEA: ICD-10-CM

## 2025-05-06 DIAGNOSIS — Q16.5 TEGMEN DEFECT OF BASE OF SKULL: Primary | ICD-10-CM

## 2025-05-06 PROCEDURE — 99213 OFFICE O/P EST LOW 20 MIN: CPT | Performed by: NEUROLOGICAL SURGERY

## 2025-05-06 NOTE — PROGRESS NOTES
"Name: Swapnil Grover      : 1950      MRN: [id number]  Encounter Provider: Billy August MD  Encounter Date: 2025   Encounter department: [facility] [place]  :  Assessment & Plan  Tegmen defect of base of skull         CSF otorrhea             History of Present Illness     Patient was previously evaluated on 2024, 24, 24,24.     He is a 74-year-old male with h/o left craniotomy for SDH evacuation in  in [place] (no reported complications) who p/w several months of progressive worsening bilateral hearing loss, fullness, and CSF leak (\"postnasal drip\") s/p bilateral endoscopic middle fossa craniotomies for repair of tegmen defect and CSF leak with temporalis muscle flap and intraoperative lumbar drain placement on 24, c/b recurrent CSF otorrhea after LD removal requiring replacement of LD in OR on 5/3/24, which stopped functioning then was removed on , discharged home on .     Today, he reports no new neurologic symptoms or deficits including CSF leak in bilateral ears. Bilateral hearing has improved. He just returned from a trip to [place] and is preparing to go on a tour in [place] next month.     Incisions are healing well without erythema, edema, dehiscence, drainage, or underlying fluid collection.      Prior LD sites CDI. No new radicular symptoms in legs.     Underwent R TKA by Dr. Moscoso (Orthopedic Surgery) in 2024 and has been recovering well.     At this time, I have no acute neurosurgical or postoperative concerns. Follow up as needed.    Dr. Wasserman plans to follow up on 7/3/25 with tympanogram.     All questions and concerns were addressed during this visit.    HPI     Review of Systems   Constitutional: Negative.    HENT:  Positive for tinnitus (faint in B/L ears).    Eyes:  Positive for visual disturbance (floaters, re adjust focus after eye sx).   Respiratory: Negative.     Cardiovascular: Negative.    Gastrointestinal: Negative.  "   Endocrine: Negative.    Genitourinary: Negative.    Musculoskeletal:  Positive for gait problem (s/p fall black friday 11/24).   Skin: Negative.    Allergic/Immunologic: Negative.    Neurological:  Negative for dizziness, weakness, numbness and headaches.        1 year POV  No imaging needed     CRANIOTOMY MIDDLE FOSSA,RETROMASTOID APPROACH FOR ENT (Bilateral: Head)       Bilateral endoscopic middle fossa craniotomies for repair of tegmen defect and CSF leak with temporalis fascia muscle flap, with neuromonitoring  4/29/24    Lumbar subarachnoid drain placement on 5/3/24    No ac/Non smoker     Hematological: Negative.    Psychiatric/Behavioral: Negative.     All other systems reviewed and are negative.    I have personally reviewed the MA's review of systems and made changes as necessary.    Past Medical History   Past Medical History:   Diagnosis Date    Anesthesia complication 2022    difficulty waking up-pt woke up with a CPAP    Arthritis     BPH (benign prostatic hyperplasia)     Breathing difficulty 12/01/2022    CPAP given per pt-s/p surgery    Cancer (HCC) 2015    basal cell of the skull     Chronic kidney disease     Colon polyp     Decreased hearing of both ears 04/29/2024    had surgery for correction    Heart failure (HCC)     12/2/22-pt had ECHO-per pt R. sided block    History of subdural hematoma     HL (hearing loss) 2022    Hypertension     Kidney stone     right stone    Migraine 1964    Renal disorder     Sleep apnea     mild- no CPAP-had nasal septoplasty    Tinnitus ?    Tonsillitis 1955?    Wears glasses     reading     Past Surgical History:   Procedure Laterality Date    BASAL CELL CARCINOMA EXCISION  2015    skull    BRAIN SURGERY      in the 1990's-subdural hematoma    CATARACT EXTRACTION Left     COLONOSCOPY      x2    CYSTOSCOPY W/ LASER LITHOTRIPSY Right 11/27/2020    Procedure: CYSTOSCOPY, FLEXIBLE URETEROSCOPY, LASER, AND STENT EXCHANGE,STONE BASKET, RIGHT RETROGRADE;  Surgeon:  Sabino Garcia MD;  [facility]: [facility];  Service: Urology    FL RETROGRADE PYELOGRAM  10/18/2020    FL RETROGRADE PYELOGRAM  11/27/2020    FL RETROGRADE PYELOGRAM  12/01/2022    FL RETROGRADE PYELOGRAM  12/22/2022    JOINT REPLACEMENT  1/23/2024    LITHOTRIPSY      LUMBAR EPIDURAL INJECTION Bilateral 5/3/2024    Procedure: Lumbar subarachnoid drain placement;  Surgeon: Billy August MD;  Location:  MAIN OR;  Service: Neurosurgery    WV ARTHRP KNE CONDYLE&PLATU MEDIAL&LAT COMPARTMENTS Left 01/23/2024    Procedure: ARTHROPLASTY KNEE TOTAL W ROBOT - overnight, cemented;  Surgeon: Bassem Moscoso DO;  [facility];  Service: Orthopedics    WV ARTHRP KNE CONDYLE&PLATU MEDIAL&LAT COMPARTMENTS Right 7/30/2024    Procedure: ARTHROPLASTY KNEE TOTAL W ROBOT - RIGHT - CEMENTED - OVERNIGHT;  Surgeon: Bassem Moscoso DO;  [facility];  Service: Orthopedics    WV CYSTO/URETERO W/LITHOTRIPSY &INDWELL STENT INSRT Right 10/18/2020    Procedure: CYSTOSCOPY URETEROSCOPY WITH BASKET EXTRACTION, RETROGRADE PYELOGRAM AND INSERTION STENT URETERAL;  Surgeon: Kris Ac MD;  [facility];  Service: Urology    WV CYSTO/URETERO W/LITHOTRIPSY &INDWELL STENT INSRT Right 12/22/2022    Procedure: CYSTOSCOPY URETEROSCOPY WITH LITHOTRIPSY HOLMIUM LASER, RETROGRADE PYELOGRAM AND INSERTION STENT URETERAL;  Surgeon: Sabino Garcia MD;  [facility];  Service: Urology    WV CYSTOURETHROSCOPY W/URETERAL CATHETERIZATION Right 11/11/2020    Procedure: ESWL RIGHT;  Surgeon: Sabino Garcia MD;  [facility];  Service: Urology    WV CYSTOURETHROSCOPY W/URETERAL CATHETERIZATION Right 12/01/2022    Procedure: CYSTOSCOPY RETROGRADE PYELOGRAM WITH INSERTION STENT URETERAL;  Surgeon: Sabino Garcia MD;  [facility];  Service: Urology    RETROMASTOID CRANIOTOMY Bilateral 4/29/2024    Procedure: CRANIOTOMY MIDDLE FOSSA,RETROMASTOID APPROACH FOR ENT;  Surgeon: Frances Wasserman MD;  Location:  MAIN  OR;  Service: ENT    SEPTOPLASTY      in the 1990's-trimmed uvula    TEGMAN DEFECT REPAIR CSF LEAK Bilateral 4/29/2024    Procedure: Bilateral endoscopic middle fossa craniotomies for repair of tegmen defect and CSF leak with temporalis fascia muscle flap, with neuromonitoring (CN 7/8) and intraoperative lumbar drain placement;  Surgeon: Billy August MD;  [facility];  Service: Neurosurgery    TONSILLECTOMY  1956    age 6     Family History   Problem Relation Age of Onset    Hypertension Mother     Kidney disease Mother         renal failure-dialysis    Diabetes Mother     Hypertension Father     Stroke Father     Diabetes Father     Diabetes Sister     Kidney disease Sister         self dialysis at home     he reports that he has never smoked. He has never been exposed to tobacco smoke. He has never used smokeless tobacco. He reports that he does not currently use alcohol after a past usage of about 1.0 standard drink of alcohol per week. He reports that he does not use drugs.  Current Outpatient Medications   Medication Instructions    Alpha-D-Galactosidase (RA DIGESTIVE AID PO) Every morning    b complex vitamins tablet 1 tablet    CALCIUM  mg, Every morning    potassium citrate (UROCIT-K) 10 mEq 20 mEq, Oral, 3 times daily with meals    VITAMIN D PO Every morning    VITAMIN E  mg, Daily     Allergies   Allergen Reactions    Other Nasal Congestion     Seasonal allergies      Objective   /80 (Patient Position: Sitting, Cuff Size: Standard)   Pulse 95   Temp (!) 97.2 °F (36.2 °C) (Temporal)   SpO2 98%     Physical Exam  Vitals and nursing note reviewed.   Constitutional:       Appearance: Normal appearance. He is normal weight.   HENT:      Head: Normocephalic and atraumatic.   Eyes:      General: Lids are normal.      Extraocular Movements: Extraocular movements intact.      Pupils: Pupils are equal, round, and reactive to light.   Cardiovascular:      Rate and Rhythm: Normal rate and  regular rhythm.      Pulses: Normal pulses.      Heart sounds: Normal heart sounds.   Pulmonary:      Effort: Pulmonary effort is normal.      Breath sounds: Normal breath sounds.   Abdominal:      General: Abdomen is flat.      Palpations: Abdomen is soft.   Musculoskeletal:         General: Normal range of motion.      Cervical back: Normal range of motion.   Neurological:      General: No focal deficit present.      Mental Status: He is alert and oriented to person, place, and time. Mental status is at baseline.      GCS: GCS eye subscore is 4. GCS verbal subscore is 5. GCS motor subscore is 6.      Sensory: Sensation is intact.      Motor: Motor strength is normal.Motor function is intact.      Coordination: Coordination is intact.      Gait: Gait is intact.      Deep Tendon Reflexes: Reflexes are normal and symmetric.   Psychiatric:         Mood and Affect: Mood normal.         Speech: Speech normal.         Behavior: Behavior normal.       Neurological Exam  Mental Status  Alert. Oriented to person, place, time and situation. Oriented to person, place, and time. Speech is normal. Language is fluent with no aphasia. Attention and concentration are normal.    Cranial Nerves  CN II: Visual acuity is normal. Visual fields full to confrontation.  CN III, IV, VI: Extraocular movements intact bilaterally. Normal lids and orbits bilaterally. Pupils equal round and reactive to light bilaterally.  CN V: Facial sensation is normal.  CN VII: Full and symmetric facial movement.  CN IX, X: Palate elevates symmetrically. Normal gag reflex.  CN XI: Shoulder shrug strength is normal.  CN XII: Tongue midline without atrophy or fasciculations.  B/L hearing loss unchanged.    Motor  Normal muscle bulk throughout. Normal muscle tone. Strength is 5/5 throughout all four extremities.    Sensory  Normal sensation.Sensation is intact to light touch, pinprick, vibration and proprioception in all four extremities.    Reflexes  Deep  tendon reflexes are 2+ and symmetric in all four extremities.    Coordination    Finger-to-nose, rapid alternating movements and heel-to-shin normal bilaterally without dysmetria.    Gait   Normal gait.  No CSF leak    Radiology Results Review: I have reviewed the following images/report studies in PACS: No results found.     Administrative Statements   I have spent a total time of 20 minutes in caring for this patient on the day of the visit/encounter including Diagnostic results, Prognosis, Risks and benefits of tx options, Instructions for management, Patient and family education, Importance of tx compliance, Risk factor reductions, Impressions, Counseling / Coordination of care, Documenting in the medical record, Reviewing/placing orders in the medical record (including tests, medications, and/or procedures), Obtaining or reviewing history  , and Communicating with other healthcare professionals .

## 2025-05-14 ENCOUNTER — VBI (OUTPATIENT)
Dept: ADMINISTRATIVE | Facility: OTHER | Age: 75
End: 2025-05-14

## 2025-05-14 NOTE — TELEPHONE ENCOUNTER
Patient contacted to schedule Annual Wellness Visit.   Patient agreed to schedule appointment.      Thank you.  Ashley Middleton  [facility]

## 2025-05-18 DIAGNOSIS — N20.0 NEPHROLITHIASIS: ICD-10-CM

## 2025-05-19 RX ORDER — POTASSIUM CITRATE 1080 MG/1
TABLET, EXTENDED RELEASE ORAL
Qty: 540 TABLET | Refills: 1 | Status: SHIPPED | OUTPATIENT
Start: 2025-05-19

## 2025-05-29 ENCOUNTER — RESULTS FOLLOW-UP (OUTPATIENT)
Dept: FAMILY MEDICINE CLINIC | Facility: CLINIC | Age: 75
End: 2025-05-29

## 2025-05-29 ENCOUNTER — APPOINTMENT (OUTPATIENT)
Dept: LAB | Facility: HOSPITAL | Age: 75
End: 2025-05-29
Attending: INTERNAL MEDICINE
Payer: MEDICARE

## 2025-05-29 DIAGNOSIS — E78.2 MIXED HYPERLIPIDEMIA: ICD-10-CM

## 2025-05-29 DIAGNOSIS — I10 PRIMARY HYPERTENSION: ICD-10-CM

## 2025-05-29 DIAGNOSIS — N18.31 STAGE 3A CHRONIC KIDNEY DISEASE (HCC): ICD-10-CM

## 2025-05-29 DIAGNOSIS — R73.03 PREDIABETES: ICD-10-CM

## 2025-05-29 LAB
ALBUMIN SERPL BCG-MCNC: 4.5 G/DL (ref 3.5–5)
ALP SERPL-CCNC: 41 U/L (ref 34–104)
ALT SERPL W P-5'-P-CCNC: 23 U/L (ref 7–52)
ANION GAP SERPL CALCULATED.3IONS-SCNC: 13 MMOL/L (ref 4–13)
AST SERPL W P-5'-P-CCNC: 17 U/L (ref 13–39)
BILIRUB SERPL-MCNC: 0.61 MG/DL (ref 0.2–1)
BUN SERPL-MCNC: 17 MG/DL (ref 5–25)
CALCIUM SERPL-MCNC: 9.8 MG/DL (ref 8.4–10.2)
CHLORIDE SERPL-SCNC: 100 MMOL/L (ref 96–108)
CHOLEST SERPL-MCNC: 201 MG/DL (ref ?–200)
CO2 SERPL-SCNC: 24 MMOL/L (ref 21–32)
CREAT SERPL-MCNC: 1.36 MG/DL (ref 0.6–1.3)
CREAT UR-MCNC: 163.2 MG/DL
ERYTHROCYTE [DISTWIDTH] IN BLOOD BY AUTOMATED COUNT: 14.2 % (ref 11.6–15.1)
EST. AVERAGE GLUCOSE BLD GHB EST-MCNC: 123 MG/DL
GFR SERPL CREATININE-BSD FRML MDRD: 51 ML/MIN/1.73SQ M
GLUCOSE P FAST SERPL-MCNC: 105 MG/DL (ref 65–99)
HBA1C MFR BLD: 5.9 %
HCT VFR BLD AUTO: 52.4 % (ref 36.5–49.3)
HDLC SERPL-MCNC: 57 MG/DL
HGB BLD-MCNC: 16.9 G/DL (ref 12–17)
LDLC SERPL CALC-MCNC: 125 MG/DL (ref 0–100)
MAGNESIUM SERPL-MCNC: 2 MG/DL (ref 1.9–2.7)
MCH RBC QN AUTO: 30.3 PG (ref 26.8–34.3)
MCHC RBC AUTO-ENTMCNC: 32.3 G/DL (ref 31.4–37.4)
MCV RBC AUTO: 94 FL (ref 82–98)
MICROALBUMIN UR-MCNC: 329.9 MG/L
MICROALBUMIN/CREAT 24H UR: 202 MG/G CREATININE (ref 0–30)
PHOSPHATE SERPL-MCNC: 4 MG/DL (ref 2.3–4.1)
PLATELET # BLD AUTO: 254 THOUSANDS/UL (ref 149–390)
PMV BLD AUTO: 8.9 FL (ref 8.9–12.7)
POTASSIUM SERPL-SCNC: 4.6 MMOL/L (ref 3.5–5.3)
PROT SERPL-MCNC: 7.8 G/DL (ref 6.4–8.4)
RBC # BLD AUTO: 5.58 MILLION/UL (ref 3.88–5.62)
SODIUM SERPL-SCNC: 137 MMOL/L (ref 135–147)
TRIGL SERPL-MCNC: 95 MG/DL (ref ?–150)
URATE SERPL-MCNC: 7.4 MG/DL (ref 3.5–8.5)
WBC # BLD AUTO: 6.87 THOUSAND/UL (ref 4.31–10.16)

## 2025-05-29 PROCEDURE — 80061 LIPID PANEL: CPT

## 2025-05-29 PROCEDURE — 83036 HEMOGLOBIN GLYCOSYLATED A1C: CPT

## 2025-05-29 PROCEDURE — 84550 ASSAY OF BLOOD/URIC ACID: CPT

## 2025-05-29 PROCEDURE — 84100 ASSAY OF PHOSPHORUS: CPT

## 2025-05-29 PROCEDURE — 80053 COMPREHEN METABOLIC PANEL: CPT

## 2025-05-29 PROCEDURE — 82570 ASSAY OF URINE CREATININE: CPT

## 2025-05-29 PROCEDURE — 36415 COLL VENOUS BLD VENIPUNCTURE: CPT

## 2025-05-29 PROCEDURE — 85027 COMPLETE CBC AUTOMATED: CPT

## 2025-05-29 PROCEDURE — 82043 UR ALBUMIN QUANTITATIVE: CPT

## 2025-05-29 PROCEDURE — 83735 ASSAY OF MAGNESIUM: CPT

## 2025-06-04 ENCOUNTER — OFFICE VISIT (OUTPATIENT)
Dept: NEPHROLOGY | Facility: CLINIC | Age: 75
End: 2025-06-04
Payer: MEDICARE

## 2025-06-04 VITALS
DIASTOLIC BLOOD PRESSURE: 92 MMHG | SYSTOLIC BLOOD PRESSURE: 138 MMHG | HEIGHT: 67 IN | OXYGEN SATURATION: 99 % | HEART RATE: 95 BPM | BODY MASS INDEX: 35.47 KG/M2 | WEIGHT: 226 LBS

## 2025-06-04 DIAGNOSIS — E83.9 CHRONIC KIDNEY DISEASE-MINERAL AND BONE DISORDER: ICD-10-CM

## 2025-06-04 DIAGNOSIS — M89.9 CHRONIC KIDNEY DISEASE-MINERAL AND BONE DISORDER: ICD-10-CM

## 2025-06-04 DIAGNOSIS — N18.30 BENIGN HYPERTENSION WITH CKD (CHRONIC KIDNEY DISEASE) STAGE III (HCC): Primary | Chronic | ICD-10-CM

## 2025-06-04 DIAGNOSIS — N18.31 STAGE 3A CHRONIC KIDNEY DISEASE (HCC): ICD-10-CM

## 2025-06-04 DIAGNOSIS — N18.9 CHRONIC KIDNEY DISEASE-MINERAL AND BONE DISORDER: ICD-10-CM

## 2025-06-04 DIAGNOSIS — I12.9 BENIGN HYPERTENSION WITH CKD (CHRONIC KIDNEY DISEASE) STAGE III (HCC): Primary | Chronic | ICD-10-CM

## 2025-06-04 DIAGNOSIS — N20.0 NEPHROLITHIASIS: ICD-10-CM

## 2025-06-04 PROCEDURE — 99214 OFFICE O/P EST MOD 30 MIN: CPT | Performed by: PHYSICIAN ASSISTANT

## 2025-06-04 NOTE — PATIENT INSTRUCTIONS
Stage 3a chronic kidney disease (HCC)  Lab Results   Component Value Date    EGFR 51 05/29/2025    EGFR 51 11/18/2024    EGFR 54 07/31/2024    CREATININE 1.36 (H) 05/29/2025    CREATININE 1.37 (H) 11/18/2024    CREATININE 1.30 07/31/2024   Baseline creatinine 1.2-1.4.  Stable.  Suspected etiology is due to DAYANNA and left renal atrophy.    Orders:    Basic metabolic panel; Future    Magnesium; Future    Phosphorus; Future    Nephrolithiasis  Last 24 hour urine is from March 2024.  Repeat prior to next office visit in December.    Stone analysis showed uric acid stones.  Uric acid level is 7.4.  Use allopurinol as needed.    He takes potassium citrate 20meq TID.  Drink 100oz of fluid/water per day.    Workup:    - 24 hour urine in Nov 2024 - volume 1.8 liters, citrate 540, pH 6.0, uric acid 657 mg.    - 24 hour urine in March 2025 - volume 2.5L, citrate 743, pH 6.5, uric acid 625 mg       Chronic kidney disease-mineral and bone disorder  Check studies prior to next visit.        Follow up with Dr. Cottrell in 6 months.  Please call the office with any questions or concerns.  Please get blood work completed prior to your next appointment.

## 2025-06-04 NOTE — ASSESSMENT & PLAN NOTE
BP acceptable.  He is taking a supplement to lower blood pressure but he is unsure what it is.  He does not want to start a medication at this time.  Please send a week of BP readings into the office after your vacation.  Avoid salt.

## 2025-06-04 NOTE — ASSESSMENT & PLAN NOTE
Last 24 hour urine is from March 2024.  Repeat prior to next office visit in December.    Stone analysis showed uric acid stones.  Uric acid level is 7.4.  Use allopurinol as needed.    He takes potassium citrate 20meq TID.  Drink 100oz of fluid/water per day.    Workup:    - 24 hour urine in Nov 2024 - volume 1.8 liters, citrate 540, pH 6.0, uric acid 657 mg.    - 24 hour urine in March 2025 - volume 2.5L, citrate 743, pH 6.5, uric acid 625 mg  He follows up with Dr. Fountain also.

## 2025-06-04 NOTE — PROGRESS NOTES
Name: Swapnil Grover      : 1950      MRN: [id number]  Encounter Provider: Diana Cavazos PA-C  Encounter Date: 2025   Encounter department: [facility] [place]  :  Assessment & Plan  Benign hypertension with CKD (chronic kidney disease) stage III (HCC)  BP acceptable.  He is taking a supplement to lower blood pressure but he is unsure what it is.  He does not want to start a medication at this time.  Please send a week of BP readings into the office after your vacation.  Avoid salt.       Stage 3a chronic kidney disease (HCC)  Lab Results   Component Value Date    EGFR 51 2025    EGFR 51 2024    EGFR 54 2024    CREATININE 1.36 (H) 2025    CREATININE 1.37 (H) 2024    CREATININE 1.30 2024   Baseline creatinine 1.2-1.4.  Stable.  Suspected etiology is due to DAYANNA and left renal atrophy.    Orders:    Basic metabolic panel; Future    Magnesium; Future    Phosphorus; Future    Nephrolithiasis  Last 24 hour urine is from 2024.  Repeat prior to next office visit in December.    Stone analysis showed uric acid stones.  Uric acid level is 7.4.  Use allopurinol as needed.    He takes potassium citrate 20meq TID.  Drink 100oz of fluid/water per day.    Workup:    - 24 hour urine in 2024 - volume 1.8 liters, citrate 540, pH 6.0, uric acid 657 mg.    - 24 hour urine in 2025 - volume 2.5L, citrate 743, pH 6.5, uric acid 625 mg  He follows up with Dr. Fountain also.        Chronic kidney disease-mineral and bone disorder  Check studies prior to next visit.        Follow up with Dr. Cottrell in 6 months.  Please call the office with any questions or concerns.  Please get blood work completed prior to your next appointment.    Reason for Visit: Follow-up (CKD.3/)    HPI: Swapnil Grover is a 74 y.o. male who is here for follow up of CKD and nephrolithiasis.  Patient was last seen in 2024.  He is going on a 3 week trip to [place],  [place] and [place] on Sunday.  He will take his BPs after the trip and send them in to us.  He is very against traditional medications and is not agreeable to starting a medicine.  He started a supplement for high BP a week ago but so far no change in BP and he is unsure what it is called or what is in it.  I asked him to send this information to us through [employer].  He has no complaints otherwise.    History obtained from: patient    ROS: A complete review of systems was performed and was negative unless otherwise noted in the history of present illness.    Allergies:   Other    Medications:   Current Medications[1]    Past Medical History[2]  Past Surgical History[3]  Family History[4]   reports that he has never smoked. He has never been exposed to tobacco smoke. He has never used smokeless tobacco. He reports that he does not currently use alcohol after a past usage of about 1.0 standard drink of alcohol per week. He reports that he does not use drugs.    Physical Exam:   Vitals:    06/04/25 1423   BP: 138/92   BP Location: Left arm   Patient Position: Sitting   Cuff Size: Large   Pulse: 95   SpO2: 99%   Weight: 103 kg (226 lb)   Height: 5' 7\" (1.702 m)     Body mass index is 35.4 kg/m².    General: NAD  Neuro: AAO  Skin: no rash  Eyes: anicteric  ENMT: mm moist  Neck: no masses  Respiratory: ctab  Cardiovascular: rrr  Extremities: no edema  Gastrointestinal: soft nt nd     Procedure:  No results found for this or any previous visit.    Lab Results   Component Value Date    CALCIUM 9.8 05/29/2025    K 4.6 05/29/2025    CO2 24 05/29/2025     05/29/2025    BUN 17 05/29/2025    CREATININE 1.36 (H) 05/29/2025       Results from last 7 days   Lab Units 05/29/25  0806   WBC Thousand/uL 6.87   HEMOGLOBIN g/dL 16.9   HEMATOCRIT % 52.4*   PLATELETS Thousands/uL 254   POTASSIUM mmol/L 4.6   CHLORIDE mmol/L 100   CO2 mmol/L 24   BUN mg/dL 17   CREATININE mg/dL 1.36*   CALCIUM mg/dL 9.8   MAGNESIUM mg/dL 2.0 "   PHOSPHORUS mg/dL 4.0   I have personally reviewed the blood work as stated above and in my note.         [1]   Current Outpatient Medications:     b complex vitamins tablet, Take 1 tablet by mouth 2 pills twice daily, Disp: , Rfl:     CALCIUM PO, Take 520 mg by mouth every morning, Disp: , Rfl:     potassium citrate (UROCIT-K) 10 mEq, TAKE TWO TABLETS BY MOUTH THREE TIMES A DAY WITH MEALS, Disp: 540 tablet, Rfl: 1    VITAMIN D PO, Take by mouth every morning With calcium, Disp: , Rfl:     VITAMIN E PO, Take 268 mg by mouth in the morning., Disp: , Rfl:   [2]   Past Medical History:  Diagnosis Date    Anesthesia complication 2022    difficulty waking up-pt woke up with a CPAP    Arthritis     BPH (benign prostatic hyperplasia)     Breathing difficulty 12/01/2022    CPAP given per pt-s/p surgery    Cancer (HCC) 2015    basal cell of the skull     Chronic kidney disease     Colon polyp     Decreased hearing of both ears 04/29/2024    had surgery for correction    Heart failure (HCC)     12/2/22-pt had ECHO-per pt R. sided block    History of subdural hematoma     HL (hearing loss) 2022    Hypertension     Kidney stone     right stone    Migraine 1964    Renal disorder     Sleep apnea     mild- no CPAP-had nasal septoplasty    Tinnitus ?    Tonsillitis 1955?    Wears glasses     reading   [3]   Past Surgical History:  Procedure Laterality Date    BASAL CELL CARCINOMA EXCISION  2015    skull    BRAIN SURGERY      in the 1990's-subdural hematoma    CATARACT EXTRACTION Left     COLONOSCOPY      x2    CYSTOSCOPY W/ LASER LITHOTRIPSY Right 11/27/2020    Procedure: CYSTOSCOPY, FLEXIBLE URETEROSCOPY, LASER, AND STENT EXCHANGE,STONE BASKET, RIGHT RETROGRADE;  Surgeon: Sabino Garcia MD;  Location: [facility];  Service: Urology    FL RETROGRADE PYELOGRAM  10/18/2020    FL RETROGRADE PYELOGRAM  11/27/2020    FL RETROGRADE PYELOGRAM  12/01/2022    FL RETROGRADE PYELOGRAM  12/22/2022    JOINT REPLACEMENT  1/23/2024     LITHOTRIPSY      LUMBAR EPIDURAL INJECTION Bilateral 5/3/2024    Procedure: Lumbar subarachnoid drain placement;  Surgeon: Billy August MD;  Location:  MAIN OR;  Service: Neurosurgery    WV ARTHRP KNE CONDYLE&PLATU MEDIAL&LAT COMPARTMENTS Left 01/23/2024    Procedure: ARTHROPLASTY KNEE TOTAL W ROBOT - overnight, cemented;  Surgeon: Bassem Moscoso DO;  [facility];  Service: Orthopedics    WV ARTHRP KNE CONDYLE&PLATU MEDIAL&LAT COMPARTMENTS Right 7/30/2024    Procedure: ARTHROPLASTY KNEE TOTAL W ROBOT - RIGHT - CEMENTED - OVERNIGHT;  Surgeon: Bassem Moscoso DO;  [facility];  Service: Orthopedics    WV CYSTO/URETERO W/LITHOTRIPSY &INDWELL STENT INSRT Right 10/18/2020    Procedure: CYSTOSCOPY URETEROSCOPY WITH BASKET EXTRACTION, RETROGRADE PYELOGRAM AND INSERTION STENT URETERAL;  Surgeon: Kris Ac MD;  [facility];  Service: Urology    WV CYSTO/URETERO W/LITHOTRIPSY &INDWELL STENT INSRT Right 12/22/2022    Procedure: CYSTOSCOPY URETEROSCOPY WITH LITHOTRIPSY HOLMIUM LASER, RETROGRADE PYELOGRAM AND INSERTION STENT URETERAL;  Surgeon: Sabino Garcia MD;  [facility];  Service: Urology    WV CYSTOURETHROSCOPY W/URETERAL CATHETERIZATION Right 11/11/2020    Procedure: ESWL RIGHT;  Surgeon: Sabino Garcia MD;  [facility];  Service: Urology    WV CYSTOURETHROSCOPY W/URETERAL CATHETERIZATION Right 12/01/2022    Procedure: CYSTOSCOPY RETROGRADE PYELOGRAM WITH INSERTION STENT URETERAL;  Surgeon: Sabino Garcia MD;  [facility];  Service: Urology    RETROMASTOID CRANIOTOMY Bilateral 4/29/2024    Procedure: CRANIOTOMY MIDDLE FOSSA,RETROMASTOID APPROACH FOR ENT;  Surgeon: Frances Wasserman MD;  Location:  MAIN OR;  Service: ENT    SEPTOPLASTY      in the 1990's-trimmed uvula    TEGMAN DEFECT REPAIR CSF LEAK Bilateral 4/29/2024    Procedure: Bilateral endoscopic middle fossa craniotomies for repair of tegmen defect and CSF leak with temporalis fascia muscle  flap, with neuromonitoring (CN 7/8) and intraoperative lumbar drain placement;  Surgeon: Billy August MD;  [facility];  Service: Neurosurgery    TONSILLECTOMY  1956    age 6   [4]   Family History  Problem Relation Name Age of Onset    Hypertension Mother Nga Grover     Kidney disease Mother Nga Grover         renal failure-dialysis    Diabetes Mother Nga Grover     Hypertension Father Swapnil Grover     Stroke Father Swapnil Grover     Diabetes Father Swapnil [place]     Diabetes Sister Alva Grover     Kidney disease Sister          self dialysis at home

## 2025-06-04 NOTE — ASSESSMENT & PLAN NOTE
Lab Results   Component Value Date    EGFR 51 05/29/2025    EGFR 51 11/18/2024    EGFR 54 07/31/2024    CREATININE 1.36 (H) 05/29/2025    CREATININE 1.37 (H) 11/18/2024    CREATININE 1.30 07/31/2024   Baseline creatinine 1.2-1.4.  Stable.  Suspected etiology is due to DAYANNA and left renal atrophy.    Orders:    Basic metabolic panel; Future    Magnesium; Future    Phosphorus; Future

## 2025-07-15 ENCOUNTER — OFFICE VISIT (OUTPATIENT)
Dept: FAMILY MEDICINE CLINIC | Facility: CLINIC | Age: 75
End: 2025-07-15
Payer: MEDICARE

## 2025-07-15 VITALS
TEMPERATURE: 98.1 F | DIASTOLIC BLOOD PRESSURE: 86 MMHG | RESPIRATION RATE: 21 BRPM | HEIGHT: 67 IN | HEART RATE: 87 BPM | BODY MASS INDEX: 35.16 KG/M2 | OXYGEN SATURATION: 97 % | WEIGHT: 224 LBS | SYSTOLIC BLOOD PRESSURE: 136 MMHG

## 2025-07-15 DIAGNOSIS — R73.03 PREDIABETES: ICD-10-CM

## 2025-07-15 DIAGNOSIS — E78.2 MIXED HYPERLIPIDEMIA: ICD-10-CM

## 2025-07-15 DIAGNOSIS — N40.0 BENIGN PROSTATIC HYPERPLASIA WITHOUT LOWER URINARY TRACT SYMPTOMS: ICD-10-CM

## 2025-07-15 DIAGNOSIS — I12.9 BENIGN HYPERTENSION WITH CKD (CHRONIC KIDNEY DISEASE) STAGE III (HCC): Chronic | ICD-10-CM

## 2025-07-15 DIAGNOSIS — N18.30 BENIGN HYPERTENSION WITH CKD (CHRONIC KIDNEY DISEASE) STAGE III (HCC): Chronic | ICD-10-CM

## 2025-07-15 DIAGNOSIS — E79.0 HYPERURICEMIA: ICD-10-CM

## 2025-07-15 DIAGNOSIS — Z00.00 MEDICARE ANNUAL WELLNESS VISIT, SUBSEQUENT: Primary | ICD-10-CM

## 2025-07-15 DIAGNOSIS — Z12.11 SCREEN FOR COLON CANCER: ICD-10-CM

## 2025-07-15 PROCEDURE — G0439 PPPS, SUBSEQ VISIT: HCPCS | Performed by: FAMILY MEDICINE

## 2025-07-24 ENCOUNTER — APPOINTMENT (OUTPATIENT)
Dept: RADIOLOGY | Facility: CLINIC | Age: 75
End: 2025-07-24
Attending: ORTHOPAEDIC SURGERY
Payer: MEDICARE

## 2025-07-24 VITALS — HEIGHT: 67 IN | WEIGHT: 224 LBS | BODY MASS INDEX: 35.16 KG/M2

## 2025-07-24 DIAGNOSIS — Z47.1 AFTERCARE FOLLOWING RIGHT KNEE JOINT REPLACEMENT SURGERY: ICD-10-CM

## 2025-07-24 DIAGNOSIS — Z96.652 STATUS POST LEFT KNEE REPLACEMENT: ICD-10-CM

## 2025-07-24 DIAGNOSIS — Z96.651 STATUS POST RIGHT KNEE REPLACEMENT: Primary | ICD-10-CM

## 2025-07-24 DIAGNOSIS — Z96.651 AFTERCARE FOLLOWING RIGHT KNEE JOINT REPLACEMENT SURGERY: ICD-10-CM

## 2025-07-24 DIAGNOSIS — Z96.651 STATUS POST RIGHT KNEE REPLACEMENT: ICD-10-CM

## 2025-07-24 PROCEDURE — 73562 X-RAY EXAM OF KNEE 3: CPT

## 2025-07-24 PROCEDURE — 99214 OFFICE O/P EST MOD 30 MIN: CPT | Performed by: ORTHOPAEDIC SURGERY

## 2025-07-24 NOTE — PROGRESS NOTES
Name: Swapnil Grover      : 1950      MRN: [id number]  Encounter Provider: Bassem Moscoso DO  Encounter Date: 2025   Encounter department: [facility]  :  Assessment & Plan  Status post right knee replacement  Aftercare following right knee joint replacement surgery      Orders:    XR knee 3 vw right non injury; Future    Status post left knee replacement           Swapnil Grover is a pleasant 74 y.o. male is doing very well now 1 year status post right robotically assisted cemented total knee arthroplasty and 16 months left robotically assisted cemented total knee arthroplasty.  X-rays noted stable alignment of the right knee today.  He may continue with activities of daily living as tolerated.  I did advise him that he must utilize predental antibiotics prior to any procedure for his lifetime.  He is to allow 48-hour leave time prior to any procedure to be prescribed the antibiotics.  He verbalized understanding had no further questions.  I will see him back on an as-needed basis.    No follow-ups on file.    I have personally reviewed pertinent imaging.  X-rays of the right knee obtained today 2025 demonstrates a well-seated and well aligned cemented total knee arthroplasty without evidence of implant failure or lucency.  No acute fractures observed.  No obvious lytic or blastic lesions.    History: Swapnil Grover is a 74 y.o. male presents for follow-up evaluation of his right knee.  He is approximately 1 year status post right robotically assisted cemented total knee arthroplasty.  Date of surgery 2024 and approximately 16 months status post left robotically assisted cemented total knee arthroplasty.  Surgery 2024 he states he is doing very well and is happy with his progress at this point.  He denies any pain.  He has been able to travel without any issue.  He states that the scars are beginning to fade.  He has no new  "complaints.      Estimated body mass index is 35.08 kg/m² as calculated from the following:    Height as of this encounter: 5' 7\" (1.702 m).    Weight as of this encounter: 102 kg (224 lb).    Lab Results   Component Value Date    HGBA1C 5.9 (H) 05/29/2025       Social History     Occupational History    Not on file   Tobacco Use    Smoking status: Never     Passive exposure: Never    Smokeless tobacco: Never   Vaping Use    Vaping status: Never Used   Substance and Sexual Activity    Alcohol use: Not Currently     Alcohol/week: 1.0 standard drink of alcohol     Comment: 1 drink a week    Drug use: Never    Sexual activity: Not Currently       Objective:  Right Knee Exam     Tenderness   The patient is experiencing no tenderness.     Range of Motion   Extension:  0   Flexion:  120     Tests   Varus: negative Valgus: negative    Other   Erythema: absent  Scars: present (Well-healed vertical midline scar)  Sensation: normal  Pulse: present  Swelling: none  Effusion: no effusion present      Left Knee Exam     Tenderness   The patient is experiencing no tenderness.     Range of Motion   Extension:  0   Flexion:  120     Tests   Varus: negative Valgus: negative    Other   Erythema: absent  Scars: present (Well-healed vertical midline scar)  Sensation: normal  Pulse: present  Swelling: none  Effusion: no effusion present          Observations   Left Knee   Negative for effusion.     Right Knee   Negative for effusion.       There were no vitals filed for this visit.    Subjective:  Past Medical History[1]    Past Surgical History[2]    Family History[3]    Current Medications[4]    Allergies[5]    Review of Systems   Constitutional:  Positive for activity change. Negative for chills, fever and unexpected weight change.   HENT:  Negative for hearing loss, nosebleeds and sore throat.    Eyes:  Negative for pain, redness and visual disturbance.   Respiratory:  Negative for cough, shortness of breath and wheezing.  "   Cardiovascular:  Negative for chest pain, palpitations and leg swelling.   Gastrointestinal:  Negative for abdominal pain, nausea and vomiting.   Endocrine: Negative for polydipsia and polyuria.   Genitourinary:  Negative for dysuria and hematuria.   Musculoskeletal:  See HPI  Skin:  Negative for rash and wound.   Neurological:  Negative for dizziness, numbness and headaches.   Psychiatric/Behavioral:  Negative for decreased concentration and suicidal ideas. The patient is not nervous/anxious.      Physical Exam  Vitals and nursing note reviewed.   Constitutional:       Appearance: Normal appearance. She is well-developed.   HENT:      Head: Normocephalic and atraumatic.      Right Ear: External ear normal.      Left Ear: External ear normal.   Eyes:      General: No scleral icterus.     Extraocular Movements: Extraocular movements intact.      Conjunctiva/sclera: Conjunctivae normal.   Cardiovascular:      Rate and Rhythm: Normal rate.   Pulmonary:      Effort: Pulmonary effort is normal. No respiratory distress.   Musculoskeletal:      Cervical back: Normal range of motion and neck supple.      Comments: See Ortho exam   Skin:     General: Skin is warm and dry.   Neurological:      General: No focal deficit present.      Mental Status: She is alert and oriented to person, place, and time.   Psychiatric:         Behavior: Behavior normal.     Scribe Attestation      I,:  Chele Salmeron am acting as a scribe while in the presence of the attending physician.:       I,:  Bassem Moscoso, DO personally performed the services described in this documentation    as scribed in my presence.:           This document was created using speech voice recognition software.   Grammatical errors, random word insertions, pronoun errors, and incomplete sentences are an occasional consequence of this system due to software limitations, ambient noise, and hardware issues.   Any formal questions or concerns about content, text, or  information contained within the body of this dictation should be directly addressed to the provider for clarification.          [1]   Past Medical History:  Diagnosis Date    Allergic 1979    Anesthesia complication 2022    difficulty waking up-pt woke up with a CPAP    Arthritis 2006    BPH (benign prostatic hyperplasia)     Breathing difficulty 12/01/2022    CPAP given per pt-s/p surgery    Cancer (HCC) 2015    basal cell of the skull     Chronic kidney disease     Colon polyp     Decreased hearing of both ears 04/29/2024    had surgery for correction    Diverticulitis of colon 2008    Heart failure (HCC)     12/2/22-pt had ECHO-per pt R. sided block    History of subdural hematoma     HL (hearing loss) 2022    Hypertension     Kidney stone 2003    right stone    Migraine 1964    Renal disorder     Sleep apnea 1990    mild- no CPAP-had nasal septoplasty    Tinnitus ?    Tonsillitis 1955?    Visual impairment 2018    Wears glasses     reading   [2]   Past Surgical History:  Procedure Laterality Date    BASAL CELL CARCINOMA EXCISION  2015    skull    BRAIN SURGERY  Nov 1998    in the 1990's-subdural hematoma    CATARACT EXTRACTION Left     COLONOSCOPY      x2    CYSTOSCOPY W/ LASER LITHOTRIPSY Right 11/27/2020    Procedure: CYSTOSCOPY, FLEXIBLE URETEROSCOPY, LASER, AND STENT EXCHANGE,STONE BASKET, RIGHT RETROGRADE;  Surgeon: Sabino Garcia MD;  Location: [facility];  Service: Urology    EYE SURGERY  2018    FL RETROGRADE PYELOGRAM  10/18/2020    FL RETROGRADE PYELOGRAM  11/27/2020    FL RETROGRADE PYELOGRAM  12/01/2022    FL RETROGRADE PYELOGRAM  12/22/2022    JOINT REPLACEMENT  1/23/2024    LITHOTRIPSY      LITHOTRIPSY      LUMBAR EPIDURAL INJECTION Bilateral 05/03/2024    Procedure: Lumbar subarachnoid drain placement;  Surgeon: Billy August MD;  Location:  MAIN OR;  Service: Neurosurgery    NH ARTHRP KNE CONDYLE&PLATU MEDIAL&LAT COMPARTMENTS Left 01/23/2024    Procedure: ARTHROPLASTY KNEE TOTAL W ROBOT -  overnight, cemented;  Surgeon: Bassem Moscoso DO;  Location: [facility];  Service: Orthopedics    KS ARTHRP KNE CONDYLE&PLATU MEDIAL&LAT COMPARTMENTS Right 07/30/2024    Procedure: ARTHROPLASTY KNEE TOTAL W ROBOT - RIGHT - CEMENTED - OVERNIGHT;  Surgeon: Bassem Moscoso DO;  Location: [facility];  Service: Orthopedics    KS CYSTO/URETERO W/LITHOTRIPSY &INDWELL STENT INSRT Right 10/18/2020    Procedure: CYSTOSCOPY URETEROSCOPY WITH BASKET EXTRACTION, RETROGRADE PYELOGRAM AND INSERTION STENT URETERAL;  Surgeon: Kris Ac MD;  Location: [facility];  Service: Urology    KS CYSTO/URETERO W/LITHOTRIPSY &INDWELL STENT INSRT Right 12/22/2022    Procedure: CYSTOSCOPY URETEROSCOPY WITH LITHOTRIPSY HOLMIUM LASER, RETROGRADE PYELOGRAM AND INSERTION STENT URETERAL;  Surgeon: Sabino Garcia MD;  Location: [facility];  Service: Urology    KS CYSTOURETHROSCOPY W/URETERAL CATHETERIZATION Right 11/11/2020    Procedure: ESWL RIGHT;  Surgeon: Sabino Garcia MD;  Location: [facility];  Service: Urology    KS CYSTOURETHROSCOPY W/URETERAL CATHETERIZATION Right 12/01/2022    Procedure: CYSTOSCOPY RETROGRADE PYELOGRAM WITH INSERTION STENT URETERAL;  Surgeon: Sabino Garcia MD;  Location: [facility];  Service: Urology    RETROMASTOID CRANIOTOMY Bilateral 04/29/2024    Procedure: CRANIOTOMY MIDDLE FOSSA,RETROMASTOID APPROACH FOR ENT;  Surgeon: Frances Wasserman MD;  Location:  MAIN OR;  Service: ENT    SEPTOPLASTY      in the 1990's-trimmed uvula    TEGMAN DEFECT REPAIR CSF LEAK Bilateral 04/29/2024    Procedure: Bilateral endoscopic middle fossa craniotomies for repair of tegmen defect and CSF leak with temporalis fascia muscle flap, with neuromonitoring (CN 7/8) and intraoperative lumbar drain placement;  Surgeon: Billy August MD;  Location:  MAIN OR;  Service: Neurosurgery    TONSILLECTOMY  1954    age 6   [3]   Family History  Problem Relation Name Age of Onset    Hypertension Mother Nga Grover     Kidney disease  Mother Nga Grover         renal failure-dialysis    Diabetes Mother Nga Grover     Hypertension Father Swapnil A Augusto Grover     Stroke Father Swapnil Grover     Diabetes Father Swapnil Grover     Diabetes Sister Alva Grover     Kidney disease Sister Alva CRUZ         self dialysis at home   [4]   Current Outpatient Medications:     b complex vitamins tablet, Take 1 tablet by mouth 2 pills twice daily, Disp: , Rfl:     CALCIUM PO, Take 520 mg by mouth every morning, Disp: , Rfl:     potassium citrate (UROCIT-K) 10 mEq, TAKE TWO TABLETS BY MOUTH THREE TIMES A DAY WITH MEALS, Disp: 540 tablet, Rfl: 1    VITAMIN D PO, Take by mouth every morning With calcium, Disp: , Rfl:     VITAMIN E PO, Take 268 mg by mouth in the morning., Disp: , Rfl:   [5]   Allergies  Allergen Reactions    Other Nasal Congestion     Seasonal allergies

## (undated) DEVICE — GLOVE SRG BIOGEL 7.5

## (undated) DEVICE — CYSTO TUBING TUR Y IRRIGATION

## (undated) DEVICE — GLOVE INDICATOR PI UNDERGLOVE SZ 8 BLUE

## (undated) DEVICE — POUCH UR CATCHER STERILE

## (undated) DEVICE — GLOVE SRG BIOGEL ORTHOPEDIC 8.5

## (undated) DEVICE — CUFF TOURNIQUET 34 X 4 IN QUICK CONNECT DISP 1BLA

## (undated) DEVICE — MAYO STAND COVER: Brand: CONVERTORS

## (undated) DEVICE — Device

## (undated) DEVICE — LUBRICANT SURGILUBE TUBE 4 OZ  FLIP TOP

## (undated) DEVICE — SPONGE STICK WITH PVP-I: Brand: KENDALL

## (undated) DEVICE — GLOVE SRG BIOGEL 6.5

## (undated) DEVICE — NEURO PATTIES 1/2 X 1/2

## (undated) DEVICE — VELYS BLADE SAW OSC 85 X 19 X 2MM

## (undated) DEVICE — FABRIC REINFORCED, SURGICAL GOWN, XL: Brand: CONVERTORS

## (undated) DEVICE — SUT VICRYL 0 CP-1 27 IN J267H

## (undated) DEVICE — SKIN MARKER DUAL TIP WITH RULER CAP, FLEXIBLE RULER AND LABELS: Brand: DEVON

## (undated) DEVICE — VELYS ROBOTIC-ASSISTED SOLUTION ARRAY SET - KNEE: Brand: VELYS

## (undated) DEVICE — DUAL CUT SAGITTAL BLADE

## (undated) DEVICE — SUT STRATFIX SPIRAL PDS PLUS 1 CT-1/CT-1 12IN SXPP2B403

## (undated) DEVICE — HANDPIECE SET WITH HIGH FLOW TIP AND SUCTION TUBE: Brand: INTERPULSE

## (undated) DEVICE — SUT STRATAFIX SPIRAL 3-0 PGA/PCL 30 X 30 CM SXMD2B410

## (undated) DEVICE — VELYS ROBOT-ASSISTED SOLUTION ARRAY DRILL PIN 125 MM X 4 MM: Brand: VELYS

## (undated) DEVICE — TIBURON EXTREMITY SHEET: Brand: CONVERTORS

## (undated) DEVICE — GLOVE INDICATOR PI UNDERGLOVE SZ 7.5 BLUE

## (undated) DEVICE — Device: Brand: IQ SYSTEM

## (undated) DEVICE — ORTHOPEDIC PACK: Brand: CARDINAL HEALTH

## (undated) DEVICE — DRAPE SHEET X-LG

## (undated) DEVICE — ADHESIVE SKIN HIGH VISCOSITY EXOFIN 1ML

## (undated) DEVICE — BOWL: 16OZ PEELPOUCH 75/CS 16/PLT: Brand: MEDEGEN MEDICAL PRODUCTS, LLC

## (undated) DEVICE — GLOVE SRG BIOGEL 8

## (undated) DEVICE — GAUZE SPONGES,16 PLY: Brand: CURITY

## (undated) DEVICE — COBAN 4 IN STERILE

## (undated) DEVICE — CAPIT KNEE ATTUNE FB W/DOM

## (undated) DEVICE — DRESSING MEPILEX AG BORDER 4 X 12 IN

## (undated) DEVICE — SHEATH URETERAL ACCESS 10/12FR 35CM PROXIS

## (undated) DEVICE — HEMOSTATIC MATRIX SURGIFLO 8ML W/THROMBIN

## (undated) DEVICE — TRAY FOLEY 16FR URIMETER SURESTEP

## (undated) DEVICE — BASIC DOUBLE BASIN 2-LF: Brand: MEDLINE INDUSTRIES, INC.

## (undated) DEVICE — PACK CRANIOTOMY PBDS RF

## (undated) DEVICE — DURASEAL EXTENDED APPLICATOR TIP 15CM

## (undated) DEVICE — SYRINGE 10ML LL

## (undated) DEVICE — INSTRUMENT POUCH: Brand: CONVERTORS

## (undated) DEVICE — ANTIBACTERIAL VIOLET BRAIDED (POLYGLACTIN 910), SYNTHETIC ABSORBABLE SUTURE: Brand: COATED VICRYL

## (undated) DEVICE — NGAGE, NITINOL STONE EXTRACTOR: Brand: NGAGE

## (undated) DEVICE — INTENDED FOR TISSUE SEPARATION, AND OTHER PROCEDURES THAT REQUIRE A SHARP SURGICAL BLADE TO PUNCTURE OR CUT.: Brand: BARD-PARKER ® CARBON RIB-BACK BLADES

## (undated) DEVICE — CYSTOSCOPY PACK: Brand: CONVERTORS

## (undated) DEVICE — DRAPE SHEET THREE QUARTER

## (undated) DEVICE — NEEDLE 23G X 1 1/2 SAFETY-GLIDE THIN WALL

## (undated) DEVICE — STERILE DOUBLE BASIN SET PACK: Brand: CARDINAL HEALTH

## (undated) DEVICE — NEPTUNE E-SEP SMOKE EVACUATION PENCIL, COATED, 70MM BLADE, PUSH BUTTON SWITCH: Brand: NEPTUNE E-SEP

## (undated) DEVICE — URETERAL CATH 5 FR

## (undated) DEVICE — PERFORATOR CRANIAL 14MM

## (undated) DEVICE — SUPPLY FEE STD

## (undated) DEVICE — ANTIBACTERIAL UNDYED BRAIDED (POLYGLACTIN 910), SYNTHETIC ABSORBABLE SUTURE: Brand: COATED VICRYL

## (undated) DEVICE — LASER FIBER HOLMIUM 272MICRON

## (undated) DEVICE — GAUZE,SPONGE,2"X2",8PLY,STERILE,LF,2'S: Brand: MEDLINE

## (undated) DEVICE — 3M™ IOBAN™ 2 ANTIMICROBIAL INCISE DRAPE 6650EZ: Brand: IOBAN™ 2

## (undated) DEVICE — PENCIL PRESHARPENED: Brand: BIOSEAL INC.

## (undated) DEVICE — SHEATH URETHERAL ACCESS FLEXOR 12FR 35CM

## (undated) DEVICE — COTTON TIP APPLICTOR 2 PK

## (undated) DEVICE — REPEL CUT REST SURGICAL GLV LNRS LG: Brand: REPEL

## (undated) DEVICE — SEAL ADJUST BIOPSY PORT Y ADAPTOR

## (undated) DEVICE — SKN PRP WNG SPNGE PVP SCRB STR: Brand: MEDLINE INDUSTRIES, INC.

## (undated) DEVICE — VEST SURGEON DISPOSABLE

## (undated) DEVICE — TUBING SUCTION 5MM X 12 FT

## (undated) DEVICE — 3M™ IOBAN™ 2 ANTIMICROBIAL INCISE DRAPE 6651EZ: Brand: IOBAN™ 2

## (undated) DEVICE — ASTOUND SURGICAL GOWN, XXX LARGE, X-LONG: Brand: CONVERTORS

## (undated) DEVICE — GUIDEWIRE STRGHT TIP 0.038 IN SOLO PLUS

## (undated) DEVICE — IV CATH INTROCAN 18G X 1 1/4 SAFETY

## (undated) DEVICE — HOOD: Brand: FLYTE, SURGICOOL

## (undated) DEVICE — ELECTRODE BLADE MOD E-Z CLEAN 2.5IN 6.4CM -0012M

## (undated) DEVICE — VELYS ROBOT-ASSISTED SOLUTION ARRAY DRILL PIN 100 MM X 4 MM: Brand: VELYS

## (undated) DEVICE — PROXIMATE SKIN STAPLERS (35 WIDE) CONTAINS 35 STAINLESS STEEL STAPLES (FIXED HEAD): Brand: PROXIMATE

## (undated) DEVICE — SUT ETHILON 3-0 FS-1 18 IN 663G

## (undated) DEVICE — GLOVE INDICATOR PI UNDERGLOVE SZ 8.5 BLUE

## (undated) DEVICE — MONITORR™ ICP EXTERNAL CSF DRAINAGE AND MONITORING SYSTEM: Brand: MONITORR™

## (undated) DEVICE — RADIOLOGY STERILE LABELS: Brand: CENTURION

## (undated) DEVICE — DURASEAL 5ML

## (undated) DEVICE — PROVE COVER: Brand: UNBRANDED

## (undated) DEVICE — DRESSING MEPILEX AG BORDER POST-OP 4 X 12 IN

## (undated) DEVICE — HOOK ELASTIC STAY 12MM BLUNT SNGL STRL

## (undated) DEVICE — PAD GROUNDING DUAL ADULT

## (undated) DEVICE — SWABSTCK, BENZOIN TINCTURE, 1/PK, STRL: Brand: APLICARE

## (undated) DEVICE — VELYS ROBOT DRAPE

## (undated) DEVICE — DISPOSABLE SLIM BIPOLAR FORCEPS, NON-STICK,: Brand: SPETZLER-MALIS

## (undated) DEVICE — 450 ML BOTTLE OF 0.05% CHLORHEXIDINE GLUCONATE IN 99.95% STERILE WATER FOR IRRIGATION, USP AND APPLICATOR.: Brand: IRRISEPT ANTIMICROBIAL WOUND LAVAGE

## (undated) DEVICE — SPECIMEN CONTAINER STERILE PEEL PACK

## (undated) DEVICE — FIBER STD QUANTA 272 MICRON

## (undated) DEVICE — BANDAGE, ESMARK LF STR 6"X9' (20/CS): Brand: CYPRESS

## (undated) DEVICE — CHLORAPREP HI-LITE 26ML ORANGE

## (undated) DEVICE — TOWEL SET X-RAY

## (undated) DEVICE — DISPOSABLE EQUIPMENT COVER: Brand: LARGE TOWEL DRAPE

## (undated) DEVICE — STR UTILITY DRAPE PK, 4 PK: Brand: CARDINAL HEALTH

## (undated) DEVICE — ANTI-EMBOLISM STOCKINGS,THIGH LENGTH,LARGE,REGULAR,SIZE H: Brand: T.E.D.

## (undated) DEVICE — VELYS SATEL DRAPE

## (undated) DEVICE — DRAPE TOWEL: Brand: CONVERTORS

## (undated) DEVICE — 3M™ STERI-DRAPE™ U-DRAPE 1015: Brand: STERI-DRAPE™

## (undated) DEVICE — SUT SILK 2-0 18 IN A185H

## (undated) DEVICE — PETRI DISH STERILE

## (undated) DEVICE — GLOVE SRG BIOGEL 6

## (undated) DEVICE — SUT NUROLON 4-0 TF CR/8 18 IN C584D

## (undated) DEVICE — 1820 FOAM BLOCK NEEDLE COUNTER: Brand: DEVON

## (undated) DEVICE — TOOL MR8-9MH30 MR8 9CM MATCH 3MM: Brand: MIDAS REX MR8

## (undated) DEVICE — DRAPE INTESTINAL ISOLATION BAG

## (undated) DEVICE — HERMETIC™ LUMBAR CATHETER CLOSED TIP: Brand: HERMETIC™

## (undated) DEVICE — 3 BONE CEMENT MIXER: Brand: MIXEVAC

## (undated) DEVICE — INTENDED FOR TISSUE SEPARATION, AND OTHER PROCEDURES THAT REQUIRE A SHARP SURGICAL BLADE TO PUNCTURE OR CUT.: Brand: BARD-PARKER SAFETY BLADES SIZE 15, STERILE

## (undated) DEVICE — 3M™ TEGADERM™ TRANSPARENT FILM DRESSING FRAME STYLE, 1628, 6 IN X 8 IN (15 CM X 20 CM), 10/CT 8CT/CASE: Brand: 3M™ TEGADERM™

## (undated) DEVICE — PREP SURGICAL PURPREP 26ML

## (undated) DEVICE — SUT MONOCRYL PLUS 4-0 PS-2 18 IN MCP496G

## (undated) DEVICE — ANTI-EMBOLISM STOCKINGS,KNEE LENGTH,X-LARGE,REGULAR,SIZE G-: Brand: T.E.D.

## (undated) DEVICE — GLOVE INDICATOR PI UNDERGLOVE SZ 6.5 BLUE

## (undated) DEVICE — SURGICEL 4 X 8IN

## (undated) DEVICE — LIGHT HANDLE COVER SLEEVE DISP BLUE STELLAR

## (undated) DEVICE — MONITORING SPINAL IMPULSE CASE FEE

## (undated) DEVICE — NGAGE, NITINOL STONE EXTRACTOR MODIFIED BASKET: Brand: NGAGE